# Patient Record
Sex: FEMALE | Race: WHITE | NOT HISPANIC OR LATINO | Employment: OTHER | URBAN - METROPOLITAN AREA
[De-identification: names, ages, dates, MRNs, and addresses within clinical notes are randomized per-mention and may not be internally consistent; named-entity substitution may affect disease eponyms.]

---

## 2017-01-18 DIAGNOSIS — M1A.0790 IDIOPATHIC CHRONIC GOUT OF FOOT WITHOUT TOPHUS, UNSPECIFIED LATERALITY: Primary | ICD-10-CM

## 2017-01-18 RX ORDER — ALLOPURINOL 100 MG/1
200 TABLET ORAL DAILY
Qty: 180 TABLET | Refills: 3 | Status: SHIPPED | OUTPATIENT
Start: 2017-01-18 | End: 2017-06-01

## 2017-01-18 NOTE — TELEPHONE ENCOUNTER
Routing refill request to provider for review/approval because:  Labs out of range  Saida Bolton, RAUL [facility] Triage.

## 2017-01-18 NOTE — TELEPHONE ENCOUNTER
allopurinol (ZYLOPRIM) 100 MG tablet       Last Written Prescription Date: 1/19/16  Last Fill Quantity: 180, # refills: 3  Last Office Visit with [facility] or [facility] prescribing provider:  5/19/16        URIC ACID   Date Value Ref Range Status   05/19/2016 4.4 2.6 - 6.0 mg/dL Final   ]  CREATININE   Date Value Ref Range Status   05/19/2016 1.29* 0.52 - 1.04 mg/dL Final   05/05/2009 78  Final     Comment:     Unit: mg/dL   ]  WBC      7.9   1/14/2016  RBC     4.33   1/14/2016  HGB     14.7   1/14/2016  HCT     43.3   1/14/2016  No components found with this name: mct  MCV      100   1/14/2016  MCH     33.9   1/14/2016  MCHC     33.9   1/14/2016  RDW     13.1   1/14/2016  PLT      237   1/14/2016  AST       30   5/2/2014  ALT       27   5/2/2014

## 2017-01-30 DIAGNOSIS — I10 HYPERTENSION GOAL BP (BLOOD PRESSURE) < 140/90: Primary | ICD-10-CM

## 2017-01-30 DIAGNOSIS — N18.30 CKD (CHRONIC KIDNEY DISEASE) STAGE 3, GFR 30-59 ML/MIN (H): ICD-10-CM

## 2017-01-30 NOTE — TELEPHONE ENCOUNTER
furosemide (LASIX) 20 MG tablet      Last Written Prescription Date: 2/2/16  Last Fill Quantity: 180, # refills: 3  Last Office Visit with [facility] or [facility] prescribing provider: 5/19/16       POTASSIUM   Date Value Ref Range Status   05/19/2016 3.9 3.4 - 5.3 mmol/L Final     CREATININE   Date Value Ref Range Status   05/19/2016 1.29* 0.52 - 1.04 mg/dL Final   05/05/2009 78  Final     Comment:     Unit: mg/dL     BP Readings from Last 3 Encounters:   05/19/16 133/70   02/24/16 120/71   02/02/16 136/80

## 2017-01-31 RX ORDER — FUROSEMIDE 20 MG
20 TABLET ORAL 2 TIMES DAILY
Qty: 180 TABLET | Refills: 3 | Status: SHIPPED | OUTPATIENT
Start: 2017-01-31 | End: 2017-06-01

## 2017-02-03 ENCOUNTER — OFFICE VISIT (OUTPATIENT)
Dept: INTERNAL MEDICINE | Facility: CLINIC | Age: 82
End: 2017-02-03
Payer: COMMERCIAL

## 2017-02-03 VITALS
WEIGHT: 144 LBS | SYSTOLIC BLOOD PRESSURE: 128 MMHG | BODY MASS INDEX: 27.65 KG/M2 | TEMPERATURE: 97.8 F | HEART RATE: 100 BPM | DIASTOLIC BLOOD PRESSURE: 69 MMHG | OXYGEN SATURATION: 96 %

## 2017-02-03 DIAGNOSIS — E78.5 HYPERLIPIDEMIA LDL GOAL <130: ICD-10-CM

## 2017-02-03 DIAGNOSIS — R73.02 GLUCOSE INTOLERANCE (IMPAIRED GLUCOSE TOLERANCE): ICD-10-CM

## 2017-02-03 DIAGNOSIS — R21 RASH: ICD-10-CM

## 2017-02-03 DIAGNOSIS — N18.30 CKD (CHRONIC KIDNEY DISEASE) STAGE 3, GFR 30-59 ML/MIN (H): ICD-10-CM

## 2017-02-03 DIAGNOSIS — N20.0 KIDNEY STONES: ICD-10-CM

## 2017-02-03 DIAGNOSIS — M1A.00X0 IDIOPATHIC CHRONIC GOUT WITHOUT TOPHUS, UNSPECIFIED SITE: ICD-10-CM

## 2017-02-03 DIAGNOSIS — E03.8 OTHER SPECIFIED HYPOTHYROIDISM: ICD-10-CM

## 2017-02-03 DIAGNOSIS — I10 HYPERTENSION GOAL BP (BLOOD PRESSURE) < 140/90: Primary | ICD-10-CM

## 2017-02-03 PROCEDURE — 99213 OFFICE O/P EST LOW 20 MIN: CPT | Performed by: INTERNAL MEDICINE

## 2017-02-03 RX ORDER — MOMETASONE FUROATE 1 MG/G
CREAM TOPICAL
Qty: 15 G | Refills: 0 | Status: SHIPPED | OUTPATIENT
Start: 2017-02-03 | End: 2017-06-01

## 2017-02-03 RX ORDER — MOMETASONE FUROATE 1 MG/G
CREAM TOPICAL
Qty: 15 G | Refills: 0 | Status: SHIPPED | OUTPATIENT
Start: 2017-02-03 | End: 2017-02-03

## 2017-02-03 NOTE — MR AVS SNAPSHOT
After Visit Summary   2/3/2017    Helena Eldridge    MRN: [id number]           Patient Information     Date Of Birth          1935        Visit Information        Provider Department      2/3/2017 2:00 PM Sushma Nelson MD [facility]        Today's Diagnoses     Hypertension goal BP (blood pressure) < 140/90    -  1     CKD (chronic kidney disease) stage 3, GFR 30-59 ml/min         Glucose intolerance (impaired glucose tolerance)         Kidney stones         Hyperlipidemia LDL goal <130         Idiopathic chronic gout without tophus, unspecified site         Other specified hypothyroidism         Rash           Care Instructions    Apply ice/heat 2 - 3 times per day    Change your positioning.      Call to schedule imaging   -- CT scan kidneys prior to your next follow up            Follow-ups after your visit        Future tests that were ordered for you today     Open Future Orders        Priority Expected Expires Ordered    CT Abdomen Pelvis w/o Contrast Routine  2/3/2018 2/3/2017    Basic metabolic panel Routine  2/2/2018 2/3/2017    Lipid panel reflex to direct LDL Routine  2/2/2018 2/3/2017    Hemoglobin A1c Routine  2/3/2018 2/3/2017    TSH with free T4 reflex Routine  2/3/2018 2/3/2017    CBC with platelets Routine  2/3/2018 2/3/2017    Vitamin D Deficiency Routine  2/3/2018 2/3/2017    Albumin Random Urine Quantitative Routine 3/3/2017 2/3/2018 2/3/2017    Uric acid Routine  2/3/2018 2/3/2017            Who to contact     If you have questions or need follow up information about today's clinic visit or your schedule please contact [facility] directly at [phone].  Normal or non-critical lab and imaging results will be communicated to you by MyChart, letter or phone within 4 business days after the clinic has received the results. If you do not hear from us within 7 days, please contact the clinic through [employer] or  "phone. If you have a critical or abnormal lab result, we will notify you by phone as soon as possible.  Submit refill requests through [employer] or call your pharmacy and they will forward the refill request to us. Please allow 3 business days for your refill to be completed.          Additional Information About Your Visit        [employer] Information     [employer] lets you send messages to your doctor, view your test results, renew your prescriptions, schedule appointments and more. To sign up, go to [email or web address] . Click on \"Log in\" on the left side of the screen, which will take you to the Welcome page. Then click on \"Sign up Now\" on the right side of the page.     You will be asked to enter the access code listed below, as well as some personal information. Please follow the directions to create your username and password.     Your access code is: [email or web address]  Expires: 2017  2:34 PM     Your access code will  in 90 days. If you need help or a new code, please call your [place] clinic or [phone].        Care EveryWhere ID     This is your Care EveryWhere ID. This could be used by other organizations to access your [place] medical records  [id number]        Your Vitals Were     Pulse Temperature Pulse Oximetry             100 97.8  F (36.6  C) (Oral) 96%          Blood Pressure from Last 3 Encounters:   17 128/69   16 133/70   16 120/71    Weight from Last 3 Encounters:   17 144 lb (65.318 kg)   16 144 lb (65.318 kg)   16 145 lb (65.772 kg)                 Today's Medication Changes          These changes are accurate as of: 2/3/17  2:34 PM.  If you have any questions, ask your nurse or doctor.               These medicines have changed or have updated prescriptions.        Dose/Directions    * mometasone 0.1 % cream   Commonly known as:  ELOCON   This may have changed:  Another medication with the same name was added. Make sure you understand how and " when to take each.   Used for:  Rash   Changed by:  Sushma Nelson MD        Apply  topically daily as needed.   Quantity:  45 g   Refills:  1       * mometasone 0.1 % cream   Commonly known as:  ELOCON   This may have changed:  You were already taking a medication with the same name, and this prescription was added. Make sure you understand how and when to take each.   Used for:  Rash   Changed by:  Sushma Nelson MD        Apply sparingly to affected area twice daily for 14 days.  Do not apply to face.   Quantity:  15 g   Refills:  0       * Notice:  This list has 2 medication(s) that are the same as other medications prescribed for you. Read the directions carefully, and ask your doctor or other care provider to review them with you.         Where to get your medicines      Some of these will need a paper prescription and others can be bought over the counter.  Ask your nurse if you have questions.     Bring a paper prescription for each of these medications    - mometasone 0.1 % cream             Primary Care Provider Office Phone # Fax #    Sushma Nelson [phone]       [facility] [address] [place], [place]. \[facility] Outlook.\"" [id number]        Thank you!     Thank you for choosing [facility]  for your care. Our goal is always to provide you with excellent care. Hearing back from our patients is one way we can continue to improve our services. Please take a few minutes to complete the written survey that you may receive in the mail after your visit with us. Thank you!             Your Updated Medication List - Protect others around you: Learn how to safely use, store and throw away your medicines at www.disposemymeds.org.          This list is accurate as of: 2/3/17  2:34 PM.  Always use your most recent med list.                   Brand Name Dispense Instructions for use    albuterol 108 (90 BASE) MCG/ACT Inhaler    PROAIR HFA/PROVENTIL  HFA/VENTOLIN HFA    1 Inhaler    Inhale 2 puffs into the lungs every 6 hours as needed for shortness of breath / dyspnea or wheezing       allopurinol 100 MG tablet    ZYLOPRIM    180 tablet    Take 2 tablets (200 mg) by mouth daily       amLODIPine 5 MG tablet    NORVASC    180 tablet    Take 1 tablet (5 mg) by mouth 2 times daily       ammonium lactate 12 % cream    AMLACTIN    280 g    Apply  topically 2 times daily as needed. apply to affected area       ASPIRIN PO      Take 81 mg by mouth daily       BENADRYL 25 MG tablet   Generic drug:  diphenhydrAMINE      Take 25 mg by mouth At Bedtime       calcium + D 600-200 MG-UNIT Tabs   Generic drug:  calcium carbonate-vitamin D      Take 2 tablets by mouth 2 times daily.       clobetasol 0.05 % cream    TEMOVATE    45 g    Apply  topically daily as needed.       DOCUSATE SODIUM PO      Take 100 mg by mouth At Bedtime       furosemide 20 MG tablet    LASIX    180 tablet    Take 1 tablet (20 mg) by mouth 2 times daily (morning and lunch time)       ketoconazole 2 % cream    NIZORAL    15 g    Apply  topically daily.       levothyroxine 100 MCG tablet    SYNTHROID    90 tablet    Take 1 tablet (100 mcg) by mouth daily Except Sunday       * mometasone 0.1 % cream    ELOCON    45 g    Apply  topically daily as needed.       * mometasone 0.1 % cream    ELOCON    15 g    Apply sparingly to affected area twice daily for 14 days.  Do not apply to face.       OCUVITE ADULT 50+ Caps     30 capsule    Take 1 capsule by mouth daily       rOPINIRole 0.25 MG tablet    REQUIP    60 tablet    Take 1-2 tablets (0.25-0.5 mg) by mouth At Bedtime       STATIN NOT PRESCRIBED (INTENTIONAL)      by Other route continuous prn. Patient declined 5/4/12       * Notice:  This list has 2 medication(s) that are the same as other medications prescribed for you. Read the directions carefully, and ask your doctor or other care provider to review them with you.

## 2017-02-03 NOTE — PROGRESS NOTES
SUBJECTIVE:                                                    Helena Eldridge is a 81 year old female who presents to clinic today for the following health issues:      Pain behind left knee x2 weeks .  Aches when sitting and reading.  No problems with ambulation or sleep.      Meantime no hematuria or flank pain.  Feels very well.          Problem list and histories reviewed & adjusted, as indicated.  Additional history: as documented    Patient Active Problem List   Diagnosis     CARDIOVASCULAR SCREENING; LDL GOAL LESS THAN 100     CKD (chronic kidney disease) stage 3, GFR 30-59 ml/min     Glucose intolerance (impaired glucose tolerance)     Kidney stones     Advance care planning     Hypertension goal BP (blood pressure) < 140/90     Hyperlipidemia LDL goal <130     Lichen sclerosus et atrophicus of the vulva     Solitary kidney, acquired     Pulmonary nodule/lesion, solitary     Senile osteoporosis     Osteoarthritis of right knee     Medial meniscus tear     Cataracts, both eyes     Macular degeneration of both eyes, right eye greater than left eye     Pelvic fracture (H)     COPD, severe (H)     IPF (idiopathic pulmonary fibrosis) (H)     Other specified hypothyroidism     Idiopathic chronic gout     Past Surgical History   Procedure Laterality Date     C removal gallbladder       Hc revise median n/carpal tunnel surg       C venous thrombosis imaging       with pe     Tubal ligation       C nephrectomy       left partial 07-01-07       Social History   Substance Use Topics     Smoking status: Former Smoker     Types: Cigarettes     Quit date: 12/04/1969     Smokeless tobacco: Never Used     Alcohol Use: No      Comment: rare     Family History   Problem Relation Age of Onset     CANCER Brother      Glaucoma Mother      Macular Degeneration No family hx of      Alzheimer Disease Brother          Current Outpatient Prescriptions   Medication Sig Dispense Refill     mometasone (ELOCON) 0.1 % cream Apply  sparingly to affected area twice daily for 14 days.  Do not apply to face. 15 g 0     furosemide (LASIX) 20 MG tablet Take 1 tablet (20 mg) by mouth 2 times daily (morning and lunch time) 180 tablet 3     allopurinol (ZYLOPRIM) 100 MG tablet Take 2 tablets (200 mg) by mouth daily 180 tablet 3     ammonium lactate (AMLACTIN) 12 % cream Apply  topically 2 times daily as needed. apply to affected area 280 g 3     rOPINIRole (REQUIP) 0.25 MG tablet Take 1-2 tablets (0.25-0.5 mg) by mouth At Bedtime 60 tablet 1     levothyroxine (SYNTHROID) 100 MCG tablet Take 1 tablet (100 mcg) by mouth daily Except Sunday 90 tablet 3     amLODIPine (NORVASC) 5 MG tablet Take 1 tablet (5 mg) by mouth 2 times daily 180 tablet 3     Multiple Vitamins-Minerals (OCUVITE ADULT 50+) CAPS Take 1 capsule by mouth daily 30 capsule 12     DOCUSATE SODIUM PO Take 100 mg by mouth At Bedtime       ketoconazole (NIZORAL) 2 % cream Apply  topically daily. 15 g 3     mometasone (ELOCON) 0.1 % cream Apply  topically daily as needed. 45 g 1     diphenhydrAMINE (BENADRYL) 25 MG tablet Take 25 mg by mouth At Bedtime        Calcium Carbonate-Vitamin D (CALCIUM + D) 600-200 MG-UNIT per tablet Take 2 tablets by mouth 2 times daily.       clobetasol (TEMOVATE) 0.05 % cream Apply  topically daily as needed. 45 g 1     albuterol (PROAIR HFA, PROVENTIL HFA, VENTOLIN HFA) 108 (90 BASE) MCG/ACT inhaler Inhale 2 puffs into the lungs every 6 hours as needed for shortness of breath / dyspnea or wheezing 1 Inhaler 3     ASPIRIN PO Take 81 mg by mouth daily        STATIN NOT PRESCRIBED, INTENTIONAL, by Other route continuous prn. Patient declined 5/4/12  0     Allergies   Allergen Reactions     Ace Inhibitors Cough     Advicor      Doxycycline Nausea     Poor appetite     Fosamax      Severe substernal burning and dysphagia within 3 days     Valsartan Cramps     severe     Recent Labs   Lab Test  05/19/16   1104  03/16/16   0952   01/14/16   0955   05/19/15   0946    05/02/14   0823   04/24/13   0828   04/20/12   0907   A1C   --    --    --    --    --   5.4   --   5.3   --   5.2   --    --    LDL  126*   --    --    --    --   122   --   125   --   128   --   135*   HDL  41*   --    --    --    --   39*   --   30*   --   32*   --   30*   TRIG  228*   --    --    --    --   183*   --   216*   --   146   --   194*   ALT   --    --    --    --    --    --    --   27   --   24   --   22   CR  1.29*  1.33*   < >  1.22*   < >  1.16*   < >  1.17*   < >  1.20*   < >  1.34*   GFRESTIMATED  40*  38*   < >  42*   < >  45*   < >  45*   < >  44*   < >  38*   GFRESTBLACK  48*  46*   < >  51*   < >  54*   < >  54*   < >  53*   < >  47*   POTASSIUM  3.9  3.6   < >  3.6   < >  4.2   < >  4.2   < >  4.3   < >  4.3   TSH  1.36   --    --   1.15   --   1.01   --   3.31   --   0.62   --   0.35*    < > = values in this interval not displayed.      BP Readings from Last 3 Encounters:   02/03/17 128/69   05/19/16 133/70   02/24/16 120/71    Wt Readings from Last 3 Encounters:   02/03/17 144 lb (65.318 kg)   05/19/16 144 lb (65.318 kg)   02/24/16 145 lb (65.772 kg)                  Labs reviewed in EPIC  Problem list, Medication list, Allergies, and Medical/Social/Surgical histories reviewed in [facility] and updated as appropriate.    ROS:  Constitutional, HEENT, cardiovascular, pulmonary, gi and gu systems are negative, except as otherwise noted.    OBJECTIVE:                                                    /69 mmHg  Pulse 100  Temp(Src) 97.8  F (36.6  C) (Oral)  Wt 144 lb (65.318 kg)  SpO2 96%  Body mass index is 27.65 kg/(m^2).  GENERAL: healthy, alert and no distress  EYES: Eyes grossly normal to inspection, PERRL and conjunctivae and sclerae normal  MS: no gross musculoskeletal defects noted, no edema  MS:  Mild pain to palpation behind left knee, no palpable cyst, though patient has subjective sense of fullness.  No erythema or warmth.   SKIN: no suspicious lesions or rashes  NEURO:  Normal strength and tone, mentation intact and speech normal  PSYCH: mentation appears normal, affect normal/bright    Diagnostic Test Results:  Results for orders placed or performed in visit on 05/19/16   MA Screening Digital Bilateral    Narrative    SCREENING MAMMOGRAM, BILATERAL, DIGITAL w/CAD - 5/19/2016 12:16 PM    BREAST SYMPTOMS: No current breast complaints.     COMPARISON:  5-, 5-9-2014, 5-8-2013.    BREAST DENSITY: Scattered fibroglandular densities.    COMMENTS: No findings of suspicion for malignancy.            Impression    IMPRESSION: BI-RADS CATEGORY: 1 -  Negative    RECOMMENDED FOLLOW-UP: Annual Mammography.    Exam results letter mailed to patient.           FRANNY HERNANDEZ MD        ASSESSMENT/PLAN:                                                              ICD-10-CM    1. Hypertension goal BP (blood pressure) < 140/90 I10 Basic metabolic panel   2. CKD (chronic kidney disease) stage 3, GFR 30-59 ml/min N18.3 Basic metabolic panel     CBC with platelets     Vitamin D Deficiency     Albumin Random Urine Quantitative   3. Glucose intolerance (impaired glucose tolerance) R73.02 Hemoglobin A1c   4. Kidney stones N20.0 CT Abdomen Pelvis w/o Contrast   5. Hyperlipidemia LDL goal <130 E78.5 Lipid panel reflex to direct LDL   6. Idiopathic chronic gout without tophus, unspecified site [id number].00X0    7. Other specified hypothyroidism E03.8 TSH with free T4 reflex   8. Rash R21 Uric acid     mometasone (ELOCON) 0.1 % cream     DISCONTINUED: mometasone (ELOCON) 0.1 % cream     Conservative management for mild bakers cyst irritation, left.   Future orders for upcoming physical.        See Patient Instructions    Sushma Nelson MD  [facility]

## 2017-02-03 NOTE — NURSING NOTE
"Chief Complaint   Patient presents with     Pain     pain behind left knee x2 weeks      Health Maintenance     hgb,Dexa,fall risk        Initial /69 mmHg  Pulse 100  Temp(Src) 97.8  F (36.6  C) (Oral)  Wt 144 lb (65.318 kg)  SpO2 96% Estimated body mass index is 27.65 kg/(m^2) as calculated from the following:    Height as of 5/19/16: 5' 0.5\" (1.537 m).    Weight as of this encounter: 144 lb (65.318 kg).  BP completed using cuff size: regular  Deanna Watson ma      "

## 2017-02-03 NOTE — PATIENT INSTRUCTIONS
Apply ice/heat 2 - 3 times per day    Change your positioning.      Call to schedule imaging   -- CT scan kidneys prior to your next follow up

## 2017-02-13 ENCOUNTER — RADIANT APPOINTMENT (OUTPATIENT)
Dept: CT IMAGING | Facility: CLINIC | Age: 82
End: 2017-02-13
Attending: INTERNAL MEDICINE
Payer: COMMERCIAL

## 2017-02-13 DIAGNOSIS — N20.0 KIDNEY STONES: ICD-10-CM

## 2017-02-13 PROCEDURE — 74176 CT ABD & PELVIS W/O CONTRAST: CPT | Mod: TC

## 2017-02-13 NOTE — PROGRESS NOTES
Helena Eldridge    Here is your recent CT (stone protocol) report.  The two small left kidney stones are unchanged.  We will continue watchful waiting for now.      Thank you for completing this study!      Sincerely,     MANUELA MONREAL M.D.

## 2017-03-11 DIAGNOSIS — R25.2 CRAMP OF LIMB: ICD-10-CM

## 2017-03-13 RX ORDER — ROPINIROLE 0.25 MG/1
TABLET, FILM COATED ORAL
Qty: 60 TABLET | Refills: 1 | Status: SHIPPED | OUTPATIENT
Start: 2017-03-13 | End: 2017-06-01

## 2017-03-13 NOTE — TELEPHONE ENCOUNTER
Routing refill request to provider for review/approval because:  [facility] is not on [facility] refill protocol for med      Juli Figueroa RN  [facility]

## 2017-03-13 NOTE — TELEPHONE ENCOUNTER
rOPINIRole (REQUIP) 0.25 MG tablet     Last Written Prescription Date: 11/15/16  Last Fill Quantity: 60, # refills: 1  Last Office Visit with [facility], [facility] or [facility] prescribing provider: 2/3/17        BP Readings from Last 3 Encounters:   02/03/17 128/69   05/19/16 133/70   02/24/16 120/71

## 2017-04-10 DIAGNOSIS — R25.2 CRAMP OF LIMB: ICD-10-CM

## 2017-04-10 NOTE — TELEPHONE ENCOUNTER
rOPINIRole (REQUIP) 0.25 MG tablet     Last Written Prescription Date: 3-13-17  Last Fill Quantity: 60, # refills: 1  Last Office Visit with [facility], [facility] or [facility] prescribing provider: 2-3-17        BP Readings from Last 3 Encounters:   02/03/17 128/69   05/19/16 133/70   02/24/16 120/71

## 2017-04-11 RX ORDER — ROPINIROLE 0.25 MG/1
TABLET, FILM COATED ORAL
Qty: 60 TABLET | Refills: 1 | Status: SHIPPED | OUTPATIENT
Start: 2017-04-11 | End: 2017-06-01

## 2017-04-27 ENCOUNTER — OFFICE VISIT (OUTPATIENT)
Dept: AUDIOLOGY | Facility: CLINIC | Age: 82
End: 2017-04-27

## 2017-04-27 DIAGNOSIS — H90.3 SENSORINEURAL HEARING LOSS, BILATERAL: Primary | ICD-10-CM

## 2017-04-27 PROCEDURE — V5267 HEARING AID SUP/ACCESS/DEV: HCPCS | Performed by: AUDIOLOGIST

## 2017-04-27 PROCEDURE — 99207 ZZC NO CHARGE LOS: CPT | Performed by: AUDIOLOGIST

## 2017-04-27 NOTE — PROGRESS NOTES
HEARING AID RECHECK    Patient Name:  Helena Eldridge    Patient Age:   81 year old    :  1935    Background:   Patient is here to have her Widex BTE hearing aids cleaned and the tubing changed.     Procedures:   Re-tubed both hearing aids and fit to patient's ears. General cleaning of both hearing aids was performed. Biologic listening check finds the hearing aids sounding crisp and clear. Patient requested a new DryAid jar so she was provided one.     Plan:   Patient will return as needed for hearing aid concerns. Patient is now out of warranty for her hearing aids so all further visits will be at a charge. Patient is made aware of this and is in agreement.     CHARGES:    Hearing aid supply $15 for a DryAid Jar bill directly to patient.     James Lau CCC-A  Licensed Audiologist  2017

## 2017-04-27 NOTE — MR AVS SNAPSHOT
"              After Visit Summary   2017    Helena Eldridge    MRN: [id number]           Patient Information     Date Of Birth          1935        Visit Information        Provider Department      2017 10:00 AM [facility]        Today's Diagnoses     Sensorineural hearing loss, bilateral    -  1       Follow-ups after your visit        Who to contact     If you have questions or need follow up information about today's clinic visit or your schedule please contact [facility] directly at [phone].  Normal or non-critical lab and imaging results will be communicated to you by [employer], letter or phone within 4 business days after the clinic has received the results. If you do not hear from us within 7 days, please contact the clinic through [employer] or phone. If you have a critical or abnormal lab result, we will notify you by phone as soon as possible.  Submit refill requests through [employer] or call your pharmacy and they will forward the refill request to us. Please allow 3 business days for your refill to be completed.          Additional Information About Your Visit        MyChart Information     [employer] lets you send messages to your doctor, view your test results, renew your prescriptions, schedule appointments and more. To sign up, go to [email or web address] . Click on \"Log in\" on the left side of the screen, which will take you to the Welcome page. Then click on \"Sign up Now\" on the right side of the page.     You will be asked to enter the access code listed below, as well as some personal information. Please follow the directions to create your username and password.     Your access code is: [email or web address]  Expires: 2017  3:34 PM     Your access code will  in 90 days. If you need help or a new code, please call your [facility] or [phone].        Care EveryWhere ID     This is your Care EveryWhere ID. This could be used " by other organizations to access your [place] medical records  [id number]         Blood Pressure from Last 3 Encounters:   02/03/17 128/69   05/19/16 133/70   02/24/16 120/71    Weight from Last 3 Encounters:   02/03/17 144 lb (65.3 kg)   05/19/16 144 lb (65.3 kg)   02/24/16 145 lb (65.8 kg)              We Performed the Following     HEARING AID SUPPLY        Primary Care Provider Office Phone # Fax #    Sushma eNlson [phone]       [facility] [address]  [facility] [address]        Thank you!     Thank you for choosing [facility]  for your care. Our goal is always to provide you with excellent care. Hearing back from our patients is one way we can continue to improve our services. Please take a few minutes to complete the written survey that you may receive in the mail after your visit with us. Thank you!             Your Updated Medication List - Protect others around you: Learn how to safely use, store and throw away your medicines at www.disposemymeds.org.          This list is accurate as of: 4/27/17 10:34 AM.  Always use your most recent med list.                   Brand Name Dispense Instructions for use    albuterol 108 (90 BASE) MCG/ACT Inhaler    PROAIR HFA/PROVENTIL HFA/VENTOLIN HFA    1 Inhaler    Inhale 2 puffs into the lungs every 6 hours as needed for shortness of breath / dyspnea or wheezing       allopurinol 100 MG tablet    ZYLOPRIM    180 tablet    Take 2 tablets (200 mg) by mouth daily       amLODIPine 5 MG tablet    NORVASC    180 tablet    Take 1 tablet (5 mg) by mouth 2 times daily       ammonium lactate 12 % cream    AMLACTIN    280 g    Apply  topically 2 times daily as needed. apply to affected area       ASPIRIN PO      Take 81 mg by mouth daily       BENADRYL 25 MG tablet   Generic drug:  diphenhydrAMINE      Take 25 mg by mouth At Bedtime       calcium + D 600-200 MG-UNIT Tabs   Generic drug:  calcium  carbonate-vitamin D      Take 2 tablets by mouth 2 times daily.       clobetasol 0.05 % cream    TEMOVATE    45 g    Apply  topically daily as needed.       DOCUSATE SODIUM PO      Take 100 mg by mouth At Bedtime       furosemide 20 MG tablet    LASIX    180 tablet    Take 1 tablet (20 mg) by mouth 2 times daily (morning and lunch time)       ketoconazole 2 % cream    NIZORAL    15 g    Apply  topically daily.       levothyroxine 100 MCG tablet    SYNTHROID    90 tablet    Take 1 tablet (100 mcg) by mouth daily Except Sunday       * mometasone 0.1 % cream    ELOCON    45 g    Apply  topically daily as needed.       * mometasone 0.1 % cream    ELOCON    15 g    Apply sparingly to affected area twice daily for 14 days.  Do not apply to face.       OCUVITE ADULT 50+ Caps     30 capsule    Take 1 capsule by mouth daily       * rOPINIRole 0.25 MG tablet    REQUIP    60 tablet    TAKE 1-2 TABLETS BY MOUTH AT BEDTIME       * rOPINIRole 0.25 MG tablet    REQUIP    60 tablet    TAKE 1-2 TABLETS BY MOUTH AT BEDTIME       STATIN NOT PRESCRIBED (INTENTIONAL)      by Other route continuous prn. Patient declined 5/4/12       * Notice:  This list has 4 medication(s) that are the same as other medications prescribed for you. Read the directions carefully, and ask your doctor or other care provider to review them with you.

## 2017-05-24 ENCOUNTER — RADIANT APPOINTMENT (OUTPATIENT)
Dept: MAMMOGRAPHY | Facility: CLINIC | Age: 82
End: 2017-05-24
Payer: COMMERCIAL

## 2017-05-24 DIAGNOSIS — Z12.31 VISIT FOR SCREENING MAMMOGRAM: ICD-10-CM

## 2017-05-24 PROCEDURE — G0202 SCR MAMMO BI INCL CAD: HCPCS | Mod: TC

## 2017-05-30 DIAGNOSIS — I10 ESSENTIAL HYPERTENSION WITH GOAL BLOOD PRESSURE LESS THAN 140/90: ICD-10-CM

## 2017-05-30 NOTE — TELEPHONE ENCOUNTER
amLODIPine (NORVASC) 5 MG tablet      Last Written Prescription Date: 5-31-16  Last Fill Quantity: 180, # refills: 3    Last Office Visit with [facility], [facility] or [facility] prescribing provider:  2-3-17   Future Office Visit:    Next 5 appointments (look out 90 days)     Jun 01, 2017  1:40 PM CDT   PHYSICAL with Sushma Nelson MD   [facility]    [address] [place] [id number] [phone]                    BP Readings from Last 3 Encounters:   02/03/17 128/69   05/19/16 133/70   02/24/16 120/71

## 2017-06-01 ENCOUNTER — OFFICE VISIT (OUTPATIENT)
Dept: FAMILY MEDICINE | Facility: CLINIC | Age: 82
End: 2017-06-01
Payer: COMMERCIAL

## 2017-06-01 VITALS
SYSTOLIC BLOOD PRESSURE: 137 MMHG | HEIGHT: 60 IN | OXYGEN SATURATION: 93 % | WEIGHT: 139 LBS | DIASTOLIC BLOOD PRESSURE: 68 MMHG | TEMPERATURE: 98.4 F | BODY MASS INDEX: 27.29 KG/M2 | HEART RATE: 92 BPM

## 2017-06-01 DIAGNOSIS — Z00.00 ROUTINE GENERAL MEDICAL EXAMINATION AT A HEALTH CARE FACILITY: Primary | ICD-10-CM

## 2017-06-01 DIAGNOSIS — L85.3 DRY SKIN DERMATITIS: ICD-10-CM

## 2017-06-01 DIAGNOSIS — M1A.0790 IDIOPATHIC CHRONIC GOUT OF FOOT WITHOUT TOPHUS, UNSPECIFIED LATERALITY: ICD-10-CM

## 2017-06-01 DIAGNOSIS — M1A.00X0 IDIOPATHIC CHRONIC GOUT WITHOUT TOPHUS, UNSPECIFIED SITE: ICD-10-CM

## 2017-06-01 DIAGNOSIS — R21 RASH: ICD-10-CM

## 2017-06-01 DIAGNOSIS — Z90.5 SOLITARY KIDNEY, ACQUIRED: ICD-10-CM

## 2017-06-01 DIAGNOSIS — E03.8 OTHER SPECIFIED HYPOTHYROIDISM: ICD-10-CM

## 2017-06-01 DIAGNOSIS — I10 ESSENTIAL HYPERTENSION WITH GOAL BLOOD PRESSURE LESS THAN 140/90: ICD-10-CM

## 2017-06-01 DIAGNOSIS — I10 HYPERTENSION GOAL BP (BLOOD PRESSURE) < 140/90: ICD-10-CM

## 2017-06-01 DIAGNOSIS — Z63.6 CAREGIVER STRESS: ICD-10-CM

## 2017-06-01 DIAGNOSIS — E78.5 HYPERLIPIDEMIA LDL GOAL <130: ICD-10-CM

## 2017-06-01 DIAGNOSIS — N18.30 CKD (CHRONIC KIDNEY DISEASE) STAGE 3, GFR 30-59 ML/MIN (H): ICD-10-CM

## 2017-06-01 DIAGNOSIS — R25.2 CRAMP OF LIMB: ICD-10-CM

## 2017-06-01 DIAGNOSIS — H35.30 MACULAR DEGENERATION OF BOTH EYES: ICD-10-CM

## 2017-06-01 DIAGNOSIS — Z78.0 ASYMPTOMATIC POSTMENOPAUSAL STATUS: ICD-10-CM

## 2017-06-01 DIAGNOSIS — R73.02 GLUCOSE INTOLERANCE (IMPAIRED GLUCOSE TOLERANCE): ICD-10-CM

## 2017-06-01 DIAGNOSIS — N20.0 KIDNEY STONES: ICD-10-CM

## 2017-06-01 DIAGNOSIS — J44.9 COPD, SEVERE (H): ICD-10-CM

## 2017-06-01 LAB
ANION GAP SERPL CALCULATED.3IONS-SCNC: 7 MMOL/L (ref 3–14)
BUN SERPL-MCNC: 22 MG/DL (ref 7–30)
CALCIUM SERPL-MCNC: 10.2 MG/DL (ref 8.5–10.1)
CHLORIDE SERPL-SCNC: 100 MMOL/L (ref 94–109)
CHOLEST SERPL-MCNC: 217 MG/DL
CO2 SERPL-SCNC: 32 MMOL/L (ref 20–32)
CREAT SERPL-MCNC: 1.26 MG/DL (ref 0.52–1.04)
GFR SERPL CREATININE-BSD FRML MDRD: 41 ML/MIN/1.7M2
GLUCOSE SERPL-MCNC: 91 MG/DL (ref 70–99)
HBA1C MFR BLD: 5.7 % (ref 4.3–6)
HDLC SERPL-MCNC: 45 MG/DL
HGB BLD-MCNC: 14.6 G/DL (ref 11.7–15.7)
LDLC SERPL CALC-MCNC: 133 MG/DL
NONHDLC SERPL-MCNC: 172 MG/DL
POTASSIUM SERPL-SCNC: 3.1 MMOL/L (ref 3.4–5.3)
SODIUM SERPL-SCNC: 139 MMOL/L (ref 133–144)
TRIGL SERPL-MCNC: 193 MG/DL
TSH SERPL DL<=0.005 MIU/L-ACNC: 1.42 MU/L (ref 0.4–4)
URATE SERPL-MCNC: 4.9 MG/DL (ref 2.6–6)

## 2017-06-01 PROCEDURE — 84443 ASSAY THYROID STIM HORMONE: CPT | Performed by: INTERNAL MEDICINE

## 2017-06-01 PROCEDURE — 99397 PER PM REEVAL EST PAT 65+ YR: CPT | Performed by: INTERNAL MEDICINE

## 2017-06-01 PROCEDURE — 83036 HEMOGLOBIN GLYCOSYLATED A1C: CPT | Performed by: INTERNAL MEDICINE

## 2017-06-01 PROCEDURE — 99213 OFFICE O/P EST LOW 20 MIN: CPT | Mod: 25 | Performed by: INTERNAL MEDICINE

## 2017-06-01 PROCEDURE — 80048 BASIC METABOLIC PNL TOTAL CA: CPT | Performed by: INTERNAL MEDICINE

## 2017-06-01 PROCEDURE — 80061 LIPID PANEL: CPT | Performed by: INTERNAL MEDICINE

## 2017-06-01 PROCEDURE — 36415 COLL VENOUS BLD VENIPUNCTURE: CPT | Performed by: INTERNAL MEDICINE

## 2017-06-01 PROCEDURE — 85018 HEMOGLOBIN: CPT | Performed by: INTERNAL MEDICINE

## 2017-06-01 PROCEDURE — 84550 ASSAY OF BLOOD/URIC ACID: CPT | Performed by: INTERNAL MEDICINE

## 2017-06-01 RX ORDER — AMLODIPINE BESYLATE 5 MG/1
TABLET ORAL
Qty: 180 TABLET | Refills: 3 | Status: SHIPPED | OUTPATIENT
Start: 2017-06-01 | End: 2017-07-27

## 2017-06-01 RX ORDER — ROPINIROLE 0.25 MG/1
0.25 TABLET, FILM COATED ORAL DAILY
Qty: 90 TABLET | Refills: 3
Start: 2017-06-01 | End: 2017-07-27

## 2017-06-01 RX ORDER — ALBUTEROL SULFATE 90 UG/1
2 AEROSOL, METERED RESPIRATORY (INHALATION) EVERY 6 HOURS PRN
Qty: 2 INHALER | Refills: 3 | Status: SHIPPED | OUTPATIENT
Start: 2017-06-01 | End: 2019-03-17

## 2017-06-01 RX ORDER — CLOBETASOL PROPIONATE 0.5 MG/G
CREAM TOPICAL
Qty: 45 G | Refills: 1 | Status: SHIPPED | OUTPATIENT
Start: 2017-06-01 | End: 2019-11-18

## 2017-06-01 RX ORDER — MV-MN/OM3/DHA/EPA/FISH/LUT/ZEA 250-5-1 MG
1 CAPSULE ORAL DAILY
Qty: 30 CAPSULE | Refills: 12 | Status: SHIPPED | OUTPATIENT
Start: 2017-06-01

## 2017-06-01 RX ORDER — FUROSEMIDE 20 MG
20 TABLET ORAL 2 TIMES DAILY
Qty: 180 TABLET | Refills: 3 | Status: SHIPPED | OUTPATIENT
Start: 2017-06-01 | End: 2018-05-03

## 2017-06-01 RX ORDER — LEVOTHYROXINE SODIUM 100 UG/1
100 TABLET ORAL DAILY
Qty: 90 TABLET | Refills: 3 | Status: SHIPPED | OUTPATIENT
Start: 2017-06-01 | End: 2018-06-10

## 2017-06-01 RX ORDER — AMLODIPINE BESYLATE 5 MG/1
5 TABLET ORAL 2 TIMES DAILY
Qty: 180 TABLET | Refills: 3 | Status: SHIPPED | OUTPATIENT
Start: 2017-06-01 | End: 2017-11-20

## 2017-06-01 RX ORDER — AMMONIUM LACTATE 12 G/100G
CREAM TOPICAL
Qty: 280 G | Refills: 3 | Status: SHIPPED | OUTPATIENT
Start: 2017-06-01 | End: 2019-02-13

## 2017-06-01 RX ORDER — ALLOPURINOL 100 MG/1
200 TABLET ORAL DAILY
Qty: 180 TABLET | Refills: 3 | Status: SHIPPED | OUTPATIENT
Start: 2017-06-01 | End: 2018-07-11

## 2017-06-01 SDOH — SOCIAL STABILITY - SOCIAL INSECURITY: DEPENDENT RELATIVE NEEDING CARE AT HOME: Z63.6

## 2017-06-01 NOTE — MR AVS SNAPSHOT
After Visit Summary   6/1/2017    Helena Eldridge    MRN: [id number]           Patient Information     Date Of Birth          1935        Visit Information        Provider Department      6/1/2017 1:40 PM Sushma Nelson MD [facility]        Today's Diagnoses     Routine general medical examination at a health care facility    -  1    Caregiver stress        Kidney stones        Solitary kidney, acquired        COPD, severe (H)        Glucose intolerance (impaired glucose tolerance)        CKD (chronic kidney disease) stage 3, GFR 30-59 ml/min        Hypertension goal BP (blood pressure) < 140/90        Hyperlipidemia LDL goal <130        Other specified hypothyroidism        Idiopathic chronic gout without tophus, unspecified site        Asymptomatic postmenopausal status        Cramp of limb        Essential hypertension with goal blood pressure less than 140/90          Care Instructions      Preventive Health Recommendations    Female Ages 65 +    Yearly exam:     See your health care provider every year in order to  o Review health changes.   o Discuss preventive care.    o Review your medicines if your doctor has prescribed any.      You no longer need a yearly Pap test unless you've had an abnormal Pap test in the past 10 years. If you have vaginal symptoms, such as bleeding or discharge, be sure to talk with your provider about a Pap test.      Every 1 to 2 years, have a mammogram.  If you are over 69, talk with your health care provider about whether or not you want to continue having screening mammograms.      Every 10 years, have a colonoscopy. Or, have a yearly FIT test (stool test). These exams will check for colon cancer.       Have a cholesterol test every 5 years, or more often if your doctor advises it.       Have a diabetes test (fasting glucose) every three years. If you are at risk for diabetes, you should have this test more often.       At age 65,  have a bone density scan (DEXA) to check for osteoporosis (brittle bone disease).    Shots:    Get a flu shot each year.    Get a tetanus shot every 10 years.    Talk to your doctor about your pneumonia vaccines. There are now two you should receive - Pneumovax (PPSV 23) and Prevnar (PCV 13).    Talk to your doctor about the shingles vaccine.    Talk to your doctor about the hepatitis B vaccine.    Nutrition:     Eat at least 5 servings of fruits and vegetables each day.      Eat whole-grain bread, whole-wheat pasta and brown rice instead of white grains and rice.      Talk to your provider about Calcium and Vitamin D.     Lifestyle    Exercise at least 150 minutes a week (30 minutes a day, 5 days a week). This will help you control your weight and prevent disease.      Limit alcohol to one drink per day.      No smoking.       Wear sunscreen to prevent skin cancer.       See your dentist twice a year for an exam and cleaning.      See your eye doctor every 1 to 2 years to screen for conditions such as glaucoma, macular degeneration and cataracts.    Plan to see Dr Ramirez in Fall/Winter              Follow-ups after your visit        Follow-up notes from your care team     Return in about 6 months (around 12/1/2017) for Routine Visit.      Future tests that were ordered for you today     Open Future Orders        Priority Expected Expires Ordered    DEXA HIP/PELVIS/SPINE - Future Routine  5/19/2018 6/1/2017            Who to contact     If you have questions or need follow up information about today's clinic visit or your schedule please contact [facility] directly at [phone].  Normal or non-critical lab and imaging results will be communicated to you by MyChart, letter or phone within 4 business days after the clinic has received the results. If you do not hear from us within 7 days, please contact the clinic through MyChart or phone. If you have a critical or abnormal lab result, we will  "notify you by phone as soon as possible.  Submit refill requests through [employer] or call your pharmacy and they will forward the refill request to us. Please allow 3 business days for your refill to be completed.          Additional Information About Your Visit        [employer] Information     [employer] lets you send messages to your doctor, view your test results, renew your prescriptions, schedule appointments and more. To sign up, go to [email or web address] . Click on \"Log in\" on the left side of the screen, which will take you to the Welcome page. Then click on \"Sign up Now\" on the right side of the page.     You will be asked to enter the access code listed below, as well as some personal information. Please follow the directions to create your username and password.     Your access code is: [email or web address]  Expires: 2017  2:34 PM     Your access code will  in 90 days. If you need help or a new code, please call your [place] clinic or [phone].        Care EveryWhere ID     This is your Care EveryWhere ID. This could be used by other organizations to access your [place] medical records  [id number]        Your Vitals Were     Pulse Temperature Height Pulse Oximetry BMI (Body Mass Index)       92 98.4  F (36.9  C) (Oral) 5' (1.524 m) 93% 27.15 kg/m2        Blood Pressure from Last 3 Encounters:   17 137/68   17 128/69   16 133/70    Weight from Last 3 Encounters:   17 139 lb (63 kg)   17 144 lb (65.3 kg)   16 144 lb (65.3 kg)              We Performed the Following     Albumin Random Urine Quantitative     BASIC METABOLIC PANEL     Hemoglobin A1c     Hemoglobin     Lipid panel reflex to direct LDL     TSH with free T4 reflex     Uric acid          Today's Medication Changes          These changes are accurate as of: 17  2:34 PM.  If you have any questions, ask your nurse or doctor.               These medicines have changed or have updated prescriptions.     "    Dose/Directions    rOPINIRole 0.25 MG tablet   Commonly known as:  REQUIP   This may have changed:  See the new instructions.   Used for:  Cramp of limb   Changed by:  Sushma Nelson MD        Dose:  0.25 mg   Take 1 tablet (0.25 mg) by mouth daily   Quantity:  90 tablet   Refills:  3            Where to get your medicines      Some of these will need a paper prescription and others can be bought over the counter.  Ask your nurse if you have questions.     You don't need a prescription for these medications     rOPINIRole 0.25 MG tablet                Primary Care Provider Office Phone # Fax #    Sushma Nelson [phone]       [facility] [address]  [facility] [address]        Thank you!     Thank you for choosing [facility]  for your care. Our goal is always to provide you with excellent care. Hearing back from our patients is one way we can continue to improve our services. Please take a few minutes to complete the written survey that you may receive in the mail after your visit with us. Thank you!             Your Updated Medication List - Protect others around you: Learn how to safely use, store and throw away your medicines at www.disposemymeds.org.          This list is accurate as of: 6/1/17  2:34 PM.  Always use your most recent med list.                   Brand Name Dispense Instructions for use    albuterol 108 (90 BASE) MCG/ACT Inhaler    PROAIR HFA/PROVENTIL HFA/VENTOLIN HFA    1 Inhaler    Inhale 2 puffs into the lungs every 6 hours as needed for shortness of breath / dyspnea or wheezing       allopurinol 100 MG tablet    ZYLOPRIM    180 tablet    Take 2 tablets (200 mg) by mouth daily       amLODIPine 5 MG tablet    NORVASC    180 tablet    Take 1 tablet (5 mg) by mouth 2 times daily       ammonium lactate 12 % cream    AMLACTIN    280 g    Apply  topically 2 times daily as needed. apply to affected area       ASPIRIN  PO      Take 81 mg by mouth daily       BENADRYL 25 MG tablet   Generic drug:  diphenhydrAMINE      Take 25 mg by mouth At Bedtime       calcium + D 600-200 MG-UNIT Tabs   Generic drug:  calcium carbonate-vitamin D      Take 2 tablets by mouth 2 times daily.       clobetasol 0.05 % cream    TEMOVATE    45 g    Apply  topically daily as needed.       DOCUSATE SODIUM PO      Take 100 mg by mouth At Bedtime       furosemide 20 MG tablet    LASIX    180 tablet    Take 1 tablet (20 mg) by mouth 2 times daily (morning and lunch time)       ketoconazole 2 % cream    NIZORAL    15 g    Apply  topically daily.       levothyroxine 100 MCG tablet    SYNTHROID    90 tablet    Take 1 tablet (100 mcg) by mouth daily Except Sunday       mometasone 0.1 % cream    ELOCON    45 g    Apply  topically daily as needed.       OCUVITE ADULT 50+ Caps     30 capsule    Take 1 capsule by mouth daily       rOPINIRole 0.25 MG tablet    REQUIP    90 tablet    Take 1 tablet (0.25 mg) by mouth daily       STATIN NOT PRESCRIBED (INTENTIONAL)      by Other route continuous prn. Patient declined 5/4/12

## 2017-06-01 NOTE — LETTER
[facility]  [address] [place]  [address]  [phone]        June 2, 2017    Helena Eldridge  [address] [facility] [address]        Dear Melonie Malik are your results.  Your labs are normal/stable at this time.     Please call  with any questions.  We can also discuss any questions regarding these labs at your next scheduled visit.    Results for orders placed or performed in visit on 06/01/17   BASIC METABOLIC PANEL   Result Value Ref Range    Sodium 139 133 - 144 mmol/L    Potassium 3.1 (L) 3.4 - 5.3 mmol/L    Chloride 100 94 - 109 mmol/L    Carbon Dioxide 32 20 - 32 mmol/L    Anion Gap 7 3 - 14 mmol/L    Glucose 91 70 - 99 mg/dL    Urea Nitrogen 22 7 - 30 mg/dL    Creatinine 1.26 (H) 0.52 - 1.04 mg/dL    GFR Estimate 41 (L) >60 mL/min/1.7m2    GFR Estimate If Black 49 (L) >60 mL/min/1.7m2    Calcium 10.2 (H) 8.5 - 10.1 mg/dL   Hemoglobin   Result Value Ref Range    Hemoglobin 14.6 11.7 - 15.7 g/dL   Lipid panel reflex to direct LDL   Result Value Ref Range    Cholesterol 217 (H) <200 mg/dL    Triglycerides 193 (H) <150 mg/dL    HDL Cholesterol 45 (L) >49 mg/dL    LDL Cholesterol Calculated 133 (H) <100 mg/dL    Non HDL Cholesterol 172 (H) <130 mg/dL   TSH with free T4 reflex   Result Value Ref Range    TSH 1.42 0.40 - 4.00 mU/L   Hemoglobin A1c   Result Value Ref Range    Hemoglobin A1C 5.7 4.3 - 6.0 %   Uric acid   Result Value Ref Range    Uric Acid 4.9 2.6 - 6.0 mg/dL       If you have any questions please call the clinic at [phone].    Sincerely,    Sushma ZAMBRANO

## 2017-06-01 NOTE — NURSING NOTE
Chief Complaint   Patient presents with     Physical     Health Maintenance     Hgb,Dexa,eye,tsh,BMP,lipid,microalbumin        Initial /68 (BP Location: Right arm, Patient Position: Chair, Cuff Size: Adult Regular)  Pulse 92  Temp 98.4  F (36.9  C) (Oral)  Ht 5' (1.524 m)  Wt 139 lb (63 kg)  SpO2 93%  BMI 27.15 kg/m2 Estimated body mass index is 27.15 kg/(m^2) as calculated from the following:    Height as of this encounter: 5' (1.524 m).    Weight as of this encounter: 139 lb (63 kg).  Medication Reconciliation: complete   Deanna Watson ma

## 2017-06-01 NOTE — PROGRESS NOTES
SUBJECTIVE:                                                            Helena Eldridge is a 81 year old female who presents for Preventive Visit.    STRESS:  Significant other has metastatic prostate cancer, recently diagnosed.  Patient is trying to manage his pain.  She feels she has resilience and support from friends/family  Are you in the first 12 months of your Medicare Part B coverage?  No    Healthy Habits:    Do you get at least three servings of calcium containing foods daily (dairy, green leafy vegetables, etc.)? no, taking calcium and/or vitamin D supplement: yes -     Amount of exercise or daily activities, outside of work: 0 day(s) per week    Problems taking medications regularly No    Medication side effects: No    Have you had an eye exam in the past two years? yes    Do you see a dentist twice per year? no    Do you have sleep apnea, excessive snoring or daytime drowsiness?no    COGNITIVE SCREEN  1) Repeat 3 items (Banana, Sunrise, Chair)    2) Clock draw: NORMAL  3) 3 item recall: Recalls 3 objects  Results: 3 items recalled: COGNITIVE IMPAIRMENT LESS LIKELY    Mini-CogTM Copyright TIARA Cam. Licensed by the author for use in [facility]; reprinted with permission (delilah@[email or web address]). All rights reserved.            No concerns    Reviewed and updated as needed this visit by clinical staff  Tobacco  Allergies  Med Hx  Surg Hx  Fam Hx  Soc Hx        Reviewed and updated as needed this visit by Provider        Social History   Substance Use Topics     Smoking status: Former Smoker     Types: Cigarettes     Quit date: 12/4/1969     Smokeless tobacco: Never Used     Alcohol use No      Comment: rare       The patient does not drink >3 drinks per day nor >7 drinks per week.    Today's PHQ-2 Score:   PHQ-2 ( 1999 [employer]) 2/3/2017 5/19/2016   Q1: Little interest or pleasure in doing things 0 0   Q2: Feeling down, depressed or hopeless 0 0   PHQ-2 Score 0 0     PHQ 2 = 0  Do you feel safe in  your environment - Yes    Do you have a Health Care Directive?: Yes: Advance Directive has been received and scanned.    Current providers sharing in care for this patient include:   Patient Care Team:  Sushma Nelson MD as PCP - Monica Oliver as       Hearing impairment: Yes, has hearing aids    Ability to successfully perform activities of daily living: Yes, no assistance needed     Fall risk:       Home safety:  none identified      The following health maintenance items are reviewed in Epic and correct as of today:  Health Maintenance   Topic Date Due     HEMOGLOBIN Q1 YR  01/14/2017     DEXA Q3 YR  02/28/2017     EYE EXAM Q1 YEAR  05/04/2017     TSH Q1 YEAR  05/19/2017     BMP Q1 YR  05/19/2017     LIPID MONITORING Q1 YEAR  05/19/2017     MICROALBUMIN Q1 YEAR  05/19/2017     INFLUENZA VACCINE (SYSTEM ASSIGNED)  09/01/2017     FALL RISK ASSESSMENT  02/03/2018     TETANUS IMMUNIZATION (SYSTEM ASSIGNED)  04/30/2020     ADVANCE DIRECTIVE PLANNING Q5 YRS  04/19/2021     PNEUMOCOCCAL  Completed               ROS:  Constitutional, HEENT, cardiovascular, pulmonary, GI, , musculoskeletal, neuro, skin, endocrine and psych systems are negative, except as otherwise noted.    Problem list, Medication list, Allergies, and Medical/Social/Surgical histories reviewed in EPIC and updated as appropriate.  Labs reviewed in EPIC  BP Readings from Last 3 Encounters:   06/01/17 137/68   02/03/17 128/69   05/19/16 133/70    Wt Readings from Last 3 Encounters:   06/01/17 139 lb (63 kg)   02/03/17 144 lb (65.3 kg)   05/19/16 144 lb (65.3 kg)                  Patient Active Problem List   Diagnosis     CARDIOVASCULAR SCREENING; LDL GOAL LESS THAN 100     CKD (chronic kidney disease) stage 3, GFR 30-59 ml/min     Glucose intolerance (impaired glucose tolerance)     Kidney stones     Advance care planning     Hypertension goal BP (blood pressure) < 140/90     Hyperlipidemia LDL goal <130     Lichen  sclerosus et atrophicus of the vulva     Solitary kidney, acquired     Pulmonary nodule/lesion, solitary     Senile osteoporosis     Osteoarthritis of right knee     Medial meniscus tear     Cataracts, both eyes     Macular degeneration of both eyes, right eye greater than left eye     Pelvic fracture (H)     COPD, severe (H)     IPF (idiopathic pulmonary fibrosis) (H)     Other specified hypothyroidism     Idiopathic chronic gout     Past Surgical History:   Procedure Laterality Date     C NEPHRECTOMY      left partial 07-01-07     C REMOVAL GALLBLADDER       C VENOUS THROMBOSIS IMAGING      with pe     HC REVISE MEDIAN N/CARPAL TUNNEL SURG       TUBAL LIGATION         Social History   Substance Use Topics     Smoking status: Former Smoker     Types: Cigarettes     Quit date: 12/4/1969     Smokeless tobacco: Never Used     Alcohol use No      Comment: rare     Family History   Problem Relation Age of Onset     CANCER Brother      Glaucoma Mother      Alzheimer Disease Brother      Macular Degeneration No family hx of          Current Outpatient Prescriptions   Medication Sig Dispense Refill     rOPINIRole (REQUIP) 0.25 MG tablet TAKE 1-2 TABLETS BY MOUTH AT BEDTIME (Patient taking differently: QD) 60 tablet 1     furosemide (LASIX) 20 MG tablet Take 1 tablet (20 mg) by mouth 2 times daily (morning and lunch time) 180 tablet 3     allopurinol (ZYLOPRIM) 100 MG tablet Take 2 tablets (200 mg) by mouth daily 180 tablet 3     ammonium lactate (AMLACTIN) 12 % cream Apply  topically 2 times daily as needed. apply to affected area 280 g 3     clobetasol (TEMOVATE) 0.05 % cream Apply  topically daily as needed. 45 g 1     levothyroxine (SYNTHROID) 100 MCG tablet Take 1 tablet (100 mcg) by mouth daily Except Sunday 90 tablet 3     amLODIPine (NORVASC) 5 MG tablet Take 1 tablet (5 mg) by mouth 2 times daily 180 tablet 3     Multiple Vitamins-Minerals (OCUVITE ADULT 50+) CAPS Take 1 capsule by mouth daily 30 capsule 12      albuterol (PROAIR HFA, PROVENTIL HFA, VENTOLIN HFA) 108 (90 BASE) MCG/ACT inhaler Inhale 2 puffs into the lungs every 6 hours as needed for shortness of breath / dyspnea or wheezing 1 Inhaler 3     DOCUSATE SODIUM PO Take 100 mg by mouth At Bedtime       ketoconazole (NIZORAL) 2 % cream Apply  topically daily. 15 g 3     mometasone (ELOCON) 0.1 % cream Apply  topically daily as needed. 45 g 1     diphenhydrAMINE (BENADRYL) 25 MG tablet Take 25 mg by mouth At Bedtime        Calcium Carbonate-Vitamin D (CALCIUM + D) 600-200 MG-UNIT per tablet Take 2 tablets by mouth 2 times daily.       [DISCONTINUED] rOPINIRole (REQUIP) 0.25 MG tablet TAKE 1-2 TABLETS BY MOUTH AT BEDTIME (Patient taking differently: QD) 60 tablet 1     ASPIRIN PO Take 81 mg by mouth daily        STATIN NOT PRESCRIBED, INTENTIONAL, by Other route continuous prn. Patient declined 5/4/12  0     Allergies   Allergen Reactions     Ace Inhibitors Cough     Advicor      Doxycycline Nausea     Poor appetite     Fosamax      Severe substernal burning and dysphagia within 3 days     Valsartan Cramps     severe     Recent Labs   Lab Test  05/19/16   1104  03/16/16   0952   01/14/16   0955   05/19/15   0946   05/02/14   0823   04/24/13   0828   04/20/12   0907   A1C   --    --    --    --    --   5.4   --   5.3   --   5.2   --    --    LDL  126*   --    --    --    --   122   --   125   --   128   --   135*   HDL  41*   --    --    --    --   39*   --   30*   --   32*   --   30*   TRIG  228*   --    --    --    --   183*   --   216*   --   146   --   194*   ALT   --    --    --    --    --    --    --   27   --   24   --   22   CR  1.29*  1.33*   < >  1.22*   < >  1.16*   < >  1.17*   < >  1.20*   < >  1.34*   GFRESTIMATED  40*  38*   < >  42*   < >  45*   < >  45*   < >  44*   < >  38*   GFRESTBLACK  48*  46*   < >  51*   < >  54*   < >  54*   < >  53*   < >  47*   POTASSIUM  3.9  3.6   < >  3.6   < >  4.2   < >  4.2   < >  4.3   < >  4.3   TSH  1.36   --    --    1.15   --   1.01   --   3.31   --   0.62   --   0.35*    < > = values in this interval not displayed.      OBJECTIVE:                                                            /68 (BP Location: Right arm, Patient Position: Chair, Cuff Size: Adult Regular)  Pulse 92  Temp 98.4  F (36.9  C) (Oral)  Ht 5' (1.524 m)  Wt 139 lb (63 kg)  SpO2 93%  BMI 27.15 kg/m2 Estimated body mass index is 27.15 kg/(m^2) as calculated from the following:    Height as of this encounter: 5' (1.524 m).    Weight as of this encounter: 139 lb (63 kg).  EXAM:   GENERAL APPEARANCE: healthy, alert and no distress  EYES: Eyes grossly normal to inspection, PERRL and conjunctivae and sclerae normal  HENT: ear canals and TM's normal, nose and mouth without ulcers or lesions, oropharynx clear and oral mucous membranes moist  NECK: no adenopathy, no asymmetry, masses, or scars and thyroid normal to palpation  RESP: lungs clear to auscultation - no rales, rhonchi or wheezes  BREAST: normal without masses, tenderness or nipple discharge and no palpable axillary masses or adenopathy  CV: regular rate and rhythm, normal S1 S2, no S3 or S4, no murmur, click or rub, no peripheral edema and peripheral pulses strong  ABDOMEN: soft, nontender, no hepatosplenomegaly, no masses and bowel sounds normal  ABDOMEN:  Rosalia scar.  Bilateral kidney surgery scars (stone right, pyelo left)   (female): normal urethral meatus, vaginal mucosal atrophy noted, normal cervix, adnexae, and uterus without masses. and severe lichen sclerosis  MS: no musculoskeletal defects are noted and gait is age appropriate without ataxia  SKIN: no suspicious lesions or rashes  NEURO: Normal strength and tone, sensory exam grossly normal, mentation intact and speech normal  PSYCH: mentation appears normal and affect normal/bright    ASSESSMENT / PLAN:                                                                ICD-10-CM    1. Routine general medical examination at a [facility]  care facility Z00.00    2. Caregiver stress Z63.6    3. Kidney stones N20.0    4. Solitary kidney, acquired Z90.5 BASIC METABOLIC PANEL   5. COPD, severe (H) J44.9    6. Glucose intolerance (impaired glucose tolerance) R73.02 Hemoglobin A1c   7. CKD (chronic kidney disease) stage 3, GFR 30-59 ml/min N18.3 BASIC METABOLIC PANEL     Hemoglobin     Albumin Random Urine Quantitative   8. Hypertension goal BP (blood pressure) < 140/90 I10    9. Hyperlipidemia LDL goal <130 E78.5 Lipid panel reflex to direct LDL   10. Other specified hypothyroidism E03.8 TSH with free T4 reflex   11. Idiopathic chronic gout without tophus, unspecified site M1A.00X0 Uric acid   12. Asymptomatic postmenopausal status Z78.0 DEXA HIP/PELVIS/SPINE - Future          End of Life Planning:  Patient currently has an advanced directive: Yes.  Practitioner is supportive of decision.    COUNSELING:  Reviewed preventive health counseling, as reflected in patient instructions       Regular exercise        Estimated body mass index is 27.15 kg/(m^2) as calculated from the following:    Height as of this encounter: 5' (1.524 m).    Weight as of this encounter: 139 lb (63 kg).     reports that she quit smoking about 47 years ago. Her smoking use included Cigarettes. She has never used smokeless tobacco.      Appropriate preventive services were discussed with this patient, including applicable screening as appropriate for cardiovascular disease, diabetes, osteopenia/osteoporosis, and glaucoma.  As appropriate for age/gender, discussed screening for colorectal cancer, prostate cancer, breast cancer, and cervical cancer. Checklist reviewing preventive services available has been given to the patient.    Reviewed patients plan of care and provided an AVS. The Basic Care Plan (routine screening as documented in Health Maintenance) for Helena meets the Care Plan requirement. This Care Plan has been established and reviewed with the Patient.    Counseling  Resources:  ATP IV Guidelines  Pooled Cohorts Equation Calculator  Breast Cancer Risk Calculator  FRAX Risk Assessment  ICSI Preventive Guidelines  Dietary Guidelines for Americans, 2010  USDA's MyPlate  ASA Prophylaxis  Lung CA Screening    Sushma Nelson MD  [facility]

## 2017-06-01 NOTE — PATIENT INSTRUCTIONS

## 2017-06-02 NOTE — PROGRESS NOTES
Helena Eldridge      Enclosed are your results.  Your labs are normal/stable at this time.     Please call  with any questions.  We can also discuss any questions regarding these labs at your next scheduled visit.    Sincerely,    Sushma Nelson M.D.

## 2017-06-02 NOTE — TELEPHONE ENCOUNTER
Patient was just seen today, advised routine visit in 6 months.    BP Readings from Last 3 Encounters:   06/01/17 137/68   02/03/17 128/69   05/19/16 133/70     Prescription approved per [facility] Refill Protocol.    Yanique Fritz RN  [facility]

## 2017-06-12 DIAGNOSIS — R25.2 CRAMP OF LIMB: ICD-10-CM

## 2017-06-12 RX ORDER — ROPINIROLE 0.25 MG/1
TABLET, FILM COATED ORAL
Qty: 60 TABLET | Refills: 1 | Status: SHIPPED | OUTPATIENT
Start: 2017-06-12 | End: 2018-04-11

## 2017-06-12 NOTE — TELEPHONE ENCOUNTER
rOPINIRole (REQUIP) 0.25 MG tablet     Last Written Prescription Date: 6-1-17  Last Fill Quantity: 90, # refills: 3  Last Office Visit with [facility], [facility] or [facility] prescribing provider: 6-1-17        BP Readings from Last 3 Encounters:   06/01/17 137/68   02/03/17 128/69   05/19/16 133/70

## 2017-06-12 NOTE — TELEPHONE ENCOUNTER
"Routing refill request to provider for review/approval because:  Last Rx was \"no print out\" so not actually sent, RN unable to refill.  Yanique Fritz RN  [facility]            "

## 2017-07-27 ENCOUNTER — OFFICE VISIT (OUTPATIENT)
Dept: FAMILY MEDICINE | Facility: CLINIC | Age: 82
End: 2017-07-27
Payer: COMMERCIAL

## 2017-07-27 VITALS
WEIGHT: 138 LBS | DIASTOLIC BLOOD PRESSURE: 65 MMHG | SYSTOLIC BLOOD PRESSURE: 129 MMHG | BODY MASS INDEX: 26.95 KG/M2 | OXYGEN SATURATION: 93 % | HEART RATE: 87 BPM | TEMPERATURE: 97.9 F

## 2017-07-27 DIAGNOSIS — Z90.5 SOLITARY KIDNEY, ACQUIRED: ICD-10-CM

## 2017-07-27 DIAGNOSIS — F43.29 ADJUSTMENT DISORDER WITH MIXED EMOTIONAL FEATURES: Primary | ICD-10-CM

## 2017-07-27 DIAGNOSIS — N20.0 KIDNEY STONES: ICD-10-CM

## 2017-07-27 DIAGNOSIS — N18.30 CKD (CHRONIC KIDNEY DISEASE) STAGE 3, GFR 30-59 ML/MIN (H): ICD-10-CM

## 2017-07-27 DIAGNOSIS — M25.512 LEFT SHOULDER PAIN, UNSPECIFIED CHRONICITY: ICD-10-CM

## 2017-07-27 DIAGNOSIS — E87.6 LOW BLOOD POTASSIUM: ICD-10-CM

## 2017-07-27 PROCEDURE — 80048 BASIC METABOLIC PNL TOTAL CA: CPT | Performed by: INTERNAL MEDICINE

## 2017-07-27 PROCEDURE — 99214 OFFICE O/P EST MOD 30 MIN: CPT | Performed by: INTERNAL MEDICINE

## 2017-07-27 PROCEDURE — 82043 UR ALBUMIN QUANTITATIVE: CPT | Performed by: INTERNAL MEDICINE

## 2017-07-27 PROCEDURE — 36415 COLL VENOUS BLD VENIPUNCTURE: CPT | Performed by: INTERNAL MEDICINE

## 2017-07-27 NOTE — MR AVS SNAPSHOT
"              After Visit Summary   7/27/2017    Helena Eldridge    MRN: [id number]           Patient Information     Date Of Birth          1935        Visit Information        Provider Department      7/27/2017 5:00 PM Sushma Nelson MD [facility]        Today's Diagnoses     Adjustment disorder with mixed emotional features    -  1    Left shoulder pain, unspecified chronicity        Kidney stones        CKD (chronic kidney disease) stage 3, GFR 30-59 ml/min        Solitary kidney, acquired        Low blood potassium          Care Instructions    \"ABC's\" and \"wall crawl\" three times daily    Diclofenac gel up to twice daily  ... You can also try over the counter aspercreme for example..     Consider injection to left shoulder (with me or you can request orthopedic referral)  ---------------------------------------------------------------------  due to see Dr Ramirez this Fall , follow up stones              Follow-ups after your visit        Follow-up notes from your care team     Return if symptoms worsen or fail to improve.      Who to contact     If you have questions or need follow up information about today's clinic visit or your schedule please contact [facility] directly at [phone].  Normal or non-critical lab and imaging results will be communicated to you by Dragon Armyhart, letter or phone within 4 business days after the clinic has received the results. If you do not hear from us within 7 days, please contact the clinic through Friends Aroundt or phone. If you have a critical or abnormal lab result, we will notify you by phone as soon as possible.  Submit refill requests through Wyzerr or call your pharmacy and they will forward the refill request to us. Please allow 3 business days for your refill to be completed.          Additional Information About Your Visit        Dragon ArmyharValued Relationships Information     Wyzerr lets you send messages to your doctor, view your test " "results, renew your prescriptions, schedule appointments and more. To sign up, go to [email or web address] . Click on \"Log in\" on the left side of the screen, which will take you to the Welcome page. Then click on \"Sign up Now\" on the right side of the page.     You will be asked to enter the access code listed below, as well as some personal information. Please follow the directions to create your username and password.     Your access code is: [email or web address]  Expires: 2017  2:34 PM     Your access code will  in 90 days. If you need help or a new code, please call your [place] clinic or [phone].        Care EveryWhere ID     This is your Care EveryWhere ID. This could be used by other organizations to access your [place] medical records  [id number]        Your Vitals Were     Pulse Temperature Pulse Oximetry BMI (Body Mass Index)          87 97.9  F (36.6  C) (Oral) 93% 26.95 kg/m2         Blood Pressure from Last 3 Encounters:   17 129/65   17 137/68   17 128/69    Weight from Last 3 Encounters:   17 138 lb (62.6 kg)   17 139 lb (63 kg)   17 144 lb (65.3 kg)              We Performed the Following     Albumin Random Urine Quantitative     Basic metabolic panel          Today's Medication Changes          These changes are accurate as of: 17  5:29 PM.  If you have any questions, ask your nurse or doctor.               Start taking these medicines.        Dose/Directions    diclofenac sodium 3 % Gel   Used for:  Left shoulder pain, unspecified chronicity   Started by:  Sushma Nelson MD        Dose:  2 g   Place 2 g onto the skin 2 times daily as needed   Quantity:  100 g   Refills:  3            Where to get your medicines      These medications were sent [facility] [place] [address] [place] [facility] - [place] - [address]  [address] [place] [address]     Phone:  [phone]     diclofenac sodium 3 % Gel                Primary Care Provider Office Phone # " Fax #    Sushma Nelson [phone]       [facility] [address]  [facility] [address]        Equal Access to Services     SHRUTHI DEL TORO : Hadii aad ku hadsteveparas Padminicrystal, waaxda luqadaha, qaybta kaalmada luis, kayla doyleatilio multanibandar rodriguez riccardo pelaez. So [facility] [phone].    ATENCIÓN: Si habla español, tiene a jones disposición servicios gratuitos de asistencia lingüística. Llame al [phone].    We comply with applicable federal civil rights laws and [place] laws. We do not discriminate on the basis of race, color, national origin, age, disability sex, sexual orientation or gender identity.            Thank you!     Thank you for choosing [facility]  for your care. Our goal is always to provide you with excellent care. Hearing back from our patients is one way we can continue to improve our services. Please take a few minutes to complete the written survey that you may receive in the mail after your visit with us. Thank you!             Your Updated Medication List - Protect others around you: Learn how to safely use, store and throw away your medicines at www.disposemymeds.org.          This list is accurate as of: 7/27/17  5:29 PM.  Always use your most recent med list.                   Brand Name Dispense Instructions for use Diagnosis    albuterol 108 (90 BASE) MCG/ACT Inhaler    PROAIR HFA/PROVENTIL HFA/VENTOLIN HFA    2 Inhaler    Inhale 2 puffs into the lungs every 6 hours as needed for shortness of breath / dyspnea or wheezing    COPD, severe (H)       allopurinol 100 MG tablet    ZYLOPRIM    180 tablet    Take 2 tablets (200 mg) by mouth daily    Idiopathic chronic gout of foot without tophus, unspecified laterality       amLODIPine 5 MG tablet    NORVASC    180 tablet    Take 1 tablet (5 mg) by mouth 2 times daily    Essential hypertension with goal blood pressure less than 140/90       ammonium lactate 12 % cream    AMLACTIN     280 g    Apply  topically 2 times daily as needed. apply to affected area    Dry skin dermatitis       ASPIRIN PO      Take 81 mg by mouth daily        BENADRYL 25 MG tablet   Generic drug:  diphenhydrAMINE      Take 25 mg by mouth At Bedtime    DIAGNOSIS NOT YET DEFINED       calcium + D 600-200 MG-UNIT Tabs   Generic drug:  calcium carbonate-vitamin D      Take 2 tablets by mouth 2 times daily.    DIAGNOSIS NOT YET DEFINED       clobetasol 0.05 % cream    TEMOVATE    45 g    Apply  topically daily as needed.    Rash       diclofenac sodium 3 % Gel     100 g    Place 2 g onto the skin 2 times daily as needed    Left shoulder pain, unspecified chronicity       DOCUSATE SODIUM PO      Take 100 mg by mouth At Bedtime        furosemide 20 MG tablet    LASIX    180 tablet    Take 1 tablet (20 mg) by mouth 2 times daily (morning and lunch time)    Hypertension goal BP (blood pressure) < 140/90, CKD (chronic kidney disease) stage 3, GFR 30-59 ml/min       ketoconazole 2 % cream    NIZORAL    15 g    Apply  topically daily.    Sciatic pain       levothyroxine 100 MCG tablet    SYNTHROID    90 tablet    Take 1 tablet (100 mcg) by mouth daily Except Sunday    Other specified hypothyroidism       mometasone 0.1 % cream    ELOCON    45 g    Apply  topically daily as needed.    Rash       OCUVITE ADULT 50+ Caps     30 capsule    Take 1 capsule by mouth daily    Macular degeneration of both eyes       rOPINIRole 0.25 MG tablet    REQUIP    60 tablet    TAKE 1-2 TABLETS BY MOUTH AT BEDTIME    Cramp of limb       STATIN NOT PRESCRIBED (INTENTIONAL)      by Other route continuous prn. Patient declined 5/4/12

## 2017-07-27 NOTE — PATIENT INSTRUCTIONS
"\"ABC's\" and \"wall crawl\" three times daily    Diclofenac gel up to twice daily  ... You can also try over the counter aspercreme for example..     Consider injection to left shoulder (with me or you can request orthopedic referral)  ---------------------------------------------------------------------  due to see Dr Ramirez this Fall , follow up stones      "

## 2017-07-27 NOTE — PROGRESS NOTES
SUBJECTIVE:                                                    Helena Eldridge is a 81 year old female who presents to clinic today for the following health issues:    80 y/o F caregiver (sig other with metastatic prostate cancer) with CKD, h/o renal stones (s/p partial nephrectomy), CKD 3, COPD, gout, here with shoulder pain.      Joint Pain    Onset: 2-3 weeks     Description:   Location: left shoulder  Character: Dull ache    Intensity: mild    Progression of Symptoms: same    Accompanying Signs & Symptoms:  Other symptoms: none    History:   Previous similar pain: no       Precipitating factors:   Trauma or overuse: YES    Alleviating factors:  Improved by: aleve     Therapies Tried and outcome: aleve      Stress:  Her significant other has metastatic prostate cancer.          Problem list and histories reviewed & adjusted, as indicated.  Additional history: as documented    Patient Active Problem List   Diagnosis     CARDIOVASCULAR SCREENING; LDL GOAL LESS THAN 100     CKD (chronic kidney disease) stage 3, GFR 30-59 ml/min     Glucose intolerance (impaired glucose tolerance)     Kidney stones     Advance care planning     Hypertension goal BP (blood pressure) < 140/90     Hyperlipidemia LDL goal <130     Lichen sclerosus et atrophicus of the vulva     Solitary kidney, acquired     Pulmonary nodule/lesion, solitary     Senile osteoporosis     Osteoarthritis of right knee     Medial meniscus tear     Cataracts, both eyes     Macular degeneration of both eyes, right eye greater than left eye     Pelvic fracture (H)     COPD, severe (H)     IPF (idiopathic pulmonary fibrosis) (H)     Other specified hypothyroidism     Idiopathic chronic gout     Past Surgical History:   Procedure Laterality Date     C NEPHRECTOMY      left partial 07-01-07     C VENOUS THROMBOSIS IMAGING      with pe     HC REMOVAL GALLBLADDER       HC REVISE MEDIAN N/CARPAL TUNNEL SURG       TUBAL LIGATION         Social History   Substance Use  Topics     Smoking status: Former Smoker     Types: Cigarettes     Quit date: 12/4/1969     Smokeless tobacco: Never Used     Alcohol use No      Comment: rare     Family History   Problem Relation Age of Onset     CANCER Brother      Glaucoma Mother      Alzheimer Disease Brother      Macular Degeneration No family hx of          Current Outpatient Prescriptions   Medication Sig Dispense Refill     rOPINIRole (REQUIP) 0.25 MG tablet TAKE 1-2 TABLETS BY MOUTH AT BEDTIME 60 tablet 1     levothyroxine (SYNTHROID) 100 MCG tablet Take 1 tablet (100 mcg) by mouth daily Except Sunday 90 tablet 3     amLODIPine (NORVASC) 5 MG tablet Take 1 tablet (5 mg) by mouth 2 times daily 180 tablet 3     furosemide (LASIX) 20 MG tablet Take 1 tablet (20 mg) by mouth 2 times daily (morning and lunch time) 180 tablet 3     allopurinol (ZYLOPRIM) 100 MG tablet Take 2 tablets (200 mg) by mouth daily 180 tablet 3     ammonium lactate (AMLACTIN) 12 % cream Apply  topically 2 times daily as needed. apply to affected area 280 g 3     clobetasol (TEMOVATE) 0.05 % cream Apply  topically daily as needed. 45 g 1     Multiple Vitamins-Minerals (OCUVITE ADULT 50+) CAPS Take 1 capsule by mouth daily 30 capsule 12     albuterol (PROAIR HFA/PROVENTIL HFA/VENTOLIN HFA) 108 (90 BASE) MCG/ACT Inhaler Inhale 2 puffs into the lungs every 6 hours as needed for shortness of breath / dyspnea or wheezing 2 Inhaler 3     ASPIRIN PO Take 81 mg by mouth daily        DOCUSATE SODIUM PO Take 100 mg by mouth At Bedtime       STATIN NOT PRESCRIBED, INTENTIONAL, by Other route continuous prn. Patient declined 5/4/12  0     ketoconazole (NIZORAL) 2 % cream Apply  topically daily. 15 g 3     mometasone (ELOCON) 0.1 % cream Apply  topically daily as needed. 45 g 1     diphenhydrAMINE (BENADRYL) 25 MG tablet Take 25 mg by mouth At Bedtime        Calcium Carbonate-Vitamin D (CALCIUM + D) 600-200 MG-UNIT per tablet Take 2 tablets by mouth 2 times daily.       [DISCONTINUED]  amLODIPine (NORVASC) 5 MG tablet TAKE 1 TABLET (5 MG) BY MOUTH 2 TIMES DAILY 180 tablet 3     [DISCONTINUED] rOPINIRole (REQUIP) 0.25 MG tablet Take 1 tablet (0.25 mg) by mouth daily 90 tablet 3     Allergies   Allergen Reactions     Ace Inhibitors Cough     Advicor      Doxycycline Nausea     Poor appetite     Fosamax      Severe substernal burning and dysphagia within 3 days     Valsartan Cramps     severe     Recent Labs   Lab Test  06/01/17   1436  05/19/16   1104   05/19/15   0946   05/02/14   0823   04/24/13   0828   04/20/12   0907   A1C  5.7   --    --   5.4   --   5.3   --   5.2   --    --    LDL  133*  126*   --   122   --   125   --   128   --   135*   HDL  45*  41*   --   39*   --   30*   --   32*   --   30*   TRIG  193*  228*   --   183*   --   216*   --   146   --   194*   ALT   --    --    --    --    --   27   --   24   --   22   CR  1.26*  1.29*   < >  1.16*   < >  1.17*   < >  1.20*   < >  1.34*   GFRESTIMATED  41*  40*   < >  45*   < >  45*   < >  44*   < >  38*   GFRESTBLACK  49*  48*   < >  54*   < >  54*   < >  53*   < >  47*   POTASSIUM  3.1*  3.9   < >  4.2   < >  4.2   < >  4.3   < >  4.3   TSH  1.42  1.36   < >  1.01   --   3.31   --   0.62   --   0.35*    < > = values in this interval not displayed.      BP Readings from Last 3 Encounters:   07/27/17 129/65   06/01/17 137/68   02/03/17 128/69    Wt Readings from Last 3 Encounters:   07/27/17 138 lb (62.6 kg)   06/01/17 139 lb (63 kg)   02/03/17 144 lb (65.3 kg)                  Labs reviewed in EPIC          Reviewed and updated as needed this visit by clinical staffTobacco  Allergies  Med Hx  Surg Hx  Fam Hx  Soc Hx      Reviewed and updated as needed this visit by Provider         ROS:  Constitutional, HEENT, cardiovascular, pulmonary, gi and gu systems are negative, except as otherwise noted.      OBJECTIVE:   /65 (BP Location: Right arm, Patient Position: Chair, Cuff Size: Adult Regular)  Pulse 87  Temp 97.9  F (36.6  C)  "(Oral)  Wt 138 lb (62.6 kg)  SpO2 93%  BMI 26.95 kg/m2  Body mass index is 26.95 kg/(m^2).  GENERAL: healthy, alert and no distress  EYES: Eyes grossly normal to inspection, PERRL and conjunctivae and sclerae normal  MS: no gross musculoskeletal defects noted, no edema  MS: Shoulder exam - both sides normal; full range of motion, no pain on motion,some tenderness at left supraspinatous tendon noted.  No deformity noted.    NEURO: Normal strength and tone, mentation intact and speech normal  PSYCH: mentation appears normal, affect normal/bright, tearful at times when discussing her loved one.     Diagnostic Test Results:  none     ASSESSMENT/PLAN:        ICD-10-CM    1. Adjustment disorder with mixed emotional features F43.29    2. Left shoulder pain, unspecified chronicity M25.512 diclofenac sodium 3 % GEL   3. Kidney stones N20.0    4. CKD (chronic kidney disease) stage 3, GFR 30-59 ml/min N18.3 Albumin Random Urine Quantitative     Basic metabolic panel   5. Solitary kidney, acquired Z90.5 Basic metabolic panel   6. Low blood potassium E87.6 Basic metabolic panel       Taught home therapy for shoulder (Unable to go to therapy, is caregiver right now)  No oral NSAIDS     Patient Instructions   \"ABC's\" and \"wall crawl\" three times daily    Diclofenac gel up to twice daily  ... You can also try over the counter aspercreme for example..     Consider injection to left shoulder (with me or you can request orthopedic referral)  ---------------------------------------------------------------------  due to see Dr Ramirez this Fall , follow up stones            Sushma Nelson MD  [facility]    "

## 2017-07-27 NOTE — LETTER
[facility]  [address] [place]  [address]  [phone]        July 31, 2017    Helena Eldridge  [address] [facility] [address]        Dear Helena,    There is a change in your kidney function.  Please work on good hydration -- I think this can play a role during stress.  Try to drink 1.5 - 2 L per day.    Results for orders placed or performed in visit on 07/27/17   Albumin Random Urine Quantitative   Result Value Ref Range    Creatinine Urine 91 mg/dL    Albumin Urine mg/L 25 mg/L    Albumin Urine mg/g Cr 27.17 (H) 0 - 25 mg/g Cr   Basic metabolic panel   Result Value Ref Range    Sodium 138 133 - 144 mmol/L    Potassium 3.5 3.4 - 5.3 mmol/L    Chloride 100 94 - 109 mmol/L    Carbon Dioxide 32 20 - 32 mmol/L    Anion Gap 6 3 - 14 mmol/L    Glucose 89 70 - 99 mg/dL    Urea Nitrogen 28 7 - 30 mg/dL    Creatinine 1.49 (H) 0.52 - 1.04 mg/dL    GFR Estimate 34 (L) >60 mL/min/1.7m2    GFR Estimate If Black 41 (L) >60 mL/min/1.7m2    Calcium 10.0 8.5 - 10.1 mg/dL       If you have any questions please call the clinic at [phone].    Sincerely,    Sushma ZAMBRANO

## 2017-07-27 NOTE — NURSING NOTE
Chief Complaint   Patient presents with     Shoulder Pain     left shoulder      Health Maintenance     eye,microalbumin       Initial /65 (BP Location: Right arm, Patient Position: Chair, Cuff Size: Adult Regular)  Pulse 87  Temp 97.9  F (36.6  C) (Oral)  Wt 138 lb (62.6 kg)  SpO2 93%  BMI 26.95 kg/m2 Estimated body mass index is 26.95 kg/(m^2) as calculated from the following:    Height as of 6/1/17: 5' (1.524 m).    Weight as of this encounter: 138 lb (62.6 kg).  Medication Reconciliation: complete   Deanna Watson ma

## 2017-07-28 ENCOUNTER — TELEPHONE (OUTPATIENT)
Dept: FAMILY MEDICINE | Facility: CLINIC | Age: 82
End: 2017-07-28

## 2017-07-28 LAB
ANION GAP SERPL CALCULATED.3IONS-SCNC: 6 MMOL/L (ref 3–14)
BUN SERPL-MCNC: 28 MG/DL (ref 7–30)
CALCIUM SERPL-MCNC: 10 MG/DL (ref 8.5–10.1)
CHLORIDE SERPL-SCNC: 100 MMOL/L (ref 94–109)
CO2 SERPL-SCNC: 32 MMOL/L (ref 20–32)
CREAT SERPL-MCNC: 1.49 MG/DL (ref 0.52–1.04)
CREAT UR-MCNC: 91 MG/DL
GFR SERPL CREATININE-BSD FRML MDRD: 34 ML/MIN/1.7M2
GLUCOSE SERPL-MCNC: 89 MG/DL (ref 70–99)
MICROALBUMIN UR-MCNC: 25 MG/L
MICROALBUMIN/CREAT UR: 27.17 MG/G CR (ref 0–25)
POTASSIUM SERPL-SCNC: 3.5 MMOL/L (ref 3.4–5.3)
SODIUM SERPL-SCNC: 138 MMOL/L (ref 133–144)

## 2017-07-28 NOTE — PROGRESS NOTES
Helena Eldridge    There is a change in your kidney function.  Please work on good hydration -- I think this can play a role during stress.  Try to drink 1.5 - 2 L per day.    MANUELA Perez M.D.

## 2017-08-08 ENCOUNTER — OFFICE VISIT (OUTPATIENT)
Dept: FAMILY MEDICINE | Facility: CLINIC | Age: 82
End: 2017-08-08
Payer: COMMERCIAL

## 2017-08-08 VITALS
BODY MASS INDEX: 26.76 KG/M2 | SYSTOLIC BLOOD PRESSURE: 127 MMHG | DIASTOLIC BLOOD PRESSURE: 63 MMHG | WEIGHT: 137 LBS | TEMPERATURE: 97.8 F | HEART RATE: 86 BPM | OXYGEN SATURATION: 96 %

## 2017-08-08 DIAGNOSIS — N18.30 CKD (CHRONIC KIDNEY DISEASE) STAGE 3, GFR 30-59 ML/MIN (H): ICD-10-CM

## 2017-08-08 DIAGNOSIS — Z13.5 SCREENING FOR DIABETIC RETINOPATHY: ICD-10-CM

## 2017-08-08 DIAGNOSIS — L72.3 SEBACEOUS CYST: Primary | ICD-10-CM

## 2017-08-08 DIAGNOSIS — S46.001D ROTATOR CUFF INJURY, RIGHT, SUBSEQUENT ENCOUNTER: ICD-10-CM

## 2017-08-08 PROCEDURE — 99214 OFFICE O/P EST MOD 30 MIN: CPT | Mod: 25 | Performed by: INTERNAL MEDICINE

## 2017-08-08 PROCEDURE — 20610 DRAIN/INJ JOINT/BURSA W/O US: CPT | Mod: RT | Performed by: INTERNAL MEDICINE

## 2017-08-08 RX ORDER — CEPHALEXIN 500 MG/1
500 CAPSULE ORAL 2 TIMES DAILY
Qty: 10 CAPSULE | Refills: 0 | Status: SHIPPED | OUTPATIENT
Start: 2017-08-08 | End: 2017-08-13

## 2017-08-08 NOTE — PROGRESS NOTES
SUBJECTIVE:                                                    Helena Eldridge is a 81 year old female who presents to clinic today for the following health issues:    82 y/o F caregiver (sig other with metastatic prostate cancer) with CKD, h/o renal stones (s/p partial nephrectomy), CKD 3, COPD, gout, here with shoulder pain...  home therapy taught to pt at last visit (due to caregiver status, unable to get to therapy).  THis was not successful...     ALSO, recent creatinine rise. pt asked to improve hydration and will recheck .      Left shoulder pain x 3 weeks   Vaginal Symptoms      Duration: 3-4 days    Description  Hard lump    Intensity:  mild    Accompanying signs and symptoms (fever/dysuria/abdominal or back pain): None    History  Sexually active: not at present  Possibility of pregnancy: No  Recent antibiotic use: no     Precipitating or alleviating factors: None    Therapies tried and outcome: none   Outcome:            Problem list and histories reviewed & adjusted, as indicated.  Additional history: as documented    Patient Active Problem List   Diagnosis     CARDIOVASCULAR SCREENING; LDL GOAL LESS THAN 100     CKD (chronic kidney disease) stage 3, GFR 30-59 ml/min     Glucose intolerance (impaired glucose tolerance)     Kidney stones     Advance care planning     Hypertension goal BP (blood pressure) < 140/90     Hyperlipidemia LDL goal <130     Lichen sclerosus et atrophicus of the vulva     Solitary kidney, acquired     Pulmonary nodule/lesion, solitary     Senile osteoporosis     Osteoarthritis of right knee     Medial meniscus tear     Cataracts, both eyes     Macular degeneration of both eyes, right eye greater than left eye     Pelvic fracture (H)     COPD, severe (H)     IPF (idiopathic pulmonary fibrosis) (H)     Other specified hypothyroidism     Idiopathic chronic gout     Past Surgical History:   Procedure Laterality Date     C NEPHRECTOMY      left partial 07-01-07     C VENOUS THROMBOSIS  IMAGING      with pe     HC REMOVAL GALLBLADDER       HC REVISE MEDIAN N/CARPAL TUNNEL SURG       TUBAL LIGATION         Social History   Substance Use Topics     Smoking status: Former Smoker     Types: Cigarettes     Quit date: 12/4/1969     Smokeless tobacco: Never Used     Alcohol use No      Comment: rare     Family History   Problem Relation Age of Onset     CANCER Brother      Glaucoma Mother      Alzheimer Disease Brother      Macular Degeneration No family hx of          Current Outpatient Prescriptions   Medication Sig Dispense Refill     cephALEXin (KEFLEX) 500 MG capsule Take 1 capsule (500 mg) by mouth 2 times daily for 5 days 10 capsule 0     rOPINIRole (REQUIP) 0.25 MG tablet TAKE 1-2 TABLETS BY MOUTH AT BEDTIME 60 tablet 1     levothyroxine (SYNTHROID) 100 MCG tablet Take 1 tablet (100 mcg) by mouth daily Except Sunday 90 tablet 3     amLODIPine (NORVASC) 5 MG tablet Take 1 tablet (5 mg) by mouth 2 times daily 180 tablet 3     furosemide (LASIX) 20 MG tablet Take 1 tablet (20 mg) by mouth 2 times daily (morning and lunch time) 180 tablet 3     allopurinol (ZYLOPRIM) 100 MG tablet Take 2 tablets (200 mg) by mouth daily 180 tablet 3     ammonium lactate (AMLACTIN) 12 % cream Apply  topically 2 times daily as needed. apply to affected area 280 g 3     clobetasol (TEMOVATE) 0.05 % cream Apply  topically daily as needed. 45 g 1     Multiple Vitamins-Minerals (OCUVITE ADULT 50+) CAPS Take 1 capsule by mouth daily 30 capsule 12     albuterol (PROAIR HFA/PROVENTIL HFA/VENTOLIN HFA) 108 (90 BASE) MCG/ACT Inhaler Inhale 2 puffs into the lungs every 6 hours as needed for shortness of breath / dyspnea or wheezing 2 Inhaler 3     ASPIRIN PO Take 81 mg by mouth daily        DOCUSATE SODIUM PO Take 100 mg by mouth At Bedtime       STATIN NOT PRESCRIBED, INTENTIONAL, by Other route continuous prn. Patient declined 5/4/12  0     ketoconazole (NIZORAL) 2 % cream Apply  topically daily. 15 g 3     mometasone (ELOCON)  0.1 % cream Apply  topically daily as needed. 45 g 1     diphenhydrAMINE (BENADRYL) 25 MG tablet Take 25 mg by mouth At Bedtime        Calcium Carbonate-Vitamin D (CALCIUM + D) 600-200 MG-UNIT per tablet Take 2 tablets by mouth 2 times daily.       Allergies   Allergen Reactions     Ace Inhibitors Cough     Advicor      Doxycycline Nausea     Poor appetite     Fosamax      Severe substernal burning and dysphagia within 3 days     Valsartan Cramps     severe     Recent Labs   Lab Test  07/27/17   1736  06/01/17   1436  05/19/16   1104   05/19/15   0946   05/02/14   0823   04/24/13   0828   04/20/12   0907   A1C   --   5.7   --    --   5.4   --   5.3   --   5.2   --    --    LDL   --   133*  126*   --   122   --   125   --   128   --   135*   HDL   --   45*  41*   --   39*   --   30*   --   32*   --   30*   TRIG   --   193*  228*   --   183*   --   216*   --   146   --   194*   ALT   --    --    --    --    --    --   27   --   24   --   22   CR  1.49*  1.26*  1.29*   < >  1.16*   < >  1.17*   < >  1.20*   < >  1.34*   GFRESTIMATED  34*  41*  40*   < >  45*   < >  45*   < >  44*   < >  38*   GFRESTBLACK  41*  49*  48*   < >  54*   < >  54*   < >  53*   < >  47*   POTASSIUM  3.5  3.1*  3.9   < >  4.2   < >  4.2   < >  4.3   < >  4.3   TSH   --   1.42  1.36   < >  1.01   --   3.31   --   0.62   --   0.35*    < > = values in this interval not displayed.      BP Readings from Last 3 Encounters:   08/08/17 127/63   07/27/17 129/65   06/01/17 137/68    Wt Readings from Last 3 Encounters:   08/08/17 137 lb (62.1 kg)   07/27/17 138 lb (62.6 kg)   06/01/17 139 lb (63 kg)                  Labs reviewed in EPIC          Reviewed and updated as needed this visit by clinical staffTobacco  Allergies  Med Hx  Surg Hx  Fam Hx  Soc Hx      Reviewed and updated as needed this visit by Provider         ROS:  Constitutional, HEENT, cardiovascular, pulmonary, gi and gu systems are negative, except as otherwise noted.      OBJECTIVE:    /63 (BP Location: Right arm, Patient Position: Chair, Cuff Size: Adult Regular)  Pulse 86  Temp 97.8  F (36.6  C) (Oral)  Wt 137 lb (62.1 kg)  SpO2 96%  BMI 26.76 kg/m2  Body mass index is 26.76 kg/(m^2).  GENERAL: healthy, alert and no distress  EYES: Eyes grossly normal to inspection, PERRL and conjunctivae and sclerae normal  : Sebaceous cyst right labia, small.  Able to drain all using pressure. Small area erythema .   MS: no gross musculoskeletal defects noted, no edema  MS: Shoulder exam shows positive impingement signs are present with pain at high arc of abduction and forward flexion on right. There is tenderness of the right supraspinatous area. .    NEURO: Normal strength and tone, mentation intact and speech normal  PSYCH: mentation appears normal, affect normal/bright    Diagnostic Test Results:  Results for orders placed or performed in visit on 07/27/17   Albumin Random Urine Quantitative   Result Value Ref Range    Creatinine Urine 91 mg/dL    Albumin Urine mg/L 25 mg/L    Albumin Urine mg/g Cr 27.17 (H) 0 - 25 mg/g Cr   Basic metabolic panel   Result Value Ref Range    Sodium 138 133 - 144 mmol/L    Potassium 3.5 3.4 - 5.3 mmol/L    Chloride 100 94 - 109 mmol/L    Carbon Dioxide 32 20 - 32 mmol/L    Anion Gap 6 3 - 14 mmol/L    Glucose 89 70 - 99 mg/dL    Urea Nitrogen 28 7 - 30 mg/dL    Creatinine 1.49 (H) 0.52 - 1.04 mg/dL    GFR Estimate 34 (L) >60 mL/min/1.7m2    GFR Estimate If Black 41 (L) >60 mL/min/1.7m2    Calcium 10.0 8.5 - 10.1 mg/dL       This procedure has been fully reviewed with the patient and written informed consent has been obtained.  After cleaning area with betadine, a steroid injection was performed at  Left shoulder  using 1% plain Lidocaine (1 cc) and 40 mg of K40 (1 cc). This was well tolerated.    ASSESSMENT/PLAN:             ICD-10-CM    1. Sebaceous cyst L72.3 cephALEXin (KEFLEX) 500 MG capsule   2. Screening for diabetic retinopathy Z13.5    3. CKD (chronic  kidney disease) stage 3, GFR 30-59 ml/min N18.3    4. Rotator cuff injury, right, subsequent encounter S46.001D TRIAMCINOLONE ACET INJ NOS     DRAIN/INJECT LARGE JOINT/BURSA          Patient Instructions   Hot packs to area daily    Keflex 500 mg twice daily for 5 days.        Sushma Nelson MD  [facility]

## 2017-08-08 NOTE — MR AVS SNAPSHOT
"              After Visit Summary   8/8/2017    Helena Eldridge    MRN: [id number]           Patient Information     Date Of Birth          1935        Visit Information        Provider Department      8/8/2017 10:40 AM Sushma Neslon MD [facility]        Today's Diagnoses     Sebaceous cyst    -  1    Screening for diabetic retinopathy        CKD (chronic kidney disease) stage 3, GFR 30-59 ml/min        Rotator cuff injury, right, subsequent encounter          Care Instructions    Hot packs to area daily    Keflex 500 mg twice daily for 5 days.            Follow-ups after your visit        Who to contact     If you have questions or need follow up information about today's clinic visit or your schedule please contact [facility] directly at [phone].  Normal or non-critical lab and imaging results will be communicated to you by ALEXANDALEXAhart, letter or phone within 4 business days after the clinic has received the results. If you do not hear from us within 7 days, please contact the clinic through ALEXANDALEXAhart or phone. If you have a critical or abnormal lab result, we will notify you by phone as soon as possible.  Submit refill requests through [employer] or call your pharmacy and they will forward the refill request to us. Please allow 3 business days for your refill to be completed.          Additional Information About Your Visit        ALEXANDALEXAharRendeevoo Information     DuckDuckGo lets you send messages to your doctor, view your test results, renew your prescriptions, schedule appointments and more. To sign up, go to [email or web address] . Click on \"Log in\" on the left side of the screen, which will take you to the Welcome page. Then click on \"Sign up Now\" on the right side of the page.     You will be asked to enter the access code listed below, as well as some personal information. Please follow the directions to create your username and password.     Your access code is: " [email or web address]  Expires: 2017  2:34 PM     Your access code will  in 90 days. If you need help or a new code, please call your [facility] clinic or [phone].        Care EveryWhere ID     This is your Care EveryWhere ID. This could be used by other organizations to access your [facility] medical records  [id number]        Your Vitals Were     Pulse Temperature Pulse Oximetry BMI (Body Mass Index)          86 97.8  F (36.6  C) (Oral) 96% 26.76 kg/m2         Blood Pressure from Last 3 Encounters:   17 127/63   17 129/65   17 137/68    Weight from Last 3 Encounters:   17 137 lb (62.1 kg)   17 138 lb (62.6 kg)   17 139 lb (63 kg)              We Performed the Following     DRAIN/INJECT LARGE JOINT/BURSA     TRIAMCINOLONE ACET INJ NOS          Today's Medication Changes          These changes are accurate as of: 17 11:13 AM.  If you have any questions, ask your nurse or doctor.               Start taking these medicines.        Dose/Directions    cephALEXin 500 MG capsule   Commonly known as:  KEFLEX   Used for:  Sebaceous cyst   Started by:  Sushma Nelson MD        Dose:  500 mg   Take 1 capsule (500 mg) by mouth 2 times daily for 5 days   Quantity:  10 capsule   Refills:  0            Where to get your medicines      These medications were sent [facility] [facility] [place], [place] - [address]  [address], [facility] [address]     Phone:  [phone]     cephALEXin 500 MG capsule                Primary Care Provider Office Phone # Fax #    Sushma Nelson [phone]       [facility] [address] [facility] [address]        Equal Access to Services     SHRUTHI DEL TORO : Hadii britany Renner, waaxda luqadaha, qaybta kaalmada luis, kayla pelaez. So [facility] [phone].    ATENCIÓN: Si habla español, tiene a jones disposición  servicios gratuitos de asistencia lingüística. Josiane parker [phone].    We comply with applicable federal civil rights laws and [place] laws. We do not discriminate on the basis of race, color, national origin, age, disability sex, sexual orientation or gender identity.            Thank you!     Thank you for choosing [facility]  for your care. Our goal is always to provide you with excellent care. Hearing back from our patients is one way we can continue to improve our services. Please take a few minutes to complete the written survey that you may receive in the mail after your visit with us. Thank you!             Your Updated Medication List - Protect others around you: Learn how to safely use, store and throw away your medicines at www.disposemymeds.org.          This list is accurate as of: 8/8/17 11:13 AM.  Always use your most recent med list.                   Brand Name Dispense Instructions for use Diagnosis    albuterol 108 (90 BASE) MCG/ACT Inhaler    PROAIR HFA/PROVENTIL HFA/VENTOLIN HFA    2 Inhaler    Inhale 2 puffs into the lungs every 6 hours as needed for shortness of breath / dyspnea or wheezing    COPD, severe (H)       allopurinol 100 MG tablet    ZYLOPRIM    180 tablet    Take 2 tablets (200 mg) by mouth daily    Idiopathic chronic gout of foot without tophus, unspecified laterality       amLODIPine 5 MG tablet    NORVASC    180 tablet    Take 1 tablet (5 mg) by mouth 2 times daily    Essential hypertension with goal blood pressure less than 140/90       ammonium lactate 12 % cream    AMLACTIN    280 g    Apply  topically 2 times daily as needed. apply to affected area    Dry skin dermatitis       ASPIRIN PO      Take 81 mg by mouth daily        BENADRYL 25 MG tablet   Generic drug:  diphenhydrAMINE      Take 25 mg by mouth At Bedtime    DIAGNOSIS NOT YET DEFINED       calcium + D 600-200 MG-UNIT Tabs   Generic drug:  calcium carbonate-vitamin D      Take 2 tablets by  mouth 2 times daily.    DIAGNOSIS NOT YET DEFINED       cephALEXin 500 MG capsule    KEFLEX    10 capsule    Take 1 capsule (500 mg) by mouth 2 times daily for 5 days    Sebaceous cyst       clobetasol 0.05 % cream    TEMOVATE    45 g    Apply  topically daily as needed.    Rash       DOCUSATE SODIUM PO      Take 100 mg by mouth At Bedtime        furosemide 20 MG tablet    LASIX    180 tablet    Take 1 tablet (20 mg) by mouth 2 times daily (morning and lunch time)    Hypertension goal BP (blood pressure) < 140/90, CKD (chronic kidney disease) stage 3, GFR 30-59 ml/min       ketoconazole 2 % cream    NIZORAL    15 g    Apply  topically daily.    Sciatic pain       levothyroxine 100 MCG tablet    SYNTHROID    90 tablet    Take 1 tablet (100 mcg) by mouth daily Except Sunday    Other specified hypothyroidism       mometasone 0.1 % cream    ELOCON    45 g    Apply  topically daily as needed.    Rash       OCUVITE ADULT 50+ Caps     30 capsule    Take 1 capsule by mouth daily    Macular degeneration of both eyes       rOPINIRole 0.25 MG tablet    REQUIP    60 tablet    TAKE 1-2 TABLETS BY MOUTH AT BEDTIME    Cramp of limb       STATIN NOT PRESCRIBED (INTENTIONAL)      by Other route continuous prn. Patient declined 5/4/12

## 2017-08-08 NOTE — NURSING NOTE
Chief Complaint   Patient presents with     Shoulder Pain     Health Maintenance     eye      Vaginal Problem     vaginal lump       Initial /63 (BP Location: Right arm, Patient Position: Chair, Cuff Size: Adult Regular)  Pulse 86  Temp 97.8  F (36.6  C) (Oral)  Wt 137 lb (62.1 kg)  SpO2 96%  BMI 26.76 kg/m2 Estimated body mass index is 26.76 kg/(m^2) as calculated from the following:    Height as of 6/1/17: 5' (1.524 m).    Weight as of this encounter: 137 lb (62.1 kg).  Medication Reconciliation: complete   Deanna Watson ma

## 2017-08-15 ENCOUNTER — TELEPHONE (OUTPATIENT)
Dept: AUDIOLOGY | Facility: CLINIC | Age: 82
End: 2017-08-15

## 2017-08-15 ENCOUNTER — OFFICE VISIT (OUTPATIENT)
Dept: AUDIOLOGY | Facility: CLINIC | Age: 82
End: 2017-08-15
Payer: COMMERCIAL

## 2017-08-15 DIAGNOSIS — H90.3 SENSORINEURAL HEARING LOSS, BILATERAL: Primary | ICD-10-CM

## 2017-08-15 DIAGNOSIS — R25.2 CRAMP OF LIMB: ICD-10-CM

## 2017-08-15 PROCEDURE — 99207 ZZC NO CHARGE LOS: CPT | Performed by: AUDIOLOGIST

## 2017-08-15 PROCEDURE — V5299 HEARING SERVICE: HCPCS | Performed by: AUDIOLOGIST

## 2017-08-15 RX ORDER — ROPINIROLE 0.25 MG/1
TABLET, FILM COATED ORAL
Qty: 60 TABLET | Refills: 1 | Status: SHIPPED | OUTPATIENT
Start: 2017-08-15 | End: 2017-10-31

## 2017-08-15 NOTE — PROGRESS NOTES
HEARING AID RECHECK    Patient Name:  Helena Eldridge    Patient Age:   81 year old    :  1935    Background:   Patient is seen for an add on hearing aid appointment, she is here with reports of her right ear Widex BTE not working. Visual inspection finds significant cerumen in the end of the earmold.     Procedures:   General cleaning of both hearing aids is performed. Once the cerumen in removed from the tubing port of the right earmold the hearing aid was returned to functionality. The tubes are still in good order so there was no need to change these today.     Plan:   Patient will return as needed for hearing aid concerns.     CHARGES:   .003 Hearing aid cleaning $15 bill to [facility].     James Lau [facility]-A  Licensed Audiologist  8/15/2017

## 2017-08-15 NOTE — TELEPHONE ENCOUNTER
I offered an add on appointment today at 10:30 for a hearing aid repair appointment. Patient agreed and will be seen today at 10:30. Patient has been scheduled.     -James Lau CCC-A

## 2017-08-15 NOTE — TELEPHONE ENCOUNTER
Prescription approved per [facility] Refill Protocol.  Yanique Fritz RN  [facility]

## 2017-08-15 NOTE — TELEPHONE ENCOUNTER
Reason for Call:  Other call back    Detailed comments: Patient states the right side of her hearing aid is not working and would like help getting this fixed. Soonest appointment is Aug 30th and patient states she can't go that long without hearing.  Please call to advise.     Phone Number Patient can be reached at: Cell number on file:    Telephone Information:   Mobile [phone]       Best Time: any    Can we leave a detailed message on this number? NO     Call taken on 8/15/2017 at 8:14 AM by Geoff Rocha

## 2017-08-15 NOTE — TELEPHONE ENCOUNTER
rOPINIRole (REQUIP) 0.25 MG tablet     Last Written Prescription Date: 6/12/17  Last Fill Quantity: 60, # refills: 1  Last Office Visit with [facility], [facility] or [facility] prescribing provider: 8/8/17   Next 5 appointments (look out 90 days)     Aug 15, 2017 10:30 AM CDT   [facility] Visit with Emiliano Dye   [facility]    [address] [place] [id number] [phone]                   BP Readings from Last 3 Encounters:   08/08/17 127/63   07/27/17 129/65   06/01/17 137/68

## 2017-08-15 NOTE — MR AVS SNAPSHOT
"              After Visit Summary   8/15/2017    Helena Eldridge    MRN: [id number]           Patient Information     Date Of Birth          1935        Visit Information        Provider Department      8/15/2017 10:30 AM James Merino AuD [facility]        Today's Diagnoses     Sensorineural hearing loss, bilateral    -  1       Follow-ups after your visit        Your next 10 appointments already scheduled     Aug 15, 2017 10:30 AM CDT   Return Visit with Emiliano Dye   [facility] ([facility]    [address] [place] [id number]   [phone]              Who to contact     If you have questions or need follow up information about today's clinic visit or your schedule please contact [facility] directly at [phone].  Normal or non-critical lab and imaging results will be communicated to you by MyChart, letter or phone within 4 business days after the clinic has received the results. If you do not hear from us within 7 days, please contact the clinic through MyChart or phone. If you have a critical or abnormal lab result, we will notify you by phone as soon as possible.  Submit refill requests through [employer] or call your pharmacy and they will forward the refill request to us. Please allow 3 business days for your refill to be completed.          Additional Information About Your Visit        MyChart Information     [employer] lets you send messages to your doctor, view your test results, renew your prescriptions, schedule appointments and more. To sign up, go to [email or web address] . Click on \"Log in\" on the left side of the screen, which will take you to the Welcome page. Then click on \"Sign up Now\" on the right side of the page.     You will be asked to enter the access code listed below, as well as some personal information. Please follow the directions to create your username and password.     Your access code " is: [email or web address]  Expires: 2017  2:34 PM     Your access code will  in 90 days. If you need help or a new code, please call your [facility] clinic or [phone].        Care EveryWhere ID     This is your Care EveryWhere ID. This could be used by other organizations to access your [facility] medical records  [id number]         Blood Pressure from Last 3 Encounters:   17 127/63   17 129/65   17 137/68    Weight from Last 3 Encounters:   17 137 lb (62.1 kg)   17 138 lb (62.6 kg)   17 139 lb (63 kg)              We Performed the Following     HEARING AID CLEANING        Primary Care Provider Office Phone # Fax #    Sushma Nelson [phone]       [address] [facility] [address]        Equal Access to Services     SHRUTHI DEL TORO : Hadii aad ku hadasho Soomaali, waaxda luqadaha, qaybta kaalmada adeegyada, waxay annain haykailey gu . So [facility] [phone].    ATENCIÓN: Si habla español, tiene a jones disposición servicios gratuitos de asistencia lingüística. Llame al [phone].    We comply with applicable federal civil rights laws and [place] laws. We do not discriminate on the basis of race, color, national origin, age, disability sex, sexual orientation or gender identity.            Thank you!     Thank you for choosing [facility]  for your care. Our goal is always to provide you with excellent care. Hearing back from our patients is one way we can continue to improve our services. Please take a few minutes to complete the written survey that you may receive in the mail after your visit with us. Thank you!             Your Updated Medication List - Protect others around you: Learn how to safely use, store and throw away your medicines at www.disposemymeds.org.          This list is accurate as of: 8/15/17 10:27 AM.  Always use your most recent med list.                   Brand Name Dispense Instructions for  use Diagnosis    albuterol 108 (90 BASE) MCG/ACT Inhaler    PROAIR HFA/PROVENTIL HFA/VENTOLIN HFA    2 Inhaler    Inhale 2 puffs into the lungs every 6 hours as needed for shortness of breath / dyspnea or wheezing    COPD, severe (H)       allopurinol 100 MG tablet    ZYLOPRIM    180 tablet    Take 2 tablets (200 mg) by mouth daily    Idiopathic chronic gout of foot without tophus, unspecified laterality       amLODIPine 5 MG tablet    NORVASC    180 tablet    Take 1 tablet (5 mg) by mouth 2 times daily    Essential hypertension with goal blood pressure less than 140/90       ammonium lactate 12 % cream    AMLACTIN    280 g    Apply  topically 2 times daily as needed. apply to affected area    Dry skin dermatitis       ASPIRIN PO      Take 81 mg by mouth daily        BENADRYL 25 MG tablet   Generic drug:  diphenhydrAMINE      Take 25 mg by mouth At Bedtime    DIAGNOSIS NOT YET DEFINED       calcium + D 600-200 MG-UNIT Tabs   Generic drug:  calcium carbonate-vitamin D      Take 2 tablets by mouth 2 times daily.    DIAGNOSIS NOT YET DEFINED       clobetasol 0.05 % cream    TEMOVATE    45 g    Apply  topically daily as needed.    Rash       DOCUSATE SODIUM PO      Take 100 mg by mouth At Bedtime        furosemide 20 MG tablet    LASIX    180 tablet    Take 1 tablet (20 mg) by mouth 2 times daily (morning and lunch time)    Hypertension goal BP (blood pressure) < 140/90, CKD (chronic kidney disease) stage 3, GFR 30-59 ml/min       ketoconazole 2 % cream    NIZORAL    15 g    Apply  topically daily.    Sciatic pain       levothyroxine 100 MCG tablet    SYNTHROID    90 tablet    Take 1 tablet (100 mcg) by mouth daily Except Sunday    Other specified hypothyroidism       mometasone 0.1 % cream    ELOCON    45 g    Apply  topically daily as needed.    Rash       OCUVITE ADULT 50+ Caps     30 capsule    Take 1 capsule by mouth daily    Macular degeneration of both eyes       rOPINIRole 0.25 MG tablet    REQUIP    60 tablet     TAKE 1-2 TABLETS BY MOUTH AT BEDTIME    Cramp of limb       STATIN NOT PRESCRIBED (INTENTIONAL)      by Other route continuous prn. Patient declined 5/4/12

## 2017-09-28 ENCOUNTER — OFFICE VISIT (OUTPATIENT)
Dept: OPTOMETRY | Facility: CLINIC | Age: 82
End: 2017-09-28
Payer: COMMERCIAL

## 2017-09-28 DIAGNOSIS — H35.30 MACULAR DEGENERATION OF BOTH EYES: ICD-10-CM

## 2017-09-28 DIAGNOSIS — Z01.00 EXAMINATION OF EYES AND VISION: Primary | ICD-10-CM

## 2017-09-28 DIAGNOSIS — H52.4 HYPEROPIA OF BOTH EYES WITH ASTIGMATISM AND PRESBYOPIA: ICD-10-CM

## 2017-09-28 DIAGNOSIS — H52.203 HYPEROPIA OF BOTH EYES WITH ASTIGMATISM AND PRESBYOPIA: ICD-10-CM

## 2017-09-28 DIAGNOSIS — H25.813 COMBINED FORMS OF AGE-RELATED CATARACT OF BOTH EYES: ICD-10-CM

## 2017-09-28 DIAGNOSIS — H52.03 HYPEROPIA OF BOTH EYES WITH ASTIGMATISM AND PRESBYOPIA: ICD-10-CM

## 2017-09-28 PROCEDURE — 92015 DETERMINE REFRACTIVE STATE: CPT | Performed by: OPTOMETRIST

## 2017-09-28 PROCEDURE — 92014 COMPRE OPH EXAM EST PT 1/>: CPT | Performed by: OPTOMETRIST

## 2017-09-28 ASSESSMENT — TONOMETRY
OS_IOP_MMHG: 12
OD_IOP_MMHG: 10
IOP_METHOD: APPLANATION

## 2017-09-28 ASSESSMENT — REFRACTION_WEARINGRX
OS_CYLINDER: +0.50
OS_ADD: +3.00
OS_AXIS: 170
OD_ADD: +3.00
OD_CYLINDER: +0.75
OS_SPHERE: +2.00
OD_AXIS: 155
OD_SPHERE: +2.50

## 2017-09-28 ASSESSMENT — VISUAL ACUITY
OS_CC: 20/40
OD_SC: 20/200
METHOD: SNELLEN - LINEAR
OD_SC: 20/70
OS_CC: 20/60
OS_CC+: -1
CORRECTION_TYPE: GLASSES
OD_CC: 20/70
OS_SC: 20/70
OS_SC: 20/200
OD_CC: 20/60 -1

## 2017-09-28 ASSESSMENT — REFRACTION_MANIFEST
OS_SPHERE: +1.25
OD_CYLINDER: +0.75
OD_ADD: +3.00
OS_CYLINDER: +0.50
OD_AXIS: 135
OS_ADD: +3.00
OS_AXIS: 170
OD_SPHERE: +2.50

## 2017-09-28 ASSESSMENT — CUP TO DISC RATIO
OS_RATIO: 0.5
OD_RATIO: 0.5

## 2017-09-28 ASSESSMENT — CONF VISUAL FIELD
OD_NORMAL: 1
OS_NORMAL: 1

## 2017-09-28 ASSESSMENT — EXTERNAL EXAM - RIGHT EYE: OD_EXAM: NORMAL

## 2017-09-28 ASSESSMENT — EXTERNAL EXAM - LEFT EYE: OS_EXAM: NORMAL

## 2017-09-28 ASSESSMENT — SLIT LAMP EXAM - LIDS
COMMENTS: NORMAL
COMMENTS: NORMAL

## 2017-09-28 NOTE — MR AVS SNAPSHOT
After Visit Summary   9/28/2017    Helena Eldridge    MRN: [id number]           Patient Information     Date Of Birth          1935        Visit Information        Provider Department      9/28/2017 11:20 AM Aurea Espinoza OD [facility]        Today's Diagnoses     Examination of eyes and vision    -  1    Hyperopia of both eyes with astigmatism and presbyopia        Combined forms of age-related cataract of both eyes        Macular degeneration of both eyes, right eye greater than left eye          Care Instructions        A final glasses prescription was given.  Allow time for adaptation.  The glasses may cause dizziness and affect depth perception for awhile.  Monitor cataracts by having yearly exams.  Wear sunglasses when outside.  Recommend taking vitamins for the eyes (Ocuvite Adult 50+) or eat a good healthy diet with lots of green leafy vegetables, colorful fruits and vegetables and nuts, also wear UV protection when outside  Return to clinic 1 year for Comprehensive Vision Exam      Aurea Espinoza O.D.  [address]  [address]    [phone]              Follow-ups after your visit        Follow-up notes from your care team     Return in about 1 year (around 9/28/2018) for Eye Exam.      Who to contact     If you have questions or need follow up information about today's clinic visit or your schedule please contact [facility] directly at [phone].  Normal or non-critical lab and imaging results will be communicated to you by MyChart, letter or phone within 4 business days after the clinic has received the results. If you do not hear from us within 7 days, please contact the clinic through MyChart or phone. If you have a critical or abnormal lab result, we will notify you by phone as soon as possible.  Submit refill requests through [employer] or call your pharmacy and they will forward the refill request to  "us. Please allow 3 business days for your refill to be completed.          Additional Information About Your Visit        MyChart Information     [employer] lets you send messages to your doctor, view your test results, renew your prescriptions, schedule appointments and more. To sign up, go to [email or web address] . Click on \"Log in\" on the left side of the screen, which will take you to the Welcome page. Then click on \"Sign up Now\" on the right side of the page.     You will be asked to enter the access code listed below, as well as some personal information. Please follow the directions to create your username and password.     Your access code is: [email or web address]  Expires: 2017 12:32 PM     Your access code will  in 90 days. If you need help or a new code, please call your [place] clinic or [phone].        Care EveryWhere ID     This is your Care EveryWhere ID. This could be used by other organizations to access your [place] medical records  [id number]         Blood Pressure from Last 3 Encounters:   17 127/63   17 129/65   17 137/68    Weight from Last 3 Encounters:   17 62.1 kg (137 lb)   17 62.6 kg (138 lb)   17 63 kg (139 lb)              We Performed the Following     EYE EXAM (SIMPLE-NONBILLABLE)     REFRACTION        Primary Care Provider Office Phone # Fax #    Sushma Nelson [phone]       [address] [facility] [address]        Equal Access to Services     [facility]: Hadii aad ku hadasho Soomaali, waaxda luqadaha, qaybta kaalmada luis, kayla gu . So [facility] [phone].    ATENCIÓN: Si habla español, tiene a jones disposición servicios gratuitos de asistencia lingüística. Llame al [phone].    We comply with applicable federal civil rights laws and [place] laws. We do not discriminate on the basis of race, color, national origin, age, disability sex, sexual orientation " or gender identity.            Thank you!     Thank you for choosing [facility]  for your care. Our goal is always to provide you with excellent care. Hearing back from our patients is one way we can continue to improve our services. Please take a few minutes to complete the written survey that you may receive in the mail after your visit with us. Thank you!             Your Updated Medication List - Protect others around you: Learn how to safely use, store and throw away your medicines at www.disposemymeds.org.          This list is accurate as of: 9/28/17 12:32 PM.  Always use your most recent med list.                   Brand Name Dispense Instructions for use Diagnosis    albuterol 108 (90 BASE) MCG/ACT Inhaler    PROAIR HFA/PROVENTIL HFA/VENTOLIN HFA    2 Inhaler    Inhale 2 puffs into the lungs every 6 hours as needed for shortness of breath / dyspnea or wheezing    COPD, severe (H)       allopurinol 100 MG tablet    ZYLOPRIM    180 tablet    Take 2 tablets (200 mg) by mouth daily    Idiopathic chronic gout of foot without tophus, unspecified laterality       amLODIPine 5 MG tablet    NORVASC    180 tablet    Take 1 tablet (5 mg) by mouth 2 times daily    Essential hypertension with goal blood pressure less than 140/90       ammonium lactate 12 % cream    AMLACTIN    280 g    Apply  topically 2 times daily as needed. apply to affected area    Dry skin dermatitis       ASPIRIN PO      Take 81 mg by mouth daily        BENADRYL 25 MG tablet   Generic drug:  diphenhydrAMINE      Take 25 mg by mouth At Bedtime    DIAGNOSIS NOT YET DEFINED       calcium + D 600-200 MG-UNIT Tabs   Generic drug:  calcium carbonate-vitamin D      Take 2 tablets by mouth 2 times daily.    DIAGNOSIS NOT YET DEFINED       clobetasol 0.05 % cream    TEMOVATE    45 g    Apply  topically daily as needed.    Rash       DOCUSATE SODIUM PO      Take 100 mg by mouth At Bedtime        furosemide 20 MG tablet    LASIX    180 tablet     Take 1 tablet (20 mg) by mouth 2 times daily (morning and lunch time)    Hypertension goal BP (blood pressure) < 140/90, CKD (chronic kidney disease) stage 3, GFR 30-59 ml/min       ketoconazole 2 % cream    NIZORAL    15 g    Apply  topically daily.    Sciatic pain       levothyroxine 100 MCG tablet    SYNTHROID    90 tablet    Take 1 tablet (100 mcg) by mouth daily Except Sunday    Other specified hypothyroidism       mometasone 0.1 % cream    ELOCON    45 g    Apply  topically daily as needed.    Rash       OCUVITE ADULT 50+ Caps     30 capsule    Take 1 capsule by mouth daily    Macular degeneration of both eyes       * rOPINIRole 0.25 MG tablet    REQUIP    60 tablet    TAKE 1-2 TABLETS BY MOUTH AT BEDTIME    Cramp of limb       * rOPINIRole 0.25 MG tablet    REQUIP    60 tablet    TAKE 1-2 TABLETS BY MOUTH AT BEDTIME    Cramp of limb       STATIN NOT PRESCRIBED (INTENTIONAL)      by Other route continuous prn. Patient declined 5/4/12        * Notice:  This list has 2 medication(s) that are the same as other medications prescribed for you. Read the directions carefully, and ask your doctor or other care provider to review them with you.

## 2017-09-28 NOTE — PROGRESS NOTES
Chief Complaint   Patient presents with     COMPREHENSIVE EYE EXAM      Accompanied by self  Last Eye Exam: 1.5 years ago  Dilated Previously: Yes    What are you currently using to see?  glasses       Distance Vision Acuity: Noticed gradual change in both eyes    Near Vision Acuity: Not satisfied     Eye Comfort: good  Do you use eye drops? : No  Occupation or Hobbies: retired    Ingrid Gaytan, Optometric Tech          Medical, surgical and family histories reviewed and updated 9/28/2017.       OBJECTIVE: See Ophthalmology exam    ASSESSMENT:    ICD-10-CM    1. Examination of eyes and vision Z01.00 EYE EXAM (SIMPLE-NONBILLABLE)     REFRACTION   2. Hyperopia of both eyes with astigmatism and presbyopia H52.03 EYE EXAM (SIMPLE-NONBILLABLE)    H52.203 REFRACTION    H52.4    3. Combined forms of age-related cataract of both eyes H25.813 EYE EXAM (SIMPLE-NONBILLABLE)   4. Macular degeneration of both eyes, right eye greater than left eye H35.30 EYE EXAM (SIMPLE-NONBILLABLE)      PLAN:   A final glasses prescription was given.  Allow time for adaptation.  The glasses may cause dizziness and affect depth perception for awhile.  Monitor cataracts by having yearly exams.  Wear sunglasses when outside.  Recommend taking vitamins for the eyes (Ocuvite Adult 50+) or eat a good healthy diet with lots of green leafy vegetables, colorful fruits and vegetables and nuts, also wear UV protection when outside  Return to clinic 1 year for Comprehensive Vision Exam      Aurea Espinoza O.D.  [address]  [address]    [phone]

## 2017-09-28 NOTE — PATIENT INSTRUCTIONS
A final glasses prescription was given.  Allow time for adaptation.  The glasses may cause dizziness and affect depth perception for awhile.  Monitor cataracts by having yearly exams.  Wear sunglasses when outside.  Recommend taking vitamins for the eyes (Ocuvite Adult 50+) or eat a good healthy diet with lots of green leafy vegetables, colorful fruits and vegetables and nuts, also wear UV protection when outside  Return to clinic 1 year for Comprehensive Vision Exam      Aurea Espinoza O.D.  [address]  [address]    [phone]

## 2017-10-07 ENCOUNTER — TRANSFERRED RECORDS (OUTPATIENT)
Dept: HEALTH INFORMATION MANAGEMENT | Facility: CLINIC | Age: 82
End: 2017-10-07

## 2017-10-28 ENCOUNTER — TRANSFERRED RECORDS (OUTPATIENT)
Dept: HEALTH INFORMATION MANAGEMENT | Facility: CLINIC | Age: 82
End: 2017-10-28

## 2017-10-30 ENCOUNTER — TELEPHONE (OUTPATIENT)
Dept: FAMILY MEDICINE | Facility: CLINIC | Age: 82
End: 2017-10-30

## 2017-10-30 NOTE — TELEPHONE ENCOUNTER
Called and spoke with patient, scheduled for 920am tomorrow.    Juli Figureoa RN  [facility]

## 2017-10-30 NOTE — TELEPHONE ENCOUNTER
Patient  the fellow she was with for 30 years  on 2017 - then second week of October the 13 went to ER for difficulty swallowing and again on 10/28 early am for same thing. Admits a history of this happening for many years,  has never chewed food well, usually gets up and eats a cracker and this helps but now is afraid to eat the cracker ( probably because am alone now) - has lost 14 or 15 pounds since end of August,  walks every day, had to move back to own home after living with her friend, was taking care of him for 3 months while he passed away.     Denies: SOB, fever, noisy respirations    Scheduled appointment for  - patient requesting something sooner if possible?     Routing to PCP to review and advise.    Ngoc Smith RN  [facility]

## 2017-10-31 ENCOUNTER — OFFICE VISIT (OUTPATIENT)
Dept: FAMILY MEDICINE | Facility: CLINIC | Age: 82
End: 2017-10-31
Payer: COMMERCIAL

## 2017-10-31 VITALS
SYSTOLIC BLOOD PRESSURE: 110 MMHG | OXYGEN SATURATION: 96 % | HEART RATE: 85 BPM | BODY MASS INDEX: 25.97 KG/M2 | TEMPERATURE: 98.1 F | WEIGHT: 133 LBS | DIASTOLIC BLOOD PRESSURE: 58 MMHG

## 2017-10-31 DIAGNOSIS — J84.112 IPF (IDIOPATHIC PULMONARY FIBROSIS) (H): ICD-10-CM

## 2017-10-31 DIAGNOSIS — I10 HYPERTENSION GOAL BP (BLOOD PRESSURE) < 140/90: ICD-10-CM

## 2017-10-31 DIAGNOSIS — N20.0 KIDNEY STONES: ICD-10-CM

## 2017-10-31 DIAGNOSIS — F43.21 ADJUSTMENT DISORDER WITH DEPRESSED MOOD: ICD-10-CM

## 2017-10-31 DIAGNOSIS — N18.30 CKD (CHRONIC KIDNEY DISEASE) STAGE 3, GFR 30-59 ML/MIN (H): ICD-10-CM

## 2017-10-31 DIAGNOSIS — R63.4 LOSS OF WEIGHT: ICD-10-CM

## 2017-10-31 DIAGNOSIS — J44.9 COPD, SEVERE (H): ICD-10-CM

## 2017-10-31 DIAGNOSIS — R13.19 ESOPHAGEAL DYSPHAGIA: Primary | ICD-10-CM

## 2017-10-31 DIAGNOSIS — Z90.5 SOLITARY KIDNEY, ACQUIRED: ICD-10-CM

## 2017-10-31 PROCEDURE — 99214 OFFICE O/P EST MOD 30 MIN: CPT | Performed by: INTERNAL MEDICINE

## 2017-10-31 RX ORDER — CITALOPRAM HYDROBROMIDE 10 MG/1
10 TABLET ORAL DAILY
Qty: 30 TABLET | Refills: 3 | Status: SHIPPED | OUTPATIENT
Start: 2017-10-31 | End: 2017-11-01 | Stop reason: SINTOL

## 2017-10-31 ASSESSMENT — PATIENT HEALTH QUESTIONNAIRE - PHQ9: SUM OF ALL RESPONSES TO PHQ QUESTIONS 1-9: 13

## 2017-10-31 NOTE — PATIENT INSTRUCTIONS
Mental health  will call you  (I recommend Luis E Ng)    Citalopram 10 mg daily (mood)    ENdoscopy will call you:  Avoid aspirin and any anti inflammatories      Return to clinic one month, follow up depression/meds.

## 2017-10-31 NOTE — NURSING NOTE
Chief Complaint   Patient presents with     Pharyngitis     trouble swallowing      Health Maintenance     Depression       Initial /58 (BP Location: Right arm, Patient Position: Sitting, Cuff Size: Adult Regular)  Pulse 85  Temp 98.1  F (36.7  C) (Oral)  Wt 133 lb (60.3 kg)  SpO2 96%  BMI 25.97 kg/m2 Estimated body mass index is 25.97 kg/(m^2) as calculated from the following:    Height as of 6/1/17: 5' (1.524 m).    Weight as of this encounter: 133 lb (60.3 kg).  Medication Reconciliation: complete   Deanna Watson ma

## 2017-10-31 NOTE — MR AVS SNAPSHOT
After Visit Summary   10/31/2017    Helena Eldridge    MRN: [id number]           Patient Information     Date Of Birth          1935        Visit Information        Provider Department      10/31/2017 9:20 AM Sushma Nelson MD [facility]        Today's Diagnoses     Esophageal dysphagia    -  1    Loss of weight        Adjustment disorder with depressed mood        COPD, severe (H)        IPF (idiopathic pulmonary fibrosis) (H)        Hypertension goal BP (blood pressure) < 140/90        Kidney stones        Solitary kidney, acquired        CKD (chronic kidney disease) stage 3, GFR 30-59 ml/min          Care Instructions    Mental health  will call you  (I recommend uLis E Ng)    Citalopram 10 mg daily (mood)    ENdoscopy will call you:  Avoid aspirin and any anti inflammatories      Return to clinic one month, follow up depression/meds.             Follow-ups after your visit        Additional Services     GASTROENTEROLOGY ADULT REF PROCEDURE ONLY       Last Lab Result: Creatinine (mg/dL)       Date                     Value                 07/27/2017               1.49 (H)         ----------  Body mass index is 25.97 kg/(m^2).     Needed:  No  Language:  English    Patient will be contacted to schedule procedure.     Please be aware that coverage of these services is subject to the terms and limitations of your health insurance plan.  Call member services at your health plan with any benefit or coverage questions.  Any procedures must be performed at a [place] facility OR coordinated by your clinic's referral office.    Please bring the following with you to your appointment:    (1) Any X-Rays, CTs or MRIs which have been performed.  Contact the facility where they were done to arrange for  prior to your scheduled appointment.    (2) List of current medications   (3) This referral request   (4) Any documents/labs given to you for this  referral            MENTAL HEALTH REFERRAL       Your provider has referred you to: [facility]: [facility] - Counseling (Individual/Couples/Family) - [facility] [phone]   [email or web address]/   *Patient will be contacted by [facility] scheduling partner, [facility]), to schedule an appointment.  Patients may also call [facility] to schedule.    All scheduling is subject to the client's specific insurance plan & benefits, provider/location availability, and provider clinical specialities.  Please arrive 15 minutes early for your first appointment and bring your completed paperwork.    Please be aware that coverage of these services is subject to the terms and limitations of your health insurance plan.  Call member services at your health plan with any benefit or coverage questions.                  Follow-up notes from your care team     Return in about 4 weeks (around 11/28/2017) for Routine Visit.      Who to contact     If you have questions or need follow up information about today's clinic visit or your schedule please contact [facility] directly at [phone].  Normal or non-critical lab and imaging results will be communicated to you by [employer], letter or phone within 4 business days after the clinic has received the results. If you do not hear from us within 7 days, please contact the clinic through [employer] or phone. If you have a critical or abnormal lab result, we will notify you by phone as soon as possible.  Submit refill requests through [employer] or call your pharmacy and they will forward the refill request to us. Please allow 3 business days for your refill to be completed.          Additional Information About Your Visit        [employer] Information     [employer] lets you send messages to your doctor, view your test results, renew your prescriptions, schedule  "appointments and more. To sign up, go to [email or web address] . Click on \"Log in\" on the left side of the screen, which will take you to the Welcome page. Then click on \"Sign up Now\" on the right side of the page.     You will be asked to enter the access code listed below, as well as some personal information. Please follow the directions to create your username and password.     Your access code is: [email or web address]  Expires: 2017 12:32 PM     Your access code will  in 90 days. If you need help or a new code, please call your [place] clinic or [phone].        Care EveryWhere ID     This is your Care EveryWhere ID. This could be used by other organizations to access your [place] medical records  [id number]        Your Vitals Were     Pulse Temperature Pulse Oximetry BMI (Body Mass Index)          85 98.1  F (36.7  C) (Oral) 96% 25.97 kg/m2         Blood Pressure from Last 3 Encounters:   10/31/17 110/58   17 127/63   17 129/65    Weight from Last 3 Encounters:   10/31/17 133 lb (60.3 kg)   17 137 lb (62.1 kg)   17 138 lb (62.6 kg)              We Performed the Following     GASTROENTEROLOGY ADULT REF PROCEDURE ONLY     MENTAL HEALTH REFERRAL          Today's Medication Changes          These changes are accurate as of: 10/31/17 10:07 AM.  If you have any questions, ask your nurse or doctor.               Start taking these medicines.        Dose/Directions    citalopram 10 MG tablet   Commonly known as:  celeXA   Used for:  Adjustment disorder with depressed mood   Started by:  Sushma Nelson MD        Dose:  10 mg   Take 1 tablet (10 mg) by mouth daily   Quantity:  30 tablet   Refills:  3            Where to get your medicines      These medications were sent [facility] [place] [address] [facility] - [place] - [address]  [address] [place] [address]     Phone:  [phone]     citalopram 10 MG tablet                Primary Care Provider Office Phone # Fax #    Sushma " STONEY Nelson [phone]       [address] [facility] [address]        Equal Access to Services     SHRUTHI DEL TORO : Hadii aad ku hadsteveparas Padminicrystal, charlesyi reiddemiha, dania kajoseda luis, kayla rodriguez laKerikaiely pelaez. So [facility] [phone].    ATENCIÓN: Si habla español, tiene a jones disposición servicios gratuitos de asistencia lingüística. Llame al [phone].    We comply with applicable federal civil rights laws and [place] laws. We do not discriminate on the basis of race, color, national origin, age, disability, sex, sexual orientation, or gender identity.            Thank you!     Thank you for choosing [facility]  for your care. Our goal is always to provide you with excellent care. Hearing back from our patients is one way we can continue to improve our services. Please take a few minutes to complete the written survey that you may receive in the mail after your visit with us. Thank you!             Your Updated Medication List - Protect others around you: Learn how to safely use, store and throw away your medicines at www.disposemymeds.org.          This list is accurate as of: 10/31/17 10:07 AM.  Always use your most recent med list.                   Brand Name Dispense Instructions for use Diagnosis    albuterol 108 (90 BASE) MCG/ACT Inhaler    PROAIR HFA/PROVENTIL HFA/VENTOLIN HFA    2 Inhaler    Inhale 2 puffs into the lungs every 6 hours as needed for shortness of breath / dyspnea or wheezing    COPD, severe (H)       allopurinol 100 MG tablet    ZYLOPRIM    180 tablet    Take 2 tablets (200 mg) by mouth daily    Idiopathic chronic gout of foot without tophus, unspecified laterality       amLODIPine 5 MG tablet    NORVASC    180 tablet    Take 1 tablet (5 mg) by mouth 2 times daily    Essential hypertension with goal blood pressure less than 140/90       ammonium lactate 12 % cream    AMLACTIN    280 g    Apply  topically 2 times  daily as needed. apply to affected area    Dry skin dermatitis       ASPIRIN PO      Take 81 mg by mouth daily        BENADRYL 25 MG tablet   Generic drug:  diphenhydrAMINE      Take 25 mg by mouth At Bedtime    DIAGNOSIS NOT YET DEFINED       calcium + D 600-200 MG-UNIT Tabs   Generic drug:  calcium carbonate-vitamin D      Take 2 tablets by mouth 2 times daily.    DIAGNOSIS NOT YET DEFINED       citalopram 10 MG tablet    celeXA    30 tablet    Take 1 tablet (10 mg) by mouth daily    Adjustment disorder with depressed mood       clobetasol 0.05 % cream    TEMOVATE    45 g    Apply  topically daily as needed.    Rash       DOCUSATE SODIUM PO      Take 100 mg by mouth At Bedtime        furosemide 20 MG tablet    LASIX    180 tablet    Take 1 tablet (20 mg) by mouth 2 times daily (morning and lunch time)    Hypertension goal BP (blood pressure) < 140/90, CKD (chronic kidney disease) stage 3, GFR 30-59 ml/min       ketoconazole 2 % cream    NIZORAL    15 g    Apply  topically daily.    Sciatic pain       levothyroxine 100 MCG tablet    SYNTHROID    90 tablet    Take 1 tablet (100 mcg) by mouth daily Except Sunday    Other specified hypothyroidism       mometasone 0.1 % cream    ELOCON    45 g    Apply  topically daily as needed.    Rash       OCUVITE ADULT 50+ Caps     30 capsule    Take 1 capsule by mouth daily    Macular degeneration of both eyes       rOPINIRole 0.25 MG tablet    REQUIP    60 tablet    TAKE 1-2 TABLETS BY MOUTH AT BEDTIME    Cramp of limb       STATIN NOT PRESCRIBED (INTENTIONAL)      by Other route continuous prn. Patient declined 5/4/12

## 2017-10-31 NOTE — PROGRESS NOTES
SUBJECTIVE:   Helena Eldridge is a 82 year old female who presents to clinic today for the following health issues:    83 y/o F with recent weight loss due to recent death of significant other (cancer), her best friend (cancer) and brother (alzheimer's)...      She was recently in ER due to dysphagia earlier this month:  resolved with GI cocktail   She continues to experience dysphasia.. She avoids bread and rice.  .. Poor appetitie and losing weight. ......  Bulk of weight loss is from her depression/stress while significant other in hospice.     Still walking.     due to see Dr Ramirez this fall 2017, f/u stones)    Abnormal Mood Symptoms  Onset: 3-4 weeks     Description:   Depression: YES  Anxiety: YES    Accompanying Signs & Symptoms:  Still participating in activities that you used to enjoy: no  Fatigue: YES  Irritability: no   Difficulty concentrating: no   Changes in appetite: YES  Problems with sleep: YES  Heart racing/beating fast : YES  Thoughts of hurting yourself or others: none    History:   Recent stress: no   Prior depression hospitalization: None  Family history of depression: no   Family history of anxiety: no     Precipitating factors:   Alcohol/drug use: no     Alleviating factors:      Therapies Tried and outcome: None      Trouble swallowing-feels likes food is sitting in her throat.       Problem list and histories reviewed & adjusted, as indicated.  Additional history: as documented    Patient Active Problem List   Diagnosis     CARDIOVASCULAR SCREENING; LDL GOAL LESS THAN 100     CKD (chronic kidney disease) stage 3, GFR 30-59 ml/min     Glucose intolerance (impaired glucose tolerance)     Kidney stones     Advance care planning     Hypertension goal BP (blood pressure) < 140/90     Hyperlipidemia LDL goal <130     Lichen sclerosus et atrophicus of the vulva     Solitary kidney, acquired     Pulmonary nodule/lesion, solitary     Senile osteoporosis     Osteoarthritis of right knee     Medial  meniscus tear     Cataracts, both eyes     Macular degeneration of both eyes, right eye greater than left eye     Pelvic fracture (H)     COPD, severe (H)     IPF (idiopathic pulmonary fibrosis) (H)     Other specified hypothyroidism     Idiopathic chronic gout     Past Surgical History:   Procedure Laterality Date     C NEPHRECTOMY      left partial 07-01-07     C VENOUS THROMBOSIS IMAGING      with pe     HC REMOVAL GALLBLADDER       HC REVISE MEDIAN N/CARPAL TUNNEL SURG       TUBAL LIGATION         Social History   Substance Use Topics     Smoking status: Former Smoker     Types: Cigarettes     Quit date: 12/4/1969     Smokeless tobacco: Never Used     Alcohol use 0.0 oz/week     0 Standard drinks or equivalent per week      Comment: very little at night 1-2 times a week      Family History   Problem Relation Age of Onset     CANCER Brother      Glaucoma Mother      Alzheimer Disease Brother      Macular Degeneration No family hx of          Current Outpatient Prescriptions   Medication Sig Dispense Refill     citalopram (CELEXA) 10 MG tablet Take 1 tablet (10 mg) by mouth daily 30 tablet 3     rOPINIRole (REQUIP) 0.25 MG tablet TAKE 1-2 TABLETS BY MOUTH AT BEDTIME 60 tablet 1     levothyroxine (SYNTHROID) 100 MCG tablet Take 1 tablet (100 mcg) by mouth daily Except Sunday 90 tablet 3     amLODIPine (NORVASC) 5 MG tablet Take 1 tablet (5 mg) by mouth 2 times daily 180 tablet 3     furosemide (LASIX) 20 MG tablet Take 1 tablet (20 mg) by mouth 2 times daily (morning and lunch time) 180 tablet 3     allopurinol (ZYLOPRIM) 100 MG tablet Take 2 tablets (200 mg) by mouth daily 180 tablet 3     ammonium lactate (AMLACTIN) 12 % cream Apply  topically 2 times daily as needed. apply to affected area 280 g 3     clobetasol (TEMOVATE) 0.05 % cream Apply  topically daily as needed. 45 g 1     Multiple Vitamins-Minerals (OCUVITE ADULT 50+) CAPS Take 1 capsule by mouth daily 30 capsule 12     DOCUSATE SODIUM PO Take 100 mg by  mouth At Bedtime       ketoconazole (NIZORAL) 2 % cream Apply  topically daily. 15 g 3     mometasone (ELOCON) 0.1 % cream Apply  topically daily as needed. 45 g 1     diphenhydrAMINE (BENADRYL) 25 MG tablet Take 25 mg by mouth At Bedtime        Calcium Carbonate-Vitamin D (CALCIUM + D) 600-200 MG-UNIT per tablet Take 2 tablets by mouth 2 times daily.       [DISCONTINUED] rOPINIRole (REQUIP) 0.25 MG tablet TAKE 1-2 TABLETS BY MOUTH AT BEDTIME 60 tablet 1     albuterol (PROAIR HFA/PROVENTIL HFA/VENTOLIN HFA) 108 (90 BASE) MCG/ACT Inhaler Inhale 2 puffs into the lungs every 6 hours as needed for shortness of breath / dyspnea or wheezing (Patient not taking: Reported on 10/31/2017) 2 Inhaler 3     ASPIRIN PO Take 81 mg by mouth daily        STATIN NOT PRESCRIBED, INTENTIONAL, by Other route continuous prn. Patient declined 5/4/12  0     Allergies   Allergen Reactions     Ace Inhibitors Cough     Advicor      Doxycycline Nausea     Poor appetite     Fosamax      Severe substernal burning and dysphagia within 3 days     Valsartan Cramps     severe     Recent Labs   Lab Test  07/27/17   1736  06/01/17   1436  05/19/16   1104   05/19/15   0946   05/02/14   0823   04/24/13   0828   04/20/12   0907   A1C   --   5.7   --    --   5.4   --   5.3   --   5.2   --    --    LDL   --   133*  126*   --   122   --   125   --   128   --   135*   HDL   --   45*  41*   --   39*   --   30*   --   32*   --   30*   TRIG   --   193*  228*   --   183*   --   216*   --   146   --   194*   ALT   --    --    --    --    --    --   27   --   24   --   22   CR  1.49*  1.26*  1.29*   < >  1.16*   < >  1.17*   < >  1.20*   < >  1.34*   GFRESTIMATED  34*  41*  40*   < >  45*   < >  45*   < >  44*   < >  38*   GFRESTBLACK  41*  49*  48*   < >  54*   < >  54*   < >  53*   < >  47*   POTASSIUM  3.5  3.1*  3.9   < >  4.2   < >  4.2   < >  4.3   < >  4.3   TSH   --   1.42  1.36   < >  1.01   --   3.31   --   0.62   --   0.35*    < > = values in this  interval not displayed.      BP Readings from Last 3 Encounters:   10/31/17 110/58   08/08/17 127/63   07/27/17 129/65    Wt Readings from Last 3 Encounters:   10/31/17 133 lb (60.3 kg)   08/08/17 137 lb (62.1 kg)   07/27/17 138 lb (62.6 kg)                  Labs reviewed in EPIC          Reviewed and updated as needed this visit by clinical staffTobacco  Allergies  Med Hx  Surg Hx  Fam Hx  Soc Hx      Reviewed and updated as needed this visit by Provider         ROS:  Constitutional, HEENT, cardiovascular, pulmonary, gi and gu systems are negative, except as otherwise noted.      OBJECTIVE:   /58 (BP Location: Right arm, Patient Position: Sitting, Cuff Size: Adult Regular)  Pulse 85  Temp 98.1  F (36.7  C) (Oral)  Wt 133 lb (60.3 kg)  SpO2 96%  BMI 25.97 kg/m2  Body mass index is 25.97 kg/(m^2).  GENERAL: healthy, alert and no distress  EYES: Eyes grossly normal to inspection, PERRL and conjunctivae and sclerae normal  HENT: ear canals and TM's normal, nose and mouth without ulcers or lesions  MS: no gross musculoskeletal defects noted, no edema  SKIN: no suspicious lesions or rashes  NEURO: Normal strength and tone, mentation intact and speech normal  PSYCH: mentation appears normal, affect normal/ appropriate... Tearful at times.  Content of speech = recent stressors.     Diagnostic Test Results:  Results for orders placed or performed in visit on 07/27/17   Albumin Random Urine Quantitative   Result Value Ref Range    Creatinine Urine 91 mg/dL    Albumin Urine mg/L 25 mg/L    Albumin Urine mg/g Cr 27.17 (H) 0 - 25 mg/g Cr   Basic metabolic panel   Result Value Ref Range    Sodium 138 133 - 144 mmol/L    Potassium 3.5 3.4 - 5.3 mmol/L    Chloride 100 94 - 109 mmol/L    Carbon Dioxide 32 20 - 32 mmol/L    Anion Gap 6 3 - 14 mmol/L    Glucose 89 70 - 99 mg/dL    Urea Nitrogen 28 7 - 30 mg/dL    Creatinine 1.49 (H) 0.52 - 1.04 mg/dL    GFR Estimate 34 (L) >60 mL/min/1.7m2    GFR Estimate If Black 41  (L) >60 mL/min/1.7m2    Calcium 10.0 8.5 - 10.1 mg/dL       ASSESSMENT/PLAN:               ICD-10-CM    1. Esophageal dysphagia R13.10 GASTROENTEROLOGY ADULT REF PROCEDURE ONLY   2. Loss of weight R63.4 GASTROENTEROLOGY ADULT REF PROCEDURE ONLY   3. Adjustment disorder with depressed mood F43.21 MENTAL HEALTH REFERRAL     citalopram (CELEXA) 10 MG tablet   4. COPD, severe (H) J44.9    5. IPF (idiopathic pulmonary fibrosis) (H) J84.112    6. Hypertension goal BP (blood pressure) < 140/90 I10    7. Kidney stones N20.0    8. Solitary kidney, acquired Z90.5    9. CKD (chronic kidney disease) stage 3, GFR 30-59 ml/min N18.3           Patient Instructions   Mental health  will call you  (I recommend Luis E Ng)    Citalopram 10 mg daily (mood)    ENdoscopy will call you:  Avoid aspirin and any anti inflammatories      Return to clinic one month, follow up depression/meds.         Sushma Nelson MD  [facility]

## 2017-11-01 ENCOUNTER — TELEPHONE (OUTPATIENT)
Dept: FAMILY MEDICINE | Facility: CLINIC | Age: 82
End: 2017-11-01

## 2017-11-01 DIAGNOSIS — R13.19 ESOPHAGEAL DYSPHAGIA: Primary | ICD-10-CM

## 2017-11-01 NOTE — TELEPHONE ENCOUNTER
MD called.  Patient will d/c citalopram.  Would like the EZ gas she got inER for dysphagia, just to have on hand.  Tried to find some at various pharmacies, and they don't carry.   Rx called in to [facility]    Will update allergy list.  Patient does not want alternative antidepressant

## 2017-11-01 NOTE — TELEPHONE ENCOUNTER
Reason for call:  Patient reporting a symptom    Symptom or request: Side effects from     Duration (how long have symptoms been present): 2 days    Have you been treated for this before? No    Additional comments: Patient stated she just started this new medication yesterday and she is experiencing side effects that she would like to discuss with the nurse. She declined explaining what exactly the side effects were to . She prefers to explain to a nurse. Please call back as soon as possible.    Phone Number patient can be reached at:  Home number on file [phone] (home)    Best Time:  Please call before 10 am    Can we leave a detailed message on this number:  YES    Call taken on 11/1/2017 at 7:18 AM by Maritza Jeter

## 2017-11-01 NOTE — TELEPHONE ENCOUNTER
Called and spoke with patient.  She is agitated and upset because she took the Celexa yesterday and then at 3 am this morning diarrhea started and went on for 2-3 hours.  It has subsided somewhat.  She does not want to take this pill any further, and she would like advice on what to do next.    Routed to PCP.    Juli Figueroa RN  [facility]

## 2017-11-02 ENCOUNTER — TELEPHONE (OUTPATIENT)
Dept: FAMILY MEDICINE | Facility: CLINIC | Age: 82
End: 2017-11-02

## 2017-11-02 NOTE — TELEPHONE ENCOUNTER
Received faxed message from [facility] .[phone] Fax [phone]    Medication: sod bicarbonate-citric acid-simethicone (EZ GAS) 2.21-1.53-0.04 G PACK    This product is available OTC. We contacted the patient to advise her this is not covered and it would be over $100 to a box of 50 packets. (We cannot break OTC box) Patient requested that the Dr or Dr's nurse to call her at home and advise her on what to do. Thanks.

## 2017-11-03 NOTE — TELEPHONE ENCOUNTER
MD called.  GI has not called patient. Yet.  She has dysphagia and weight loss.  Please ask GI to call     Patient will stop pursuing the other med. Will keep Maalox and tums on hand for prn use

## 2017-11-06 ENCOUNTER — TELEPHONE (OUTPATIENT)
Dept: FAMILY MEDICINE | Facility: CLINIC | Age: 82
End: 2017-11-06

## 2017-11-06 NOTE — TELEPHONE ENCOUNTER
Reason for call:  Patient reporting a symptom    Symptom or request: Head cold    Duration (how long have symptoms been present): 4-5 days    Have you been treated for this before? No    Additional comments: Patient stated she has a head cold and needs a prescription to help with this. Please call back to discuss.    Phone Number patient can be reached at:  Home number on file [phone] (home)    Best Time:  Anytime    Can we leave a detailed message on this number:  YES    Call taken on 11/6/2017 at 7:54 AM by Maritza Jeter

## 2017-11-06 NOTE — TELEPHONE ENCOUNTER
"Patient was last seen 10/31/17, has \"severe COPD\" on problem list but I don't see any controller med, just albuterol on med list.    I called patient to discuss.   She states she is having nasal congestion and post nasal drainage into her throat.   Denies cough, chest pain, shortness of breath, fever.  States she is drinking fluids, son got her some over the counter cold and sinus med which she is already taking, it contains sudafed and she has HTN, advised her to take this sparingly.   She is using saline nose drops.  Denies weakness or dizziness.  She will do home care measures to include warm fluids, humidity, rest, over the counter medications.  Call if having chest pain, short of breath, fever, weakness or other alarming symptoms.    She also says she has not heard from the endoscopy .  I provided her with phone number for [facility] scheduling to call herself:  [phone].    Patient verbalized understanding of and agreement with plan.  Yanique Fritz RN  [facility]      "

## 2017-11-13 ENCOUNTER — TRANSFERRED RECORDS (OUTPATIENT)
Dept: HEALTH INFORMATION MANAGEMENT | Facility: CLINIC | Age: 82
End: 2017-11-13

## 2017-11-20 ENCOUNTER — TELEPHONE (OUTPATIENT)
Dept: FAMILY MEDICINE | Facility: CLINIC | Age: 82
End: 2017-11-20

## 2017-11-20 ENCOUNTER — OFFICE VISIT (OUTPATIENT)
Dept: FAMILY MEDICINE | Facility: CLINIC | Age: 82
End: 2017-11-20
Payer: COMMERCIAL

## 2017-11-20 VITALS
TEMPERATURE: 98.5 F | DIASTOLIC BLOOD PRESSURE: 58 MMHG | WEIGHT: 131 LBS | HEART RATE: 90 BPM | BODY MASS INDEX: 25.58 KG/M2 | SYSTOLIC BLOOD PRESSURE: 109 MMHG | OXYGEN SATURATION: 95 %

## 2017-11-20 DIAGNOSIS — K21.00 GASTROESOPHAGEAL REFLUX DISEASE WITH ESOPHAGITIS: ICD-10-CM

## 2017-11-20 DIAGNOSIS — R07.89 ATYPICAL CHEST PAIN: ICD-10-CM

## 2017-11-20 DIAGNOSIS — R13.10 DYSPHAGIA, UNSPECIFIED TYPE: Primary | ICD-10-CM

## 2017-11-20 PROCEDURE — 99214 OFFICE O/P EST MOD 30 MIN: CPT | Performed by: INTERNAL MEDICINE

## 2017-11-20 PROCEDURE — 93000 ELECTROCARDIOGRAM COMPLETE: CPT | Performed by: INTERNAL MEDICINE

## 2017-11-20 RX ORDER — OMEPRAZOLE 40 MG/1
40 CAPSULE, DELAYED RELEASE ORAL DAILY
Qty: 90 CAPSULE | Refills: 3 | Status: SHIPPED | OUTPATIENT
Start: 2017-11-20 | End: 2018-10-29

## 2017-11-20 NOTE — TELEPHONE ENCOUNTER
Reason for Call:  Same Day Appointment, Requested Provider:  Sushma Nelson MD    PCP: Sushma Nelson    Reason for visit: Swallowing issues / cannot sleep    Duration of symptoms: Ongoign    Have you been treated for this in the past? Yes    Additional comments: Patient asked if Dr. Nelson could see her today or tomorrow for her swallowing issues. She has been unable to sleep because she cannot swallow. She asked to be seen as soon as possible.    Can we leave a detailed message on this number? YES    Phone number patient can be reached at: Home number on file [phone] (home)    Best Time: Anytime    Call taken on 11/20/2017 at 8:33 AM by Maritza Jeter

## 2017-11-20 NOTE — PROGRESS NOTES
"  SUBJECTIVE:   Helena Eldridge is a 82 year old female who presents to clinic today for the following health issues:      Results of EGD -     She has the feeling that she can not belch and this feels distressing.   Has the feeling she can not move food down.    Feels hungry, but apprehensive to eat.  The worst time is after supper and then when lays down to sleep.  Meatloaf yesterday caused a lot of symptoms.       Grieving loss of her partner, but not depressed.     Still exercising, walked 35 minutes yesterday without any symptoms.   No problems with activity.   Having some discomfort now, as if needs to \"belch\".           Problem list and histories reviewed & adjusted, as indicated.  Additional history: as documented    Patient Active Problem List   Diagnosis     CARDIOVASCULAR SCREENING; LDL GOAL LESS THAN 100     CKD (chronic kidney disease) stage 3, GFR 30-59 ml/min     Glucose intolerance (impaired glucose tolerance)     Kidney stones     Advance care planning     Hypertension goal BP (blood pressure) < 140/90     Hyperlipidemia LDL goal <130     Lichen sclerosus et atrophicus of the vulva     Solitary kidney, acquired     Pulmonary nodule/lesion, solitary     Senile osteoporosis     Osteoarthritis of right knee     Medial meniscus tear     Cataracts, both eyes     Macular degeneration of both eyes, right eye greater than left eye     Pelvic fracture (H)     COPD, severe (H)     IPF (idiopathic pulmonary fibrosis) (H)     Other specified hypothyroidism     Idiopathic chronic gout     Past Surgical History:   Procedure Laterality Date     C NEPHRECTOMY      left partial 07-01-07     C VENOUS THROMBOSIS IMAGING      with pe     HC REMOVAL GALLBLADDER       HC REVISE MEDIAN N/CARPAL TUNNEL SURG       TUBAL LIGATION         Social History   Substance Use Topics     Smoking status: Former Smoker     Types: Cigarettes     Quit date: 12/4/1969     Smokeless tobacco: Never Used     Alcohol use 0.0 oz/week     0 " Standard drinks or equivalent per week      Comment: very little at night 1-2 times a week      Family History   Problem Relation Age of Onset     CANCER Brother      Glaucoma Mother      Alzheimer Disease Brother      Macular Degeneration No family hx of          Current Outpatient Prescriptions   Medication Sig Dispense Refill     omeprazole (PRILOSEC) 40 MG capsule Take 1 capsule (40 mg) by mouth daily 90 capsule 3     rOPINIRole (REQUIP) 0.25 MG tablet TAKE 1-2 TABLETS BY MOUTH AT BEDTIME 60 tablet 1     levothyroxine (SYNTHROID) 100 MCG tablet Take 1 tablet (100 mcg) by mouth daily Except Sunday 90 tablet 3     furosemide (LASIX) 20 MG tablet Take 1 tablet (20 mg) by mouth 2 times daily (morning and lunch time) 180 tablet 3     allopurinol (ZYLOPRIM) 100 MG tablet Take 2 tablets (200 mg) by mouth daily 180 tablet 3     ammonium lactate (AMLACTIN) 12 % cream Apply  topically 2 times daily as needed. apply to affected area 280 g 3     clobetasol (TEMOVATE) 0.05 % cream Apply  topically daily as needed. 45 g 1     Multiple Vitamins-Minerals (OCUVITE ADULT 50+) CAPS Take 1 capsule by mouth daily 30 capsule 12     albuterol (PROAIR HFA/PROVENTIL HFA/VENTOLIN HFA) 108 (90 BASE) MCG/ACT Inhaler Inhale 2 puffs into the lungs every 6 hours as needed for shortness of breath / dyspnea or wheezing 2 Inhaler 3     DOCUSATE SODIUM PO Take 100 mg by mouth At Bedtime       ketoconazole (NIZORAL) 2 % cream Apply  topically daily. 15 g 3     mometasone (ELOCON) 0.1 % cream Apply  topically daily as needed. 45 g 1     diphenhydrAMINE (BENADRYL) 25 MG tablet Take 25 mg by mouth At Bedtime        Calcium Carbonate-Vitamin D (CALCIUM + D) 600-200 MG-UNIT per tablet Take 2 tablets by mouth 2 times daily.       ASPIRIN PO Take 81 mg by mouth daily        STATIN NOT PRESCRIBED, INTENTIONAL, by Other route continuous prn. Patient declined 5/4/12  0     Allergies   Allergen Reactions     Ace Inhibitors Cough     Advicor      Celexa  [Citalopram] GI Disturbance     diarrhea     Doxycycline Nausea     Poor appetite     Fosamax      Severe substernal burning and dysphagia within 3 days     Valsartan Cramps     severe     Recent Labs   Lab Test  07/27/17   1736  06/01/17   1436  05/19/16   1104   05/19/15   0946   05/02/14   0823   04/24/13   0828   04/20/12   0907   A1C   --   5.7   --    --   5.4   --   5.3   --   5.2   --    --    LDL   --   133*  126*   --   122   --   125   --   128   --   135*   HDL   --   45*  41*   --   39*   --   30*   --   32*   --   30*   TRIG   --   193*  228*   --   183*   --   216*   --   146   --   194*   ALT   --    --    --    --    --    --   27   --   24   --   22   CR  1.49*  1.26*  1.29*   < >  1.16*   < >  1.17*   < >  1.20*   < >  1.34*   GFRESTIMATED  34*  41*  40*   < >  45*   < >  45*   < >  44*   < >  38*   GFRESTBLACK  41*  49*  48*   < >  54*   < >  54*   < >  53*   < >  47*   POTASSIUM  3.5  3.1*  3.9   < >  4.2   < >  4.2   < >  4.3   < >  4.3   TSH   --   1.42  1.36   < >  1.01   --   3.31   --   0.62   --   0.35*    < > = values in this interval not displayed.      BP Readings from Last 3 Encounters:   11/20/17 109/58   10/31/17 110/58   08/08/17 127/63    Wt Readings from Last 3 Encounters:   11/20/17 131 lb (59.4 kg)   10/31/17 133 lb (60.3 kg)   08/08/17 137 lb (62.1 kg)                  Labs reviewed in EPIC          Reviewed and updated as needed this visit by clinical staffTobacco  Allergies  Meds  Med Hx  Surg Hx  Fam Hx  Soc Hx      Reviewed and updated as needed this visit by Provider         ROS:  Constitutional, HEENT, cardiovascular, pulmonary, gi and gu systems are negative, except as otherwise noted.      OBJECTIVE:   /58 (BP Location: Right arm, Patient Position: Sitting, Cuff Size: Adult Regular)  Pulse 90  Temp 98.5  F (36.9  C) (Oral)  Wt 131 lb (59.4 kg)  SpO2 95%  BMI 25.58 kg/m2  Body mass index is 25.58 kg/(m^2).  GENERAL: healthy, alert and no distress  EYES:  Eyes grossly normal to inspection, PERRL and conjunctivae and sclerae normal  NECK: no adenopathy, no asymmetry, masses, or scars and thyroid normal to palpation  RESP: lungs clear to auscultation - no rales, rhonchi or wheezes  CV: regular rate and rhythm, normal S1 S2, no S3 or S4, no murmur, click or rub, no peripheral edema and peripheral pulses strong  ABDOMEN: soft, nontender, no hepatosplenomegaly, no masses and bowel sounds normal  MS: no gross musculoskeletal defects noted, no edema    Diagnostic Test Results:  none     EKG = LAFB and LAD.  Basically unchanged since 2011    ASSESSMENT/PLAN:             ICD-10-CM    1. Dysphagia, unspecified type R13.10 XR Esophagram     EKG 12-lead complete w/read - Clinics     GASTROENTEROLOGY ADULT REF CONSULT ONLY     Exercise Stress Echocardiogram   2. Gastroesophageal reflux disease with esophagitis K21.0 omeprazole (PRILOSEC) 40 MG capsule   3. Atypical chest pain R07.89 Exercise Stress Echocardiogram        MD called [facility].  Esophpageal specialist will see her tomorrow at 10 am  Mehreen Villar, Esophageal specialist  [address] [facility]      Start PPI.     25 minutes face to face time, > 50 %  counseling and coordination of care      Sushma Nelson MD  [facility]

## 2017-11-20 NOTE — TELEPHONE ENCOUNTER
RN sees patient has history of swallowing issues.  She is wondering if she can be seen today or tomorrow for this?  She only wants to see PCP.    Routed to PCP.    Juli Figueroa RN  [facility]

## 2017-11-20 NOTE — NURSING NOTE
Chief Complaint   Patient presents with     Results     EGD     Health Maintenance     DAP,COPD action plan        Initial /58 (BP Location: Right arm, Patient Position: Sitting, Cuff Size: Adult Regular)  Pulse 90  Temp 98.5  F (36.9  C) (Oral)  Wt 131 lb (59.4 kg)  SpO2 95%  BMI 25.58 kg/m2 Estimated body mass index is 25.58 kg/(m^2) as calculated from the following:    Height as of 6/1/17: 5' (1.524 m).    Weight as of this encounter: 131 lb (59.4 kg).  Medication Reconciliation: complete   Deanna Watson ma

## 2017-11-20 NOTE — PATIENT INSTRUCTIONS
Mehreen Villar, Esophageal specialist  [address] [facility]    10 am tomorrow (11/21/17)... Get there 15 min early  ____________________________________________    Omeprazole 40 mg daily (first dose tonight)      ________________________________________________    Stress test.

## 2017-11-21 ENCOUNTER — TRANSFERRED RECORDS (OUTPATIENT)
Dept: HEALTH INFORMATION MANAGEMENT | Facility: CLINIC | Age: 82
End: 2017-11-21

## 2017-11-22 ENCOUNTER — RADIANT APPOINTMENT (OUTPATIENT)
Dept: CARDIOLOGY | Facility: CLINIC | Age: 82
End: 2017-11-22
Attending: INTERNAL MEDICINE
Payer: COMMERCIAL

## 2017-11-22 DIAGNOSIS — R13.10 DYSPHAGIA, UNSPECIFIED TYPE: ICD-10-CM

## 2017-11-22 DIAGNOSIS — R07.89 ATYPICAL CHEST PAIN: ICD-10-CM

## 2017-11-22 PROCEDURE — 40000264 ECHO STRESS TEST WITH DEFINITY: Performed by: INTERNAL MEDICINE

## 2017-11-22 PROCEDURE — 93350 STRESS TTE ONLY: CPT | Mod: TC | Performed by: INTERNAL MEDICINE

## 2017-11-22 PROCEDURE — 93017 CV STRESS TEST TRACING ONLY: CPT | Performed by: INTERNAL MEDICINE

## 2017-11-22 PROCEDURE — 93325 DOPPLER ECHO COLOR FLOW MAPG: CPT | Mod: 26 | Performed by: INTERNAL MEDICINE

## 2017-11-22 PROCEDURE — 93018 CV STRESS TEST I&R ONLY: CPT | Performed by: INTERNAL MEDICINE

## 2017-11-22 PROCEDURE — 93016 CV STRESS TEST SUPVJ ONLY: CPT | Performed by: INTERNAL MEDICINE

## 2017-11-22 PROCEDURE — 93352 ADMIN ECG CONTRAST AGENT: CPT | Performed by: INTERNAL MEDICINE

## 2017-11-22 PROCEDURE — 93321 DOPPLER ECHO F-UP/LMTD STD: CPT | Mod: 26 | Performed by: INTERNAL MEDICINE

## 2017-11-22 PROCEDURE — 93350 STRESS TTE ONLY: CPT | Mod: 26 | Performed by: INTERNAL MEDICINE

## 2017-11-22 PROCEDURE — 93321 DOPPLER ECHO F-UP/LMTD STD: CPT | Mod: TC | Performed by: INTERNAL MEDICINE

## 2017-11-22 PROCEDURE — 93325 DOPPLER ECHO COLOR FLOW MAPG: CPT | Mod: TC | Performed by: INTERNAL MEDICINE

## 2017-11-22 NOTE — LETTER
[place]   [address] [place]  [address]  [phone]                                   2017    Helena SPENCER Chente  [address] [facility] [address]        Dear Helena,    Your stress test appears to be normal.     Results for orders placed or performed in visit on 17   Echo stress test with definity    Narrative    [id number]  [id number]  [id number]  [id number]^RAH^MANUELA^STONEY           [facility], Echocardiography Laboratory  [address] [place] [id number]        Name: HELENA GARCIA  MRN: [id number]  : 1935  Study Date: 2017 09:57 AM  Age: 82 yrs  Gender: Female  Patient Location: [facility]  Reason For Study: Dysphagia, unspecified type, Atypical chest pain  History: COPD  Ordering Physician: MANUELA MONREAL  Referring Physician: MANUELA MONREAL  Performed By: Roberto Morocho RDCS     BSA: 1.6 m2  Height: 60 in  Weight: 131 lb  HR: 88  BP: 127/64 mmHg  _____________________________________________________________________________  __        Procedure  Stress Echo Bike with two dimensional, color and spectral Doppler performed.  Contrast Definity.  _____________________________________________________________________________  __        Interpretation Summary  Normal, low-risk exercise echocardiogram with slightly reduced sensitivity due  to failure to augment LVEF and decrease LV cavity size.  The target heart rate was achieved. Normal heart rate and BP response to  exercise.  Normal biventricular size, thickness, and global systolic function at  baseline, LVEF=60-65%.  With exercise, LVEF and LV cavity size remained unchanged .  No regional wall motion abnormalities are present at rest or with exercise.  No angina was elicited.  No ECG evidence of ischemia.  Functional capacity is normal for age.  No significant valvular abnormalities are noted on screening Doppler exam.  The aortic root and  visualized ascending aorta are normal.  _____________________________________________________________________________  __     Stress  Definity (NDC #11267-501-81) given intravenously.  Patient was given 5ml mixture of 1.5ml Definity and 8.5ml saline.  5 ml wasted.  IV start location RAC .  Peak MVO2 20.2 ml/kg/min .  Percent predicted MVO2 98 %.  RPP 04110.  Maximum workload 75 muro.  Target Heart Rate was achieved.  The patient did not exhibit any symptoms during exercise.  The patient exhibited fatigue during exercise.     Stress Results     Protocol:  Supine Bike Stress Echo        Maximum Predicted HR:   138 bpm     Target HR: 117 bpm                        % Maximum Predicted HR: 87 %                             StageDurationHeart Rate  BP                                (mm:ss)   (bpm)                           Rest            88    127/64                           Peak  5:20      120   184/78                             Stress Duration:   5:20 mm:ss                       Maximum Stress HR: 120 bpm  _____________________________________________________________________________  __     MMode/2D Measurements & Calculations  asc Aorta Diam: 2.8 cm        Doppler Measurements & Calculations  Ao V2 max: 148.2 cm/sec  Ao max P.0 mmHg  PA V2 max: 139.0 cm/sec  PA max P.7 mmHg           _____________________________________________________________________________  __           Report approved by: Jacquelyn Thibodeaux 2017 02:52 PM          If you have any questions please call the clinic at [phone]    Sincerely,    MANUELA MONREAL M.D.   bmd

## 2017-11-29 ENCOUNTER — TRANSFERRED RECORDS (OUTPATIENT)
Dept: HEALTH INFORMATION MANAGEMENT | Facility: CLINIC | Age: 82
End: 2017-11-29

## 2017-11-29 ENCOUNTER — TELEPHONE (OUTPATIENT)
Dept: FAMILY MEDICINE | Facility: CLINIC | Age: 82
End: 2017-11-29

## 2017-11-29 NOTE — TELEPHONE ENCOUNTER
Reason for Call:  Same Day Appointment, Requested Provider:  Sushma Nelson MD    PCP: Sushma Nelson    Reason for visit: ER follow up, Esophagus     Duration of symptoms: ongoing    Have you been treated for this in the past? Yes    Additional comments: please call patient     Can we leave a detailed message on this number? NO    Phone number patient can be reached at: Home number on file [phone] (home)    Best Time: any    Call taken on 11/29/2017 at 8:28 AM by Mary Kenyon

## 2017-11-30 ENCOUNTER — OFFICE VISIT (OUTPATIENT)
Dept: FAMILY MEDICINE | Facility: CLINIC | Age: 82
End: 2017-11-30
Payer: COMMERCIAL

## 2017-11-30 VITALS
HEART RATE: 80 BPM | TEMPERATURE: 98.6 F | DIASTOLIC BLOOD PRESSURE: 67 MMHG | SYSTOLIC BLOOD PRESSURE: 116 MMHG | BODY MASS INDEX: 25.39 KG/M2 | OXYGEN SATURATION: 94 % | WEIGHT: 130 LBS

## 2017-11-30 DIAGNOSIS — K21.00 GASTROESOPHAGEAL REFLUX DISEASE WITH ESOPHAGITIS: ICD-10-CM

## 2017-11-30 DIAGNOSIS — R91.8 PULMONARY NODULES: Primary | ICD-10-CM

## 2017-11-30 DIAGNOSIS — R63.4 LOSS OF WEIGHT: ICD-10-CM

## 2017-11-30 PROCEDURE — 99214 OFFICE O/P EST MOD 30 MIN: CPT | Performed by: INTERNAL MEDICINE

## 2017-11-30 NOTE — NURSING NOTE
Chief Complaint   Patient presents with     Hospital F/U     [facility]        Initial /67 (BP Location: Left arm, Patient Position: Sitting, Cuff Size: Adult Regular)  Pulse 80  Temp 98.6  F (37  C) (Oral)  Wt 130 lb (59 kg)  SpO2 94%  BMI 25.39 kg/m2 Estimated body mass index is 25.39 kg/(m^2) as calculated from the following:    Height as of 6/1/17: 5' (1.524 m).    Weight as of this encounter: 130 lb (59 kg).  Medication Reconciliation: complete   Deanna Watson ma

## 2017-11-30 NOTE — TELEPHONE ENCOUNTER
Called patient who states that she went into [facility] last night for esophagus issues. She states that she always feels there is something stuck and the only way for relief is when she belches. [facility] did not find anything abnormal according to patient but they did recommend that patient see provider in 2 days.   She is currently doing well. No shortness of breath/difficulties breathing but she is afraid to eat and has already lost a lot of weight.  Can she come in today? Preferably at 11:20?    Routed to provider to advise.  Armida Kelsey RN  [facility]

## 2017-11-30 NOTE — TELEPHONE ENCOUNTER
Attempt # 1  Called patient at home number [phone]  Was call answered? Yes,  States just spoke to Dr. Nelson's nurse who was going to check with PCP for an 11 am appointment today - said they will check with provider and get back to her?    Ngoc Smith RN  [facility]

## 2017-11-30 NOTE — PATIENT INSTRUCTIONS
Zantac 150 mg daily for a month, then as needed after that month    Schedule with esophageal specialist if symptoms recur despite acid blockers on board.

## 2017-11-30 NOTE — MR AVS SNAPSHOT
After Visit Summary   11/30/2017    Helena Eldridge    MRN: [id number]           Patient Information     Date Of Birth          1935        Visit Information        Provider Department      11/30/2017 11:20 AM Sushma Nelson MD [facility]        Today's Diagnoses     Pulmonary nodules    -  1    Gastroesophageal reflux disease with esophagitis        Loss of weight          Care Instructions    Zantac 150 mg daily for a month, then as needed after that month    Schedule with esophageal specialist if symptoms recur despite acid blockers on board.               Follow-ups after your visit        Additional Services     PULMONARY MEDICINE REFERRAL       Your provider has referred you to:  [facility], Dr Moises De La Cruz    Please be aware that coverage of these services is subject to the terms and limitations of your health insurance plan.  Call member services at your health plan with any benefit or coverage questions.      Please bring the following with you to your appointment:    (1) Any X-Rays, CTs or MRIs which have been performed.  Contact the facility where they were done to arrange for  prior to your scheduled appointment.    (2) List of current medications   (3) This referral request   (4) Any documents/labs given to you for this referral                  Follow-up notes from your care team     Return in about 2 weeks (around 12/14/2017), or as schedule .      Your next 10 appointments already scheduled     Dec 04, 2017  1:30 PM CST   New Visit with JABARI Cuevas   [facility] ([facility])    [address] [place] [id number] [phone]            Dec 11, 2017  2:20 PM CST   SHORT with Sushma Nelson MD   [facility] ([facility]    [address] [place] [id number] [phone]              Who  "to contact     If you have questions or need follow up information about today's clinic visit or your schedule please contact [facility] directly at [phone].  Normal or non-critical lab and imaging results will be communicated to you by MyChart, letter or phone within 4 business days after the clinic has received the results. If you do not hear from us within 7 days, please contact the clinic through MyChart or phone. If you have a critical or abnormal lab result, we will notify you by phone as soon as possible.  Submit refill requests through [employer] or call your pharmacy and they will forward the refill request to us. Please allow 3 business days for your refill to be completed.          Additional Information About Your Visit        [employer] Information     [employer] lets you send messages to your doctor, view your test results, renew your prescriptions, schedule appointments and more. To sign up, go to [email or web address] . Click on \"Log in\" on the left side of the screen, which will take you to the Welcome page. Then click on \"Sign up Now\" on the right side of the page.     You will be asked to enter the access code listed below, as well as some personal information. Please follow the directions to create your username and password.     Your access code is: [email or web address]  Expires: 2017 11:32 AM     Your access code will  in 90 days. If you need help or a new code, please call your [place] clinic or [phone].        Care EveryWhere ID     This is your Care EveryWhere ID. This could be used by other organizations to access your [facility] medical records  [id number]        Your Vitals Were     Pulse Temperature Pulse Oximetry BMI (Body Mass Index)          80 98.6  F (37  C) (Oral) 94% 25.39 kg/m2         Blood Pressure from Last 3 Encounters:   17 116/67   17 109/58   10/31/17 110/58    Weight from Last 3 Encounters:   17 130 lb (59 kg)   17 " 131 lb (59.4 kg)   10/31/17 133 lb (60.3 kg)              We Performed the Following     PULMONARY MEDICINE REFERRAL          Today's Medication Changes          These changes are accurate as of: 11/30/17 12:02 PM.  If you have any questions, ask your nurse or doctor.               Start taking these medicines.        Dose/Directions    ranitidine 150 MG tablet   Commonly known as:  ZANTAC   Used for:  Gastroesophageal reflux disease with esophagitis   Started by:  Sushma Nelson MD        Dose:  150 mg   Take 1 tablet (150 mg) by mouth daily (afternoon) for a month, then as needed   Quantity:  60 tablet   Refills:  1            Where to get your medicines      These medications were sent [facility] [place] [address] [facility] - [facility] [address]  [address] [facility] [address]     Phone:  [phone]     ranitidine 150 MG tablet                Primary Care Provider Office Phone # Fax #    Sushma Nelson [phone]       [address]  [facility] [address]        Equal Access to Services     [facility] CenterDAMIEN : Hadii britany ku hadasho Soomaali, waaxda luqadaha, qaybta kaalmada adeegyada, waxjuan velazquez haykailey gu . So [facility] [phone].    ATENCIÓN: Si habla español, tiene a jones disposición servicios gratuitos de asistencia lingüística. Llame al [phone].    We comply with applicable federal civil rights laws and [place] laws. We do not discriminate on the basis of race, color, national origin, age, disability, sex, sexual orientation, or gender identity.            Thank you!     Thank you for choosing [facility]  for your care. Our goal is always to provide you with excellent care. Hearing back from our patients is one way we can continue to improve our services. Please take a few minutes to complete the written survey that you may receive in the mail after your visit with us. Thank you!             Your Updated Medication List - Protect  others around you: Learn how to safely use, store and throw away your medicines at www.disposemymeds.org.          This list is accurate as of: 11/30/17 12:02 PM.  Always use your most recent med list.                   Brand Name Dispense Instructions for use Diagnosis    albuterol 108 (90 BASE) MCG/ACT Inhaler    PROAIR HFA/PROVENTIL HFA/VENTOLIN HFA    2 Inhaler    Inhale 2 puffs into the lungs every 6 hours as needed for shortness of breath / dyspnea or wheezing    COPD, severe (H)       allopurinol 100 MG tablet    ZYLOPRIM    180 tablet    Take 2 tablets (200 mg) by mouth daily    Idiopathic chronic gout of foot without tophus, unspecified laterality       ammonium lactate 12 % cream    AMLACTIN    280 g    Apply  topically 2 times daily as needed. apply to affected area    Dry skin dermatitis       ASPIRIN PO      Take 81 mg by mouth daily        BENADRYL 25 MG tablet   Generic drug:  diphenhydrAMINE      Take 25 mg by mouth At Bedtime    DIAGNOSIS NOT YET DEFINED       calcium + D 600-200 MG-UNIT Tabs   Generic drug:  calcium carbonate-vitamin D      Take 2 tablets by mouth 2 times daily.    DIAGNOSIS NOT YET DEFINED       clobetasol 0.05 % cream    TEMOVATE    45 g    Apply  topically daily as needed.    Rash       DOCUSATE SODIUM PO      Take 100 mg by mouth At Bedtime        furosemide 20 MG tablet    LASIX    180 tablet    Take 1 tablet (20 mg) by mouth 2 times daily (morning and lunch time)    Hypertension goal BP (blood pressure) < 140/90, CKD (chronic kidney disease) stage 3, GFR 30-59 ml/min       ketoconazole 2 % cream    NIZORAL    15 g    Apply  topically daily.    Sciatic pain       levothyroxine 100 MCG tablet    SYNTHROID    90 tablet    Take 1 tablet (100 mcg) by mouth daily Except Sunday    Other specified hypothyroidism       mometasone 0.1 % cream    ELOCON    45 g    Apply  topically daily as needed.    Rash       OCUVITE ADULT 50+ Caps     30 capsule    Take 1 capsule by mouth daily     Macular degeneration of both eyes       omeprazole 40 MG capsule    priLOSEC    90 capsule    Take 1 capsule (40 mg) by mouth daily    Gastroesophageal reflux disease with esophagitis       ranitidine 150 MG tablet    ZANTAC    60 tablet    Take 1 tablet (150 mg) by mouth daily (afternoon) for a month, then as needed    Gastroesophageal reflux disease with esophagitis       rOPINIRole 0.25 MG tablet    REQUIP    60 tablet    TAKE 1-2 TABLETS BY MOUTH AT BEDTIME    Cramp of limb       STATIN NOT PRESCRIBED (INTENTIONAL)      by Other route continuous prn. Patient declined 5/4/12

## 2017-11-30 NOTE — TELEPHONE ENCOUNTER
Yes, have her come for 11:20 appt.  Ask her to bring ph# of GI specialist she saw recently.  Thank you

## 2017-11-30 NOTE — PROGRESS NOTES
SUBJECTIVE:   Helena Eldridge is a 82 year old female who presents to clinic today for the following health issues:      ED/UC Followup:    Facility:  [facility]   Date of visit: 11/29/17  Reason for visit: feeling of food stuck in esophagus   Current Status:       This has been occurring for past 6 weeks.   This is 3rd ER episode  EGD = Schatzkis ring, gastritis.      On omeprazole once daily.      The symptoms come on with random meals, still losing weight apprehensive to eat.             Problem list and histories reviewed & adjusted, as indicated.  Additional history: as documented    Patient Active Problem List   Diagnosis     CARDIOVASCULAR SCREENING; LDL GOAL LESS THAN 100     CKD (chronic kidney disease) stage 3, GFR 30-59 ml/min     Glucose intolerance (impaired glucose tolerance)     Kidney stones     Advance care planning     Hypertension goal BP (blood pressure) < 140/90     Hyperlipidemia LDL goal <130     Lichen sclerosus et atrophicus of the vulva     Solitary kidney, acquired     Pulmonary nodule/lesion, solitary     Senile osteoporosis     Osteoarthritis of right knee     Medial meniscus tear     Cataracts, both eyes     Macular degeneration of both eyes, right eye greater than left eye     Pelvic fracture (H)     COPD, severe (H)     IPF (idiopathic pulmonary fibrosis) (H)     Other specified hypothyroidism     Idiopathic chronic gout     Past Surgical History:   Procedure Laterality Date     C NEPHRECTOMY      left partial 07-01-07     C VENOUS THROMBOSIS IMAGING      with pe     HC REMOVAL GALLBLADDER       HC REVISE MEDIAN N/CARPAL TUNNEL SURG       TUBAL LIGATION         Social History   Substance Use Topics     Smoking status: Former Smoker     Types: Cigarettes     Quit date: 12/4/1969     Smokeless tobacco: Never Used     Alcohol use 0.0 oz/week     0 Standard drinks or equivalent per week      Comment: very little at night 1-2 times a week      Family History   Problem Relation Age of Onset      CANCER Brother      Glaucoma Mother      Alzheimer Disease Brother      Macular Degeneration No family hx of          Current Outpatient Prescriptions   Medication Sig Dispense Refill     ranitidine (ZANTAC) 150 MG tablet Take 1 tablet (150 mg) by mouth daily (afternoon) for a month, then as needed 60 tablet 1     omeprazole (PRILOSEC) 40 MG capsule Take 1 capsule (40 mg) by mouth daily 90 capsule 3     rOPINIRole (REQUIP) 0.25 MG tablet TAKE 1-2 TABLETS BY MOUTH AT BEDTIME 60 tablet 1     levothyroxine (SYNTHROID) 100 MCG tablet Take 1 tablet (100 mcg) by mouth daily Except Sunday 90 tablet 3     furosemide (LASIX) 20 MG tablet Take 1 tablet (20 mg) by mouth 2 times daily (morning and lunch time) 180 tablet 3     allopurinol (ZYLOPRIM) 100 MG tablet Take 2 tablets (200 mg) by mouth daily 180 tablet 3     ammonium lactate (AMLACTIN) 12 % cream Apply  topically 2 times daily as needed. apply to affected area 280 g 3     clobetasol (TEMOVATE) 0.05 % cream Apply  topically daily as needed. 45 g 1     Multiple Vitamins-Minerals (OCUVITE ADULT 50+) CAPS Take 1 capsule by mouth daily 30 capsule 12     albuterol (PROAIR HFA/PROVENTIL HFA/VENTOLIN HFA) 108 (90 BASE) MCG/ACT Inhaler Inhale 2 puffs into the lungs every 6 hours as needed for shortness of breath / dyspnea or wheezing 2 Inhaler 3     DOCUSATE SODIUM PO Take 100 mg by mouth At Bedtime       ketoconazole (NIZORAL) 2 % cream Apply  topically daily. 15 g 3     mometasone (ELOCON) 0.1 % cream Apply  topically daily as needed. 45 g 1     diphenhydrAMINE (BENADRYL) 25 MG tablet Take 25 mg by mouth At Bedtime        Calcium Carbonate-Vitamin D (CALCIUM + D) 600-200 MG-UNIT per tablet Take 2 tablets by mouth 2 times daily.       ASPIRIN PO Take 81 mg by mouth daily        STATIN NOT PRESCRIBED, INTENTIONAL, by Other route continuous prn. Patient declined 5/4/12  0     Allergies   Allergen Reactions     Ace Inhibitors Cough     Advicor      Celexa [Citalopram] GI  Disturbance     diarrhea     Doxycycline Nausea     Poor appetite     Fosamax      Severe substernal burning and dysphagia within 3 days     Valsartan Cramps     severe     Recent Labs   Lab Test  07/27/17   1736  06/01/17   1436  05/19/16   1104   05/19/15   0946   05/02/14   0823   04/24/13   0828   04/20/12   0907   A1C   --   5.7   --    --   5.4   --   5.3   --   5.2   --    --    LDL   --   133*  126*   --   122   --   125   --   128   --   135*   HDL   --   45*  41*   --   39*   --   30*   --   32*   --   30*   TRIG   --   193*  228*   --   183*   --   216*   --   146   --   194*   ALT   --    --    --    --    --    --   27   --   24   --   22   CR  1.49*  1.26*  1.29*   < >  1.16*   < >  1.17*   < >  1.20*   < >  1.34*   GFRESTIMATED  34*  41*  40*   < >  45*   < >  45*   < >  44*   < >  38*   GFRESTBLACK  41*  49*  48*   < >  54*   < >  54*   < >  53*   < >  47*   POTASSIUM  3.5  3.1*  3.9   < >  4.2   < >  4.2   < >  4.3   < >  4.3   TSH   --   1.42  1.36   < >  1.01   --   3.31   --   0.62   --   0.35*    < > = values in this interval not displayed.      BP Readings from Last 3 Encounters:   11/30/17 116/67   11/20/17 109/58   10/31/17 110/58    Wt Readings from Last 3 Encounters:   11/30/17 130 lb (59 kg)   11/20/17 131 lb (59.4 kg)   10/31/17 133 lb (60.3 kg)                  Labs reviewed in EPIC          Reviewed and updated as needed this visit by clinical staffTobacco  Allergies  Med Hx  Surg Hx  Fam Hx  Soc Hx      Reviewed and updated as needed this visit by Provider         ROS:  Constitutional, HEENT, cardiovascular, pulmonary,  and gu systems are negative, except as otherwise noted.      OBJECTIVE:   /67 (BP Location: Left arm, Patient Position: Sitting, Cuff Size: Adult Regular)  Pulse 80  Temp 98.6  F (37  C) (Oral)  Wt 130 lb (59 kg)  SpO2 94%  BMI 25.39 kg/m2  Body mass index is 25.39 kg/(m^2).  GENERAL: alert, no distress, fatigued and has lost weight   MS: no gross  musculoskeletal defects noted, no edema  SKIN: no suspicious lesions or rashes  NEURO: Normal strength and tone, mentation intact and speech normal  PSYCH: mentation appears normal, affect normal/bright  PSYCH: content of speech sad since loss of her significant other to cancer.     Diagnostic Test Results:  Results for orders placed or performed in visit on 17   Echo stress test with definity    Narrative    [id number]  [id number]  [id number]  [id number]^RAH^MANUELA^STONEY           [facility], Echocardiography Laboratory  [address] [place] [address]        Name: ARELY GARCIA  MRN: [id number]  : 1935  Study Date: 2017 09:57 AM  Age: 82 yrs  Gender: Female  Patient [facility]  Reason For Study: Dysphagia, unspecified type, Atypical chest pain  History: COPD  Ordering Physician: MANUELA MONREAL  Referring Physician: MANUELA MONREAL  Performed By: Roberto Morocho RDCS     BSA: 1.6 m2  Height: 60 in  Weight: 131 lb  HR: 88  BP: 127/64 mmHg  _____________________________________________________________________________  __        Procedure  Stress Echo Bike with two dimensional, color and spectral Doppler performed.  Contrast Definity.  _____________________________________________________________________________  __        Interpretation Summary  Normal, low-risk exercise echocardiogram with slightly reduced sensitivity due  to failure to augment LVEF and decrease LV cavity size.  The target heart rate was achieved. Normal heart rate and BP response to  exercise.  Normal biventricular size, thickness, and global systolic function at  baseline, LVEF=60-65%.  With exercise, LVEF and LV cavity size remained unchanged .  No regional wall motion abnormalities are present at rest or with exercise.  No angina was elicited.  No ECG evidence of ischemia.  Functional capacity is normal for age.  No significant valvular abnormalities are noted on screening Doppler exam.  The  aortic root and visualized ascending aorta are normal.  _____________________________________________________________________________  __     Stress  Definity (NDC #76995-559-65) given intravenously.  Patient was given 5ml mixture of 1.5ml Definity and 8.5ml saline.  5 ml wasted.  IV start location RAC .  Peak MVO2 20.2 ml/kg/min .  Percent predicted MVO2 98 %.  RPP 97395.  Maximum workload 75 muro.  Target Heart Rate was achieved.  The patient did not exhibit any symptoms during exercise.  The patient exhibited fatigue during exercise.     Stress Results     Protocol:  Supine Bike Stress Echo        Maximum Predicted HR:   138 bpm     Target HR: 117 bpm                        % Maximum Predicted HR: 87 %                             StageDurationHeart Rate  BP                                (mm:ss)   (bpm)                           Rest            88    127/64                           Peak  5:20      120   184/78                             Stress Duration:   5:20 mm:ss                       Maximum Stress HR: 120 bpm  _____________________________________________________________________________  __     MMode/2D Measurements & Calculations  asc Aorta Diam: 2.8 cm        Doppler Measurements & Calculations  Ao V2 max: 148.2 cm/sec  Ao max P.0 mmHg  PA V2 max: 139.0 cm/sec  PA max P.7 mmHg           _____________________________________________________________________________  __           Report approved by: Jacquelyn Thibodeaux 2017 02:52 PM          ASSESSMENT/PLAN:             ICD-10-CM    1. Pulmonary nodules R91.8 PULMONARY MEDICINE REFERRAL   2. Gastroesophageal reflux disease with esophagitis K21.0 ranitidine (ZANTAC) 150 MG tablet   3. Loss of weight R63.4        Ensure supplements for weight  Pulm nodule likely the same one from 2013:  D/c from pulmonary clinic at that time, was deemed benign (behaviour of lesion)... Pulmonary referral to re assess  Add zantac. .. The episodes might be less  frequent, altho she was recently in ER... Watchful waiting ... Hopefully no more ER with Zantac on board.   She has appt with me 12/11, will check weight again then.         Sushma Nelson MD  [facility]

## 2017-11-30 NOTE — TELEPHONE ENCOUNTER
Attempt # 1  Called patient at home number.  Was call answered?  Yes, scheduled appointment for today at 11:20   Asked patient to bring all paper work with her to appointment, she will.    Ngoc Smith RN  [facility]

## 2017-12-04 ENCOUNTER — OFFICE VISIT (OUTPATIENT)
Dept: PSYCHOLOGY | Facility: CLINIC | Age: 82
End: 2017-12-04
Attending: INTERNAL MEDICINE

## 2017-12-04 DIAGNOSIS — F43.22 ADJUSTMENT DISORDER WITH ANXIETY: Primary | ICD-10-CM

## 2017-12-04 PROCEDURE — 90791 PSYCH DIAGNOSTIC EVALUATION: CPT | Performed by: SOCIAL WORKER

## 2017-12-04 ASSESSMENT — ANXIETY QUESTIONNAIRES
7. FEELING AFRAID AS IF SOMETHING AWFUL MIGHT HAPPEN: NOT AT ALL
IF YOU CHECKED OFF ANY PROBLEMS ON THIS QUESTIONNAIRE, HOW DIFFICULT HAVE THESE PROBLEMS MADE IT FOR YOU TO DO YOUR WORK, TAKE CARE OF THINGS AT HOME, OR GET ALONG WITH OTHER PEOPLE: NOT DIFFICULT AT ALL
6. BECOMING EASILY ANNOYED OR IRRITABLE: NOT AT ALL
GAD7 TOTAL SCORE: 1
5. BEING SO RESTLESS THAT IT IS HARD TO SIT STILL: NOT AT ALL
3. WORRYING TOO MUCH ABOUT DIFFERENT THINGS: NOT AT ALL
1. FEELING NERVOUS, ANXIOUS, OR ON EDGE: SEVERAL DAYS
2. NOT BEING ABLE TO STOP OR CONTROL WORRYING: NOT AT ALL

## 2017-12-04 ASSESSMENT — PATIENT HEALTH QUESTIONNAIRE - PHQ9
5. POOR APPETITE OR OVEREATING: NOT AT ALL
SUM OF ALL RESPONSES TO PHQ QUESTIONS 1-9: 1

## 2017-12-04 NOTE — MR AVS SNAPSHOT
"                  MRN:[id number]                      After Visit Summary   2017    Helena Eldridge    MRN: [id number]           Visit Information        Provider Department      2017 1:30 PM Luis E Plata VIOLETJABARI [facility]      Your next 10 appointments already scheduled     Dec 11, 2017  2:20 PM CST   [facility]    [address] [facility] [id number] [phone]            2018  2:30 PM CST   Return Visit with Luis E VIOLET LiannariverJABARI richter   [facility] ([facility]    [address] [facility] [id number] [phone]              MyChart Information     [facility] lets you send messages to your doctor, view your test results, renew your prescriptions, schedule appointments and more. To sign up, go to [email or web address] . Click on \"Log in\" on the left side of the screen, which will take you to the Welcome page. Then click on \"Sign up Now\" on the right side of the page.     You will be asked to enter the access code listed below, as well as some personal information. Please follow the directions to create your username and password.     Your access code is: [email or web address]  Expires: 2017 11:32 AM     Your access code will  in 90 days. If you need help or a new code, please call your [place] clinic or [phone].        Care EveryWhere ID     This is your Care EveryWhere ID. This could be used by other organizations to access your [facility] medical records  [id number]        Equal Access to Services     SHRUTHI DEL TORO : Hadii britany perry Socrystal, waaxda luqadaha, qaybta kaalmakayla becker. So [facility] [phone].    ATENCIÓN: Si habla español, tiene a jones disposición servicios gratuitos de asistencia lingüística. Llame al " [phone].    We comply with applicable federal civil rights laws and [place] laws. We do not discriminate on the basis of race, color, national origin, age, disability, sex, sexual orientation, or gender identity.

## 2017-12-04 NOTE — Clinical Note
Maciel Ordaz--I met with our patient today for her first counseling session.  I will work with her on grief and loss and reducing anxiety around health issues she has concerns about.  Let me know if you have any questions.  I look forward to coordinating care with you, as needed, in the future.  Best, Luis E
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
## 2017-12-05 ASSESSMENT — ANXIETY QUESTIONNAIRES: GAD7 TOTAL SCORE: 1

## 2017-12-11 ENCOUNTER — OFFICE VISIT (OUTPATIENT)
Dept: FAMILY MEDICINE | Facility: CLINIC | Age: 82
End: 2017-12-11
Payer: COMMERCIAL

## 2017-12-11 VITALS
DIASTOLIC BLOOD PRESSURE: 55 MMHG | HEART RATE: 99 BPM | TEMPERATURE: 98.2 F | WEIGHT: 129 LBS | BODY MASS INDEX: 25.19 KG/M2 | OXYGEN SATURATION: 96 % | SYSTOLIC BLOOD PRESSURE: 123 MMHG

## 2017-12-11 DIAGNOSIS — R63.4 LOSS OF WEIGHT: ICD-10-CM

## 2017-12-11 DIAGNOSIS — K22.4 ESOPHAGEAL DYSMOTILITY: Primary | ICD-10-CM

## 2017-12-11 PROCEDURE — 99213 OFFICE O/P EST LOW 20 MIN: CPT | Performed by: INTERNAL MEDICINE

## 2017-12-11 NOTE — PROGRESS NOTES
"  SUBJECTIVE:   Helena Eldridge is a 82 year old female who presents to clinic today for the following health issues:      Follow up on swallowing issues- states it is getting better.    83 y/o F experiencing bereavement since the loss of her significant other.  In addition, she has experienced difficulty swallowing, like food \"gets stuck\" in the esophagus.  She feels her weight loss is in large part due to apprehension with eating lately...  no general or vocal weakness, went walking ...  Recent EGD=mild Schatzkis ring, making a mild esophageal  dysmotility possible.   She is on PPI and sx are better, but she is still apprehensive to eat and still losing weight... Patient feels part of weight loss is due to her significant other \"used to do all of the cooking\"...         Problem list and histories reviewed & adjusted, as indicated.  Additional history: as documented    Patient Active Problem List   Diagnosis     CARDIOVASCULAR SCREENING; LDL GOAL LESS THAN 100     CKD (chronic kidney disease) stage 3, GFR 30-59 ml/min     Glucose intolerance (impaired glucose tolerance)     Kidney stones     Advance care planning     Hypertension goal BP (blood pressure) < 140/90     Hyperlipidemia LDL goal <130     Lichen sclerosus et atrophicus of the vulva     Solitary kidney, acquired     Pulmonary nodule/lesion, solitary     Senile osteoporosis     Osteoarthritis of right knee     Medial meniscus tear     Cataracts, both eyes     Macular degeneration of both eyes, right eye greater than left eye     Pelvic fracture (H)     COPD, severe (H)     IPF (idiopathic pulmonary fibrosis) (H)     Other specified hypothyroidism     Idiopathic chronic gout     Adjustment disorder with anxiety     Past Surgical History:   Procedure Laterality Date     C NEPHRECTOMY      left partial 07-01-07     C VENOUS THROMBOSIS IMAGING      with pe     HC REMOVAL GALLBLADDER       HC REVISE MEDIAN N/CARPAL TUNNEL SURG       TUBAL LIGATION         Social " History   Substance Use Topics     Smoking status: Former Smoker     Types: Cigarettes     Quit date: 12/4/1969     Smokeless tobacco: Never Used     Alcohol use 0.0 oz/week     0 Standard drinks or equivalent per week      Comment: very little      Family History   Problem Relation Age of Onset     CANCER Brother      Glaucoma Mother      Alzheimer Disease Brother      Macular Degeneration No family hx of          Current Outpatient Prescriptions   Medication Sig Dispense Refill     ranitidine (ZANTAC) 150 MG tablet Take 1 tablet (150 mg) by mouth daily (afternoon) for a month, then as needed 60 tablet 1     omeprazole (PRILOSEC) 40 MG capsule Take 1 capsule (40 mg) by mouth daily 90 capsule 3     rOPINIRole (REQUIP) 0.25 MG tablet TAKE 1-2 TABLETS BY MOUTH AT BEDTIME 60 tablet 1     levothyroxine (SYNTHROID) 100 MCG tablet Take 1 tablet (100 mcg) by mouth daily Except Sunday 90 tablet 3     furosemide (LASIX) 20 MG tablet Take 1 tablet (20 mg) by mouth 2 times daily (morning and lunch time) 180 tablet 3     allopurinol (ZYLOPRIM) 100 MG tablet Take 2 tablets (200 mg) by mouth daily 180 tablet 3     ammonium lactate (AMLACTIN) 12 % cream Apply  topically 2 times daily as needed. apply to affected area 280 g 3     clobetasol (TEMOVATE) 0.05 % cream Apply  topically daily as needed. 45 g 1     Multiple Vitamins-Minerals (OCUVITE ADULT 50+) CAPS Take 1 capsule by mouth daily 30 capsule 12     albuterol (PROAIR HFA/PROVENTIL HFA/VENTOLIN HFA) 108 (90 BASE) MCG/ACT Inhaler Inhale 2 puffs into the lungs every 6 hours as needed for shortness of breath / dyspnea or wheezing 2 Inhaler 3     DOCUSATE SODIUM PO Take 100 mg by mouth At Bedtime       ketoconazole (NIZORAL) 2 % cream Apply  topically daily. 15 g 3     mometasone (ELOCON) 0.1 % cream Apply  topically daily as needed. 45 g 1     diphenhydrAMINE (BENADRYL) 25 MG tablet Take 25 mg by mouth At Bedtime        Calcium Carbonate-Vitamin D (CALCIUM + D) 600-200 MG-UNIT  per tablet Take 2 tablets by mouth 2 times daily.       ASPIRIN PO Take 81 mg by mouth daily        STATIN NOT PRESCRIBED, INTENTIONAL, by Other route continuous prn. Patient declined 5/4/12  0     Allergies   Allergen Reactions     Ace Inhibitors Cough     Advicor      Celexa [Citalopram] GI Disturbance     diarrhea     Doxycycline Nausea     Poor appetite     Fosamax      Severe substernal burning and dysphagia within 3 days     Valsartan Cramps     severe     Recent Labs   Lab Test  07/27/17   1736  06/01/17   1436  05/19/16   1104   05/19/15   0946   05/02/14   0823   04/24/13   0828   04/20/12   0907   A1C   --   5.7   --    --   5.4   --   5.3   --   5.2   --    --    LDL   --   133*  126*   --   122   --   125   --   128   --   135*   HDL   --   45*  41*   --   39*   --   30*   --   32*   --   30*   TRIG   --   193*  228*   --   183*   --   216*   --   146   --   194*   ALT   --    --    --    --    --    --   27   --   24   --   22   CR  1.49*  1.26*  1.29*   < >  1.16*   < >  1.17*   < >  1.20*   < >  1.34*   GFRESTIMATED  34*  41*  40*   < >  45*   < >  45*   < >  44*   < >  38*   GFRESTBLACK  41*  49*  48*   < >  54*   < >  54*   < >  53*   < >  47*   POTASSIUM  3.5  3.1*  3.9   < >  4.2   < >  4.2   < >  4.3   < >  4.3   TSH   --   1.42  1.36   < >  1.01   --   3.31   --   0.62   --   0.35*    < > = values in this interval not displayed.      BP Readings from Last 3 Encounters:   12/11/17 123/55   11/30/17 116/67   11/20/17 109/58    Wt Readings from Last 3 Encounters:   12/11/17 129 lb (58.5 kg)   11/30/17 130 lb (59 kg)   11/20/17 131 lb (59.4 kg)                  Labs reviewed in EPIC          Reviewed and updated as needed this visit by clinical staffTobacco  Allergies  Med Hx  Surg Hx  Fam Hx  Soc Hx      Reviewed and updated as needed this visit by Provider         ROS:  Constitutional, HEENT, cardiovascular, pulmonary, gi and gu systems are negative, except as otherwise noted.      OBJECTIVE:    /55 (BP Location: Right arm, Patient Position: Sitting, Cuff Size: Adult Regular)  Pulse 99  Temp 98.2  F (36.8  C) (Oral)  Wt 129 lb (58.5 kg)  SpO2 96%  BMI 25.19 kg/m2  Body mass index is 25.19 kg/(m^2).  GENERAL: healthy, alert and no distress  EYES: Eyes grossly normal to inspection, PERRL and conjunctivae and sclerae normal  HENT:  Normal speech/volume.  No tongue fasciculations.   MS: no gross musculoskeletal defects noted, no edema  NEURO:  Grossly Normal strength and tone, mentation intact and speech normal    Diagnostic Test Results:  Results for orders placed or performed in visit on 17   Echo stress test with definity    Narrative    [id number]  [id number]  [id number]  [id number]^RAH^MANUELA^STONEY           [facility], Echocardiography Laboratory  [address] [place] [address]        Name: ARELY GARCIA  MRN: [id number]  : 1935  Study Date: 2017 09:57 AM  Age: 82 yrs  Gender: Female  Patient Location: [facility]  Reason For Study: Dysphagia, unspecified type, Atypical chest pain  History: COPD  Ordering Physician: MANUELA MONREAL  Referring Physician: MANUELA MONREAL  Performed By: Roberto Morocho RDCS     BSA: 1.6 m2  Height: 60 in  Weight: 131 lb  HR: 88  BP: 127/64 mmHg  _____________________________________________________________________________  __        Procedure  Stress Echo Bike with two dimensional, color and spectral Doppler performed.  Contrast Definity.  _____________________________________________________________________________  __        Interpretation Summary  Normal, low-risk exercise echocardiogram with slightly reduced sensitivity due  to failure to augment LVEF and decrease LV cavity size.  The target heart rate was achieved. Normal heart rate and BP response to  exercise.  Normal biventricular size, thickness, and global systolic function at  baseline, LVEF=60-65%.  With exercise, LVEF and LV cavity size remained unchanged  .  No regional wall motion abnormalities are present at rest or with exercise.  No angina was elicited.  No ECG evidence of ischemia.  Functional capacity is normal for age.  No significant valvular abnormalities are noted on screening Doppler exam.  The aortic root and visualized ascending aorta are normal.  _____________________________________________________________________________  __     Stress  Definity (NDC #57289-773-29) given intravenously.  Patient was given 5ml mixture of 1.5ml Definity and 8.5ml saline.  5 ml wasted.  IV start location RAC .  Peak MVO2 20.2 ml/kg/min .  Percent predicted MVO2 98 %.  RPP 13140.  Maximum workload 75 muro.  Target Heart Rate was achieved.  The patient did not exhibit any symptoms during exercise.  The patient exhibited fatigue during exercise.     Stress Results     Protocol:  Supine Bike Stress Echo        Maximum Predicted HR:   138 bpm     Target HR: 117 bpm                        % Maximum Predicted HR: 87 %                             StageDurationHeart Rate  BP                                (mm:ss)   (bpm)                           Rest            88    127/64                           Peak  5:20      120   184/78                             Stress Duration:   5:20 mm:ss                       Maximum Stress HR: 120 bpm  _____________________________________________________________________________  __     MMode/2D Measurements & Calculations  asc Aorta Diam: 2.8 cm        Doppler Measurements & Calculations  Ao V2 max: 148.2 cm/sec  Ao max P.0 mmHg  PA V2 max: 139.0 cm/sec  PA max P.7 mmHg           _____________________________________________________________________________  __           Report approved by: Jacquelyn Thibodeaux 2017 02:52 PM          ASSESSMENT/PLAN:               ICD-10-CM    1. Esophageal dysmotility K22.4    2. Loss of weight R63.4         If her swallow issues and weight loss continue, then will get specialized EMG, HEad and  neck imaging, and neurology consult.  She currently feels she is getting better so we will not pursue an unlikely case of ALS.      Patient is having PET scan via Pulmonologist (Dr Turner) to re evaluate the pulmonary nodule.     She is grieving loss of significant other and reports she is staying active and feels well supported.   Sushma Nelson MD  [facility]

## 2017-12-11 NOTE — MR AVS SNAPSHOT
"              After Visit Summary   12/11/2017    Helena Eldridge    MRN: [id number]           Patient Information     Date Of Birth          1935        Visit Information        Provider Department      12/11/2017 2:20 PM Sushma Nelson MD [facility]        Today's Diagnoses     Esophageal dysmotility    -  1    Loss of weight          Care Instructions    Return to clinic Spring or summer '18 (or sooner if needed)              Follow-ups after your visit        Follow-up notes from your care team     Return in about 5 months (around 5/11/2018).      Your next 10 appointments already scheduled     Jan 08, 2018 10:00 AM CST   Return Visit with JABARI Cuevas   [facility] ([facility])    [address] [place] [id number] [phone]              Who to contact     If you have questions or need follow up information about today's clinic visit or your schedule please contact [facility] directly at [phone].  Normal or non-critical lab and imaging results will be communicated to you by [employer], letter or phone within 4 business days after the clinic has received the results. If you do not hear from us within 7 days, please contact the clinic through [employer] or phone. If you have a critical or abnormal lab result, we will notify you by phone as soon as possible.  Submit refill requests through [employer] or call your pharmacy and they will forward the refill request to us. Please allow 3 business days for your refill to be completed.          Additional Information About Your Visit        [employer] Information     [employer] lets you send messages to your doctor, view your test results, renew your prescriptions, schedule appointments and more. To sign up, go to [email or web address] . Click on \"Log in\" on the left side of the screen, which will take you to the Welcome page. Then click on \"Sign " "up Now\" on the right side of the page.     You will be asked to enter the access code listed below, as well as some personal information. Please follow the directions to create your username and password.     Your access code is: [email or web address]  Expires: 2017 11:32 AM     Your access code will  in 90 days. If you need help or a new code, please call your [facility] clinic or [phone].        Care EveryWhere ID     This is your Care EveryWhere ID. This could be used by other organizations to access your [facility] medical records  [id number]        Your Vitals Were     Pulse Temperature Pulse Oximetry BMI (Body Mass Index)          99 98.2  F (36.8  C) (Oral) 96% 25.19 kg/m2         Blood Pressure from Last 3 Encounters:   17 123/55   17 116/67   17 109/58    Weight from Last 3 Encounters:   17 129 lb (58.5 kg)   17 130 lb (59 kg)   17 131 lb (59.4 kg)              Today, you had the following     No orders found for display       Primary Care Provider Office Phone # Fax #    Sushma Nelson [phone]       [address] [facility] [address]        Equal Access to Services     SHRUTHI DEL TORO : Hadii brtiany russ hadasho Soheberali, waaxda luqadaha, qaybta kaalmada adeegyada, kayla gu . So [facility] [phone].    ATENCIÓN: Si habla español, tiene a jones disposición servicios gratuitos de asistencia lingüística. Llame al [phone].    We comply with applicable federal civil rights laws and [place] laws. We do not discriminate on the basis of race, color, national origin, age, disability, sex, sexual orientation, or gender identity.            Thank you!     Thank you for choosing [facility]  for your care. Our goal is always to provide you with excellent care. Hearing back from our patients is one way we can continue to improve our services. Please take a few minutes to complete the written " survey that you may receive in the mail after your visit with us. Thank you!             Your Updated Medication List - Protect others around you: Learn how to safely use, store and throw away your medicines at www.disposemymeds.org.          This list is accurate as of: 12/11/17  2:45 PM.  Always use your most recent med list.                   Brand Name Dispense Instructions for use Diagnosis    albuterol 108 (90 BASE) MCG/ACT Inhaler    PROAIR HFA/PROVENTIL HFA/VENTOLIN HFA    2 Inhaler    Inhale 2 puffs into the lungs every 6 hours as needed for shortness of breath / dyspnea or wheezing    COPD, severe (H)       allopurinol 100 MG tablet    ZYLOPRIM    180 tablet    Take 2 tablets (200 mg) by mouth daily    Idiopathic chronic gout of foot without tophus, unspecified laterality       ammonium lactate 12 % cream    AMLACTIN    280 g    Apply  topically 2 times daily as needed. apply to affected area    Dry skin dermatitis       ASPIRIN PO      Take 81 mg by mouth daily        BENADRYL 25 MG tablet   Generic drug:  diphenhydrAMINE      Take 25 mg by mouth At Bedtime    DIAGNOSIS NOT YET DEFINED       calcium + D 600-200 MG-UNIT Tabs   Generic drug:  calcium carbonate-vitamin D      Take 2 tablets by mouth 2 times daily.    DIAGNOSIS NOT YET DEFINED       clobetasol 0.05 % cream    TEMOVATE    45 g    Apply  topically daily as needed.    Rash       DOCUSATE SODIUM PO      Take 100 mg by mouth At Bedtime        furosemide 20 MG tablet    LASIX    180 tablet    Take 1 tablet (20 mg) by mouth 2 times daily (morning and lunch time)    Hypertension goal BP (blood pressure) < 140/90, CKD (chronic kidney disease) stage 3, GFR 30-59 ml/min       ketoconazole 2 % cream    NIZORAL    15 g    Apply  topically daily.    Sciatic pain       levothyroxine 100 MCG tablet    SYNTHROID    90 tablet    Take 1 tablet (100 mcg) by mouth daily Except Sunday    Other specified hypothyroidism       mometasone 0.1 % cream    ELOCON    45 g     Apply  topically daily as needed.    Rash       OCUVITE ADULT 50+ Caps     30 capsule    Take 1 capsule by mouth daily    Macular degeneration of both eyes       omeprazole 40 MG capsule    priLOSEC    90 capsule    Take 1 capsule (40 mg) by mouth daily    Gastroesophageal reflux disease with esophagitis       ranitidine 150 MG tablet    ZANTAC    60 tablet    Take 1 tablet (150 mg) by mouth daily (afternoon) for a month, then as needed    Gastroesophageal reflux disease with esophagitis       rOPINIRole 0.25 MG tablet    REQUIP    60 tablet    TAKE 1-2 TABLETS BY MOUTH AT BEDTIME    Cramp of limb       STATIN NOT PRESCRIBED (INTENTIONAL)      by Other route continuous prn. Patient declined 5/4/12

## 2017-12-11 NOTE — NURSING NOTE
Chief Complaint   Patient presents with     Pharyngitis     trouble swallowing      Health Maintenance     COPD action plan,DAP        Initial /55 (BP Location: Right arm, Patient Position: Sitting, Cuff Size: Adult Regular)  Pulse 99  Temp 98.2  F (36.8  C) (Oral)  Wt 129 lb (58.5 kg)  SpO2 96%  BMI 25.19 kg/m2 Estimated body mass index is 25.19 kg/(m^2) as calculated from the following:    Height as of 6/1/17: 5' (1.524 m).    Weight as of this encounter: 129 lb (58.5 kg).  Medication Reconciliation: complete   Deanna Watson ma

## 2017-12-13 ENCOUNTER — TRANSFERRED RECORDS (OUTPATIENT)
Dept: HEALTH INFORMATION MANAGEMENT | Facility: CLINIC | Age: 82
End: 2017-12-13

## 2017-12-21 ENCOUNTER — TRANSFERRED RECORDS (OUTPATIENT)
Dept: HEALTH INFORMATION MANAGEMENT | Facility: CLINIC | Age: 82
End: 2017-12-21

## 2018-01-04 ENCOUNTER — TRANSFERRED RECORDS (OUTPATIENT)
Dept: HEALTH INFORMATION MANAGEMENT | Facility: CLINIC | Age: 83
End: 2018-01-04

## 2018-01-08 ENCOUNTER — OFFICE VISIT (OUTPATIENT)
Dept: PSYCHOLOGY | Facility: CLINIC | Age: 83
End: 2018-01-08
Payer: COMMERCIAL

## 2018-01-08 DIAGNOSIS — F43.22 ADJUSTMENT DISORDER WITH ANXIETY: Primary | ICD-10-CM

## 2018-01-08 PROCEDURE — 90834 PSYTX W PT 45 MINUTES: CPT | Performed by: SOCIAL WORKER

## 2018-01-08 NOTE — MR AVS SNAPSHOT
"                  MRN:[id number]                      After Visit Summary   2018    Helena Eldridge    MRN: [id number]           Visit Information        Provider Department      2018 10:00 AM Luis E Plata LICSW [facility]      MyChart Information     MyChart lets you send messages to your doctor, view your test results, renew your prescriptions, schedule appointments and more. To sign up, go to [email or web address] . Click on \"Log in\" on the left side of the screen, which will take you to the Welcome page. Then click on \"Sign up Now\" on the right side of the page.     You will be asked to enter the access code listed below, as well as some personal information. Please follow the directions to create your username and password.     Your access code is: [email or web address]  Expires: 2018 12:23 PM     Your access code will  in 90 days. If you need help or a new code, please call your [facility] clinic or [phone].        Care EveryWhere ID     This is your Care EveryWhere ID. This could be used by other organizations to access your [facility] medical records  [id number]        Equal Access to Services     SHRUTHI DEL TORO AH: Sofya Renner, paty matthews, qaelizabeth kaaureliano smith, kayla pelaez. So [facility] [phone].    ATENCIÓN: Si habla español, tiene a jones disposición servicios gratuitos de asistencia lingüística. Llame al [phone].    We comply with applicable federal civil rights laws and [place] laws. We do not discriminate on the basis of race, color, national origin, age, disability, sex, sexual orientation, or gender identity.            "
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
## 2018-01-11 ENCOUNTER — TRANSFERRED RECORDS (OUTPATIENT)
Dept: HEALTH INFORMATION MANAGEMENT | Facility: CLINIC | Age: 83
End: 2018-01-11

## 2018-02-01 ENCOUNTER — TRANSFERRED RECORDS (OUTPATIENT)
Dept: HEALTH INFORMATION MANAGEMENT | Facility: CLINIC | Age: 83
End: 2018-02-01

## 2018-02-07 ENCOUNTER — TRANSFERRED RECORDS (OUTPATIENT)
Dept: HEALTH INFORMATION MANAGEMENT | Facility: CLINIC | Age: 83
End: 2018-02-07

## 2018-02-13 DIAGNOSIS — N18.30 CKD (CHRONIC KIDNEY DISEASE) STAGE 3, GFR 30-59 ML/MIN (H): ICD-10-CM

## 2018-02-13 LAB
ERYTHROCYTE [DISTWIDTH] IN BLOOD BY AUTOMATED COUNT: 12.8 % (ref 10–15)
HCT VFR BLD AUTO: 36.5 % (ref 35–47)
HGB BLD-MCNC: 12 G/DL (ref 11.7–15.7)
MCH RBC QN AUTO: 33.5 PG (ref 26.5–33)
MCHC RBC AUTO-ENTMCNC: 32.9 G/DL (ref 31.5–36.5)
MCV RBC AUTO: 102 FL (ref 78–100)
PLATELET # BLD AUTO: 358 10E9/L (ref 150–450)
RBC # BLD AUTO: 3.58 10E12/L (ref 3.8–5.2)
WBC # BLD AUTO: 6.9 10E9/L (ref 4–11)

## 2018-02-13 PROCEDURE — 85027 COMPLETE CBC AUTOMATED: CPT | Performed by: INTERNAL MEDICINE

## 2018-02-13 PROCEDURE — 36415 COLL VENOUS BLD VENIPUNCTURE: CPT | Performed by: INTERNAL MEDICINE

## 2018-02-13 PROCEDURE — 82306 VITAMIN D 25 HYDROXY: CPT | Performed by: INTERNAL MEDICINE

## 2018-02-13 NOTE — LETTER
[facility]  [address] [place]  [address]  [phone]        February 15, 2018    Helena Eldridge  [address] [facility] [address]        Dear Helena,    Enclosed are your results.  Your labs are normal/stable at this time.    The MCV is slightly elevated.   Please review this with Dr Hsieh.   Typically one might check some B-Vitamin levels with this finding.  I am quite sure he will be getting more blood tests during your evaluation and he may want to check into this as well.      Results for orders placed or performed in visit on 02/13/18   CBC with platelets   Result Value Ref Range    WBC 6.9 4.0 - 11.0 10e9/L    RBC Count 3.58 (L) 3.8 - 5.2 10e12/L    Hemoglobin 12.0 11.7 - 15.7 g/dL    Hematocrit 36.5 35.0 - 47.0 %     (H) 78 - 100 fl    MCH 33.5 (H) 26.5 - 33.0 pg    MCHC 32.9 31.5 - 36.5 g/dL    RDW 12.8 10.0 - 15.0 %    Platelet Count 358 150 - 450 10e9/L   Vitamin D Deficiency   Result Value Ref Range    Vitamin D Deficiency screening 63 20 - 75 ug/L       If you have any questions please call the clinic at [phone].    Sincerely,    Sushma ZAMBRANO

## 2018-02-14 LAB — DEPRECATED CALCIDIOL+CALCIFEROL SERPL-MC: 63 UG/L (ref 20–75)

## 2018-02-15 ENCOUNTER — OFFICE VISIT (OUTPATIENT)
Dept: AUDIOLOGY | Facility: CLINIC | Age: 83
End: 2018-02-15
Payer: COMMERCIAL

## 2018-02-15 DIAGNOSIS — H90.3 SENSORINEURAL HEARING LOSS, BILATERAL: Primary | ICD-10-CM

## 2018-02-15 PROCEDURE — V5299 HEARING SERVICE: HCPCS | Performed by: AUDIOLOGIST

## 2018-02-15 NOTE — MR AVS SNAPSHOT
"              After Visit Summary   2/15/2018    Helena Eldridge    MRN: [id number]           Patient Information     Date Of Birth          1935        Visit Information        Provider Department      2/15/2018 2:45 PM [facility]        Today's Diagnoses     Sensorineural hearing loss, bilateral    -  1       Follow-ups after your visit        Who to contact     If you have questions or need follow up information about today's clinic visit or your schedule please contact [facility] directly at [phone].  Normal or non-critical lab and imaging results will be communicated to you by [employer], letter or phone within 4 business days after the clinic has received the results. If you do not hear from us within 7 days, please contact the clinic through [employer] or phone. If you have a critical or abnormal lab result, we will notify you by phone as soon as possible.  Submit refill requests through [employer] or call your pharmacy and they will forward the refill request to us. Please allow 3 business days for your refill to be completed.          Additional Information About Your Visit        MyChart Information     [employer] lets you send messages to your doctor, view your test results, renew your prescriptions, schedule appointments and more. To sign up, go to [email or web address] . Click on \"Log in\" on the left side of the screen, which will take you to the Welcome page. Then click on \"Sign up Now\" on the right side of the page.     You will be asked to enter the access code listed below, as well as some personal information. Please follow the directions to create your username and password.     Your access code is: [email or web address]  Expires: 2018 12:23 PM     Your access code will  in 90 days. If you need help or a new code, please call your [facility] or [phone].        Care EveryWhere ID     This is your Care EveryWhere ID. This could be used " by other organizations to access your [facility] medical records  [id number]         Blood Pressure from Last 3 Encounters:   12/11/17 123/55   11/30/17 116/67   11/20/17 109/58    Weight from Last 3 Encounters:   12/11/17 129 lb (58.5 kg)   11/30/17 130 lb (59 kg)   11/20/17 131 lb (59.4 kg)              We Performed the Following     HEARING SERVICES, [facility]        Primary Care Provider Office Phone # Fax #    Sushma Nelson [phone]       [address] [facility] [address]        Equal Access to Services     [facility]: Hadii aad jeb hadsteveo Socrystal, waaxda luqadaha, qaybta kaalmada adebandaryada, kayla gu . So [facility] [phone].    ATENCIÓN: Si habla español, tiene a jones disposición servicios gratuitos de asistencia lingüística. LlMer[facility] [phone].    We comply with applicable federal civil rights laws and [place] laws. We do not discriminate on the basis of race, color, national origin, age, disability, sex, sexual orientation, or gender identity.            Thank you!     Thank you for choosing [facility]  for your care. Our goal is always to provide you with excellent care. Hearing back from our patients is one way we can continue to improve our services. Please take a few minutes to complete the written survey that you may receive in the mail after your visit with us. Thank you!             Your Updated Medication List - Protect others around you: Learn how to safely use, store and throw away your medicines at www.disposemymeds.org.          This list is accurate as of 2/15/18  2:55 PM.  Always use your most recent med list.                   Brand Name Dispense Instructions for use Diagnosis    albuterol 108 (90 BASE) MCG/ACT Inhaler    PROAIR HFA/PROVENTIL HFA/VENTOLIN HFA    2 Inhaler    Inhale 2 puffs into the lungs every 6 hours as needed for shortness of breath / dyspnea or wheezing    COPD, severe (H)       allopurinol  100 MG tablet    ZYLOPRIM    180 tablet    Take 2 tablets (200 mg) by mouth daily    Idiopathic chronic gout of foot without tophus, unspecified laterality       ammonium lactate 12 % cream    AMLACTIN    280 g    Apply  topically 2 times daily as needed. apply to affected area    Dry skin dermatitis       ASPIRIN PO      Take 81 mg by mouth daily        BENADRYL 25 MG tablet   Generic drug:  diphenhydrAMINE      Take 25 mg by mouth At Bedtime    DIAGNOSIS NOT YET DEFINED       calcium + D 600-200 MG-UNIT Tabs   Generic drug:  calcium carbonate-vitamin D      Take 2 tablets by mouth 2 times daily.    DIAGNOSIS NOT YET DEFINED       clobetasol 0.05 % cream    TEMOVATE    45 g    Apply  topically daily as needed.    Rash       DOCUSATE SODIUM PO      Take 100 mg by mouth At Bedtime        furosemide 20 MG tablet    LASIX    180 tablet    Take 1 tablet (20 mg) by mouth 2 times daily (morning and lunch time)    Hypertension goal BP (blood pressure) < 140/90, CKD (chronic kidney disease) stage 3, GFR 30-59 ml/min       ketoconazole 2 % cream    NIZORAL    15 g    Apply  topically daily.    Sciatic pain       levothyroxine 100 MCG tablet    SYNTHROID    90 tablet    Take 1 tablet (100 mcg) by mouth daily Except Sunday    Other specified hypothyroidism       mometasone 0.1 % cream    ELOCON    45 g    Apply  topically daily as needed.    Rash       OCUVITE ADULT 50+ Caps     30 capsule    Take 1 capsule by mouth daily    Macular degeneration of both eyes       omeprazole 40 MG capsule    priLOSEC    90 capsule    Take 1 capsule (40 mg) by mouth daily    Gastroesophageal reflux disease with esophagitis       ranitidine 150 MG tablet    ZANTAC    60 tablet    Take 1 tablet (150 mg) by mouth daily (afternoon) for a month, then as needed    Gastroesophageal reflux disease with esophagitis       rOPINIRole 0.25 MG tablet    REQUIP    60 tablet    TAKE 1-2 TABLETS BY MOUTH AT BEDTIME    Cramp of limb       STATIN NOT PRESCRIBED  (INTENTIONAL)      by Other route continuous prn. Patient declined 5/4/12

## 2018-02-15 NOTE — PROGRESS NOTES
HEARING AID RECHECK    Patient Name:  Helena Eldridge    Patient Age:   82 year old    :  1935    Background:   Patient is here to have the tubing on her Widex BTE hearing aids changed as they are hard and brittle.     Procedures:   Replaced the tubing on the hearing aids and cut to patient's ear benton. General cleaning of the hearing aids was also performed. Biologic listening check finds the hearings aids sounding crisp and clear. Patient reports the hearing aids are feeling better and sounding better than when she arrived.      Plan:   Patient will return as needed for hearing aid concerns.     CHARGES:   .001 [facility], [facility] $30 bill to patient directly.     James Lau [facility]  Licensed Audiologist  2/15/2018

## 2018-02-26 ENCOUNTER — TRANSFERRED RECORDS (OUTPATIENT)
Dept: HEALTH INFORMATION MANAGEMENT | Facility: CLINIC | Age: 83
End: 2018-02-26

## 2018-02-28 ENCOUNTER — TRANSFERRED RECORDS (OUTPATIENT)
Dept: HEALTH INFORMATION MANAGEMENT | Facility: CLINIC | Age: 83
End: 2018-02-28

## 2018-03-08 ENCOUNTER — OFFICE VISIT (OUTPATIENT)
Dept: FAMILY MEDICINE | Facility: CLINIC | Age: 83
End: 2018-03-08
Payer: COMMERCIAL

## 2018-03-08 ENCOUNTER — TELEPHONE (OUTPATIENT)
Dept: FAMILY MEDICINE | Facility: CLINIC | Age: 83
End: 2018-03-08

## 2018-03-08 VITALS
DIASTOLIC BLOOD PRESSURE: 58 MMHG | TEMPERATURE: 97.4 F | WEIGHT: 128 LBS | SYSTOLIC BLOOD PRESSURE: 111 MMHG | HEART RATE: 86 BPM | BODY MASS INDEX: 25 KG/M2 | OXYGEN SATURATION: 98 %

## 2018-03-08 DIAGNOSIS — Z95.828 PORT-A-CATH IN PLACE: ICD-10-CM

## 2018-03-08 DIAGNOSIS — D84.9 IMMUNOSUPPRESSION (H): ICD-10-CM

## 2018-03-08 DIAGNOSIS — Z90.5 SOLITARY KIDNEY, ACQUIRED: ICD-10-CM

## 2018-03-08 DIAGNOSIS — J44.9 COPD, SEVERE (H): ICD-10-CM

## 2018-03-08 DIAGNOSIS — C34.91 NON-SMALL CELL CANCER OF RIGHT LUNG (H): Primary | ICD-10-CM

## 2018-03-08 DIAGNOSIS — I10 ESSENTIAL HYPERTENSION WITH GOAL BLOOD PRESSURE LESS THAN 140/90: ICD-10-CM

## 2018-03-08 DIAGNOSIS — Z91.81 AT RISK FOR FALLING: ICD-10-CM

## 2018-03-08 PROCEDURE — 99214 OFFICE O/P EST MOD 30 MIN: CPT | Performed by: INTERNAL MEDICINE

## 2018-03-08 RX ORDER — FOLIC ACID 1 MG/1
1 TABLET ORAL DAILY
Qty: 100 TABLET | Refills: 0 | COMMUNITY
Start: 2018-03-08 | End: 2018-08-20

## 2018-03-08 RX ORDER — AMLODIPINE BESYLATE 5 MG/1
5 TABLET ORAL DAILY
Qty: 90 TABLET | Refills: 3
Start: 2018-03-08 | End: 2018-11-23

## 2018-03-08 NOTE — PATIENT INSTRUCTIONS
Reduce the furosemide to ONE daily... Would consider stopping it if your blood pressure is < 120/70.     Okay to use Senna S daily if needed (some take up to 2 tabs twice daily)    Return to clinic 2-3 months (summer 2018)

## 2018-03-08 NOTE — MR AVS SNAPSHOT
"              After Visit Summary   3/8/2018    Helena Eldridge    MRN: [id number]           Patient Information     Date Of Birth          1935        Visit Information        Provider Department      3/8/2018 10:40 AM Sushma Nelson MD [facility]        Today's Diagnoses     Non-small cell cancer of right lung (H)    -  1    Immunosuppression (H)        COPD, severe (H)        Solitary kidney, acquired        At risk for falling        Port-a-cath in place          Care Instructions    Reduce the furosemide to ONE daily... Would consider stopping it if your blood pressure is < 120/70.     Okay to use Senna S daily if needed (some take up to 2 tabs twice daily)    Return to clinic 2-3 months (summer 2018)              Follow-ups after your visit        Who to contact     If you have questions or need follow up information about today's clinic visit or your schedule please contact [facility] directly at [phone].  Normal or non-critical lab and imaging results will be communicated to you by Synderohart, letter or phone within 4 business days after the clinic has received the results. If you do not hear from us within 7 days, please contact the clinic through Synderohart or phone. If you have a critical or abnormal lab result, we will notify you by phone as soon as possible.  Submit refill requests through Kamida or call your pharmacy and they will forward the refill request to us. Please allow 3 business days for your refill to be completed.          Additional Information About Your Visit        Kamida Information     Kamida lets you send messages to your doctor, view your test results, renew your prescriptions, schedule appointments and more. To sign up, go to [email or web address] . Click on \"Log in\" on the left side of the screen, which will take you to the Welcome page. Then click on \"Sign up Now\" on the right side of the page.     You will be asked to " enter the access code listed below, as well as some personal information. Please follow the directions to create your username and password.     Your access code is: [email or web address]  Expires: 2018 12:23 PM     Your access code will  in 90 days. If you need help or a new code, please call your [place] clinic or [phone].        Care EveryWhere ID     This is your Care EveryWhere ID. This could be used by other organizations to access your [facility] medical records  [id number]        Your Vitals Were     Pulse Temperature Pulse Oximetry BMI (Body Mass Index)          86 97.4  F (36.3  C) (Oral) 98% 25 kg/m2         Blood Pressure from Last 3 Encounters:   18 111/58   17 123/55   17 116/67    Weight from Last 3 Encounters:   18 128 lb (58.1 kg)   17 129 lb (58.5 kg)   17 130 lb (59 kg)              We Performed the Following     COPD ACTION PLAN     DEPRESSION ACTION PLAN (DAP)        Primary Care Provider Office Phone # Fax #    Sushma Nelson [phone]       [address] [facility] [address]        Equal Access to Services     SHRUTHI DEL TORO : Hadii britany rameyo Soheberali, waaxda luqadaha, qaybta kaalmada adeegyada, kayla pelaez. So [facility] [phone].    ATENCIÓN: Si habla español, tiene a jones disposición servicios gratuitos de asistencia lingüística. Llame al [phone].    We comply with applicable federal civil rights laws and [place] laws. We do not discriminate on the basis of race, color, national origin, age, disability, sex, sexual orientation, or gender identity.            Thank you!     Thank you for choosing [facility]  for your care. Our goal is always to provide you with excellent care. Hearing back from our patients is one way we can continue to improve our services. Please take a few minutes to complete the written survey that you may receive in the mail after your  visit with us. Thank you!             Your Updated Medication List - Protect others around you: Learn how to safely use, store and throw away your medicines at www.disposemymeds.org.          This list is accurate as of 3/8/18 11:20 AM.  Always use your most recent med list.                   Brand Name Dispense Instructions for use Diagnosis    albuterol 108 (90 BASE) MCG/ACT Inhaler    PROAIR HFA/PROVENTIL HFA/VENTOLIN HFA    2 Inhaler    Inhale 2 puffs into the lungs every 6 hours as needed for shortness of breath / dyspnea or wheezing    COPD, severe (H)       allopurinol 100 MG tablet    ZYLOPRIM    180 tablet    Take 2 tablets (200 mg) by mouth daily    Idiopathic chronic gout of foot without tophus, unspecified laterality       ammonium lactate 12 % cream    AMLACTIN    280 g    Apply  topically 2 times daily as needed. apply to affected area    Dry skin dermatitis       ASPIRIN PO      Take 81 mg by mouth daily        BENADRYL 25 MG tablet   Generic drug:  diphenhydrAMINE      Take 25 mg by mouth At Bedtime    DIAGNOSIS NOT YET DEFINED       calcium + D 600-200 MG-UNIT Tabs   Generic drug:  calcium carbonate-vitamin D      Take 2 tablets by mouth 2 times daily.    DIAGNOSIS NOT YET DEFINED       clobetasol 0.05 % cream    TEMOVATE    45 g    Apply  topically daily as needed.    Rash       DOCUSATE SODIUM PO      Take 100 mg by mouth At Bedtime        furosemide 20 MG tablet    LASIX    180 tablet    Take 1 tablet (20 mg) by mouth 2 times daily (morning and lunch time)    Hypertension goal BP (blood pressure) < 140/90, CKD (chronic kidney disease) stage 3, GFR 30-59 ml/min       ketoconazole 2 % cream    NIZORAL    15 g    Apply  topically daily.    Sciatic pain       levothyroxine 100 MCG tablet    SYNTHROID    90 tablet    Take 1 tablet (100 mcg) by mouth daily Except Sunday    Other specified hypothyroidism       mometasone 0.1 % cream    ELOCON    45 g    Apply  topically daily as needed.    Rash        OCUVITE ADULT 50+ Caps     30 capsule    Take 1 capsule by mouth daily    Macular degeneration of both eyes       omeprazole 40 MG capsule    priLOSEC    90 capsule    Take 1 capsule (40 mg) by mouth daily    Gastroesophageal reflux disease with esophagitis       ranitidine 150 MG tablet    ZANTAC    60 tablet    Take 1 tablet (150 mg) by mouth daily (afternoon) for a month, then as needed    Gastroesophageal reflux disease with esophagitis       rOPINIRole 0.25 MG tablet    REQUIP    60 tablet    TAKE 1-2 TABLETS BY MOUTH AT BEDTIME    Cramp of limb       STATIN NOT PRESCRIBED (INTENTIONAL)      by Other route continuous prn. Patient declined 5/4/12

## 2018-03-08 NOTE — TELEPHONE ENCOUNTER
Called patient and provided message below as per Dr. Nelson.  Patient verbalized understanding.    Dhaval Nolan RN

## 2018-03-08 NOTE — LETTER
My COPD Action Plan     Name: Helena Eldridge    YOB: 1935   Date: 3/8/2018    My doctor: Sushma Nelson MD   My clinic: [address] [address] [phone]  My Controller Medicine:     Dose:       My Rescue Medicine: Albuterol (Proair/Ventolin/Proventil) inhaler   Dose:       My Flare Up Medicine: Prednisone   Dose: call clinic     My COPD Severity: Severe = FeV1 < 30%-49%      Use of Oxygen: Oxygen Not Prescribed      Make sure you've had your pneumonia   vaccines.          GREEN ZONE       Doing well today      Usual level of activity and exercise    Usual amount of cough and mucus    No shortness of breath    Usual level of health (thinking clearly, sleeping well, feel like eating) Actions:      Take daily medicines    Use oxygen as prescribed    Follow regular exercise and diet plan    Avoid cigarette smoke and other irritants that harm the lungs           YELLOW ZONE          Having a bad day or flare up      Short of breath more than usual    A lot more sputum (mucus) than usual    Sputum looks yellow, green, tan, brown or bloody    More coughing or wheezing    Fever or chills    Less energy; trouble completing activities    Trouble thinking or focusing    Using quick relief inhaler or nebulizer more often    Poor sleep; symptoms wake me up    Do not feel like eating Actions:      Get plenty of rest    Take daily medicines    Use quick relief inhaler every 4 hours    If you use oxygen, call you doctor to see if you should adjust your oxygen    Do breathing exercises or other things to help you relax    Let a loved one, friend or neighbor know you are feeling worse    Call your care team if you have 2 or more symptoms.  Start taking steroids or antibiotics if directed by your care team           RED ZONE       Need medical care now      Severe shortness of breath (feel you can't breathe)    Fever, chills    Not  enough breath to do any activity    Trouble coughing up mucus, walking or talking    Blood in mucus    Frequent coughing   Rescue medicines are not working    Not able to sleep because of breathing    Feel confused or drowsy    Chest pain    Actions:      Call your health care team.  If you cannot reach your care team, call 911 or go to the emergency room.        Electronically signed by: Sushma Nelson, March 8, 2018  Annual Reminders:  Meet with Care Team, Flu Shot every Fall  Pharmacy:    [id number] [place] [facility] - [address] [facility] - [address]

## 2018-03-08 NOTE — TELEPHONE ENCOUNTER
Reason for Call:  Other    Detailed comments: Patient got home after appointment today and discovered that she is taking amlodipine 5mg twice daily and folic acid 1mg. She for sure wants to continue the folic acid and wants to clarify if she should be taking the amlodipine.    Phone Number Patient can be reached at: Home number on file [phone] (home)    Best Time: any    Can we leave a detailed message on this number? NO- patient does not know how to operate her voicemail    Call taken on 3/8/2018 at 12:03 PM by Raquel Merchant

## 2018-03-08 NOTE — LETTER
My Depression Action Plan  Name: Helena Eldridge   Date of Birth 1935  Date: 3/8/2018    My doctor: Sushma Nelson   My clinic: [address] [id number] [phone]          GREEN    ZONE   Good Control    What it looks like:     Things are going generally well. You have normal up s and down s. You may even feel depressed from time to time, but bad moods usually last less than a day.   What you need to do:  1. Continue to care for yourself (see self care plan)  2. Check your depression survival kit and update it as needed  3. Follow your physician s recommendations including any medication.  4. Do not stop taking medication unless you consult with your physician first.           YELLOW         ZONE Getting Worse    What it looks like:     Depression is starting to interfere with your life.     It may be hard to get out of bed; you may be starting to isolate yourself from others.    Symptoms of depression are starting to last most all day and this has happened for several days.     You may have suicidal thoughts but they are not constant.   What you need to do:     1. Call your care team, your response to treatment will improve if you keep your care team informed of your progress. Yellow periods are signs an adjustment may need to be made.     2. Continue your self-care, even if you have to fake it!    3. Talk to someone in your support network    4. Open up your depression survival kit           RED    ZONE Medical Alert - Get Help    What it looks like:     Depression is seriously interfering with your life.     You may experience these or other symptoms: You can t get out of bed most days, can t work or engage in other necessary activities, you have trouble taking care of basic hygiene, or basic responsibilities, thoughts of suicide or death that will not go away, self-injurious behavior.     What you need to  do:  1. Call your care team and request a same-day appointment. If they are not available (weekends or after hours) call your local crisis line, emergency room or 911.      Electronically signed by: Sushma Nelson, March 8, 2018    Depression Self Care Plan / Survival Kit    Self-Care for Depression  Here s the deal. Your body and mind are really not as separate as most people think.  What you do and think affects how you feel and how you feel influences what you do and think. This means if you do things that people who feel good do, it will help you feel better.  Sometimes this is all it takes.  There is also a place for medication and therapy depending on how severe your depression is, so be sure to consult with your medical provider and/ or Behavioral Health Consultant if your symptoms are worsening or not improving.     In order to better manage my stress, I will:    Exercise  Get some form of exercise, every day. This will help reduce pain and release endorphins, the  feel good  chemicals in your brain. This is almost as good as taking antidepressants!  This is not the same as joining a gym and then never going! (they count on that by the way ) It can be as simple as just going for a walk or doing some gardening, anything that will get you moving.      Hygiene   Maintain good hygiene (Get out of bed in the morning, Make your bed, Brush your teeth, Take a shower, and Get dressed like you were going to work, even if you are unemployed).  If your clothes don't fit try to get ones that do.    Diet  I will strive to eat foods that are good for me, drink plenty of water, and avoid excessive sugar, caffeine, alcohol, and other mood-altering substances.  Some foods that are helpful in depression are: complex carbohydrates, B vitamins, flaxseed, fish or fish oil, fresh fruits and vegetables.    Psychotherapy  I agree to participate in Individual Therapy (if recommended).    Medication  If prescribed medications, I  agree to take them.  Missing doses can result in serious side effects.  I understand that drinking alcohol, or other illicit drug use, may cause potential side effects.  I will not stop my medication abruptly without first discussing it with my provider.    Staying Connected With Others  I will stay in touch with my friends, family members, and my primary care provider/team.    Use your imagination  Be creative.  We all have a creative side; it doesn t matter if it s oil painting, sand castles, or mud pies! This will also kick up the endorphins.    Witness Beauty  (AKA stop and smell the roses) Take a look outside, even in mid-winter. Notice colors, textures. Watch the squirrels and birds.     Service to others  Be of service to others.  There is always someone else in need.  By helping others we can  get out of ourselves  and remember the really important things.  This also provides opportunities for practicing all the other parts of the program.    Humor  Laugh and be silly!  Adjust your TV habits for less news and crime-drama and more comedy.    Control your stress  Try breathing deep, massage therapy, biofeedback, and meditation. Find time to relax each day.     My support system    Clinic Contact:  Phone number:    Contact 1:  Phone number:    Contact 2:  Phone number:    Buddhist/:  Phone number:    Therapist:  Phone number:    Local crisis center:    Phone number:    Other community support:  Phone number:

## 2018-03-08 NOTE — TELEPHONE ENCOUNTER
Phone call to patient. Taking folic acid 1 mg per oncology - added to med list.  Also reports taking amlodipine 5 mg twice daily. Our records indicate she wasn't taking it as of last July and it was removed from med list.  Patient states, however, that she has been taking this all along.    Should she continue to take amlodipine?  Reports furosemide was cut to once daily per Dr. Nelson today.    BP Readings from Last 3 Encounters:   03/08/18 111/58   12/11/17 123/55   11/30/17 116/67     Dhaval Nolan RN

## 2018-03-08 NOTE — PROGRESS NOTES
SUBJECTIVE:   Helena Eldridge is a 82 year old female who presents to clinic today for the following health issues:      Medication Followup of all meds    Taking Medication as prescribed: yes    Side Effects:  None    Medication Helping Symptoms:  yes     81 y/o F with recent dx metastatic NSCLCa involving right upper lung.  Biopsies yielded insufficient tissue for PD-L1 ancillary testing.  She has initiated chemotherapy with carboplatin and pemtrexed starting 1/2018  ..  with a 50% dose reduction for age. Has a PORT cath...   No current issues.      Eating okay, some constipation.  Her Tenant brings her food, and she eats it.  Wants to be sure it is okay to take senna s daily     reports she has Hgb 9.6 as measured via Dr Hsieh.      Problem list and histories reviewed & adjusted, as indicated.  Additional history: as documented    Patient Active Problem List   Diagnosis     CARDIOVASCULAR SCREENING; LDL GOAL LESS THAN 100     CKD (chronic kidney disease) stage 3, GFR 30-59 ml/min     Glucose intolerance (impaired glucose tolerance)     Kidney stones     Advance care planning     Hypertension goal BP (blood pressure) < 140/90     Hyperlipidemia LDL goal <130     Lichen sclerosus et atrophicus of the vulva     Solitary kidney, acquired     Pulmonary nodule/lesion, solitary     Senile osteoporosis     Osteoarthritis of right knee     Medial meniscus tear     Cataracts, both eyes     Macular degeneration of both eyes, right eye greater than left eye     Pelvic fracture (H)     COPD, severe (H)     IPF (idiopathic pulmonary fibrosis) (H)     Other specified hypothyroidism     Idiopathic chronic gout     Adjustment disorder with anxiety     Non-small cell cancer of right lung (H)     Immunosuppression (H)     Past Surgical History:   Procedure Laterality Date     C NEPHRECTOMY      left partial 07-01-07     C VENOUS THROMBOSIS IMAGING      with pe     HC REMOVAL GALLBLADDER       HC REVISE MEDIAN N/CARPAL TUNNEL SURG        TUBAL LIGATION         Social History   Substance Use Topics     Smoking status: Former Smoker     Types: Cigarettes     Quit date: 12/4/1969     Smokeless tobacco: Never Used     Alcohol use 0.0 oz/week     0 Standard drinks or equivalent per week      Comment: very little      Family History   Problem Relation Age of Onset     CANCER Brother      Glaucoma Mother      Alzheimer Disease Brother      Macular Degeneration No family hx of          Current Outpatient Prescriptions   Medication Sig Dispense Refill     ranitidine (ZANTAC) 150 MG tablet Take 1 tablet (150 mg) by mouth daily (afternoon) for a month, then as needed 60 tablet 1     omeprazole (PRILOSEC) 40 MG capsule Take 1 capsule (40 mg) by mouth daily 90 capsule 3     rOPINIRole (REQUIP) 0.25 MG tablet TAKE 1-2 TABLETS BY MOUTH AT BEDTIME 60 tablet 1     levothyroxine (SYNTHROID) 100 MCG tablet Take 1 tablet (100 mcg) by mouth daily Except Sunday 90 tablet 3     furosemide (LASIX) 20 MG tablet Take 1 tablet (20 mg) by mouth 2 times daily (morning and lunch time) 180 tablet 3     allopurinol (ZYLOPRIM) 100 MG tablet Take 2 tablets (200 mg) by mouth daily 180 tablet 3     ammonium lactate (AMLACTIN) 12 % cream Apply  topically 2 times daily as needed. apply to affected area 280 g 3     clobetasol (TEMOVATE) 0.05 % cream Apply  topically daily as needed. 45 g 1     Multiple Vitamins-Minerals (OCUVITE ADULT 50+) CAPS Take 1 capsule by mouth daily 30 capsule 12     DOCUSATE SODIUM PO Take 100 mg by mouth At Bedtime       STATIN NOT PRESCRIBED, INTENTIONAL, by Other route continuous prn. Patient declined 5/4/12  0     ketoconazole (NIZORAL) 2 % cream Apply  topically daily. 15 g 3     mometasone (ELOCON) 0.1 % cream Apply  topically daily as needed. 45 g 1     diphenhydrAMINE (BENADRYL) 25 MG tablet Take 25 mg by mouth At Bedtime        Calcium Carbonate-Vitamin D (CALCIUM + D) 600-200 MG-UNIT per tablet Take 2 tablets by mouth 2 times daily.       albuterol  (PROAIR HFA/PROVENTIL HFA/VENTOLIN HFA) 108 (90 BASE) MCG/ACT Inhaler Inhale 2 puffs into the lungs every 6 hours as needed for shortness of breath / dyspnea or wheezing (Patient not taking: Reported on 3/8/2018) 2 Inhaler 3     ASPIRIN PO Take 81 mg by mouth daily        Allergies   Allergen Reactions     Ace Inhibitors Cough     Advicor      Celexa [Citalopram] GI Disturbance     diarrhea     Doxycycline Nausea     Poor appetite     Fosamax      Severe substernal burning and dysphagia within 3 days     Valsartan Cramps     severe     Recent Labs   Lab Test  07/27/17   1736  06/01/17   1436  05/19/16   1104   05/19/15   0946   05/02/14   0823   04/24/13   0828   04/20/12   0907   A1C   --   5.7   --    --   5.4   --   5.3   --   5.2   --    --    LDL   --   133*  126*   --   122   --   125   --   128   --   135*   HDL   --   45*  41*   --   39*   --   30*   --   32*   --   30*   TRIG   --   193*  228*   --   183*   --   216*   --   146   --   194*   ALT   --    --    --    --    --    --   27   --   24   --   22   CR  1.49*  1.26*  1.29*   < >  1.16*   < >  1.17*   < >  1.20*   < >  1.34*   GFRESTIMATED  34*  41*  40*   < >  45*   < >  45*   < >  44*   < >  38*   GFRESTBLACK  41*  49*  48*   < >  54*   < >  54*   < >  53*   < >  47*   POTASSIUM  3.5  3.1*  3.9   < >  4.2   < >  4.2   < >  4.3   < >  4.3   TSH   --   1.42  1.36   < >  1.01   --   3.31   --   0.62   --   0.35*    < > = values in this interval not displayed.      BP Readings from Last 3 Encounters:   03/08/18 111/58   12/11/17 123/55   11/30/17 116/67    Wt Readings from Last 3 Encounters:   03/08/18 128 lb (58.1 kg)   12/11/17 129 lb (58.5 kg)   11/30/17 130 lb (59 kg)                  Labs reviewed in EPIC    Reviewed and updated as needed this visit by clinical staff  Tobacco  Allergies  Meds  Med Hx  Surg Hx  Fam Hx  Soc Hx      Reviewed and updated as needed this visit by Provider         ROS:  Constitutional, HEENT, cardiovascular,  "pulmonary, gi and gu systems are negative, except as otherwise noted.    OBJECTIVE:     /58 (BP Location: Right arm, Patient Position: Sitting, Cuff Size: Adult Regular)  Pulse 86  Temp 97.4  F (36.3  C) (Oral)  Wt 128 lb (58.1 kg)  SpO2 98%  BMI 25 kg/m2  Body mass index is 25 kg/(m^2).  GENERAL: healthy, alert and no distress  EYES: Eyes grossly normal to inspection, PERRL and conjunctivae and sclerae normal  NECK: no adenopathy, no asymmetry, masses, or scars and thyroid normal to palpation  RESP: lungs clear to auscultation - no rales, rhonchi or wheezes  CV: regular rate and rhythm, normal S1 S2, no S3 or S4,  2/6 early systolic murmur best over LUSB, click or rub, no peripheral edema and peripheral pulses strong  ABDOMEN: soft, nontender, no hepatosplenomegaly, no masses and bowel sounds normal  MS: no gross musculoskeletal defects noted, no edema    Diagnostic Test Results:  Results for orders placed or performed in visit on 02/13/18   CBC with platelets   Result Value Ref Range    WBC 6.9 4.0 - 11.0 10e9/L    RBC Count 3.58 (L) 3.8 - 5.2 10e12/L    Hemoglobin 12.0 11.7 - 15.7 g/dL    Hematocrit 36.5 35.0 - 47.0 %     (H) 78 - 100 fl    MCH 33.5 (H) 26.5 - 33.0 pg    MCHC 32.9 31.5 - 36.5 g/dL    RDW 12.8 10.0 - 15.0 %    Platelet Count 358 150 - 450 10e9/L   Vitamin D Deficiency   Result Value Ref Range    Vitamin D Deficiency screening 63 20 - 75 ug/L       ASSESSMENT/PLAN:      81 y/o on renal dosed chemotherapy for NSCLCa, tolerating well.  She looks great today.  Due to diminishing BP and some weight loss, will reduce the lasix (... And norvasc, see \"later entry\" below)       ICD-10-CM    1. Non-small cell cancer of right lung (H) C34.91    2. Immunosuppression (H) D89.9    3. COPD, severe (H) J44.9 COPD ACTION PLAN   4. Solitary kidney, acquired Z90.5    5. At risk for falling Z91.81    6. Port-a-cath in place Z95.828           Patient Instructions   Reduce the furosemide to ONE " daily... Would consider stopping it if your blood pressure is < 120/70.     Okay to use Senna S daily if needed (some take up to 2 tabs twice daily)    LATER ENTRY:  Patient has been taking norvas 5 mg bid, never did d/c it.  Will reduce this to 5 mg once daily and re evaluate need at next appt., ....  She will watch BP meantime.      Sushma Nelson MD  [facility]

## 2018-03-21 ENCOUNTER — TRANSFERRED RECORDS (OUTPATIENT)
Dept: HEALTH INFORMATION MANAGEMENT | Facility: CLINIC | Age: 83
End: 2018-03-21

## 2018-03-27 DIAGNOSIS — K21.00 GASTROESOPHAGEAL REFLUX DISEASE WITH ESOPHAGITIS: ICD-10-CM

## 2018-03-27 NOTE — TELEPHONE ENCOUNTER
"Requested Prescriptions   Pending Prescriptions Disp Refills     ranitidine (ZANTAC) 150 MG tablet [Pharmacy Med Name: RANITIDINE 150 MG TABLET] 60 tablet 1    Last Written Prescription Date:  11-30-17  Last Fill Quantity: 60,  # refills: 1   Last office visit: 3/8/2018 with prescribing provider:     Future Office Visit:     Sig: TAKE 1 TABLET BY MOUTH DAILY (AFTERNOON) FOR A MONTH, THEN AS NEEDED    H2 Blockers Protocol Passed    3/27/2018 12:33 PM       Passed - Patient is age 12 or older       Passed - Recent (12 mo) or future (30 days) visit within the authorizing provider's specialty    Patient had office visit in the last 12 months or has a visit in the next 30 days with authorizing provider or within the authorizing provider's specialty.  See \"Patient Info\" tab in inbasket, or \"Choose Columns\" in Meds & Orders section of the refill encounter.              "

## 2018-03-28 NOTE — TELEPHONE ENCOUNTER
Routing refill request to provider for review/approval because:  Diagnosis not on problem list.  Saida Bolton, RN CPC Triage.

## 2018-04-11 ENCOUNTER — TRANSFERRED RECORDS (OUTPATIENT)
Dept: HEALTH INFORMATION MANAGEMENT | Facility: CLINIC | Age: 83
End: 2018-04-11

## 2018-04-11 DIAGNOSIS — R25.2 CRAMP OF LIMB: ICD-10-CM

## 2018-04-12 RX ORDER — ROPINIROLE 0.25 MG/1
TABLET, FILM COATED ORAL
Qty: 180 TABLET | Refills: 1 | Status: SHIPPED | OUTPATIENT
Start: 2018-04-12 | End: 2019-04-18

## 2018-04-12 NOTE — TELEPHONE ENCOUNTER
Routing refill request to provider for review/approval because:  CR is out of range July 2017 and last fill August 2017      Ngoc Smith RN  [facility]

## 2018-04-12 NOTE — TELEPHONE ENCOUNTER
"Requested Prescriptions   Pending Prescriptions Disp Refills     rOPINIRole (REQUIP) 0.25 MG tablet [Pharmacy Med Name: ROPINIROLE HCL 0.25 MG TABLET] 60 tablet 1    Last Written Prescription Date:  6-12-17  Last Fill Quantity: 60,  # refills: 1   Last office visit: 3/8/2018 with prescribing provider:     Future Office Visit:     Sig: TAKE ONE TO TWO TABLETS BY MOUTH NIGHTLY AT BEDTIME    Antiparkinson's Agents Protocol Passed    4/11/2018  8:11 PM       Passed - Blood pressure under 140/90 in past 12 months    BP Readings from Last 3 Encounters:   03/08/18 111/58   12/11/17 123/55   11/30/17 116/67                Passed - Recent (12 mo) or future (30 days) visit within the authorizing provider's specialty    Patient had office visit in the last 12 months or has a visit in the next 30 days with authorizing provider or within the authorizing provider's specialty.  See \"Patient Info\" tab in inbasket, or \"Choose Columns\" in Meds & Orders section of the refill encounter.           Passed - Patient is age 18 or older       Passed - No active pregnancy on record       Passed - No positive pregnancy test in the past 12 months          "

## 2018-04-30 ENCOUNTER — TRANSFERRED RECORDS (OUTPATIENT)
Dept: HEALTH INFORMATION MANAGEMENT | Facility: CLINIC | Age: 83
End: 2018-04-30

## 2018-05-02 ENCOUNTER — TRANSFERRED RECORDS (OUTPATIENT)
Dept: HEALTH INFORMATION MANAGEMENT | Facility: CLINIC | Age: 83
End: 2018-05-02

## 2018-05-03 ENCOUNTER — OFFICE VISIT (OUTPATIENT)
Dept: FAMILY MEDICINE | Facility: CLINIC | Age: 83
End: 2018-05-03
Payer: COMMERCIAL

## 2018-05-03 VITALS
DIASTOLIC BLOOD PRESSURE: 55 MMHG | BODY MASS INDEX: 26.17 KG/M2 | SYSTOLIC BLOOD PRESSURE: 122 MMHG | HEART RATE: 83 BPM | TEMPERATURE: 98.8 F | OXYGEN SATURATION: 96 % | WEIGHT: 134 LBS

## 2018-05-03 DIAGNOSIS — N18.30 CKD (CHRONIC KIDNEY DISEASE) STAGE 3, GFR 30-59 ML/MIN (H): ICD-10-CM

## 2018-05-03 DIAGNOSIS — D84.9 IMMUNOSUPPRESSION (H): ICD-10-CM

## 2018-05-03 DIAGNOSIS — K21.00 GASTROESOPHAGEAL REFLUX DISEASE WITH ESOPHAGITIS: ICD-10-CM

## 2018-05-03 DIAGNOSIS — J44.9 COPD, SEVERE (H): ICD-10-CM

## 2018-05-03 DIAGNOSIS — I10 HYPERTENSION GOAL BP (BLOOD PRESSURE) < 140/90: ICD-10-CM

## 2018-05-03 DIAGNOSIS — C34.91 NON-SMALL CELL CANCER OF RIGHT LUNG (H): ICD-10-CM

## 2018-05-03 DIAGNOSIS — M25.512 LEFT SHOULDER PAIN, UNSPECIFIED CHRONICITY: Primary | ICD-10-CM

## 2018-05-03 PROCEDURE — 99213 OFFICE O/P EST LOW 20 MIN: CPT | Mod: 25 | Performed by: INTERNAL MEDICINE

## 2018-05-03 PROCEDURE — 20610 DRAIN/INJ JOINT/BURSA W/O US: CPT | Mod: LT | Performed by: INTERNAL MEDICINE

## 2018-05-03 RX ORDER — FUROSEMIDE 20 MG
20 TABLET ORAL DAILY
Qty: 90 TABLET | Refills: 3
Start: 2018-05-03 | End: 2018-08-20

## 2018-05-03 NOTE — MR AVS SNAPSHOT
"              After Visit Summary   5/3/2018    Helena Eldridge    MRN: [id number]           Patient Information     Date Of Birth          1935        Visit Information        Provider Department      5/3/2018 5:20 PM Sushma Nelson MD [facility]        Today's Diagnoses     Left shoulder pain, unspecified chronicity    -  1    Non-small cell cancer of right lung (H)        COPD, severe (H)        Immunosuppression (H)        Hypertension goal BP (blood pressure) < 140/90        CKD (chronic kidney disease) stage 3, GFR 30-59 ml/min        Gastroesophageal reflux disease with esophagitis           Follow-ups after your visit        Who to contact     If you have questions or need follow up information about today's clinic visit or your schedule please contact [facility] directly at [phone].  Normal or non-critical lab and imaging results will be communicated to you by MyChart, letter or phone within 4 business days after the clinic has received the results. If you do not hear from us within 7 days, please contact the clinic through MyChart or phone. If you have a critical or abnormal lab result, we will notify you by phone as soon as possible.  Submit refill requests through [employer] or call your pharmacy and they will forward the refill request to us. Please allow 3 business days for your refill to be completed.          Additional Information About Your Visit        MyChart Information     [employer] lets you send messages to your doctor, view your test results, renew your prescriptions, schedule appointments and more. To sign up, go to [email or web address] . Click on \"Log in\" on the left side of the screen, which will take you to the Welcome page. Then click on \"Sign up Now\" on the right side of the page.     You will be asked to enter the access code listed below, as well as some personal information. Please follow the directions to create your " username and password.     Your access code is: [email or web address]  Expires: 2018  5:50 PM     Your access code will  in 90 days. If you need help or a new code, please call your [place] clinic or [phone].        Care EveryWhere ID     This is your Care EveryWhere ID. This could be used by other organizations to access your [place] medical records  [id number]        Your Vitals Were     Pulse Temperature Pulse Oximetry BMI (Body Mass Index)          83 98.8  F (37.1  C) (Oral) 96% 26.17 kg/m2         Blood Pressure from Last 3 Encounters:   18 122/55   18 111/58   17 123/55    Weight from Last 3 Encounters:   18 134 lb (60.8 kg)   18 128 lb (58.1 kg)   17 129 lb (58.5 kg)              We Performed the Following     DRAIN/INJECT LARGE JOINT/BURSA     TRIAMCINOLONE ACET INJ NOS          Today's Medication Changes          These changes are accurate as of 5/3/18  5:50 PM.  If you have any questions, ask your nurse or doctor.               These medicines have changed or have updated prescriptions.        Dose/Directions    furosemide 20 MG tablet   Commonly known as:  LASIX   This may have changed:  when to take this   Used for:  Hypertension goal BP (blood pressure) < 140/90, CKD (chronic kidney disease) stage 3, GFR 30-59 ml/min   Changed by:  Sushma Nelson MD        Dose:  20 mg   Take 1 tablet (20 mg) by mouth daily (morning and lunch time)   Quantity:  90 tablet   Refills:  3       ranitidine 150 MG tablet   Commonly known as:  ZANTAC   This may have changed:  See the new instructions.   Used for:  Gastroesophageal reflux disease with esophagitis   Changed by:  Sushma Nelson MD        Dose:  150 mg   Take 1 tablet (150 mg) by mouth daily as needed for heartburn (breakthrough)   Quantity:  60 tablet   Refills:  1            Where to get your medicines      Some of these will need a paper prescription and others can be bought over the counter.  Ask your  nurse if you have questions.     You don't need a prescription for these medications     furosemide 20 MG tablet    ranitidine 150 MG tablet                Primary Care Provider Office Phone # Fax #    Sushma Nelson [phone] [address] [facility] [address]        Equal Access to Services     SHRUTHI DEL TORO : Hadii aad ku hadasho Soomaali, waaxda luqadaha, qaybta kaalmada adeegyada, waxay caroline vivekatilio cruzkrisjorje pelaez. So [facility] [phone].    ATENCIÓN: Si habla español, tiene a jones disposición servicios gratuitos de asistencia lingüística. [facility] [phone].    We comply with applicable federal civil rights laws and [place] laws. We do not discriminate on the basis of race, color, national origin, age, disability, sex, sexual orientation, or gender identity.            Thank you!     Thank you for choosing [facility]  for your care. Our goal is always to provide you with excellent care. Hearing back from our patients is one way we can continue to improve our services. Please take a few minutes to complete the written survey that you may receive in the mail after your visit with us. Thank you!             Your Updated Medication List - Protect others around you: Learn how to safely use, store and throw away your medicines at www.disposemymeds.org.          This list is accurate as of 5/3/18  5:50 PM.  Always use your most recent med list.                   Brand Name Dispense Instructions for use Diagnosis    albuterol 108 (90 Base) MCG/ACT Inhaler    PROAIR HFA/PROVENTIL HFA/VENTOLIN HFA    2 Inhaler    Inhale 2 puffs into the lungs every 6 hours as needed for shortness of breath / dyspnea or wheezing    COPD, severe (H)       allopurinol 100 MG tablet    ZYLOPRIM    180 tablet    Take 2 tablets (200 mg) by mouth daily    Idiopathic chronic gout of foot without tophus, unspecified laterality       amLODIPine 5 MG tablet    NORVASC    90  tablet    Take 1 tablet (5 mg) by mouth daily    Essential hypertension with goal blood pressure less than 140/90       ammonium lactate 12 % cream    AMLACTIN    280 g    Apply  topically 2 times daily as needed. apply to affected area    Dry skin dermatitis       ASPIRIN PO      Take 81 mg by mouth daily        BENADRYL 25 MG tablet   Generic drug:  diphenhydrAMINE      Take 25 mg by mouth At Bedtime    DIAGNOSIS NOT YET DEFINED       calcium + D 600-200 MG-UNIT Tabs   Generic drug:  calcium carbonate-vitamin D      Take 2 tablets by mouth 2 times daily.    DIAGNOSIS NOT YET DEFINED       clobetasol 0.05 % cream    TEMOVATE    45 g    Apply  topically daily as needed.    Rash       DOCUSATE SODIUM PO      Take 100 mg by mouth At Bedtime        folic acid 1 MG tablet    FOLVITE    100 tablet    Take 1 tablet (1 mg) by mouth daily        furosemide 20 MG tablet    LASIX    90 tablet    Take 1 tablet (20 mg) by mouth daily (morning and lunch time)    Hypertension goal BP (blood pressure) < 140/90, CKD (chronic kidney disease) stage 3, GFR 30-59 ml/min       ketoconazole 2 % cream    NIZORAL    15 g    Apply  topically daily.    Sciatic pain       levothyroxine 100 MCG tablet    SYNTHROID    90 tablet    Take 1 tablet (100 mcg) by mouth daily Except Sunday    Other specified hypothyroidism       mometasone 0.1 % cream    ELOCON    45 g    Apply  topically daily as needed.    Rash       OCUVITE ADULT 50+ Caps     30 capsule    Take 1 capsule by mouth daily    Macular degeneration of both eyes       omeprazole 40 MG capsule    priLOSEC    90 capsule    Take 1 capsule (40 mg) by mouth daily    Gastroesophageal reflux disease with esophagitis       ranitidine 150 MG tablet    ZANTAC    60 tablet    Take 1 tablet (150 mg) by mouth daily as needed for heartburn (breakthrough)    Gastroesophageal reflux disease with esophagitis       rOPINIRole 0.25 MG tablet    REQUIP    180 tablet    TAKE ONE TO TWO TABLETS BY MOUTH  NIGHTLY AT BEDTIME    Cramp of limb       STATIN NOT PRESCRIBED (INTENTIONAL)      by Other route continuous prn. Patient declined 5/4/12

## 2018-05-03 NOTE — LETTER
[address] [id number] [phone]          May 3, 2018    RE:  Helena SPENCER Chente        [birth year]                                                                                                   [address] [facility] [address]            Dear Dr Hsieh,     Please add FLP and TSH, Free T4 to next scheduled lab.       She has a diagnosis of hypothyroidism and is due for routine cholesterol monitoring.         Sincerely,     MANUELA MONREAL M.D.   [phone]  [phone] (fax)

## 2018-05-03 NOTE — PROGRESS NOTES
SUBJECTIVE:   Helena Eldridge is a 82 year old female who presents to clinic today for the following health issues:    81 Y/o F here for L shoulder pain.  she has h/o RUL/Hilar NSCLungCa.   She has undergone 4 Cycles chemotherapy (low dose carboplatin and pemerexed).  Per imaging there has been good response.    She has d/c'd pm lasix dose, BP have been good. ......     Joint Pain    Onset: 2-3 weeks   Diclofenac did not work.     Description:   Location: left shoulder    Character: Dull ache, hurts if she leans on it     Intensity: moderate, severe    Progression of Symptoms: worse    Accompanying Signs & Symptoms:  Other symptoms: none    History:   Previous similar pain: no       Precipitating factors:   Trauma or overuse: no     Alleviating factors:  Improved by: nothing    Therapies Tried and outcome: diclofenac              Problem list and histories reviewed & adjusted, as indicated.  Additional history: as documented    Patient Active Problem List   Diagnosis     CARDIOVASCULAR SCREENING; LDL GOAL LESS THAN 100     CKD (chronic kidney disease) stage 3, GFR 30-59 ml/min     Glucose intolerance (impaired glucose tolerance)     Kidney stones     Advance care planning     Hypertension goal BP (blood pressure) < 140/90     Hyperlipidemia LDL goal <130     Lichen sclerosus et atrophicus of the vulva     Solitary kidney, acquired     Pulmonary nodule/lesion, solitary     Senile osteoporosis     Osteoarthritis of right knee     Medial meniscus tear     Cataracts, both eyes     Macular degeneration of both eyes, right eye greater than left eye     Pelvic fracture (H)     COPD, severe (H)     IPF (idiopathic pulmonary fibrosis) (H)     Other specified hypothyroidism     Idiopathic chronic gout     Adjustment disorder with anxiety     Non-small cell cancer of right lung (H)     Immunosuppression (H)     Port-a-cath in place     Past Surgical History:   Procedure Laterality Date     C NEPHRECTOMY      left partial  07-01-07     C VENOUS THROMBOSIS IMAGING      with pe     HC REMOVAL GALLBLADDER       HC REVISE MEDIAN N/CARPAL TUNNEL SURG       TUBAL LIGATION         Social History   Substance Use Topics     Smoking status: Former Smoker     Types: Cigarettes     Quit date: 12/4/1969     Smokeless tobacco: Never Used     Alcohol use 0.0 oz/week     0 Standard drinks or equivalent per week      Comment: very little      Family History   Problem Relation Age of Onset     CANCER Brother      Glaucoma Mother      Alzheimer Disease Brother      Macular Degeneration No family hx of          Current Outpatient Prescriptions   Medication Sig Dispense Refill     albuterol (PROAIR HFA/PROVENTIL HFA/VENTOLIN HFA) 108 (90 BASE) MCG/ACT Inhaler Inhale 2 puffs into the lungs every 6 hours as needed for shortness of breath / dyspnea or wheezing 2 Inhaler 3     allopurinol (ZYLOPRIM) 100 MG tablet Take 2 tablets (200 mg) by mouth daily 180 tablet 3     amLODIPine (NORVASC) 5 MG tablet Take 1 tablet (5 mg) by mouth daily 90 tablet 3     ammonium lactate (AMLACTIN) 12 % cream Apply  topically 2 times daily as needed. apply to affected area 280 g 3     Calcium Carbonate-Vitamin D (CALCIUM + D) 600-200 MG-UNIT per tablet Take 2 tablets by mouth 2 times daily.       clobetasol (TEMOVATE) 0.05 % cream Apply  topically daily as needed. 45 g 1     diphenhydrAMINE (BENADRYL) 25 MG tablet Take 25 mg by mouth At Bedtime        DOCUSATE SODIUM PO Take 100 mg by mouth At Bedtime       folic acid (FOLVITE) 1 MG tablet Take 1 tablet (1 mg) by mouth daily 100 tablet 0     furosemide (LASIX) 20 MG tablet Take 1 tablet (20 mg) by mouth daily (morning and lunch time) 90 tablet 3     ketoconazole (NIZORAL) 2 % cream Apply  topically daily. 15 g 3     levothyroxine (SYNTHROID) 100 MCG tablet Take 1 tablet (100 mcg) by mouth daily Except Sunday 90 tablet 3     mometasone (ELOCON) 0.1 % cream Apply  topically daily as needed. 45 g 1     Multiple Vitamins-Minerals  (OCUVITE ADULT 50+) CAPS Take 1 capsule by mouth daily 30 capsule 12     omeprazole (PRILOSEC) 40 MG capsule Take 1 capsule (40 mg) by mouth daily 90 capsule 3     ranitidine (ZANTAC) 150 MG tablet Take 1 tablet (150 mg) by mouth daily as needed for heartburn (breakthrough) 60 tablet 1     rOPINIRole (REQUIP) 0.25 MG tablet TAKE ONE TO TWO TABLETS BY MOUTH NIGHTLY AT BEDTIME 180 tablet 1     STATIN NOT PRESCRIBED, INTENTIONAL, by Other route continuous prn. Patient declined 5/4/12  0     ASPIRIN PO Take 81 mg by mouth daily        [DISCONTINUED] furosemide (LASIX) 20 MG tablet Take 1 tablet (20 mg) by mouth 2 times daily (morning and lunch time) 180 tablet 3     Allergies   Allergen Reactions     Ace Inhibitors Cough     Advicor      Celexa [Citalopram] GI Disturbance     diarrhea     Doxycycline Nausea     Poor appetite     Fosamax      Severe substernal burning and dysphagia within 3 days     Valsartan Cramps     severe     Recent Labs   Lab Test  07/27/17   1736  06/01/17   1436  05/19/16   1104   05/19/15   0946   05/02/14   0823   04/24/13   0828   04/20/12   0907   A1C   --   5.7   --    --   5.4   --   5.3   --   5.2   --    --    LDL   --   133*  126*   --   122   --   125   --   128   --   135*   HDL   --   45*  41*   --   39*   --   30*   --   32*   --   30*   TRIG   --   193*  228*   --   183*   --   216*   --   146   --   194*   ALT   --    --    --    --    --    --   27   --   24   --   22   CR  1.49*  1.26*  1.29*   < >  1.16*   < >  1.17*   < >  1.20*   < >  1.34*   GFRESTIMATED  34*  41*  40*   < >  45*   < >  45*   < >  44*   < >  38*   GFRESTBLACK  41*  49*  48*   < >  54*   < >  54*   < >  53*   < >  47*   POTASSIUM  3.5  3.1*  3.9   < >  4.2   < >  4.2   < >  4.3   < >  4.3   TSH   --   1.42  1.36   < >  1.01   --   3.31   --   0.62   --   0.35*    < > = values in this interval not displayed.      BP Readings from Last 3 Encounters:   05/03/18 122/55   03/08/18 111/58   12/11/17 123/55    Wt  Readings from Last 3 Encounters:   05/03/18 134 lb (60.8 kg)   03/08/18 128 lb (58.1 kg)   12/11/17 129 lb (58.5 kg)                  Labs reviewed in EPIC    Reviewed and updated as needed this visit by clinical staff  Tobacco  Allergies  Meds  Med Hx  Surg Hx  Fam Hx  Soc Hx      Reviewed and updated as needed this visit by Provider         ROS:  Constitutional, HEENT, cardiovascular, pulmonary, gi and gu systems are negative, except as otherwise noted.    OBJECTIVE:     /55 (BP Location: Right arm, Patient Position: Sitting, Cuff Size: Adult Regular)  Pulse 83  Temp 98.8  F (37.1  C) (Oral)  Wt 134 lb (60.8 kg)  SpO2 96%  BMI 26.17 kg/m2  Body mass index is 26.17 kg/(m^2).  GENERAL: healthy, alert and no distress  MS: no gross musculoskeletal defects noted, no edema  MS: Shoulder exam shows positive impingement signs are present with pain at high arc of abduction and forward flexion on left. There is tenderness of the supraspinatus tendon.     SKIN: no suspicious lesions or rashes  NEURO: Normal strength and tone, mentation intact and speech normal  PSYCH: mentation appears normal, affect normal/bright     This procedure has been fully reviewed with the patient and written informed consent has been obtained.  After cleaning area with betadine, a steroid injection was performed at  Left supraspinatous tendon.  using 1% plain Lidocaine (1.5 cc) and 60 mg of K40 (1.5 cc). This was well tolerated.    Diagnostic Test Results:  Recent CBC via Dr Hsieh reviewed:  Hgb 9.2*, platelets 236 on 5/2/18.     ASSESSMENT/PLAN:             ICD-10-CM    1. Left shoulder pain, unspecified chronicity M25.512 DRAIN/INJECT LARGE JOINT/BURSA     TRIAMCINOLONE ACET INJ NOS   2. Non-small cell cancer of right lung (H) C34.91    3. COPD, severe (H) J44.9    4. Immunosuppression (H) D89.9    5. Hypertension goal BP (blood pressure) < 140/90 I10 furosemide (LASIX) 20 MG tablet   6. CKD (chronic kidney disease) stage 3, GFR  30-59 ml/min N18.3 furosemide (LASIX) 20 MG tablet   7. Gastroesophageal reflux disease with esophagitis K21.0 ranitidine (ZANTAC) 150 MG tablet       Shoulder injection done.   She is doing well s/p 4 cycles chemo, recent PET shows almost complete resolution of lung cancer  She might get 2 more cycles..       Will add TSH and FLP to next lab at [place]ua Nelson MD  [facility]

## 2018-05-04 ENCOUNTER — TELEPHONE (OUTPATIENT)
Dept: FAMILY MEDICINE | Facility: CLINIC | Age: 83
End: 2018-05-04

## 2018-05-04 DIAGNOSIS — Z13.6 CARDIOVASCULAR SCREENING; LDL GOAL LESS THAN 100: ICD-10-CM

## 2018-05-04 DIAGNOSIS — E03.8 OTHER SPECIFIED HYPOTHYROIDISM: Primary | ICD-10-CM

## 2018-05-04 DIAGNOSIS — M25.512 LEFT SHOULDER PAIN, UNSPECIFIED CHRONICITY: ICD-10-CM

## 2018-05-04 NOTE — TELEPHONE ENCOUNTER
Reason for Call:  Other     Detailed comments: Macey called from [facility] to inform that an order for labs to be done can not be done. They no longer take outside orders.     Phone Number Patient can be reached at: Other phone number:  [phone]    Best Time: Anytime    Can we leave a detailed message on this number? YES    Call taken on 5/4/2018 at 3:42 PM by [place] Man

## 2018-05-04 NOTE — TELEPHONE ENCOUNTER
"I called back to [facility], representative I spoke to said they don't do outside orders \"across the board\", they don't do new draws nor do they add on outside orders to blood they already ailyn.      Routed back to [facility], do you want to place future orders and have patient come back for lab here?    Yanique Fritz RN  [facility]      "

## 2018-05-04 NOTE — TELEPHONE ENCOUNTER
"Patient was just seen yesterday by [facility].    See note:         Will add TSH and FLP to next lab at [facility]          Sushma Nelson MD  [facility]    It sounds like patient tried to get this done at [facility]?    I see the letter with the request was addressed to \"Dr. Hsieh\", is this person with [facility] or an [facility] provider.    I called [facility], Dr. Hsieh is with them, they are not an [facility].    I did try to call patient to clarify if she sees someone at [facility] but her mailbox was full at home number, unable to leave a message.      Routed to [facility], should patient just come back to [facility] for lab draw?   If so, will need future orders.    Yanique Fritz RN  [facility]      "

## 2018-05-07 NOTE — TELEPHONE ENCOUNTER
Called and advised patient of the message below per PCP.  Lab appointment made for tomorrow here with our lab.    Patient stated that she was also seen on 5/3/18 with PCP for a shoulder injection.  She stated that the injection has helped her very little and she is wondering what to do next.    Routed to PCP to advise.    Saida Bolton RN [facility] Triage.

## 2018-05-07 NOTE — TELEPHONE ENCOUNTER
I called patient, advised her of PT referral and number to call if she does not hear from them in a few days.    Patient verbalized understanding of and agreement with plan.    Yanique Fritz RN  [facility]

## 2018-05-08 ENCOUNTER — THERAPY VISIT (OUTPATIENT)
Dept: PHYSICAL THERAPY | Facility: CLINIC | Age: 83
End: 2018-05-08
Payer: COMMERCIAL

## 2018-05-08 DIAGNOSIS — M25.512 SHOULDER PAIN, LEFT: Primary | ICD-10-CM

## 2018-05-08 DIAGNOSIS — Z13.6 CARDIOVASCULAR SCREENING; LDL GOAL LESS THAN 100: ICD-10-CM

## 2018-05-08 DIAGNOSIS — E03.8 OTHER SPECIFIED HYPOTHYROIDISM: ICD-10-CM

## 2018-05-08 LAB
CHOLEST SERPL-MCNC: 206 MG/DL
HDLC SERPL-MCNC: 50 MG/DL
LDLC SERPL CALC-MCNC: 124 MG/DL
NONHDLC SERPL-MCNC: 156 MG/DL
T4 FREE SERPL-MCNC: 0.94 NG/DL (ref 0.76–1.46)
TRIGL SERPL-MCNC: 160 MG/DL
TSH SERPL DL<=0.005 MIU/L-ACNC: 4.85 MU/L (ref 0.4–4)

## 2018-05-08 PROCEDURE — 84443 ASSAY THYROID STIM HORMONE: CPT | Performed by: INTERNAL MEDICINE

## 2018-05-08 PROCEDURE — 97161 PT EVAL LOW COMPLEX 20 MIN: CPT | Mod: GP | Performed by: PHYSICAL THERAPIST

## 2018-05-08 PROCEDURE — 97112 NEUROMUSCULAR REEDUCATION: CPT | Mod: GP | Performed by: PHYSICAL THERAPIST

## 2018-05-08 PROCEDURE — 36415 COLL VENOUS BLD VENIPUNCTURE: CPT | Performed by: INTERNAL MEDICINE

## 2018-05-08 PROCEDURE — G8985 CARRY GOAL STATUS: HCPCS | Mod: GP | Performed by: PHYSICAL THERAPIST

## 2018-05-08 PROCEDURE — 97110 THERAPEUTIC EXERCISES: CPT | Mod: GP | Performed by: PHYSICAL THERAPIST

## 2018-05-08 PROCEDURE — 84439 ASSAY OF FREE THYROXINE: CPT | Performed by: INTERNAL MEDICINE

## 2018-05-08 PROCEDURE — G8984 CARRY CURRENT STATUS: HCPCS | Mod: GP | Performed by: PHYSICAL THERAPIST

## 2018-05-08 PROCEDURE — 80061 LIPID PANEL: CPT | Performed by: INTERNAL MEDICINE

## 2018-05-08 NOTE — MR AVS SNAPSHOT
"              After Visit Summary   5/8/2018    Helena Eldridge    MRN: [id number]           Patient Information     Date Of Birth          1935        Visit Information        Provider Department      5/8/2018 2:20 PM Mitzy Cee, PT [facility]        Today's Diagnoses     Shoulder pain, left    -  1       Follow-ups after your visit        Your next 10 appointments already scheduled     May 17, 2018  8:50 AM CDT   SHAUN Extremity with Mitzy Cee PT   [facility] ([facility])    [address] [facility] [address] [phone]            May 24, 2018 12:10 PM CDT   SHAUN Extremity with Mitzy Cee PT   [facility] ([facility])    [address] [facility] [id number] [phone]              Who to contact     If you have questions or need follow up information about today's clinic visit or your schedule please contact [facility] directly at [phone].  Normal or non-critical lab and imaging results will be communicated to you by [employer], letter or phone within 4 business days after the clinic has received the results. If you do not hear from us within 7 days, please contact the clinic through [employer] or phone. If you have a critical or abnormal lab result, we will notify you by phone as soon as possible.  Submit refill requests through [employer] or call your pharmacy and they will forward the refill request to us. Please allow 3 business days for your refill to be completed.          Additional Information About Your Visit        [employer] Information     [employer] lets you send messages to your doctor, view your test results, renew your prescriptions, schedule appointments and more. To sign up, go to [email or web address] . Click on \"Log in\" on the left side of the screen, which will take you to the " "Welcome page. Then click on \"Sign up Now\" on the right side of the page.     You will be asked to enter the access code listed below, as well as some personal information. Please follow the directions to create your username and password.     Your access code is: [email or web address]  Expires: 2018  5:50 PM     Your access code will  in 90 days. If you need help or a new code, please call your [facility] clinic or [phone].        Care EveryWhere ID     This is your Care EveryWhere ID. This could be used by other organizations to access your [facility] medical records  [id number]         Blood Pressure from Last 3 Encounters:   18 122/55   18 111/58   17 123/55    Weight from Last 3 Encounters:   18 60.8 kg (134 lb)   18 58.1 kg (128 lb)   17 58.5 kg (129 lb)              We Performed the Following     HC PT EVAL, LOW COMPLEXITY     NEUROMUSCULAR RE-EDUCATION     THERAPEUTIC EXERCISES        Primary Care Provider Office Phone # Fax #    Sushma Nelson [phone]       [address] [facility] [address]        Equal Access to Services     SHRUTHI DEL TORO : Hadii aad ku hadasho Soomaali, waaxda luqadaha, qaybta kaalmada adeegyada, waxay caroline gu . So [facility] [phone].    ATENCIÓN: Si habla español, tiene a jones disposición servicios gratuitos de asistencia lingüística. Llame al [phone].    We comply with applicable federal civil rights laws and [place] laws. We do not discriminate on the basis of race, color, national origin, age, disability, sex, sexual orientation, or gender identity.            Thank you!     Thank you for choosing [facility]  for your care. Our goal is always to provide you with excellent care. Hearing back from our patients is one way we can continue to improve our services. Please take a few minutes to complete the written survey that you may receive in " the mail after your visit with us. Thank you!             Your Updated Medication List - Protect others around you: Learn how to safely use, store and throw away your medicines at www.disposemymeds.org.          This list is accurate as of 5/8/18  3:12 PM.  Always use your most recent med list.                   Brand Name Dispense Instructions for use Diagnosis    albuterol 108 (90 Base) MCG/ACT Inhaler    PROAIR HFA/PROVENTIL HFA/VENTOLIN HFA    2 Inhaler    Inhale 2 puffs into the lungs every 6 hours as needed for shortness of breath / dyspnea or wheezing    COPD, severe (H)       allopurinol 100 MG tablet    ZYLOPRIM    180 tablet    Take 2 tablets (200 mg) by mouth daily    Idiopathic chronic gout of foot without tophus, unspecified laterality       amLODIPine 5 MG tablet    NORVASC    90 tablet    Take 1 tablet (5 mg) by mouth daily    Essential hypertension with goal blood pressure less than 140/90       ammonium lactate 12 % cream    AMLACTIN    280 g    Apply  topically 2 times daily as needed. apply to affected area    Dry skin dermatitis       ASPIRIN PO      Take 81 mg by mouth daily        BENADRYL 25 MG tablet   Generic drug:  diphenhydrAMINE      Take 25 mg by mouth At Bedtime    DIAGNOSIS NOT YET DEFINED       calcium + D 600-200 MG-UNIT Tabs   Generic drug:  calcium carbonate-vitamin D      Take 2 tablets by mouth 2 times daily.    DIAGNOSIS NOT YET DEFINED       clobetasol 0.05 % cream    TEMOVATE    45 g    Apply  topically daily as needed.    Rash       DOCUSATE SODIUM PO      Take 100 mg by mouth At Bedtime        folic acid 1 MG tablet    FOLVITE    100 tablet    Take 1 tablet (1 mg) by mouth daily        furosemide 20 MG tablet    LASIX    90 tablet    Take 1 tablet (20 mg) by mouth daily (morning and lunch time)    Hypertension goal BP (blood pressure) < 140/90, CKD (chronic kidney disease) stage 3, GFR 30-59 ml/min       ketoconazole 2 % cream    NIZORAL    15 g    Apply  topically daily.     Sciatic pain       levothyroxine 100 MCG tablet    SYNTHROID    90 tablet    Take 1 tablet (100 mcg) by mouth daily Except Sunday    Other specified hypothyroidism       mometasone 0.1 % cream    ELOCON    45 g    Apply  topically daily as needed.    Rash       OCUVITE ADULT 50+ Caps     30 capsule    Take 1 capsule by mouth daily    Macular degeneration of both eyes       omeprazole 40 MG capsule    priLOSEC    90 capsule    Take 1 capsule (40 mg) by mouth daily    Gastroesophageal reflux disease with esophagitis       ranitidine 150 MG tablet    ZANTAC    60 tablet    Take 1 tablet (150 mg) by mouth daily as needed for heartburn (breakthrough)    Gastroesophageal reflux disease with esophagitis       rOPINIRole 0.25 MG tablet    REQUIP    180 tablet    TAKE ONE TO TWO TABLETS BY MOUTH NIGHTLY AT BEDTIME    Cramp of limb       STATIN NOT PRESCRIBED (INTENTIONAL)      by Other route continuous prn. Patient declined 5/4/12

## 2018-05-08 NOTE — PROGRESS NOTES
[facility] Initial Evaluation  Subjective:  Patient is a 82 year old female presenting with rehab left shoulder hpi. The history is provided by the patient.   Helena Eldridge is a 82 year old female with a left shoulder condition.  Condition occurred with:  Unknown cause.  Condition occurred: for unknown reasons.  This is a recurrent condition  Pain began a few months ago. PT referral on 5/4/18.    Patient reports pain:  Anterior, scapular area and lateral.  Radiates to:  Upper arm and cervical.  Pain is described as aching and is intermittent and reported as 8/10 (at worst).  Associated symptoms:  Loss of strength, loss of motion/stiffness and painful arc. Worse during: no pattern.  Symptoms are exacerbated by using arm overhead, using arm at shoulder level, using arm behind back, lifting, lying on extremity, carrying and certain positions and relieved by rest and ice.  Since onset symptoms are gradually improving.    Previous treatment includes other (cortisone injection 1 week ago).  There was moderate improvement following previous treatment.  General health as reported by patient is good.  Pertinent medical history includes:  Kidney disease, thyroid problems, osteoarthritis and cancer (lung; recent PET shows almost complete resolution of cancer).  Medical allergies: no.  Other surgeries include:  None reported.  Current medications:  High blood pressure medication and thyroid medication.  Current occupation is Retired.        Barriers include:  None as reported by the patient.    Red flags:  None as reported by the patient.                        Objective:  Standing Alignment:    Cervical/Thoracic:  Forward head  Shoulder/UE:  Rounded shoulders, protracted scapula L and protracted scapula R                  Flexibility/Screens:     Upper Extremity:    Decreased left upper extremity flexibility at:  Pectoralis Minor    Decreased right upper extremity flexibility present at:  Pectoralis  Minor    Spine:  Decreased left spine flexibility:  Rhomboids; Upper Trap and Levator                    Cervical/Thoracic Evaluation  Cervical AROM: normal         Headaches: none  Cervical Myotomes:  normal                                       Shoulder Evaluation:  ROM:  AROM:    Flexion:  Left:  160    Right:  160    Abduction:  Left: WNL       Internal Rotation:  Left:  WNL      External Rotation:  Left:  WNL                      Pain: at mid-range flexion, abduction, ER, IR    Strength:  normal                      Stability Testing:  normal      Special Tests:    Left shoulder positive for the following special tests:  Impingement  Left shoulder negative for the following special tests:  Labral; Neural Tension; Rotator cuff tear and Acromioclavicular    Palpation:    Left shoulder tenderness present at:  Supraspinatus; Levator; Rhomboids; Upper Trap and Bicipital Groove    Mobility Tests:    Glenohumeral anterior left:  Normal  Glenohumeral anterior right:  Normal  Glenohumeral posterior left:  Normal  Glenohumeral posterior right:  Normal                                             General     ROS    Assessment/Plan:    Patient is a 82 year old female with left side shoulder complaints.    Patient has the following significant findings with corresponding treatment plan.                Diagnosis 1:  L shoulder pain  Pain -  manual therapy, self management, education and home program  Decreased joint mobility - manual therapy, therapeutic exercise and home program  Decreased strength - therapeutic exercise, therapeutic activities and home program    Therapy Evaluation Codes:   1) History comprised of:   Personal factors that impact the plan of care:      None.    Comorbidity factors that impact the plan of care are:      Cancer.     Medications impacting care: Chemo.  2) Examination of Body Systems comprised of:   Body structures and functions that impact the plan of care:      Cervical spine, Shoulder and  Thoracic Spine.   Activity limitations that impact the plan of care are:      Bathing, Cooking, Dressing, Lifting, Reading/Computer work, Sleeping and Laying down.  3) Clinical presentation characteristics are:   Stable/Uncomplicated.  4) Decision-Making    Low complexity using standardized patient assessment instrument and/or measureable assessment of functional outcome.  Cumulative Therapy Evaluation is: Low complexity.    Previous and current functional limitations:  (See Goal Flow Sheet for this information)    Short term and Long term goals: (See Goal Flow Sheet for this information)     Communication ability:  Patient appears to be able to clearly communicate and understand verbal and written communication and follow directions correctly.  Treatment Explanation - The following has been discussed with the patient:   RX ordered/plan of care  Anticipated outcomes  Possible risks and side effects  This patient would benefit from PT intervention to resume normal activities.   Rehab potential is good.    Frequency:  1 X week, once daily  Duration:  for 8 weeks  Discharge Plan:  Achieve all LTG.  Independent in home treatment program.  Reach maximal therapeutic benefit.    Please refer to the daily flowsheet for treatment today, total treatment time and time spent performing 1:1 timed codes.

## 2018-05-08 NOTE — LETTER
[facility]  [address] [place]  [address]  [phone]        May 9, 2018    Helena Eldridge  [address] [facility] [address]        Dear Helena,    Your cholesterol looks okay.  The thyroid is within normal range, I would not change the dose at this time.      Results for orders placed or performed in visit on 05/08/18   Lipid panel reflex to direct LDL Fasting   Result Value Ref Range    Cholesterol 206 (H) <200 mg/dL    Triglycerides 160 (H) <150 mg/dL    HDL Cholesterol 50 >49 mg/dL    LDL Cholesterol Calculated 124 (H) <100 mg/dL    Non HDL Cholesterol 156 (H) <130 mg/dL   TSH   Result Value Ref Range    TSH 4.85 (H) 0.40 - 4.00 mU/L   T4, free   Result Value Ref Range    T4 Free 0.94 0.76 - 1.46 ng/dL       If you have any questions please call the clinic at [phone].    Sincerely,    Sushma ZAMBRANO

## 2018-05-08 NOTE — PROGRESS NOTES
Helena Eldridge    Your cholesterol looks okay.  The thyroid is within normal range, I would not change the dose at this time.      Sincerely,     MANUELA MONREAL M.D.

## 2018-05-17 ENCOUNTER — THERAPY VISIT (OUTPATIENT)
Dept: PHYSICAL THERAPY | Facility: CLINIC | Age: 83
End: 2018-05-17
Payer: COMMERCIAL

## 2018-05-17 DIAGNOSIS — M25.512 SHOULDER PAIN, LEFT: ICD-10-CM

## 2018-05-17 PROCEDURE — 97110 THERAPEUTIC EXERCISES: CPT | Mod: GP | Performed by: PHYSICAL THERAPIST

## 2018-05-17 PROCEDURE — 97112 NEUROMUSCULAR REEDUCATION: CPT | Mod: GP | Performed by: PHYSICAL THERAPIST

## 2018-05-23 ENCOUNTER — TRANSFERRED RECORDS (OUTPATIENT)
Dept: HEALTH INFORMATION MANAGEMENT | Facility: CLINIC | Age: 83
End: 2018-05-23

## 2018-05-29 ENCOUNTER — RADIANT APPOINTMENT (OUTPATIENT)
Dept: MAMMOGRAPHY | Facility: CLINIC | Age: 83
End: 2018-05-29
Attending: INTERNAL MEDICINE
Payer: COMMERCIAL

## 2018-05-29 DIAGNOSIS — Z12.31 VISIT FOR SCREENING MAMMOGRAM: ICD-10-CM

## 2018-05-29 PROCEDURE — 77067 SCR MAMMO BI INCL CAD: CPT | Mod: TC

## 2018-05-31 ENCOUNTER — THERAPY VISIT (OUTPATIENT)
Dept: PHYSICAL THERAPY | Facility: CLINIC | Age: 83
End: 2018-05-31
Payer: COMMERCIAL

## 2018-05-31 DIAGNOSIS — M25.512 SHOULDER PAIN, LEFT: ICD-10-CM

## 2018-05-31 PROCEDURE — 97110 THERAPEUTIC EXERCISES: CPT | Mod: GP | Performed by: PHYSICAL THERAPIST

## 2018-05-31 PROCEDURE — 97112 NEUROMUSCULAR REEDUCATION: CPT | Mod: GP | Performed by: PHYSICAL THERAPIST

## 2018-05-31 NOTE — MR AVS SNAPSHOT
"              After Visit Summary   5/31/2018    Helena Eldridge    MRN: [id number]           Patient Information     Date Of Birth          1935        Visit Information        Provider Department      5/31/2018 12:10 PM Mitzy Cee, PT [facility]        Today's Diagnoses     Shoulder pain, left           Follow-ups after your visit        Your next 10 appointments already scheduled     Jun 06, 2018  2:10 PM CDT   MA DIAGNOSTIC DIGITAL RIGHT with MGMA2, MG MA TECH   [facility]    [address] [place] [address] [phone]           Do not use any powder, lotion or deodorant under your arms or on your breast. If you do, we will ask you to remove it before your exam.  Wear comfortable, two-piece clothing.  If you have any allergies, tell your care team.  Bring any previous mammograms from other facilities or have them mailed to the breast center.   Three-dimensional (3D) mammograms are available at [place] locations in [facility], [facility], [facility], and [place]. [facility] locations include [place] [facility] in [place]. Benefits of 3D mammograms include: - Improved rate of cancer detection - Decreases your chance of having to go back for more tests, which means fewer: - \"False-positive\" results (This means that there is an abnormal area but it isn't cancer.) - Invasive testing procedures, such as a biopsy or surgery - Can provide clearer images of the breast if you have dense breast tissue. 3D mammography is an optional exam that anyone can have with a 2D mammogram. It doesn't replace or take the place of a 2D mammogram. 2D mammograms remain an effective screening test for all women.  Not all insurance companies cover the cost of a 3D mammogram. Check with your insurance.            Jun 06, 2018  2:30 PM CDT   US BREAST RIGHT LIMITED " "1-3 QUAD with MGMUS1, MG [facility] ([facility])    [address] [address] [place] [address] [phone]           Please bring a list of your medicines (including vitamins, minerals and over-the-counter drugs). Also, tell your doctor about any allergies you may have. Wear comfortable clothes and leave your valuables at home.  You do not need to do anything special to prepare for your exam.  Please call the Imaging Department at your exam site with any questions.            Jun 21, 2018  8:20 AM CDT   PHYSICAL with Sushma Nelson MD   [facility]    [address] [facility] [address] [phone]              Who to contact     If you have questions or need follow up information about today's clinic visit or your schedule please contact [facility] directly at [phone].  Normal or non-critical lab and imaging results will be communicated to you by [employer], letter or phone within 4 business days after the clinic has received the results. If you do not hear from us within 7 days, please contact the clinic through [employer] or phone. If you have a critical or abnormal lab result, we will notify you by phone as soon as possible.  Submit refill requests through [employer] or call your pharmacy and they will forward the refill request to us. Please allow 3 business days for your refill to be completed.          Additional Information About Your Visit        [employer] Information     [employer] lets you send messages to your doctor, view your test results, renew your prescriptions, schedule appointments and more. To sign up, go to [email or web address] . Click on \"Log in\" on the left side of the screen, which will take you to the Welcome page. Then click on \"Sign up Now\" on the right side of the page.     You will be asked to enter the access " code listed below, as well as some personal information. Please follow the directions to create your username and password.     Your access code is: [email or web address]  Expires: 2018  5:50 PM     Your access code will  in 90 days. If you need help or a new code, please call your [facility] clinic or [phone].        Care EveryWhere ID     This is your Care EveryWhere ID. This could be used by other organizations to access your [facility] medical records  [id number]         Blood Pressure from Last 3 Encounters:   18 122/55   18 111/58   17 123/55    Weight from Last 3 Encounters:   18 60.8 kg (134 lb)   18 58.1 kg (128 lb)   17 58.5 kg (129 lb)              We Performed the Following     NEUROMUSCULAR RE-EDUCATION     THERAPEUTIC EXERCISES        Primary Care Provider Office Phone # Fax #    Sushma Nelson [phone]       [address] [facility] [address]        Equal Access to Services     [facility] CenterDAMIEN : Hadii aad ku hadasho Soomaali, waaxda luqadaha, qaybta kaalmada adejesus, kayla gu . So [facility] [phone].    ATENCIÓN: Si habla español, tiene a jones disposición servicios gratuitos de asistencia lingüística. Josiane al [phone].    We comply with applicable federal civil rights laws and [place] laws. We do not discriminate on the basis of race, color, national origin, age, disability, sex, sexual orientation, or gender identity.            Thank you!     Thank you for choosing [facility]  for your care. Our goal is always to provide you with excellent care. Hearing back from our patients is one way we can continue to improve our services. Please take a few minutes to complete the written survey that you may receive in the mail after your visit with us. Thank you!             Your Updated Medication List - Protect others around you: Learn how to safely use, store  and throw away your medicines at www.disposemymeds.org.          This list is accurate as of 5/31/18 11:59 PM.  Always use your most recent med list.                   Brand Name Dispense Instructions for use Diagnosis    albuterol 108 (90 Base) MCG/ACT Inhaler    PROAIR HFA/PROVENTIL HFA/VENTOLIN HFA    2 Inhaler    Inhale 2 puffs into the lungs every 6 hours as needed for shortness of breath / dyspnea or wheezing    COPD, severe (H)       allopurinol 100 MG tablet    ZYLOPRIM    180 tablet    Take 2 tablets (200 mg) by mouth daily    Idiopathic chronic gout of foot without tophus, unspecified laterality       amLODIPine 5 MG tablet    NORVASC    90 tablet    Take 1 tablet (5 mg) by mouth daily    Essential hypertension with goal blood pressure less than 140/90       ammonium lactate 12 % cream    AMLACTIN    280 g    Apply  topically 2 times daily as needed. apply to affected area    Dry skin dermatitis       ASPIRIN PO      Take 81 mg by mouth daily        BENADRYL 25 MG tablet   Generic drug:  diphenhydrAMINE      Take 25 mg by mouth At Bedtime    DIAGNOSIS NOT YET DEFINED       calcium + D 600-200 MG-UNIT Tabs   Generic drug:  calcium carbonate-vitamin D      Take 2 tablets by mouth 2 times daily.    DIAGNOSIS NOT YET DEFINED       clobetasol 0.05 % cream    TEMOVATE    45 g    Apply  topically daily as needed.    Rash       DOCUSATE SODIUM PO      Take 100 mg by mouth At Bedtime        folic acid 1 MG tablet    FOLVITE    100 tablet    Take 1 tablet (1 mg) by mouth daily        furosemide 20 MG tablet    LASIX    90 tablet    Take 1 tablet (20 mg) by mouth daily (morning and lunch time)    Hypertension goal BP (blood pressure) < 140/90, CKD (chronic kidney disease) stage 3, GFR 30-59 ml/min       ketoconazole 2 % cream    NIZORAL    15 g    Apply  topically daily.    Sciatic pain       levothyroxine 100 MCG tablet    SYNTHROID    90 tablet    Take 1 tablet (100 mcg) by mouth daily Except Sunday    Other  specified hypothyroidism       mometasone 0.1 % cream    ELOCON    45 g    Apply  topically daily as needed.    Rash       OCUVITE ADULT 50+ Caps     30 capsule    Take 1 capsule by mouth daily    Macular degeneration of both eyes       omeprazole 40 MG capsule    priLOSEC    90 capsule    Take 1 capsule (40 mg) by mouth daily    Gastroesophageal reflux disease with esophagitis       ranitidine 150 MG tablet    ZANTAC    60 tablet    Take 1 tablet (150 mg) by mouth daily as needed for heartburn (breakthrough)    Gastroesophageal reflux disease with esophagitis       rOPINIRole 0.25 MG tablet    REQUIP    180 tablet    TAKE ONE TO TWO TABLETS BY MOUTH NIGHTLY AT BEDTIME    Cramp of limb       STATIN NOT PRESCRIBED (INTENTIONAL)      by Other route continuous prn. Patient declined 5/4/12

## 2018-06-04 PROBLEM — M25.512 SHOULDER PAIN, LEFT: Status: RESOLVED | Noted: 2018-05-08 | Resolved: 2018-06-04

## 2018-06-04 NOTE — PROGRESS NOTES
Subjective:  HPI                    Objective:  System    Physical Exam    General     ROS    Assessment/Plan:    DISCHARGE REPORT    Progress reporting period is from 5/8/18 to 5/31/18.     SUBJECTIVE  Pt no longer has shoulder pain. Is back to her old activities and everything is going well. She states she is having some R shoulder pain and wants to make appt with MD for this. She is pleased with her progress and does not feel she needs to come back.       Initial Pain level: 6/10   Current Pain level: 0/10  Changes in function:  (yes see goal flow sheet)    OBJECTIVE  L shoulder AROM and strength WNL and pain free  (-) Alicia Aguiar  (-) scapular assistance/relocation  Independent in HEP and instructed in progression    ASSESSMENT/PLAN  Diagnosis 1:  L shoulder pain  Pain -  manual therapy, self management, education and home program  Decreased joint mobility - manual therapy, therapeutic exercise and home program  Decreased strength - therapeutic exercise, therapeutic activities and home program  STG/LTGs have been met or progress has been made towards goals:  Yes (See Goal flow sheet completed today.)  Assessment of Progress: The patient's condition is improving.  The patient has met all of their long term goals.  Self Management Plans:  Patient has been instructed in a home treatment program.  Patient is independent in a home treatment program.  Patient  has been instructed in self management of symptoms.  Patient is independent in self management of symptoms.  Helena continues to require the following intervention to meet STG and LTG's: PT intervention is no longer required to meet STG/LTG.  We will discharge this patient from PT.    Recommendations:  This patient is ready to be discharged from therapy and continue their home treatment program.    Please refer to the daily flowsheet for treatment today, total treatment time and time spent performing 1:1 timed codes.

## 2018-06-06 ENCOUNTER — RADIANT APPOINTMENT (OUTPATIENT)
Dept: MAMMOGRAPHY | Facility: CLINIC | Age: 83
End: 2018-06-06
Attending: INTERNAL MEDICINE
Payer: COMMERCIAL

## 2018-06-06 DIAGNOSIS — R92.8 ABNORMAL MAMMOGRAM: ICD-10-CM

## 2018-06-06 PROCEDURE — 77065 DX MAMMO INCL CAD UNI: CPT | Mod: RT

## 2018-06-10 DIAGNOSIS — E03.8 OTHER SPECIFIED HYPOTHYROIDISM: ICD-10-CM

## 2018-06-11 RX ORDER — LEVOTHYROXINE SODIUM 100 UG/1
TABLET ORAL
Qty: 90 TABLET | Refills: 2 | Status: SHIPPED | OUTPATIENT
Start: 2018-06-11 | End: 2019-03-06

## 2018-06-11 NOTE — TELEPHONE ENCOUNTER
Dear Helena,     Your cholesterol looks okay.  The thyroid is within normal range, I would not change the dose at this time.

## 2018-06-11 NOTE — TELEPHONE ENCOUNTER
"Requested Prescriptions   Pending Prescriptions Disp Refills     levothyroxine (SYNTHROID/LEVOTHROID) 100 MCG tablet [Pharmacy Med Name: LEVOTHYROXINE 100 MCG TABLET] 90 tablet 2    Last Written Prescription Date:  6/1/17  Last Fill Quantity: 90,  # refills: 3   Last office visit: 5/3/2018 with prescribing provider:     Future Office Visit:   Next 5 appointments (look out 90 days)     Jun 21, 2018  8:20 AM CDT   PHYSICAL with Sushma Nelson MD   [facility]    [address] [place] [id number]   [phone]                  Sig: TAKE ONE TABLET BY MOUTH ONCE DAILY EXCEPT SUNDAY    Thyroid Protocol Failed    6/10/2018  1:32 AM       Failed - Normal TSH on file in past 12 months    Recent Labs   Lab Test  05/08/18   0835   TSH  4.85*             Passed - Patient is 12 years or older       Passed - Recent (12 mo) or future (30 days) visit within the authorizing provider's specialty    Patient had office visit in the last 12 months or has a visit in the next 30 days with authorizing provider or within the authorizing provider's specialty.  See \"Patient Info\" tab in inbasket, or \"Choose Columns\" in Meds & Orders section of the refill encounter.           Passed - No active pregnancy on record    If patient is pregnant or has had a positive pregnancy test, please check TSH.         Passed - No positive pregnancy test in past 12 months    If patient is pregnant or has had a positive pregnancy test, please check TSH.            "

## 2018-06-14 ENCOUNTER — RADIANT APPOINTMENT (OUTPATIENT)
Dept: MAMMOGRAPHY | Facility: CLINIC | Age: 83
End: 2018-06-14
Attending: INTERNAL MEDICINE
Payer: COMMERCIAL

## 2018-06-14 DIAGNOSIS — N18.30 CKD (CHRONIC KIDNEY DISEASE) STAGE 3, GFR 30-59 ML/MIN (H): ICD-10-CM

## 2018-06-14 DIAGNOSIS — R92.8 ABNORMAL FINDING ON BREAST IMAGING: ICD-10-CM

## 2018-06-14 DIAGNOSIS — I10 HYPERTENSION GOAL BP (BLOOD PRESSURE) < 140/90: ICD-10-CM

## 2018-06-14 DIAGNOSIS — R92.0 MAMMOGRAPHIC MICROCALCIFICATION: Primary | ICD-10-CM

## 2018-06-14 LAB
CREAT BLD-MCNC: 1.4 MG/DL (ref 0.52–1.04)
GFR SERPL CREATININE-BSD FRML MDRD: 36 ML/MIN/1.7M2

## 2018-06-14 PROCEDURE — 82565 ASSAY OF CREATININE: CPT | Performed by: RADIOLOGY

## 2018-06-14 PROCEDURE — 19081 BX BREAST 1ST LESION STRTCTC: CPT | Mod: RT | Performed by: RADIOLOGY

## 2018-06-14 PROCEDURE — 88305 TISSUE EXAM BY PATHOLOGIST: CPT | Performed by: INTERNAL MEDICINE

## 2018-06-14 NOTE — LETTER
"[facility]  [address] [place]  [address]  [phone]        June 20, 2018    Helena Eldridge  [address] [facility] [address]        Dear Helena,    Here is a copy of your biopsy result.  It is negative, as we discussed by phone.  I look forward to seeing you again for your upcoming physical.     Results for orders placed or performed in visit on 06/14/18   MA Stereotactic Breast Biopsy Vacuum Assist Right    Addendum: 6/19/2018    Addendum:    RIGHT BREAST, 8:00-9:00 POSITION, 8CM FROM THE NIPPLE, STEREOTACTIC  BIOPSY:   -Fibrocystic change with usual ductal hyperplasia, columnar cell  change and focal epithelial atypia.   -Calcifications associated with benign epithelium.   -No definite evidence of malignancy identified in this biopsy.     Concordant with imaging.    Recommendation:  Annual screening mammography.    XU HAZEL MD      Narrative    EXAMINATION: MA STEREOTACTIC BREAST BIOPSY VACUUM RT, 6/14/2018 1:46  PM     COMPARISON: Mammogram 6/6/2018    HISTORY:  Suspicious right breast calcifications. Contrast enhanced  mammogram deferred due to renal function.    FINDINGS: Procedure, risks, benefits and alternatives were discussed  with the patient and the patient gave written and verbal consent. The  patient was positioned for stereotactic biopsy. Aseptic technique was  utilized. Approximately 5 cc of 1% lidocaine with bicarbonate and 15  cc of 1% lidocaine with epinephrine were utilized for local  anesthesia.      Using stereotactic technique and an 9 gauge vacuum assisted biopsy  needle, multiple specimens were obtained. Calcifications are seen in  the biopsy specimen. A SecurMark (shaped like a \"T\") clip was placed.   Pressure was held over this area for approximately 15 minutes. A  dressing was placed and care instructions were discussed with the  patient.    Post biopsy mammogram demonstrates clip deployment at the site of the  biopsied " "calcifications.      Impression    IMPRESSION:    Uncomplicated stereotactic vacuum assisted core needle  biopsy of the right breast.    ALAN WALLACE MD ([place])   Creatinine POCT   Result Value Ref Range    Creatinine 1.4 (H) 0.52 - 1.04 mg/dL    GFR Estimate 36 (L) >60 mL/min/1.7m2    GFR Estimate If Black 44 (L) >60 mL/min/1.7m2   Surgical pathology exam   Result Value Ref Range    Copath Report       Patient Name: ARELY GARCIA  MR#: [id number]  Specimen #: [id number]  Collected: 6/14/2018  Received: 6/15/2018  Reported: 6/19/2018 09:48  Ordering Phy(s): MANUELA MONREAL    For improved result formatting, select 'View Enhanced Report Format' under   Linked Documents section.    SPECIMEN(S):  Breast needle biopsy, right    FINAL DIAGNOSIS:  RIGHT BREAST, 8:00-9:00 POSITION, 8CM FROM THE NIPPLE, STEREOTACTIC   BIOPSY:  - Fibrocystic change with usual ductal hyperplasia, columnar cell change   and focal epithelial atypia.  - Calcifications associated with benign epithelium.  - No definite evidence of malignancy identified in this biopsy.    I have personally reviewed all specimens and/or slides, including the   listed special stains, and used them  with my medical judgement to determine or confirm the final diagnosis.    Electronically signed out by:    Gypsy Cardona M.D., [facility]    CLINICAL HISTORY:  82 year-old woman with developing calcifications in the right breast   det ected by mammogram screening.  GROSS:  The specimen is received in formalin with proper patient identification,   labeled \"right breast stereotactic  biopsy\". The specimen consists of a yellow cassette filled with 5   white-yellow needle cores ranging from  1.1-2.2 cm in length and averaging 0.5 cm in diameter.  Also noted in the   specimen container is 8  free-floating white-yellow needle cores ranging in size from 0.5-1.8 cm in   length and averaging 0.5 cm in  diameter. The cores are entirely submitted (wrapped) as follows:  Summary " of Sections:  A1 - cassetted cores  A2 - free-floating cores  The specimen is collected and placed in formalin at 1320 on 6/14/2018.   (Dictated by: Rabia Holder 6/15/2018 11:22 AM)    MICROSCOPIC:  Microscopic examination is performed.    CPT Codes:  A: 04548-NH3    TESTING LAB LOCATION:  [facility], [address]   [id number] [phone]    COLLECTION SITE:  Client:  [facility]  Location: [facility] (B)    Resident  [id number]         If you have any questions please call the clinic at [phone].    Sincerely,    Sushma ZAMBRANO

## 2018-06-15 RX ORDER — FUROSEMIDE 20 MG
TABLET ORAL
Qty: 180 TABLET | Refills: 1 | Status: SHIPPED | OUTPATIENT
Start: 2018-06-15 | End: 2018-06-21

## 2018-06-15 NOTE — TELEPHONE ENCOUNTER
"Requested Prescriptions   Pending Prescriptions Disp Refills     furosemide (LASIX) 20 MG tablet [Pharmacy Med Name: FUROSEMIDE 20 MG TABLET] 180 tablet 0    Last Written Prescription Date:  5/3/18  Last Fill Quantity: 90,  # refills: 3   Last office visit: 5/3/2018 with prescribing provider:     Future Office Visit:   Next 5 appointments (look out 90 days)     Jun 21, 2018  8:20 AM CDT   PHYSICAL with Sushma Nelson MD   [facility] ([facility]    [address] [place] [id number] [phone]                  Sig: TAKE ONE TABLET BY MOUTH TWICE DAILY IN THE MORNING AND LUNCH TIME    Diuretics (Including Combos) Protocol Failed    6/14/2018  6:04 PM       Failed - Normal serum creatinine on file in past 12 months    Recent Labs   Lab Test  07/27/17   1736   CR  1.49*             Passed - Blood pressure under 140/90 in past 12 months    BP Readings from Last 3 Encounters:   05/03/18 122/55   03/08/18 111/58   12/11/17 123/55                Passed - Recent (12 mo) or future (30 days) visit within the authorizing provider's specialty    Patient had office visit in the last 12 months or has a visit in the next 30 days with authorizing provider or within the authorizing provider's specialty.  See \"Patient Info\" tab in inbasket, or \"Choose Columns\" in Meds & Orders section of the refill encounter.           Passed - Patient is age 18 or older       Passed - No active pregancy on record       Passed - Normal serum potassium on file in past 12 months    Recent Labs   Lab Test  07/27/17   1736   POTASSIUM  3.5                   Passed - Normal serum sodium on file in past 12 months    Recent Labs   Lab Test  07/27/17   1736   NA  138             Passed - No positive pregnancy test in past 12 months          "

## 2018-06-15 NOTE — TELEPHONE ENCOUNTER
Routing refill request to provider for review/approval because does not meet protocol:  Labs out of range:  Per below    Route to PCP    Elana Preston RN

## 2018-06-19 ENCOUNTER — TELEPHONE (OUTPATIENT)
Dept: GENERAL RADIOLOGY | Facility: CLINIC | Age: 83
End: 2018-06-19

## 2018-06-19 LAB — COPATH REPORT: NORMAL

## 2018-06-19 NOTE — PROGRESS NOTES
Helena Eldridge    Here is a copy of your biopsy result.  It is negative, as we discussed by phone.  I look forward to seeing you again for your upcoming physical.     Sincerely,     MANUELA MONREAL M.D.

## 2018-06-19 NOTE — TELEPHONE ENCOUNTER
Spoke to Helena regarding the benign finding from her recent right breast biopsy and the radiologist's recommendation for continuation with annual mammograms. Helena verbalized understanding of these results and all questions were addressed at this time.

## 2018-06-21 ENCOUNTER — OFFICE VISIT (OUTPATIENT)
Dept: FAMILY MEDICINE | Facility: CLINIC | Age: 83
End: 2018-06-21
Payer: COMMERCIAL

## 2018-06-21 VITALS
DIASTOLIC BLOOD PRESSURE: 61 MMHG | BODY MASS INDEX: 25.52 KG/M2 | HEART RATE: 86 BPM | WEIGHT: 130 LBS | TEMPERATURE: 98.3 F | HEIGHT: 60 IN | SYSTOLIC BLOOD PRESSURE: 125 MMHG | OXYGEN SATURATION: 93 %

## 2018-06-21 DIAGNOSIS — E03.8 OTHER SPECIFIED HYPOTHYROIDISM: ICD-10-CM

## 2018-06-21 DIAGNOSIS — Z23 NEED FOR SHINGLES VACCINE: ICD-10-CM

## 2018-06-21 DIAGNOSIS — Z90.5 SOLITARY KIDNEY, ACQUIRED: ICD-10-CM

## 2018-06-21 DIAGNOSIS — R73.09 ELEVATED GLUCOSE: ICD-10-CM

## 2018-06-21 DIAGNOSIS — I49.9 IRREGULAR HEART BEAT: ICD-10-CM

## 2018-06-21 DIAGNOSIS — Z00.00 ROUTINE GENERAL MEDICAL EXAMINATION AT A HEALTH CARE FACILITY: Primary | ICD-10-CM

## 2018-06-21 DIAGNOSIS — J44.9 COPD, SEVERE (H): ICD-10-CM

## 2018-06-21 DIAGNOSIS — I10 HYPERTENSION GOAL BP (BLOOD PRESSURE) < 140/90: ICD-10-CM

## 2018-06-21 DIAGNOSIS — Z95.828 PORT-A-CATH IN PLACE: ICD-10-CM

## 2018-06-21 DIAGNOSIS — C34.91 NON-SMALL CELL CANCER OF RIGHT LUNG (H): ICD-10-CM

## 2018-06-21 DIAGNOSIS — N18.30 CKD (CHRONIC KIDNEY DISEASE) STAGE 3, GFR 30-59 ML/MIN (H): ICD-10-CM

## 2018-06-21 LAB — HBA1C MFR BLD: 5.3 % (ref 0–5.6)

## 2018-06-21 PROCEDURE — 36415 COLL VENOUS BLD VENIPUNCTURE: CPT | Performed by: INTERNAL MEDICINE

## 2018-06-21 PROCEDURE — 99213 OFFICE O/P EST LOW 20 MIN: CPT | Mod: 25 | Performed by: INTERNAL MEDICINE

## 2018-06-21 PROCEDURE — 93000 ELECTROCARDIOGRAM COMPLETE: CPT | Performed by: INTERNAL MEDICINE

## 2018-06-21 PROCEDURE — 99397 PER PM REEVAL EST PAT 65+ YR: CPT | Performed by: INTERNAL MEDICINE

## 2018-06-21 PROCEDURE — 83036 HEMOGLOBIN GLYCOSYLATED A1C: CPT | Performed by: INTERNAL MEDICINE

## 2018-06-21 ASSESSMENT — ANXIETY QUESTIONNAIRES
2. NOT BEING ABLE TO STOP OR CONTROL WORRYING: NOT AT ALL
1. FEELING NERVOUS, ANXIOUS, OR ON EDGE: NOT AT ALL
6. BECOMING EASILY ANNOYED OR IRRITABLE: NOT AT ALL
3. WORRYING TOO MUCH ABOUT DIFFERENT THINGS: NOT AT ALL
5. BEING SO RESTLESS THAT IT IS HARD TO SIT STILL: NOT AT ALL
7. FEELING AFRAID AS IF SOMETHING AWFUL MIGHT HAPPEN: NOT AT ALL
GAD7 TOTAL SCORE: 0
IF YOU CHECKED OFF ANY PROBLEMS ON THIS QUESTIONNAIRE, HOW DIFFICULT HAVE THESE PROBLEMS MADE IT FOR YOU TO DO YOUR WORK, TAKE CARE OF THINGS AT HOME, OR GET ALONG WITH OTHER PEOPLE: NOT DIFFICULT AT ALL

## 2018-06-21 ASSESSMENT — ACTIVITIES OF DAILY LIVING (ADL)
I_NEED_ASSISTANCE_FOR_THE_FOLLOWING_DAILY_ACTIVITIES:: NO ASSISTANCE IS NEEDED
CURRENT_FUNCTION: NO ASSISTANCE NEEDED

## 2018-06-21 ASSESSMENT — PATIENT HEALTH QUESTIONNAIRE - PHQ9: 5. POOR APPETITE OR OVEREATING: NOT AT ALL

## 2018-06-21 NOTE — PROGRESS NOTES
SUBJECTIVE:   Helena Eldridge is a 82 year old female who presents for Preventive Visit.    83 Y/o F here for AFE.  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  Currently living alone in home, significan other passed last year (prost Ca) .  Recent breast biopsy appears negative/benign (6/19/18, [facility])     Her now retired son is able to help get her to her appointments.     Are you in the first 12 months of your Medicare coverage?  No    Physical   Annual:     Getting at least 3 servings of Calcium per day::  NO    Bi-annual eye exam::  Yes    Dental care twice a year::  NO    Sleep apnea or symptoms of sleep apnea::  None    Diet::  Regular (no restrictions)    Frequency of exercise::  2-3 days/week    Duration of exercise::  15-30 minutes    Taking medications regularly::  Yes    Medication side effects::  None    Additional concerns today::  No    Ability to successfully perform activities of daily living: no assistance needed  Home Safety:  No safety concerns identified  Hearing Impairment: no hearing concerns      Ability to successfully perform activities of daily living: Yes, no assistance needed  Home safety:  none identified   Hearing impairment: Yes, has hearing aids    Fall risk:       COGNITIVE SCREEN  1) Repeat 3 items (Banana, Sunrise, Chair)    2) Clock draw: NORMAL  3) 3 item recall: Recalls 3 objects  Results: 3 items recalled: COGNITIVE IMPAIRMENT LESS LIKELY    Mini-CogTM Copyright TIARA Cam. Licensed by the author for use in [facility]; reprinted with permission (delilah@[facility]). All rights reserved.        Reviewed and updated as needed this visit by clinical staff         Reviewed and updated as needed this visit by Provider        Social History   Substance Use Topics     Smoking status: Former Smoker     Types: Cigarettes     Quit date: 12/4/1969      Smokeless tobacco: Never Used     Alcohol use 0.0 oz/week     0 Standard drinks or equivalent per week      Comment: very little        Alcohol Use 6/21/2018   If you drink alcohol do you typically have greater than 3 drinks per day OR greater than 7 drinks per week? No       Discuss breast Bx     Today's PHQ-2 Score:   PHQ-2 ( 1999 [employer]) 6/21/2018   Q1: Little interest or pleasure in doing things 0   Q2: Feeling down, depressed or hopeless 0   PHQ-2 Score 0   Q1: Little interest or pleasure in doing things Not at all   Q2: Feeling down, depressed or hopeless Not at all   PHQ-2 Score 0       Do you feel safe in your environment - Yes    Do you have a Health Care Directive?: Yes: Advance Directive has been received and scanned.    Current providers sharing in care for this patient include:   Patient Care Team:  Sushma Nelson MD as PCP - Monica Oliver as     The following health maintenance items are reviewed in Epic and correct as of today:  Health Maintenance   Topic Date Due     PHQ-9 Q6 MONTHS  06/04/2018     BMP Q1 YR  07/27/2018     MICROALBUMIN Q1 YEAR  07/27/2018     EYE EXAM Q1 YEAR  09/28/2018     HEMOGLOBIN Q1 YR  02/13/2019     COPD ACTION PLAN Q1 YR  03/08/2019     FALL RISK ASSESSMENT  03/08/2019     DEPRESSION ACTION PLAN Q1 YR  03/08/2019     TSH W/ FREE T4 REFLEX Q1 YEAR  05/08/2019     LIPID MONITORING Q1 YEAR  05/08/2019     TETANUS IMMUNIZATION (SYSTEM ASSIGNED)  04/30/2020     DEXA Q3 YR  06/01/2020     ADVANCE DIRECTIVE PLANNING Q5 YRS  04/19/2021     SPIROMETRY ONETIME  Completed     PNEUMOCOCCAL  Completed     INFLUENZA VACCINE  Completed     Labs reviewed in EPIC  BP Readings from Last 3 Encounters:   06/21/18 125/61   05/03/18 122/55   03/08/18 111/58    Wt Readings from Last 3 Encounters:   06/21/18 130 lb (59 kg)   05/03/18 134 lb (60.8 kg)   03/08/18 128 lb (58.1 kg)                  Patient Active Problem List   Diagnosis     CARDIOVASCULAR SCREENING; LDL  GOAL LESS THAN 100     CKD (chronic kidney disease) stage 3, GFR 30-59 ml/min     Glucose intolerance (impaired glucose tolerance)     Kidney stones     Advance care planning     Hypertension goal BP (blood pressure) < 140/90     Hyperlipidemia LDL goal <130     Lichen sclerosus et atrophicus of the vulva     Solitary kidney, acquired     Senile osteoporosis     Osteoarthritis of right knee     Medial meniscus tear     Cataracts, both eyes     Macular degeneration of both eyes, right eye greater than left eye     Pelvic fracture (H)     COPD, severe (H)     IPF (idiopathic pulmonary fibrosis) (H)     Other specified hypothyroidism     Idiopathic chronic gout     Adjustment disorder with anxiety     Non-small cell cancer of right lung (H)     Immunosuppression (H)     Port-a-cath in place     Past Surgical History:   Procedure Laterality Date     C NEPHRECTOMY      left partial 07-01-07     C VENOUS THROMBOSIS IMAGING      with pe     HC REMOVAL GALLBLADDER       HC REVISE MEDIAN N/CARPAL TUNNEL SURG       TUBAL LIGATION         Social History   Substance Use Topics     Smoking status: Former Smoker     Types: Cigarettes     Quit date: 12/4/1969     Smokeless tobacco: Never Used     Alcohol use 0.0 oz/week     0 Standard drinks or equivalent per week      Comment: very little      Family History   Problem Relation Age of Onset     Cancer Brother      Glaucoma Mother      Alzheimer Disease Brother      Macular Degeneration No family hx of          Current Outpatient Prescriptions   Medication Sig Dispense Refill     albuterol (PROAIR HFA/PROVENTIL HFA/VENTOLIN HFA) 108 (90 BASE) MCG/ACT Inhaler Inhale 2 puffs into the lungs every 6 hours as needed for shortness of breath / dyspnea or wheezing 2 Inhaler 3     allopurinol (ZYLOPRIM) 100 MG tablet Take 2 tablets (200 mg) by mouth daily 180 tablet 3     amLODIPine (NORVASC) 5 MG tablet Take 1 tablet (5 mg) by mouth daily 90 tablet 3     ammonium lactate (AMLACTIN) 12 %  cream Apply  topically 2 times daily as needed. apply to affected area 280 g 3     Calcium Carbonate-Vitamin D (CALCIUM + D) 600-200 MG-UNIT per tablet Take 2 tablets by mouth 2 times daily.       clobetasol (TEMOVATE) 0.05 % cream Apply  topically daily as needed. 45 g 1     diphenhydrAMINE (BENADRYL) 25 MG tablet Take 25 mg by mouth At Bedtime        DOCUSATE SODIUM PO Take 100 mg by mouth At Bedtime       folic acid (FOLVITE) 1 MG tablet Take 1 tablet (1 mg) by mouth daily 100 tablet 0     furosemide (LASIX) 20 MG tablet Take 1 tablet (20 mg) by mouth daily (morning and lunch time) 90 tablet 3     ketoconazole (NIZORAL) 2 % cream Apply  topically daily. 15 g 3     levothyroxine (SYNTHROID/LEVOTHROID) 100 MCG tablet TAKE ONE TABLET BY MOUTH ONCE DAILY EXCEPT SUNDAY 90 tablet 2     mometasone (ELOCON) 0.1 % cream Apply  topically daily as needed. 45 g 1     Multiple Vitamins-Minerals (OCUVITE ADULT 50+) CAPS Take 1 capsule by mouth daily 30 capsule 12     omeprazole (PRILOSEC) 40 MG capsule Take 1 capsule (40 mg) by mouth daily 90 capsule 3     ranitidine (ZANTAC) 150 MG tablet Take 1 tablet (150 mg) by mouth daily as needed for heartburn (breakthrough) 60 tablet 1     rOPINIRole (REQUIP) 0.25 MG tablet TAKE ONE TO TWO TABLETS BY MOUTH NIGHTLY AT BEDTIME 180 tablet 1     STATIN NOT PRESCRIBED, INTENTIONAL, by Other route continuous prn. Patient declined 5/4/12  0     ASPIRIN PO Take 81 mg by mouth daily        [DISCONTINUED] furosemide (LASIX) 20 MG tablet TAKE ONE TABLET BY MOUTH TWICE DAILY IN THE MORNING AND LUNCH TIME 180 tablet 1     Allergies   Allergen Reactions     Ace Inhibitors Cough     Advicor      Celexa [Citalopram] GI Disturbance     diarrhea     Doxycycline Nausea     Poor appetite     Fosamax      Severe substernal burning and dysphagia within 3 days     Valsartan Cramps     severe     Recent Labs   Lab Test  06/14/18   1302  05/08/18   0835  07/27/17   1736  06/01/17   1436  05/19/16   1104    "05/19/15   0946   05/02/14   0823   04/24/13   0828   04/20/12   0907   A1C   --    --    --   5.7   --    --   5.4   --   5.3   --   5.2   --    --    LDL   --   124*   --   133*  126*   --   122   --   125   --   128   --   135*   HDL   --   50   --   45*  41*   --   39*   --   30*   --   32*   --   30*   TRIG   --   160*   --   193*  228*   --   183*   --   216*   --   146   --   194*   ALT   --    --    --    --    --    --    --    --   27   --   24   --   22   CR   --    --   1.49*  1.26*  1.29*   < >  1.16*   < >  1.17*   < >  1.20*   < >  1.34*   GFRESTIMATED  36*   --   34*  41*  40*   < >  45*   < >  45*   < >  44*   < >  38*   GFRESTBLACK  44*   --   41*  49*  48*   < >  54*   < >  54*   < >  53*   < >  47*   POTASSIUM   --    --   3.5  3.1*  3.9   < >  4.2   < >  4.2   < >  4.3   < >  4.3   TSH   --   4.85*   --   1.42  1.36   < >  1.01   --   3.31   --   0.62   --   0.35*    < > = values in this interval not displayed.             Review of Systems  Constitutional, HEENT, cardiovascular, pulmonary, GI, , musculoskeletal, neuro, skin, endocrine and psych systems are negative, except as otherwise noted.    OBJECTIVE:   /61 (BP Location: Right arm, Patient Position: Sitting, Cuff Size: Adult Regular)  Pulse 86  Temp 98.3  F (36.8  C) (Oral)  Ht 5' 0.25\" (1.53 m)  Wt 130 lb (59 kg)  SpO2 93%  BMI 25.18 kg/m2 Estimated body mass index is 26.17 kg/(m^2) as calculated from the following:    Height as of 6/1/17: 5' (1.524 m).    Weight as of 5/3/18: 134 lb (60.8 kg).  Physical Exam  GENERAL APPEARANCE: healthy, alert and no distress  EYES: Eyes grossly normal to inspection, PERRL and conjunctivae and sclerae normal  HENT: ear canals and TM's normal, nose and mouth without ulcers or lesions, oropharynx clear and oral mucous membranes moist  NECK: no adenopathy, no asymmetry, masses, or scars and thyroid normal to palpation  RESP: lungs clear to auscultation - no rales, rhonchi or wheezes  BREAST: " normal without masses, tenderness or nipple discharge and no palpable axillary masses or adenopathy  CV: regular rate and rhythm with occasional irre, normal S1 S2, no S3 or S4, no murmur, click or rub, no peripheral edema and peripheral pulses strong   Port a cath right upper chest.   ABDOMEN: soft, nontender, no hepatosplenomegaly, no masses and bowel sounds normal  ABDOMEN: soft, nontender, no hepatosplenomegaly, no masses and bowel sounds normal...   Nephrectomy scar bilaterally (partial on one side)        (female): normal female external genitalia, normal urethral meatus, vaginal mucosal atrophy noted, normal cervix, adnexae, and uterus without masses or abnormal discharge   (female):  Lichen sclerosis.  Bimanual only  MS: no musculoskeletal defects are noted and gait is age appropriate without ataxia  SKIN: no suspicious lesions or rashes   [place].     NEURO: Normal strength and tone, sensory exam grossly normal, mentation intact and speech normal  PSYCH: mentation appears normal and affect normal/bright    ASSESSMENT / PLAN:       ICD-10-CM    1. Routine general medical examination at a health care facility Z00.00 Hemoglobin A1c     order for DME   2. Non-small cell cancer of right lung (H) C34.91    3. COPD, severe (H) J44.9    4. CKD (chronic kidney disease) stage 3, GFR 30-59 ml/min N18.3    5. Hypertension goal BP (blood pressure) < 140/90 I10    6. Solitary kidney, acquired Z90.5    7. Other specified hypothyroidism E03.8    8. Port-a-cath in place Z95.828    9. Elevated glucose R73.09    10. Need for shingles vaccine Z23 zoster vaccine recombinant adjuvanted (SHINGRIX) injection     DISCONTINUED: zoster vaccine recombinant adjuvanted (SHINGRIX) injection   11. Irregular heart beat I49.9 EKG 12-lead complete w/read - Clinics     Doing well.     Patient Instructions   Return to clinic 6 months (November/December 2018) for med follow up.        End of Life Planning:  Patient currently has an advanced  directive: Yes.  Practitioner is supportive of decision.    COUNSELING:  Reviewed preventive health counseling, as reflected in patient instructions       Immunizations    Vaccinated for: Zoster        Estimated body mass index is 26.17 kg/(m^2) as calculated from the following:    Height as of 6/1/17: 5' (1.524 m).    Weight as of 5/3/18: 134 lb (60.8 kg).     reports that she quit smoking about 48 years ago. Her smoking use included Cigarettes. She has never used smokeless tobacco.      Appropriate preventive services were discussed with this patient, including applicable screening as appropriate for cardiovascular disease, diabetes, osteopenia/osteoporosis, and glaucoma.  As appropriate for age/gender, discussed screening for colorectal cancer, prostate cancer, breast cancer, and cervical cancer. Checklist reviewing preventive services available has been given to the patient.    Reviewed patients plan of care and provided an AVS. The Basic Care Plan (routine screening as documented in Health Maintenance) for Helena meets the Care Plan requirement. This Care Plan has been established and reviewed with the Patient.    Counseling Resources:  ATP IV Guidelines  Pooled Cohorts Equation Calculator  Breast Cancer Risk Calculator  FRAX Risk Assessment  ICSI Preventive Guidelines  Dietary Guidelines for Americans, 2010  [employer] MyPlate  ASA Prophylaxis  Lung CA Screening    Sushma Nelson MD  [facility]  Answers for HPI/ROS submitted by the patient on 6/21/2018   PHQ-2 Score: 0

## 2018-06-21 NOTE — MR AVS SNAPSHOT
After Visit Summary   6/21/2018    Helena Eldridge    MRN: [id number]           Patient Information     Date Of Birth          1935        Visit Information        Provider Department      6/21/2018 8:20 AM Sushma Nelson MD [facility]        Today's Diagnoses     Routine general medical examination at a health care facility    -  1    Non-small cell cancer of right lung (H)        COPD, severe (H)        CKD (chronic kidney disease) stage 3, GFR 30-59 ml/min        Hypertension goal BP (blood pressure) < 140/90        Solitary kidney, acquired        Other specified hypothyroidism        Port-a-cath in place        Elevated glucose        Need for shingles vaccine        Irregular heart beat          Care Instructions    Return to clinic 6 months (November/December 2018) for med follow up.             Follow-ups after your visit        Follow-up notes from your care team     Return in about 6 months (around 12/21/2018) for BP Recheck, Routine Visit.      Who to contact     If you have questions or need follow up information about today's clinic visit or your schedule please contact [facility] directly at [phone].  Normal or non-critical lab and imaging results will be communicated to you by MyChart, letter or phone within 4 business days after the clinic has received the results. If you do not hear from us within 7 days, please contact the clinic through MyChart or phone. If you have a critical or abnormal lab result, we will notify you by phone as soon as possible.  Submit refill requests through [employer] or call your pharmacy and they will forward the refill request to us. Please allow 3 business days for your refill to be completed.          Additional Information About Your Visit        Care EveryWhere ID     This is your Care EveryWhere ID. This could be used by other organizations to access your [place] medical records  [id number]    "     Your Vitals Were     Pulse Temperature Height Pulse Oximetry BMI (Body Mass Index)       86 98.3  F (36.8  C) (Oral) 5' 0.25\" (1.53 m) 93% 25.18 kg/m2        Blood Pressure from Last 3 Encounters:   06/21/18 125/61   05/03/18 122/55   03/08/18 111/58    Weight from Last 3 Encounters:   06/21/18 130 lb (59 kg)   05/03/18 134 lb (60.8 kg)   03/08/18 128 lb (58.1 kg)              We Performed the Following     EKG 12-lead complete w/read - Clinics     Hemoglobin A1c          Today's Medication Changes          These changes are accurate as of 6/21/18  9:40 AM.  If you have any questions, ask your nurse or doctor.               Start taking these medicines.        Dose/Directions    order for DME   Used for:  Routine general medical examination at a health care facility   Started by:  Sushma Nelson MD        Equipment being ordered:   Bassam bar Blizzard at [facility].   Quantity:  1 each   Refills:  0       zoster vaccine recombinant adjuvanted injection   Commonly known as:  SHINGRIX   Used for:  Need for shingles vaccine   Started by:  Sushma Nelson MD        Dose:  1 each   Inject 0.5 mLs into the muscle once for 1 dose   Quantity:  0.5 mL   Refills:  0            Where to get your medicines      These medications were sent [facility] [facility] [place], [place] - [address]  [address] [facility] [address]     Phone:  [phone]     zoster vaccine recombinant adjuvanted injection         Some of these will need a paper prescription and others can be bought over the counter.  Ask your nurse if you have questions.     Bring a paper prescription for each of these medications     order for DME                Primary Care Provider Office Phone # Fax #    Sushma Nelson [phone]       [address] [facility] [address]        Equal Access to Services     SHRUTHI DEL TORO AH: paty Gallagher, " kayla potter ah. So [facility] [phone].    ATENCIÓN: Si anthony holden, tiene a jones disposición servicios gratuitos de asistencia lingüística. Josiane al [phone].    We comply with applicable federal civil rights laws and [place] laws. We do not discriminate on the basis of race, color, national origin, age, disability, sex, sexual orientation, or gender identity.            Thank you!     Thank you for choosing [facility]  for your care. Our goal is always to provide you with excellent care. Hearing back from our patients is one way we can continue to improve our services. Please take a few minutes to complete the written survey that you may receive in the mail after your visit with us. Thank you!             Your Updated Medication List - Protect others around you: Learn how to safely use, store and throw away your medicines at www.disposemymeds.org.          This list is accurate as of 6/21/18  9:40 AM.  Always use your most recent med list.                   Brand Name Dispense Instructions for use Diagnosis    albuterol 108 (90 Base) MCG/ACT Inhaler    PROAIR HFA/PROVENTIL HFA/VENTOLIN HFA    2 Inhaler    Inhale 2 puffs into the lungs every 6 hours as needed for shortness of breath / dyspnea or wheezing    COPD, severe (H)       allopurinol 100 MG tablet    ZYLOPRIM    180 tablet    Take 2 tablets (200 mg) by mouth daily    Idiopathic chronic gout of foot without tophus, unspecified laterality       amLODIPine 5 MG tablet    NORVASC    90 tablet    Take 1 tablet (5 mg) by mouth daily    Essential hypertension with goal blood pressure less than 140/90       ammonium lactate 12 % cream    AMLACTIN    280 g    Apply  topically 2 times daily as needed. apply to affected area    Dry skin dermatitis       ASPIRIN PO      Take 81 mg by mouth daily        BENADRYL 25 MG tablet   Generic drug:  diphenhydrAMINE      Take 25 mg by mouth At  Bedtime    DIAGNOSIS NOT YET DEFINED       calcium + D 600-200 MG-UNIT Tabs   Generic drug:  calcium carbonate-vitamin D      Take 2 tablets by mouth 2 times daily.    DIAGNOSIS NOT YET DEFINED       clobetasol 0.05 % cream    TEMOVATE    45 g    Apply  topically daily as needed.    Rash       DOCUSATE SODIUM PO      Take 100 mg by mouth At Bedtime        folic acid 1 MG tablet    FOLVITE    100 tablet    Take 1 tablet (1 mg) by mouth daily        furosemide 20 MG tablet    LASIX    90 tablet    Take 1 tablet (20 mg) by mouth daily (morning and lunch time)    Hypertension goal BP (blood pressure) < 140/90, CKD (chronic kidney disease) stage 3, GFR 30-59 ml/min       ketoconazole 2 % cream    NIZORAL    15 g    Apply  topically daily.    Sciatic pain       levothyroxine 100 MCG tablet    SYNTHROID/LEVOTHROID    90 tablet    TAKE ONE TABLET BY MOUTH ONCE DAILY EXCEPT SUNDAY    Other specified hypothyroidism       mometasone 0.1 % cream    ELOCON    45 g    Apply  topically daily as needed.    Rash       OCUVITE ADULT 50+ Caps     30 capsule    Take 1 capsule by mouth daily    Macular degeneration of both eyes       omeprazole 40 MG capsule    priLOSEC    90 capsule    Take 1 capsule (40 mg) by mouth daily    Gastroesophageal reflux disease with esophagitis       order for DME     1 each    Equipment being ordered:   Bassam bar Blizzard at [facility].    Routine general medical examination at a health care facility       ranitidine 150 MG tablet    ZANTAC    60 tablet    Take 1 tablet (150 mg) by mouth daily as needed for heartburn (breakthrough)    Gastroesophageal reflux disease with esophagitis       rOPINIRole 0.25 MG tablet    REQUIP    180 tablet    TAKE ONE TO TWO TABLETS BY MOUTH NIGHTLY AT BEDTIME    Cramp of limb       STATIN NOT PRESCRIBED (INTENTIONAL)      by Other route continuous prn. Patient declined 5/4/12        zoster vaccine recombinant adjuvanted injection    SHINGRIX    0.5 mL    Inject 0.5 mLs  into the muscle once for 1 dose    Need for shingles vaccine

## 2018-06-21 NOTE — NURSING NOTE
1.  Has the patient received the information for the Zostavax vaccine? YES    2.  Does the patient have any of the following contraindications?     Allergy to Neomycin or Gelatin? No       Current moderate or severe illness? No  Temp = 98.3  If temp > 99.0 degrees orally or patient has above allergies, vaccine is deferred    3.  The vaccine has been administered in the usual fashion and the patient was instructed to wait 15 minutes before leaving the building in the event of an allergic reaction: YES    Vaccination given by Deanna Watson ma  .  Recorded by Deanna Watson

## 2018-06-22 ASSESSMENT — PATIENT HEALTH QUESTIONNAIRE - PHQ9: SUM OF ALL RESPONSES TO PHQ QUESTIONS 1-9: 1

## 2018-06-22 ASSESSMENT — ANXIETY QUESTIONNAIRES: GAD7 TOTAL SCORE: 0

## 2018-07-10 ENCOUNTER — TRANSFERRED RECORDS (OUTPATIENT)
Dept: HEALTH INFORMATION MANAGEMENT | Facility: CLINIC | Age: 83
End: 2018-07-10

## 2018-07-11 DIAGNOSIS — M1A.0790 IDIOPATHIC CHRONIC GOUT OF FOOT WITHOUT TOPHUS, UNSPECIFIED LATERALITY: ICD-10-CM

## 2018-07-11 RX ORDER — ALLOPURINOL 100 MG/1
TABLET ORAL
Qty: 180 TABLET | Refills: 1 | Status: SHIPPED | OUTPATIENT
Start: 2018-07-11 | End: 2019-01-12

## 2018-07-11 NOTE — TELEPHONE ENCOUNTER
Routing refill request to provider for review/approval because:  Failed protocol.  Saida Bolton RN [facility] Triage.

## 2018-07-11 NOTE — TELEPHONE ENCOUNTER
"Requested Prescriptions   Pending Prescriptions Disp Refills     allopurinol (ZYLOPRIM) 100 MG tablet [Pharmacy Med Name: ALLOPURINOL 100 MG TABLET] 180 tablet 1    Last Written Prescription Date:  6-1-17  Last Fill Quantity: 180,  # refills: 3   Last office visit: 6/21/2018 with prescribing provider:  6-21-18   Future Office Visit:     Sig: TAKE TWO TABLETS BY MOUTH ONCE DAILY    Gout Agents Protocol Failed    7/11/2018  3:53 AM       Failed - ALT on file in past 12 months    Recent Labs   Lab Test  05/02/14   0823   ALT  27            Failed - Has Uric Acid on file in past 12 months and value is less than 6    Recent Labs   Lab Test  06/01/17   1436   URIC  4.9     If level is 6mg/dL or greater, ok to refill one time and refer to provider.          Failed - Normal serum creatinine on file in the past 12 months    Recent Labs   Lab Test  07/27/17   1736   CR  1.49*            Passed - CBC on file in past 12 months    Recent Labs   Lab Test  02/13/18   1337   WBC  6.9   RBC  3.58*   HGB  12.0   HCT  36.5   PLT  358       For [facility] ONLY: [id number] = WBC, [id number] = RBC         Passed - Recent (12 mo) or future (30 days) visit within the authorizing provider's specialty    Patient had office visit in the last 12 months or has a visit in the next 30 days with authorizing provider or within the authorizing provider's specialty.  See \"Patient Info\" tab in inbasket, or \"Choose Columns\" in Meds & Orders section of the refill encounter.           Passed - Patient is age 18 or older       Passed - No active pregnancy on record       Passed - No positive pregnancy test in past year          "

## 2018-07-29 DIAGNOSIS — K21.00 GASTROESOPHAGEAL REFLUX DISEASE WITH ESOPHAGITIS: ICD-10-CM

## 2018-07-30 NOTE — TELEPHONE ENCOUNTER
"Requested Prescriptions   Pending Prescriptions Disp Refills     ranitidine (ZANTAC) 150 MG tablet [Pharmacy Med Name: RANITIDINE 150 MG TABLET] 60 tablet 1    Last Written Prescription Date:  5/3/18  Last Fill Quantity: 60,  # refills: 1   Last office visit: 6/21/2018 with prescribing provider:     Future Office Visit:     Sig: TAKE 1 TABLET BY MOUTH DAILY IN THE AFTERNOON FOR A MONTH, THEN AS NEEDED    H2 Blockers Protocol Passed    7/29/2018  1:32 AM       Passed - Patient is age 12 or older       Passed - Recent (12 mo) or future (30 days) visit within the authorizing provider's specialty    Patient had office visit in the last 12 months or has a visit in the next 30 days with authorizing provider or within the authorizing provider's specialty.  See \"Patient Info\" tab in inbasket, or \"Choose Columns\" in Meds & Orders section of the refill encounter.              "

## 2018-07-31 DIAGNOSIS — K21.00 GASTROESOPHAGEAL REFLUX DISEASE WITH ESOPHAGITIS: ICD-10-CM

## 2018-07-31 NOTE — TELEPHONE ENCOUNTER
"Requested Prescriptions   Pending Prescriptions Disp Refills     ranitidine (ZANTAC) 150 MG tablet [Pharmacy Med Name: RANITIDINE 150 MG TABLET] 60 tablet 1    Last Written Prescription Date:  7/30/18  Last Fill Quantity: 60,  # refills: 3   Last office visit: 6/21/2018 with prescribing provider:     Future Office Visit:     Sig: TAKE 1 TABLET BY MOUTH DAILY IN THE AFTERNOON FOR A MONTH, THEN AS NEEDED    H2 Blockers Protocol Passed    7/31/2018 11:01 AM       Passed - Patient is age 12 or older       Passed - Recent (12 mo) or future (30 days) visit within the authorizing provider's specialty    Patient had office visit in the last 12 months or has a visit in the next 30 days with authorizing provider or within the authorizing provider's specialty.  See \"Patient Info\" tab in inbasket, or \"Choose Columns\" in Meds & Orders section of the refill encounter.              "

## 2018-08-08 ENCOUNTER — TRANSFERRED RECORDS (OUTPATIENT)
Dept: HEALTH INFORMATION MANAGEMENT | Facility: CLINIC | Age: 83
End: 2018-08-08

## 2018-08-20 ENCOUNTER — OFFICE VISIT (OUTPATIENT)
Dept: FAMILY MEDICINE | Facility: CLINIC | Age: 83
End: 2018-08-20
Payer: COMMERCIAL

## 2018-08-20 VITALS
TEMPERATURE: 98 F | DIASTOLIC BLOOD PRESSURE: 65 MMHG | SYSTOLIC BLOOD PRESSURE: 124 MMHG | WEIGHT: 135 LBS | OXYGEN SATURATION: 94 % | BODY MASS INDEX: 26.15 KG/M2 | HEART RATE: 98 BPM

## 2018-08-20 DIAGNOSIS — N90.4 LICHEN SCLEROSUS ET ATROPHICUS OF THE VULVA: Primary | ICD-10-CM

## 2018-08-20 DIAGNOSIS — N18.30 CKD (CHRONIC KIDNEY DISEASE) STAGE 3, GFR 30-59 ML/MIN (H): ICD-10-CM

## 2018-08-20 DIAGNOSIS — D64.81 ANEMIA ASSOCIATED WITH CHEMOTHERAPY: ICD-10-CM

## 2018-08-20 DIAGNOSIS — C34.91 NON-SMALL CELL CANCER OF RIGHT LUNG (H): ICD-10-CM

## 2018-08-20 DIAGNOSIS — E03.8 OTHER SPECIFIED HYPOTHYROIDISM: ICD-10-CM

## 2018-08-20 DIAGNOSIS — R73.09 ELEVATED GLUCOSE: ICD-10-CM

## 2018-08-20 DIAGNOSIS — T45.1X5A ANEMIA ASSOCIATED WITH CHEMOTHERAPY: ICD-10-CM

## 2018-08-20 DIAGNOSIS — I10 HYPERTENSION GOAL BP (BLOOD PRESSURE) < 140/90: ICD-10-CM

## 2018-08-20 LAB
ANION GAP SERPL CALCULATED.3IONS-SCNC: 11 MMOL/L (ref 3–14)
BUN SERPL-MCNC: 18 MG/DL (ref 7–30)
CALCIUM SERPL-MCNC: 9 MG/DL (ref 8.5–10.1)
CHLORIDE SERPL-SCNC: 103 MMOL/L (ref 94–109)
CO2 SERPL-SCNC: 27 MMOL/L (ref 20–32)
CREAT SERPL-MCNC: 1.36 MG/DL (ref 0.52–1.04)
CREAT UR-MCNC: 156 MG/DL
ERYTHROCYTE [DISTWIDTH] IN BLOOD BY AUTOMATED COUNT: 14.3 % (ref 10–15)
GFR SERPL CREATININE-BSD FRML MDRD: 37 ML/MIN/1.7M2
GLUCOSE SERPL-MCNC: 130 MG/DL (ref 70–99)
HCT VFR BLD AUTO: 33.8 % (ref 35–47)
HGB BLD-MCNC: 11.6 G/DL (ref 11.7–15.7)
MCH RBC QN AUTO: 37.5 PG (ref 26.5–33)
MCHC RBC AUTO-ENTMCNC: 34.3 G/DL (ref 31.5–36.5)
MCV RBC AUTO: 109 FL (ref 78–100)
MICROALBUMIN UR-MCNC: 57 MG/L
MICROALBUMIN/CREAT UR: 36.73 MG/G CR (ref 0–25)
PLATELET # BLD AUTO: 229 10E9/L (ref 150–450)
POTASSIUM SERPL-SCNC: 3.8 MMOL/L (ref 3.4–5.3)
RBC # BLD AUTO: 3.09 10E12/L (ref 3.8–5.2)
SODIUM SERPL-SCNC: 141 MMOL/L (ref 133–144)
TSH SERPL DL<=0.005 MIU/L-ACNC: 1.58 MU/L (ref 0.4–4)
WBC # BLD AUTO: 7.2 10E9/L (ref 4–11)

## 2018-08-20 PROCEDURE — 83036 HEMOGLOBIN GLYCOSYLATED A1C: CPT | Performed by: INTERNAL MEDICINE

## 2018-08-20 PROCEDURE — 99214 OFFICE O/P EST MOD 30 MIN: CPT | Performed by: INTERNAL MEDICINE

## 2018-08-20 PROCEDURE — 80048 BASIC METABOLIC PNL TOTAL CA: CPT | Performed by: INTERNAL MEDICINE

## 2018-08-20 PROCEDURE — 82043 UR ALBUMIN QUANTITATIVE: CPT | Performed by: INTERNAL MEDICINE

## 2018-08-20 PROCEDURE — 85027 COMPLETE CBC AUTOMATED: CPT | Performed by: INTERNAL MEDICINE

## 2018-08-20 PROCEDURE — 36415 COLL VENOUS BLD VENIPUNCTURE: CPT | Performed by: INTERNAL MEDICINE

## 2018-08-20 PROCEDURE — 84443 ASSAY THYROID STIM HORMONE: CPT | Performed by: INTERNAL MEDICINE

## 2018-08-20 RX ORDER — CLOBETASOL PROPIONATE 0.5 MG/G
OINTMENT TOPICAL
Qty: 45 G | Refills: 1 | Status: SHIPPED | OUTPATIENT
Start: 2018-08-20 | End: 2019-01-09

## 2018-08-20 RX ORDER — FUROSEMIDE 20 MG
20 TABLET ORAL DAILY
COMMUNITY
End: 2019-03-17

## 2018-08-20 RX ORDER — ESTRADIOL 0.1 MG/G
2 CREAM VAGINAL
Qty: 42.5 G | Refills: 1 | Status: SHIPPED | OUTPATIENT
Start: 2018-08-20 | End: 2019-06-25

## 2018-08-20 NOTE — LETTER
[facility]  [address] [place]  [address]  [phone]        August 21, 2018    Helena Eldridge  [address] [facility] [address]        Dear Melonie Malik are your results.  Your labs are normal/stable at this time.     Your blood count has improved.     Please call  with any questions.  We can also discuss any questions regarding these labs at your next scheduled visit.    Results for orders placed or performed in visit on 08/20/18   BASIC METABOLIC PANEL   Result Value Ref Range    Sodium 141 133 - 144 mmol/L    Potassium 3.8 3.4 - 5.3 mmol/L    Chloride 103 94 - 109 mmol/L    Carbon Dioxide 27 20 - 32 mmol/L    Anion Gap 11 3 - 14 mmol/L    Glucose 130 (H) 70 - 99 mg/dL    Urea Nitrogen 18 7 - 30 mg/dL    Creatinine 1.36 (H) 0.52 - 1.04 mg/dL    GFR Estimate 37 (L) >60 mL/min/1.7m2    GFR Estimate If Black 45 (L) >60 mL/min/1.7m2    Calcium 9.0 8.5 - 10.1 mg/dL   Albumin Random Urine Quantitative with Creat Ratio   Result Value Ref Range    Creatinine Urine 156 mg/dL    Albumin Urine mg/L 57 mg/L    Albumin Urine mg/g Cr 36.73 (H) 0 - 25 mg/g Cr   TSH with free T4 reflex   Result Value Ref Range    TSH 1.58 0.40 - 4.00 mU/L   CBC with platelets   Result Value Ref Range    WBC 7.2 4.0 - 11.0 10e9/L    RBC Count 3.09 (L) 3.8 - 5.2 10e12/L    Hemoglobin 11.6 (L) 11.7 - 15.7 g/dL    Hematocrit 33.8 (L) 35.0 - 47.0 %     (H) 78 - 100 fl    MCH 37.5 (H) 26.5 - 33.0 pg    MCHC 34.3 31.5 - 36.5 g/dL    RDW 14.3 10.0 - 15.0 %    Platelet Count 229 150 - 450 10e9/L   Hemoglobin A1c   Result Value Ref Range    Hemoglobin A1C 4.9 0 - 5.6 %       If you have any questions please call the clinic at [phone].    Sincerely,    Sushma ZAMBRANO

## 2018-08-20 NOTE — PATIENT INSTRUCTIONS
Clobetasol ointment OR Estrogen vaginal cream    OBGYN referral.  [facility]: [facility] [place] [phone]     Dr Reyna.

## 2018-08-20 NOTE — PROGRESS NOTES
SUBJECTIVE:   Helena Eldridge is a 82 year old female who presents to clinic today for the following health issues:    83 y/o F with H/o RUL/Hilar NSCLungCa (4 Cycles low dose carboplatin and pemerexed), Left nephrectomy (stones), osteoporosis, pelvic fx, lichen sclerosis, Gout, CKD 3, hypothryoid, severe COPD      She has h/o Lichen Sclerosis, severe     Vaginal Symptoms  Onset: 3-4 weeks     Description:  Vaginal Discharge: none   Itching (Pruritis): YES  Burning sensation:  YES  Odor: no     Accompanying Signs & Symptoms:  Pain with Urination: no   Abdominal Pain: no   Fever: no     History:   Sexually active: no   New Partner: no   Possibility of Pregnancy:  No    Precipitating factors:   Recent Antibiotic Use: no     Alleviating factors:      Therapies Tried and outcome: vasaline, OTC cream          Problem list and histories reviewed & adjusted, as indicated.  Additional history: as documented    Patient Active Problem List   Diagnosis     CARDIOVASCULAR SCREENING; LDL GOAL LESS THAN 100     CKD (chronic kidney disease) stage 3, GFR 30-59 ml/min     Glucose intolerance (impaired glucose tolerance)     Kidney stones     Advance care planning     Hypertension goal BP (blood pressure) < 140/90     Hyperlipidemia LDL goal <130     Lichen sclerosus et atrophicus of the vulva     Solitary kidney, acquired     Senile osteoporosis     Osteoarthritis of right knee     Medial meniscus tear     Cataracts, both eyes     Macular degeneration of both eyes, right eye greater than left eye     Pelvic fracture (H)     COPD, severe (H)     IPF (idiopathic pulmonary fibrosis) (H)     Other specified hypothyroidism     Idiopathic chronic gout     Adjustment disorder with anxiety     Non-small cell cancer of right lung (H)     Immunosuppression (H)     Port-a-cath in place     Past Surgical History:   Procedure Laterality Date     C NEPHRECTOMY      left partial 07-01-07     C VENOUS THROMBOSIS IMAGING      with pe     HC REMOVAL  GALLBLADDER       HC REVISE MEDIAN N/CARPAL TUNNEL SURG       TUBAL LIGATION         Social History   Substance Use Topics     Smoking status: Former Smoker     Types: Cigarettes     Quit date: 12/4/1969     Smokeless tobacco: Never Used     Alcohol use 0.0 oz/week     0 Standard drinks or equivalent per week      Comment: very little      Family History   Problem Relation Age of Onset     Cancer Brother      Glaucoma Mother      Alzheimer Disease Brother      Macular Degeneration No family hx of          Current Outpatient Prescriptions   Medication Sig Dispense Refill     albuterol (PROAIR HFA/PROVENTIL HFA/VENTOLIN HFA) 108 (90 BASE) MCG/ACT Inhaler Inhale 2 puffs into the lungs every 6 hours as needed for shortness of breath / dyspnea or wheezing 2 Inhaler 3     allopurinol (ZYLOPRIM) 100 MG tablet TAKE TWO TABLETS BY MOUTH ONCE DAILY 180 tablet 1     amLODIPine (NORVASC) 5 MG tablet Take 1 tablet (5 mg) by mouth daily 90 tablet 3     ammonium lactate (AMLACTIN) 12 % cream Apply  topically 2 times daily as needed. apply to affected area 280 g 3     ASPIRIN PO Take 81 mg by mouth daily        Calcium Carbonate-Vitamin D (CALCIUM + D) 600-200 MG-UNIT per tablet Take 2 tablets by mouth 2 times daily.       clobetasol (TEMOVATE) 0.05 % cream Apply  topically daily as needed. 45 g 1     clobetasol (TEMOVATE) 0.05 % ointment Apply sparingly to affected area twice daily as needed.  Do not apply to face. 45 g 1     diphenhydrAMINE (BENADRYL) 25 MG tablet Take 25 mg by mouth At Bedtime        DOCUSATE SODIUM PO Take 100 mg by mouth At Bedtime       estradiol (ESTRACE) 0.1 MG/GM cream Place 2 g vaginally twice a week 42.5 g 1     furosemide (LASIX) 20 MG tablet Take 20 mg by mouth daily       ketoconazole (NIZORAL) 2 % cream Apply  topically daily. 15 g 3     levothyroxine (SYNTHROID/LEVOTHROID) 100 MCG tablet TAKE ONE TABLET BY MOUTH ONCE DAILY EXCEPT SUNDAY 90 tablet 2     Multiple Vitamins-Minerals (OCUVITE ADULT 50+)  CAPS Take 1 capsule by mouth daily 30 capsule 12     omeprazole (PRILOSEC) 40 MG capsule Take 1 capsule (40 mg) by mouth daily 90 capsule 3     order for DME Equipment being ordered:   Bassam bar Blizzard at [facility]. 1 each 0     ranitidine (ZANTAC) 150 MG tablet TAKE 1 TABLET BY MOUTH DAILY IN THE AFTERNOON FOR A MONTH, THEN AS NEEDED 60 tablet 1     ranitidine (ZANTAC) 150 MG tablet TAKE 1 TABLET BY MOUTH DAILY IN THE AFTERNOON FOR A MONTH, THEN AS NEEDED 60 tablet 3     ranitidine (ZANTAC) 150 MG tablet Take 1 tablet (150 mg) by mouth daily as needed for heartburn (breakthrough) 60 tablet 1     rOPINIRole (REQUIP) 0.25 MG tablet TAKE ONE TO TWO TABLETS BY MOUTH NIGHTLY AT BEDTIME 180 tablet 1     STATIN NOT PRESCRIBED, INTENTIONAL, by Other route continuous prn. Patient declined 5/4/12  0     [DISCONTINUED] furosemide (LASIX) 20 MG tablet Take 1 tablet (20 mg) by mouth daily (morning and lunch time) (Patient taking differently: Take 20 mg by mouth daily ) 90 tablet 3     Allergies   Allergen Reactions     Ace Inhibitors Cough     Advicor      Celexa [Citalopram] GI Disturbance     diarrhea     Doxycycline Nausea     Poor appetite     Fosamax      Severe substernal burning and dysphagia within 3 days     Valsartan Cramps     severe     Recent Labs   Lab Test  06/21/18   0946  06/14/18   1302  05/08/18   0835  07/27/17   1736  06/01/17   1436  05/19/16   1104   05/19/15   0946   05/02/14   0823   04/24/13   0828   04/20/12   0907   A1C  5.3   --    --    --   5.7   --    --   5.4   --   5.3   --   5.2   --    --    LDL   --    --   124*   --   133*  126*   --   122   --   125   --   128   --   135*   HDL   --    --   50   --   45*  41*   --   39*   --   30*   --   32*   --   30*   TRIG   --    --   160*   --   193*  228*   --   183*   --   216*   --   146   --   194*   ALT   --    --    --    --    --    --    --    --    --   27   --   24   --   22   CR   --    --    --   1.49*  1.26*  1.29*   < >  1.16*   < >   1.17*   < >  1.20*   < >  1.34*   GFRESTIMATED   --   36*   --   34*  41*  40*   < >  45*   < >  45*   < >  44*   < >  38*   GFRESTBLACK   --   44*   --   41*  49*  48*   < >  54*   < >  54*   < >  53*   < >  47*   POTASSIUM   --    --    --   3.5  3.1*  3.9   < >  4.2   < >  4.2   < >  4.3   < >  4.3   TSH   --    --   4.85*   --   1.42  1.36   < >  1.01   --   3.31   --   0.62   --   0.35*    < > = values in this interval not displayed.      BP Readings from Last 3 Encounters:   08/20/18 124/65   06/21/18 125/61   05/03/18 122/55    Wt Readings from Last 3 Encounters:   08/20/18 135 lb (61.2 kg)   06/21/18 130 lb (59 kg)   05/03/18 134 lb (60.8 kg)                  Labs reviewed in EPIC    Reviewed and updated as needed this visit by clinical staff  Tobacco  Allergies  Meds  Med Hx  Surg Hx  Fam Hx  Soc Hx      Reviewed and updated as needed this visit by Provider         ROS:  Constitutional, HEENT, cardiovascular, pulmonary, gi and gu systems are negative, except as otherwise noted.    OBJECTIVE:     /65 (BP Location: Right arm, Patient Position: Sitting, Cuff Size: Adult Regular)  Pulse 98  Temp 98  F (36.7  C) (Oral)  Wt 135 lb (61.2 kg)  SpO2 94%  BMI 26.15 kg/m2  Body mass index is 26.15 kg/(m^2).  GENERAL: healthy, alert and no distress  EYES: Eyes grossly normal to inspection, PERRL and conjunctivae and sclerae normal   (female): severe lichen sclerosis changes at both labia minora, some bleeding.   MS: no gross musculoskeletal defects noted, no edema  NEURO: Normal strength and tone, mentation intact and speech normal  PSYCH: mentation appears normal, affect normal/bright    Diagnostic Test Results:  Results for orders placed or performed in visit on 08/20/18 (from the past 24 hour(s))   CBC with platelets   Result Value Ref Range    WBC 7.2 4.0 - 11.0 10e9/L    RBC Count 3.09 (L) 3.8 - 5.2 10e12/L    Hemoglobin 11.6 (L) 11.7 - 15.7 g/dL    Hematocrit 33.8 (L) 35.0 - 47.0 %      (H) 78 - 100 fl    MCH 37.5 (H) 26.5 - 33.0 pg    MCHC 34.3 31.5 - 36.5 g/dL    RDW 14.3 10.0 - 15.0 %    Platelet Count 229 150 - 450 10e9/L       ASSESSMENT/PLAN:             ICD-10-CM    1. Lichen sclerosus et atrophicus of the vulva N90.4 clobetasol (TEMOVATE) 0.05 % ointment     estradiol (ESTRACE) 0.1 MG/GM cream     OB/GYN REFERRAL   2. CKD (chronic kidney disease) stage 3, GFR 30-59 ml/min N18.3 Albumin Random Urine Quantitative with Creat Ratio   3. Non-small cell cancer of right lung (H) C34.91    4. Hypertension goal BP (blood pressure) < 140/90 I10 BASIC METABOLIC PANEL   5. Other specified hypothyroidism E03.8 TSH with free T4 reflex   6. Anemia associated with chemotherapy D64.81 CBC with platelets    T45.1X5A        Severe Lichen sclerosis, may be worse due to rebound s/p chemo suppression.  Need OB GYN eval (biopsy?) to rule out other dx.     Mometasone is burning the area. Hoping she can try a steroid ointment and/or estrogen cream.     Will recheck thyroid, she takes   6 doses per week.      Asking for hgb recheck, was 10.9 at Oncology office on 8/12/18 (11.6 today, will reassure her)    LATER ENTRY:  A1C added due to glucose elevation    Sushma Nelson MD  [facility]

## 2018-08-20 NOTE — MR AVS SNAPSHOT
After Visit Summary   8/20/2018    Helena Eldridge    MRN: [id number]           Patient Information     Date Of Birth          1935        Visit Information        Provider Department      8/20/2018 2:20 PM Sushma Nelson MD [facility]        Today's Diagnoses     Lichen sclerosus et atrophicus of the vulva    -  1    CKD (chronic kidney disease) stage 3, GFR 30-59 ml/min        Non-small cell cancer of right lung (H)        Hypertension goal BP (blood pressure) < 140/90        Other specified hypothyroidism        Anemia associated with chemotherapy          Care Instructions    Clobetasol ointment OR Estrogen vaginal cream    [place]N referral.  [facility] [facility] [phone]     Dr Reyna.            Follow-ups after your visit        Additional Services     OB/GYN REFERRAL       Your provider has referred you to:  [facility] [facility] [phone]   [email or web address]    Please be aware that coverage of these services is subject to the terms and limitations of your health insurance plan.  Call member services at your health plan with any benefit or coverage questions.      Please bring the following with you to your appointment:    (1) Any X-Rays, CTs or MRIs which have been performed.  Contact the facility where they were done to arrange for  prior to your scheduled appointment.   (2) List of current medications   (3) This referral request   (4) Any documents/labs given to you for this referral                  Who to contact     If you have questions or need follow up information about today's clinic visit or your schedule please contact [facility] directly at [phone].  Normal or non-critical lab and imaging results will be communicated to you by MyChart, letter or phone within 4 business days after the clinic has received the results. If you do not hear from us  within 7 days, please contact the clinic through [employer] or phone. If you have a critical or abnormal lab result, we will notify you by phone as soon as possible.  Submit refill requests through [employer] or call your pharmacy and they will forward the refill request to us. Please allow 3 business days for your refill to be completed.          Additional Information About Your Visit        Care EveryWhere ID     This is your Care EveryWhere ID. This could be used by other organizations to access your [place] medical records  [id number]        Your Vitals Were     Pulse Temperature Pulse Oximetry BMI (Body Mass Index)          98 98  F (36.7  C) (Oral) 94% 26.15 kg/m2         Blood Pressure from Last 3 Encounters:   08/20/18 124/65   06/21/18 125/61   05/03/18 122/55    Weight from Last 3 Encounters:   08/20/18 135 lb (61.2 kg)   06/21/18 130 lb (59 kg)   05/03/18 134 lb (60.8 kg)              We Performed the Following     Albumin Random Urine Quantitative with Creat Ratio     BASIC METABOLIC PANEL     CBC with platelets     OB/GYN REFERRAL     TSH with free T4 reflex          Today's Medication Changes          These changes are accurate as of 8/20/18  2:47 PM.  If you have any questions, ask your nurse or doctor.               Start taking these medicines.        Dose/Directions    estradiol 0.1 MG/GM cream   Commonly known as:  ESTRACE   Used for:  Lichen sclerosus et atrophicus of the vulva   Started by:  Sushma Nelson MD        Dose:  2 g   Place 2 g vaginally twice a week   Quantity:  42.5 g   Refills:  1         These medicines have changed or have updated prescriptions.        Dose/Directions    * clobetasol 0.05 % cream   Commonly known as:  TEMOVATE   This may have changed:  Another medication with the same name was added. Make sure you understand how and when to take each.   Used for:  Rash   Changed by:  Sushma Nelson MD        Apply  topically daily as needed.   Quantity:  45 g   Refills:   1       * clobetasol 0.05 % ointment   Commonly known as:  TEMOVATE   This may have changed:  You were already taking a medication with the same name, and this prescription was added. Make sure you understand how and when to take each.   Used for:  Lichen sclerosus et atrophicus of the vulva   Changed by:  Sushma Nelson MD        Apply sparingly to affected area twice daily as needed.  Do not apply to face.   Quantity:  45 g   Refills:  1       * Notice:  This list has 2 medication(s) that are the same as other medications prescribed for you. Read the directions carefully, and ask your doctor or other care provider to review them with you.         Where to get your medicines      [facility] [place] [id number] [place] [facility] [address]  [address] [facility] [address]     Phone:  [phone]     clobetasol 0.05 % ointment    estradiol 0.1 MG/GM cream                Primary Care Provider Office Phone # Fax #    Sushma Nelson [phone]       [address]  [facility] [address]        Equal Access to Services     [facility] CenterDAMIEN : Hadii britany ku hadasho Soomaali, waaxda luqadaha, qaybta kaalmada adeegyada, kayla gu . So [facility] [phone].    ATENCIÓN: Si habla español, tiene a jones disposición servicios gratuitos de asistencia lingüística. Llame al [phone].    We comply with applicable federal civil rights laws and [place] laws. We do not discriminate on the basis of race, color, national origin, age, disability, sex, sexual orientation, or gender identity.            Thank you!     Thank you for choosing [facility]  for your care. Our goal is always to provide you with excellent care. Hearing back from our patients is one way we can continue to improve our services. Please take a few minutes to complete the written survey that you may receive in the mail after your visit with us. Thank you!              Your Updated Medication List - Protect others around you: Learn how to safely use, store and throw away your medicines at www.disposemymeds.org.          This list is accurate as of 8/20/18  2:47 PM.  Always use your most recent med list.                   Brand Name Dispense Instructions for use Diagnosis    albuterol 108 (90 Base) MCG/ACT inhaler    PROAIR HFA/PROVENTIL HFA/VENTOLIN HFA    2 Inhaler    Inhale 2 puffs into the lungs every 6 hours as needed for shortness of breath / dyspnea or wheezing    COPD, severe (H)       allopurinol 100 MG tablet    ZYLOPRIM    180 tablet    TAKE TWO TABLETS BY MOUTH ONCE DAILY    Idiopathic chronic gout of foot without tophus, unspecified laterality       amLODIPine 5 MG tablet    NORVASC    90 tablet    Take 1 tablet (5 mg) by mouth daily    Essential hypertension with goal blood pressure less than 140/90       ammonium lactate 12 % cream    AMLACTIN    280 g    Apply  topically 2 times daily as needed. apply to affected area    Dry skin dermatitis       ASPIRIN PO      Take 81 mg by mouth daily        BENADRYL 25 MG tablet   Generic drug:  diphenhydrAMINE      Take 25 mg by mouth At Bedtime    DIAGNOSIS NOT YET DEFINED       calcium + D 600-200 MG-UNIT Tabs   Generic drug:  calcium carbonate-vitamin D      Take 2 tablets by mouth 2 times daily.    DIAGNOSIS NOT YET DEFINED       * clobetasol 0.05 % cream    TEMOVATE    45 g    Apply  topically daily as needed.    Rash       * clobetasol 0.05 % ointment    TEMOVATE    45 g    Apply sparingly to affected area twice daily as needed.  Do not apply to face.    Lichen sclerosus et atrophicus of the vulva       DOCUSATE SODIUM PO      Take 100 mg by mouth At Bedtime        estradiol 0.1 MG/GM cream    ESTRACE    42.5 g    Place 2 g vaginally twice a week    Lichen sclerosus et atrophicus of the vulva       furosemide 20 MG tablet    LASIX     Take 20 mg by mouth daily        ketoconazole 2 % cream    NIZORAL    15 g     Apply  topically daily.    Sciatic pain       levothyroxine 100 MCG tablet    SYNTHROID/LEVOTHROID    90 tablet    TAKE ONE TABLET BY MOUTH ONCE DAILY EXCEPT SUNDAY    Other specified hypothyroidism       OCUVITE ADULT 50+ Caps     30 capsule    Take 1 capsule by mouth daily    Macular degeneration of both eyes       omeprazole 40 MG capsule    priLOSEC    90 capsule    Take 1 capsule (40 mg) by mouth daily    Gastroesophageal reflux disease with esophagitis       order for DME     1 each    Equipment being ordered:   [facility] at [facility].    Routine general medical examination at a health care facility       * ranitidine 150 MG tablet    ZANTAC    60 tablet    Take 1 tablet (150 mg) by mouth daily as needed for heartburn (breakthrough)    Gastroesophageal reflux disease with esophagitis       * ranitidine 150 MG tablet    ZANTAC    60 tablet    TAKE 1 TABLET BY MOUTH DAILY IN THE AFTERNOON FOR A MONTH, THEN AS NEEDED    Gastroesophageal reflux disease with esophagitis       * ranitidine 150 MG tablet    ZANTAC    60 tablet    TAKE 1 TABLET BY MOUTH DAILY IN THE AFTERNOON FOR A MONTH, THEN AS NEEDED    Gastroesophageal reflux disease with esophagitis       rOPINIRole 0.25 MG tablet    REQUIP    180 tablet    TAKE ONE TO TWO TABLETS BY MOUTH NIGHTLY AT BEDTIME    Cramp of limb       STATIN NOT PRESCRIBED (INTENTIONAL)      by Other route continuous prn. Patient declined 5/4/12        * Notice:  This list has 5 medication(s) that are the same as other medications prescribed for you. Read the directions carefully, and ask your doctor or other care provider to review them with you.

## 2018-08-21 LAB — HBA1C MFR BLD: 4.9 % (ref 0–5.6)

## 2018-08-26 DIAGNOSIS — I10 ESSENTIAL HYPERTENSION WITH GOAL BLOOD PRESSURE LESS THAN 140/90: ICD-10-CM

## 2018-08-27 RX ORDER — AMLODIPINE BESYLATE 5 MG/1
5 TABLET ORAL DAILY
Qty: 90 TABLET | Refills: 3 | Status: SHIPPED | OUTPATIENT
Start: 2018-08-27 | End: 2019-06-25

## 2018-08-27 NOTE — TELEPHONE ENCOUNTER
"Epic med list has amlodipine 5 mg take one daily; the request from pharmacy is for taking 2 per day.  I called patient, she is taking 1 daily.    Routing refill request to provider for review/approval because:  Rx in March was \"no print out\"    Yanique Fritz RN  [facility]                "

## 2018-08-27 NOTE — TELEPHONE ENCOUNTER
"Requested Prescriptions   Pending Prescriptions Disp Refills     amLODIPine (NORVASC) 5 MG tablet [Pharmacy Med Name: AMLODIPINE BESYLATE 5 MG TAB] 180 tablet 3    Last Written Prescription Date:  3/8/18  Last Fill Quantity: 90,  # refills: 3   Last office visit: 8/20/2018 with prescribing provider:     Future Office Visit:   Next 5 appointments (look out 90 days)     Sep 07, 2018 11:30 AM CDT   Office Visit with Sergio Reyna MD   [facility] ([address] [address]   [phone]                  Sig: TAKE 1 TABLET (5 MG) BY MOUTH 2 TIMES DAILY    Calcium Channel Blockers Protocol  Failed    8/26/2018  4:52 PM       Failed - Normal serum creatinine on file in past 12 months    Recent Labs   Lab Test  08/20/18   1448  06/14/18   1302   CR  1.36*   --    CREAT   --   1.4*            Passed - Blood pressure under 140/90 in past 12 months    BP Readings from Last 3 Encounters:   08/20/18 124/65   06/21/18 125/61   05/03/18 122/55                Passed - Recent (12 mo) or future (30 days) visit within the authorizing provider's specialty    Patient had office visit in the last 12 months or has a visit in the next 30 days with authorizing provider or within the authorizing provider's specialty.  See \"Patient Info\" tab in inbasket, or \"Choose Columns\" in Meds & Orders section of the refill encounter.           Passed - Patient is age 18 or older       Passed - No active pregnancy on record       Passed - No positive pregnancy test in past 12 months          "

## 2018-09-05 ENCOUNTER — TRANSFERRED RECORDS (OUTPATIENT)
Dept: HEALTH INFORMATION MANAGEMENT | Facility: CLINIC | Age: 83
End: 2018-09-05

## 2018-09-07 ENCOUNTER — OFFICE VISIT (OUTPATIENT)
Dept: OBGYN | Facility: CLINIC | Age: 83
End: 2018-09-07
Payer: COMMERCIAL

## 2018-09-07 VITALS
HEART RATE: 98 BPM | SYSTOLIC BLOOD PRESSURE: 117 MMHG | BODY MASS INDEX: 25.95 KG/M2 | DIASTOLIC BLOOD PRESSURE: 70 MMHG | WEIGHT: 134 LBS | OXYGEN SATURATION: 96 %

## 2018-09-07 DIAGNOSIS — N90.4 LICHEN SCLEROSUS ET ATROPHICUS OF THE VULVA: Primary | ICD-10-CM

## 2018-09-07 DIAGNOSIS — L29.2 VULVAR ITCHING: ICD-10-CM

## 2018-09-07 PROCEDURE — 99203 OFFICE O/P NEW LOW 30 MIN: CPT | Performed by: OBSTETRICS & GYNECOLOGY

## 2018-09-07 NOTE — MR AVS SNAPSHOT
After Visit Summary   9/7/2018    Helena Eldridge    MRN: [id number]           Patient Information     Date Of Birth          1935        Visit Information        Provider Department      9/7/2018 11:30 AM [facility] Reyn[facility] MD [facility]        Today's Diagnoses     Lichen sclerosus et atrophicus of the vulva    -  1    Vulvar itching           Follow-ups after your visit        Follow-up notes from your care team     Return if symptoms worsen or fail to improve.      Who to contact     If you have questions or need follow up information about today's clinic visit or your schedule please contact [facility] directly at [phone].  Normal or non-critical lab and imaging results will be communicated to you by MyChart, letter or phone within 4 business days after the clinic has received the results. If you do not hear from us within 7 days, please contact the clinic through MyChart or phone. If you have a critical or abnormal lab result, we will notify you by phone as soon as possible.  Submit refill requests through [employer] or call your pharmacy and they will forward the refill request to us. Please allow 3 business days for your refill to be completed.          Additional Information About Your Visit        Care EveryWhere ID     This is your Care EveryWhere ID. This could be used by other organizations to access your [place] medical records  [id number]        Your Vitals Were     Pulse Pulse Oximetry BMI (Body Mass Index)             98 96% 25.95 kg/m2          Blood Pressure from Last 3 Encounters:   09/07/18 117/70   08/20/18 124/65   06/21/18 125/61    Weight from Last 3 Encounters:   09/07/18 134 lb (60.8 kg)   08/20/18 135 lb (61.2 kg)   06/21/18 130 lb (59 kg)              Today, you had the following     No orders found for display       Primary Care Provider Office Phone # Fax #    Sushma Nelson [phone] [address]  [place]  [place], [place]. \[facility] Outlook.\"" [address]        Equal Access to Services     SHRUTHI DEL TORO : Hadii britany russ hadsteveparas Soheberali, waaxda luqadaha, qaybta kajoseyi smith, kayla cruzkrisjorje pelaez. So [facility] [phone].    ATENCIÓN: Si habla español, tiene a jones disposición servicios gratuitos de asistencia lingüística. Karolinaame al [phone].    We comply with applicable federal civil rights laws and [place] laws. We do not discriminate on the basis of race, color, national origin, age, disability, sex, sexual orientation, or gender identity.            Thank you!     Thank you for choosing [facility]  for your care. Our goal is always to provide you with excellent care. Hearing back from our patients is one way we can continue to improve our services. Please take a few minutes to complete the written survey that you may receive in the mail after your visit with us. Thank you!             Your Updated Medication List - Protect others around you: Learn how to safely use, store and throw away your medicines at www.disposemymeds.org.          This list is accurate as of 9/7/18 11:59 PM.  Always use your most recent med list.                   Brand Name Dispense Instructions for use Diagnosis    albuterol 108 (90 Base) MCG/ACT inhaler    PROAIR HFA/PROVENTIL HFA/VENTOLIN HFA    2 Inhaler    Inhale 2 puffs into the lungs every 6 hours as needed for shortness of breath / dyspnea or wheezing    COPD, severe (H)       allopurinol 100 MG tablet    ZYLOPRIM    180 tablet    TAKE TWO TABLETS BY MOUTH ONCE DAILY    Idiopathic chronic gout of foot without tophus, unspecified laterality       * amLODIPine 5 MG tablet    NORVASC    90 tablet    Take 1 tablet (5 mg) by mouth daily    Essential hypertension with goal blood pressure less than 140/90       * amLODIPine 5 MG tablet    NORVASC    90 tablet    Take 1 tablet (5 mg) by mouth daily    Essential hypertension with goal blood pressure less than 140/90        ammonium lactate 12 % cream    AMLACTIN    280 g    Apply  topically 2 times daily as needed. apply to affected area    Dry skin dermatitis       ASPIRIN PO      Take 81 mg by mouth daily        BENADRYL 25 MG tablet   Generic drug:  diphenhydrAMINE      Take 25 mg by mouth At Bedtime    DIAGNOSIS NOT YET DEFINED       calcium + D 600-200 MG-UNIT Tabs   Generic drug:  calcium carbonate-vitamin D      Take 2 tablets by mouth 2 times daily.    DIAGNOSIS NOT YET DEFINED       * clobetasol 0.05 % cream    TEMOVATE    45 g    Apply  topically daily as needed.    Rash       * clobetasol 0.05 % ointment    TEMOVATE    45 g    Apply sparingly to affected area twice daily as needed.  Do not apply to face.    Lichen sclerosus et atrophicus of the vulva       DOCUSATE SODIUM PO      Take 100 mg by mouth At Bedtime        estradiol 0.1 MG/GM cream    ESTRACE    42.5 g    Place 2 g vaginally twice a week    Lichen sclerosus et atrophicus of the vulva       furosemide 20 MG tablet    LASIX     Take 20 mg by mouth daily        ketoconazole 2 % cream    NIZORAL    15 g    Apply  topically daily.    Sciatic pain       levothyroxine 100 MCG tablet    SYNTHROID/LEVOTHROID    90 tablet    TAKE ONE TABLET BY MOUTH ONCE DAILY EXCEPT SUNDAY    Other specified hypothyroidism       OCUVITE ADULT 50+ Caps     30 capsule    Take 1 capsule by mouth daily    Macular degeneration of both eyes       omeprazole 40 MG capsule    priLOSEC    90 capsule    Take 1 capsule (40 mg) by mouth daily    Gastroesophageal reflux disease with esophagitis       order for DME     1 each    Equipment being ordered:   [facility] at [facility].    Routine general medical examination at a health care facility       * ranitidine 150 MG tablet    ZANTAC    60 tablet    Take 1 tablet (150 mg) by mouth daily as needed for heartburn (breakthrough)    Gastroesophageal reflux disease with esophagitis       * ranitidine 150 MG tablet    ZANTAC    60 tablet     TAKE 1 TABLET BY MOUTH DAILY IN THE AFTERNOON FOR A MONTH, THEN AS NEEDED    Gastroesophageal reflux disease with esophagitis       * ranitidine 150 MG tablet    ZANTAC    60 tablet    TAKE 1 TABLET BY MOUTH DAILY IN THE AFTERNOON FOR A MONTH, THEN AS NEEDED    Gastroesophageal reflux disease with esophagitis       rOPINIRole 0.25 MG tablet    REQUIP    180 tablet    TAKE ONE TO TWO TABLETS BY MOUTH NIGHTLY AT BEDTIME    Cramp of limb       STATIN NOT PRESCRIBED (INTENTIONAL)      by Other route continuous prn. Patient declined 5/4/12        * Notice:  This list has 7 medication(s) that are the same as other medications prescribed for you. Read the directions carefully, and ask your doctor or other care provider to review them with you.

## 2018-09-25 NOTE — PROGRESS NOTES
Helena Eldridge is an 82 year old postmenopausal female who presents today in consultation regarding lichen sclerosus, at the request of Dr. Nelson.  She has a long standing history of lichen sclerosus and Clobetasol has helped in the past, when she uses it.  She has had increase in the itching and burning sensation for the past 1.5 months, but she had not been using it as regularly.  Since she has been using it on a more regular basis, her symptoms have improved.  She has used Vaseline and an OTC (likely Vagisil). Mometasone has been used but it causes burning.  She has been using topical estradiol cream also.   None have worked as well as the Clobetasol cream.  She has not noticed worsening changes of the vulva.  Symptoms might be aggravated by the chemo she is receiving for non-small cell cancer of the right lung.     Past Medical History:   Diagnosis Date     Carpal tunnel syndrome      CKD (chronic kidney disease)     kidney stone with infection     Deep vein thrombosis (DVT) (H)     1972     DVT [id number]     Glucose intolerance      H/O partial nephrectomy      Heel spur      Hypothyroidism      Kidney stones      Lichen sclerosus      Osteoarthritis      Osteoporosis      PID      Pulmonary embolism (H)     1970     Unspecified hypothyroidism      Vitiligo        Past Surgical History:   Procedure Laterality Date     C NEPHRECTOMY      left partial 07-01-07     C VENOUS THROMBOSIS IMAGING      with pe     HC REMOVAL GALLBLADDER       HC REVISE MEDIAN N/CARPAL TUNNEL SURG       TUBAL LIGATION            Allergies   Allergen Reactions     Ace Inhibitors Cough     Advicor      Celexa [Citalopram] GI Disturbance     diarrhea     Doxycycline Nausea     Poor appetite     Fosamax      Severe substernal burning and dysphagia within 3 days     Valsartan Cramps     severe       Current Outpatient Prescriptions   Medication Sig Dispense Refill     allopurinol (ZYLOPRIM) 100 MG tablet TAKE TWO TABLETS BY MOUTH ONCE  DAILY 180 tablet 1     amLODIPine (NORVASC) 5 MG tablet Take 1 tablet (5 mg) by mouth daily 90 tablet 3     amLODIPine (NORVASC) 5 MG tablet Take 1 tablet (5 mg) by mouth daily 90 tablet 3     ASPIRIN PO Take 81 mg by mouth daily        clobetasol (TEMOVATE) 0.05 % cream Apply  topically daily as needed. 45 g 1     estradiol (ESTRACE) 0.1 MG/GM cream Place 2 g vaginally twice a week 42.5 g 1     furosemide (LASIX) 20 MG tablet Take 20 mg by mouth daily       levothyroxine (SYNTHROID/LEVOTHROID) 100 MCG tablet TAKE ONE TABLET BY MOUTH ONCE DAILY EXCEPT SUNDAY 90 tablet 2     Multiple Vitamins-Minerals (OCUVITE ADULT 50+) CAPS Take 1 capsule by mouth daily 30 capsule 12     omeprazole (PRILOSEC) 40 MG capsule Take 1 capsule (40 mg) by mouth daily 90 capsule 3     order for DME Equipment being ordered:   [employer] at [employer]. 1 each 0     ranitidine (ZANTAC) 150 MG tablet TAKE 1 TABLET BY MOUTH DAILY IN THE AFTERNOON FOR A MONTH, THEN AS NEEDED 60 tablet 1     ranitidine (ZANTAC) 150 MG tablet TAKE 1 TABLET BY MOUTH DAILY IN THE AFTERNOON FOR A MONTH, THEN AS NEEDED 60 tablet 3     ranitidine (ZANTAC) 150 MG tablet Take 1 tablet (150 mg) by mouth daily as needed for heartburn (breakthrough) 60 tablet 1     rOPINIRole (REQUIP) 0.25 MG tablet TAKE ONE TO TWO TABLETS BY MOUTH NIGHTLY AT BEDTIME 180 tablet 1     STATIN NOT PRESCRIBED, INTENTIONAL, by Other route continuous prn. Patient declined 5/4/12  0     albuterol (PROAIR HFA/PROVENTIL HFA/VENTOLIN HFA) 108 (90 BASE) MCG/ACT Inhaler Inhale 2 puffs into the lungs every 6 hours as needed for shortness of breath / dyspnea or wheezing 2 Inhaler 3     ammonium lactate (AMLACTIN) 12 % cream Apply  topically 2 times daily as needed. apply to affected area 280 g 3     Calcium Carbonate-Vitamin D (CALCIUM + D) 600-200 MG-UNIT per tablet Take 2 tablets by mouth 2 times daily.       clobetasol (TEMOVATE) 0.05 % ointment Apply sparingly to affected area twice daily as  needed.  Do not apply to face. (Patient not taking: Reported on 9/7/2018) 45 g 1     diphenhydrAMINE (BENADRYL) 25 MG tablet Take 25 mg by mouth At Bedtime        DOCUSATE SODIUM PO Take 100 mg by mouth At Bedtime       ketoconazole (NIZORAL) 2 % cream Apply  topically daily. (Patient not taking: Reported on 9/7/2018) 15 g 3       Social History     Social History     Marital status: Single     Spouse name: N/A     Number of children: N/A     Years of education: N/A     Occupational History     Not on file.     Social History Main Topics     Smoking status: Former Smoker     Types: Cigarettes     Quit date: 12/4/1969     Smokeless tobacco: Never Used     Alcohol use 0.0 oz/week     0 Standard drinks or equivalent per week      Comment: very little      Drug use: No     Sexual activity: Not Currently     Partners: Male     Other Topics Concern     Parent/Sibling W/ Cabg, Mi Or Angioplasty Before 65f 55m? No     Social History Narrative       Family History   Problem Relation Age of Onset     Cancer Brother      Glaucoma Mother      Alzheimer Disease Brother      Macular Degeneration No family hx of        Review of Systems:  10 point ROS of systems including Constitutional, Eyes, Respiratory, Cardiovascular, Gastroenterology, Genitourinary, Integumentary, Muscularskeletal, Psychiatric were all negative except for pertinent positives noted in my HPI and in the PMH.      Exam  /70 (BP Location: Right arm, Cuff Size: Adult Regular)  Pulse 98  Wt 134 lb (60.8 kg)  SpO2 96%  BMI 25.95 kg/m2  General:  WNWD female, NAD  Alert  Oriented x 3  Gait:  Normal  Skin:  Normal skin turgor  HEENT:  NC/AT, EOMI  Abdomen:  Non-tender, non-distended.  Vulva: atrophic changes and labia minora changes consistent with lichen sclerosus.  no blood seen, normal hair distribution, no adenopathy  BUS:  Normal, no masses noted  Urethral meatus:  No masses noted  Perianal:  No masses noted  Extremities:  No clubbing, no cyanosis and  no edema.      Assessment  Lichen sclerosus  Vulvar itching      Plan  She is encouraged to continue with the Clobetasol cream twice weekly.  She is also encouraged to stay away from Vagisil as it has rebound symptoms that encourage continued and increasing use of the Vagisil.    She may also continue with the Estradiol cream.    Since she feels that she has not had any changes in the vulva, I feel that she may continue with the Clobetasol, and a biopsy is not performed today.  Should symptoms worsen (with continued Clobetasol use) a biopsy would be suggested.    Questions seem to be answered.     Sergio Reyna MD

## 2018-10-03 ENCOUNTER — TRANSFERRED RECORDS (OUTPATIENT)
Dept: HEALTH INFORMATION MANAGEMENT | Facility: CLINIC | Age: 83
End: 2018-10-03

## 2018-10-15 ENCOUNTER — ALLIED HEALTH/NURSE VISIT (OUTPATIENT)
Dept: NURSING | Facility: CLINIC | Age: 83
End: 2018-10-15
Payer: COMMERCIAL

## 2018-10-15 DIAGNOSIS — Z23 NEED FOR SHINGLES VACCINE: Primary | ICD-10-CM

## 2018-10-15 PROCEDURE — 90471 IMMUNIZATION ADMIN: CPT

## 2018-10-15 PROCEDURE — 99207 ZZC NO CHARGE NURSE ONLY: CPT

## 2018-10-15 NOTE — NURSING NOTE
Screening Questionnaire for Adult Immunization    Are you sick today?   Yes   Do you have allergies to medications, food, a vaccine component or latex?   Yes   Have you ever had a serious reaction after receiving a vaccination?   No   Do you have a long-term health problem with heart disease, lung disease, asthma, kidney disease, metabolic disease (e.g. diabetes), anemia, or other blood disorder?   Yes   Do you have cancer, leukemia, HIV/AIDS, or any other immune system problem?   Yes   In the past 3 months, have you taken medications that affect  your immune system, such as prednisone, other steroids, or anticancer drugs; drugs for the treatment of rheumatoid arthritis, Crohn s disease, or psoriasis; or have you had radiation treatments?   Yes   Have you had a seizure, or a brain or other nervous system problem?   No   During the past year, have you received a transfusion of blood or blood     products, or been given immune (gamma) globulin or antiviral drug?   Yes   For women: Are you pregnant or is there a chance you could become        pregnant during the next month?   No   Have you received any vaccinations in the past 4 weeks?   No     Immunization questionnaire was positive for at least one answer.  Notified Dr. Brush and Deborah Hand.      Patient instructed to remain in clinic for 15 minutes afterwards, and to report any adverse reaction to me immediately.  Prior to injection verified patient identity using patient's name and date of birth.    Vaccine information supplied for Shingrix.     Screening performed by Radha Krueger on 10/15/2018 at 10:25 AM.

## 2018-10-15 NOTE — MR AVS SNAPSHOT
After Visit Summary   10/15/2018    Helena Eldridge    MRN: [id number]           Patient Information     Date Of Birth          1935        Visit Information        Provider Department      10/15/2018 10:00 AM CP ANCILLARY [facility]        Today's Diagnoses     Need for shingles vaccine    -  1       Follow-ups after your visit        Who to contact     If you have questions or need follow up information about today's clinic visit or your schedule please contact [facility] directly at [phone].  Normal or non-critical lab and imaging results will be communicated to you by MyChart, letter or phone within 4 business days after the clinic has received the results. If you do not hear from us within 7 days, please contact the clinic through MyChart or phone. If you have a critical or abnormal lab result, we will notify you by phone as soon as possible.  Submit refill requests through [employer] or call your pharmacy and they will forward the refill request to us. Please allow 3 business days for your refill to be completed.          Additional Information About Your Visit        Care EveryWhere ID     This is your Care EveryWhere ID. This could be used by other organizations to access your [facility] medical records  [id number]         Blood Pressure from Last 3 Encounters:   09/07/18 117/70   08/20/18 124/65   06/21/18 125/61    Weight from Last 3 Encounters:   09/07/18 134 lb (60.8 kg)   08/20/18 135 lb (61.2 kg)   06/21/18 130 lb (59 kg)              We Performed the Following     VACCINE ADMINISTRATION, INITIAL        Primary Care Provider Office Phone # Fax #    Sushma Nelson [phone]       [address] [facility] [address]        Equal Access to Services     SHRUTHI DEL TORO : paty Gallagher, kayla potter. So [facility]  [phone].    ATENCIÓN: Si anthony holedn, tiene a jones disposición servicios gratuitos de asistencia lingüística. Josiane parker [phone].    We comply with applicable federal civil rights laws and [place] laws. We do not discriminate on the basis of race, color, national origin, age, disability, sex, sexual orientation, or gender identity.            Thank you!     Thank you for choosing [facility]  for your care. Our goal is always to provide you with excellent care. Hearing back from our patients is one way we can continue to improve our services. Please take a few minutes to complete the written survey that you may receive in the mail after your visit with us. Thank you!             Your Updated Medication List - Protect others around you: Learn how to safely use, store and throw away your medicines at www.disposemymeds.org.          This list is accurate as of 10/15/18 10:28 AM.  Always use your most recent med list.                   Brand Name Dispense Instructions for use Diagnosis    albuterol 108 (90 Base) MCG/ACT inhaler    PROAIR HFA/PROVENTIL HFA/VENTOLIN HFA    2 Inhaler    Inhale 2 puffs into the lungs every 6 hours as needed for shortness of breath / dyspnea or wheezing    COPD, severe (H)       allopurinol 100 MG tablet    ZYLOPRIM    180 tablet    TAKE TWO TABLETS BY MOUTH ONCE DAILY    Idiopathic chronic gout of foot without tophus, unspecified laterality       * amLODIPine 5 MG tablet    NORVASC    90 tablet    Take 1 tablet (5 mg) by mouth daily    Essential hypertension with goal blood pressure less than 140/90       * amLODIPine 5 MG tablet    NORVASC    90 tablet    Take 1 tablet (5 mg) by mouth daily    Essential hypertension with goal blood pressure less than 140/90       ammonium lactate 12 % cream    AMLACTIN    280 g    Apply  topically 2 times daily as needed. apply to affected area    Dry skin dermatitis       ASPIRIN PO      Take 81 mg by mouth daily         BENADRYL 25 MG tablet   Generic drug:  diphenhydrAMINE      Take 25 mg by mouth At Bedtime    DIAGNOSIS NOT YET DEFINED       calcium + D 600-200 MG-UNIT Tabs   Generic drug:  calcium carbonate-vitamin D      Take 2 tablets by mouth 2 times daily.    DIAGNOSIS NOT YET DEFINED       * clobetasol 0.05 % cream    TEMOVATE    45 g    Apply  topically daily as needed.    Rash       * clobetasol 0.05 % ointment    TEMOVATE    45 g    Apply sparingly to affected area twice daily as needed.  Do not apply to face.    Lichen sclerosus et atrophicus of the vulva       DOCUSATE SODIUM PO      Take 100 mg by mouth At Bedtime        estradiol 0.1 MG/GM cream    ESTRACE    42.5 g    Place 2 g vaginally twice a week    Lichen sclerosus et atrophicus of the vulva       furosemide 20 MG tablet    LASIX     Take 20 mg by mouth daily        ketoconazole 2 % cream    NIZORAL    15 g    Apply  topically daily.    Sciatic pain       levothyroxine 100 MCG tablet    SYNTHROID/LEVOTHROID    90 tablet    TAKE ONE TABLET BY MOUTH ONCE DAILY EXCEPT SUNDAY    Other specified hypothyroidism       OCUVITE ADULT 50+ Caps     30 capsule    Take 1 capsule by mouth daily    Macular degeneration of both eyes       omeprazole 40 MG capsule    priLOSEC    90 capsule    Take 1 capsule (40 mg) by mouth daily    Gastroesophageal reflux disease with esophagitis       order for DME     1 each    Equipment being ordered:   [facility] at [facility].    Routine general medical examination at a health care facility       * ranitidine 150 MG tablet    ZANTAC    60 tablet    Take 1 tablet (150 mg) by mouth daily as needed for heartburn (breakthrough)    Gastroesophageal reflux disease with esophagitis       * ranitidine 150 MG tablet    ZANTAC    60 tablet    TAKE 1 TABLET BY MOUTH DAILY IN THE AFTERNOON FOR A MONTH, THEN AS NEEDED    Gastroesophageal reflux disease with esophagitis       * ranitidine 150 MG tablet    ZANTAC    60 tablet    TAKE 1 TABLET  BY MOUTH DAILY IN THE AFTERNOON FOR A MONTH, THEN AS NEEDED    Gastroesophageal reflux disease with esophagitis       rOPINIRole 0.25 MG tablet    REQUIP    180 tablet    TAKE ONE TO TWO TABLETS BY MOUTH NIGHTLY AT BEDTIME    Cramp of limb       STATIN NOT PRESCRIBED (INTENTIONAL)      by Other route continuous prn. Patient declined 5/4/12        * Notice:  This list has 7 medication(s) that are the same as other medications prescribed for you. Read the directions carefully, and ask your doctor or other care provider to review them with you.

## 2018-10-29 DIAGNOSIS — K21.00 GASTROESOPHAGEAL REFLUX DISEASE WITH ESOPHAGITIS: ICD-10-CM

## 2018-10-29 NOTE — TELEPHONE ENCOUNTER
"Requested Prescriptions   Pending Prescriptions Disp Refills     omeprazole (PRILOSEC) 40 MG capsule [Pharmacy Med Name: OMEPRAZOLE DR 40 MG CAPSULE] 90 capsule 3    Last Written Prescription Date:  11-20-17  Last Fill Quantity: 90,  # refills: 3   Last office visit: 8/20/2018 with prescribing provider:  8-20-18   Future Office Visit:     Sig: TAKE 1 CAPSULE BY MOUTH DAILY    PPI Protocol Failed    10/29/2018  4:18 PM       Failed - No diagnosis of osteoporosis on record       Passed - Not on Clopidogrel (unless Pantoprazole ordered)       Passed - Recent (12 mo) or future (30 days) visit within the authorizing provider's specialty    Patient had office visit in the last 12 months or has a visit in the next 30 days with authorizing provider or within the authorizing provider's specialty.  See \"Patient Info\" tab in inbasket, or \"Choose Columns\" in Meds & Orders section of the refill encounter.             Passed - Patient is age 18 or older       Passed - No active pregnacy on record       Passed - No positive pregnancy test in past 12 months          "

## 2018-10-30 RX ORDER — OMEPRAZOLE 40 MG/1
CAPSULE, DELAYED RELEASE ORAL
Qty: 90 CAPSULE | Refills: 3 | Status: SHIPPED | OUTPATIENT
Start: 2018-10-30 | End: 2019-10-11

## 2018-10-30 NOTE — TELEPHONE ENCOUNTER
Routing refill request to provider for review/approval because:  RX Protocol Failed.  Please review refill.  Thank you.  Phuong Suggs RN

## 2018-10-31 ENCOUNTER — TRANSFERRED RECORDS (OUTPATIENT)
Dept: HEALTH INFORMATION MANAGEMENT | Facility: CLINIC | Age: 83
End: 2018-10-31

## 2018-11-23 ENCOUNTER — OFFICE VISIT (OUTPATIENT)
Dept: FAMILY MEDICINE | Facility: CLINIC | Age: 83
End: 2018-11-23
Payer: COMMERCIAL

## 2018-11-23 VITALS
WEIGHT: 138 LBS | BODY MASS INDEX: 26.73 KG/M2 | SYSTOLIC BLOOD PRESSURE: 131 MMHG | DIASTOLIC BLOOD PRESSURE: 72 MMHG | OXYGEN SATURATION: 96 % | HEART RATE: 99 BPM | TEMPERATURE: 98.2 F

## 2018-11-23 DIAGNOSIS — J84.112 IPF (IDIOPATHIC PULMONARY FIBROSIS) (H): ICD-10-CM

## 2018-11-23 DIAGNOSIS — N18.30 CKD (CHRONIC KIDNEY DISEASE) STAGE 3, GFR 30-59 ML/MIN (H): ICD-10-CM

## 2018-11-23 DIAGNOSIS — J44.9 COPD, SEVERE (H): ICD-10-CM

## 2018-11-23 DIAGNOSIS — D84.9 IMMUNOSUPPRESSION (H): ICD-10-CM

## 2018-11-23 DIAGNOSIS — C34.91 NON-SMALL CELL CANCER OF RIGHT LUNG (H): ICD-10-CM

## 2018-11-23 DIAGNOSIS — H25.013 CORTICAL AGE-RELATED CATARACT OF BOTH EYES: Primary | ICD-10-CM

## 2018-11-23 DIAGNOSIS — Z01.818 PREOP GENERAL PHYSICAL EXAM: ICD-10-CM

## 2018-11-23 DIAGNOSIS — Z90.5 SOLITARY KIDNEY, ACQUIRED: ICD-10-CM

## 2018-11-23 DIAGNOSIS — B37.9 CANDIDA INFECTION: ICD-10-CM

## 2018-11-23 DIAGNOSIS — E03.8 OTHER SPECIFIED HYPOTHYROIDISM: ICD-10-CM

## 2018-11-23 DIAGNOSIS — I10 HYPERTENSION GOAL BP (BLOOD PRESSURE) < 140/90: ICD-10-CM

## 2018-11-23 DIAGNOSIS — R00.0 TACHYCARDIA: ICD-10-CM

## 2018-11-23 LAB
ANION GAP SERPL CALCULATED.3IONS-SCNC: 6 MMOL/L (ref 3–14)
BUN SERPL-MCNC: 18 MG/DL (ref 7–30)
CALCIUM SERPL-MCNC: 8.8 MG/DL (ref 8.5–10.1)
CHLORIDE SERPL-SCNC: 104 MMOL/L (ref 94–109)
CO2 SERPL-SCNC: 29 MMOL/L (ref 20–32)
CREAT SERPL-MCNC: 1.56 MG/DL (ref 0.52–1.04)
GFR SERPL CREATININE-BSD FRML MDRD: 32 ML/MIN/1.7M2
GLUCOSE SERPL-MCNC: 144 MG/DL (ref 70–99)
POTASSIUM SERPL-SCNC: 3.7 MMOL/L (ref 3.4–5.3)
SODIUM SERPL-SCNC: 139 MMOL/L (ref 133–144)
TSH SERPL DL<=0.005 MIU/L-ACNC: 1.44 MU/L (ref 0.4–4)

## 2018-11-23 PROCEDURE — 93000 ELECTROCARDIOGRAM COMPLETE: CPT | Performed by: INTERNAL MEDICINE

## 2018-11-23 PROCEDURE — 36415 COLL VENOUS BLD VENIPUNCTURE: CPT | Performed by: INTERNAL MEDICINE

## 2018-11-23 PROCEDURE — 99214 OFFICE O/P EST MOD 30 MIN: CPT | Performed by: INTERNAL MEDICINE

## 2018-11-23 PROCEDURE — 84443 ASSAY THYROID STIM HORMONE: CPT | Performed by: INTERNAL MEDICINE

## 2018-11-23 PROCEDURE — 80048 BASIC METABOLIC PNL TOTAL CA: CPT | Performed by: INTERNAL MEDICINE

## 2018-11-23 RX ORDER — NYSTATIN 100000 U/G
CREAM TOPICAL 3 TIMES DAILY
Qty: 30 G | Refills: 1 | Status: SHIPPED | OUTPATIENT
Start: 2018-11-23 | End: 2019-03-19

## 2018-11-23 ASSESSMENT — PATIENT HEALTH QUESTIONNAIRE - PHQ9: SUM OF ALL RESPONSES TO PHQ QUESTIONS 1-9: 2

## 2018-11-23 NOTE — PROGRESS NOTES
[address] [id number] [phone]  Dept: [phone]    PRE-OP EVALUATION:  Today's date: 2018    Helena Eldridge (: 1935) presents for pre-operative evaluation assessment as requested by patient unsure of doctor.  She requires evaluation and anesthesia risk assessment prior to undergoing surgery/procedure for treatment of cataract .    83 y/o F with H/o RUL/Hilar NSCLungCa (4 Cycles low dose carboplatin and pemerexed), Left nephrectomy (stones), osteoporosis, pelvic fx, lichen sclerosis, Gout, CKD 3, hypothryoid, severe COPD (which has not limited her activity) here for preop, cataracts.         Proposed Surgery/ Procedure: cataract surgery  Date of Surgery/ Procedure: 18 and 18  Time of Surgery/ Procedure: [facility]/Surgical Facility: [facility]  Fax number for surgical facility: [phone] or [phone]  Primary Physician: Sushma Nelson  Type of Anesthesia Anticipated: to be determined    Patient has a Health Care Directive or Living Will:  YES    1. NO - Do you have a history of heart attack, stroke, stent, bypass or surgery on an artery in the head, neck, heart or legs?  2. NO - Do you ever have any pain or discomfort in your chest?  3. NO - Do you have a history of  Heart Failure?  4. NO - Are you troubled by shortness of breath when: walking on the level, up a slight hill or at night?  5. NO - Do you currently have a cold, bronchitis or other respiratory infection?  6. NO - Do you have a cough, shortness of breath or wheezing?  7. NO - Do you sometimes get pains in the calves of your legs when you walk?  8. YES - Do you or anyone in your family have previous history of blood clots?  In 1970s due to Birth Control plus smoking.   9. NO - Do you or does anyone in your family have a serious bleeding problem such as prolonged bleeding following surgeries or cuts?  10. NO - Have you ever  had problems with anemia or been told to take iron pills?  11. NO - Have you had any abnormal blood loss such as black, tarry or bloody stools, or abnormal vaginal bleeding?  12. YES - Have you ever had a blood transfusion?  Remote  13. NO - Have you or any of your relatives ever had problems with anesthesia?  14. NO - Do you have sleep apnea, excessive snoring or daytime drowsiness?  15. NO - Do you have any prosthetic heart valves?  16. NO - Do you have prosthetic joints?  17. NO - Is there any chance that you may be pregnant?      HPI:     HPI related to upcoming procedure: decreased visual acuity due to cataracts.  Will have elective removal.       HYPERTENSION - Patient has longstanding history of HTN , currently denies any symptoms referable to elevated blood pressure. Specifically denies chest pain, palpitations, dyspnea, orthopnea, PND or peripheral edema. Blood pressure readings have been in normal range. Current medication regimen is as listed below. Patient denies any side effects of medication.                                                                                                                                                                                          .  RENAL INSUFFICIENCY - Patient has a longstanding history of moderate-severe chronic renal insufficiency. Last Cr 1.36 8/20/18.                                                                                                                                                                               .    MEDICAL HISTORY:     Patient Active Problem List    Diagnosis Date Noted     Port-A-Cath in place 03/08/2018     Priority: Medium     Placed in early 2018       Non-small cell cancer of right lung (H) 03/07/2018     Priority: Medium     Immunosuppression (H) 03/07/2018     Priority: Medium     Adjustment disorder with anxiety 12/04/2017     Priority: Medium     Other specified hypothyroidism 05/14/2015     Priority: Medium      Idiopathic chronic gout 05/14/2015     Priority: Medium     IPF (idiopathic pulmonary fibrosis) (H) 02/25/2015     Priority: Medium     Mild, at bases per CXR 2/2015 (2/15/15, [facility])       COPD, severe (H) 02/16/2015     Priority: Medium     Seen by pulmonary 2013.         Pelvic fracture (H) 08/19/2014     Priority: Medium     Cataracts, both eyes 04/16/2014     Priority: Medium     Macular degeneration of both eyes, right eye greater than left eye 04/16/2014     Priority: Medium     Senile osteoporosis 03/11/2014     Priority: Medium     Osteoarthritis of right knee 03/11/2014     Priority: Medium     Medial meniscus tear 03/11/2014     Priority: Medium     Lichen sclerosus et atrophicus of the vulva 05/08/2013     Priority: Medium     Clobetasol bid is the recommended remedy       Solitary kidney, acquired 05/08/2013     Priority: Medium     Left nephrectomy due to stones (5/8/13[facility])       Hypertension goal BP (blood pressure) < 140/90 11/28/2011     Priority: Medium     Advance care planning 10/10/2011     Priority: Medium     Advance Care Planning 4/19/2016: Receipt of ACP document:  Received: Health Care Directive which was witnessed or notarized on 2/23/16.  Document previously scanned on 3/2/16.  Validation form completed and sent to be scanned.  Code Status reflects choices in most recent ACP document.  Confirmed/documented designated decision maker(s).  Added by Monique Reyna , Advance Care Planning Liaison  Advance Care Planning 10/10/2011:  Discussed advance care planning with patient; however, patient declined at this time.  Patrica Epps       Glucose intolerance (impaired glucose tolerance) 02/02/2011     Priority: Medium     Kidney stones      Priority: Medium     Partial left nephrectomy around 2008 due to stone  Should get yearly CT (stone protocol ) -- 5/20/15, [facility]       CKD (chronic kidney disease) stage 3, GFR 30-59 ml/min (H) 12/02/2010     Priority: Medium     History of kidney stone with  infection.  Creatinine 1.3 - 1.6 (12/2/10, [facility])   -- she can't take ACE / ARB due to side effects.  Will continue with good BP control (12/3/15, [facility])       CARDIOVASCULAR SCREENING; LDL GOAL LESS THAN 100 10/31/2010     Priority: Medium      Past Medical History:   Diagnosis Date     Carpal tunnel syndrome      CKD (chronic kidney disease)     kidney stone with infection     Deep vein thrombosis (DVT) (H)     1972     DVT [id number]     Glucose intolerance      H/O partial nephrectomy      Heel spur      Hypothyroidism      Kidney stones      Lichen sclerosus      Osteoarthritis      Osteoporosis      PID      Pulmonary embolism (H)     1970     Unspecified hypothyroidism      Vitiligo      Past Surgical History:   Procedure Laterality Date     C NEPHRECTOMY      left partial 07-01-07     C VENOUS THROMBOSIS IMAGING      with pe     HC REMOVAL GALLBLADDER       HC REVISE MEDIAN N/CARPAL TUNNEL SURG       TUBAL LIGATION       Current Outpatient Prescriptions   Medication Sig Dispense Refill     albuterol (PROAIR HFA/PROVENTIL HFA/VENTOLIN HFA) 108 (90 BASE) MCG/ACT Inhaler Inhale 2 puffs into the lungs every 6 hours as needed for shortness of breath / dyspnea or wheezing 2 Inhaler 3     allopurinol (ZYLOPRIM) 100 MG tablet TAKE TWO TABLETS BY MOUTH ONCE DAILY 180 tablet 1     amLODIPine (NORVASC) 5 MG tablet Take 1 tablet (5 mg) by mouth daily 90 tablet 3     ammonium lactate (AMLACTIN) 12 % cream Apply  topically 2 times daily as needed. apply to affected area 280 g 3     ASPIRIN PO Take 81 mg by mouth daily        Calcium Carbonate-Vitamin D (CALCIUM + D) 600-200 MG-UNIT per tablet Take 2 tablets by mouth 2 times daily.       clobetasol (TEMOVATE) 0.05 % cream Apply  topically daily as needed. 45 g 1     diphenhydrAMINE (BENADRYL) 25 MG tablet Take 25 mg by mouth At Bedtime        DOCUSATE SODIUM PO Take 100 mg by mouth At Bedtime       estradiol (ESTRACE) 0.1 MG/GM cream Place 2 g vaginally twice a week 42.5 g 1      furosemide (LASIX) 20 MG tablet Take 20 mg by mouth daily       ketoconazole (NIZORAL) 2 % cream Apply  topically daily. 15 g 3     levothyroxine (SYNTHROID/LEVOTHROID) 100 MCG tablet TAKE ONE TABLET BY MOUTH ONCE DAILY EXCEPT SUNDAY 90 tablet 2     Multiple Vitamins-Minerals (OCUVITE ADULT 50+) CAPS Take 1 capsule by mouth daily 30 capsule 12     omeprazole (PRILOSEC) 40 MG capsule TAKE 1 CAPSULE BY MOUTH DAILY 90 capsule 3     order for DME Equipment being ordered:   Bassam [facility] at [facility]. 1 each 0     ranitidine (ZANTAC) 150 MG tablet TAKE 1 TABLET BY MOUTH DAILY IN THE AFTERNOON FOR A MONTH, THEN AS NEEDED 60 tablet 3     ranitidine (ZANTAC) 150 MG tablet Take 1 tablet (150 mg) by mouth daily as needed for heartburn (breakthrough) 60 tablet 1     rOPINIRole (REQUIP) 0.25 MG tablet TAKE ONE TO TWO TABLETS BY MOUTH NIGHTLY AT BEDTIME 180 tablet 1     STATIN NOT PRESCRIBED, INTENTIONAL, by Other route continuous prn. Patient declined 5/4/12  0     clobetasol (TEMOVATE) 0.05 % ointment Apply sparingly to affected area twice daily as needed.  Do not apply to face. (Patient not taking: Reported on 9/7/2018) 45 g 1     ranitidine (ZANTAC) 150 MG tablet TAKE 1 TABLET BY MOUTH DAILY IN THE AFTERNOON FOR A MONTH, THEN AS NEEDED (Patient not taking: Reported on 11/23/2018) 60 tablet 1     [DISCONTINUED] amLODIPine (NORVASC) 5 MG tablet Take 1 tablet (5 mg) by mouth daily 90 tablet 3     OTC products:      Allergies   Allergen Reactions     Ace Inhibitors Cough     Advicor      Celexa [Citalopram] GI Disturbance     diarrhea     Doxycycline Nausea     Poor appetite     Fosamax      Severe substernal burning and dysphagia within 3 days     Valsartan Cramps     severe      Latex Allergy: NO    Social History   Substance Use Topics     Smoking status: Former Smoker     Types: Cigarettes     Quit date: 12/4/1969     Smokeless tobacco: Never Used     Alcohol use 0.0 oz/week     0 Standard drinks or equivalent per  week      Comment: very little      History   Drug Use No       REVIEW OF SYSTEMS:   CONSTITUTIONAL: NEGATIVE for fever, chills, change in weight  ENT/MOUTH: NEGATIVE for ear, mouth and throat problems  RESP: NEGATIVE for significant cough or SOB  CV: NEGATIVE for chest pain, palpitations or peripheral edema  GI: NEGATIVE for nausea, abdominal pain, heartburn, or change in bowel habits    EXAM:   /72 (BP Location: Left arm, Patient Position: Chair, Cuff Size: Adult Regular)  Pulse 99  Temp 98.2  F (36.8  C) (Oral)  Wt 138 lb (62.6 kg)  SpO2 96%  BMI 26.73 kg/m2  GENERAL APPEARANCE: healthy, alert and no distress  HENT: ear canals and TM's normal and nose and mouth without ulcers or lesions  RESP: lungs clear to auscultation - no rales, rhonchi or wheezes  CV: regular rate and rhythm, normal S1 S2, no S3 or S4 and no murmur, click or rub   ABDOMEN: soft, nontender, no HSM or masses and bowel sounds normal  NEURO: Normal strength and tone, sensory exam grossly normal, mentation intact and speech normal  PSYCH: mentation appears normal and affect normal/bright    DIAGNOSTICS:   EKG: appears normal, NSR, normal axis, normal intervals, no acute ST/T changes c/w ischemia, no LVH by voltage criteria, unchanged from previous tracings, (6/2018)    Recent Labs   Lab Test  08/20/18   1448  06/21/18   0946  02/13/18   1337  07/27/17   1736   HGB  11.6*   --   12.0   --    PLT  229   --   358   --    NA  141   --    --   138   POTASSIUM  3.8   --    --   3.5   CR  1.36*   --    --   1.49*   A1C  4.9  5.3   --    --       Hgb 11 early this month.       IMPRESSION:   Reason for surgery/procedure: cataracts   Diagnosis/reason for consult: med clearance.      The proposed surgical procedure is considered LOW risk.    REVISED CARDIAC RISK INDEX  The patient has the following serious cardiovascular risks for perioperative complications such as (MI, PE, VFib and 3  AV Block):  No serious cardiac risks  INTERPRETATION: 0  risks: Class I (very low risk - 0.4% complication rate)    The patient has the following additional risks for perioperative complications:  No identified additional risks      ICD-10-CM    1. Cortical age-related cataract of both eyes H25.013    2. COPD, severe (H) J44.9    3. IPF (idiopathic pulmonary fibrosis) (H) J84.112    4. Non-small cell cancer of right lung (H) C34.91    5. Immunosuppression (H) D89.9    6. Hypertension goal BP (blood pressure) < 140/90 I10 Basic metabolic panel   7. CKD (chronic kidney disease) stage 3, GFR 30-59 ml/min (H) N18.3 Basic metabolic panel   8. Solitary kidney, acquired Z90.5    9. Preop general physical exam Z01.818    10. Candida infection B37.9 nystatin (MYCOSTATIN) cream   11. Tachycardia R00.0 EKG 12-lead complete w/read - Clinics     TSH with free T4 reflex   12. Other specified hypothyroidism E03.8 TSH with free T4 reflex       RECOMMENDATIONS:    --Patient is to take all scheduled medications on the day of surgery     ekg done due to mild borderline tachycardia.  WNL, unchanged from 6/2018.    Will check BMP and TFTs.         APPROVAL GIVEN to proceed with proposed procedure, without further diagnostic evaluation       Signed Electronically by: Sushma Nelson MD    Copy of this evaluation report is provided to requesting physician.    [place] Preop Guidelines    Revised Cardiac Risk Index

## 2018-11-23 NOTE — MR AVS SNAPSHOT
After Visit Summary   11/23/2018    Helena Eldridge    MRN: [id number]           Patient Information     Date Of Birth          1935        Visit Information        Provider Department      11/23/2018 2:00 PM Sushma Nelson MD [facility]        Today's Diagnoses     Cortical age-related cataract of both eyes    -  1    COPD, severe (H)        IPF (idiopathic pulmonary fibrosis) (H)        Non-small cell cancer of right lung (H)        Immunosuppression (H)        Hypertension goal BP (blood pressure) < 140/90        CKD (chronic kidney disease) stage 3, GFR 30-59 ml/min (H)        Solitary kidney, acquired        Preop general physical exam        Candida infection        Tachycardia        Other specified hypothyroidism          Care Instructions      Before Your Surgery      Call your surgeon if there is any change in your health. This includes signs of a cold or flu (such as a sore throat, runny nose, cough, rash or fever).    Do not smoke, drink alcohol or take over the counter medicine (unless your surgeon or primary care doctor tells you to) for the 24 hours before and after surgery.    If you take prescribed drugs: Follow your doctor s orders about which medicines to take and which to stop until after surgery.    Eating and drinking prior to surgery: follow the instructions from your surgeon    Take a shower or bath the night before surgery. Use the soap your surgeon gave you to gently clean your skin. If you do not have soap from your surgeon, use your regular soap. Do not shave or scrub the surgery site.  Wear clean pajamas and have clean sheets on your bed.       Nystatin cream to rash as needed.              Follow-ups after your visit        Who to contact     If you have questions or need follow up information about today's clinic visit or your schedule please contact [facility] directly at [phone].  Normal or non-critical lab  and imaging results will be communicated to you by MyChart, letter or phone within 4 business days after the clinic has received the results. If you do not hear from us within 7 days, please contact the clinic through MyChart or phone. If you have a critical or abnormal lab result, we will notify you by phone as soon as possible.  Submit refill requests through [employer] or call your pharmacy and they will forward the refill request to us. Please allow 3 business days for your refill to be completed.          Additional Information About Your Visit        Care EveryWhere ID     This is your Care EveryWhere ID. This could be used by other organizations to access your [place] medical records  [id number]        Your Vitals Were     Pulse Temperature Pulse Oximetry BMI (Body Mass Index)          99 98.2  F (36.8  C) (Oral) 96% 26.73 kg/m2         Blood Pressure from Last 3 Encounters:   11/23/18 131/72   09/07/18 117/70   08/20/18 124/65    Weight from Last 3 Encounters:   11/23/18 138 lb (62.6 kg)   09/07/18 134 lb (60.8 kg)   08/20/18 135 lb (61.2 kg)              We Performed the Following     Basic metabolic panel     EKG 12-lead complete w/read - Clinics     TSH with free T4 reflex          Today's Medication Changes          These changes are accurate as of 11/23/18  2:53 PM.  If you have any questions, ask your nurse or doctor.               Start taking these medicines.        Dose/Directions    nystatin cream   Commonly known as:  MYCOSTATIN   Used for:  Candida infection   Started by:  Sushma Nelson MD        Apply topically 3 times daily for 14 days   Quantity:  30 g   Refills:  1            Where to get your medicines      These medications were sent [facility] [place] [address] [facility] - [place] - [address]  [address] [place] [address]     Phone:  [phone]     nystatin cream                Primary Care Provider Office Phone # Fax #    Sushma Nelson [phone]  [facility] [address]        Equal Access to Services     SHRUTHI DEL TORO : Hadii britany russ karolineparas Socrystal, waaxda luqadaha, qaybta kajoseyi paezmaria del carmenyi, kayla cruzkrisjorje gu . [facility] [phone].    ATENCIÓN: Si habla español, tiene a jones disposición servicios gratuitos de asistencia lingüística. Karolinaame al [phone].    We comply with applicable federal civil rights laws and [place] laws. We do not discriminate on the basis of race, color, national origin, age, disability, sex, sexual orientation, or gender identity.            Thank you!     Thank you for choosing [facility]  for your care. Our goal is always to provide you with excellent care. Hearing back from our patients is one way we can continue to improve our services. Please take a few minutes to complete the written survey that you may receive in the mail after your visit with us. Thank you!             Your Updated Medication List - Protect others around you: Learn how to safely use, store and throw away your medicines at www.disposemymeds.org.          This list is accurate as of 11/23/18  2:53 PM.  Always use your most recent med list.                   Brand Name Dispense Instructions for use Diagnosis    albuterol 108 (90 Base) MCG/ACT inhaler    PROAIR HFA/PROVENTIL HFA/VENTOLIN HFA    2 Inhaler    Inhale 2 puffs into the lungs every 6 hours as needed for shortness of breath / dyspnea or wheezing    COPD, severe (H)       allopurinol 100 MG tablet    ZYLOPRIM    180 tablet    TAKE TWO TABLETS BY MOUTH ONCE DAILY    Idiopathic chronic gout of foot without tophus, unspecified laterality       amLODIPine 5 MG tablet    NORVASC    90 tablet    Take 1 tablet (5 mg) by mouth daily    Essential hypertension with goal blood pressure less than 140/90       ammonium lactate 12 % cream    AMLACTIN    280 g    Apply  topically 2 times daily as needed. apply to affected area    Dry skin dermatitis        ASPIRIN PO      Take 81 mg by mouth daily        BENADRYL 25 MG tablet   Generic drug:  diphenhydrAMINE      Take 25 mg by mouth At Bedtime    DIAGNOSIS NOT YET DEFINED       calcium + D 600-200 MG-UNIT Tabs   Generic drug:  calcium carbonate-vitamin D      Take 2 tablets by mouth 2 times daily.    DIAGNOSIS NOT YET DEFINED       * clobetasol 0.05 % cream    TEMOVATE    45 g    Apply  topically daily as needed.    Rash       * clobetasol 0.05 % ointment    TEMOVATE    45 g    Apply sparingly to affected area twice daily as needed.  Do not apply to face.    Lichen sclerosus et atrophicus of the vulva       DOCUSATE SODIUM PO      Take 100 mg by mouth At Bedtime        estradiol 0.1 MG/GM cream    ESTRACE    42.5 g    Place 2 g vaginally twice a week    Lichen sclerosus et atrophicus of the vulva       furosemide 20 MG tablet    LASIX     Take 20 mg by mouth daily        ketoconazole 2 % cream    NIZORAL    15 g    Apply  topically daily.    Sciatic pain       levothyroxine 100 MCG tablet    SYNTHROID/LEVOTHROID    90 tablet    TAKE ONE TABLET BY MOUTH ONCE DAILY EXCEPT SUNDAY    Other specified hypothyroidism       nystatin cream    MYCOSTATIN    30 g    Apply topically 3 times daily for 14 days    Candida infection       OCUVITE ADULT 50+ Caps     30 capsule    Take 1 capsule by mouth daily    Macular degeneration of both eyes       omeprazole 40 MG capsule    priLOSEC    90 capsule    TAKE 1 CAPSULE BY MOUTH DAILY    Gastroesophageal reflux disease with esophagitis       order for DME     1 each    Equipment being ordered:   [facility] at [facility].    Routine general medical examination at a health care facility       * ranitidine 150 MG tablet    ZANTAC    60 tablet    Take 1 tablet (150 mg) by mouth daily as needed for heartburn (breakthrough)    Gastroesophageal reflux disease with esophagitis       * ranitidine 150 MG tablet    ZANTAC    60 tablet    TAKE 1 TABLET BY MOUTH DAILY IN THE AFTERNOON  FOR A MONTH, THEN AS NEEDED    Gastroesophageal reflux disease with esophagitis       rOPINIRole 0.25 MG tablet    REQUIP    180 tablet    TAKE ONE TO TWO TABLETS BY MOUTH NIGHTLY AT BEDTIME    Cramp of limb       STATIN NOT PRESCRIBED (INTENTIONAL)      by Other route continuous prn. Patient declined 5/4/12        * Notice:  This list has 4 medication(s) that are the same as other medications prescribed for you. Read the directions carefully, and ask your doctor or other care provider to review them with you.

## 2018-11-23 NOTE — PATIENT INSTRUCTIONS
Before Your Surgery      Call your surgeon if there is any change in your health. This includes signs of a cold or flu (such as a sore throat, runny nose, cough, rash or fever).    Do not smoke, drink alcohol or take over the counter medicine (unless your surgeon or primary care doctor tells you to) for the 24 hours before and after surgery.    If you take prescribed drugs: Follow your doctor s orders about which medicines to take and which to stop until after surgery.    Eating and drinking prior to surgery: follow the instructions from your surgeon    Take a shower or bath the night before surgery. Use the soap your surgeon gave you to gently clean your skin. If you do not have soap from your surgeon, use your regular soap. Do not shave or scrub the surgery site.  Wear clean pajamas and have clean sheets on your bed.       Nystatin cream to rash as needed.

## 2018-11-26 NOTE — PROGRESS NOTES
Helena Eldridge    Enclosed are your results.  Your labs are normal/stable at this time.   The creatinine is a bit higher than previous, will continue to monitor.     Please call  with any questions.  We can also discuss any questions regarding these labs at your next scheduled visit.    Sincerely,    Sushma Nelson M.D.

## 2018-11-28 ENCOUNTER — TRANSFERRED RECORDS (OUTPATIENT)
Dept: HEALTH INFORMATION MANAGEMENT | Facility: CLINIC | Age: 83
End: 2018-11-28

## 2019-01-02 ENCOUNTER — TRANSFERRED RECORDS (OUTPATIENT)
Dept: HEALTH INFORMATION MANAGEMENT | Facility: CLINIC | Age: 84
End: 2019-01-02

## 2019-01-03 ENCOUNTER — TRANSFERRED RECORDS (OUTPATIENT)
Dept: HEALTH INFORMATION MANAGEMENT | Facility: CLINIC | Age: 84
End: 2019-01-03

## 2019-01-03 LAB
GLUCOSE SERPL-MCNC: 109 MG/DL (ref 65–100)
POTASSIUM SERPL-SCNC: 4.1 MMOL/L (ref 3.5–5)

## 2019-01-04 ENCOUNTER — TRANSFERRED RECORDS (OUTPATIENT)
Dept: HEALTH INFORMATION MANAGEMENT | Facility: CLINIC | Age: 84
End: 2019-01-04

## 2019-01-05 LAB
CREAT SERPL-MCNC: 1.52 MG/DL (ref 0.57–1.11)
GFR SERPL CREATININE-BSD FRML MDRD: 33 ML/MIN/1.73M2

## 2019-01-09 ENCOUNTER — OFFICE VISIT (OUTPATIENT)
Dept: FAMILY MEDICINE | Facility: CLINIC | Age: 84
End: 2019-01-09
Payer: COMMERCIAL

## 2019-01-09 VITALS
DIASTOLIC BLOOD PRESSURE: 65 MMHG | BODY MASS INDEX: 25.57 KG/M2 | WEIGHT: 132 LBS | SYSTOLIC BLOOD PRESSURE: 120 MMHG | OXYGEN SATURATION: 93 % | TEMPERATURE: 98.7 F | HEART RATE: 83 BPM

## 2019-01-09 DIAGNOSIS — J44.9 COPD, SEVERE (H): ICD-10-CM

## 2019-01-09 DIAGNOSIS — D84.9 IMMUNOSUPPRESSION (H): ICD-10-CM

## 2019-01-09 DIAGNOSIS — C34.91 NON-SMALL CELL CANCER OF RIGHT LUNG (H): ICD-10-CM

## 2019-01-09 DIAGNOSIS — N18.30 CKD (CHRONIC KIDNEY DISEASE) STAGE 3, GFR 30-59 ML/MIN (H): ICD-10-CM

## 2019-01-09 DIAGNOSIS — Z09 HOSPITAL DISCHARGE FOLLOW-UP: Primary | ICD-10-CM

## 2019-01-09 PROCEDURE — 99214 OFFICE O/P EST MOD 30 MIN: CPT | Performed by: FAMILY MEDICINE

## 2019-01-09 RX ORDER — BUDESONIDE AND FORMOTEROL FUMARATE DIHYDRATE 80; 4.5 UG/1; UG/1
2 AEROSOL RESPIRATORY (INHALATION) 2 TIMES DAILY
Qty: 1 INHALER | Refills: 10 | Status: SHIPPED | OUTPATIENT
Start: 2019-01-09 | End: 2019-08-09

## 2019-01-09 RX ORDER — AZITHROMYCIN 250 MG/1
TABLET, FILM COATED ORAL
Qty: 6 TABLET | Refills: 0 | Status: SHIPPED | OUTPATIENT
Start: 2019-01-09 | End: 2019-03-17

## 2019-01-09 ASSESSMENT — PAIN SCALES - GENERAL: PAINLEVEL: NO PAIN (0)

## 2019-01-09 NOTE — PROGRESS NOTES
SUBJECTIVE:                                                    Helena Eldridge is a 83 year old female who presents to clinic today for the following health issues:  Patient with history of COPD, history of non-small cell carcinoma.  She is currently on immunosuppressive agent, Nivolumab, IV every 4 weeks.  Through oncology office.  Was admitted to the hospital on January 30, 2019, was discharged January 5, 2019.  She was admitted for cough shortness of breath was treated for COPD exacerbation.    Patient reports she was discharged on doxycycline, albuterol inhaler.  She reports she quit taking her doxycycline after 1 dose secondary to stomach upset nausea and vomiting.    Since her discharge she has been doing well she denies any fever or chills she continued to cough.  Denies any shortness of breath or wheezing.  Patient denies being lightheaded and dizzy she has no diarrhea no loose stool. She continue to use her Albuterol inhaler,every 4- 6 hours.  History of chronic kidney disease stage III her kidney function remained to be stable his last creatinine from January 5 was at 1.52.    Hospital Follow-up Visit:    Hospital/Nursing Home/IP Rehab Facility: [facility]  Date of Admission: 01/03/2019  Date of Discharge: 01/05/2019  Reason(s) for Admission: Acute Bronchitis            Problems taking medications regularly:  None       Medication changes since discharge: None       Problems adhering to non-medication therapy:  None    Summary of hospitalization:  [facility] discharge summary reviewed  See outside records, reviewed and scanned  Diagnostic Tests/Treatments reviewed.  Follow up needed: none  Other Healthcare Providers Involved in Patient s Care:         None  Update since discharge: improved.     Post Discharge Medication Reconciliation: discharge medications reconciled and changed, per note/orders (see AVS).  Plan of care communicated with patient     Coding guidelines for this  visit:  Type of Medical   Decision Making Face-to-Face Visit       within 7 Days of discharge Face-to-Face Visit        within 14 days of discharge   Moderate Complexity [id number]   High Complexity [id number]            Social History     Tobacco Use     Smoking status: Former Smoker     Types: Cigarettes     Last attempt to quit: 1969     Years since quittin.1     Smokeless tobacco: Never Used   Substance Use Topics     Alcohol use: Yes     Alcohol/week: 0.0 oz     Comment: very little         Problem list and histories reviewed & adjusted, as indicated.  Additional history: as documented    Patient Active Problem List   Diagnosis     CARDIOVASCULAR SCREENING; LDL GOAL LESS THAN 100     CKD (chronic kidney disease) stage 3, GFR 30-59 ml/min (H)     Glucose intolerance (impaired glucose tolerance)     Kidney stones     Advance care planning     Hypertension goal BP (blood pressure) < 140/90     Lichen sclerosus et atrophicus of the vulva     Solitary kidney, acquired     Senile osteoporosis     Osteoarthritis of right knee     Medial meniscus tear     Cataracts, both eyes     Macular degeneration of both eyes, right eye greater than left eye     Pelvic fracture (H)     COPD, severe (H)     IPF (idiopathic pulmonary fibrosis) (H)     Other specified hypothyroidism     Idiopathic chronic gout     Adjustment disorder with anxiety     Non-small cell cancer of right lung (H)     Immunosuppression (H)     Port-A-Cath in place     Past Surgical History:   Procedure Laterality Date     C NEPHRECTOMY      left partial 07     C VENOUS THROMBOSIS IMAGING      with pe     HC REMOVAL GALLBLADDER       HC REVISE MEDIAN N/CARPAL TUNNEL SURG       TUBAL LIGATION         Social History     Tobacco Use     Smoking status: Former Smoker     Types: Cigarettes     Last attempt to quit: 1969     Years since quittin.1     Smokeless tobacco: Never Used   Substance Use Topics     Alcohol use: Yes      Alcohol/week: 0.0 oz     Comment: very little      Family History   Problem Relation Age of Onset     Cancer Brother      Glaucoma Mother      Alzheimer Disease Brother      Macular Degeneration No family hx of          Current Outpatient Medications   Medication Sig Dispense Refill     albuterol (PROAIR HFA/PROVENTIL HFA/VENTOLIN HFA) 108 (90 BASE) MCG/ACT Inhaler Inhale 2 puffs into the lungs every 6 hours as needed for shortness of breath / dyspnea or wheezing 2 Inhaler 3     allopurinol (ZYLOPRIM) 100 MG tablet TAKE TWO TABLETS BY MOUTH ONCE DAILY 180 tablet 1     amLODIPine (NORVASC) 5 MG tablet Take 1 tablet (5 mg) by mouth daily 90 tablet 3     ammonium lactate (AMLACTIN) 12 % cream Apply  topically 2 times daily as needed. apply to affected area 280 g 3     ASPIRIN PO Take 81 mg by mouth daily        azithromycin (ZITHROMAX) 250 MG tablet Take 2 tablets (500 mg) by mouth daily for 1 day, THEN 1 tablet (250 mg) daily for 4 days. 6 tablet 0     budesonide-formoterol (SYMBICORT) 80-4.5 MCG/ACT Inhaler Inhale 2 puffs into the lungs 2 times daily 1 Inhaler 10     Calcium Carbonate-Vitamin D (CALCIUM + D) 600-200 MG-UNIT per tablet Take 2 tablets by mouth 2 times daily.       clobetasol (TEMOVATE) 0.05 % cream Apply  topically daily as needed. 45 g 1     diphenhydrAMINE (BENADRYL) 25 MG tablet Take 25 mg by mouth At Bedtime        DOCUSATE SODIUM PO Take 100 mg by mouth At Bedtime       estradiol (ESTRACE) 0.1 MG/GM cream Place 2 g vaginally twice a week 42.5 g 1     furosemide (LASIX) 20 MG tablet Take 20 mg by mouth daily       ketoconazole (NIZORAL) 2 % cream Apply  topically daily. 15 g 3     levothyroxine (SYNTHROID/LEVOTHROID) 100 MCG tablet TAKE ONE TABLET BY MOUTH ONCE DAILY EXCEPT SUNDAY 90 tablet 2     Multiple Vitamins-Minerals (OCUVITE ADULT 50+) CAPS Take 1 capsule by mouth daily 30 capsule 12     omeprazole (PRILOSEC) 40 MG capsule TAKE 1 CAPSULE BY MOUTH DAILY 90 capsule 3     order for DME  Equipment being ordered:   Bassam bar [facility] at [facility]. 1 each 0     ranitidine (ZANTAC) 150 MG tablet TAKE 1 TABLET BY MOUTH DAILY IN THE AFTERNOON FOR A MONTH, THEN AS NEEDED 60 tablet 1     rOPINIRole (REQUIP) 0.25 MG tablet TAKE ONE TO TWO TABLETS BY MOUTH NIGHTLY AT BEDTIME 180 tablet 1     sodium chloride 0.9 % SOLN 100 mL with nivolumab 100 MG/10ML SOLN Inject into the vein once for 1 dose       STATIN NOT PRESCRIBED, INTENTIONAL, by Other route continuous prn. Patient declined 5/4/12  0     Allergies   Allergen Reactions     Ace Inhibitors Cough     Advicor      Celexa [Citalopram] GI Disturbance     diarrhea     Doxycycline Nausea     Poor appetite     Fosamax      Severe substernal burning and dysphagia within 3 days     Valsartan Cramps     severe     Recent Labs   Lab Test 11/23/18  1453 08/20/18  1448 06/21/18  0946  05/08/18  0835  06/01/17  1436 05/19/16  1104  05/02/14  0823  04/24/13  0828  04/20/12  0907   A1C  --  4.9 5.3  --   --   --  5.7  --    < > 5.3  --  5.2  --   --    LDL  --   --   --   --  124*  --  133* 126*   < > 125  --  128  --  135*   HDL  --   --   --   --  50  --  45* 41*   < > 30*  --  32*  --  30*   TRIG  --   --   --   --  160*  --  193* 228*   < > 216*  --  146  --  194*   ALT  --   --   --   --   --   --   --   --   --  27  --  24  --  22   CR 1.56* 1.36*  --   --   --    < > 1.26* 1.29*   < > 1.17*   < > 1.20*   < > 1.34*   GFRESTIMATED 32* 37*  --    < >  --    < > 41* 40*   < > 45*   < > 44*   < > 38*   GFRESTBLACK 38* 45*  --    < >  --    < > 49* 48*   < > 54*   < > 53*   < > 47*   POTASSIUM 3.7 3.8  --   --   --    < > 3.1* 3.9   < > 4.2   < > 4.3   < > 4.3   TSH 1.44 1.58  --   --  4.85*  --  1.42 1.36   < > 3.31  --  0.62  --  0.35*    < > = values in this interval not displayed.      BP Readings from Last 3 Encounters:   01/09/19 120/65   11/23/18 131/72   09/07/18 117/70    Wt Readings from Last 3 Encounters:   01/09/19 59.9 kg (132 lb)   11/23/18 62.6 kg (138  lb)   09/07/18 60.8 kg (134 lb)                    ROS:  Constitutional, HEENT, cardiovascular, pulmonary, gi and gu systems are negative, except as otherwise noted.    OBJECTIVE:     /65 (BP Location: Right arm, Patient Position: Chair, Cuff Size: Adult Regular)   Pulse 83   Temp 98.7  F (37.1  C) (Oral)   Wt 59.9 kg (132 lb)   SpO2 93%   BMI 25.57 kg/m    Body mass index is 25.57 kg/m .  GENERAL: healthy, alert and no distress  RESP: rales throughout, rhonchi throughout and bronchial breath sounds  CV: regular rate and rhythm, normal S1 S2, no S3 or S4, no murmur, click or rub, no peripheral edema and peripheral pulses strong  ABDOMEN: soft, nontender, no hepatosplenomegaly, no masses and bowel sounds normal  MS: no gross musculoskeletal defects noted, no edema  PSYCH: mentation appears normal, affect normal/bright    Diagnostic Test Results:  none     ASSESSMENT/PLAN:       ICD-10-CM    1. Hospital discharge follow-up Z09    2. COPD, severe (H) J44.9 azithromycin (ZITHROMAX) 250 MG tablet     budesonide-formoterol (SYMBICORT) 80-4.5 MCG/ACT Inhaler   3. CKD (chronic kidney disease) stage 3, GFR 30-59 ml/min (H) N18.3    4. Non-small cell cancer of right lung (H) C34.91 sodium chloride 0.9 % SOLN 100 mL with nivolumab 100 MG/10ML SOLN   5. Immunosuppression (H) D89.9 sodium chloride 0.9 % SOLN 100 mL with nivolumab 100 MG/10ML SOLN     #1 Hospital discharge follow-up.  Status post acute bronchitis/ COPD exacerbation.  Patient reports she is feeling a lot better.  She has been using her albuterol inhaler multiple times throughout the day.  She also reports side effect with doxycycline.    I did change Doxycyline to , Z-John use has been prescribed.  In addition, because of her history of COPD she continues to use her albuterol inhaler multiple times throughout the day give her a prescription for Symbicort to use has been prescribed as a maintenance therapy.  She was advised to continue with her albuterol  inhaler as needed.    2.  History of renal cell carcinoma of the lung she is to continue with her immunosuppressive agent, Nivolumb, IV once every 4 weeks.  She does follow-up with oncology.    3.  History of chronic kidney disease stage III that remained to be stable.    She is to follow-up with her primary care physician in 4-6 months or sooner  See Patient Instructions  There are no Patient Instructions on file for this visit.    Celena Freire MD  [facility]

## 2019-01-12 ENCOUNTER — TRANSFERRED RECORDS (OUTPATIENT)
Dept: HEALTH INFORMATION MANAGEMENT | Facility: CLINIC | Age: 84
End: 2019-01-12

## 2019-01-12 DIAGNOSIS — M1A.0790 IDIOPATHIC CHRONIC GOUT OF FOOT WITHOUT TOPHUS, UNSPECIFIED LATERALITY: ICD-10-CM

## 2019-01-14 RX ORDER — ALLOPURINOL 100 MG/1
TABLET ORAL
Qty: 180 TABLET | Refills: 1 | Status: SHIPPED | OUTPATIENT
Start: 2019-01-14 | End: 2019-05-13

## 2019-01-14 NOTE — TELEPHONE ENCOUNTER
Routing refill request to provider for review/approval because:  Labs out of range  Labs not current  Saida Bolton RN [facility] Triage.

## 2019-01-14 NOTE — TELEPHONE ENCOUNTER
"Requested Prescriptions   Pending Prescriptions Disp Refills     allopurinol (ZYLOPRIM) 100 MG tablet [Pharmacy Med Name: ALLOPURINOL 100 MG TABLET] 180 tablet 1    Last Written Prescription Date:  7/11/18  Last Fill Quantity: 180,  # refills: 1   Last office visit: 1/9/2019 with prescribing provider:     Future Office Visit:     Sig: TAKE 2 TABLETS BY MOUTH EVERY DAY    Gout Agents Protocol Failed - 1/12/2019 12:29 AM       Failed - ALT on file in past 12 months    Recent Labs   Lab Test 05/02/14  0823   ALT 27            Failed - Has Uric Acid on file in past 12 months and value is less than 6    Recent Labs   Lab Test 06/01/17  1436   URIC 4.9     If level is 6mg/dL or greater, ok to refill one time and refer to provider.          Failed - Normal serum creatinine on file in the past 12 months    Recent Labs   Lab Test 11/23/18  1453  06/14/18  1302   CR 1.56*   < >  --    CREAT  --   --  1.4*    < > = values in this interval not displayed.            Passed - CBC on file in past 12 months    Recent Labs   Lab Test 08/20/18  1448   WBC 7.2   RBC 3.09*   HGB 11.6*   HCT 33.8*                   Passed - Recent (12 mo) or future (30 days) visit within the authorizing provider's specialty    Patient had office visit in the last 12 months or has a visit in the next 30 days with authorizing provider or within the authorizing provider's specialty.  See \"Patient Info\" tab in inbasket, or \"Choose Columns\" in Meds & Orders section of the refill encounter.             Passed - Medication is active on med list       Passed - Patient is age 18 or older       Passed - No active pregnancy on record       Passed - No positive pregnancy test in past year          "

## 2019-01-16 ENCOUNTER — PATIENT OUTREACH (OUTPATIENT)
Dept: CARE COORDINATION | Facility: CLINIC | Age: 84
End: 2019-01-16

## 2019-01-16 ENCOUNTER — TRANSFERRED RECORDS (OUTPATIENT)
Dept: HEALTH INFORMATION MANAGEMENT | Facility: CLINIC | Age: 84
End: 2019-01-16

## 2019-01-16 NOTE — PATIENT INSTRUCTIONS
DC'd  from [facility] on 1/12/19 to home with home self care   Primary Problem: None  LACE Score: 64

## 2019-01-16 NOTE — PROGRESS NOTES
Clinic Care Coordination Contact  Care Team Conversations    Patient was seen in the emergency room following a fall on her front steps in her slippers getting the newspaper.  The patient states she injured her ribs and her forehead in the fall.  Today she has a very black eyes and still has pain in her ribs.  She did attend her appointment with the oncologist today and he gave her a different prescription for a pain medication.  The patient feels that everything is going much better and that she is not in any need of care coordination.  Therefore the case was closed as declined.    Jonah Sanchez, MSN, RN, PHN, CCM  I Clinic Care Coordinator  [facility]  Phone: [phone]  [email or web address]

## 2019-01-17 ENCOUNTER — TRANSFERRED RECORDS (OUTPATIENT)
Dept: HEALTH INFORMATION MANAGEMENT | Facility: CLINIC | Age: 84
End: 2019-01-17

## 2019-01-24 ENCOUNTER — OFFICE VISIT (OUTPATIENT)
Dept: FAMILY MEDICINE | Facility: CLINIC | Age: 84
End: 2019-01-24
Payer: COMMERCIAL

## 2019-01-24 VITALS
SYSTOLIC BLOOD PRESSURE: 107 MMHG | DIASTOLIC BLOOD PRESSURE: 62 MMHG | BODY MASS INDEX: 25.18 KG/M2 | OXYGEN SATURATION: 94 % | HEART RATE: 94 BPM | WEIGHT: 130 LBS | TEMPERATURE: 98.2 F

## 2019-01-24 DIAGNOSIS — S00.03XA HEMATOMA OF SCALP, INITIAL ENCOUNTER: ICD-10-CM
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
## 2019-01-24 DIAGNOSIS — S20.219A CONTUSION OF RIB, UNSPECIFIED LATERALITY, INITIAL ENCOUNTER: ICD-10-CM

## 2019-01-24 DIAGNOSIS — K59.00 CONSTIPATION, UNSPECIFIED CONSTIPATION TYPE: ICD-10-CM

## 2019-01-24 PROCEDURE — 99213 OFFICE O/P EST LOW 20 MIN: CPT | Performed by: NURSE PRACTITIONER

## 2019-01-24 ASSESSMENT — PAIN SCALES - GENERAL: PAINLEVEL: MILD PAIN (3)

## 2019-01-24 NOTE — PROGRESS NOTES
SUBJECTIVE:   Helena Eldridge is a 83 year old female who presents to clinic today for the following health issues:      ED/UC Followup:    Facility:  [place]  Date of visit: 1/12/19  Reason for visit: fell, face and bruised ribs  Current Status: better but still sore.     [facility] 1/3/19 for bronchitis which has since resolved  She was seen at [facility] 1/12 after a fall  No LOC  CT head/brain showed nare fluid level in maxillary sinus though no symptoms sinus. Otherwise normal  CT cervical spine negative for acute fracture  XR ribs/chest unremarkable  She returned to ED 1/17 for constipation  Given enema in ED     She has continued to take Miralax every day in addition stool softener  Having several BMs daily    She continues to have bilateral rib pain  Taking 2 Tylenol every 6 hours  Dr. Hsieh prescribed Tramadol for pain 1/16/19 (follows for non-small cell cancer right lung)        Problem list and histories reviewed & adjusted, as indicated.  Additional history: as documented    Patient Active Problem List   Diagnosis     CARDIOVASCULAR SCREENING; LDL GOAL LESS THAN 100     CKD (chronic kidney disease) stage 3, GFR 30-59 ml/min (H)     Glucose intolerance (impaired glucose tolerance)     Kidney stones     Advance care planning     Hypertension goal BP (blood pressure) < 140/90     Lichen sclerosus et atrophicus of the vulva     Solitary kidney, acquired     Senile osteoporosis     Osteoarthritis of right knee     Medial meniscus tear     Cataracts, both eyes     Macular degeneration of both eyes, right eye greater than left eye     Pelvic fracture (H)     COPD, severe (H)     IPF (idiopathic pulmonary fibrosis) (H)     Other specified hypothyroidism     Idiopathic chronic gout     Adjustment disorder with anxiety     Non-small cell cancer of right lung (H)     Immunosuppression (H)     Port-A-Cath in place     Past Surgical History:   Procedure Laterality Date     C NEPHRECTOMY      left partial 07-01-07     C VENOUS  THROMBOSIS IMAGING      with pe     HC REMOVAL GALLBLADDER       HC REVISE MEDIAN N/CARPAL TUNNEL SURG       TUBAL LIGATION         Social History     Tobacco Use     Smoking status: Former Smoker     Types: Cigarettes     Last attempt to quit: 1969     Years since quittin.1     Smokeless tobacco: Never Used   Substance Use Topics     Alcohol use: Yes     Alcohol/week: 0.0 oz     Comment: very little      Family History   Problem Relation Age of Onset     Cancer Brother      Glaucoma Mother      Alzheimer Disease Brother      Macular Degeneration No family hx of          Current Outpatient Medications   Medication Sig Dispense Refill     allopurinol (ZYLOPRIM) 100 MG tablet TAKE 2 TABLETS BY MOUTH EVERY  tablet 1     amLODIPine (NORVASC) 5 MG tablet Take 1 tablet (5 mg) by mouth daily 90 tablet 3     ammonium lactate (AMLACTIN) 12 % cream Apply  topically 2 times daily as needed. apply to affected area 280 g 3     budesonide-formoterol (SYMBICORT) 80-4.5 MCG/ACT Inhaler Inhale 2 puffs into the lungs 2 times daily 1 Inhaler 10     Calcium Carbonate-Vitamin D (CALCIUM + D) 600-200 MG-UNIT per tablet Take 2 tablets by mouth 2 times daily.       clobetasol (TEMOVATE) 0.05 % cream Apply  topically daily as needed. 45 g 1     diphenhydrAMINE (BENADRYL) 25 MG tablet Take 25 mg by mouth At Bedtime        DOCUSATE SODIUM PO Take 100 mg by mouth At Bedtime       estradiol (ESTRACE) 0.1 MG/GM cream Place 2 g vaginally twice a week 42.5 g 1     furosemide (LASIX) 20 MG tablet Take 20 mg by mouth daily       ketoconazole (NIZORAL) 2 % cream Apply  topically daily. 15 g 3     levothyroxine (SYNTHROID/LEVOTHROID) 100 MCG tablet TAKE ONE TABLET BY MOUTH ONCE DAILY EXCEPT  90 tablet 2     Multiple Vitamins-Minerals (OCUVITE ADULT 50+) CAPS Take 1 capsule by mouth daily 30 capsule 12     omeprazole (PRILOSEC) 40 MG capsule TAKE 1 CAPSULE BY MOUTH DAILY 90 capsule 3     ranitidine (ZANTAC) 150 MG tablet TAKE 1  TABLET BY MOUTH DAILY IN THE AFTERNOON FOR A MONTH, THEN AS NEEDED 60 tablet 1     rOPINIRole (REQUIP) 0.25 MG tablet TAKE ONE TO TWO TABLETS BY MOUTH NIGHTLY AT BEDTIME 180 tablet 1     STATIN NOT PRESCRIBED, INTENTIONAL, by Other route continuous prn. Patient declined 5/4/12  0     albuterol (PROAIR HFA/PROVENTIL HFA/VENTOLIN HFA) 108 (90 BASE) MCG/ACT Inhaler Inhale 2 puffs into the lungs every 6 hours as needed for shortness of breath / dyspnea or wheezing (Patient not taking: Reported on 1/24/2019) 2 Inhaler 3     ASPIRIN PO Take 81 mg by mouth daily        order for DME Equipment being ordered:   [facility]am [facility] at [facility]. 1 each 0     sodium chloride 0.9 % SOLN 100 mL with nivolumab 100 MG/10ML SOLN Inject into the vein once for 1 dose         Reviewed and updated as needed this visit by clinical staff  Tobacco  Allergies  Meds  Med Hx  Surg Hx  Fam Hx  Soc Hx      Reviewed and updated as needed this visit by Provider         ROS:  Constitutional, HEENT, cardiovascular, pulmonary, gi and gu systems are negative, except as otherwise noted.    OBJECTIVE:     /62 (BP Location: Right arm, Patient Position: Chair, Cuff Size: Adult Regular)   Pulse 94   Temp 98.2  F (36.8  C) (Oral)   Wt 59 kg (130 lb)   SpO2 94%   BMI 25.18 kg/m    Body mass index is 25.18 kg/m .  GENERAL: healthy, alert and no distress  RESP: lungs clear to auscultation - no rales, rhonchi or wheezes  CV: regular rate and rhythm, normal S1 S2, no S3 or S4, no murmur, click or rub, no peripheral edema and peripheral pulses strong  ABDOMEN: soft, nontender, no hepatosplenomegaly, no masses and bowel sounds normal  MS: Tenderness bilateral lower ribs without guarding or crepitus  SKIN: Ecchymosis right temporal region. Skin intact  Port right subclavian    Diagnostic Test Results:  none     ASSESSMENT/PLAN:       ICD-10-CM    1. Fall, initial encounter W19.XXXA    2. Hematoma of scalp, initial encounter S00.03XA    3.  Contusion of rib, unspecified laterality, initial encounter S20.219A    4. Constipation, unspecified constipation type K59.00      She had a list of questions which I answered  She is doing better  Attributes fall to tripping on a rub. No previous history of falling so I don't think further workup is indicated today  Pain is improving  Controlling with tylenol and Tramadol PRN  Advised may take 4-6 weeks to heal  Reviewed breathing exercises to prevent pneumonia      Patient Instructions   You can stop Miralax. Just take in the future as needed when you are having constipation  You can continue with your nightly stool softener     Do not take more than 3,000 mg in a 24 hour period  Max dose is 1,000 mg every 8 hours    You can take 1 tablet Tramadol for pain. May helpful to take at night. Ok to take with Tylenol  It may cause constipation. You may want to take an extra stool softener when you take it      MERARI Liu [facility]

## 2019-01-24 NOTE — PATIENT INSTRUCTIONS
You can stop Miralax. Just take in the future as needed when you are having constipation  You can continue with your nightly stool softener     Do not take more than 3,000 mg in a 24 hour period  Max dose is 1,000 mg every 8 hours    You can take 1 tablet Tramadol for pain. May helpful to take at night. Ok to take with Tylenol  It may cause constipation. You may want to take an extra stool softener when you take it

## 2019-02-01 DIAGNOSIS — N90.4 LICHEN SCLEROSUS ET ATROPHICUS OF THE VULVA: ICD-10-CM

## 2019-02-04 RX ORDER — CLOBETASOL PROPIONATE 0.5 MG/G
OINTMENT TOPICAL
Qty: 45 G | Refills: 1 | Status: SHIPPED | OUTPATIENT
Start: 2019-02-04 | End: 2019-11-18

## 2019-02-04 NOTE — TELEPHONE ENCOUNTER
Requested Prescriptions   Pending Prescriptions Disp Refills     clobetasol (TEMOVATE) 0.05 % external ointment [Pharmacy Med Name: CLOBETASOL 0.05% OINTMENT] 45 g 1     Sig: APPLY SPARINGLY TO AFFECTED AREA TOPICALLY TWICE DAILY AS NEEDED. DO NOT APPLY TO FACE.    There is no refill protocol information for this order        Routing refill request to provider for review/approval because:  Drug not on the [facility] [place]     Aby Dunne RN

## 2019-02-04 NOTE — TELEPHONE ENCOUNTER
Requested Prescriptions   Pending Prescriptions Disp Refills     clobetasol (TEMOVATE) 0.05 % external ointment [Pharmacy Med Name: CLOBETASOL 0.05% OINTMENT] 45 g 1     Sig: APPLY SPARINGLY TO AFFECTED AREA TOPICALLY TWICE DAILY AS NEEDED. DO NOT APPLY TO FACE.    There is no refill protocol information for this order   Last Written Prescription Date:  6-1-17  Last Fill Quantity: 45g,  # refills: 1   Last office visit: 1/24/2019 with prescribing provider:  11-23-18   Future Office Visit:

## 2019-02-13 ENCOUNTER — TRANSFERRED RECORDS (OUTPATIENT)
Dept: HEALTH INFORMATION MANAGEMENT | Facility: CLINIC | Age: 84
End: 2019-02-13

## 2019-02-13 DIAGNOSIS — L85.3 DRY SKIN DERMATITIS: ICD-10-CM

## 2019-02-13 NOTE — TELEPHONE ENCOUNTER
"Requested Prescriptions   Pending Prescriptions Disp Refills     ammonium lactate (AMLACTIN) 12 % external cream [Pharmacy Med Name: AMMONIUM LACTATE 12% CREAM] 280 g 0    Last Written Prescription Date:  6/1/17  Last Fill Quantity: 280,  # refills: 3   Last office visit: 1/24/2019 with prescribing provider:     Future Office Visit:     Sig: APPLY TOPICALLY TO AFFECTED AREA(S) TWICE DAILY AS NEEDED    Miscellaneous Dermatologic Agents Passed - 2/13/2019 12:06 PM       Passed - Recent (12 mo) or future (30 days) visit within the authorizing provider's specialty    Patient had office visit in the last 12 months or has a visit in the next 30 days with authorizing provider or within the authorizing provider's specialty.  See \"Patient Info\" tab in inbasket, or \"Choose Columns\" in Meds & Orders section of the refill encounter.             Passed - Refill request is not for Imiquimod, 5-Fluorouracil, or Finasteride     If Imiquimod, 5-Fluorouracil, or Finasteride, may refill if indicated in progress notes.          Passed - Medication is active on med list       Passed - Patient is 24 mos old or older          "

## 2019-02-14 RX ORDER — AMMONIUM LACTATE 12 G/100G
CREAM TOPICAL
Qty: 280 G | Refills: 0 | Status: SHIPPED | OUTPATIENT
Start: 2019-02-14 | End: 2019-05-04

## 2019-02-14 NOTE — TELEPHONE ENCOUNTER
Prescription approved per [facility] Refill Protocol.    Yanique Fritz RN  [facility]

## 2019-03-06 DIAGNOSIS — E03.8 OTHER SPECIFIED HYPOTHYROIDISM: ICD-10-CM

## 2019-03-06 NOTE — TELEPHONE ENCOUNTER
"Requested Prescriptions   Pending Prescriptions Disp Refills     levothyroxine (SYNTHROID/LEVOTHROID) 100 MCG tablet [Pharmacy Med Name: LEVOTHYROXINE 100 MCG TABLET] 90 tablet 1    Last Written Prescription Date:  6-11-18  Last Fill Quantity: 90,  # refills: 2   Last office visit: 1/24/2019 with prescribing provider:  11-23-18   Future Office Visit:   Next 5 appointments (look out 90 days)    Mar 19, 2019 12:20 PM CDT  Pre-Op physical with Sushma Nelson MD  [facility] ([facility]) [address] [place] [phone]          Sig: TAKE ONE TABLET BY MOUTH ONCE DAILY EXCEPT SUNDAY    Thyroid Protocol Passed - 3/6/2019  9:23 AM       Passed - Patient is 12 years or older       Passed - Recent (12 mo) or future (30 days) visit within the authorizing provider's specialty    Patient had office visit in the last 12 months or has a visit in the next 30 days with authorizing provider or within the authorizing provider's specialty.  See \"Patient Info\" tab in inbasket, or \"Choose Columns\" in Meds & Orders section of the refill encounter.             Passed - Medication is active on med list       Passed - Normal TSH on file in past 12 months    Recent Labs   Lab Test 11/23/18  1453   TSH 1.44             Passed - No active pregnancy on record    If patient is pregnant or has had a positive pregnancy test, please check TSH.         Passed - No positive pregnancy test in past 12 months    If patient is pregnant or has had a positive pregnancy test, please check TSH.            "

## 2019-03-07 DIAGNOSIS — I10 HYPERTENSION GOAL BP (BLOOD PRESSURE) < 140/90: ICD-10-CM

## 2019-03-07 DIAGNOSIS — N18.30 CKD (CHRONIC KIDNEY DISEASE) STAGE 3, GFR 30-59 ML/MIN (H): ICD-10-CM

## 2019-03-07 RX ORDER — LEVOTHYROXINE SODIUM 100 UG/1
TABLET ORAL
Qty: 90 TABLET | Refills: 1 | Status: SHIPPED | OUTPATIENT
Start: 2019-03-07 | End: 2019-06-25

## 2019-03-07 NOTE — TELEPHONE ENCOUNTER
"Requested Prescriptions   Pending Prescriptions Disp Refills     furosemide (LASIX) 20 MG tablet [Pharmacy Med Name: FUROSEMIDE 20 MG TABLET] 180 tablet 1          Last Written Prescription Date:  na  Last Fill Quantity: na,   # refills: na  Last Office Visit: 11/23/18  Future Office visit:    Next 5 appointments (look out 90 days)    Mar 19, 2019 12:20 PM CDT  Pre-Op physical with Sushma Nelson MD  [facility] ([facility]) [address] [place] [phone]           Routing refill request to provider for review/approval because:  Medication is reported/historical   Sig: TAKE ONE TABLET BY MOUTH TWICE DAILY IN THE MORNING AND LUNCH TIME    Diuretics (Including Combos) Protocol Failed - 3/7/2019 11:14 AM       Failed - Normal serum creatinine on file in past 12 months    Recent Labs   Lab Test 01/05/19   CR 1.52*             Passed - Blood pressure under 140/90 in past 12 months    BP Readings from Last 3 Encounters:   01/24/19 107/62   01/09/19 120/65   11/23/18 131/72                Passed - Recent (12 mo) or future (30 days) visit within the authorizing provider's specialty    Patient had office visit in the last 12 months or has a visit in the next 30 days with authorizing provider or within the authorizing provider's specialty.  See \"Patient Info\" tab in inbasket, or \"Choose Columns\" in Meds & Orders section of the refill encounter.             Passed - Medication is active on med list       Passed - Patient is age 18 or older       Passed - No active pregancy on record       Passed - Normal serum potassium on file in past 12 months    Recent Labs   Lab Test 01/03/19   POTASSIUM 4.1                   Passed - Normal serum sodium on file in past 12 months    Recent Labs   Lab Test 11/23/18  1453                Passed - No positive pregnancy test in past 12 months          "

## 2019-03-07 NOTE — TELEPHONE ENCOUNTER
Prescription approved per [facility] Refill Protocol.  Aby Dunne RN

## 2019-03-08 RX ORDER — FUROSEMIDE 20 MG
TABLET ORAL
Qty: 180 TABLET | Refills: 1 | Status: SHIPPED | OUTPATIENT
Start: 2019-03-08 | End: 2019-06-25

## 2019-03-08 NOTE — TELEPHONE ENCOUNTER
Patient was last seen 1/24/19.    Routing refill request to provider for review/approval because:  Medication is reported/historical  Elevated creatinine    Yanique Fritz RN  [facility]

## 2019-03-13 ENCOUNTER — TRANSFERRED RECORDS (OUTPATIENT)
Dept: HEALTH INFORMATION MANAGEMENT | Facility: CLINIC | Age: 84
End: 2019-03-13

## 2019-03-13 LAB
CREAT SERPL-MCNC: 1.8 MG/DL (ref 0.6–1.3)
GFR SERPL CREATININE-BSD FRML MDRD: 25.6 ML/MIN/1.73M2

## 2019-03-18 DIAGNOSIS — B37.9 CANDIDA INFECTION: ICD-10-CM

## 2019-03-18 NOTE — TELEPHONE ENCOUNTER
"Requested Prescriptions   Pending Prescriptions Disp Refills     nystatin (MYCOSTATIN) 926587 UNIT/GM external cream 30 g 1    Last Written Prescription Date:  11-23-18  Last Fill Quantity: 30g,  # refills: 1   Last office visit: 1/24/2019 with prescribing provider:  11-23-18   Future Office Visit:   Next 5 appointments (look out 90 days)    Mar 19, 2019 12:20 PM CDT  Pre-Op physical with S[facility] MD  [facility] [address] [place] [phone]  [phone]          Sig: Apply topically 3 times daily    Antifungal Agents Failed - 3/18/2019  4:28 PM       Failed - Medication is active on med list       Passed - Recent (12 mo) or future (30 days) visit within the authorizing provider's specialty    Patient had office visit in the last 12 months or has a visit in the next 30 days with authorizing provider or within the authorizing provider's specialty.  See \"Patient Info\" tab in inbasket, or \"Choose Columns\" in Meds & Orders section of the refill encounter.             Passed - Not Fluconazole or Terconazole     If oral Fluconazole or Terconazole, may refill if indicated in progress notes.             "

## 2019-03-19 ENCOUNTER — OFFICE VISIT (OUTPATIENT)
Dept: FAMILY MEDICINE | Facility: CLINIC | Age: 84
End: 2019-03-19
Payer: COMMERCIAL

## 2019-03-19 VITALS
DIASTOLIC BLOOD PRESSURE: 69 MMHG | SYSTOLIC BLOOD PRESSURE: 120 MMHG | HEART RATE: 110 BPM | OXYGEN SATURATION: 96 % | WEIGHT: 130 LBS | BODY MASS INDEX: 25.52 KG/M2 | HEIGHT: 60 IN | TEMPERATURE: 98.9 F

## 2019-03-19 DIAGNOSIS — L29.9 SCALP ITCH: ICD-10-CM

## 2019-03-19 DIAGNOSIS — C34.91 NON-SMALL CELL CANCER OF RIGHT LUNG (H): ICD-10-CM

## 2019-03-19 DIAGNOSIS — N18.30 CKD (CHRONIC KIDNEY DISEASE) STAGE 3, GFR 30-59 ML/MIN (H): ICD-10-CM

## 2019-03-19 DIAGNOSIS — T88.7XXA MEDICATION SIDE EFFECTS: ICD-10-CM

## 2019-03-19 DIAGNOSIS — Z90.5 SOLITARY KIDNEY, ACQUIRED: ICD-10-CM

## 2019-03-19 DIAGNOSIS — I10 HYPERTENSION GOAL BP (BLOOD PRESSURE) < 140/90: ICD-10-CM

## 2019-03-19 DIAGNOSIS — Z95.828 PORT-A-CATH IN PLACE: ICD-10-CM

## 2019-03-19 DIAGNOSIS — Z01.818 PREOP GENERAL PHYSICAL EXAM: Primary | ICD-10-CM

## 2019-03-19 DIAGNOSIS — J44.9 COPD, SEVERE (H): ICD-10-CM

## 2019-03-19 PROCEDURE — 99214 OFFICE O/P EST MOD 30 MIN: CPT | Performed by: INTERNAL MEDICINE

## 2019-03-19 RX ORDER — NYSTATIN 100000 U/G
CREAM TOPICAL 3 TIMES DAILY
Qty: 30 G | Refills: 3 | Status: SHIPPED | OUTPATIENT
Start: 2019-03-19 | End: 2019-08-09

## 2019-03-19 RX ORDER — HYDROXYZINE HYDROCHLORIDE 25 MG/1
25 TABLET, FILM COATED ORAL 2 TIMES DAILY PRN
Qty: 90 TABLET | Refills: 1 | Status: SHIPPED | OUTPATIENT
Start: 2019-03-19 | End: 2019-08-09

## 2019-03-19 RX ORDER — CLOBETASOL PROPIONATE 0.5 MG/G
AEROSOL, FOAM TOPICAL
Qty: 50 G | Refills: 1 | Status: SHIPPED | OUTPATIENT
Start: 2019-03-19 | End: 2019-08-09

## 2019-03-19 ASSESSMENT — MIFFLIN-ST. JEOR: SCORE: 966.18

## 2019-03-19 NOTE — LETTER
My COPD Action Plan     Name: Helena Eldridge    YOB: 1935   Date: 3/17/2019    My doctor: Sushma Nelson MD   My clinic: [address] [address] [phone]  My Controller Medicine: Formoterol/Budesonide (Symbicort)   Dose:      My Rescue Medicine: Albuterol (Proair/Ventolin/Proventil) inhaler   Dose: 2 puffs every 4 hours as needed for wheezing/shortness of breath     My Flare Up Medicine: Prednisone, Azithromycin (Zithromax or Zithromax Z-darren) and call clinic   Dose:       My COPD Severity: Severe = FeV1 < 30%-49%      Use of Oxygen: Oxygen Not Prescribed      Make sure you've had your pneumonia   vaccines.          GREEN ZONE       Doing well today      Usual level of activity and exercise    Usual amount of cough and mucus    No shortness of breath    Usual level of health (thinking clearly, sleeping well, feel like eating) Actions:      Take daily medicines    Use oxygen as prescribed    Follow regular exercise and diet plan    Avoid cigarette smoke and other irritants that harm the lungs           YELLOW ZONE          Having a bad day or flare up      Short of breath more than usual    A lot more sputum (mucus) than usual    Sputum looks yellow, green, tan, brown or bloody    More coughing or wheezing    Fever or chills    Less energy; trouble completing activities    Trouble thinking or focusing    Using quick relief inhaler or nebulizer more often    Poor sleep; symptoms wake me up    Do not feel like eating Actions:      Get plenty of rest    Take daily medicines    Use quick relief inhaler every 4 hours    If you use oxygen, call you doctor to see if you should adjust your oxygen    Do breathing exercises or other things to help you relax    Let a loved one, friend or neighbor know you are feeling worse    Call your care team if you have 2 or more symptoms.  Start taking steroids or antibiotics if directed  by your care team           RED ZONE       Need medical care now      Severe shortness of breath (feel you can't breathe)    Fever, chills    Not enough breath to do any activity    Trouble coughing up mucus, walking or talking    Blood in mucus    Frequent coughing   Rescue medicines are not working    Not able to sleep because of breathing    Feel confused or drowsy    Chest pain    Actions:      Call your health care team.  If you cannot reach your care team, call 911 or go to the emergency room.        Annual Reminders:  Meet with Care Team, Flu Shot every Fall  Pharmacy:    [id number] [place] [address] [facility] - [address]

## 2019-03-19 NOTE — PROGRESS NOTES
[address] [address]  [phone]  Dept: [phone]    PRE-OP EVALUATION:  Today's date: 3/19/2019    Helena Eldridge (: 1935) presents for pre-operative evaluation assessment as requested by Dr. Lazaro.  She requires evaluation and anesthesia risk assessment prior to undergoing surgery/procedure for treatment of eyes .    Proposed Surgery/ Procedure: eye surgery  Date of Surgery/ Procedure: 19  Time of Surgery/ Procedure: 9:50 A.M.  Hospital/Surgical Facility: [facility]   Fax number for surgical facility: [phone] and [phone]  Primary Physician: Sushma Nelson  Type of Anesthesia Anticipated: to be determined    Patient has a Health Care Directive or Living Will:  YES     1. NO - Do you have a history of heart attack, stroke, stent, bypass or surgery on an artery in the head, neck, heart or legs?  2. NO - Do you ever have any pain or discomfort in your chest?  3. NO - Do you have a history of  Heart Failure?  4. NO - Are you troubled by shortness of breath when: walking on the level, up a slight hill or at night?  5. NO - Do you currently have a cold, bronchitis or other respiratory infection?  6. NO - Do you have a cough, shortness of breath or wheezing?  7. NO - Do you sometimes get pains in the calves of your legs when you walk?  8. YES - DO YOU OR ANYONE IN YOUR FAMILY HAVE PREVIOUS HISTORY OF BLOOD CLOTS? During OCP treatment, remote  9. NO - Do you or does anyone in your family have a serious bleeding problem such as prolonged bleeding following surgeries or cuts?  10. YES - HAVE YOU EVER HAD PROBLEMS WITH ANEMIA OR BEEN TOLD TO TAKE IRON PILLS?  remote  11. NO - Have you had any abnormal blood loss such as black, tarry or bloody stools, or abnormal vaginal bleeding?  12. YES - HAVE YOU EVER HAD A BLOOD TRANSFUSION? Last year   13. NO - Have you or any of your relatives ever had problems with  anesthesia?  14. NO - Do you have sleep apnea, excessive snoring or daytime drowsiness?  15. NO - Do you have any prosthetic heart valves?  16. NO - Do you have prosthetic joints?  17. NO - Is there any chance that you may be pregnant?      HPI:     HPI related to upcoming procedure:      82 y/o F here for preop.  She will have laser surgery on both eyes    H/o RUL/Hilar NSCLungCa (s/p low dose carboplatin and pemerexed.....no on Novolumib immunotherapy), L. nephrectomy (stones), osteoporosis, pelvic fx, lichen sclerosis, Gout, CKD 3, hypothryoid, severe COPD   Lichen Sclerosis, severe ....     Since starting Novolumib, she has had a scalp rash and itching all over .  This is very bothersome.         COPD - Patient has a longstanding history of moderate-severe COPD . Patient has been doing well overall noting NO SYMPTOMS and continues on medication regimen consisting of ...not using much...  without adverse reactions or side effects.                                                                                                         .  RENAL INSUFFICIENCY - Patient has a longstanding history of moderate-severe chronic renal insufficiency. Last Cr 1.5 1/5/19.                                                                                                                                                                               .       MEDICAL HISTORY:     Patient Active Problem List    Diagnosis Date Noted     Port-A-Cath in place 03/08/2018     Priority: Medium     Placed in early 2018       Non-small cell cancer of right lung (H) 03/07/2018     Priority: Medium     Immunosuppression (H) 03/07/2018     Priority: Medium     Adjustment disorder with anxiety 12/04/2017     Priority: Medium     Other specified hypothyroidism 05/14/2015     Priority: Medium     Idiopathic chronic gout 05/14/2015     Priority: Medium     IPF (idiopathic pulmonary fibrosis) (H) 02/25/2015     Priority: Medium     Mild, at bases per  CXR 2/2015 (2/15/15, [facility])       COPD, severe (H) 02/16/2015     Priority: Medium     Seen by pulmonary 2013.         Pelvic fracture (H) 08/19/2014     Priority: Medium     Cataracts, both eyes 04/16/2014     Priority: Medium     Macular degeneration of both eyes, right eye greater than left eye 04/16/2014     Priority: Medium     Senile osteoporosis 03/11/2014     Priority: Medium     Osteoarthritis of right knee 03/11/2014     Priority: Medium     Medial meniscus tear 03/11/2014     Priority: Medium     Lichen sclerosus et atrophicus of the vulva 05/08/2013     Priority: Medium     Clobetasol bid is the recommended remedy       Solitary kidney, acquired 05/08/2013     Priority: Medium     Left nephrectomy due to stones (5/8/13, [facility])       Hypertension goal BP (blood pressure) < 140/90 11/28/2011     Priority: Medium     Advance care planning 10/10/2011     Priority: Medium     Advance Care Planning 4/19/2016: Receipt of ACP document:  Received: Health Care Directive which was witnessed or notarized on 2/23/16.  Document previously scanned on 3/2/16.  Validation form completed and sent to be scanned.  Code Status reflects choices in most recent ACP document.  Confirmed/documented designated decision maker(s).  Added by Monique Reyna , Advance Care Planning Liaison  Advance Care Planning 10/10/2011:  Discussed advance care planning with patient; however, patient declined at this time.  Patrica Epps       Glucose intolerance (impaired glucose tolerance) 02/02/2011     Priority: Medium     Kidney stones      Priority: Medium     Partial left nephrectomy around 2008 due to stone  Should get yearly CT (stone protocol ) -- 5/20/15, [facility]       CKD (chronic kidney disease) stage 3, GFR 30-59 ml/min (H) 12/02/2010     Priority: Medium     History of kidney stone with infection.  Creatinine 1.3 - 1.6 (12/2/10, [facility])   -- she can't take ACE / ARB due to side effects.  Will continue with good BP control (12/3/15, [facility])        CARDIOVASCULAR SCREENING; LDL GOAL LESS THAN 100 10/31/2010     Priority: Medium      Past Medical History:   Diagnosis Date     Carpal tunnel syndrome      CKD (chronic kidney disease)     kidney stone with infection     Deep vein thrombosis (DVT) (H)     1972     DVT [id number]     Glucose intolerance      H/O partial nephrectomy      Heel spur      Hypothyroidism      Kidney stones      Lichen sclerosus      Osteoarthritis      Osteoporosis      PID      Pulmonary embolism (H)     1970     Unspecified hypothyroidism      Vitiligo      Past Surgical History:   Procedure Laterality Date     C NEPHRECTOMY      left partial 07-01-07     C VENOUS THROMBOSIS IMAGING      with pe     HC REMOVAL GALLBLADDER       HC REVISE MEDIAN N/CARPAL TUNNEL SURG       TUBAL LIGATION       Current Outpatient Medications   Medication Sig Dispense Refill     amLODIPine (NORVASC) 5 MG tablet Take 1 tablet (5 mg) by mouth daily 90 tablet 3     ammonium lactate (AMLACTIN) 12 % external cream APPLY TOPICALLY TO AFFECTED AREA(S) TWICE DAILY AS NEEDED 280 g 0     ASPIRIN PO Take 81 mg by mouth daily        budesonide-formoterol (SYMBICORT) 80-4.5 MCG/ACT Inhaler Inhale 2 puffs into the lungs 2 times daily 1 Inhaler 10     Calcium Carbonate-Vitamin D (CALCIUM + D) 600-200 MG-UNIT per tablet Take 2 tablets by mouth 2 times daily.       clobetasol (TEMOVATE) 0.05 % cream Apply  topically daily as needed. 45 g 1     clobetasol (TEMOVATE) 0.05 % external ointment APPLY SPARINGLY TO AFFECTED AREA TOPICALLY TWICE DAILY AS NEEDED. DO NOT APPLY TO FACE. 45 g 1     diphenhydrAMINE (BENADRYL) 25 MG tablet Take 25 mg by mouth At Bedtime        DOCUSATE SODIUM PO Take 100 mg by mouth At Bedtime       estradiol (ESTRACE) 0.1 MG/GM cream Place 2 g vaginally twice a week 42.5 g 1     furosemide (LASIX) 20 MG tablet TAKE ONE TABLET BY MOUTH TWICE DAILY IN THE MORNING AND LUNCH TIME 180 tablet 1     ketoconazole (NIZORAL) 2 % cream Apply  topically daily.  15 g 3     levothyroxine (SYNTHROID/LEVOTHROID) 100 MCG tablet TAKE ONE TABLET BY MOUTH ONCE DAILY EXCEPT  90 tablet 1     Multiple Vitamins-Minerals (OCUVITE ADULT 50+) CAPS Take 1 capsule by mouth daily 30 capsule 12     nystatin (MYCOSTATIN) 850591 UNIT/GM external cream Apply topically 3 times daily 30 g 3     omeprazole (PRILOSEC) 40 MG capsule TAKE 1 CAPSULE BY MOUTH DAILY 90 capsule 3     order for DME Equipment being ordered:   [facility] at [facility]. 1 each 0     ranitidine (ZANTAC) 150 MG tablet TAKE 1 TABLET BY MOUTH DAILY IN THE AFTERNOON FOR A MONTH, THEN AS NEEDED 60 tablet 1     rOPINIRole (REQUIP) 0.25 MG tablet TAKE ONE TO TWO TABLETS BY MOUTH NIGHTLY AT BEDTIME 180 tablet 1     sodium chloride 0.9 % SOLN 100 mL with nivolumab 100 MG/10ML SOLN Inject into the vein once for 1 dose       STATIN NOT PRESCRIBED, INTENTIONAL, by Other route continuous prn. Patient declined 12  0     allopurinol (ZYLOPRIM) 100 MG tablet TAKE 2 TABLETS BY MOUTH EVERY  tablet 1     OTC products: None, except as noted above    Allergies   Allergen Reactions     Ace Inhibitors Cough     Advicor      Celexa [Citalopram] GI Disturbance     diarrhea     Doxycycline Nausea     Poor appetite     Fosamax      Severe substernal burning and dysphagia within 3 days     Valsartan Cramps     severe      Latex Allergy: NO    Social History     Tobacco Use     Smoking status: Former Smoker     Types: Cigarettes     Last attempt to quit: 1969     Years since quittin.3     Smokeless tobacco: Never Used   Substance Use Topics     Alcohol use: Yes     Alcohol/week: 0.0 oz     Comment: very little      History   Drug Use No       REVIEW OF SYSTEMS:   CONSTITUTIONAL: NEGATIVE for fever, chills, change in weight  ENT/MOUTH: NEGATIVE for ear, mouth and throat problems  RESP: NEGATIVE for significant cough or SOB  CV: NEGATIVE for chest pain, palpitations or peripheral edema  GI: NEGATIVE for nausea, abdominal  pain, heartburn, or change in bowel habits  PSYCHIATRIC: NEGATIVE for changes in mood or affect    EXAM:   /69 (BP Location: Right arm, Patient Position: Sitting, Cuff Size: Adult Regular)   Pulse 110   Temp 98.9  F (37.2  C) (Oral)   Ht 1.524 m (5')   Wt 59 kg (130 lb)   SpO2 96%   BMI 25.39 kg/m    GENERAL APPEARANCE: healthy, alert and no distress  HENT: ear canals and TM's normal and nose and mouth without ulcers or lesions  RESP: lungs clear to auscultation - no rales, rhonchi or wheezes  CV: regular rate and rhythm, normal S1 S2, no S3 or S4 and no murmur, click or rub   ABDOMEN: soft, nontender, no HSM or masses and bowel sounds normal  SKIN:   Rash on right side of scalp, plaques.  Excoriations on back and bra- line.   NEURO: Normal strength and tone, sensory exam grossly normal, mentation intact and speech normal    DIAGNOSTICS:   No labs or EKG required for low risk surgery (cataract, skin procedure, breast biopsy, etc)    Recent Labs   Lab Test 01/05/19 01/03/19 11/23/18  1453 08/20/18  1448 06/21/18  0946 02/13/18  1337   HGB  --   --   --  11.6*  --  12.0   PLT  --   --   --  229  --  358   NA  --   --  139 141  --   --    POTASSIUM  --  4.1 3.7 3.8  --   --    CR 1.52*  --  1.56* 1.36*  --   --    A1C  --   --   --  4.9 5.3  --         IMPRESSION:   Reason for surgery/procedure: removing a film from eyes, using laser.   Diagnosis/reason for consult:   Med clearance    The proposed surgical procedure is considered LOW risk.    REVISED CARDIAC RISK INDEX  The patient has the following serious cardiovascular risks for perioperative complications such as (MI, PE, VFib and 3  AV Block):  No serious cardiac risks  INTERPRETATION: 0 risks: Class I (very low risk - 0.4% complication rate)    The patient has the following additional risks for perioperative complications:  No identified additional risks      ICD-10-CM    1. CKD (chronic kidney disease) stage 3, GFR 30-59 ml/min (H) N18.3    2.  Hypertension goal BP (blood pressure) < 140/90 I10    3. Solitary kidney, acquired Z90.5    4. COPD, severe (H) J44.9    5. Non-small cell cancer of right lung (H) C34.91    6. Port-A-Cath in place Z95.828    7. Preop general physical exam Z01.818        RECOMMENDATIONS:         --Patient is to take all scheduled medications on the day of surgery      APPROVAL GIVEN to proceed with proposed procedure, without further diagnostic evaluation       Signed Electronically by: Sushma Nelson MD    Copy of this evaluation report is provided to requesting physician.    [facility] Preop Guidelines    Revised Cardiac Risk Index

## 2019-03-19 NOTE — LETTER
My COPD Action Plan     Name: Helena Eldridge    YOB: 1935   Date: 3/19/2019    My doctor: Sushma Nelson MD   My clinic: [address] [address] [phone]  My Controller Medicine: Formoterol/Budesonide (Symbicort)   Dose:       My Rescue Medicine: Albuterol (Proair/Ventolin/Proventil) inhaler   Dose:       My Flare Up Medicine: Prednisone and call clinic   Dose:       My COPD Severity: Severe = FeV1 < 30%-49%      Use of Oxygen: Oxygen Not Prescribed      Make sure you've had your pneumonia   vaccines.          GREEN ZONE       Doing well today      Usual level of activity and exercise    Usual amount of cough and mucus    No shortness of breath    Usual level of health (thinking clearly, sleeping well, feel like eating) Actions:      Take daily medicines    Use oxygen as prescribed    Follow regular exercise and diet plan    Avoid cigarette smoke and other irritants that harm the lungs           YELLOW ZONE          Having a bad day or flare up      Short of breath more than usual    A lot more sputum (mucus) than usual    Sputum looks yellow, green, tan, brown or bloody    More coughing or wheezing    Fever or chills    Less energy; trouble completing activities    Trouble thinking or focusing    Using quick relief inhaler or nebulizer more often    Poor sleep; symptoms wake me up    Do not feel like eating Actions:      Get plenty of rest    Take daily medicines    Use quick relief inhaler every 4 hours    If you use oxygen, call you doctor to see if you should adjust your oxygen    Do breathing exercises or other things to help you relax    Let a loved one, friend or neighbor know you are feeling worse    Call your care team if you have 2 or more symptoms.  Start taking steroids or antibiotics if directed by your care team           RED ZONE       Need medical care now      Severe shortness of breath (feel you  can't breathe)    Fever, chills    Not enough breath to do any activity    Trouble coughing up mucus, walking or talking    Blood in mucus    Frequent coughing   Rescue medicines are not working    Not able to sleep because of breathing    Feel confused or drowsy    Chest pain    Actions:      Call your health care team.  If you cannot reach your care team, call 911 or go to the emergency room.        Annual Reminders:  Meet with Care Team, Flu Shot every Fall  Pharmacy:    [id number] [place] [address] [address]  [facility] - [facility], [place] - [address] [place] [address] DRUG STORE [address] [place] [address] AVE [address] AT [address]

## 2019-03-19 NOTE — PATIENT INSTRUCTIONS
Atarax (Hydroxyzine) 25 mg up to twice daily for itch (sedating)  Steroid Foam for your scalp as needed  Talk to Dr Hsieh about the itching.     Hold Lasix (furosemide) for one day.  Push some fluids.         Before Your Surgery      Call your surgeon if there is any change in your health. This includes signs of a cold or flu (such as a sore throat, runny nose, cough, rash or fever).    Do not smoke, drink alcohol or take over the counter medicine (unless your surgeon or primary care doctor tells you to) for the 24 hours before and after surgery.    If you take prescribed drugs: Follow your doctor s orders about which medicines to take and which to stop until after surgery.    Eating and drinking prior to surgery: follow the instructions from your surgeon    Take a shower or bath the night before surgery. Use the soap your surgeon gave you to gently clean your skin. If you do not have soap from your surgeon, use your regular soap. Do not shave or scrub the surgery site.  Wear clean pajamas and have clean sheets on your bed.

## 2019-03-21 ENCOUNTER — TELEPHONE (OUTPATIENT)
Dept: FAMILY MEDICINE | Facility: CLINIC | Age: 84
End: 2019-03-21

## 2019-03-21 NOTE — TELEPHONE ENCOUNTER
Prior Authorization Retail Medication Request    Medication/Dose: Hydroxyzine HCL 25 MG tablets   ICD code (if different than what is on RX):  L29.9  Previously Tried and Failed:  ?  Rationale:  Scalp itching    Insurance Name:  [facility]  Insurance ID:  [id number]       Pharmacy Information (if different than what is on RX)  Name:  [facility]  Phone:  [phone]

## 2019-03-26 NOTE — TELEPHONE ENCOUNTER
Central Prior Authorization Team   Phone: [phone]    PA Initiation    Medication: Hydroxyzine HCL 25 MG tablets   Insurance Company: [employer] - Phone [phone] Fax [phone]  Pharmacy Filling the Rx: [facility] [address] [place] [address] [address] AT [facility] OF [address]  Filling Pharmacy Phone: [phone]  Filling Pharmacy Fax:    Start Date: 3/26/2019    Key: [id number] - PA Case ID: [id number]

## 2019-03-26 NOTE — TELEPHONE ENCOUNTER
Prior Authorization Approval    Authorization Effective Date: 2/24/2019  Authorization Expiration Date: 3/25/2020  Medication: Hydroxyzine HCL 25 MG tablets   Approved Dose/Quantity:    Reference #:     Insurance Company: [employer] - Phone [phone] Fax [phone]  Expected CoPay:       CoPay Card Available:      Foundation Assistance Needed:    Which Pharmacy is filling the prescription (Not needed for infusion/clinic administered): [facility] [address] [place] [address] [address] AT [facility] OF [address] [facility]  Pharmacy Notified: Yes  Patient Notified: Yes

## 2019-04-03 DIAGNOSIS — K21.00 GASTROESOPHAGEAL REFLUX DISEASE WITH ESOPHAGITIS: ICD-10-CM

## 2019-04-03 NOTE — TELEPHONE ENCOUNTER
Prescription approved per [facility] Refill Protocol.  Aby Dunne RN

## 2019-04-03 NOTE — TELEPHONE ENCOUNTER
"Requested Prescriptions   Pending Prescriptions Disp Refills     ranitidine (ZANTAC) 150 MG tablet [Pharmacy Med Name: RANITIDINE 150 MG TABLET] 60 tablet 1    Last Written Prescription Date:  11-26-18  Last Fill Quantity: 60,  # refills: 1   Last office visit: 3/19/2019 with prescribing provider:     Future Office Visit:     Sig: TAKE 1 TABLET BY MOUTH DAILY IN THE AFTERNOON FOR A MONTH, THEN AS NEEDED    H2 Blockers Protocol Passed - 4/3/2019  1:16 AM       Passed - Patient is age 12 or older       Passed - Recent (12 mo) or future (30 days) visit within the authorizing provider's specialty    Patient had office visit in the last 12 months or has a visit in the next 30 days with authorizing provider or within the authorizing provider's specialty.  See \"Patient Info\" tab in inbasket, or \"Choose Columns\" in Meds & Orders section of the refill encounter.             Passed - Medication is active on med list          "

## 2019-04-17 ENCOUNTER — TRANSFERRED RECORDS (OUTPATIENT)
Dept: HEALTH INFORMATION MANAGEMENT | Facility: CLINIC | Age: 84
End: 2019-04-17

## 2019-04-18 ENCOUNTER — TELEPHONE (OUTPATIENT)
Dept: FAMILY MEDICINE | Facility: CLINIC | Age: 84
End: 2019-04-18
Payer: COMMERCIAL

## 2019-04-18 DIAGNOSIS — R25.2 CRAMP OF LIMB: ICD-10-CM

## 2019-04-18 RX ORDER — ROPINIROLE 0.25 MG/1
TABLET, FILM COATED ORAL
Qty: 180 TABLET | Refills: 1 | Status: SHIPPED | OUTPATIENT
Start: 2019-04-18 | End: 2019-10-11

## 2019-04-18 NOTE — TELEPHONE ENCOUNTER
"Requested Prescriptions   Pending Prescriptions Disp Refills     rOPINIRole (REQUIP) 0.25 MG tablet 180 tablet 1     Sig: TAKE ONE TO TWO TABLETS BY MOUTH NIGHTLY AT BEDTIME       Last Written Prescription Date:  4/12/18  Last Fill Quantity: 180,  # refills: 1   Last office visit: 3/19/2019 with prescribing provider:  3/19/19   Future Office Visit:        Antiparkinson's Agents Protocol Failed - 4/18/2019  4:09 PM        Failed - ALT on record in past 12 months         Recent Labs   Lab Test 05/02/14  0823   ALT 27             Passed - Blood pressure under 140/90 in past 12 months     BP Readings from Last 3 Encounters:   03/19/19 120/69   01/24/19 107/62   01/09/19 120/65                 Passed - CBC on record in past 12 months     Recent Labs   Lab Test 08/20/18  1448   WBC 7.2   RBC 3.09*   HGB 11.6*   HCT 33.8*                    Passed - Serum Creatinine on file in past 12 months     Recent Labs   Lab Test 03/13/19 06/14/18  1302   CR 1.8*   < >  --    CREAT  --   --  1.4*    < > = values in this interval not displayed.             Passed - Medication is active on med list        Passed - Patient is age 18 or older        Passed - No active pregnancy on record        Passed - No positive pregnancy test in the past 12 months        Passed - Recent (6 mo) or future (30 days) visit within the authorizing provider's specialty     Patient had office visit in the last 6 months or has a visit in the next 30 days with authorizing provider or within the authorizing provider's specialty.  See \"Patient Info\" tab in inbasket, or \"Choose Columns\" in Meds & Orders section of the refill encounter.              "

## 2019-04-18 NOTE — TELEPHONE ENCOUNTER
Routing refill request to provider for review/approval because:  Labs not current:  ALT    Elevated creatinine    Yanique Fritz RN  [facility]

## 2019-04-18 NOTE — TELEPHONE ENCOUNTER
Reason for Call:  Medication or medication refill:    Do you use a [facility]?  Name of the pharmacy and phone number for the current request:  [email or web address] [facility] [phone]    Name of the medication requested: rOPINIRole (REQUIP)    Other request: Patient called and stated she needs this medication ASAP.    Can we leave a detailed message on this number? YES    Phone number patient can be reached at: Home number on file [phone] (home)    Best Time: ASAP    Call taken on 4/18/2019 at 3:58 PM by Cristina Rodgers

## 2019-05-02 ENCOUNTER — TRANSFERRED RECORDS (OUTPATIENT)
Dept: HEALTH INFORMATION MANAGEMENT | Facility: CLINIC | Age: 84
End: 2019-05-02

## 2019-05-04 DIAGNOSIS — L85.3 DRY SKIN DERMATITIS: ICD-10-CM

## 2019-05-06 RX ORDER — AMMONIUM LACTATE 12 G/100G
CREAM TOPICAL
Qty: 280 G | Refills: 1 | Status: SHIPPED | OUTPATIENT
Start: 2019-05-06 | End: 2020-07-09

## 2019-05-06 NOTE — TELEPHONE ENCOUNTER
"Requested Prescriptions   Pending Prescriptions Disp Refills     ammonium lactate (AMLACTIN) 12 % external cream [Pharmacy Med Name: AMMONIUM LACTATE 12% CREAM 140GM] 280 g 0     Sig: APPLY TOPICALLY TO THE AFFECTED AREA TWICE DAILY AS NEEDED   Last Written Prescription Date:  2/14/19  Last Fill Quantity: 280,  # refills: 0   Last office visit: 3/19/2019 with prescribing provider:     Future Office Visit:   Next 5 appointments (look out 90 days)    May 07, 2019 11:30 AM CDT  [facility] Emiliano Dye  [facility] [address] [place] [id number]  [phone]   Jun 25, 2019 11:00 AM CDT  PHYSICAL with Sushma Nelson MD  [facility] [address] [facility] [id number] [phone]             Miscellaneous Dermatologic Agents Passed - 5/4/2019 12:47 PM        Passed - Recent (12 mo) or future (30 days) visit within the authorizing provider's specialty     Patient had office visit in the last 12 months or has a visit in the next 30 days with authorizing provider or within the authorizing provider's specialty.  See \"Patient Info\" tab in inbasket, or \"Choose Columns\" in Meds & Orders section of the refill encounter.              Passed - Refill request is not for Imiquimod, 5-Fluorouracil, or Finasteride      If Imiquimod, 5-Fluorouracil, or Finasteride, may refill if indicated in progress notes.           Passed - Medication is active on med list        Passed - Patient is 24 mos old or older          "

## 2019-05-07 ENCOUNTER — OFFICE VISIT (OUTPATIENT)
Dept: AUDIOLOGY | Facility: CLINIC | Age: 84
End: 2019-05-07

## 2019-05-07 DIAGNOSIS — H90.3 SENSORINEURAL HEARING LOSS, BILATERAL: Primary | ICD-10-CM

## 2019-05-07 PROCEDURE — V5299 HEARING SERVICE: HCPCS | Performed by: AUDIOLOGIST

## 2019-05-07 PROCEDURE — 99207 ZZC NO CHARGE LOS: CPT | Performed by: AUDIOLOGIST

## 2019-05-13 DIAGNOSIS — M1A.0790 IDIOPATHIC CHRONIC GOUT OF FOOT WITHOUT TOPHUS, UNSPECIFIED LATERALITY: ICD-10-CM

## 2019-05-13 NOTE — TELEPHONE ENCOUNTER
"Requested Prescriptions   Pending Prescriptions Disp Refills     allopurinol (ZYLOPRIM) 100 MG tablet 180 tablet 1   Last Written Prescription Date:  1-14-19  Last Fill Quantity: 180,  # refills: 1   Last office visit: 3/19/2019 with prescribing provider:  3-19-19   Future Office Visit:   Next 5 appointments (look out 90 days)    Jun 25, 2019 11:00 AM CDT  PHYSICAL with Sushma [facility] MD  [facility] [address] [place] [phone] [phone]             Gout Agents Protocol Failed - 5/13/2019  3:36 PM        Failed - ALT on file in past 12 months     Recent Labs   Lab Test 05/02/14  0823   ALT 27             Failed - Has Uric Acid on file in past 12 months and value is less than 6     Recent Labs   Lab Test 06/01/17  1436   URIC 4.9     If level is 6mg/dL or greater, ok to refill one time and refer to provider.           Failed - Normal serum creatinine on file in the past 12 months     Recent Labs   Lab Test 03/13/19 06/14/18  1302   CR 1.8*   < >  --    CREAT  --   --  1.4*    < > = values in this interval not displayed.             Passed - CBC on file in past 12 months     Recent Labs   Lab Test 08/20/18  1448   WBC 7.2   RBC 3.09*   HGB 11.6*   HCT 33.8*                    Passed - Recent (12 mo) or future (30 days) visit within the authorizing provider's specialty     Patient had office visit in the last 12 months or has a visit in the next 30 days with authorizing provider or within the authorizing provider's specialty.  See \"Patient Info\" tab in inbasket, or \"Choose Columns\" in Meds & Orders section of the refill encounter.              Passed - Medication is active on med list        Passed - Patient is age 18 or older        Passed - No active pregnancy on record        Passed - No positive pregnancy test in past year          "

## 2019-05-14 RX ORDER — ALLOPURINOL 100 MG/1
TABLET ORAL
Qty: 180 TABLET | Refills: 2 | Status: SHIPPED | OUTPATIENT
Start: 2019-05-14 | End: 2019-08-09

## 2019-05-15 ENCOUNTER — TRANSFERRED RECORDS (OUTPATIENT)
Dept: HEALTH INFORMATION MANAGEMENT | Facility: CLINIC | Age: 84
End: 2019-05-15

## 2019-06-07 ENCOUNTER — ANCILLARY PROCEDURE (OUTPATIENT)
Dept: MAMMOGRAPHY | Facility: CLINIC | Age: 84
End: 2019-06-07
Payer: COMMERCIAL

## 2019-06-07 DIAGNOSIS — Z12.31 VISIT FOR SCREENING MAMMOGRAM: ICD-10-CM

## 2019-06-07 PROCEDURE — 77067 SCR MAMMO BI INCL CAD: CPT | Mod: TC

## 2019-06-12 ENCOUNTER — TRANSFERRED RECORDS (OUTPATIENT)
Dept: HEALTH INFORMATION MANAGEMENT | Facility: CLINIC | Age: 84
End: 2019-06-12

## 2019-06-12 LAB
CREAT SERPL-MCNC: 1.8 MG/DL (ref 0.6–1.3)
GFR SERPL CREATININE-BSD FRML MDRD: 25.5 ML/MIN/1.73M2

## 2019-06-25 ENCOUNTER — OFFICE VISIT (OUTPATIENT)
Dept: FAMILY MEDICINE | Facility: CLINIC | Age: 84
End: 2019-06-25
Payer: COMMERCIAL

## 2019-06-25 VITALS
OXYGEN SATURATION: 93 % | SYSTOLIC BLOOD PRESSURE: 110 MMHG | WEIGHT: 132 LBS | HEIGHT: 60 IN | TEMPERATURE: 98.3 F | DIASTOLIC BLOOD PRESSURE: 65 MMHG | HEART RATE: 97 BPM | BODY MASS INDEX: 25.91 KG/M2

## 2019-06-25 DIAGNOSIS — R73.02 GLUCOSE INTOLERANCE (IMPAIRED GLUCOSE TOLERANCE): ICD-10-CM

## 2019-06-25 DIAGNOSIS — Z00.01 ENCOUNTER FOR GENERAL ADULT MEDICAL EXAMINATION WITH ABNORMAL FINDINGS: Primary | ICD-10-CM

## 2019-06-25 DIAGNOSIS — N18.30 CKD (CHRONIC KIDNEY DISEASE) STAGE 3, GFR 30-59 ML/MIN (H): ICD-10-CM

## 2019-06-25 DIAGNOSIS — E03.8 OTHER SPECIFIED HYPOTHYROIDISM: ICD-10-CM

## 2019-06-25 DIAGNOSIS — Z95.828 PORT-A-CATH IN PLACE: ICD-10-CM

## 2019-06-25 DIAGNOSIS — I10 ESSENTIAL HYPERTENSION WITH GOAL BLOOD PRESSURE LESS THAN 140/90: ICD-10-CM

## 2019-06-25 DIAGNOSIS — N90.4 LICHEN SCLEROSUS ET ATROPHICUS OF THE VULVA: ICD-10-CM

## 2019-06-25 DIAGNOSIS — J44.9 COPD, SEVERE (H): ICD-10-CM

## 2019-06-25 DIAGNOSIS — C34.91 NON-SMALL CELL CANCER OF RIGHT LUNG (H): ICD-10-CM

## 2019-06-25 DIAGNOSIS — Z90.5 SOLITARY KIDNEY, ACQUIRED: ICD-10-CM

## 2019-06-25 DIAGNOSIS — L40.9 SCALP PSORIASIS: ICD-10-CM

## 2019-06-25 DIAGNOSIS — I10 HYPERTENSION GOAL BP (BLOOD PRESSURE) < 140/90: ICD-10-CM

## 2019-06-25 DIAGNOSIS — Z13.6 CARDIOVASCULAR SCREENING; LDL GOAL LESS THAN 100: ICD-10-CM

## 2019-06-25 LAB
ANION GAP SERPL CALCULATED.3IONS-SCNC: 8 MMOL/L (ref 3–14)
BUN SERPL-MCNC: 37 MG/DL (ref 7–30)
CALCIUM SERPL-MCNC: 9.3 MG/DL (ref 8.5–10.1)
CHLORIDE SERPL-SCNC: 102 MMOL/L (ref 94–109)
CHOLEST SERPL-MCNC: 207 MG/DL
CO2 SERPL-SCNC: 27 MMOL/L (ref 20–32)
CREAT SERPL-MCNC: 2.07 MG/DL (ref 0.52–1.04)
GFR SERPL CREATININE-BSD FRML MDRD: 22 ML/MIN/{1.73_M2}
GLUCOSE SERPL-MCNC: 112 MG/DL (ref 70–99)
HBA1C MFR BLD: 5.5 % (ref 0–5.6)
HDLC SERPL-MCNC: 35 MG/DL
LDLC SERPL CALC-MCNC: 97 MG/DL
NONHDLC SERPL-MCNC: 172 MG/DL
POTASSIUM SERPL-SCNC: 4.3 MMOL/L (ref 3.4–5.3)
SODIUM SERPL-SCNC: 137 MMOL/L (ref 133–144)
TRIGL SERPL-MCNC: 376 MG/DL
TSH SERPL DL<=0.005 MIU/L-ACNC: 2.5 MU/L (ref 0.4–4)

## 2019-06-25 PROCEDURE — 80048 BASIC METABOLIC PNL TOTAL CA: CPT | Performed by: INTERNAL MEDICINE

## 2019-06-25 PROCEDURE — 36415 COLL VENOUS BLD VENIPUNCTURE: CPT | Performed by: INTERNAL MEDICINE

## 2019-06-25 PROCEDURE — 80061 LIPID PANEL: CPT | Performed by: INTERNAL MEDICINE

## 2019-06-25 PROCEDURE — 99397 PER PM REEVAL EST PAT 65+ YR: CPT | Performed by: INTERNAL MEDICINE

## 2019-06-25 PROCEDURE — 84443 ASSAY THYROID STIM HORMONE: CPT | Performed by: INTERNAL MEDICINE

## 2019-06-25 PROCEDURE — 83036 HEMOGLOBIN GLYCOSYLATED A1C: CPT | Performed by: INTERNAL MEDICINE

## 2019-06-25 PROCEDURE — 99213 OFFICE O/P EST LOW 20 MIN: CPT | Mod: 25 | Performed by: INTERNAL MEDICINE

## 2019-06-25 RX ORDER — AMLODIPINE BESYLATE 5 MG/1
5 TABLET ORAL DAILY
Qty: 90 TABLET | Refills: 3 | Status: SHIPPED | OUTPATIENT
Start: 2019-06-25 | End: 2019-08-09

## 2019-06-25 RX ORDER — LEVOTHYROXINE SODIUM 100 UG/1
TABLET ORAL
Qty: 90 TABLET | Refills: 3 | Status: SHIPPED | OUTPATIENT
Start: 2019-06-25 | End: 2020-07-24

## 2019-06-25 RX ORDER — ESTRADIOL 0.1 MG/G
2 CREAM VAGINAL
Qty: 42.5 G | Refills: 3 | Status: SHIPPED | OUTPATIENT
Start: 2019-06-27 | End: 2020-11-12

## 2019-06-25 RX ORDER — TRIAMCINOLONE ACETONIDE 5 MG/G
OINTMENT TOPICAL 2 TIMES DAILY
COMMUNITY
End: 2019-08-09

## 2019-06-25 RX ORDER — CLOBETASOL PROPIONATE 0.5 MG/ML
SOLUTION TOPICAL 2 TIMES DAILY
Qty: 50 ML | Refills: 3 | Status: SHIPPED | OUTPATIENT
Start: 2019-06-25 | End: 2019-08-09

## 2019-06-25 RX ORDER — FUROSEMIDE 20 MG
TABLET ORAL
Qty: 180 TABLET | Refills: 3 | Status: SHIPPED | OUTPATIENT
Start: 2019-06-25 | End: 2019-07-05

## 2019-06-25 ASSESSMENT — MIFFLIN-ST. JEOR: SCORE: 975.25

## 2019-06-25 NOTE — PATIENT INSTRUCTIONS
Consider trial clobetasol solution for scalp    You can put some of that triamcinolone cream to your ear canals, and around A      Return to clinic 4 - 6 months for routine follow up

## 2019-06-25 NOTE — PROGRESS NOTES
SUBJECTIVE:   Helena Eldridge is a 83 year old female who presents for Preventive Visit.    82 y/o F here for AFE.   H/o RUL/Hilar NSCLungCa (s/p low dose carboplatin and pemerexed.....recently d/c Novolumib immunotherapy due to pruritis and yeast infections.... has CT imaging surveillance next month), L. nephrectomy (stones), osteoporosis, pelvic fx, lichen sclerosis, Gout, CKD 3, hypothryoid, severe COPD   Lichen Sclerosis, severe (asymptomatic) COPD  Are you in the first 12 months of your Medicare coverage?  No    HPI  Do you feel safe in your environment? Yes    Do you have a Health Care Directive? Yes: Advance Directive has been received and scanned.      Fall risk       Cognitive Screening   1) Repeat 3 items (Leader, Season, Table)    2) Clock draw: NORMAL  3) 3 item recall: Recalls 3 objects  Results: 3 items recalled: COGNITIVE IMPAIRMENT LESS LIKELY    Mini-CogTM Copyright S Dorina. Licensed by the author for use in [facility] [employer]; reprinted with permission (dleilah@[facility]). All rights reserved.      Do you have sleep apnea, excessive snoring or daytime drowsiness?: yes-Daytime drowsiness    Reviewed and updated as needed this visit by clinical staff         Reviewed and updated as needed this visit by Provider        Social History     Tobacco Use     Smoking status: Former Smoker     Types: Cigarettes     Last attempt to quit: 1969     Years since quittin.5     Smokeless tobacco: Never Used   Substance Use Topics     Alcohol use: Yes     Alcohol/week: 0.0 oz     Comment: very little      If you drink alcohol do you typically have >3 drinks per day or >7 drinks per week? No    Alcohol Use 2018   Prescreen: >3 drinks/day or >7 drinks/week? No   Prescreen: >3 drinks/day or >7 drinks/week? -       Itching all over  improving    Current providers sharing in care for this patient include:   Patient Care Team:  Sushma Nelson MD as PCP - General  Sushma Nelson MD as Assigned  PCP    The following health maintenance items are reviewed in Epic and correct as of today:  Health Maintenance   Topic Date Due     MEDICARE ANNUAL WELLNESS VISIT  06/01/2018     LIPID  05/08/2019     MICROALBUMIN  08/20/2019     BMP  11/23/2019     TSH W/FREE T4 REFLEX  11/23/2019     FALL RISK ASSESSMENT  01/16/2020     EYE EXAM  03/01/2020     DTAP/TDAP/TD IMMUNIZATION (3 - Td) 04/30/2020     DEXA  06/01/2020     ADVANCE CARE PLANNING  04/19/2021     SPIROMETRY  Completed     COPD ACTION PLAN  Completed     INFLUENZA VACCINE  Completed     ZOSTER IMMUNIZATION  Completed     IPV IMMUNIZATION  Aged Out     MENINGITIS IMMUNIZATION  Aged Out     Lab work is in process  Labs reviewed in EPIC  BP Readings from Last 3 Encounters:   06/25/19 110/65   03/19/19 120/69   01/24/19 107/62    Wt Readings from Last 3 Encounters:   06/25/19 59.9 kg (132 lb)   03/19/19 59 kg (130 lb)   01/24/19 59 kg (130 lb)                  Patient Active Problem List   Diagnosis     CARDIOVASCULAR SCREENING; LDL GOAL LESS THAN 100     CKD (chronic kidney disease) stage 3, GFR 30-59 ml/min (H)     Glucose intolerance (impaired glucose tolerance)     Kidney stones     Advance care planning     Hypertension goal BP (blood pressure) < 140/90     Lichen sclerosus et atrophicus of the vulva     Solitary kidney, acquired     Senile osteoporosis     Osteoarthritis of right knee     Medial meniscus tear     Cataracts, both eyes     Macular degeneration of both eyes, right eye greater than left eye     Pelvic fracture (H)     COPD, severe (H)     IPF (idiopathic pulmonary fibrosis) (H)     Other specified hypothyroidism     Idiopathic chronic gout     Adjustment disorder with anxiety     Non-small cell cancer of right lung (H)     Immunosuppression (H)     Port-A-Cath in place     Past Surgical History:   Procedure Laterality Date     C NEPHRECTOMY      left partial 07-01-07     C VENOUS THROMBOSIS IMAGING      with pe     HC REMOVAL GALLBLADDER        HC REVISE MEDIAN N/CARPAL TUNNEL SURG       TUBAL LIGATION         Social History     Tobacco Use     Smoking status: Former Smoker     Types: Cigarettes     Last attempt to quit: 1969     Years since quittin.5     Smokeless tobacco: Never Used   Substance Use Topics     Alcohol use: Yes     Alcohol/week: 0.0 oz     Comment: very little      Family History   Problem Relation Age of Onset     Cancer Brother      Glaucoma Mother      Alzheimer Disease Brother      Macular Degeneration No family hx of          Current Outpatient Medications   Medication Sig Dispense Refill     allopurinol (ZYLOPRIM) 100 MG tablet TAKE 2 TABLETS BY MOUTH EVERY  tablet 2     amLODIPine (NORVASC) 5 MG tablet Take 1 tablet (5 mg) by mouth daily 90 tablet 3     Calcium Carbonate-Vitamin D (CALCIUM + D) 600-200 MG-UNIT per tablet Take 2 tablets by mouth 2 times daily.       diphenhydrAMINE (BENADRYL) 25 MG tablet Take 25 mg by mouth At Bedtime        DOCUSATE SODIUM PO Take 100 mg by mouth At Bedtime       [START ON 2019] estradiol (ESTRACE) 0.1 MG/GM vaginal cream Place 2 g vaginally twice a week 42.5 g 3     furosemide (LASIX) 20 MG tablet TAKE ONE TABLET BY MOUTH TWICE DAILY IN THE MORNING AND LUNCH TIME 180 tablet 3     hydrOXYzine (ATARAX) 25 MG tablet Take 1 tablet (25 mg) by mouth 2 times daily as needed for itching 90 tablet 1     levothyroxine (SYNTHROID/LEVOTHROID) 100 MCG tablet TAKE ONE TABLET BY MOUTH ONCE DAILY EXCEPT  90 tablet 3     Multiple Vitamins-Minerals (OCUVITE ADULT 50+) CAPS Take 1 capsule by mouth daily 30 capsule 12     omeprazole (PRILOSEC) 40 MG capsule TAKE 1 CAPSULE BY MOUTH DAILY 90 capsule 3     ranitidine (ZANTAC) 150 MG tablet TAKE 1 TABLET BY MOUTH DAILY IN THE AFTERNOON FOR A MONTH, THEN AS NEEDED 60 tablet 1     rOPINIRole (REQUIP) 0.25 MG tablet TAKE ONE TO TWO TABLETS BY MOUTH NIGHTLY AT BEDTIME 180 tablet 1     STATIN NOT PRESCRIBED, INTENTIONAL, by Other route continuous  prn. Patient declined 5/4/12  0     triamcinolone (KENALOG) 0.5 % external ointment Apply topically 2 times daily       ammonium lactate (AMLACTIN) 12 % external cream APPLY TOPICALLY TO THE AFFECTED AREA TWICE DAILY AS NEEDED (Patient not taking: Reported on 6/25/2019) 280 g 1     ASPIRIN PO Take 81 mg by mouth daily        budesonide-formoterol (SYMBICORT) 80-4.5 MCG/ACT Inhaler Inhale 2 puffs into the lungs 2 times daily (Patient not taking: Reported on 6/25/2019) 1 Inhaler 10     clobetasol (TEMOVATE) 0.05 % cream Apply  topically daily as needed. (Patient not taking: Reported on 6/25/2019) 45 g 1     clobetasol (TEMOVATE) 0.05 % external ointment APPLY SPARINGLY TO AFFECTED AREA TOPICALLY TWICE DAILY AS NEEDED. DO NOT APPLY TO FACE. (Patient not taking: Reported on 6/25/2019) 45 g 1     clobetasol propionate (OLUX) 0.05 % external foam Apply to scalp rash up to twice daily as needed 50 g 1     ketoconazole (NIZORAL) 2 % cream Apply  topically daily. (Patient not taking: Reported on 6/25/2019) 15 g 3     nystatin (MYCOSTATIN) 367657 UNIT/GM external cream Apply topically 3 times daily (Patient not taking: Reported on 6/25/2019) 30 g 3     order for DME Equipment being ordered:   Bassam bar [facility] at [facility]. 1 each 0     sodium chloride 0.9 % SOLN 100 mL with nivolumab 100 MG/10ML SOLN Inject into the vein once for 1 dose       Allergies   Allergen Reactions     Ace Inhibitors Cough     Advicor      Celexa [Citalopram] GI Disturbance     diarrhea     Doxycycline Nausea     Poor appetite     Fosamax      Severe substernal burning and dysphagia within 3 days     Valsartan Cramps     severe     Recent Labs   Lab Test 03/13/19 01/05/19 01/03/19 11/23/18  1453 08/20/18  1448 06/21/18  0946  05/08/18  0835  06/01/17  1436 05/19/16  1104  05/02/14  0823  04/24/13  0828  04/20/12  0907   A1C  --   --   --   --  4.9 5.3  --   --   --  5.7  --    < > 5.3  --  5.2  --   --    LDL  --   --   --   --   --   --   --   "124*  --  133* 126*   < > 125  --  128  --  135*   HDL  --   --   --   --   --   --   --  50  --  45* 41*   < > 30*  --  32*  --  30*   TRIG  --   --   --   --   --   --   --  160*  --  193* 228*   < > 216*  --  146  --  194*   ALT  --   --   --   --   --   --   --   --   --   --   --   --  27  --  24  --  22   CR 1.8* 1.52*  --  1.56* 1.36*  --   --   --    < > 1.26* 1.29*   < > 1.17*   < > 1.20*   < > 1.34*   GFRESTIMATED 25.6 33*  --  32* 37*  --    < >  --    < > 41* 40*   < > 45*   < > 44*   < > 38*   GFRESTBLACK  --  40*  --  38* 45*  --    < >  --    < > 49* 48*   < > 54*   < > 53*   < > 47*   POTASSIUM  --   --  4.1 3.7 3.8  --   --   --    < > 3.1* 3.9   < > 4.2   < > 4.3   < > 4.3   TSH  --   --   --  1.44 1.58  --   --  4.85*  --  1.42 1.36   < > 3.31  --  0.62  --  0.35*    < > = values in this interval not displayed.           Review of Systems  Constitutional, HEENT, cardiovascular, pulmonary, GI, , musculoskeletal, neuro, skin, endocrine and psych systems are negative, except as otherwise noted.  Can walk on treadmill at \"2.3\" for 27 minutes each day.      OBJECTIVE:   /65 (BP Location: Right arm, Patient Position: Sitting, Cuff Size: Adult Regular)   Pulse 97   Temp 98.3  F (36.8  C) (Oral)   Ht 1.524 m (5')   Wt 59.9 kg (132 lb)   SpO2 93%   BMI 25.78 kg/m   Estimated body mass index is 25.39 kg/m  as calculated from the following:    Height as of 3/19/19: 1.524 m (5').    Weight as of 3/19/19: 59 kg (130 lb).     Physical Exam     GENERAL APPEARANCE: healthy, alert and no distress  EYES: Eyes grossly normal to inspection, PERRL and conjunctivae and sclerae normal  HENT: ear canals and TM's normal, nose and mouth without ulcers or lesions, oropharynx clear and oral mucous membranes moist  HENT: Wears Haids.     Some psoriatic flaking and excoriations in ear canals.   NECK: no adenopathy, no asymmetry, masses, or scars and thyroid normal to palpation  RESP: lungs clear to auscultation - " no rales, rhonchi or wheezes  Normal expansion.  Surgical changes R upper field.   BREAST: normal without masses, tenderness or nipple discharge and no palpable axillary masses or adenopathy  CV: regular rate and rhythm, normal S1 S2, no S3 or S4, no murmur, click or rub, no peripheral edema and peripheral pulses strong  ABDOMEN: soft, nontender, no hepatosplenomegaly, no masses and bowel sounds normal   (female): normal female external genitalia, normal urethral meatus without  abnormal discharge.  Bimanual not done due to discomfort/lichen sclerosis   (female): lichen sclerosis  MS: no musculoskeletal defects are noted and gait is age appropriate without ataxia  SKIN: psoriatic plaques on scalp and scattered on trunk and extremities.   Around anus.  NEURO: Normal strength and tone, sensory exam grossly normal, mentation intact and speech normal  PSYCH: mentation appears normal and affect normal/bright    Diagnostic Test Results:  Labs reviewed in Epic  Orders done.     ASSESSMENT / PLAN:       ICD-10-CM    1. Encounter for general adult medical examination with abnormal findings Z00.01    2. CKD (chronic kidney disease) stage 3, GFR 30-59 ml/min (H) N18.3 Basic metabolic panel     furosemide (LASIX) 20 MG tablet     OFFICE/OUTPT VISIT,EST,LEVL III   3. Glucose intolerance (impaired glucose tolerance) R73.02 Hemoglobin A1c     OFFICE/OUTPT VISIT,EST,LEVL III   4. Hypertension goal BP (blood pressure) < 140/90 I10 Basic metabolic panel     furosemide (LASIX) 20 MG tablet     OFFICE/OUTPT VISIT,EST,LEVL III   5. Solitary kidney, acquired Z90.5 Basic metabolic panel   6. COPD, severe (H) J44.9    7. Other specified hypothyroidism E03.8 TSH with free T4 reflex     levothyroxine (SYNTHROID/LEVOTHROID) 100 MCG tablet   8. Non-small cell cancer of right lung (H) C34.91    9. Port-A-Cath in place Z95.828 OFFICE/OUTPT VISIT,EST,LEVL III   10. CARDIOVASCULAR SCREENING; LDL GOAL LESS THAN 100 Z13.6 Lipid panel reflex to  direct LDL Non-fasting   11. Essential hypertension with goal blood pressure less than 140/90 I10 amLODIPine (NORVASC) 5 MG tablet     OFFICE/OUTPT VISIT,EST,LEVL III   12. Lichen sclerosus et atrophicus of the vulva N90.4 estradiol (ESTRACE) 0.1 MG/GM vaginal cream   13. Scalp psoriasis L40.9 clobetasol (TEMOVATE) 0.05 % external solution     OFFICE/OUTPT VISIT,EST,LEVL III       Psoriasis exacerbated by novolumib therapy.    Is improving a lot.   Ropinirole helps leg cramps, has started to use >1 per night now.      End of Life Planning:  Patient currently has an advanced directive: Yes.  Practitioner is supportive of decision.    COUNSELING:  Reviewed preventive health counseling, as reflected in patient instructions       Regular exercise    Estimated body mass index is 25.39 kg/m  as calculated from the following:    Height as of 3/19/19: 1.524 m (5').    Weight as of 3/19/19: 59 kg (130 lb).         reports that she quit smoking about 49 years ago. Her smoking use included cigarettes. She has never used smokeless tobacco.      Appropriate preventive services were discussed with this patient, including applicable screening as appropriate for cardiovascular disease, diabetes, osteopenia/osteoporosis, and glaucoma.  As appropriate for age/gender, discussed screening for colorectal cancer, prostate cancer, breast cancer, and cervical cancer. Checklist reviewing preventive services available has been given to the patient.    Reviewed patients plan of care and provided an AVS. The Basic Care Plan (routine screening as documented in Health Maintenance) for Helena meets the Care Plan requirement. This Care Plan has been established and reviewed with the Patient.    Counseling Resources:  ATP IV Guidelines  Pooled Cohorts Equation Calculator  Breast Cancer Risk Calculator  FRAX Risk Assessment  ICSI Preventive Guidelines  Dietary Guidelines for Americans, 2010  USDA's MyPlate  ASA Prophylaxis  Lung CA Screening    Sushma  DANIEL Nelson MD  [facility]    Identified Health Risks:

## 2019-06-25 NOTE — LETTER
[facility]   [address] [place]  [address]  [phone]                                   July 5, 2019    Helena Eldridge  [address] [facility] [address]        Dear Helena,    Enclosed are your results.   The creatinine is rising .   As discussed by phone, I think it is in part due to dehydration of the kidneys.  Your weight has declined this year, and perhaps we should reduce the water pill (furosemide)  to once daily (you are currently taking the furosemide twice daily).    So, please reduce the furosemide to ONCE daily.   Drink more fluids daily.  Recheck labs in about 2 - 3 weeks (via lab appointment at any [facility], no fasting is necessary).     Please call  with any questions.  We can also discuss any questions regarding these labs at your next scheduled visit.    Results for orders placed or performed in visit on 06/25/19   Hemoglobin A1c   Result Value Ref Range    Hemoglobin A1C 5.5 0 - 5.6 %   Lipid panel reflex to direct LDL Non-fasting   Result Value Ref Range    Cholesterol 207 (H) <200 mg/dL    Triglycerides 376 (H) <150 mg/dL    HDL Cholesterol 35 (L) >49 mg/dL    LDL Cholesterol Calculated 97 <100 mg/dL    Non HDL Cholesterol 172 (H) <130 mg/dL   TSH with free T4 reflex   Result Value Ref Range    TSH 2.50 0.40 - 4.00 mU/L   Basic metabolic panel   Result Value Ref Range    Sodium 137 133 - 144 mmol/L    Potassium 4.3 3.4 - 5.3 mmol/L    Chloride 102 94 - 109 mmol/L    Carbon Dioxide 27 20 - 32 mmol/L    Anion Gap 8 3 - 14 mmol/L    Glucose 112 (H) 70 - 99 mg/dL    Urea Nitrogen 37 (H) 7 - 30 mg/dL    Creatinine 2.07 (H) 0.52 - 1.04 mg/dL    GFR Estimate 22 (L) >60 mL/min/[1.73_m2]    GFR Estimate If Black 25 (L) >60 mL/min/[1.73_m2]    Calcium 9.3 8.5 - 10.1 mg/dL       If you have any questions please call the clinic at [phone]    Sincerely,    Sushma Nelsno MD  [facility]

## 2019-07-05 ENCOUNTER — TELEPHONE (OUTPATIENT)
Dept: FAMILY MEDICINE | Facility: CLINIC | Age: 84
End: 2019-07-05

## 2019-07-05 DIAGNOSIS — N18.30 CKD (CHRONIC KIDNEY DISEASE) STAGE 3, GFR 30-59 ML/MIN (H): ICD-10-CM

## 2019-07-05 DIAGNOSIS — I10 HYPERTENSION GOAL BP (BLOOD PRESSURE) < 140/90: ICD-10-CM

## 2019-07-05 RX ORDER — FUROSEMIDE 20 MG
20 TABLET ORAL DAILY
Qty: 90 TABLET | Refills: 3 | Status: SHIPPED | OUTPATIENT
Start: 2019-07-05 | End: 2020-02-28

## 2019-07-05 NOTE — TELEPHONE ENCOUNTER
MD called patient with the following information:      The creatinine is rising .   As discussed by phone, I think it is in part due to dehydration of the kidneys.  Your weight has declined this year, and perhaps we should reduce the water pill (furosemide)  to once daily (you are currently taking the furosemide twice daily).    So, please reduce the furosemide to ONCE daily.   Drink more fluids daily.  Recheck labs in about 2 - 3 weeks (via lab appointment at any [place] clinic, no fasting is necessary).

## 2019-07-05 NOTE — RESULT ENCOUNTER NOTE
Helena Eldridge    Enclosed are your results.   The creatinine is rising .   As discussed by phone, I think it is in part due to dehydration of the kidneys.  Your weight has declined this year, and perhaps we should reduce the water pill (furosemide)  to once daily (you are currently taking the furosemide twice daily).    So, please reduce the furosemide to ONCE daily.   Drink more fluids daily.  Recheck labs in about 2 - 3 weeks (via lab appointment at any [facility], no fasting is necessary).     Please call  with any questions.  We can also discuss any questions regarding these labs at your next scheduled visit.    Sincerely,    Sushma Nelson M.D.  '

## 2019-07-10 ENCOUNTER — TRANSFERRED RECORDS (OUTPATIENT)
Dept: HEALTH INFORMATION MANAGEMENT | Facility: CLINIC | Age: 84
End: 2019-07-10

## 2019-07-29 DIAGNOSIS — N18.30 CKD (CHRONIC KIDNEY DISEASE) STAGE 3, GFR 30-59 ML/MIN (H): ICD-10-CM

## 2019-07-29 DIAGNOSIS — I10 HYPERTENSION GOAL BP (BLOOD PRESSURE) < 140/90: ICD-10-CM

## 2019-07-29 LAB
ANION GAP SERPL CALCULATED.3IONS-SCNC: 6 MMOL/L (ref 3–14)
BUN SERPL-MCNC: 22 MG/DL (ref 7–30)
CALCIUM SERPL-MCNC: 9.1 MG/DL (ref 8.5–10.1)
CHLORIDE SERPL-SCNC: 104 MMOL/L (ref 94–109)
CO2 SERPL-SCNC: 27 MMOL/L (ref 20–32)
CREAT SERPL-MCNC: 1.78 MG/DL (ref 0.52–1.04)
GFR SERPL CREATININE-BSD FRML MDRD: 26 ML/MIN/{1.73_M2}
GLUCOSE SERPL-MCNC: 112 MG/DL (ref 70–99)
POTASSIUM SERPL-SCNC: 4.4 MMOL/L (ref 3.4–5.3)
SODIUM SERPL-SCNC: 137 MMOL/L (ref 133–144)

## 2019-07-29 PROCEDURE — 36415 COLL VENOUS BLD VENIPUNCTURE: CPT | Performed by: INTERNAL MEDICINE

## 2019-07-29 PROCEDURE — 80048 BASIC METABOLIC PNL TOTAL CA: CPT | Performed by: INTERNAL MEDICINE

## 2019-07-29 NOTE — LETTER
[facility]  [address] [place]  [address]  [phone]        July 30, 2019    Helenajessica Eldridge  [address] [facility] [address]        Dear Helena,    Basic metabolic panel is stable.       Results for orders placed or performed in visit on 07/29/19   Basic metabolic panel   Result Value Ref Range    Sodium 137 133 - 144 mmol/L    Potassium 4.4 3.4 - 5.3 mmol/L    Chloride 104 94 - 109 mmol/L    Carbon Dioxide 27 20 - 32 mmol/L    Anion Gap 6 3 - 14 mmol/L    Glucose 112 (H) 70 - 99 mg/dL    Urea Nitrogen 22 7 - 30 mg/dL    Creatinine 1.78 (H) 0.52 - 1.04 mg/dL    GFR Estimate 26 (L) >60 mL/min/[1.73_m2]    GFR Estimate If Black 30 (L) >60 mL/min/[1.73_m2]    Calcium 9.1 8.5 - 10.1 mg/dL       If you have any questions please call the clinic at [phone].    Sincerely,    Sushma ZAMBRANO

## 2019-07-31 DIAGNOSIS — K21.00 GASTROESOPHAGEAL REFLUX DISEASE WITH ESOPHAGITIS: ICD-10-CM

## 2019-07-31 NOTE — TELEPHONE ENCOUNTER
"Requested Prescriptions   Pending Prescriptions Disp Refills     ranitidine (ZANTAC) 150 MG tablet  Last Written Prescription Date:  4/3/19  Last Fill Quantity: 60,  # refills: 1   Last office visit: 6/25/2019 with prescribing provider:  Leslie   Future Office Visit:   Next 5 appointments (look out 90 days)    Aug 09, 2019 10:20 AM CDT  SHORT with Sushma Nelson MD  [facility] ([facility] [address] [place] [id number]  [phone]          60 tablet 1       H2 Blockers Protocol Passed - 7/31/2019 11:14 AM        Passed - Patient is age 12 or older        Passed - Recent (12 mo) or future (30 days) visit within the authorizing provider's specialty     Patient had office visit in the last 12 months or has a visit in the next 30 days with authorizing provider or within the authorizing provider's specialty.  See \"Patient Info\" tab in inbasket, or \"Choose Columns\" in Meds & Orders section of the refill encounter.              Passed - Medication is active on med list          "

## 2019-07-31 NOTE — TELEPHONE ENCOUNTER
Prescription approved per [facility] Refill Protocol.  Saida Bolton, RN C[facility] Triage.

## 2019-08-09 ENCOUNTER — OFFICE VISIT (OUTPATIENT)
Dept: FAMILY MEDICINE | Facility: CLINIC | Age: 84
End: 2019-08-09
Payer: COMMERCIAL

## 2019-08-09 VITALS
TEMPERATURE: 98.3 F | OXYGEN SATURATION: 95 % | WEIGHT: 136 LBS | BODY MASS INDEX: 26.56 KG/M2 | DIASTOLIC BLOOD PRESSURE: 68 MMHG | HEART RATE: 101 BPM | SYSTOLIC BLOOD PRESSURE: 108 MMHG

## 2019-08-09 DIAGNOSIS — T88.7XXA MEDICATION SIDE EFFECTS: ICD-10-CM

## 2019-08-09 DIAGNOSIS — R21 RASH AND NONSPECIFIC SKIN ERUPTION: Primary | ICD-10-CM

## 2019-08-09 DIAGNOSIS — L29.9 SCALP ITCH: ICD-10-CM

## 2019-08-09 LAB
BASOPHILS # BLD AUTO: 0 10E9/L (ref 0–0.2)
BASOPHILS NFR BLD AUTO: 0.2 %
DIFFERENTIAL METHOD BLD: ABNORMAL
EOSINOPHIL # BLD AUTO: 0.5 10E9/L (ref 0–0.7)
EOSINOPHIL NFR BLD AUTO: 5.3 %
ERYTHROCYTE [DISTWIDTH] IN BLOOD BY AUTOMATED COUNT: 14.4 % (ref 10–15)
HCT VFR BLD AUTO: 34.8 % (ref 35–47)
HGB BLD-MCNC: 11.7 G/DL (ref 11.7–15.7)
LYMPHOCYTES # BLD AUTO: 2.1 10E9/L (ref 0.8–5.3)
LYMPHOCYTES NFR BLD AUTO: 23 %
MCH RBC QN AUTO: 35.3 PG (ref 26.5–33)
MCHC RBC AUTO-ENTMCNC: 33.6 G/DL (ref 31.5–36.5)
MCV RBC AUTO: 105 FL (ref 78–100)
MONOCYTES # BLD AUTO: 0.9 10E9/L (ref 0–1.3)
MONOCYTES NFR BLD AUTO: 10.3 %
NEUTROPHILS # BLD AUTO: 5.5 10E9/L (ref 1.6–8.3)
NEUTROPHILS NFR BLD AUTO: 61.2 %
PLATELET # BLD AUTO: 276 10E9/L (ref 150–450)
RBC # BLD AUTO: 3.31 10E12/L (ref 3.8–5.2)
WBC # BLD AUTO: 9 10E9/L (ref 4–11)

## 2019-08-09 PROCEDURE — 99214 OFFICE O/P EST MOD 30 MIN: CPT | Performed by: INTERNAL MEDICINE

## 2019-08-09 PROCEDURE — 85025 COMPLETE CBC W/AUTO DIFF WBC: CPT | Performed by: INTERNAL MEDICINE

## 2019-08-09 PROCEDURE — 36415 COLL VENOUS BLD VENIPUNCTURE: CPT | Performed by: INTERNAL MEDICINE

## 2019-08-09 RX ORDER — HYDROXYZINE HYDROCHLORIDE 25 MG/1
25 TABLET, FILM COATED ORAL 2 TIMES DAILY PRN
Qty: 90 TABLET | Refills: 1 | Status: SHIPPED | OUTPATIENT
Start: 2019-08-09 | End: 2020-01-27

## 2019-08-09 RX ORDER — TRIAMCINOLONE ACETONIDE 5 MG/G
CREAM TOPICAL 2 TIMES DAILY
Qty: 454 G | Refills: 3 | Status: SHIPPED | OUTPATIENT
Start: 2019-08-09 | End: 2020-02-28

## 2019-08-09 ASSESSMENT — PATIENT HEALTH QUESTIONNAIRE - PHQ9: SUM OF ALL RESPONSES TO PHQ QUESTIONS 1-9: 2

## 2019-08-09 ASSESSMENT — PAIN SCALES - GENERAL: PAINLEVEL: NO PAIN (0)

## 2019-08-09 NOTE — PROGRESS NOTES
Subjective     Helena Eldridge is a 83 year old female who presents to clinic today for the following health issues:  84 y/o F here for f/u.   H/o RUL/Hilar NSCLungCa (s/p low dose carboplatin and pemerexed.....recently d/c Novolumib immunotherapy due to pruritis and yeast infections.... recent CT imaging= no evidence of cancer), L. nephrectomy (stones), osteoporosis, pelvic fx, lichen sclerosis, Gout, CKD 3, hypothryoid   , severe (asymptomatic) COPD  .  Lasix decreased at recent visit due to prerenal changes in Bun/Cr.   Skin reaction has barely improved since d/c Novimulab     HPI   psoriasis       Duration: A few months ago    Description (location/character/radiation): Right hip is the worst, all over body    Intensity:  severe    Accompanying signs and symptoms: Red, itching, raised    History (similar episodes/previous evaluation): Yes- dermatologist    Precipitating or alleviating factors: Shower, taking clothes off    Therapies tried and outcome: Brought things with her today to show you, Rx creams     Alisa Cunningham MA    OTC cerave helps symptoms some.   Using calamine lotion  Triamcinolone may help. She would like refills.    The steroid shampoo is helping.           Patient Active Problem List   Diagnosis     CARDIOVASCULAR SCREENING; LDL GOAL LESS THAN 100     CKD (chronic kidney disease) stage 3, GFR 30-59 ml/min (H)     Glucose intolerance (impaired glucose tolerance)     Kidney stones     Advance care planning     Hypertension goal BP (blood pressure) < 140/90     Lichen sclerosus et atrophicus of the vulva     Solitary kidney, acquired     Senile osteoporosis     Osteoarthritis of right knee     Medial meniscus tear     Cataracts, both eyes     Macular degeneration of both eyes, right eye greater than left eye     Pelvic fracture (H)     COPD, severe (H)     IPF (idiopathic pulmonary fibrosis) (H)     Other specified hypothyroidism     Idiopathic chronic gout     Adjustment disorder with anxiety      Non-small cell cancer of right lung (H)     Immunosuppression (H)     Port-A-Cath in place     Past Surgical History:   Procedure Laterality Date     C NEPHRECTOMY      left partial 07     C VENOUS THROMBOSIS IMAGING      with pe     HC REMOVAL GALLBLADDER       HC REVISE MEDIAN N/CARPAL TUNNEL SURG       TUBAL LIGATION         Social History     Tobacco Use     Smoking status: Former Smoker     Types: Cigarettes     Last attempt to quit: 1969     Years since quittin.7     Smokeless tobacco: Never Used   Substance Use Topics     Alcohol use: Yes     Alcohol/week: 0.0 oz     Comment: very little      Family History   Problem Relation Age of Onset     Cancer Brother      Glaucoma Mother      Alzheimer Disease Brother      Macular Degeneration No family hx of          Current Outpatient Medications   Medication Sig Dispense Refill     ammonium lactate (AMLACTIN) 12 % external cream APPLY TOPICALLY TO THE AFFECTED AREA TWICE DAILY AS NEEDED 280 g 1     Calcium Carbonate-Vitamin D (CALCIUM + D) 600-200 MG-UNIT per tablet Take 2 tablets by mouth 2 times daily.       clobetasol (TEMOVATE) 0.05 % cream Apply  topically daily as needed. 45 g 1     clobetasol (TEMOVATE) 0.05 % external ointment APPLY SPARINGLY TO AFFECTED AREA TOPICALLY TWICE DAILY AS NEEDED. DO NOT APPLY TO FACE. 45 g 1     diphenhydrAMINE (BENADRYL) 25 MG tablet Take 25 mg by mouth At Bedtime        DOCUSATE SODIUM PO Take 100 mg by mouth At Bedtime       estradiol (ESTRACE) 0.1 MG/GM vaginal cream Place 2 g vaginally twice a week 42.5 g 3     furosemide (LASIX) 20 MG tablet Take 1 tablet (20 mg) by mouth daily 90 tablet 3     hydrOXYzine (ATARAX) 25 MG tablet Take 1 tablet (25 mg) by mouth 2 times daily as needed for itching (use at night time for itch) 90 tablet 1     levothyroxine (SYNTHROID/LEVOTHROID) 100 MCG tablet TAKE ONE TABLET BY MOUTH ONCE DAILY EXCEPT  90 tablet 3     Multiple Vitamins-Minerals (OCUVITE ADULT 50+) CAPS  Take 1 capsule by mouth daily 30 capsule 12     omeprazole (PRILOSEC) 40 MG capsule TAKE 1 CAPSULE BY MOUTH DAILY 90 capsule 3     order for DME Equipment being ordered:   [facility]. 1 each 0     rOPINIRole (REQUIP) 0.25 MG tablet TAKE ONE TO TWO TABLETS BY MOUTH NIGHTLY AT BEDTIME 180 tablet 1     STATIN NOT PRESCRIBED, INTENTIONAL, by Other route continuous prn. Patient declined 5/4/12  0     triamcinolone (ARISTOCORT HP) 0.5 % external cream Apply topically 2 times daily 454 g 3     Allergies   Allergen Reactions     Ace Inhibitors Cough     Advicor      Celexa [Citalopram] GI Disturbance     diarrhea     Doxycycline Nausea     Poor appetite     Fosamax      Severe substernal burning and dysphagia within 3 days     Valsartan Cramps     severe     Recent Labs   Lab Test 07/29/19  1053 06/25/19  1157  11/23/18  1453 08/20/18  1448 06/21/18  0946  05/08/18  0835  06/01/17  1436  05/02/14  0823  04/24/13  0828  04/20/12  0907   A1C  --  5.5  --   --  4.9 5.3  --   --   --  5.7   < > 5.3  --  5.2   < >  --    LDL  --  97  --   --   --   --   --  124*  --  133*   < > 125  --  128  --  135*   HDL  --  35*  --   --   --   --   --  50  --  45*   < > 30*  --  32*  --  30*   TRIG  --  376*  --   --   --   --   --  160*  --  193*   < > 216*  --  146  --  194*   ALT  --   --   --   --   --   --   --   --   --   --   --  27  --  24  --  22   CR 1.78* 2.07*   < > 1.56* 1.36*  --   --   --    < > 1.26*   < > 1.17*   < > 1.20*   < > 1.34*   GFRESTIMATED 26* 22*   < > 32* 37*  --    < >  --    < > 41*   < > 45*   < > 44*   < > 38*   GFRESTBLACK 30* 25*   < > 38* 45*  --    < >  --    < > 49*   < > 54*   < > 53*   < > 47*   POTASSIUM 4.4 4.3   < > 3.7 3.8  --   --   --    < > 3.1*   < > 4.2   < > 4.3   < > 4.3   TSH  --  2.50  --  1.44 1.58  --   --  4.85*  --  1.42   < > 3.31  --  0.62  --  0.35*    < > = values in this interval not displayed.      BP Readings from Last 3 Encounters:   08/09/19 108/68    06/25/19 110/65   03/19/19 120/69    Wt Readings from Last 3 Encounters:   08/09/19 61.7 kg (136 lb)   06/25/19 59.9 kg (132 lb)   03/19/19 59 kg (130 lb)                    Reviewed and updated as needed this visit by Provider         Review of Systems   ROS COMP: Constitutional, HEENT, cardiovascular, pulmonary, gi and gu systems are negative, except as otherwise noted.      Objective    /68 (BP Location: Left arm, Patient Position: Chair, Cuff Size: Adult Regular)   Pulse 101   Temp 98.3  F (36.8  C) (Oral)   Wt 61.7 kg (136 lb)   SpO2 95%   BMI 26.56 kg/m    There is no height or weight on file to calculate BMI.  Physical Exam   GENERAL: healthy, alert and no distress  SKIN: inflammatory hyperkeratotic patches on scalp, abdomen, low back...   NEURO: Normal strength and tone, mentation intact and speech normal  PSYCH: mentation appears normal, affect normal/bright    Diagnostic Test Results:  Labs reviewed in Epic  Results for orders placed or performed in visit on 08/09/19   CBC with platelets differential   Result Value Ref Range    WBC 9.0 4.0 - 11.0 10e9/L    RBC Count 3.31 (L) 3.8 - 5.2 10e12/L    Hemoglobin 11.7 11.7 - 15.7 g/dL    Hematocrit 34.8 (L) 35.0 - 47.0 %     (H) 78 - 100 fl    MCH 35.3 (H) 26.5 - 33.0 pg    MCHC 33.6 31.5 - 36.5 g/dL    RDW 14.4 10.0 - 15.0 %    Platelet Count 276 150 - 450 10e9/L    % Neutrophils 61.2 %    % Lymphocytes 23.0 %    % Monocytes 10.3 %    % Eosinophils 5.3 %    % Basophils 0.2 %    Absolute Neutrophil 5.5 1.6 - 8.3 10e9/L    Absolute Lymphocytes 2.1 0.8 - 5.3 10e9/L    Absolute Monocytes 0.9 0.0 - 1.3 10e9/L    Absolute Eosinophils 0.5 0.0 - 0.7 10e9/L    Absolute Basophils 0.0 0.0 - 0.2 10e9/L    Diff Method Automated Method        NO EOSINOPHILIA    Assessment & Plan       ICD-10-CM    1. Rash and nonspecific skin eruption R21 CBC with platelets differential     triamcinolone (ARISTOCORT HP) 0.5 % external cream   2. Medication side effects  T88.7XXA hydrOXYzine (ATARAX) 25 MG tablet   3. Scalp itch L29.9 hydrOXYzine (ATARAX) 25 MG tablet     triamcinolone (ARISTOCORT HP) 0.5 % external cream        Patient Instructions   Stop norvasc (amlodipine)   -- -in case contributing to rash     Stop allopurinol    -in case contributing to rash     Use hydroxyzine for the night itch    Triamcinolone steroid cream refilled.      We should stop the norvasc now that the psoriasis is persisting off nivolumab for a long time.     BMI:   Estimated body mass index is 26.56 kg/m  as calculated from the following:    Height as of 6/25/19: 1.524 m (5').    Weight as of this encounter: 61.7 kg (136 lb).       No follow-ups on file.    Sushma Nelson MD  [facility]

## 2019-08-09 NOTE — LETTER
[facility]  [address] [place] [place]  [address]  [phone]        August 9, 2019    Helena Eldridge  [address] [facility] [address]        Dear Helena,    Blood count is normal, thank you for doing this additional test.     Results for orders placed or performed in visit on 08/09/19   CBC with platelets differential   Result Value Ref Range    WBC 9.0 4.0 - 11.0 10e9/L    RBC Count 3.31 (L) 3.8 - 5.2 10e12/L    Hemoglobin 11.7 11.7 - 15.7 g/dL    Hematocrit 34.8 (L) 35.0 - 47.0 %     (H) 78 - 100 fl    MCH 35.3 (H) 26.5 - 33.0 pg    MCHC 33.6 31.5 - 36.5 g/dL    RDW 14.4 10.0 - 15.0 %    Platelet Count 276 150 - 450 10e9/L    % Neutrophils 61.2 %    % Lymphocytes 23.0 %    % Monocytes 10.3 %    % Eosinophils 5.3 %    % Basophils 0.2 %    Absolute Neutrophil 5.5 1.6 - 8.3 10e9/L    Absolute Lymphocytes 2.1 0.8 - 5.3 10e9/L    Absolute Monocytes 0.9 0.0 - 1.3 10e9/L    Absolute Eosinophils 0.5 0.0 - 0.7 10e9/L    Absolute Basophils 0.0 0.0 - 0.2 10e9/L    Diff Method Automated Method        If you have any questions please call the clinic at [phone].    Sincerely,    Sushma ZAMBRANO

## 2019-08-09 NOTE — RESULT ENCOUNTER NOTE
Helena Eldridge    Blood count is normal, thank you for doing this additional test.     Sincerely,     MANUELA MONREAL M.D.

## 2019-08-09 NOTE — PATIENT INSTRUCTIONS
Stop norvasc (amlodipine)    Stop allopurinol     Use hydroxyzine for the night itch    Triamcinolone steroid cream refilled.

## 2019-09-26 DIAGNOSIS — K21.00 GASTROESOPHAGEAL REFLUX DISEASE WITH ESOPHAGITIS: ICD-10-CM

## 2019-09-26 NOTE — TELEPHONE ENCOUNTER
Reason for Call:  Other prescription    Detailed comments: Aby with [facility] is calling wanting to say they are needing a refill with a 90 day supply instead of 60 day.     Phone Number Patient can be reached at: Other phone number:  [phone]    Best Time: anytime    Can we leave a detailed message on this number? YES    Call taken on 9/26/2019 at 11:23 AM by Linda Alfred

## 2019-09-26 NOTE — TELEPHONE ENCOUNTER
"Requested Prescriptions   Pending Prescriptions Disp Refills     ranitidine (ZANTAC) 150 MG tablet [Pharmacy Med Name: RANITIDINE 150MG TABLETS] 60 tablet 0     Sig: TAKE 1 TABLET BY MOUTH DAILY IN THE AFTERNOON FOR 1 MONTH, THEN TAKE AS NEEDED         Last Written Prescription Date:  na  Last Fill Quantity: na,   # refills: na  Last Office Visit: 8/9/19  Future Office visit:       Routing refill request to provider for review/approval because:  Drug not active on patient's medication list      H2 Blockers Protocol Failed - 9/26/2019 11:25 AM        Failed - Medication is active on med list        Passed - Patient is age 12 or older        Passed - Recent (12 mo) or future (30 days) visit within the authorizing provider's specialty     Patient had office visit in the last 12 months or has a visit in the next 30 days with authorizing provider or within the authorizing provider's specialty.  See \"Patient Info\" tab in inbasket, or \"Choose Columns\" in Meds & Orders section of the refill encounter.                "

## 2019-09-26 NOTE — TELEPHONE ENCOUNTER
Routing refill request to provider for review/approval because:  Drug not active on patient's medication list    Medication taken off list on 8/9/19, no discontinued reason given.     Keyana Flanagan RN on 9/26/2019 at 4:29 PM

## 2019-10-01 ENCOUNTER — TRANSFERRED RECORDS (OUTPATIENT)
Dept: HEALTH INFORMATION MANAGEMENT | Facility: CLINIC | Age: 84
End: 2019-10-01

## 2019-10-03 ENCOUNTER — TRANSFERRED RECORDS (OUTPATIENT)
Dept: HEALTH INFORMATION MANAGEMENT | Facility: CLINIC | Age: 84
End: 2019-10-03

## 2019-10-03 LAB
CREAT SERPL-MCNC: 2 MG/DL (ref 0.6–1.3)
GFR SERPL CREATININE-BSD FRML MDRD: 22.4 ML/MIN/1.73M2

## 2019-10-11 DIAGNOSIS — K21.00 GASTROESOPHAGEAL REFLUX DISEASE WITH ESOPHAGITIS: ICD-10-CM

## 2019-10-11 DIAGNOSIS — R25.2 CRAMP OF LIMB: ICD-10-CM

## 2019-10-11 RX ORDER — OMEPRAZOLE 40 MG/1
CAPSULE, DELAYED RELEASE ORAL
Qty: 90 CAPSULE | Refills: 0 | Status: SHIPPED | OUTPATIENT
Start: 2019-10-11 | End: 2020-01-09

## 2019-10-11 RX ORDER — ROPINIROLE 0.25 MG/1
TABLET, FILM COATED ORAL
Qty: 180 TABLET | Refills: 3 | Status: SHIPPED | OUTPATIENT
Start: 2019-10-11 | End: 2020-12-08

## 2019-10-11 NOTE — TELEPHONE ENCOUNTER
Routing refill request to provider for review/approval because:  Failed - No diagnosis of osteoporosis on record  ALT out of date.     Keyana Flanagan, RN, BSN, PHN  [facility]

## 2019-10-11 NOTE — TELEPHONE ENCOUNTER
"Requested Prescriptions   Pending Prescriptions Disp Refills     omeprazole (PRILOSEC) 40 MG DR capsule [Pharmacy Med Name: OMEPRAZOLE 40MG CAPSULES] 90 capsule 0     Sig: TAKE 1 CAPSULE BY MOUTH EVERY DAY   Last Written Prescription Date:  10-30-18  Last Fill Quantity: 90,  # refills: 3   Last office visit: 8/9/2019 with prescribing provider:  8-9-19   Future Office Visit:   Next 5 appointments (look out 90 days)    Oct 21, 2019 11:20 AM CDT  SHORT with Sushma Nelson MD  [facility] ([address] [address]  [phone]             PPI Protocol Failed - 10/11/2019  5:11 AM        Failed - No diagnosis of osteoporosis on record        Passed - Not on Clopidogrel (unless Pantoprazole ordered)        Passed - Recent (12 mo) or future (30 days) visit within the authorizing provider's specialty     Patient has had an office visit with the authorizing provider or a provider within the authorizing providers department within the previous 12 mos or has a future within next 30 days. See \"Patient Info\" tab in inbasket, or \"Choose Columns\" in Meds & Orders section of the refill encounter.              Passed - Medication is active on med list        Passed - Patient is age 18 or older        Passed - No active pregnacy on record        Passed - No positive pregnancy test in past 12 months        rOPINIRole (REQUIP) 0.25 MG tablet [Pharmacy Med Name: ROPINIROLE 0.25MG TABLETS] 180 tablet 0     Sig: TAKE 1 TO 2 TABLETS BY MOUTH EVERY NIGHT AT BEDTIME   Last Written Prescription Date:  4-18-19  Last Fill Quantity: 180,  # refills: 1   Last office visit: 8/9/2019 with prescribing provider:  8-9-19   Future Office Visit:   Next 5 appointments (look out 90 days)    Oct 21, 2019 11:20 AM CDT  SHORT with Sushma Nelson MD  [facility] ([address]" "[facility] [address]  [phone]             Antiparkinson's Agents Protocol Failed - 10/11/2019  5:11 AM        Failed - ALT on record in past 12 months         Recent Labs   Lab Test 05/02/14  0823   ALT 27             Passed - Blood pressure under 140/90 in past 12 months     BP Readings from Last 3 Encounters:   08/09/19 108/68   06/25/19 110/65   03/19/19 120/69                 Passed - CBC on record in past 12 months     Recent Labs   Lab Test 08/09/19  1050   WBC 9.0   RBC 3.31*   HGB 11.7   HCT 34.8*                    Passed - Serum Creatinine on file in past 12 months     Recent Labs   Lab Test 07/29/19  1053  06/14/18  1302   CR 1.78*   < >  --    CREAT  --   --  1.4*    < > = values in this interval not displayed.             Passed - Medication is active on med list        Passed - Patient is age 18 or older        Passed - No active pregnancy on record        Passed - No positive pregnancy test in the past 12 months        Passed - Recent (6 mo) or future (30 days) visit within the authorizing provider's specialty     Patient had office visit in the last 6 months or has a visit in the next 30 days with authorizing provider or within the authorizing provider's specialty.  See \"Patient Info\" tab in inbasket, or \"Choose Columns\" in Meds & Orders section of the refill encounter.              "

## 2019-10-21 ENCOUNTER — OFFICE VISIT (OUTPATIENT)
Dept: FAMILY MEDICINE | Facility: CLINIC | Age: 84
End: 2019-10-21
Payer: COMMERCIAL

## 2019-10-21 VITALS
TEMPERATURE: 98 F | HEART RATE: 92 BPM | DIASTOLIC BLOOD PRESSURE: 80 MMHG | OXYGEN SATURATION: 97 % | WEIGHT: 130 LBS | BODY MASS INDEX: 25.39 KG/M2 | SYSTOLIC BLOOD PRESSURE: 129 MMHG

## 2019-10-21 DIAGNOSIS — I10 HYPERTENSION GOAL BP (BLOOD PRESSURE) < 140/90: Primary | ICD-10-CM

## 2019-10-21 DIAGNOSIS — L29.9 SCALP ITCH: ICD-10-CM

## 2019-10-21 DIAGNOSIS — N18.4 CKD (CHRONIC KIDNEY DISEASE) STAGE 4, GFR 15-29 ML/MIN (H): ICD-10-CM

## 2019-10-21 DIAGNOSIS — R21 RASH AND NONSPECIFIC SKIN ERUPTION: ICD-10-CM

## 2019-10-21 DIAGNOSIS — L29.9 CHRONIC PRURITUS: ICD-10-CM

## 2019-10-21 DIAGNOSIS — C34.91 NON-SMALL CELL CANCER OF RIGHT LUNG (H): ICD-10-CM

## 2019-10-21 DIAGNOSIS — J84.112 IPF (IDIOPATHIC PULMONARY FIBROSIS) (H): ICD-10-CM

## 2019-10-21 DIAGNOSIS — J44.9 COPD, SEVERE (H): ICD-10-CM

## 2019-10-21 LAB
ANION GAP SERPL CALCULATED.3IONS-SCNC: 7 MMOL/L (ref 3–14)
BUN SERPL-MCNC: 33 MG/DL (ref 7–30)
CALCIUM SERPL-MCNC: 9.6 MG/DL (ref 8.5–10.1)
CHLORIDE SERPL-SCNC: 103 MMOL/L (ref 94–109)
CO2 SERPL-SCNC: 27 MMOL/L (ref 20–32)
CREAT SERPL-MCNC: 1.87 MG/DL (ref 0.52–1.04)
GFR SERPL CREATININE-BSD FRML MDRD: 24 ML/MIN/{1.73_M2}
GLUCOSE SERPL-MCNC: 99 MG/DL (ref 70–99)
POTASSIUM SERPL-SCNC: 4.6 MMOL/L (ref 3.4–5.3)
SODIUM SERPL-SCNC: 137 MMOL/L (ref 133–144)

## 2019-10-21 PROCEDURE — 99207 C PAF COMPLETED  NO CHARGE: CPT | Performed by: INTERNAL MEDICINE

## 2019-10-21 PROCEDURE — 80048 BASIC METABOLIC PNL TOTAL CA: CPT | Performed by: INTERNAL MEDICINE

## 2019-10-21 PROCEDURE — 36415 COLL VENOUS BLD VENIPUNCTURE: CPT | Performed by: INTERNAL MEDICINE

## 2019-10-21 PROCEDURE — 99214 OFFICE O/P EST MOD 30 MIN: CPT | Performed by: INTERNAL MEDICINE

## 2019-10-21 NOTE — LETTER
[facility]  [address] [place]  [address]  [phone]        October 22, 2019    Helena Edlridge  [address] [facility] [address]        Dear Helena,    Electrolytes and kidney function remain stable.     Results for orders placed or performed in visit on 10/21/19   Basic metabolic panel   Result Value Ref Range    Sodium 137 133 - 144 mmol/L    Potassium 4.6 3.4 - 5.3 mmol/L    Chloride 103 94 - 109 mmol/L    Carbon Dioxide 27 20 - 32 mmol/L    Anion Gap 7 3 - 14 mmol/L    Glucose 99 70 - 99 mg/dL    Urea Nitrogen 33 (H) 7 - 30 mg/dL    Creatinine 1.87 (H) 0.52 - 1.04 mg/dL    GFR Estimate 24 (L) >60 mL/min/[1.73_m2]    GFR Estimate If Black 28 (L) >60 mL/min/[1.73_m2]    Calcium 9.6 8.5 - 10.1 mg/dL       If you have any questions please call the clinic at [phone].    Sincerely,    Sushma ZAMBRANO

## 2019-10-21 NOTE — PROGRESS NOTES
Subjective     Helena Eldridge is a 83 year old female who presents to clinic today for the following health issues:    82 y/o F here for f/u.   H/o RUL/Hilar NSCLungCa (s/p low dose carboplatin and pemerexed.....recently d/c Novolumib immunotherapy due to  pruritis and yeast infections.... recent CT imaging= no evidence of cancer), L. nephrectomy (stones), osteoporosis, pelvic fx, lichen sclerosis, Gout, CKD 3, hypothryoid   , severe (asymptomatic) COPD  .   2 months ago we stopped norvasc hoping this would improve pruritis , but pruritis still an active problem.  She has f/u with oncology in 2020.          HPI   Discuss psoriasis everywhere        Patient Active Problem List   Diagnosis     CARDIOVASCULAR SCREENING; LDL GOAL LESS THAN 100     CKD (chronic kidney disease) stage 4, GFR 15-29 ml/min (H)     Glucose intolerance (impaired glucose tolerance)     Kidney stones     Advance care planning     Hypertension goal BP (blood pressure) < 140/90     Lichen sclerosus et atrophicus of the vulva     Solitary kidney, acquired     Senile osteoporosis     Osteoarthritis of right knee     Medial meniscus tear     Cataracts, both eyes     Macular degeneration of both eyes, right eye greater than left eye     Pelvic fracture (H)     COPD, severe (H)     IPF (idiopathic pulmonary fibrosis) (H)     Other specified hypothyroidism     Idiopathic chronic gout     Adjustment disorder with anxiety     Non-small cell cancer of right lung (H)     Immunosuppression (H)     Port-A-Cath in place     Past Surgical History:   Procedure Laterality Date     C NEPHRECTOMY      left partial 07     C VENOUS THROMBOSIS IMAGING      with pe     HC REMOVAL GALLBLADDER       HC REVISE MEDIAN N/CARPAL TUNNEL SURG       TUBAL LIGATION         Social History     Tobacco Use     Smoking status: Former Smoker     Types: Cigarettes     Last attempt to quit: 1969     Years since quittin.9     Smokeless tobacco: Never Used   Substance  Use Topics     Alcohol use: Yes     Alcohol/week: 0.0 standard drinks     Comment: very little      Family History   Problem Relation Age of Onset     Cancer Brother      Glaucoma Mother      Alzheimer Disease Brother      Macular Degeneration No family hx of          Current Outpatient Medications   Medication Sig Dispense Refill     ammonium lactate (AMLACTIN) 12 % external cream APPLY TOPICALLY TO THE AFFECTED AREA TWICE DAILY AS NEEDED 280 g 1     Calcium Carbonate-Vitamin D (CALCIUM + D) 600-200 MG-UNIT per tablet Take 2 tablets by mouth 2 times daily.       clobetasol (TEMOVATE) 0.05 % cream Apply  topically daily as needed. 45 g 1     clobetasol (TEMOVATE) 0.05 % external ointment APPLY SPARINGLY TO AFFECTED AREA TOPICALLY TWICE DAILY AS NEEDED. DO NOT APPLY TO FACE. 45 g 1     diphenhydrAMINE (BENADRYL) 25 MG tablet Take 25 mg by mouth At Bedtime        DOCUSATE SODIUM PO Take 100 mg by mouth At Bedtime       furosemide (LASIX) 20 MG tablet Take 1 tablet (20 mg) by mouth daily 90 tablet 3     hydrOXYzine (ATARAX) 25 MG tablet Take 1 tablet (25 mg) by mouth 2 times daily as needed for itching (use at night time for itch) 90 tablet 1     levothyroxine (SYNTHROID/LEVOTHROID) 100 MCG tablet TAKE ONE TABLET BY MOUTH ONCE DAILY EXCEPT SUNDAY 90 tablet 3     Multiple Vitamins-Minerals (OCUVITE ADULT 50+) CAPS Take 1 capsule by mouth daily 30 capsule 12     omeprazole (PRILOSEC) 40 MG DR capsule TAKE 1 CAPSULE BY MOUTH EVERY DAY 90 capsule 0     ranitidine (ZANTAC) 150 MG tablet TAKE 1 TABLET BY MOUTH DAILY IN THE AFTERNOON FOR 1 MONTH, THEN TAKE AS NEEDED 60 tablet 11     rOPINIRole (REQUIP) 0.25 MG tablet TAKE 1 TO 2 TABLETS BY MOUTH EVERY NIGHT AT BEDTIME 180 tablet 3     STATIN NOT PRESCRIBED, INTENTIONAL, by Other route continuous prn. Patient declined 5/4/12  0     triamcinolone (ARISTOCORT HP) 0.5 % external cream Apply topically 2 times daily 454 g 3     estradiol (ESTRACE) 0.1 MG/GM vaginal cream Place 2 g  vaginally twice a week 42.5 g 3     order for DME Equipment being ordered:   [facility] [employer] at [employer]. 1 each 0     Allergies   Allergen Reactions     Ace Inhibitors Cough     Advicor      Celexa [Citalopram] GI Disturbance     diarrhea     Doxycycline Nausea     Poor appetite     Fosamax      Severe substernal burning and dysphagia within 3 days     Valsartan Cramps     severe     Recent Labs   Lab Test 07/29/19  1053 06/25/19  1157  11/23/18  1453 08/20/18  1448 06/21/18  0946  05/08/18  0835  06/01/17  1436  05/02/14  0823  04/24/13  0828  04/20/12  0907   A1C  --  5.5  --   --  4.9 5.3  --   --   --  5.7   < > 5.3  --  5.2   < >  --    LDL  --  97  --   --   --   --   --  124*  --  133*   < > 125  --  128  --  135*   HDL  --  35*  --   --   --   --   --  50  --  45*   < > 30*  --  32*  --  30*   TRIG  --  376*  --   --   --   --   --  160*  --  193*   < > 216*  --  146  --  194*   ALT  --   --   --   --   --   --   --   --   --   --   --  27  --  24  --  22   CR 1.78* 2.07*   < > 1.56* 1.36*  --   --   --    < > 1.26*   < > 1.17*   < > 1.20*   < > 1.34*   GFRESTIMATED 26* 22*   < > 32* 37*  --    < >  --    < > 41*   < > 45*   < > 44*   < > 38*   GFRESTBLACK 30* 25*   < > 38* 45*  --    < >  --    < > 49*   < > 54*   < > 53*   < > 47*   POTASSIUM 4.4 4.3   < > 3.7 3.8  --   --   --    < > 3.1*   < > 4.2   < > 4.3   < > 4.3   TSH  --  2.50  --  1.44 1.58  --   --  4.85*  --  1.42   < > 3.31  --  0.62  --  0.35*    < > = values in this interval not displayed.      BP Readings from Last 3 Encounters:   10/21/19 129/80   08/09/19 108/68   06/25/19 110/65    Wt Readings from Last 3 Encounters:   10/21/19 59 kg (130 lb)   08/09/19 61.7 kg (136 lb)   06/25/19 59.9 kg (132 lb)                       Reviewed and updated as needed this visit by Provider         Review of Systems   ROS COMP: Constitutional, HEENT, cardiovascular, pulmonary, gi and gu systems are negative, except as otherwise noted.      Objective     /80 (BP Location: Right arm, Patient Position: Sitting, Cuff Size: Adult Regular)   Pulse 92   Temp 98  F (36.7  C) (Oral)   Wt 59 kg (130 lb)   SpO2 97%   BMI 25.39 kg/m    Body mass index is 25.39 kg/m .     Physical Exam   GENERAL: alert, no distress, elderly and fatigued  EYES: Eyes grossly normal to inspection, PERRL and conjunctivae and sclerae normal  CV: regular rate and rhythm, normal S1 S2, no S3 or S4, no murmur, click or rub, no peripheral edema and peripheral pulses strong  ABDOMEN: soft, nontender, no hepatosplenomegaly, no masses and bowel sounds normal  MS: no gross musculoskeletal defects noted, no edema  SKIN: dry hair with loss at midline part.  Thick scales all over scalp.  Rash at right flank, chronic, scratch marks.    NEURO: Normal strength and tone, mentation intact and speech normal  PSYCH: mentation appears normal, affect appropriate, stressed due to constant symptoms.        Diagnostic Test Results:  Labs reviewed in Epic  BMP pending.  Reviewed recent CBC and CT chest from Onc clinic.  No mets, stable disease.          Assessment & Plan       ICD-10-CM    1. Hypertension goal BP (blood pressure) < 140/90 I10    2. Chronic pruritus L29.9    3. Non-small cell cancer of right lung (H) C34.91    4. IPF (idiopathic pulmonary fibrosis) (H) J84.112    5. COPD, severe (H) J44.9    6. CKD (chronic kidney disease) stage 4, GFR 15-29 ml/min (H) N18.4 Basic metabolic panel     Albumin Random Urine Quantitative with Creat Ratio     CANCELED: Albumin Random Urine Quantitative with Creat Ratio   7. Scalp itch L29.9    8. Rash and nonspecific skin eruption R21 DERMATOLOGY REFERRAL        Stop the furosemide, in case her rash has allergy component (minimize med exposure).    Seems more likely Nivolumab induced.          Return in about 3 months (around 1/21/2020).    Sushma Nelson MD  [facility]      BMI:   Estimated body mass index is 25.39 kg/m  as calculated  from the following:    Height as of 6/25/19: 1.524 m (5').    Weight as of this encounter: 59 kg (130 lb).

## 2019-10-22 NOTE — RESULT ENCOUNTER NOTE
Helena Eldridge    Electrolytes and kidney function remain stable.     Sincerely,     MANUELA MONREAL M.D.

## 2019-11-07 ENCOUNTER — OFFICE VISIT (OUTPATIENT)
Dept: FAMILY MEDICINE | Facility: CLINIC | Age: 84
End: 2019-11-07
Payer: COMMERCIAL

## 2019-11-07 VITALS
OXYGEN SATURATION: 91 % | TEMPERATURE: 98.7 F | BODY MASS INDEX: 25.39 KG/M2 | SYSTOLIC BLOOD PRESSURE: 112 MMHG | DIASTOLIC BLOOD PRESSURE: 68 MMHG | HEART RATE: 91 BPM | WEIGHT: 130 LBS

## 2019-11-07 DIAGNOSIS — M25.511 ACUTE PAIN OF BOTH SHOULDERS: Primary | ICD-10-CM

## 2019-11-07 DIAGNOSIS — M25.512 ACUTE PAIN OF BOTH SHOULDERS: Primary | ICD-10-CM

## 2019-11-07 PROCEDURE — 20610 DRAIN/INJ JOINT/BURSA W/O US: CPT | Mod: RT | Performed by: NURSE PRACTITIONER

## 2019-11-07 RX ORDER — TRIAMCINOLONE ACETONIDE 40 MG/ML
60 INJECTION, SUSPENSION INTRA-ARTICULAR; INTRAMUSCULAR ONCE
Status: COMPLETED | OUTPATIENT
Start: 2019-11-07 | End: 2019-11-07

## 2019-11-07 RX ADMIN — TRIAMCINOLONE ACETONIDE 60 MG: 40 INJECTION, SUSPENSION INTRA-ARTICULAR; INTRAMUSCULAR at 11:00

## 2019-11-07 ASSESSMENT — PAIN SCALES - GENERAL: PAINLEVEL: SEVERE PAIN (6)

## 2019-11-07 NOTE — PATIENT INSTRUCTIONS
It may take several days to a week for the steroid medication to kick in  In the mean time, take it easy with your shoulders. Avoid lifting more than 10 pounds or excessive repetitive activity with your shoulders  I recommend increasing use of Tylenol. You can take 1,000 mg Tylenol every 8 hours for pain  It may also be helpful to ice your shoulders, 10-15 minutes every few hours    Schedule with physical therapy in the next 1-2 weeks

## 2019-11-07 NOTE — PROGRESS NOTES
Subjective     Helena Eldridge is a 84 year old female who presents to clinic today for the following health issues:    HPI   Joint Pain    Onset: 1 1/5 weeks    Description:   Location: left shoulder and right shoulder  Character: Sharp, her right shoulder is worse.    Intensity: severe    Progression of Symptoms: same    Accompanying Signs & Symptoms:  Other symptoms: none    History:   Previous similar pain: no       Precipitating factors:   Trauma or overuse: no     Alleviating factors:  Improved by: nothing    Therapies Tried and outcome: tylenol not helpful    She developed bilateral shoulder pain (R>L) approximately 10 days ago  The pain is constant and worse with activity  She denies any history of injury  She has been taking Tylenol at bedtime and is not sure if it is helpful  The pain does not wake her up at night  She points to anterior shoulders as location of pain  Pain does not radiate  Denies paresthesias or weakness        Patient Active Problem List   Diagnosis     CARDIOVASCULAR SCREENING; LDL GOAL LESS THAN 100     CKD (chronic kidney disease) stage 4, GFR 15-29 ml/min (H)     Glucose intolerance (impaired glucose tolerance)     Kidney stones     Advance care planning     Hypertension goal BP (blood pressure) < 140/90     Lichen sclerosus et atrophicus of the vulva     Solitary kidney, acquired     Senile osteoporosis     Osteoarthritis of right knee     Medial meniscus tear     Cataracts, both eyes     Macular degeneration of both eyes, right eye greater than left eye     Pelvic fracture (H)     COPD, severe (H)     IPF (idiopathic pulmonary fibrosis) (H)     Other specified hypothyroidism     Idiopathic chronic gout     Adjustment disorder with anxiety     Non-small cell cancer of right lung (H)     Immunosuppression (H)     Port-A-Cath in place     Past Surgical History:   Procedure Laterality Date     C NEPHRECTOMY      left partial 07-01-07     C VENOUS THROMBOSIS IMAGING      with pe     HC  REMOVAL GALLBLADDER       HC REVISE MEDIAN N/CARPAL TUNNEL SURG       TUBAL LIGATION         Social History     Tobacco Use     Smoking status: Former Smoker     Types: Cigarettes     Last attempt to quit: 1969     Years since quittin.9     Smokeless tobacco: Never Used   Substance Use Topics     Alcohol use: Yes     Alcohol/week: 0.0 standard drinks     Comment: very little      Family History   Problem Relation Age of Onset     Cancer Brother      Glaucoma Mother      Alzheimer Disease Brother      Macular Degeneration No family hx of              Reviewed and updated as needed this visit by Provider         Review of Systems   ROS COMP: Constitutional, HEENT, cardiovascular, pulmonary, gi and gu systems are negative, except as otherwise noted.      Objective    /68 (BP Location: Right arm, Patient Position: Chair, Cuff Size: Adult Regular)   Pulse 91   Temp 98.7  F (37.1  C) (Oral)   Wt 59 kg (130 lb)   SpO2 91%   Breastfeeding? No   BMI 25.39 kg/m    Body mass index is 25.39 kg/m .  Physical Exam   GENERAL: healthy, alert and no distress  MS: Generalized tenderness bilateral shoulders. Flexion limited to 90 degrees, abduction limited to 75 degrees. Positive empty can test bilaterally.   SKIN: no suspicious lesions or rashes  NEURO: Normal strength and tone, mentation intact and speech normal    This procedure has been fully reviewed with the patient and verbal informed consent has been obtained.  After cleaning area with betadine, a steroid injection was performed at  Bilateral shoulders using 1% plain Lidocaine (1.5 cc) and 60 mg of K40 (1.5 cc). This was well tolerated.        Diagnostic Test Results:  Labs reviewed in Epic        Assessment & Plan       ICD-10-CM    1. Acute pain of both shoulders M25.511 SHAUN PT, HAND, AND CHIROPRACTIC REFERRAL    M25.512 triamcinolone (KENALOG-40) injection 60 mg     triamcinolone (KENALOG-40) injection 60 mg     DRAIN/INJECT LARGE JOINT/BURSA      DRAIN/INJECT LARGE JOINT/BURSA        BMI:   Estimated body mass index is 25.39 kg/m  as calculated from the following:    Height as of 6/25/19: 1.524 m (5').    Weight as of this encounter: 59 kg (130 lb).       She likely has some arthritis and may also have new rotator cuff tendonitis. Recommend physical therapy and over the counter pain medications. She is also interested in bilateral injections today. Reviewed risks and benefits of injections. Recommend PHYSICAL THERAPY, gentle ROM exercises. If not improving, consider imaging and/or ortho consult    Patient Instructions   It may take several days to a week for the steroid medication to kick in  In the mean time, take it easy with your shoulders. Avoid lifting more than 10 pounds or excessive repetitive activity with your shoulders  I recommend increasing use of Tylenol. You can take 1,000 mg Tylenol every 8 hours for pain  It may also be helpful to ice your shoulders, 10-15 minutes every few hours    Schedule with physical therapy in the next 1-2 weeks          MERARI Liu [facility]

## 2019-11-15 ENCOUNTER — TELEPHONE (OUTPATIENT)
Dept: DERMATOLOGY | Facility: CLINIC | Age: 84
End: 2019-11-15

## 2019-11-15 NOTE — TELEPHONE ENCOUNTER
Attempted to reach patient, unable to leave message at this time. She needs to reschedule her appointment on 11/18 with Elidia. She is referred for a rash so should not be scheduled with a PA.

## 2019-11-18 ENCOUNTER — OFFICE VISIT (OUTPATIENT)
Dept: DERMATOLOGY | Facility: CLINIC | Age: 84
End: 2019-11-18
Attending: INTERNAL MEDICINE
Payer: COMMERCIAL

## 2019-11-18 DIAGNOSIS — L40.0 PSORIASIS VULGARIS: Primary | ICD-10-CM

## 2019-11-18 RX ORDER — TRIAMCINOLONE ACETONIDE 5 MG/G
CREAM TOPICAL 2 TIMES DAILY
Qty: 60 G | Refills: 5 | Status: SHIPPED | OUTPATIENT
Start: 2019-11-18 | End: 2020-07-09

## 2019-11-18 RX ORDER — FLUOCINOLONE ACETONIDE 0.11 MG/ML
OIL TOPICAL AT BEDTIME
Qty: 1 BOTTLE | Refills: 11 | Status: SHIPPED | OUTPATIENT
Start: 2019-11-18 | End: 2020-07-20

## 2019-11-18 RX ORDER — KETOCONAZOLE 20 MG/ML
SHAMPOO TOPICAL
Qty: 120 ML | Refills: 11 | Status: SHIPPED | OUTPATIENT
Start: 2019-11-18 | End: 2020-02-24

## 2019-11-18 RX ORDER — CLOBETASOL PROPIONATE 0.5 MG/ML
SOLUTION TOPICAL 2 TIMES DAILY
Qty: 50 ML | Refills: 11 | Status: SHIPPED | OUTPATIENT
Start: 2019-11-18 | End: 2020-02-24

## 2019-11-18 RX ORDER — AMLODIPINE BESYLATE AND ATORVASTATIN CALCIUM 10; 10 MG/1; MG/1
1 TABLET, FILM COATED ORAL DAILY
COMMUNITY
End: 2020-02-28

## 2019-11-18 ASSESSMENT — PAIN SCALES - GENERAL: PAINLEVEL: NO PAIN (0)

## 2019-11-18 NOTE — PROGRESS NOTES
[facility] Dermatology Note      Dermatology Problem List:  1. Psoriasis  - Clobetasol 0.05% solution BID for flares, scalp  - Triamcinolone 0.5% ointment BID, body  - Ketoconazole 2% shampoo, daily  - Fluocinolone 0.01% scalp oil, nightly    Encounter Date: Nov 18, 2019    CC:  Chief Complaint   Patient presents with     Derm Problem     Helena is here today for Psoriasis flare up. Helena notes she was on Nivolumab and it caused Psoriasis.          History of Present Illness:  Ms. Helena Eldridge is a 84 year old female who presents as a referral from Dr. Sushma Nelson as a new patient in the dermatology clinic for evaluation of a flare of her psoriasis.     Today she reports that she broke out in this rash a little over one year ago after taking Nivolumab. The rash is present on the abdomen, upper back, and left arm today. She notes that the overall severity of the lesions is reducing slightly as the time since exposure to Nivolumab has increased. She denies a past personal history of psoriasis. She has been using clobetasol 0.05% solution for her scalp, triamcinolone 0.5% cream to the body, and ketoconazole 2% shampoo for her scalp without control of the rashes. She washes her scalp with the ketoconazole 2% shampoo twice a week. She showers in the late afternoon. She uses clobetasol 0.05% solution on her scalp 3-4 times a day. She has been applying triamcinolone 0.5% cream at night prn when her rashes are itchy.     Denies a family history of skin cancer or skin diseases.    Otherwise she is feeling well, without additional skin concerns at this time.     Past Medical History:   Patient Active Problem List   Diagnosis     CARDIOVASCULAR SCREENING; LDL GOAL LESS THAN 100     CKD (chronic kidney disease) stage 4, GFR 15-29 ml/min (H)     Glucose intolerance (impaired glucose tolerance)     Kidney stones     Advance care planning     Hypertension goal BP (blood pressure) < 140/90     Lichen  sclerosus et atrophicus of the vulva     Solitary kidney, acquired     Senile osteoporosis     Osteoarthritis of right knee     Medial meniscus tear     Cataracts, both eyes     Macular degeneration of both eyes, right eye greater than left eye     Pelvic fracture (H)     COPD, severe (H)     IPF (idiopathic pulmonary fibrosis) (H)     Other specified hypothyroidism     Idiopathic chronic gout     Adjustment disorder with anxiety     Non-small cell cancer of right lung (H)     Immunosuppression (H)     Port-A-Cath in place     Past Medical History:   Diagnosis Date     Carpal tunnel syndrome      CKD (chronic kidney disease)     kidney stone with infection     Deep vein thrombosis (DVT) (H)     1972     DVT [id number]     Glucose intolerance      H/O partial nephrectomy      Heel spur      Hypothyroidism      Kidney stones      Lichen sclerosus      Osteoarthritis      Osteoporosis      PID      Pulmonary embolism (H)     1970     Unspecified hypothyroidism      Vitiligo      Past Surgical History:   Procedure Laterality Date     C NEPHRECTOMY      left partial 07-01-07     C VENOUS THROMBOSIS IMAGING      with pe     HC REMOVAL GALLBLADDER       HC REVISE MEDIAN N/CARPAL TUNNEL SURG       TUBAL LIGATION         Social History:  She leads a very active life and has continued to work out regularly.    reports that she quit smoking about 49 years ago. Her smoking use included cigarettes. She has never used smokeless tobacco. She reports current alcohol use. She reports that she does not use drugs.    Family History:  Denies a family history of skin cancer or skin diseases.  Family History   Problem Relation Age of Onset     Cancer Brother      Glaucoma Mother      Alzheimer Disease Brother      Macular Degeneration No family hx of        Medications:  Current Outpatient Medications   Medication Sig Dispense Refill     amLODIPine-atorvastatin (CADUET) 10-10 MG tablet Take 1 tablet by mouth daily       Calcium  Carbonate-Vitamin D (CALCIUM + D) 600-200 MG-UNIT per tablet Take 2 tablets by mouth 2 times daily.       diphenhydrAMINE (BENADRYL) 25 MG tablet Take 25 mg by mouth At Bedtime        levothyroxine (SYNTHROID/LEVOTHROID) 100 MCG tablet TAKE ONE TABLET BY MOUTH ONCE DAILY EXCEPT SUNDAY 90 tablet 3     Multiple Vitamins-Minerals (OCUVITE ADULT 50+) CAPS Take 1 capsule by mouth daily 30 capsule 12     omeprazole (PRILOSEC) 40 MG DR capsule TAKE 1 CAPSULE BY MOUTH EVERY DAY 90 capsule 0     ranitidine (ZANTAC) 150 MG tablet TAKE 1 TABLET BY MOUTH DAILY IN THE AFTERNOON FOR 1 MONTH, THEN TAKE AS NEEDED 60 tablet 11     rOPINIRole (REQUIP) 0.25 MG tablet TAKE 1 TO 2 TABLETS BY MOUTH EVERY NIGHT AT BEDTIME 180 tablet 3     triamcinolone (ARISTOCORT HP) 0.5 % external cream Apply topically 2 times daily 454 g 3     ammonium lactate (AMLACTIN) 12 % external cream APPLY TOPICALLY TO THE AFFECTED AREA TWICE DAILY AS NEEDED (Patient not taking: Reported on 11/18/2019) 280 g 1     clobetasol (TEMOVATE) 0.05 % cream Apply  topically daily as needed. (Patient not taking: Reported on 11/18/2019) 45 g 1     clobetasol (TEMOVATE) 0.05 % external ointment APPLY SPARINGLY TO AFFECTED AREA TOPICALLY TWICE DAILY AS NEEDED. DO NOT APPLY TO FACE. (Patient not taking: Reported on 11/18/2019) 45 g 1     DOCUSATE SODIUM PO Take 100 mg by mouth At Bedtime       estradiol (ESTRACE) 0.1 MG/GM vaginal cream Place 2 g vaginally twice a week (Patient not taking: Reported on 11/18/2019) 42.5 g 3     furosemide (LASIX) 20 MG tablet Take 1 tablet (20 mg) by mouth daily (Patient not taking: Reported on 11/7/2019) 90 tablet 3     hydrOXYzine (ATARAX) 25 MG tablet Take 1 tablet (25 mg) by mouth 2 times daily as needed for itching (use at night time for itch) (Patient not taking: Reported on 11/18/2019) 90 tablet 1     order for DME Equipment being ordered:   [facility] [facility] at [facility]. 1 each 0     STATIN NOT PRESCRIBED, INTENTIONAL, by Other  route continuous prn. Patient declined 5/4/12  0       Allergies   Allergen Reactions     Ace Inhibitors Cough     Advicor      Celexa [Citalopram] GI Disturbance     diarrhea     Doxycycline Nausea     Poor appetite     Fosamax      Severe substernal burning and dysphagia within 3 days     Valsartan Cramps     severe       Review of Systems:  -As per HPI  -Constitutional: The patient denies fatigue, fevers, chills, unintended weight loss, and night sweats.  -HEENT: Patient denies nonhealing oral sores.  -Skin: As above in HPI. No additional skin concerns.    Physical exam:  Vitals: There were no vitals taken for this visit.  GEN: This is a well developed, well-nourished female in no acute distress, in a pleasant mood.    SKIN: Full skin, which includes the head/face, both arms, chest, back, abdomen,both legs, genitalia and/or groin buttocks, digits and/or nails, was examined.    - Pink scaly plaques to the scalp with adherent scale to many hair fibers  - Pink scaly plaques on the abdomen, upper back, and left arm  - No other lesions of concern on areas examined.       Impression/Plan:  1. Psoriasis, flaring in reaction to Nivolumab.    Start fluocinolone 0.01% scalp oil, apply once daily to the scalp before bed    Increase frequency of bathing to daily with application of ketoconazole 2% shampoo to the scalp daily    Continue Clobetasol 0.05% solution BID for flares on the scalp    Continue triamcinolone 0.5% cream BID to plaques even if the lesions are non-bothersome, until they resolve.     Photodocumentation was obtained today      Follow-up in 3 months, earlier for new or changing lesions.       Staff Involved:  Staff/Scribe    Scribe Disclosure:  Chinedu GARCIA am serving as a scribe to document services personally performed by Elidia Bower PA-C based on data collection and the provider's statements to me.     Provider Disclosure:   The documentation recorded by the scribe accurately reflects the  services I personally performed and the decisions made by me.    All risks, benefits and alternatives were discussed with patient.  Patient is in agreement and understands the assessment and plan.  All questions were answered.  Sun Screen Education was given.   Return to Clinic in 3 months or sooner as needed.   Elidia Bower PA-C   [facility]

## 2019-11-18 NOTE — LETTER
11/18/2019       RE: Helena Eldridge  [address] [facility] [address]     Dear Colleague,    Thank you for referring your patient, Helena Eldridge, to the [facility] DERMATOLOGY at [facility]. Please see a copy of my visit note below.    [facility] Dermatology Note      Dermatology Problem List:  1. Psoriasis  - Clobetasol 0.05% solution BID for flares, scalp  - Triamcinolone 0.5% ointment BID, body  - Ketoconazole 2% shampoo, daily  - Fluocinolone 0.01% scalp oil, nightly    Encounter Date: Nov 18, 2019    CC:  Chief Complaint   Patient presents with     Derm Problem     Helena is here today for Psoriasis flare up. Helena notes she was on Nivolumab and it caused Psoriasis.          History of Present Illness:  Ms. Helena Eldridge is a 84 year old female who presents as a referral from Dr. Sushma Nelson as a new patient in the dermatology clinic for evaluation of a flare of her psoriasis.     Today she reports that she broke out in this rash a little over one year ago after taking Nivolumab. The rash is present on the abdomen, upper back, and left arm today. She notes that the overall severity of the lesions is reducing slightly as the time since exposure to Nivolumab has increased. She denies a past personal history of psoriasis. She has been using clobetasol 0.05% solution for her scalp, triamcinolone 0.5% cream to the body, and ketoconazole 2% shampoo for her scalp without control of the rashes. She washes her scalp with the ketoconazole 2% shampoo twice a week. She showers in the late afternoon. She uses clobetasol 0.05% solution on her scalp 3-4 times a day. She has been applying triamcinolone 0.5% cream at night prn when her rashes are itchy.     Denies a family history of skin cancer or skin diseases.    Otherwise she is feeling well, without additional skin concerns at this time.     Past Medical History:   Patient Active Problem List    Diagnosis     CARDIOVASCULAR SCREENING; LDL GOAL LESS THAN 100     CKD (chronic kidney disease) stage 4, GFR 15-29 ml/min (H)     Glucose intolerance (impaired glucose tolerance)     Kidney stones     Advance care planning     Hypertension goal BP (blood pressure) < 140/90     Lichen sclerosus et atrophicus of the vulva     Solitary kidney, acquired     Senile osteoporosis     Osteoarthritis of right knee     Medial meniscus tear     Cataracts, both eyes     Macular degeneration of both eyes, right eye greater than left eye     Pelvic fracture (H)     COPD, severe (H)     IPF (idiopathic pulmonary fibrosis) (H)     Other specified hypothyroidism     Idiopathic chronic gout     Adjustment disorder with anxiety     Non-small cell cancer of right lung (H)     Immunosuppression (H)     Port-A-Cath in place     Past Medical History:   Diagnosis Date     Carpal tunnel syndrome      CKD (chronic kidney disease)     kidney stone with infection     Deep vein thrombosis (DVT) (H)     1972     DVT [id number]     Glucose intolerance      H/O partial nephrectomy      Heel spur      Hypothyroidism      Kidney stones      Lichen sclerosus      Osteoarthritis      Osteoporosis      PID      Pulmonary embolism (H)     1970     Unspecified hypothyroidism      Vitiligo      Past Surgical History:   Procedure Laterality Date     C NEPHRECTOMY      left partial 07-01-07     C VENOUS THROMBOSIS IMAGING      with pe     HC REMOVAL GALLBLADDER       HC REVISE MEDIAN N/CARPAL TUNNEL SURG       TUBAL LIGATION         Social History:  She leads a very active life and has continued to work out regularly.    reports that she quit smoking about 49 years ago. Her smoking use included cigarettes. She has never used smokeless tobacco. She reports current alcohol use. She reports that she does not use drugs.    Family History:  Denies a family history of skin cancer or skin diseases.  Family History   Problem Relation Age of Onset     Cancer Brother       Glaucoma Mother      Alzheimer Disease Brother      Macular Degeneration No family hx of        Medications:  Current Outpatient Medications   Medication Sig Dispense Refill     amLODIPine-atorvastatin (CADUET) 10-10 MG tablet Take 1 tablet by mouth daily       Calcium Carbonate-Vitamin D (CALCIUM + D) 600-200 MG-UNIT per tablet Take 2 tablets by mouth 2 times daily.       diphenhydrAMINE (BENADRYL) 25 MG tablet Take 25 mg by mouth At Bedtime        levothyroxine (SYNTHROID/LEVOTHROID) 100 MCG tablet TAKE ONE TABLET BY MOUTH ONCE DAILY EXCEPT SUNDAY 90 tablet 3     Multiple Vitamins-Minerals (OCUVITE ADULT 50+) CAPS Take 1 capsule by mouth daily 30 capsule 12     omeprazole (PRILOSEC) 40 MG DR capsule TAKE 1 CAPSULE BY MOUTH EVERY DAY 90 capsule 0     ranitidine (ZANTAC) 150 MG tablet TAKE 1 TABLET BY MOUTH DAILY IN THE AFTERNOON FOR 1 MONTH, THEN TAKE AS NEEDED 60 tablet 11     rOPINIRole (REQUIP) 0.25 MG tablet TAKE 1 TO 2 TABLETS BY MOUTH EVERY NIGHT AT BEDTIME 180 tablet 3     triamcinolone (ARISTOCORT HP) 0.5 % external cream Apply topically 2 times daily 454 g 3     ammonium lactate (AMLACTIN) 12 % external cream APPLY TOPICALLY TO THE AFFECTED AREA TWICE DAILY AS NEEDED (Patient not taking: Reported on 11/18/2019) 280 g 1     clobetasol (TEMOVATE) 0.05 % cream Apply  topically daily as needed. (Patient not taking: Reported on 11/18/2019) 45 g 1     clobetasol (TEMOVATE) 0.05 % external ointment APPLY SPARINGLY TO AFFECTED AREA TOPICALLY TWICE DAILY AS NEEDED. DO NOT APPLY TO FACE. (Patient not taking: Reported on 11/18/2019) 45 g 1     DOCUSATE SODIUM PO Take 100 mg by mouth At Bedtime       estradiol (ESTRACE) 0.1 MG/GM vaginal cream Place 2 g vaginally twice a week (Patient not taking: Reported on 11/18/2019) 42.5 g 3     furosemide (LASIX) 20 MG tablet Take 1 tablet (20 mg) by mouth daily (Patient not taking: Reported on 11/7/2019) 90 tablet 3     hydrOXYzine (ATARAX) 25 MG tablet Take 1 tablet (25  mg) by mouth 2 times daily as needed for itching (use at night time for itch) (Patient not taking: Reported on 11/18/2019) 90 tablet 1     order for DME Equipment being ordered:   Bassam [facility] Blizzard at [facility]. 1 each 0     STATIN NOT PRESCRIBED, INTENTIONAL, by Other route continuous prn. Patient declined 5/4/12  0       Allergies   Allergen Reactions     Ace Inhibitors Cough     Advicor      Celexa [Citalopram] GI Disturbance     diarrhea     Doxycycline Nausea     Poor appetite     Fosamax      Severe substernal burning and dysphagia within 3 days     Valsartan Cramps     severe       Review of Systems:  -As per HPI  -Constitutional: The patient denies fatigue, fevers, chills, unintended weight loss, and night sweats.  -HEENT: Patient denies nonhealing oral sores.  -Skin: As above in HPI. No additional skin concerns.    Physical exam:  Vitals: There were no vitals taken for this visit.  GEN: This is a well developed, well-nourished female in no acute distress, in a pleasant mood.    SKIN: Full skin, which includes the head/face, both arms, chest, back, abdomen,both legs, genitalia and/or groin buttocks, digits and/or nails, was examined.    - Pink scaly plaques to the scalp with adherent scale to many hair fibers  - Pink scaly plaques on the abdomen, upper back, and left arm  - No other lesions of concern on areas examined.       Impression/Plan:  1. Psoriasis, flaring in reaction to Nivolumab.    Start fluocinolone 0.01% scalp oil, apply once daily to the scalp before bed    Increase frequency of bathing to daily with application of ketoconazole 2% shampoo to the scalp daily    Continue Clobetasol 0.05% solution BID for flares on the scalp    Continue triamcinolone 0.5% cream BID to plaques even if the lesions are non-bothersome, until they resolve.     Photodocumentation was obtained today      Follow-up in 3 months, earlier for new or changing lesions.       Staff Involved:  Staff/Scribe    Scribe  Disclosure:  I, Chinedu Eldridge am serving as a scribe to document services personally performed by Elidia Bower PA-C based on data collection and the provider's statements to me.     Provider Disclosure:   The documentation recorded by the scribe accurately reflects the services I personally performed and the decisions made by me.    All risks, benefits and alternatives were discussed with patient.  Patient is in agreement and understands the assessment and plan.  All questions were answered.  Sun Screen Education was given.   Return to Clinic in 3 months or sooner as needed.   Elidia Bower PA-C   [facility]

## 2019-11-18 NOTE — NURSING NOTE
Chief Complaint   Patient presents with     Derm Problem     Helena is here today for Psoriasis flare up. Helena notes she was on Nivolumab and it caused Psoriasis.      Akilah Sandhu LPN

## 2019-11-25 ENCOUNTER — TELEPHONE (OUTPATIENT)
Dept: FAMILY MEDICINE | Facility: CLINIC | Age: 84
End: 2019-11-25

## 2019-11-25 DIAGNOSIS — K21.9 GASTROESOPHAGEAL REFLUX DISEASE, ESOPHAGITIS PRESENCE NOT SPECIFIED: Primary | ICD-10-CM

## 2019-11-25 RX ORDER — FAMOTIDINE 20 MG/1
20 TABLET, FILM COATED ORAL 2 TIMES DAILY PRN
Qty: 60 TABLET | Refills: 11 | Status: SHIPPED | OUTPATIENT
Start: 2019-11-25 | End: 2020-07-09

## 2019-11-25 NOTE — TELEPHONE ENCOUNTER
Received faxed request from [facility]. . [phone] Fax [phone]    Medication: Ranitidine 150mg tablets    This medication is recalled and not readily available. We wonder if we can get a new prescription for famotidine(pepcid) instead. Thank you!

## 2019-11-26 NOTE — TELEPHONE ENCOUNTER
[facility] has now sent a request stating that the Famotidine is on backorder. Please send alternative if possible. Ph. [phone] Fax [phone]

## 2019-12-13 ENCOUNTER — OFFICE VISIT (OUTPATIENT)
Dept: FAMILY MEDICINE | Facility: CLINIC | Age: 84
End: 2019-12-13
Payer: COMMERCIAL

## 2019-12-13 VITALS
DIASTOLIC BLOOD PRESSURE: 80 MMHG | WEIGHT: 129 LBS | HEART RATE: 102 BPM | TEMPERATURE: 98.3 F | BODY MASS INDEX: 25.19 KG/M2 | SYSTOLIC BLOOD PRESSURE: 131 MMHG

## 2019-12-13 DIAGNOSIS — J34.0: ICD-10-CM

## 2019-12-13 DIAGNOSIS — M19.011 PRIMARY OSTEOARTHRITIS OF RIGHT SHOULDER: Primary | ICD-10-CM

## 2019-12-13 DIAGNOSIS — N90.4 LICHEN SCLEROSUS ET ATROPHICUS OF THE VULVA: ICD-10-CM

## 2019-12-13 PROCEDURE — 99214 OFFICE O/P EST MOD 30 MIN: CPT | Mod: 25 | Performed by: INTERNAL MEDICINE

## 2019-12-13 PROCEDURE — 20610 DRAIN/INJ JOINT/BURSA W/O US: CPT | Mod: RT | Performed by: INTERNAL MEDICINE

## 2019-12-13 RX ORDER — TRIAMCINOLONE ACETONIDE 40 MG/ML
40 INJECTION, SUSPENSION INTRA-ARTICULAR; INTRAMUSCULAR ONCE
Status: COMPLETED | OUTPATIENT
Start: 2019-12-13 | End: 2019-12-13

## 2019-12-13 RX ORDER — CLINDAMYCIN PHOSPHATE 10 MG/G
GEL TOPICAL 2 TIMES DAILY
Qty: 30 G | Refills: 1 | Status: SHIPPED | OUTPATIENT
Start: 2019-12-13 | End: 2020-07-09

## 2019-12-13 RX ORDER — CLOBETASOL PROPIONATE 0.5 MG/G
OINTMENT TOPICAL
Qty: 60 G | Refills: 1 | Status: SHIPPED | OUTPATIENT
Start: 2019-12-13 | End: 2020-06-05

## 2019-12-13 RX ADMIN — TRIAMCINOLONE ACETONIDE 40 MG: 40 INJECTION, SUSPENSION INTRA-ARTICULAR; INTRAMUSCULAR at 15:40

## 2019-12-13 NOTE — PATIENT INSTRUCTIONS
Physical therapy will call you    Return to clinic June 2020 for routine physical         For mornings, use non sedating allergy pill for the runny nose (like claritin or allegra)    Clindamycin gel to LEFT septum twice daily    Try nasal saline every few hours while awake

## 2019-12-13 NOTE — PROGRESS NOTES
Subjective     Helena Eldridge is a 84 year old female who presents to clinic today for the following health issues:     83 y/o F here for shoulder pain.  H/o  H/o RUL/Hilar NSCLungCa (s/p low dose carboplatin and pemerexed.....recently d/c Novolumib immunotherapy due to  pruritis and yeast infections.... recent CT imaging= no evidence of cancer), L. nephrectomy (stones), osteoporosis, pelvic fx, lichen sclerosis, Gout, CKD 3, hypothryoid   , severe (asymptomatic) COPD       She saw colleague a month ago, and recieved bilateral shoulder injections which has has not helped on right side... this still hurts.  Hard to get dressed, does not awaken her at night.  ...     She has had much improvement in her psoriatic symptoms with current derm treatment.  Still has pruritis of her back, especially in shower.     HPI   Right shoulder pain x 3-4 weeks        Patient Active Problem List   Diagnosis     CARDIOVASCULAR SCREENING; LDL GOAL LESS THAN 100     CKD (chronic kidney disease) stage 4, GFR 15-29 ml/min (H)     Glucose intolerance (impaired glucose tolerance)     Kidney stones     Advance care planning     Hypertension goal BP (blood pressure) < 140/90     Lichen sclerosus et atrophicus of the vulva     Solitary kidney, acquired     Senile osteoporosis     Osteoarthritis of right knee     Medial meniscus tear     Cataracts, both eyes     Macular degeneration of both eyes, right eye greater than left eye     Pelvic fracture (H)     COPD, severe (H)     IPF (idiopathic pulmonary fibrosis) (H)     Other specified hypothyroidism     Idiopathic chronic gout     Adjustment disorder with anxiety     Non-small cell cancer of right lung (H)     Immunosuppression (H)     Port-A-Cath in place     Past Surgical History:   Procedure Laterality Date     C NEPHRECTOMY      left partial 07-01-07     C VENOUS THROMBOSIS IMAGING      with pe     HC REMOVAL GALLBLADDER       HC REVISE MEDIAN N/CARPAL TUNNEL SURG       TUBAL LIGATION          Social History     Tobacco Use     Smoking status: Former Smoker     Types: Cigarettes     Last attempt to quit: 1969     Years since quittin.0     Smokeless tobacco: Never Used   Substance Use Topics     Alcohol use: Yes     Alcohol/week: 0.0 standard drinks     Comment: very little      Family History   Problem Relation Age of Onset     Cancer Brother      Glaucoma Mother      Alzheimer Disease Brother      Macular Degeneration No family hx of          Current Outpatient Medications   Medication Sig Dispense Refill     amLODIPine-atorvastatin (CADUET) 10-10 MG tablet Take 1 tablet by mouth daily       Calcium Carbonate-Vitamin D (CALCIUM + D) 600-200 MG-UNIT per tablet Take 2 tablets by mouth 2 times daily.       clindamycin (CLINDAMAX) 1 % external gel Apply topically 2 times daily To left septum 30 g 1     clobetasol (TEMOVATE) 0.05 % external ointment APPLY SPARINGLY TO AFFECTED AREA TOPICALLY TWICE DAILY AS NEEDED. DO NOT APPLY TO FACE. 60 g 1     clobetasol (TEMOVATE) 0.05 % external solution Apply topically 2 times daily To the scalp 50 mL 11     diphenhydrAMINE (BENADRYL) 25 MG tablet Take 25 mg by mouth At Bedtime        ketoconazole (NIZORAL) 2 % external shampoo Use to wash scalp daily. 120 mL 11     levothyroxine (SYNTHROID/LEVOTHROID) 100 MCG tablet TAKE ONE TABLET BY MOUTH ONCE DAILY EXCEPT  90 tablet 3     Multiple Vitamins-Minerals (OCUVITE ADULT 50+) CAPS Take 1 capsule by mouth daily 30 capsule 12     omeprazole (PRILOSEC) 40 MG DR capsule TAKE 1 CAPSULE BY MOUTH EVERY DAY 90 capsule 0     rOPINIRole (REQUIP) 0.25 MG tablet TAKE 1 TO 2 TABLETS BY MOUTH EVERY NIGHT AT BEDTIME 180 tablet 3     STATIN NOT PRESCRIBED, INTENTIONAL, by Other route continuous prn. Patient declined 12  0     ammonium lactate (AMLACTIN) 12 % external cream APPLY TOPICALLY TO THE AFFECTED AREA TWICE DAILY AS NEEDED (Patient not taking: Reported on 2019) 280 g 1     cimetidine (TAGAMET) 200 MG  tablet Take 1 tablet (200 mg) by mouth 4 times daily 30 tablet 3     DOCUSATE SODIUM PO Take 100 mg by mouth At Bedtime       estradiol (ESTRACE) 0.1 MG/GM vaginal cream Place 2 g vaginally twice a week (Patient not taking: Reported on 11/18/2019) 42.5 g 3     famotidine (PEPCID) 20 MG tablet Take 1 tablet (20 mg) by mouth 2 times daily as needed 60 tablet 11     Fluocinolone Acetonide Scalp 0.01 % OIL oil Apply topically At Bedtime To the scalp 1 Bottle 11     furosemide (LASIX) 20 MG tablet Take 1 tablet (20 mg) by mouth daily (Patient not taking: Reported on 11/7/2019) 90 tablet 3     hydrOXYzine (ATARAX) 25 MG tablet Take 1 tablet (25 mg) by mouth 2 times daily as needed for itching (use at night time for itch) (Patient not taking: Reported on 11/18/2019) 90 tablet 1     order for DME Equipment being ordered:   Bassam [facility] Blizzard at [facility] [facility]. 1 each 0     triamcinolone (ARISTOCORT HP) 0.5 % external cream Apply topically 2 times daily To the plaques on the body 60 g 5     triamcinolone (ARISTOCORT HP) 0.5 % external cream Apply topically 2 times daily 454 g 3     Allergies   Allergen Reactions     Ace Inhibitors Cough     Advicor      Celexa [Citalopram] GI Disturbance     diarrhea     Doxycycline Nausea     Poor appetite     Fosamax      Severe substernal burning and dysphagia within 3 days     Valsartan Cramps     severe     Recent Labs   Lab Test 10/21/19  1221 10/03/19 07/29/19  1053 06/25/19  1157  11/23/18  1453 08/20/18  1448 06/21/18  0946  05/08/18  0835  06/01/17  1436  05/02/14  0823  04/24/13  0828  04/20/12  0907   A1C  --   --   --  5.5  --   --  4.9 5.3  --   --   --  5.7   < > 5.3  --  5.2   < >  --    LDL  --   --   --  97  --   --   --   --   --  124*  --  133*   < > 125  --  128  --  135*   HDL  --   --   --  35*  --   --   --   --   --  50  --  45*   < > 30*  --  32*  --  30*   TRIG  --   --   --  376*  --   --   --   --   --  160*  --  193*   < > 216*  --  146  --  194*   ALT  --   --    --   --   --   --   --   --   --   --   --   --   --  27  --  24  --  22   CR 1.87* 2.0* 1.78* 2.07*   < > 1.56* 1.36*  --   --   --    < > 1.26*   < > 1.17*   < > 1.20*   < > 1.34*   GFRESTIMATED 24* 22.4* 26* 22*   < > 32* 37*  --    < >  --    < > 41*   < > 45*   < > 44*   < > 38*   GFRESTBLACK 28*  --  30* 25*   < > 38* 45*  --    < >  --    < > 49*   < > 54*   < > 53*   < > 47*   POTASSIUM 4.6  --  4.4 4.3   < > 3.7 3.8  --   --   --    < > 3.1*   < > 4.2   < > 4.3   < > 4.3   TSH  --   --   --  2.50  --  1.44 1.58  --   --  4.85*  --  1.42   < > 3.31  --  0.62  --  0.35*    < > = values in this interval not displayed.      BP Readings from Last 3 Encounters:   12/13/19 131/80   11/07/19 112/68   10/21/19 129/80    Wt Readings from Last 3 Encounters:   12/13/19 58.5 kg (129 lb)   11/07/19 59 kg (130 lb)   10/21/19 59 kg (130 lb)                    Reviewed and updated as needed this visit by Provider         Review of Systems   ROS COMP: Constitutional, HEENT, cardiovascular, pulmonary, gi and gu systems are negative, except as otherwise noted.      Objective    /80 (BP Location: Right arm, Patient Position: Sitting, Cuff Size: Adult Regular)   Pulse 102   Temp 98.3  F (36.8  C) (Oral)   Wt 58.5 kg (129 lb)   BMI 25.19 kg/m    Body mass index is 25.19 kg/m .  Physical Exam   GENERAL: healthy, alert and no distress  EYES: Eyes grossly normal to inspection, PERRL and conjunctivae and sclerae normal  HENT: ear canals and TM's normal, nose and mouth without ulcers or lesions  HENT: left nasal septal sore/ulcer.  Rhinorrhea   NECK: no adenopathy, no asymmetry, masses, or scars and thyroid normal to palpation  MS: no gross musculoskeletal defects noted, no edema  MS:  Shoulder exam shows positive impingement signs are present with pain at high arc of abduction and forward flexion on right. There is tenderness of the supraspinatous tendon.    SKIN:  Psoriatic lesions on scalp, extremities almost completely  gone.   NEURO: Normal strength and tone, mentation intact and speech normal  PSYCH: mentation appears normal, affect normal/bright    Diagnostic Test Results:  Labs reviewed in Epic      This procedure has been fully reviewed with the patient and written informed consent has been obtained.  After cleaning area with betadine, a steroid injection was performed at  Right shoulder, supraspinatous area using 1% plain Lidocaine (1.  cc) and  40 mg of K40 (1  cc). This was well tolerated.      Assessment & Plan       ICD-10-CM    1. Primary osteoarthritis of right shoulder M19.011 DRAIN/INJECT LARGE JOINT/BURSA     triamcinolone (KENALOG-40) injection 40 mg     [facility] REFERR[facility]   2. Lichen sclerosus et atrophicus of the vulva N90.4 clobetasol (TEMOVATE) 0.05 % external ointment   3. Nose septum ulceration J34.0 clindamycin (CLINDAMAX) 1 % external gel             Patient Instructions   Physical therapy will call you  Shoulder therapy    Return to clinic June 2020 for routine physical         For mornings, use non sedating allergy pill for the runny nose (like claritin or allegra)  She has been using benadryl    Clindamycin gel to LEFT septum twice daily    Try nasal saline every few hours while awake   Consider ENT referral if symptoms don't improve.        Return in about 6 months (around 6/13/2020) for Physical Exam.    Sushma Nelson MD  [facility]

## 2019-12-27 ENCOUNTER — THERAPY VISIT (OUTPATIENT)
Dept: PHYSICAL THERAPY | Facility: CLINIC | Age: 84
End: 2019-12-27
Attending: INTERNAL MEDICINE
Payer: COMMERCIAL

## 2019-12-27 DIAGNOSIS — G89.29 CHRONIC RIGHT SHOULDER PAIN: ICD-10-CM

## 2019-12-27 DIAGNOSIS — M19.011 PRIMARY OSTEOARTHRITIS OF RIGHT SHOULDER: ICD-10-CM

## 2019-12-27 DIAGNOSIS — M25.511 CHRONIC RIGHT SHOULDER PAIN: ICD-10-CM

## 2019-12-27 PROCEDURE — 97110 THERAPEUTIC EXERCISES: CPT | Mod: GP | Performed by: PHYSICAL THERAPIST

## 2019-12-27 PROCEDURE — 97162 PT EVAL MOD COMPLEX 30 MIN: CPT | Mod: GP | Performed by: PHYSICAL THERAPIST

## 2019-12-27 NOTE — PROGRESS NOTES
[facility] Initial Evaluation  Subjective:  The history is provided by the patient.   Type of problem:  Right shoulder   Condition occurred with:  Unknown cause. This is a chronic condition   Problem details: Many months overall.  MD referral 12/13/2019..   Patient reports pain:  Anterior, in the joint, lateral, medial, posterior and upper arm. Radiates to:  Shoulder and upper arm. Associated symptoms:  Loss of motion/stiffness and loss of strength. Symptoms are exacerbated by certain positions, lifting, using arm at shoulder level, using arm behind back and using arm overhead and relieved by rest.      and reported as 9/10 on pain scale. General health as reported by patient is fair. Pertinent medical history includes:  Cancer, kidney disease, osteoporosis and thyroid problems.    Surgeries include:  Orthopedic surgery and other.  Current medications: see list in EPIC.     Pain is described as aching and sharp and is intermittent. Pain is worse during the day. Since onset symptoms are unchanged.  Past treatment: injections. There was none improvement following previous treatment.   Occupation: Retired.   Barriers include:  None as reported by patient.  Red flags:  None as reported by patient.                      Objective:  Standing Alignment:    Cervical/Thoracic:  Forward head  Shoulder/UE:  Rounded shoulders                                       Shoulder Evaluation:  ROM:  AROM:    Flexion:  Right:  85    Abduction:  Right:  50    Internal Rotation:  Right:  R hip                  PROM:    Flexion:  Right: 110      Abduction:  Right:  94                          Strength:    Flexion: Right: 4/5      Pain:  ++  Extension:  Right: 5-/5     Pain:+  Abduction:  Right: 4-/5      Pain:++  Adduction:  Right: 5-/5      Pain:+  Internal Rotation:  Right: 4/5      Pain:+  External Rotation:   Right:4/5      Pain:+      Horizontal Adduction:  Right:/5     Pain:+        Special Tests:      Right  shoulder positive for the following special tests:Impingement and Rotator cuff tear  Palpation:  Palpation assessed shoulder: diffuse around shoulder.                                         General     ROS    Assessment/Plan:    Patient is a 84 year old female with right side shoulder complaints.    Patient has the following significant findings with corresponding treatment plan.                Diagnosis 1:  R shoulder pain  Pain -  manual therapy, self management, education, directional preference exercise and home program  Decreased ROM/flexibility - manual therapy and therapeutic exercise  Decreased strength - therapeutic exercise and therapeutic activities  Impaired muscle performance - neuro re-education  Decreased function - therapeutic activities    Therapy Evaluation Codes:   1) History comprised of:   Personal factors that impact the plan of care:      None.    Comorbidity factors that impact the plan of care are:      Cancer and Weakness.     Medications impacting care: List in EPIC.  2) Examination of Body Systems comprised of:   Body structures and functions that impact the plan of care:      Shoulder.   Activity limitations that impact the plan of care are:      Bathing, Cooking, Driving, Dressing and Lifting.  3) Clinical presentation characteristics are:   Evolving/Changing.  4) Decision-Making    Moderate complexity using standardized patient assessment instrument and/or measureable assessment of functional outcome.  Cumulative Therapy Evaluation is: Moderate complexity.    Previous and current functional limitations:  (See Goal Flow Sheet for this information)    Short term and Long term goals: (See Goal Flow Sheet for this information)     Communication ability:  Patient appears to be able to clearly communicate and understand verbal and written communication and follow directions correctly.  Treatment Explanation - The following has been discussed with the patient:   RX ordered/plan of  care  Anticipated outcomes  Possible risks and side effects  This patient would benefit from PT intervention to resume normal activities.   Rehab potential is fair.    Frequency:  1 X week, once daily  Duration:  for 8 weeks  Discharge Plan:  Achieve all LTG.  Independent in home treatment program.  Reach maximal therapeutic benefit.    Please refer to the daily flowsheet for treatment today, total treatment time and time spent performing 1:1 timed codes.

## 2020-01-03 ENCOUNTER — THERAPY VISIT (OUTPATIENT)
Dept: PHYSICAL THERAPY | Facility: CLINIC | Age: 85
End: 2020-01-03
Attending: INTERNAL MEDICINE
Payer: COMMERCIAL

## 2020-01-03 DIAGNOSIS — G89.29 CHRONIC RIGHT SHOULDER PAIN: ICD-10-CM

## 2020-01-03 DIAGNOSIS — M25.511 CHRONIC RIGHT SHOULDER PAIN: ICD-10-CM

## 2020-01-03 PROCEDURE — 97112 NEUROMUSCULAR REEDUCATION: CPT | Mod: GP | Performed by: PHYSICAL THERAPIST

## 2020-01-03 PROCEDURE — 97110 THERAPEUTIC EXERCISES: CPT | Mod: GP | Performed by: PHYSICAL THERAPIST

## 2020-01-10 ENCOUNTER — THERAPY VISIT (OUTPATIENT)
Dept: PHYSICAL THERAPY | Facility: CLINIC | Age: 85
End: 2020-01-10
Payer: COMMERCIAL

## 2020-01-10 DIAGNOSIS — M25.511 CHRONIC RIGHT SHOULDER PAIN: ICD-10-CM

## 2020-01-10 DIAGNOSIS — G89.29 CHRONIC RIGHT SHOULDER PAIN: ICD-10-CM

## 2020-01-10 PROCEDURE — 97112 NEUROMUSCULAR REEDUCATION: CPT | Mod: GP | Performed by: PHYSICAL THERAPIST

## 2020-01-10 PROCEDURE — 97110 THERAPEUTIC EXERCISES: CPT | Mod: GP | Performed by: PHYSICAL THERAPIST

## 2020-01-16 ENCOUNTER — OFFICE VISIT (OUTPATIENT)
Dept: AUDIOLOGY | Facility: CLINIC | Age: 85
End: 2020-01-16
Payer: COMMERCIAL

## 2020-01-16 ENCOUNTER — TELEPHONE (OUTPATIENT)
Dept: AUDIOLOGY | Facility: CLINIC | Age: 85
End: 2020-01-16

## 2020-01-16 DIAGNOSIS — H90.3 SENSORINEURAL HEARING LOSS, BILATERAL: Primary | ICD-10-CM

## 2020-01-16 PROCEDURE — V5299 HEARING SERVICE: HCPCS | Performed by: AUDIOLOGIST

## 2020-01-16 PROCEDURE — 99207 ZZC NO CHARGE LOS: CPT | Performed by: AUDIOLOGIST

## 2020-01-16 NOTE — PROGRESS NOTES
HEARING AID RECHECK     Patient Name:  Helena Eldridge     Patient Age:   82 year old     :  1935     Background:                Patient is here to have the tubing on her Widex BTE hearing aids changed as they are hard and brittle.      Procedures:       SIDE: Both    : Widex     TYPE: C3-9 BTE    S/N: [id number]    WARRANTY:                  Replaced the tubing on the hearing aids and cut to patient's ear height. General cleaning of the hearing aids and earmolds was also performed. Biologic listening check finds the hearings aids sounding crisp and clear. Patient reports the hearing aids are feeling better and sounding better than when she arrived.       Plan:              Patient will return as needed for hearing aid concerns.      CHARGES:              [facility] [place]    James Lau CCC-A  Licensed Audiologist #[id number]  2020

## 2020-01-16 NOTE — TELEPHONE ENCOUNTER
Please call Helena and schedule a 30 minute hearing aid check. Permission to use non-ears ENT spots if necessary. If you have any questions please do not hesitate to ask.     Thank you,   -James KINGA

## 2020-01-21 ENCOUNTER — OFFICE VISIT (OUTPATIENT)
Dept: FAMILY MEDICINE | Facility: CLINIC | Age: 85
End: 2020-01-21
Payer: COMMERCIAL

## 2020-01-21 VITALS
SYSTOLIC BLOOD PRESSURE: 128 MMHG | BODY MASS INDEX: 24.35 KG/M2 | HEART RATE: 95 BPM | TEMPERATURE: 97.9 F | OXYGEN SATURATION: 97 % | DIASTOLIC BLOOD PRESSURE: 74 MMHG | HEIGHT: 61 IN | WEIGHT: 129 LBS

## 2020-01-21 DIAGNOSIS — L30.4 INTERTRIGO: ICD-10-CM

## 2020-01-21 DIAGNOSIS — R30.9 PAINFUL URINATION: Primary | ICD-10-CM

## 2020-01-21 DIAGNOSIS — L98.9 PERINEAL IRRITATION IN FEMALE: ICD-10-CM

## 2020-01-21 DIAGNOSIS — J30.0 CHRONIC VASOMOTOR RHINITIS: ICD-10-CM

## 2020-01-21 LAB
ALBUMIN UR-MCNC: NEGATIVE MG/DL
APPEARANCE UR: CLEAR
BILIRUB UR QL STRIP: NEGATIVE
COLOR UR AUTO: YELLOW
GLUCOSE UR STRIP-MCNC: NEGATIVE MG/DL
HGB UR QL STRIP: NEGATIVE
HYALINE CASTS #/AREA URNS LPF: NORMAL /LPF
KETONES UR STRIP-MCNC: NEGATIVE MG/DL
LEUKOCYTE ESTERASE UR QL STRIP: NEGATIVE
NITRATE UR QL: NEGATIVE
NON-SQ EPI CELLS #/AREA URNS LPF: NORMAL /LPF
PH UR STRIP: 5.5 PH (ref 5–7)
RBC #/AREA URNS AUTO: NORMAL /HPF
SOURCE: NORMAL
SP GR UR STRIP: 1.02 (ref 1–1.03)
UROBILINOGEN UR STRIP-ACNC: 0.2 EU/DL (ref 0.2–1)
WBC #/AREA URNS AUTO: NORMAL /HPF

## 2020-01-21 PROCEDURE — 81001 URINALYSIS AUTO W/SCOPE: CPT | Performed by: INTERNAL MEDICINE

## 2020-01-21 PROCEDURE — 99214 OFFICE O/P EST MOD 30 MIN: CPT | Performed by: INTERNAL MEDICINE

## 2020-01-21 RX ORDER — NYSTATIN 100000 U/G
CREAM TOPICAL 2 TIMES DAILY
Qty: 60 G | Refills: 1 | Status: SHIPPED | OUTPATIENT
Start: 2020-01-21 | End: 2020-07-09

## 2020-01-21 RX ORDER — AMLODIPINE BESYLATE 5 MG/1
5 TABLET ORAL DAILY
COMMUNITY
End: 2020-07-24

## 2020-01-21 RX ORDER — ZINC OXIDE
OINTMENT (GRAM) TOPICAL PRN
Qty: 60 G | Refills: 0 | Status: SHIPPED | OUTPATIENT
Start: 2020-01-21 | End: 2020-07-09

## 2020-01-21 RX ORDER — IPRATROPIUM BROMIDE 21 UG/1
2 SPRAY, METERED NASAL EVERY 12 HOURS
Qty: 30 ML | Refills: 1 | Status: SHIPPED | OUTPATIENT
Start: 2020-01-21 | End: 2020-07-20

## 2020-01-21 ASSESSMENT — MIFFLIN-ST. JEOR: SCORE: 964.58

## 2020-01-21 NOTE — PROGRESS NOTES
"Subjective     Helena Eldridge is a 84 year old female who presents to clinic today for the following health issues:   .    Getting PT for shoulder pain... \"it is coming along\".  Happy with results. S/p injection at our visit a couple months ago.     H/o  H/o RUL/Hilar NSCLungCa (s/p low dose carboplatin and pemerexed.....had to d/c Novolumib immunotherapy due to  pruritis and yeast infections.... recent CT imaging= no evidence of cancer), L. nephrectomy (stones), osteoporosis, pelvic fx, lichen sclerosis, Gout, CKD 3, hypothryoid   , severe (asymptomatic) COPD       ..    HPI   URINARY TRACT SYMPTOMS  Onset: 2 weeks    Description:   Painful urination (Dysuria): YES           Frequency: no  Blood in urine (Hematuria): no   Delay in urine (Hesitency): no     Intensity: moderate    Progression of Symptoms:  same    Accompanying Signs & Symptoms:  Fever/chills: no   Flank pain no   Nausea and vomiting: no   Any vaginal symptoms: none  Abdominal/Pelvic Pain: no     History:   History of frequent UTI's: no ... does have lichen sclerosis.    History of kidney stones: YES  Sexually Active: no   Possibility of pregnancy: No    Precipitating factors:       Therapies Tried and outcome: none    Also runny nose all the time.  Despite benadryl and fluticasone trial.            Patient Active Problem List   Diagnosis     CARDIOVASCULAR SCREENING; LDL GOAL LESS THAN 100     CKD (chronic kidney disease) stage 4, GFR 15-29 ml/min (H)     Glucose intolerance (impaired glucose tolerance)     Kidney stones     Advance care planning     Hypertension goal BP (blood pressure) < 140/90     Lichen sclerosus et atrophicus of the vulva     Solitary kidney, acquired     Senile osteoporosis     Osteoarthritis of right knee     Medial meniscus tear     Cataracts, both eyes     Macular degeneration of both eyes, right eye greater than left eye     Pelvic fracture (H)     COPD, severe (H)     IPF (idiopathic pulmonary fibrosis) (H)     Other " specified hypothyroidism     Idiopathic chronic gout     Adjustment disorder with anxiety     Non-small cell cancer of right lung (H)     Immunosuppression (H)     Port-A-Cath in place     Chronic right shoulder pain     Past Surgical History:   Procedure Laterality Date     C NEPHRECTOMY      left partial 07     C VENOUS THROMBOSIS IMAGING      with pe     HC REMOVAL GALLBLADDER       HC REVISE MEDIAN N/CARPAL TUNNEL SURG       TUBAL LIGATION         Social History     Tobacco Use     Smoking status: Former Smoker     Types: Cigarettes     Last attempt to quit: 1969     Years since quittin.1     Smokeless tobacco: Never Used   Substance Use Topics     Alcohol use: Yes     Alcohol/week: 0.0 standard drinks     Comment: very little      Family History   Problem Relation Age of Onset     Cancer Brother      Glaucoma Mother      Alzheimer Disease Brother      Macular Degeneration No family hx of          Current Outpatient Medications   Medication Sig Dispense Refill     amLODIPine (NORVASC) 5 MG tablet Take 5 mg by mouth daily       Calcium Carbonate-Vitamin D (CALCIUM + D) 600-200 MG-UNIT per tablet Take 2 tablets by mouth 2 times daily.       diphenhydrAMINE (BENADRYL) 25 MG tablet Take 25 mg by mouth At Bedtime        ipratropium (ATROVENT) 0.03 % nasal spray Spray 2 sprays into both nostrils every 12 hours 30 mL 1     ketoconazole (NIZORAL) 2 % external shampoo Use to wash scalp daily. 120 mL 11     levothyroxine (SYNTHROID/LEVOTHROID) 100 MCG tablet TAKE ONE TABLET BY MOUTH ONCE DAILY EXCEPT  90 tablet 3     Multiple Vitamins-Minerals (OCUVITE ADULT 50+) CAPS Take 1 capsule by mouth daily 30 capsule 12     nystatin (MYCOSTATIN) 074294 UNIT/GM external cream Apply topically 2 times daily 60 g 1     omeprazole (PRILOSEC) 40 MG DR capsule TAKE 1 CAPSULE BY MOUTH EVERY DAY 90 capsule 3     raNITIdine HCl (RANITIDINE 150 MAX STRENGTH PO)        rOPINIRole (REQUIP) 0.25 MG tablet TAKE 1 TO 2  TABLETS BY MOUTH EVERY NIGHT AT BEDTIME 180 tablet 3     STATIN NOT PRESCRIBED, INTENTIONAL, by Other route continuous prn. Patient declined 5/4/12  0     triamcinolone (ARISTOCORT HP) 0.5 % external cream Apply topically 2 times daily To the plaques on the body 60 g 5     triamcinolone (ARISTOCORT HP) 0.5 % external cream Apply topically 2 times daily 454 g 3     zinc oxide (DESITIN) 40 % external ointment Apply topically as needed for dry skin or irritation 60 g 0     amLODIPine-atorvastatin (CADUET) 10-10 MG tablet Take 1 tablet by mouth daily       ammonium lactate (AMLACTIN) 12 % external cream APPLY TOPICALLY TO THE AFFECTED AREA TWICE DAILY AS NEEDED (Patient not taking: Reported on 11/18/2019) 280 g 1     cimetidine (TAGAMET) 200 MG tablet Take 1 tablet (200 mg) by mouth 4 times daily 30 tablet 3     clindamycin (CLINDAMAX) 1 % external gel Apply topically 2 times daily To left septum 30 g 1     clobetasol (TEMOVATE) 0.05 % external ointment APPLY SPARINGLY TO AFFECTED AREA TOPICALLY TWICE DAILY AS NEEDED. DO NOT APPLY TO FACE. 60 g 1     clobetasol (TEMOVATE) 0.05 % external solution Apply topically 2 times daily To the scalp 50 mL 11     DOCUSATE SODIUM PO Take 100 mg by mouth At Bedtime       estradiol (ESTRACE) 0.1 MG/GM vaginal cream Place 2 g vaginally twice a week (Patient not taking: Reported on 11/18/2019) 42.5 g 3     famotidine (PEPCID) 20 MG tablet Take 1 tablet (20 mg) by mouth 2 times daily as needed 60 tablet 11     Fluocinolone Acetonide Scalp 0.01 % OIL oil Apply topically At Bedtime To the scalp 1 Bottle 11     furosemide (LASIX) 20 MG tablet Take 1 tablet (20 mg) by mouth daily (Patient not taking: Reported on 11/7/2019) 90 tablet 3     hydrOXYzine (ATARAX) 25 MG tablet Take 1 tablet (25 mg) by mouth 2 times daily as needed for itching (use at night time for itch) (Patient not taking: Reported on 11/18/2019) 90 tablet 1     order for DME Equipment being ordered:   Heath bar [employer] at  [employer]. 1 each 0     Allergies   Allergen Reactions     Ace Inhibitors Cough     Advicor      Celexa [Citalopram] GI Disturbance     diarrhea     Doxycycline Nausea     Poor appetite     Fosamax      Severe substernal burning and dysphagia within 3 days     Valsartan Cramps     severe     Recent Labs   Lab Test 10/21/19  1221 10/03/19 07/29/19  1053 06/25/19  1157  11/23/18  1453 08/20/18  1448 06/21/18  0946  05/08/18  0835  06/01/17  1436  05/02/14  0823  04/24/13  0828  04/20/12  0907   A1C  --   --   --  5.5  --   --  4.9 5.3  --   --   --  5.7   < > 5.3  --  5.2   < >  --    LDL  --   --   --  97  --   --   --   --   --  124*  --  133*   < > 125  --  128  --  135*   HDL  --   --   --  35*  --   --   --   --   --  50  --  45*   < > 30*  --  32*  --  30*   TRIG  --   --   --  376*  --   --   --   --   --  160*  --  193*   < > 216*  --  146  --  194*   ALT  --   --   --   --   --   --   --   --   --   --   --   --   --  27  --  24  --  22   CR 1.87* 2.0* 1.78* 2.07*   < > 1.56* 1.36*  --   --   --    < > 1.26*   < > 1.17*   < > 1.20*   < > 1.34*   GFRESTIMATED 24* 22.4* 26* 22*   < > 32* 37*  --    < >  --    < > 41*   < > 45*   < > 44*   < > 38*   GFRESTBLACK 28*  --  30* 25*   < > 38* 45*  --    < >  --    < > 49*   < > 54*   < > 53*   < > 47*   POTASSIUM 4.6  --  4.4 4.3   < > 3.7 3.8  --   --   --    < > 3.1*   < > 4.2   < > 4.3   < > 4.3   TSH  --   --   --  2.50  --  1.44 1.58  --   --  4.85*  --  1.42   < > 3.31  --  0.62  --  0.35*    < > = values in this interval not displayed.      BP Readings from Last 3 Encounters:   01/21/20 128/74   12/13/19 131/80   11/07/19 112/68    Wt Readings from Last 3 Encounters:   01/21/20 58.5 kg (129 lb)   12/13/19 58.5 kg (129 lb)   11/07/19 59 kg (130 lb)                    Reviewed and updated as needed this visit by Provider         Review of Systems   ROS COMP: Constitutional, HEENT, cardiovascular, pulmonary, gi and gu systems are negative, except as otherwise  "noted.      Objective    /74 (BP Location: Other (Comment), Patient Position: Sitting, Cuff Size: Adult Regular)   Pulse 95   Temp 97.9  F (36.6  C) (Oral)   Ht 1.537 m (5' 0.5\")   Wt 58.5 kg (129 lb)   SpO2 97%   BMI 24.78 kg/m    Body mass index is 24.78 kg/m .  Physical Exam   GENERAL: healthy, alert and no distress  EYES: Eyes grossly normal to inspection, PERRL and conjunctivae and sclerae normal   (female): she has chronic lichen sclerosis.  perinium has thinning scarred puckered skin, may have some candida as well.  Patient wears a pad.      Diagnostic Test Results:  Labs reviewed in Epic  Results for orders placed or performed in visit on 01/21/20 (from the past 24 hour(s))   UA with Microscopic reflex to Culture   Result Value Ref Range    Color Urine Yellow     Appearance Urine Clear     Glucose Urine Negative NEG^Negative mg/dL    Bilirubin Urine Negative NEG^Negative    Ketones Urine Negative NEG^Negative mg/dL    Specific Gravity Urine 1.025 1.003 - 1.035    pH Urine 5.5 5.0 - 7.0 pH    Protein Albumin Urine Negative NEG^Negative mg/dL    Urobilinogen Urine 0.2 0.2 - 1.0 EU/dL    Nitrite Urine Negative NEG^Negative    Blood Urine Negative NEG^Negative    Leukocyte Esterase Urine Negative NEG^Negative    Source Midstream Urine     WBC Urine 0 - 5 OTO5^0 - 5 /HPF    RBC Urine O - 2 OTO2^O - 2 /HPF    Hyaline Casts O - 2 OTO2^O - 2 /LPF    Squamous Epithelial /LPF Urine Few FEW^Few /LPF           Assessment & Plan       ICD-10-CM    1. Painful urination R30.9 UA with Microscopic reflex to Culture   2. Intertrigo L30.4 nystatin (MYCOSTATIN) 131119 UNIT/GM external cream     zinc oxide (DESITIN) 40 % external ointment   3. Perineal irritation in female L98.9 nystatin (MYCOSTATIN) 566736 UNIT/GM external cream     zinc oxide (DESITIN) 40 % external ointment   4. Chronic vasomotor rhinitis J30.0 ipratropium (ATROVENT) 0.03 % nasal spray          Patient Instructions   For irritated area:   Apply " Desitin ointment several times a day (after gentle clean/dry) as BARRIER  ------------------------------------------  Use Nystatin (antifungal) to the area twice daily for a week (possible yeast)      For vasomotor rhinitis:  Trial Atrovent nasal spray         Return in about 5 months (around 6/21/2020) for Physical Exam.    Sushma Nelson MD  [facility]

## 2020-01-21 NOTE — PATIENT INSTRUCTIONS
For irritated area:   Apply Desitin ointment several times a day (after gentle clean/dry) as BARRIER  ------------------------------------------  Use Nystatin (antifungal) to the area twice daily for a week (possible yeast)      For vasomotor rhinitis:  Trial Atrovent nasal spray

## 2020-01-23 ENCOUNTER — TELEPHONE (OUTPATIENT)
Dept: FAMILY MEDICINE | Facility: CLINIC | Age: 85
End: 2020-01-23

## 2020-01-23 ENCOUNTER — THERAPY VISIT (OUTPATIENT)
Dept: PHYSICAL THERAPY | Facility: CLINIC | Age: 85
End: 2020-01-23
Payer: COMMERCIAL

## 2020-01-23 DIAGNOSIS — M25.511 CHRONIC RIGHT SHOULDER PAIN: ICD-10-CM

## 2020-01-23 DIAGNOSIS — G89.29 CHRONIC RIGHT SHOULDER PAIN: ICD-10-CM

## 2020-01-23 PROCEDURE — 97110 THERAPEUTIC EXERCISES: CPT | Mod: GP | Performed by: PHYSICAL THERAPIST

## 2020-01-23 PROCEDURE — 97112 NEUROMUSCULAR REEDUCATION: CPT | Mod: GP | Performed by: PHYSICAL THERAPIST

## 2020-01-23 NOTE — TELEPHONE ENCOUNTER
PA is needed for the medication, Nystatin Cream 100,000 Unit/GM Cream . Would you like to start PA or Rx alternative medication? If PA, please list all medications this patient has tried along with any contraindications that may have been experienced in the past. Thank you.    Pharmacy: [facility]  Phone: [phone]    Insurance Plan: [facility]  Phone:   Pt ID: [id number]  Group:     Forwarded to AMY CASILLAS for review.

## 2020-01-23 NOTE — PROGRESS NOTES
Subjective:  HPI                    Objective:  System    Physical Exam    General     ROS    Assessment/Plan:    DISCHARGE REPORT    Progress reporting period is from 12/27/2019 to 1/23/2020.       SUBJECTIVE  Subjective changes noted by patient:  Improved.  Able to do everything    Current pain level is : 4/10.     Previous pain level was  : 9/10.   Changes in function:  Yes (See Goal flowsheet attached for changes in current functional level)  Adverse reaction to treatment or activity: None    OBJECTIVE  Objective: AROM equal to L    Strength improving  Pain decreased    ASSESSMENT/PLAN  Updated problem list and treatment plan:Diagnosis 1:  R shoulder pain  Pain -  manual therapy, self management, education, directional preference exercise and home program  Decreased ROM/flexibility - manual therapy and therapeutic exercise  Decreased strength - therapeutic exercise and therapeutic activities  Impaired muscle performance - neuro re-education  Decreased function - therapeutic activities   STG/LTGs have been met or progress has been made towards goals:  Yes (See Goal flow sheet completed today.)  Assessment of Progress: The patient's condition is improving.  The patient has met all of their long term goals.  Self Management Plans:  Patient has been instructed in a home treatment program.    Helena continues to require the following intervention to meet STG and LTG's:  PT intervention is no longer required to meet STG/LTG.    Recommendations:  This patient is ready to be discharged from therapy and continue their home treatment program.    Please refer to the daily flowsheet for treatment today, total treatment time and time spent performing 1:1 timed codes.

## 2020-01-27 NOTE — TELEPHONE ENCOUNTER
Central Prior Authorization Team  Phone: [phone]    PA Initiation    Medication: Nystatin Cream   Insurance Company: [employer] - Phone [phone] Fax [phone]  Pharmacy Filling the Rx: [id number] - [facility] [address] AT [facility] [address] [facility]  Filling Pharmacy Phone: [phone]  Filling Pharmacy Fax:    Start Date: 1/27/2020

## 2020-01-28 NOTE — TELEPHONE ENCOUNTER
Prior Authorization Approval    Authorization Effective Date: 12/28/2019  Authorization Expiration Date: 2/26/2020  Medication: Nystatin Cream- APPROVED   Approved Dose/Quantity:   Reference #:     Insurance Company: [employer] - Phone [phone] Fax [phone]  Expected CoPay:       CoPay Card Available:      Foundation Assistance Needed:    Which Pharmacy is filling the prescription (Not needed for infusion/clinic administered): [id number] - [facility] [address] [place] [facility] [place] [address] [facility]  Pharmacy Notified: Yes  Patient Notified: Comment:  **Instructed pharmacy to notify patient when script is ready to /ship.**

## 2020-01-30 ENCOUNTER — TRANSFERRED RECORDS (OUTPATIENT)
Dept: HEALTH INFORMATION MANAGEMENT | Facility: CLINIC | Age: 85
End: 2020-01-30

## 2020-02-24 ENCOUNTER — OFFICE VISIT (OUTPATIENT)
Dept: DERMATOLOGY | Facility: CLINIC | Age: 85
End: 2020-02-24
Payer: COMMERCIAL

## 2020-02-24 DIAGNOSIS — L40.0 PSORIASIS VULGARIS: ICD-10-CM

## 2020-02-24 DIAGNOSIS — L40.0 PLAQUE PSORIASIS: Primary | ICD-10-CM

## 2020-02-24 RX ORDER — CLOBETASOL PROPIONATE 0.5 MG/ML
SOLUTION TOPICAL 2 TIMES DAILY
Qty: 50 ML | Refills: 11 | Status: SHIPPED | OUTPATIENT
Start: 2020-02-24 | End: 2020-10-19

## 2020-02-24 RX ORDER — KETOCONAZOLE 20 MG/ML
SHAMPOO TOPICAL
Qty: 120 ML | Refills: 11 | Status: SHIPPED | OUTPATIENT
Start: 2020-02-24 | End: 2020-10-19

## 2020-02-24 ASSESSMENT — PAIN SCALES - GENERAL: PAINLEVEL: NO PAIN (0)

## 2020-02-24 NOTE — PROGRESS NOTES
[facility] Dermatology Note      Dermatology Problem List:  1. Plaque psoriasis  - Clobetasol 0.05% solution BID for flares, scalp  - Triamcinolone 0.5% ointment BID, body  - Ketoconazole 2% shampoo, daily  - Holding Fluocinolone 0.01% scalp oil    Encounter Date: Feb 24, 2020    CC:  Chief Complaint   Patient presents with     Derm Problem     Helena is here today for Psoriasis follow up. Helena would like to discuss her presriptions.          History of Present Illness:  Ms. Helena Eldridge is a 84 year old female who presents as a follow up for psoriasis. She was last seen in the dermatology clinic on 11/18/19 when she started fluocinolone 0.01% scalp oil once a day to the scalp, increased the frequency of washing of the scalp to daily with daily use of ketoconazole 2% shampoo, continued clobetasol 0.05% solution BID for flares on the scalp, and triamcinolone 0.5% cream BID for flares on the body.     She notes that she used both clobetasol solution and fluocinolone scalp oil with improvement in her scalp lesions. She did not renew her prescription of fluocinolone as she believes that it was too expensive, she can afford it, she just doesn't want to pay the price for the medication if she can get good control of symptoms with only washing and clobetasol. She notes that the only itchy areas today are on the lower back, the rest of her skin is clear of bothersome lesions. She notes that for one month, she was washing her scalp and applying medications daily and after that until today she has been washing her scalp every other day. She has continued using the clobetasol 0.05% solution and triamcinolone 0.5% cream prn for plaques as they arise on the scalp and body respectively.     Otherwise she is feeling well, without additional skin concerns at this time.     Past Medical History:   Patient Active Problem List   Diagnosis     CARDIOVASCULAR SCREENING; LDL GOAL LESS THAN 100     CKD (chronic kidney  disease) stage 4, GFR 15-29 ml/min (H)     Glucose intolerance (impaired glucose tolerance)     Kidney stones     Advance care planning     Hypertension goal BP (blood pressure) < 140/90     Lichen sclerosus et atrophicus of the vulva     Solitary kidney, acquired     Senile osteoporosis     Osteoarthritis of right knee     Medial meniscus tear     Cataracts, both eyes     Macular degeneration of both eyes, right eye greater than left eye     Pelvic fracture (H)     COPD, severe (H)     IPF (idiopathic pulmonary fibrosis) (H)     Other specified hypothyroidism     Idiopathic chronic gout     Adjustment disorder with anxiety     Non-small cell cancer of right lung (H)     Immunosuppression (H)     Port-A-Cath in place     Past Medical History:   Diagnosis Date     Carpal tunnel syndrome      CKD (chronic kidney disease)     kidney stone with infection     Deep vein thrombosis (DVT) (H)     1972     DVT 07011992     Glucose intolerance      H/O partial nephrectomy      Heel spur      Hypothyroidism      Kidney stones      Lichen sclerosus      Osteoarthritis      Osteoporosis      PID      Pulmonary embolism (H)     1970     Unspecified hypothyroidism      Vitiligo      Past Surgical History:   Procedure Laterality Date     C NEPHRECTOMY      left partial 07-01-07     C VENOUS THROMBOSIS IMAGING      with pe     HC REMOVAL GALLBLADDER       HC REVISE MEDIAN N/CARPAL TUNNEL SURG       TUBAL LIGATION         Social History:  She leads a very active life and has continued to work out regularly.    reports that she quit smoking about 50 years ago. Her smoking use included cigarettes. She has never used smokeless tobacco. She reports current alcohol use. She reports that she does not use drugs.    Family History:  Denies a family history of skin cancer or skin diseases.  Family History   Problem Relation Age of Onset     Cancer Brother      Glaucoma Mother      Alzheimer Disease Brother      Macular Degeneration No family  hx of        Medications:  Current Outpatient Medications   Medication Sig Dispense Refill     amLODIPine (NORVASC) 5 MG tablet Take 5 mg by mouth daily       amLODIPine-atorvastatin (CADUET) 10-10 MG tablet Take 1 tablet by mouth daily       Calcium Carbonate-Vitamin D (CALCIUM + D) 600-200 MG-UNIT per tablet Take 2 tablets by mouth 2 times daily.       cimetidine (TAGAMET) 200 MG tablet Take 1 tablet (200 mg) by mouth 4 times daily 30 tablet 3     clindamycin (CLINDAMAX) 1 % external gel Apply topically 2 times daily To left septum 30 g 1     clobetasol (TEMOVATE) 0.05 % external ointment APPLY SPARINGLY TO AFFECTED AREA TOPICALLY TWICE DAILY AS NEEDED. DO NOT APPLY TO FACE. 60 g 1     clobetasol (TEMOVATE) 0.05 % external solution Apply topically 2 times daily To the scalp 50 mL 11     diphenhydrAMINE (BENADRYL) 25 MG tablet Take 25 mg by mouth At Bedtime        DOCUSATE SODIUM PO Take 100 mg by mouth At Bedtime       estradiol (ESTRACE) 0.1 MG/GM vaginal cream Place 2 g vaginally twice a week 42.5 g 3     famotidine (PEPCID) 20 MG tablet Take 1 tablet (20 mg) by mouth 2 times daily as needed 60 tablet 11     Fluocinolone Acetonide Scalp 0.01 % OIL oil Apply topically At Bedtime To the scalp 1 Bottle 11     furosemide (LASIX) 20 MG tablet Take 1 tablet (20 mg) by mouth daily 90 tablet 3     hydrOXYzine (ATARAX) 25 MG tablet TAKE 1 TABLET BY MOUTH TWICE DAILY AS NEEDED FOR ITCHING 90 tablet 1     ipratropium (ATROVENT) 0.03 % nasal spray Spray 2 sprays into both nostrils every 12 hours 30 mL 1     ketoconazole (NIZORAL) 2 % external shampoo Use to wash scalp daily. 120 mL 11     levothyroxine (SYNTHROID/LEVOTHROID) 100 MCG tablet TAKE ONE TABLET BY MOUTH ONCE DAILY EXCEPT SUNDAY 90 tablet 3     Multiple Vitamins-Minerals (OCUVITE ADULT 50+) CAPS Take 1 capsule by mouth daily 30 capsule 12     nystatin (MYCOSTATIN) 189700 UNIT/GM external cream Apply topically 2 times daily 60 g 1     omeprazole (PRILOSEC) 40 MG DR  capsule TAKE 1 CAPSULE BY MOUTH EVERY DAY 90 capsule 3     order for DME Equipment being ordered:   Bassam [facility] at [facility]. 1 each 0     raNITIdine HCl (RANITIDINE 150 MAX STRENGTH PO)        rOPINIRole (REQUIP) 0.25 MG tablet TAKE 1 TO 2 TABLETS BY MOUTH EVERY NIGHT AT BEDTIME 180 tablet 3     STATIN NOT PRESCRIBED, INTENTIONAL, by Other route continuous prn. Patient declined 5/4/12  0     triamcinolone (ARISTOCORT HP) 0.5 % external cream Apply topically 2 times daily To the plaques on the body 60 g 5     triamcinolone (ARISTOCORT HP) 0.5 % external cream Apply topically 2 times daily 454 g 3     zinc oxide (DESITIN) 40 % external ointment Apply topically as needed for dry skin or irritation 60 g 0     ammonium lactate (AMLACTIN) 12 % external cream APPLY TOPICALLY TO THE AFFECTED AREA TWICE DAILY AS NEEDED (Patient not taking: Reported on 11/18/2019) 280 g 1       Allergies   Allergen Reactions     Ace Inhibitors Cough     Advicor      Celexa [Citalopram] GI Disturbance     diarrhea     Doxycycline Nausea     Poor appetite     Fosamax      Severe substernal burning and dysphagia within 3 days     Valsartan Cramps     severe       Review of Systems:  -As per HPI  -Constitutional: The patient denies fatigue, fevers, chills, unintended weight loss, and night sweats.  -HEENT: Patient denies nonhealing oral sores.  -Skin: As above in HPI. No additional skin concerns.    Physical exam:  Vitals: There were no vitals taken for this visit.  GEN: This is a well developed, well-nourished female in no acute distress, in a pleasant mood.    SKIN: Focused examination of the abdomen, lower back, and scalp was performed.    - Pink scaly plaques on the lower abdominal fold and central lower back  - Very faint pink scaly plaques to the central parietal scalp. No thick adherent scale.    - No other lesions of concern on areas examined.       Impression/Plan:  1. Psoriasis, improvement noted on current regimen    Holding  fluocinolone 0.01% scalp oil for now to restart as desired, apply once daily to the scalp before bed    Continue regular bathing and ketoconazole 2% shampoo to the scalp every other day to daily    Continue Clobetasol 0.05% solution BID for flares on the scalp    Continue triamcinolone 0.5% cream BID to plaques even if the lesions are non-bothersome, until they resolve.     Photodocumentation was obtained today      Follow-up annually, earlier if flaring for new or changing lesions.       Staff Involved:  Staff/Scribe    Scribe Disclosure:  IChinedu am serving as a scribe to document services personally performed by Elidia Bower PA-C based on data collection and the provider's statements to me.     Provider Disclosure:   The documentation recorded by the scribe accurately reflects the services I personally performed and the decisions made by me.    All risks, benefits and alternatives were discussed with patient.  Patient is in agreement and understands the assessment and plan.  All questions were answered.  Sun Screen Education was given.   Return to Clinic annually or sooner as needed.   Elidia Bower PA-C   [facility]

## 2020-02-24 NOTE — NURSING NOTE
Chief Complaint   Patient presents with     Derm Problem     Helena is here today for Psoriasis follow up. Helena would like to discuss her presriptions.      Akilah Sandhu LPN

## 2020-02-24 NOTE — LETTER
2/24/2020       RE: Helena Eldridge  [address] [id number]     Dear Colleague,    Thank you for referring your patient, Helena Eldridge, to the [facility] DERMATOLOGY at [facility]. Please see a copy of my visit note below.    [facility] Dermatology Note      Dermatology Problem List:  1. Plaque psoriasis  - Clobetasol 0.05% solution BID for flares, scalp  - Triamcinolone 0.5% ointment BID, body  - Ketoconazole 2% shampoo, daily  - Holding Fluocinolone 0.01% scalp oil    Encounter Date: Feb 24, 2020    CC:  Chief Complaint   Patient presents with     Derm Problem     Helena is here today for Psoriasis follow up. Helena would like to discuss her presriptions.          History of Present Illness:  Ms. Helena Eldridge is a 84 year old female who presents as a follow up for psoriasis. She was last seen in the dermatology clinic on 11/18/19 when she started fluocinolone 0.01% scalp oil once a day to the scalp, increased the frequency of washing of the scalp to daily with daily use of ketoconazole 2% shampoo, continued clobetasol 0.05% solution BID for flares on the scalp, and triamcinolone 0.5% cream BID for flares on the body.     She notes that she used both clobetasol solution and fluocinolone scalp oil with improvement in her scalp lesions. She did not renew her prescription of fluocinolone as she believes that it was too expensive, she can afford it, she just doesn't want to pay the price for the medication if she can get good control of symptoms with only washing and clobetasol. She notes that the only itchy areas today are on the lower back, the rest of her skin is clear of bothersome lesions. She notes that for one month, she was washing her scalp and applying medications daily and after that until today she has been washing her scalp every other day. She has continued using the clobetasol 0.05% solution and triamcinolone 0.5% cream prn for  plaques as they arise on the scalp and body respectively.     Otherwise she is feeling well, without additional skin concerns at this time.     Past Medical History:   Patient Active Problem List   Diagnosis     CARDIOVASCULAR SCREENING; LDL GOAL LESS THAN 100     CKD (chronic kidney disease) stage 4, GFR 15-29 ml/min (H)     Glucose intolerance (impaired glucose tolerance)     Kidney stones     Advance care planning     Hypertension goal BP (blood pressure) < 140/90     Lichen sclerosus et atrophicus of the vulva     Solitary kidney, acquired     Senile osteoporosis     Osteoarthritis of right knee     Medial meniscus tear     Cataracts, both eyes     Macular degeneration of both eyes, right eye greater than left eye     Pelvic fracture (H)     COPD, severe (H)     IPF (idiopathic pulmonary fibrosis) (H)     Other specified hypothyroidism     Idiopathic chronic gout     Adjustment disorder with anxiety     Non-small cell cancer of right lung (H)     Immunosuppression (H)     Port-A-Cath in place     Past Medical History:   Diagnosis Date     Carpal tunnel syndrome      CKD (chronic kidney disease)     kidney stone with infection     Deep vein thrombosis (DVT) (H)     1972     DVT 07011992     Glucose intolerance      H/O partial nephrectomy      Heel spur      Hypothyroidism      Kidney stones      Lichen sclerosus      Osteoarthritis      Osteoporosis      PID      Pulmonary embolism (H)     1970     Unspecified hypothyroidism      Vitiligo      Past Surgical History:   Procedure Laterality Date     C NEPHRECTOMY      left partial 07-01-07     C VENOUS THROMBOSIS IMAGING      with pe     HC REMOVAL GALLBLADDER       HC REVISE MEDIAN N/CARPAL TUNNEL SURG       TUBAL LIGATION         Social History:  She leads a very active life and has continued to work out regularly.    reports that she quit smoking about 50 years ago. Her smoking use included cigarettes. She has never used smokeless tobacco. She reports current  alcohol use. She reports that she does not use drugs.    Family History:  Denies a family history of skin cancer or skin diseases.  Family History   Problem Relation Age of Onset     Cancer Brother      Glaucoma Mother      Alzheimer Disease Brother      Macular Degeneration No family hx of        Medications:  Current Outpatient Medications   Medication Sig Dispense Refill     amLODIPine (NORVASC) 5 MG tablet Take 5 mg by mouth daily       amLODIPine-atorvastatin (CADUET) 10-10 MG tablet Take 1 tablet by mouth daily       Calcium Carbonate-Vitamin D (CALCIUM + D) 600-200 MG-UNIT per tablet Take 2 tablets by mouth 2 times daily.       cimetidine (TAGAMET) 200 MG tablet Take 1 tablet (200 mg) by mouth 4 times daily 30 tablet 3     clindamycin (CLINDAMAX) 1 % external gel Apply topically 2 times daily To left septum 30 g 1     clobetasol (TEMOVATE) 0.05 % external ointment APPLY SPARINGLY TO AFFECTED AREA TOPICALLY TWICE DAILY AS NEEDED. DO NOT APPLY TO FACE. 60 g 1     clobetasol (TEMOVATE) 0.05 % external solution Apply topically 2 times daily To the scalp 50 mL 11     diphenhydrAMINE (BENADRYL) 25 MG tablet Take 25 mg by mouth At Bedtime        DOCUSATE SODIUM PO Take 100 mg by mouth At Bedtime       estradiol (ESTRACE) 0.1 MG/GM vaginal cream Place 2 g vaginally twice a week 42.5 g 3     famotidine (PEPCID) 20 MG tablet Take 1 tablet (20 mg) by mouth 2 times daily as needed 60 tablet 11     Fluocinolone Acetonide Scalp 0.01 % OIL oil Apply topically At Bedtime To the scalp 1 Bottle 11     furosemide (LASIX) 20 MG tablet Take 1 tablet (20 mg) by mouth daily 90 tablet 3     hydrOXYzine (ATARAX) 25 MG tablet TAKE 1 TABLET BY MOUTH TWICE DAILY AS NEEDED FOR ITCHING 90 tablet 1     ipratropium (ATROVENT) 0.03 % nasal spray Spray 2 sprays into both nostrils every 12 hours 30 mL 1     ketoconazole (NIZORAL) 2 % external shampoo Use to wash scalp daily. 120 mL 11     levothyroxine (SYNTHROID/LEVOTHROID) 100 MCG tablet  TAKE ONE TABLET BY MOUTH ONCE DAILY EXCEPT SUNDAY 90 tablet 3     Multiple Vitamins-Minerals (OCUVITE ADULT 50+) CAPS Take 1 capsule by mouth daily 30 capsule 12     nystatin (MYCOSTATIN) 508238 UNIT/GM external cream Apply topically 2 times daily 60 g 1     omeprazole (PRILOSEC) 40 MG DR capsule TAKE 1 CAPSULE BY MOUTH EVERY DAY 90 capsule 3     order for DME Equipment being ordered:   [facility] at [facility]. 1 each 0     raNITIdine HCl (RANITIDINE 150 MAX STRENGTH PO)        rOPINIRole (REQUIP) 0.25 MG tablet TAKE 1 TO 2 TABLETS BY MOUTH EVERY NIGHT AT BEDTIME 180 tablet 3     STATIN NOT PRESCRIBED, INTENTIONAL, by Other route continuous prn. Patient declined 5/4/12  0     triamcinolone (ARISTOCORT HP) 0.5 % external cream Apply topically 2 times daily To the plaques on the body 60 g 5     triamcinolone (ARISTOCORT HP) 0.5 % external cream Apply topically 2 times daily 454 g 3     zinc oxide (DESITIN) 40 % external ointment Apply topically as needed for dry skin or irritation 60 g 0     ammonium lactate (AMLACTIN) 12 % external cream APPLY TOPICALLY TO THE AFFECTED AREA TWICE DAILY AS NEEDED (Patient not taking: Reported on 11/18/2019) 280 g 1       Allergies   Allergen Reactions     Ace Inhibitors Cough     Advicor      Celexa [Citalopram] GI Disturbance     diarrhea     Doxycycline Nausea     Poor appetite     Fosamax      Severe substernal burning and dysphagia within 3 days     Valsartan Cramps     severe       Review of Systems:  -As per HPI  -Constitutional: The patient denies fatigue, fevers, chills, unintended weight loss, and night sweats.  -HEENT: Patient denies nonhealing oral sores.  -Skin: As above in HPI. No additional skin concerns.    Physical exam:  Vitals: There were no vitals taken for this visit.  GEN: This is a well developed, well-nourished female in no acute distress, in a pleasant mood.    SKIN: Focused examination of the abdomen, lower back, and scalp was performed.    - Pink  scaly plaques on the lower abdominal fold and central lower back  - Very faint pink scaly plaques to the central parietal scalp. No thick adherent scale.    - No other lesions of concern on areas examined.       Impression/Plan:  1. Psoriasis, improvement noted on current regimen    Holding fluocinolone 0.01% scalp oil for now to restart as desired, apply once daily to the scalp before bed    Continue regular bathing and ketoconazole 2% shampoo to the scalp every other day to daily    Continue Clobetasol 0.05% solution BID for flares on the scalp    Continue triamcinolone 0.5% cream BID to plaques even if the lesions are non-bothersome, until they resolve.     Photodocumentation was obtained today      Follow-up annually, earlier if flaring for new or changing lesions.       Staff Involved:  Staff/Scribe    Scribe Disclosure:  IChinedu am serving as a scribe to document services personally performed by Elidia Bower PA-C based on data collection and the provider's statements to me.     Provider Disclosure:   The documentation recorded by the scribe accurately reflects the services I personally performed and the decisions made by me.    All risks, benefits and alternatives were discussed with patient.  Patient is in agreement and understands the assessment and plan.  All questions were answered.  Sun Screen Education was given.   Return to Clinic annually or sooner as needed.   Elidia Bower PA-C   [facility]       Elidia Bower PA-C

## 2020-02-28 ENCOUNTER — ANCILLARY PROCEDURE (OUTPATIENT)
Dept: GENERAL RADIOLOGY | Facility: CLINIC | Age: 85
End: 2020-02-28
Attending: INTERNAL MEDICINE
Payer: COMMERCIAL

## 2020-02-28 ENCOUNTER — OFFICE VISIT (OUTPATIENT)
Dept: FAMILY MEDICINE | Facility: CLINIC | Age: 85
End: 2020-02-28
Payer: COMMERCIAL

## 2020-02-28 VITALS
DIASTOLIC BLOOD PRESSURE: 85 MMHG | BODY MASS INDEX: 24.97 KG/M2 | OXYGEN SATURATION: 95 % | TEMPERATURE: 98.3 F | SYSTOLIC BLOOD PRESSURE: 136 MMHG | HEART RATE: 87 BPM | WEIGHT: 130 LBS

## 2020-02-28 DIAGNOSIS — J44.9 COPD, SEVERE (H): ICD-10-CM

## 2020-02-28 DIAGNOSIS — J84.112 IPF (IDIOPATHIC PULMONARY FIBROSIS) (H): ICD-10-CM

## 2020-02-28 DIAGNOSIS — D84.9 IMMUNOSUPPRESSION (H): ICD-10-CM

## 2020-02-28 DIAGNOSIS — R05.9 COUGH: ICD-10-CM

## 2020-02-28 DIAGNOSIS — J06.9 ACUTE UPPER RESPIRATORY INFECTION, UNSPECIFIED: Primary | ICD-10-CM

## 2020-02-28 DIAGNOSIS — Z90.5 SOLITARY KIDNEY, ACQUIRED: ICD-10-CM

## 2020-02-28 DIAGNOSIS — C34.91 NON-SMALL CELL CANCER OF RIGHT LUNG (H): ICD-10-CM

## 2020-02-28 DIAGNOSIS — N18.4 CKD (CHRONIC KIDNEY DISEASE) STAGE 4, GFR 15-29 ML/MIN (H): ICD-10-CM

## 2020-02-28 LAB
ANION GAP SERPL CALCULATED.3IONS-SCNC: 9 MMOL/L (ref 3–14)
BUN SERPL-MCNC: 32 MG/DL (ref 7–30)
CALCIUM SERPL-MCNC: 9.3 MG/DL (ref 8.5–10.1)
CHLORIDE SERPL-SCNC: 102 MMOL/L (ref 94–109)
CO2 SERPL-SCNC: 27 MMOL/L (ref 20–32)
CREAT SERPL-MCNC: 1.72 MG/DL (ref 0.52–1.04)
ERYTHROCYTE [DISTWIDTH] IN BLOOD BY AUTOMATED COUNT: 13 % (ref 10–15)
GFR SERPL CREATININE-BSD FRML MDRD: 27 ML/MIN/{1.73_M2}
GLUCOSE SERPL-MCNC: 101 MG/DL (ref 70–99)
HCT VFR BLD AUTO: 34.5 % (ref 35–47)
HGB BLD-MCNC: 11 G/DL (ref 11.7–15.7)
MCH RBC QN AUTO: 33.3 PG (ref 26.5–33)
MCHC RBC AUTO-ENTMCNC: 31.9 G/DL (ref 31.5–36.5)
MCV RBC AUTO: 105 FL (ref 78–100)
PLATELET # BLD AUTO: 291 10E9/L (ref 150–450)
POTASSIUM SERPL-SCNC: 4.4 MMOL/L (ref 3.4–5.3)
RBC # BLD AUTO: 3.3 10E12/L (ref 3.8–5.2)
SODIUM SERPL-SCNC: 138 MMOL/L (ref 133–144)
WBC # BLD AUTO: 9.6 10E9/L (ref 4–11)

## 2020-02-28 PROCEDURE — 80048 BASIC METABOLIC PNL TOTAL CA: CPT | Performed by: INTERNAL MEDICINE

## 2020-02-28 PROCEDURE — 71046 X-RAY EXAM CHEST 2 VIEWS: CPT

## 2020-02-28 PROCEDURE — 85027 COMPLETE CBC AUTOMATED: CPT | Performed by: INTERNAL MEDICINE

## 2020-02-28 PROCEDURE — 99214 OFFICE O/P EST MOD 30 MIN: CPT | Performed by: INTERNAL MEDICINE

## 2020-02-28 PROCEDURE — 36415 COLL VENOUS BLD VENIPUNCTURE: CPT | Performed by: INTERNAL MEDICINE

## 2020-02-28 RX ORDER — PREDNISONE 20 MG/1
40 TABLET ORAL DAILY
Qty: 6 TABLET | Refills: 0 | Status: SHIPPED | OUTPATIENT
Start: 2020-02-28 | End: 2020-07-20

## 2020-02-28 RX ORDER — CEFIXIME 400 MG/1
400 CAPSULE ORAL DAILY
Qty: 10 CAPSULE | Refills: 0 | Status: SHIPPED | OUTPATIENT
Start: 2020-02-28 | End: 2020-07-16

## 2020-02-28 NOTE — PROGRESS NOTES
Subjective     Helena Eldridge is an 85 y/o F here for URI sx ...     H/o RUL/Hilar NSCLungCa (s/p low dose carboplatin and pemerexed.....had to d/c Novolumib immunotherapy due to  pruritis and yeast infections.... recent CT imaging= no evidence of cancer), L. nephrectomy (stones), osteoporosis, pelvic fx, lichen sclerosis, Gout, CKD 3, hypothryoid   , severe (asymptomatic) COPD       ..      She will see Dr Hsieh and get imaging again in early MAY.       WBC/Hgb 6.6 /10.8 1/2020 @ [facility]     HPI   ENT Symptoms             Symptoms: cc Present Absent Comment   Fever/Chills  x  chills   Fatigue  x     Muscle Aches   xx    Eye Irritation   x    Sneezing  x     Nasal Christo/Drg  x     Sinus Pressure/Pain   x    Loss of smell   x    Dental pain   x    Sore Throat   x    Swollen Glands   x    Ear Pain/Fullness   x    Cough  x     Wheeze  x     Chest Pain   x    Shortness of breath  x     Rash   x    Other         Symptom duration:  2 weeks   Symptom severity:  severe   Treatments tried:  nyquil,dayquil,zinc   Contacts:  none known.             Patient Active Problem List   Diagnosis     CARDIOVASCULAR SCREENING; LDL GOAL LESS THAN 100     CKD (chronic kidney disease) stage 4, GFR 15-29 ml/min (H)     Glucose intolerance (impaired glucose tolerance)     Kidney stones     Advance care planning     Hypertension goal BP (blood pressure) < 140/90     Lichen sclerosus et atrophicus of the vulva     Solitary kidney, acquired     Senile osteoporosis     Osteoarthritis of right knee     Medial meniscus tear     Cataracts, both eyes     Macular degeneration of both eyes, right eye greater than left eye     Pelvic fracture (H)     COPD, severe (H)     IPF (idiopathic pulmonary fibrosis) (H)     Other specified hypothyroidism     Idiopathic chronic gout     Adjustment disorder with anxiety     Non-small cell cancer of right lung (H)     Immunosuppression (H)     Port-A-Cath in place     Past Surgical History:   Procedure Laterality  Date     C NEPHRECTOMY      left partial 07     C VENOUS THROMBOSIS IMAGING      with pe     HC REMOVAL GALLBLADDER       HC REVISE MEDIAN N/CARPAL TUNNEL SURG       TUBAL LIGATION         Social History     Tobacco Use     Smoking status: Former Smoker     Types: Cigarettes     Last attempt to quit: 1969     Years since quittin.2     Smokeless tobacco: Never Used   Substance Use Topics     Alcohol use: Yes     Alcohol/week: 0.0 standard drinks     Comment: very little      Family History   Problem Relation Age of Onset     Cancer Brother      Glaucoma Mother      Alzheimer Disease Brother      Macular Degeneration No family hx of          Current Outpatient Medications   Medication Sig Dispense Refill     amLODIPine (NORVASC) 5 MG tablet Take 5 mg by mouth daily       ammonium lactate (AMLACTIN) 12 % external cream APPLY TOPICALLY TO THE AFFECTED AREA TWICE DAILY AS NEEDED 280 g 1     Calcium Carbonate-Vitamin D (CALCIUM + D) 600-200 MG-UNIT per tablet Take 2 tablets by mouth 2 times daily.       clindamycin (CLINDAMAX) 1 % external gel Apply topically 2 times daily To left septum 30 g 1     clobetasol (TEMOVATE) 0.05 % external ointment APPLY SPARINGLY TO AFFECTED AREA TOPICALLY TWICE DAILY AS NEEDED. DO NOT APPLY TO FACE. 60 g 1     clobetasol (TEMOVATE) 0.05 % external solution Apply topically 2 times daily To the scalp 50 mL 11     diphenhydrAMINE (BENADRYL) 25 MG tablet Take 25 mg by mouth At Bedtime        DOCUSATE SODIUM PO Take 100 mg by mouth At Bedtime       estradiol (ESTRACE) 0.1 MG/GM vaginal cream Place 2 g vaginally twice a week 42.5 g 3     famotidine (PEPCID) 20 MG tablet Take 1 tablet (20 mg) by mouth 2 times daily as needed 60 tablet 11     Fluocinolone Acetonide Scalp 0.01 % OIL oil Apply topically At Bedtime To the scalp 1 Bottle 11     hydrOXYzine (ATARAX) 25 MG tablet TAKE 1 TABLET BY MOUTH TWICE DAILY AS NEEDED FOR ITCHING 90 tablet 1     ipratropium (ATROVENT) 0.03 % nasal  spray Spray 2 sprays into both nostrils every 12 hours 30 mL 1     ketoconazole (NIZORAL) 2 % external shampoo Use to wash scalp daily. 120 mL 11     levothyroxine (SYNTHROID/LEVOTHROID) 100 MCG tablet TAKE ONE TABLET BY MOUTH ONCE DAILY EXCEPT SUNDAY 90 tablet 3     Multiple Vitamins-Minerals (OCUVITE ADULT 50+) CAPS Take 1 capsule by mouth daily 30 capsule 12     nystatin (MYCOSTATIN) 305078 UNIT/GM external cream Apply topically 2 times daily 60 g 1     omeprazole (PRILOSEC) 40 MG DR capsule TAKE 1 CAPSULE BY MOUTH EVERY DAY 90 capsule 3     order for DME Equipment being ordered:   [facility] at [employer]. 1 each 0     raNITIdine HCl (RANITIDINE 150 MAX STRENGTH PO)        rOPINIRole (REQUIP) 0.25 MG tablet TAKE 1 TO 2 TABLETS BY MOUTH EVERY NIGHT AT BEDTIME 180 tablet 3     STATIN NOT PRESCRIBED, INTENTIONAL, by Other route continuous prn. Patient declined 5/4/12  0     triamcinolone (ARISTOCORT HP) 0.5 % external cream Apply topically 2 times daily To the plaques on the body 60 g 5     triamcinolone (ARISTOCORT HP) 0.5 % external cream Apply topically 2 times daily 454 g 3     zinc oxide (DESITIN) 40 % external ointment Apply topically as needed for dry skin or irritation 60 g 0     amLODIPine-atorvastatin (CADUET) 10-10 MG tablet Take 1 tablet by mouth daily       cimetidine (TAGAMET) 200 MG tablet Take 1 tablet (200 mg) by mouth 4 times daily 30 tablet 3     furosemide (LASIX) 20 MG tablet Take 1 tablet (20 mg) by mouth daily (Patient not taking: Reported on 2/28/2020) 90 tablet 3     Allergies   Allergen Reactions     Ace Inhibitors Cough     Advicor      Celexa [Citalopram] GI Disturbance     diarrhea     Doxycycline Nausea     Poor appetite     Fosamax      Severe substernal burning and dysphagia within 3 days     Valsartan Cramps     severe     Recent Labs   Lab Test 10/21/19  1221 10/03/19 07/29/19  1053 06/25/19  1157  11/23/18  1453 08/20/18  1448 06/21/18  0946  05/08/18  0835   06/01/17  1436  05/02/14  0823  04/24/13  0828  04/20/12  0907   A1C  --   --   --  5.5  --   --  4.9 5.3  --   --   --  5.7   < > 5.3  --  5.2   < >  --    LDL  --   --   --  97  --   --   --   --   --  124*  --  133*   < > 125  --  128  --  135*   HDL  --   --   --  35*  --   --   --   --   --  50  --  45*   < > 30*  --  32*  --  30*   TRIG  --   --   --  376*  --   --   --   --   --  160*  --  193*   < > 216*  --  146  --  194*   ALT  --   --   --   --   --   --   --   --   --   --   --   --   --  27  --  24  --  22   CR 1.87* 2.0* 1.78* 2.07*   < > 1.56* 1.36*  --   --   --    < > 1.26*   < > 1.17*   < > 1.20*   < > 1.34*   GFRESTIMATED 24* 22.4* 26* 22*   < > 32* 37*  --    < >  --    < > 41*   < > 45*   < > 44*   < > 38*   GFRESTBLACK 28*  --  30* 25*   < > 38* 45*  --    < >  --    < > 49*   < > 54*   < > 53*   < > 47*   POTASSIUM 4.6  --  4.4 4.3   < > 3.7 3.8  --   --   --    < > 3.1*   < > 4.2   < > 4.3   < > 4.3   TSH  --   --   --  2.50  --  1.44 1.58  --   --  4.85*  --  1.42   < > 3.31  --  0.62  --  0.35*    < > = values in this interval not displayed.      BP Readings from Last 3 Encounters:   02/28/20 136/85   01/21/20 128/74   12/13/19 131/80    Wt Readings from Last 3 Encounters:   02/28/20 59 kg (130 lb)   01/21/20 58.5 kg (129 lb)   12/13/19 58.5 kg (129 lb)                    Reviewed and updated as needed this visit by Provider         Review of Systems   See HPI  ROS COMP: see HPII      Objective    /85 (BP Location: Right arm, Patient Position: Sitting, Cuff Size: Adult Regular)   Pulse 87   Temp 98.3  F (36.8  C) (Oral)   Wt 59 kg (130 lb)   SpO2 95%   BMI 24.97 kg/m    Body mass index is 24.97 kg/m .   Oxygen saturations 95% on RA @ rest...    After a 2 minute walk, 87%.   HR stayed at about 85.     Physical Exam     GENERAL: healthy, alert and no distress  EYES: Eyes grossly normal to inspection, PERRL and conjunctivae and sclerae normal  NECK: no adenopathy, no asymmetry,  masses, or scars and thyroid normal to palpation  RESP: lungs clear to auscultation - no rales, rhonchi or wheezes  RESP: some dry cough.  She has diminished BS  BREAST: normal without masses, tenderness or nipple discharge and no palpable axillary masses or adenopathy  CV: regular rate and rhythm, normal S1 S2, no S3 or S4, no murmur, click or rub, no peripheral edema and peripheral pulses strong  MS: no gross musculoskeletal defects noted, no edema  SKIN: no suspicious lesions or rashes  NEURO: Normal strength and tone, mentation intact and speech normal  PSYCH: mentation appears normal, affect normal/bright    Diagnostic Test Results:  Labs reviewed in Epic  Results for orders placed or performed in visit on 02/28/20 (from the past 24 hour(s))   CBC with platelets   Result Value Ref Range    WBC 9.6 4.0 - 11.0 10e9/L    RBC Count 3.30 (L) 3.8 - 5.2 10e12/L    Hemoglobin 11.0 (L) 11.7 - 15.7 g/dL    Hematocrit 34.5 (L) 35.0 - 47.0 %     (H) 78 - 100 fl    MCH 33.3 (H) 26.5 - 33.0 pg    MCHC 31.9 31.5 - 36.5 g/dL    RDW 13.0 10.0 - 15.0 %    Platelet Count 291 150 - 450 10e9/L       CXR:  Lateral view may have perihilar infiltrate.     BMP is pending.       Assessment & Plan       ICD-10-CM    1. Acute upper respiratory infection, unspecified J06.9 cefixime (SUPRAX) 400 MG capsule     predniSONE (DELTASONE) 20 MG tablet     CANCELED: XR Chest 2 Views   2. COPD, severe (H) J44.9 predniSONE (DELTASONE) 20 MG tablet   3. IPF (idiopathic pulmonary fibrosis) (H) J84.112    4. Non-small cell cancer of right lung (H) C34.91    5. Immunosuppression (H) D89.9 CBC with platelets   6. CKD (chronic kidney disease) stage 4, GFR 15-29 ml/min (H) N18.4 Basic metabolic panel     CBC with platelets   7. Solitary kidney, acquired Z90.5         Patient Instructions   Cefixime 400 mg twice daily for 10 days  Prednisone 20 mg, Two tabs each morning for 3 days    Return to clinic 1 - 2 weeks for close follow up  Recheck status  and O2 sats.           Return in about 2 weeks (around 3/13/2020) for pneumonia recheck. .    Sushma Nelson MD  [facility]

## 2020-02-28 NOTE — PATIENT INSTRUCTIONS
Cefixime 400 mg twice daily for 10 days  Prednisone 20 mg, Two tabs each morning for 3 days    Return to clinic 1 - 2 weeks for close follow up

## 2020-02-28 NOTE — Clinical Note
"Patient also wants included in the note from patient :  \"Dr Hsieh wanted to know who my dermatologist is... it is Elidia Wright at [facility] \"   Thank you!"

## 2020-03-02 NOTE — RESULT ENCOUNTER NOTE
Helena Eldridge    Blood count and kidney function remain stable.  I hope you are feeling a lot better.     Sincerely,     MANUELA MONREAL M.D.

## 2020-03-16 ENCOUNTER — OFFICE VISIT (OUTPATIENT)
Dept: FAMILY MEDICINE | Facility: CLINIC | Age: 85
End: 2020-03-16
Payer: COMMERCIAL

## 2020-03-16 VITALS
DIASTOLIC BLOOD PRESSURE: 69 MMHG | WEIGHT: 130 LBS | SYSTOLIC BLOOD PRESSURE: 139 MMHG | OXYGEN SATURATION: 96 % | BODY MASS INDEX: 24.97 KG/M2 | HEART RATE: 94 BPM | TEMPERATURE: 97.9 F

## 2020-03-16 DIAGNOSIS — J44.9 COPD, SEVERE (H): ICD-10-CM

## 2020-03-16 DIAGNOSIS — J84.112 IPF (IDIOPATHIC PULMONARY FIBROSIS) (H): ICD-10-CM

## 2020-03-16 DIAGNOSIS — N18.4 CKD (CHRONIC KIDNEY DISEASE) STAGE 4, GFR 15-29 ML/MIN (H): ICD-10-CM

## 2020-03-16 DIAGNOSIS — J06.9 UPPER RESPIRATORY TRACT INFECTION, UNSPECIFIED TYPE: Primary | ICD-10-CM

## 2020-03-16 DIAGNOSIS — I10 HYPERTENSION GOAL BP (BLOOD PRESSURE) < 140/90: ICD-10-CM

## 2020-03-16 DIAGNOSIS — Z90.5 SOLITARY KIDNEY, ACQUIRED: ICD-10-CM

## 2020-03-16 DIAGNOSIS — C34.91 NON-SMALL CELL CANCER OF RIGHT LUNG (H): ICD-10-CM

## 2020-03-16 PROCEDURE — 99213 OFFICE O/P EST LOW 20 MIN: CPT | Performed by: INTERNAL MEDICINE

## 2020-03-16 NOTE — PROGRESS NOTES
Subjective     Helena Eldridge is a 84 year old female who presents to clinic today for the following health issues:    83 y/o F here for f/u pneumonia, treated w/cefixime/prednisone 3 weeks ago....     H/o RUL/Hilar NSCLungCa (s/p low dose carboplatin and pemerexed.....had to d/c Novolumib immunotherapy due to  pruritis and yeast infections.... recent CT imaging= no evidence of cancer), L. nephrectomy (stones), osteoporosis, pelvic fx, lichen sclerosis, Gout, CKD 3, hypothryoid   , severe (asymptomatic) COPD           HPI   Follow up on URI-States she is feeling better.   Felt better within 24 hours           Patient Active Problem List   Diagnosis     CARDIOVASCULAR SCREENING; LDL GOAL LESS THAN 100     CKD (chronic kidney disease) stage 4, GFR 15-29 ml/min (H)     Glucose intolerance (impaired glucose tolerance)     Kidney stones     Advance care planning     Hypertension goal BP (blood pressure) < 140/90     Lichen sclerosus et atrophicus of the vulva     Solitary kidney, acquired     Senile osteoporosis     Osteoarthritis of right knee     Medial meniscus tear     Cataracts, both eyes     Macular degeneration of both eyes, right eye greater than left eye     Pelvic fracture (H)     COPD, severe (H)     IPF (idiopathic pulmonary fibrosis) (H)     Other specified hypothyroidism     Idiopathic chronic gout     Adjustment disorder with anxiety     Non-small cell cancer of right lung (H)     Immunosuppression (H)     Port-A-Cath in place     Past Surgical History:   Procedure Laterality Date     C NEPHRECTOMY      left partial 07     C VENOUS THROMBOSIS IMAGING      with pe     HC REMOVAL GALLBLADDER       HC REVISE MEDIAN N/CARPAL TUNNEL SURG       TUBAL LIGATION         Social History     Tobacco Use     Smoking status: Former Smoker     Types: Cigarettes     Last attempt to quit: 1969     Years since quittin.3     Smokeless tobacco: Never Used   Substance Use Topics     Alcohol use: Not Currently      Alcohol/week: 0.0 standard drinks     Comment: very little      Family History   Problem Relation Age of Onset     Cancer Brother      Glaucoma Mother      Alzheimer Disease Brother      Macular Degeneration No family hx of          Current Outpatient Medications   Medication Sig Dispense Refill     amLODIPine (NORVASC) 5 MG tablet Take 5 mg by mouth daily       ammonium lactate (AMLACTIN) 12 % external cream APPLY TOPICALLY TO THE AFFECTED AREA TWICE DAILY AS NEEDED 280 g 1     Calcium Carbonate-Vitamin D (CALCIUM + D) 600-200 MG-UNIT per tablet Take 2 tablets by mouth 2 times daily.       clindamycin (CLINDAMAX) 1 % external gel Apply topically 2 times daily To left septum 30 g 1     clobetasol (TEMOVATE) 0.05 % external ointment APPLY SPARINGLY TO AFFECTED AREA TOPICALLY TWICE DAILY AS NEEDED. DO NOT APPLY TO FACE. 60 g 1     clobetasol (TEMOVATE) 0.05 % external solution Apply topically 2 times daily To the scalp 50 mL 11     diphenhydrAMINE (BENADRYL) 25 MG tablet Take 25 mg by mouth At Bedtime        DOCUSATE SODIUM PO Take 100 mg by mouth At Bedtime       estradiol (ESTRACE) 0.1 MG/GM vaginal cream Place 2 g vaginally twice a week 42.5 g 3     famotidine (PEPCID) 20 MG tablet Take 1 tablet (20 mg) by mouth 2 times daily as needed 60 tablet 11     Fluocinolone Acetonide Scalp 0.01 % OIL oil Apply topically At Bedtime To the scalp 1 Bottle 11     hydrOXYzine (ATARAX) 25 MG tablet TAKE 1 TABLET BY MOUTH TWICE DAILY AS NEEDED FOR ITCHING 90 tablet 1     ipratropium (ATROVENT) 0.03 % nasal spray Spray 2 sprays into both nostrils every 12 hours 30 mL 1     ketoconazole (NIZORAL) 2 % external shampoo Use to wash scalp daily. 120 mL 11     levothyroxine (SYNTHROID/LEVOTHROID) 100 MCG tablet TAKE ONE TABLET BY MOUTH ONCE DAILY EXCEPT SUNDAY 90 tablet 3     Multiple Vitamins-Minerals (OCUVITE ADULT 50+) CAPS Take 1 capsule by mouth daily 30 capsule 12     nystatin (MYCOSTATIN) 648541 UNIT/GM external cream Apply  topically 2 times daily 60 g 1     omeprazole (PRILOSEC) 40 MG DR capsule TAKE 1 CAPSULE BY MOUTH EVERY DAY 90 capsule 3     order for DME Equipment being ordered:   [facility] at [facility]. 1 each 0     raNITIdine HCl (RANITIDINE 150 MAX STRENGTH PO)        rOPINIRole (REQUIP) 0.25 MG tablet TAKE 1 TO 2 TABLETS BY MOUTH EVERY NIGHT AT BEDTIME 180 tablet 3     STATIN NOT PRESCRIBED, INTENTIONAL, by Other route continuous prn. Patient declined 5/4/12  0     triamcinolone (ARISTOCORT HP) 0.5 % external cream Apply topically 2 times daily To the plaques on the body 60 g 5     zinc oxide (DESITIN) 40 % external ointment Apply topically as needed for dry skin or irritation 60 g 0     Allergies   Allergen Reactions     Ace Inhibitors Cough     Advicor      Celexa [Citalopram] GI Disturbance     diarrhea     Doxycycline Nausea     Poor appetite     Fosamax      Severe substernal burning and dysphagia within 3 days     Valsartan Cramps     severe     Recent Labs   Lab Test 02/28/20  0854 10/21/19  1221  06/25/19  1157  11/23/18  1453 08/20/18  1448 06/21/18  0946  05/08/18  0835  06/01/17  1436  05/02/14  0823  04/24/13  0828  04/20/12  0907   A1C  --   --   --  5.5  --   --  4.9 5.3  --   --   --  5.7   < > 5.3  --  5.2   < >  --    LDL  --   --   --  97  --   --   --   --   --  124*  --  133*   < > 125  --  128  --  135*   HDL  --   --   --  35*  --   --   --   --   --  50  --  45*   < > 30*  --  32*  --  30*   TRIG  --   --   --  376*  --   --   --   --   --  160*  --  193*   < > 216*  --  146  --  194*   ALT  --   --   --   --   --   --   --   --   --   --   --   --   --  27  --  24  --  22   CR 1.72* 1.87*   < > 2.07*   < > 1.56* 1.36*  --   --   --    < > 1.26*   < > 1.17*   < > 1.20*   < > 1.34*   GFRESTIMATED 27* 24*   < > 22*   < > 32* 37*  --    < >  --    < > 41*   < > 45*   < > 44*   < > 38*   GFRESTBLACK 31* 28*   < > 25*   < > 38* 45*  --    < >  --    < > 49*   < > 54*   < > 53*   < > 47*    POTASSIUM 4.4 4.6   < > 4.3   < > 3.7 3.8  --   --   --    < > 3.1*   < > 4.2   < > 4.3   < > 4.3   TSH  --   --   --  2.50  --  1.44 1.58  --   --  4.85*  --  1.42   < > 3.31  --  0.62  --  0.35*    < > = values in this interval not displayed.      BP Readings from Last 3 Encounters:   03/16/20 139/69   02/28/20 136/85   01/21/20 128/74    Wt Readings from Last 3 Encounters:   03/16/20 59 kg (130 lb)   02/28/20 59 kg (130 lb)   01/21/20 58.5 kg (129 lb)                    Reviewed and updated as needed this visit by Provider         Review of Systems   ROS COMP: Constitutional, HEENT, cardiovascular, pulmonary, gi and gu systems are negative, except as otherwise noted.      Objective    /69 (BP Location: Right arm, Patient Position: Sitting, Cuff Size: Adult Regular)   Pulse 94   Temp 97.9  F (36.6  C) (Oral)   Wt 59 kg (130 lb)   SpO2 96%   BMI 24.97 kg/m    Body mass index is 24.97 kg/m .   O2 sats 95% rest and 95% on room air.     Physical Exam   GENERAL: healthy, alert and no distress  EYES: Eyes grossly normal to inspection, PERRL and conjunctivae and sclerae normal  NECK: no adenopathy, no asymmetry, masses, or scars and thyroid normal to palpation  RESP: lungs clear to auscultation - no rales, rhonchi or wheezes  RESP: shorter respiratory cycle.      MS: no gross musculoskeletal defects noted, no edema  PSYCH: mentation appears normal, affect normal/bright    Diagnostic Test Results:  Labs reviewed in Epic        Assessment & Plan       ICD-10-CM    1. Upper respiratory tract infection, unspecified type  J06.9    2. COPD, severe (H)  J44.9    3. Non-small cell cancer of right lung (H)  C34.91    4. IPF (idiopathic pulmonary fibrosis) (H)  J84.112    5. Solitary kidney, acquired  Z90.5    6. Hypertension goal BP (blood pressure) < 140/90  I10    7. CKD (chronic kidney disease) stage 4, GFR 15-29 ml/min (H)  N18.4           URI symptoms are a lot better.   She will contact us of symptoms recur and  would treat the same     Will be avoiding clinic/social distancing due to COVID    No follow-ups on file.    Sushma Nelson MD  [facility]

## 2020-04-10 ENCOUNTER — TELEPHONE (OUTPATIENT)
Dept: FAMILY MEDICINE | Facility: CLINIC | Age: 85
End: 2020-04-10

## 2020-04-10 NOTE — TELEPHONE ENCOUNTER
Nurse sees had Cefixime 2/28/2020    Attempt # 1  Called patient at home number.[phone] (home)  Was call answered?  Yes, not runny, soft but no bigger than a tablespoon, easy to expel. Has gone 3 times already this morning. No new medications, nothing OTC. Walks in basement every day. Cannot go to fitness center because is closed.   No fever, no cough, quit smoking in 1970, has lung cancer but is in remission.   Nurse advised patient to make sure is drinking 6 to 8 eight ounce glasses of water per day and go outside even for a few minutes on the front step. Patient verbalized understanding and agreement with plan and had no questions.        Routing to PCP to review and advise.      Ngoc Smith RN  [facility]                  Ngoc Smith RN  [facility]

## 2020-04-10 NOTE — TELEPHONE ENCOUNTER
MD called.   Patient thinks it is constipation  She is drinking water, this has helped some.     She will resume a laxative she used to take in the past.

## 2020-04-10 NOTE — TELEPHONE ENCOUNTER
Reason for Call:  Other call back    Detailed comments:  Patient having bowel movement 10 to 15 times a day for about 3 to 4 days.  Wondering what to do please give patient a call back.    Phone Number Patient can be reached at: Home number on file [phone] (home)    Best Time:  Anytime     Can we leave a detailed message on this number? YES    Call taken on 4/10/2020 at 8:30 AM by Lisa Ray

## 2020-04-21 ENCOUNTER — TELEPHONE (OUTPATIENT)
Dept: AUDIOLOGY | Facility: CLINIC | Age: 85
End: 2020-04-21

## 2020-04-21 NOTE — TELEPHONE ENCOUNTER
Reason for Call:  Other Hearing Aid    Detailed comments: Patient would like to get an appointment to fix her hearing aid tubes. States they are hardened so she is unable to hear.    Phone Number Patient can be reached at: Home number on file [phone] (home)    Best Time: any    Can we leave a detailed message on this number? YES    Call taken on 4/21/2020 at 1:28 PM by James Dunlap

## 2020-04-22 NOTE — TELEPHONE ENCOUNTER
Called patient back and informed her that we are not doing in-person hearing aid appointments at this time, but that she could drop off the hearing aids and they will be retubed the next time an audiologist is at this location. She will have them dropped off tomorrow.    William Poe, CCC-A  4/22/2020

## 2020-04-24 ENCOUNTER — ALLIED HEALTH/NURSE VISIT (OUTPATIENT)
Dept: AUDIOLOGY | Facility: CLINIC | Age: 85
End: 2020-04-24
Payer: COMMERCIAL

## 2020-04-24 DIAGNOSIS — H90.3 SENSORINEURAL HEARING LOSS, BILATERAL: Primary | ICD-10-CM

## 2020-04-24 PROCEDURE — 99207 ZZC NO CHARGE LOS: CPT | Performed by: AUDIOLOGIST

## 2020-04-24 PROCEDURE — V5299 HEARING SERVICE: HCPCS | Performed by: AUDIOLOGIST

## 2020-05-04 ENCOUNTER — TELEPHONE (OUTPATIENT)
Dept: FAMILY MEDICINE | Facility: CLINIC | Age: 85
End: 2020-05-04

## 2020-05-04 DIAGNOSIS — K21.00 GASTROESOPHAGEAL REFLUX DISEASE WITH ESOPHAGITIS: Primary | ICD-10-CM

## 2020-05-04 RX ORDER — FAMOTIDINE 20 MG/1
20 TABLET, FILM COATED ORAL 2 TIMES DAILY
Qty: 90 TABLET | Refills: 3 | Status: SHIPPED | OUTPATIENT
Start: 2020-05-04 | End: 2020-10-19

## 2020-05-04 NOTE — TELEPHONE ENCOUNTER
?pharmacy?   Ranitidine is historical on med list.    I see hydroxyxine refill sent to [facility] [place] recently so cued that up.    Routed to provider to address alternative for zantac.      Yanique Fritz RN  [facility]

## 2020-05-04 NOTE — TELEPHONE ENCOUNTER
Reason for Call:  Other prescription (Rantidine)    Detailed comments: Pt would like a call back to discuss that she is no longer able to receive this medication as she was informed that it was removed from the market.    Phone Number Patient can be reached at: Home number on file [phone] (home)    Best Time: Anytime    Can we leave a detailed message on this number? NO    Call taken on 5/4/2020 at 12:43 PM by Savana Keys

## 2020-05-05 ENCOUNTER — TRANSFERRED RECORDS (OUTPATIENT)
Dept: HEALTH INFORMATION MANAGEMENT | Facility: CLINIC | Age: 85
End: 2020-05-05

## 2020-06-05 ENCOUNTER — TELEPHONE (OUTPATIENT)
Dept: DERMATOLOGY | Facility: CLINIC | Age: 85
End: 2020-06-05

## 2020-06-05 ENCOUNTER — OFFICE VISIT (OUTPATIENT)
Dept: DERMATOLOGY | Facility: CLINIC | Age: 85
End: 2020-06-05
Payer: COMMERCIAL

## 2020-06-05 DIAGNOSIS — L40.0 PLAQUE PSORIASIS: Primary | ICD-10-CM

## 2020-06-05 DIAGNOSIS — N90.4 LICHEN SCLEROSUS ET ATROPHICUS OF THE VULVA: ICD-10-CM

## 2020-06-05 RX ORDER — TACROLIMUS 1 MG/G
OINTMENT TOPICAL
Qty: 60 G | Refills: 11 | Status: SHIPPED | OUTPATIENT
Start: 2020-06-05 | End: 2020-07-09

## 2020-06-05 RX ORDER — CLOBETASOL PROPIONATE 0.5 MG/G
OINTMENT TOPICAL
Qty: 60 G | Refills: 1 | Status: SHIPPED | OUTPATIENT
Start: 2020-06-05 | End: 2020-10-19

## 2020-06-05 RX ORDER — CALCIPOTRIENE 50 UG/G
OINTMENT TOPICAL
Qty: 90 G | Refills: 3 | Status: SHIPPED | OUTPATIENT
Start: 2020-06-05 | End: 2020-07-09

## 2020-06-05 ASSESSMENT — PAIN SCALES - GENERAL: PAINLEVEL: SEVERE PAIN (7)

## 2020-06-05 NOTE — LETTER
6/5/2020       RE: Helena Eldridge  [address] [facility] [id number]     Dear Colleague,    Thank you for referring your patient, Helena Eldridge, to the [facility] DERMATOLOGY at [facility]. Please see a copy of my visit note below.    [facility] Dermatology Note      Dermatology Problem List:  1. Plaque psoriasis  - Clobetasol 0.05% solution BID for flares, scalp  - Triamcinolone 0.5% ointment BID, body  - Ketoconazole 2% shampoo, daily  - Holding Fluocinolone 0.01% scalp oil    Encounter Date: Jun 5, 2020    CC:  Chief Complaint   Patient presents with     Derm Problem     Helena, is being seen today for a follow up for a itchy rash on  stomach, buttocks, back and the right lateral knee, as reported by patient.         History of Present Illness:  Ms. Helena Eldridge is a 84 year old female who presents as a follow up for psoriasis. She was last seen in the dermatology clinic on 2/24/20 by AMY Bower. She has been using dermasmoothe oil intermittently to the scalp, but notes that this is expensive. She uses triamcinolone 0.5% cream to the areas of psoriasis on the body, but this seems to help less than calamine lotion which she is also using alsong with Cerave. Areas that are bothersome are under the breasts and on the abdomen.     Otherwise she is feeling well, without additional skin concerns at this time.     Past Medical History:   Patient Active Problem List   Diagnosis     CARDIOVASCULAR SCREENING; LDL GOAL LESS THAN 100     CKD (chronic kidney disease) stage 4, GFR 15-29 ml/min (H)     Glucose intolerance (impaired glucose tolerance)     Kidney stones     Advance care planning     Hypertension goal BP (blood pressure) < 140/90     Lichen sclerosus et atrophicus of the vulva     Solitary kidney, acquired     Senile osteoporosis     Osteoarthritis of right knee     Medial meniscus tear     Cataracts, both eyes     Macular degeneration of both  eyes, right eye greater than left eye     Pelvic fracture (H)     COPD, severe (H)     IPF (idiopathic pulmonary fibrosis) (H)     Other specified hypothyroidism     Idiopathic chronic gout     Adjustment disorder with anxiety     Non-small cell cancer of right lung (H)     Immunosuppression (H)     Port-A-Cath in place     Past Medical History:   Diagnosis Date     Carpal tunnel syndrome      CKD (chronic kidney disease)     kidney stone with infection     Deep vein thrombosis (DVT) (H)     1972     DVT 07011992     Glucose intolerance      H/O partial nephrectomy      Heel spur      Hypothyroidism      Kidney stones      Lichen sclerosus      Osteoarthritis      Osteoporosis      PID      Pulmonary embolism (H)     1970     Unspecified hypothyroidism      Vitiligo      Past Surgical History:   Procedure Laterality Date     C NEPHRECTOMY      left partial 07-01-07     C VENOUS THROMBOSIS IMAGING      with pe     HC REMOVAL GALLBLADDER       HC REVISE MEDIAN N/CARPAL TUNNEL SURG       TUBAL LIGATION         Social History:  She leads a very active life and has continued to work out regularly.    reports that she quit smoking about 50 years ago. Her smoking use included cigarettes. She has never used smokeless tobacco. She reports previous alcohol use. She reports that she does not use drugs.    Family History:  Denies a family history of skin cancer or skin diseases.  Family History   Problem Relation Age of Onset     Cancer Brother      Glaucoma Mother      Alzheimer Disease Brother      Macular Degeneration No family hx of        Medications:  Current Outpatient Medications   Medication Sig Dispense Refill     amLODIPine (NORVASC) 5 MG tablet Take 5 mg by mouth daily       ammonium lactate (AMLACTIN) 12 % external cream APPLY TOPICALLY TO THE AFFECTED AREA TWICE DAILY AS NEEDED 280 g 1     calcipotriene (DOVONOX) 0.005 % external ointment Twice daily to areas of psoriasis on the stomach 90 g 3     Calcium  Carbonate-Vitamin D (CALCIUM + D) 600-200 MG-UNIT per tablet Take 2 tablets by mouth 2 times daily.       clindamycin (CLINDAMAX) 1 % external gel Apply topically 2 times daily To left septum 30 g 1     clobetasol (TEMOVATE) 0.05 % external ointment APPLY SPARINGLY TO AFFECTED AREA TOPICALLY TWICE DAILY AS NEEDED. DO NOT APPLY TO FACE. 60 g 1     clobetasol (TEMOVATE) 0.05 % external solution Apply topically 2 times daily To the scalp 50 mL 11     diphenhydrAMINE (BENADRYL) 25 MG tablet Take 25 mg by mouth At Bedtime        DOCUSATE SODIUM PO Take 100 mg by mouth At Bedtime       estradiol (ESTRACE) 0.1 MG/GM vaginal cream Place 2 g vaginally twice a week 42.5 g 3     famotidine (PEPCID) 20 MG tablet Take 1 tablet (20 mg) by mouth 2 times daily 90 tablet 3     famotidine (PEPCID) 20 MG tablet Take 1 tablet (20 mg) by mouth 2 times daily as needed 60 tablet 11     Fluocinolone Acetonide Scalp 0.01 % OIL oil Apply topically At Bedtime To the scalp 1 Bottle 11     hydrOXYzine (ATARAX) 25 MG tablet TAKE 1 TABLET BY MOUTH TWICE DAILY AS NEEDED FOR ITCHING 90 tablet 1     ipratropium (ATROVENT) 0.03 % nasal spray Spray 2 sprays into both nostrils every 12 hours 30 mL 1     ketoconazole (NIZORAL) 2 % external shampoo Use to wash scalp daily. 120 mL 11     levothyroxine (SYNTHROID/LEVOTHROID) 100 MCG tablet TAKE ONE TABLET BY MOUTH ONCE DAILY EXCEPT SUNDAY 90 tablet 3     Multiple Vitamins-Minerals (OCUVITE ADULT 50+) CAPS Take 1 capsule by mouth daily 30 capsule 12     nystatin (MYCOSTATIN) 201521 UNIT/GM external cream Apply topically 2 times daily 60 g 1     omeprazole (PRILOSEC) 40 MG DR capsule TAKE 1 CAPSULE BY MOUTH EVERY DAY 90 capsule 3     order for DME Equipment being ordered:   [facility] at [facility]. 1 each 0     rOPINIRole (REQUIP) 0.25 MG tablet TAKE 1 TO 2 TABLETS BY MOUTH EVERY NIGHT AT BEDTIME 180 tablet 3     STATIN NOT PRESCRIBED, INTENTIONAL, by Other route continuous prn. Patient declined  5/4/12  0     tacrolimus (PROTOPIC) 0.1 % external ointment Twice daily to rash areas in the underwear area and under the breast. 60 g 11     triamcinolone (ARISTOCORT HP) 0.5 % external cream Apply topically 2 times daily To the plaques on the body 60 g 5     zinc oxide (DESITIN) 40 % external ointment Apply topically as needed for dry skin or irritation 60 g 0       Allergies   Allergen Reactions     Ace Inhibitors Cough     Advicor      Celexa [Citalopram] GI Disturbance     diarrhea     Doxycycline Nausea     Poor appetite     Fosamax      Severe substernal burning and dysphagia within 3 days     Valsartan Cramps     severe       Review of Systems:  -As per HPI  -Constitutional: The patient denies fatigue, fevers, chills, unintended weight loss, and night sweats.  -HEENT: Patient denies nonhealing oral sores.No cough or rhinorrhea.   -Skin: As above in HPI. No additional skin concerns.    Physical exam:  Vitals: There were no vitals taken for this visit.  GEN: This is a well developed, well-nourished female in no acute distress, in a pleasant mood.    NEURO: Normal mentation and speech  PSYCH: Normal mood/affect  SKIN: Focused examination of the abdomen, lower back, chest, and scalp was performed.  - Pink scaly plaques on the lower abdominal fold and central lower back  - Pink macerated plaques under the breasts bilaterally  - Scaling plaque to the central frontal scalp.       Impression/Plan:    Psoriasis, flaring in the intertriginous areas. I recommended the following:    Use protopic 0.1% ointment in the skin folds twice daily    Use calcipotriene bid to rash areas on the body    If the calcipotriene and protopic are not covered, but use clobetasol ointment BID until visit by phone in 2 weeks.     Continue dermasmooth to the scalp as needed      Follow-up 2 weeks by phone.     Eugenia Remy MD  Dermatology Staff

## 2020-06-05 NOTE — NURSING NOTE
Dermatology Rooming Note    Helena Eldridge's goals for this visit include:   Chief Complaint   Patient presents with     Derm Problem     Helena, is being seen today for a follow up for a itchy rash on  stomach, buttocks, back and the right lateral knee, as reported by patient.     Aby Bond LPN

## 2020-06-05 NOTE — PROGRESS NOTES
[facility] Dermatology Note      Dermatology Problem List:  1. Plaque psoriasis  - Clobetasol 0.05% solution BID for flares, scalp  - Triamcinolone 0.5% ointment BID, body  - Ketoconazole 2% shampoo, daily  - Holding Fluocinolone 0.01% scalp oil    Encounter Date: Jun 5, 2020    CC:  Chief Complaint   Patient presents with     Derm Pb Malik, is being seen today for a follow up for a itchy rash on  stomach, buttocks, back and the right lateral knee, as reported by patient.         History of Present Illness:  Ms. Helena Eldridge is a 84 year old female who presents as a follow up for psoriasis. She was last seen in the dermatology clinic on 2/24/20 by AMY Bower. She has been using dermasmoothe oil intermittently to the scalp, but notes that this is expensive. She uses triamcinolone 0.5% cream to the areas of psoriasis on the body, but this seems to help less than calamine lotion which she is also using alsong with Cerave. Areas that are bothersome are under the breasts and on the abdomen.     Otherwise she is feeling well, without additional skin concerns at this time.     Past Medical History:   Patient Active Problem List   Diagnosis     CARDIOVASCULAR SCREENING; LDL GOAL LESS THAN 100     CKD (chronic kidney disease) stage 4, GFR 15-29 ml/min (H)     Glucose intolerance (impaired glucose tolerance)     Kidney stones     Advance care planning     Hypertension goal BP (blood pressure) < 140/90     Lichen sclerosus et atrophicus of the vulva     Solitary kidney, acquired     Senile osteoporosis     Osteoarthritis of right knee     Medial meniscus tear     Cataracts, both eyes     Macular degeneration of both eyes, right eye greater than left eye     Pelvic fracture (H)     COPD, severe (H)     IPF (idiopathic pulmonary fibrosis) (H)     Other specified hypothyroidism     Idiopathic chronic gout     Adjustment disorder with anxiety     Non-small cell cancer of right lung (H)      Immunosuppression (H)     Port-A-Cath in place     Past Medical History:   Diagnosis Date     Carpal tunnel syndrome      CKD (chronic kidney disease)     kidney stone with infection     Deep vein thrombosis (DVT) (H)     1972     DVT 07011992     Glucose intolerance      H/O partial nephrectomy      Heel spur      Hypothyroidism      Kidney stones      Lichen sclerosus      Osteoarthritis      Osteoporosis      PID      Pulmonary embolism (H)     1970     Unspecified hypothyroidism      Vitiligo      Past Surgical History:   Procedure Laterality Date     C NEPHRECTOMY      left partial 07-01-07     C VENOUS THROMBOSIS IMAGING      with pe     HC REMOVAL GALLBLADDER       HC REVISE MEDIAN N/CARPAL TUNNEL SURG       TUBAL LIGATION         Social History:  She leads a very active life and has continued to work out regularly.    reports that she quit smoking about 50 years ago. Her smoking use included cigarettes. She has never used smokeless tobacco. She reports previous alcohol use. She reports that she does not use drugs.    Family History:  Denies a family history of skin cancer or skin diseases.  Family History   Problem Relation Age of Onset     Cancer Brother      Glaucoma Mother      Alzheimer Disease Brother      Macular Degeneration No family hx of        Medications:  Current Outpatient Medications   Medication Sig Dispense Refill     amLODIPine (NORVASC) 5 MG tablet Take 5 mg by mouth daily       ammonium lactate (AMLACTIN) 12 % external cream APPLY TOPICALLY TO THE AFFECTED AREA TWICE DAILY AS NEEDED 280 g 1     calcipotriene (DOVONOX) 0.005 % external ointment Twice daily to areas of psoriasis on the stomach 90 g 3     Calcium Carbonate-Vitamin D (CALCIUM + D) 600-200 MG-UNIT per tablet Take 2 tablets by mouth 2 times daily.       clindamycin (CLINDAMAX) 1 % external gel Apply topically 2 times daily To left septum 30 g 1     clobetasol (TEMOVATE) 0.05 % external ointment APPLY SPARINGLY TO AFFECTED AREA  TOPICALLY TWICE DAILY AS NEEDED. DO NOT APPLY TO FACE. 60 g 1     clobetasol (TEMOVATE) 0.05 % external solution Apply topically 2 times daily To the scalp 50 mL 11     diphenhydrAMINE (BENADRYL) 25 MG tablet Take 25 mg by mouth At Bedtime        DOCUSATE SODIUM PO Take 100 mg by mouth At Bedtime       estradiol (ESTRACE) 0.1 MG/GM vaginal cream Place 2 g vaginally twice a week 42.5 g 3     famotidine (PEPCID) 20 MG tablet Take 1 tablet (20 mg) by mouth 2 times daily 90 tablet 3     famotidine (PEPCID) 20 MG tablet Take 1 tablet (20 mg) by mouth 2 times daily as needed 60 tablet 11     Fluocinolone Acetonide Scalp 0.01 % OIL oil Apply topically At Bedtime To the scalp 1 Bottle 11     hydrOXYzine (ATARAX) 25 MG tablet TAKE 1 TABLET BY MOUTH TWICE DAILY AS NEEDED FOR ITCHING 90 tablet 1     ipratropium (ATROVENT) 0.03 % nasal spray Spray 2 sprays into both nostrils every 12 hours 30 mL 1     ketoconazole (NIZORAL) 2 % external shampoo Use to wash scalp daily. 120 mL 11     levothyroxine (SYNTHROID/LEVOTHROID) 100 MCG tablet TAKE ONE TABLET BY MOUTH ONCE DAILY EXCEPT SUNDAY 90 tablet 3     Multiple Vitamins-Minerals (OCUVITE ADULT 50+) CAPS Take 1 capsule by mouth daily 30 capsule 12     nystatin (MYCOSTATIN) 885431 UNIT/GM external cream Apply topically 2 times daily 60 g 1     omeprazole (PRILOSEC) 40 MG DR capsule TAKE 1 CAPSULE BY MOUTH EVERY DAY 90 capsule 3     order for DME Equipment being ordered:   [facility] at [facility]. 1 each 0     rOPINIRole (REQUIP) 0.25 MG tablet TAKE 1 TO 2 TABLETS BY MOUTH EVERY NIGHT AT BEDTIME 180 tablet 3     STATIN NOT PRESCRIBED, INTENTIONAL, by Other route continuous prn. Patient declined 5/4/12  0     tacrolimus (PROTOPIC) 0.1 % external ointment Twice daily to rash areas in the underwear area and under the breast. 60 g 11     triamcinolone (ARISTOCORT HP) 0.5 % external cream Apply topically 2 times daily To the plaques on the body 60 g 5     zinc oxide (DESITIN) 40  % external ointment Apply topically as needed for dry skin or irritation 60 g 0       Allergies   Allergen Reactions     Ace Inhibitors Cough     Advicor      Celexa [Citalopram] GI Disturbance     diarrhea     Doxycycline Nausea     Poor appetite     Fosamax      Severe substernal burning and dysphagia within 3 days     Valsartan Cramps     severe       Review of Systems:  -As per HPI  -Constitutional: The patient denies fatigue, fevers, chills, unintended weight loss, and night sweats.  -HEENT: Patient denies nonhealing oral sores.No cough or rhinorrhea.   -Skin: As above in HPI. No additional skin concerns.    Physical exam:  Vitals: There were no vitals taken for this visit.  GEN: This is a well developed, well-nourished female in no acute distress, in a pleasant mood.    NEURO: Normal mentation and speech  PSYCH: Normal mood/affect  SKIN: Focused examination of the abdomen, lower back, chest, and scalp was performed.  - Pink scaly plaques on the lower abdominal fold and central lower back  - Pink macerated plaques under the breasts bilaterally  - Scaling plaque to the central frontal scalp.       Impression/Plan:    Psoriasis, flaring in the intertriginous areas. I recommended the following:    Use protopic 0.1% ointment in the skin folds twice daily    Use calcipotriene bid to rash areas on the body    If the calcipotriene and protopic are not covered, but use clobetasol ointment BID until visit by phone in 2 weeks.     Continue dermasmooth to the scalp as needed      Follow-up 2 weeks by phone.     Eugenia Remy MD  Dermatology Staff

## 2020-06-05 NOTE — TELEPHONE ENCOUNTER
"Teledermatology Nurse Call for RETURN patients seen within the last 3 years:    The patient was contacted by phone and we reviewed, \"Due to the coronavirus pandemic, we are calling to review your visit and offer you a teledermatology visit where you send in photos via [email or web address]. These photos will be seen by an MD or TONYA. This will be billed to you and your insurance.\"  The patient was also told that \"a teledermatology visit is not as thorough as an in-person visit and that the quality of the photograph sent may not be of the same quality as that taken by the dermatology clinic, but the patient would like to proceed with an teledermatology because of National Emergency Regarding Coronavirus disease (COVID 19) Outbreak.\"  \"If a prescription is necessary we can send it directly to your pharmacy.  If lab work is needed we can place an order for that and you can then stop by our lab to have the test done at a later time.\"    The patient understood that they may receive a call from the clinic to review additional history, may still be instructed to come to clinic even after photo review and be billed for both visits with an MD. They were told that a photo assessment does not replace an in person skin exam. The patient understood that teledermatology is not for urgent issues and would require up to 3 business days for review. The patient denied skin pain, fever, mucosal symptoms (lesions, blisters, sores in the mouth, nose, eyes, or genitals)  IF PATIENT ENDORSES ANY OF THESE STOP AND PAGE  ON CALL ATTENDING. IF OTHER POSSIBLY URGENT SYMPTOMS THEN PAGE PHYSICIAN YOU ARE SCHEDULING WITH OR ON CALL IF NO ANSWER.       The patient chose to:                                                                                                                                                                                                                    The patient accepted a phone visit with no photos to be received. They " understood they would receive a bill for this visit. They understood that even with a phone visit, the clinician may require a photo, video or in person visit for treatment.                                                                                                     The patient is verified to be in the [facility] at their home address at the time of the encounter.                                                                                                                        Patient concerns for this return visit:     Two week follow up     Photo instructions reviewed with patients as below:  -ALL patient needs to send photos unless they have a phone only visit approved by the clinic:  o To send photos to your doctor, respond to the message in [employer] as many times as needed to upload your photos. Each message allows for 3 photos.  o For spots or lesions of concern, please take at least 2 photos of each site you are worried about (at different angles if needed, and at least one close up and one farther away so we can tell where it is on the body. Be sure all photographs are in focus)  o For rashes, take photos of the entire body because it is important for us to see which areas are involved and which areas are not involved.  At a minimum, please include photos of the arms, legs, front of trunk, back of trunk, face, and a few close-ups of the rash.  Leave undergarments in place unless the rash involves the skin in these areas  o For acne, please take photos of the face, upper chest and neck, and upper chest and back  o For hair loss, please send views of the top of the head, sides of the head, back of the head and a picture of your face with your hair pulled back. Also, a photos of both hands with nails.    -For ADULT NEW patients video visits are needed, to receive an invitation and connect with your provider, the [employer] video visit technology must be accessible from your smartphone or personal  device. Please click the link below for setup instructions: [email or web address]    Nursing tasks completed  -Pharmacy preference was updated.  -The nurse has dropped in the AVS information *(For adults the phrase is umdermhteleavs and for pediatrics it is their own) for the physician to route in the AVS.                                                                                                                                                                                                                         -The patient was told to contact the clinic if they have not received correspondence within 72 hours.

## 2020-06-19 ENCOUNTER — VIRTUAL VISIT (OUTPATIENT)
Dept: DERMATOLOGY | Facility: CLINIC | Age: 85
End: 2020-06-19
Payer: COMMERCIAL

## 2020-06-19 DIAGNOSIS — L40.0 PLAQUE PSORIASIS: Primary | ICD-10-CM

## 2020-06-19 ASSESSMENT — PAIN SCALES - GENERAL: PAINLEVEL: NO PAIN (0)

## 2020-06-19 NOTE — NURSING NOTE
"Chief Complaint   Patient presents with     Derm Problem     Helena is following up with Dr. Remy for psoriasis, pt states \"things are perfect\"     Kathy Juarez LPN    "

## 2020-06-19 NOTE — PROGRESS NOTES
"[facility] ((National Emergency Concerning the CORONAVIRUS (COVID 19), preferred for return patients. )    Image quality and interpretability:NA    Physician has received verbal consent for a Video/Photos Visit from the patient? Yes    In-person dermatology visit recommendation: no    Consent has been obtained for this service by 1 care team member: yes.     Teledermatology information:  - Location of patient: Home  - Location of teledermatologist:  [facility] DERMATOLOGY (Dr. Remy, [place], [place])  - Reason teledermatology is appropriate:  of National Emergency Regarding Coronavirus disease (COVID 19) Outbreak  - Method of transmission:  Store and Forward ((National Emergency Concerning the CORONAVIRUS (COVID 19), preferred for return patients.   - Date of images: NA  - Service start time: 0902  - Service end time:0908  - Date of report: 06/19/20      ___________________________________________________________________________      [facility] Dermatology Note      Dermatology Problem List:  1. Plaque psoriasis  - Clobetasol 0.05% solution BID for flares, scalp  - Triamcinolone 0.5% ointment BID, body  - Ketoconazole 2% shampoo, daily  - Holding Fluocinolone 0.01% scalp oil  - Protopic for skin folds    Encounter Date: Jun 19, 2020    CC:  Chief Complaint   Patient presents with     Derm Problem     Helena is following up with Dr. Remy for psoriasis, pt states \"things are perfect\"         History of Present Illness:  Ms. Helena Eldridge is a 84 year old female who presents as a follow up for psoriasis. She was last seen in the dermatology clinic two weeks ago. I prescribed calcipotriene and protopic 0.1%. She notes that she is using the protopic twice daily but did not get the calcipotriene due to cost. She is \"99.5%\" better.       Past Medical History:   Patient Active Problem List   Diagnosis     CARDIOVASCULAR SCREENING; LDL GOAL LESS THAN 100     CKD (chronic " kidney disease) stage 4, GFR 15-29 ml/min (H)     Glucose intolerance (impaired glucose tolerance)     Kidney stones     Advance care planning     Hypertension goal BP (blood pressure) < 140/90     Lichen sclerosus et atrophicus of the vulva     Solitary kidney, acquired     Senile osteoporosis     Osteoarthritis of right knee     Medial meniscus tear     Cataracts, both eyes     Macular degeneration of both eyes, right eye greater than left eye     Pelvic fracture (H)     COPD, severe (H)     IPF (idiopathic pulmonary fibrosis) (H)     Other specified hypothyroidism     Idiopathic chronic gout     Adjustment disorder with anxiety     Non-small cell cancer of right lung (H)     Immunosuppression (H)     Port-A-Cath in place     Past Medical History:   Diagnosis Date     Carpal tunnel syndrome      CKD (chronic kidney disease)     kidney stone with infection     Deep vein thrombosis (DVT) (H)     1972     DVT 07011992     Glucose intolerance      H/O partial nephrectomy      Heel spur      Hypothyroidism      Kidney stones      Lichen sclerosus      Osteoarthritis      Osteoporosis      PID      Pulmonary embolism (H)     1970     Unspecified hypothyroidism      Vitiligo      Past Surgical History:   Procedure Laterality Date     C NEPHRECTOMY      left partial 07-01-07     C VENOUS THROMBOSIS IMAGING      with pe     HC REMOVAL GALLBLADDER       HC REVISE MEDIAN N/CARPAL TUNNEL SURG       TUBAL LIGATION         Social History:  She leads a very active life and has continued to work out regularly.    reports that she quit smoking about 50 years ago. Her smoking use included cigarettes. She has never used smokeless tobacco. She reports previous alcohol use. She reports that she does not use drugs.    Family History:  Denies a family history of skin cancer or skin diseases.  Family History   Problem Relation Age of Onset     Cancer Brother      Glaucoma Mother      Alzheimer Disease Brother      Macular Degeneration No  family hx of        Medications:  Current Outpatient Medications   Medication Sig Dispense Refill     amLODIPine (NORVASC) 5 MG tablet Take 5 mg by mouth daily       ammonium lactate (AMLACTIN) 12 % external cream APPLY TOPICALLY TO THE AFFECTED AREA TWICE DAILY AS NEEDED 280 g 1     Calcium Carbonate-Vitamin D (CALCIUM + D) 600-200 MG-UNIT per tablet Take 2 tablets by mouth 2 times daily.       clindamycin (CLINDAMAX) 1 % external gel Apply topically 2 times daily To left septum 30 g 1     clobetasol (TEMOVATE) 0.05 % external ointment APPLY SPARINGLY TO AFFECTED AREA TOPICALLY TWICE DAILY AS NEEDED. DO NOT APPLY TO FACE. 60 g 1     clobetasol (TEMOVATE) 0.05 % external solution Apply topically 2 times daily To the scalp 50 mL 11     diphenhydrAMINE (BENADRYL) 25 MG tablet Take 25 mg by mouth At Bedtime        DOCUSATE SODIUM PO Take 100 mg by mouth At Bedtime       estradiol (ESTRACE) 0.1 MG/GM vaginal cream Place 2 g vaginally twice a week 42.5 g 3     famotidine (PEPCID) 20 MG tablet Take 1 tablet (20 mg) by mouth 2 times daily 90 tablet 3     famotidine (PEPCID) 20 MG tablet Take 1 tablet (20 mg) by mouth 2 times daily as needed 60 tablet 11     Fluocinolone Acetonide Scalp 0.01 % OIL oil Apply topically At Bedtime To the scalp 1 Bottle 11     hydrOXYzine (ATARAX) 25 MG tablet TAKE 1 TABLET BY MOUTH TWICE DAILY AS NEEDED FOR ITCHING 90 tablet 1     ipratropium (ATROVENT) 0.03 % nasal spray Spray 2 sprays into both nostrils every 12 hours 30 mL 1     ketoconazole (NIZORAL) 2 % external shampoo Use to wash scalp daily. 120 mL 11     levothyroxine (SYNTHROID/LEVOTHROID) 100 MCG tablet TAKE ONE TABLET BY MOUTH ONCE DAILY EXCEPT SUNDAY 90 tablet 3     Multiple Vitamins-Minerals (OCUVITE ADULT 50+) CAPS Take 1 capsule by mouth daily 30 capsule 12     nystatin (MYCOSTATIN) 800062 UNIT/GM external cream Apply topically 2 times daily 60 g 1     omeprazole (PRILOSEC) 40 MG DR capsule TAKE 1 CAPSULE BY MOUTH EVERY DAY 90  capsule 3     order for DME Equipment being ordered:   Bassam [facility]izzard at [facility]. 1 each 0     rOPINIRole (REQUIP) 0.25 MG tablet TAKE 1 TO 2 TABLETS BY MOUTH EVERY NIGHT AT BEDTIME 180 tablet 3     STATIN NOT PRESCRIBED, INTENTIONAL, by Other route continuous prn. Patient declined 5/4/12  0     tacrolimus (PROTOPIC) 0.1 % external ointment Twice daily to rash areas in the underwear area and under the breast. 60 g 11     triamcinolone (ARISTOCORT HP) 0.5 % external cream Apply topically 2 times daily To the plaques on the body 60 g 5     zinc oxide (DESITIN) 40 % external ointment Apply topically as needed for dry skin or irritation 60 g 0     calcipotriene (DOVONOX) 0.005 % external ointment Twice daily to areas of psoriasis on the stomach (Patient not taking: Reported on 6/19/2020) 90 g 3       Allergies   Allergen Reactions     Ace Inhibitors Cough     Advicor      Celexa [Citalopram] GI Disturbance     diarrhea     Doxycycline Nausea     Poor appetite     Fosamax      Severe substernal burning and dysphagia within 3 days     Valsartan Cramps     severe       Review of Systems:  -As per HPI  -Constitutional: The patient denies fatigue, fevers, chills, unintended weight loss, and night sweats.  -HEENT: Patient denies nonhealing oral sores.No cough or rhinorrhea.   -Skin: As above in HPI. No additional skin concerns.    Physical exam:  Vitals: There were no vitals taken for this visit.  GEN: This is a well developed, well-nourished female in no acute distress, in a pleasant mood.    NEURO: Normal mentation and speech  PSYCH: Normal mood/affect        Impression/Plan:    Psoriasis, now clear per patient in the intertriginous areas. I recommended the following:    Use protopic 0.1% ointment in the skin folds twice daily as needed    Triamcinolone 0.5% cream available for the body    Continue dermasmooth to the scalp as needed      Follow-up yearly, sooner as needed.     Eugenia Remy MD  Dermatology  Staff

## 2020-06-19 NOTE — LETTER
"6/19/2020       RE: Helena Eldridge  [address] [address]     Dear Colleague,    Thank you for referring your patient, Helena Eldridge, to the [facility] DERMATOLOGY at [facility]. Please see a copy of my visit note below.    [facility] ((National Emergency Concerning the CORONAVIRUS (COVID 19), preferred for return patients. )    Image quality and interpretability:NA    Physician has received verbal consent for a Video/Photos Visit from the patient? Yes    In-person dermatology visit recommendation: no    Consent has been obtained for this service by 1 care team member: yes.     Teledermatology information:  - Location of patient: Home  - Location of teledermatologist:  ([facility] DERMATOLOGY (Dr. Remy, [place], [place])  - Reason teledermatology is appropriate:  of National Emergency Regarding Coronavirus disease (COVID 19) Outbreak  - Method of transmission:  Store and Forward ((National Emergency Concerning the CORONAVIRUS (COVID 19), preferred for return patients.   - Date of images: NA  - Service start time: 0902  - Service end time:0908  - Date of report: 06/19/20      ___________________________________________________________________________      [facility] Dermatology Note      Dermatology Problem List:  1. Plaque psoriasis  - Clobetasol 0.05% solution BID for flares, scalp  - Triamcinolone 0.5% ointment BID, body  - Ketoconazole 2% shampoo, daily  - Holding Fluocinolone 0.01% scalp oil  - Protopic for skin folds    Encounter Date: Jun 19, 2020    CC:  Chief Complaint   Patient presents with     Derm Problem     Helena is following up with Dr. Remy for psoriasis, pt states \"things are perfect\"         History of Present Illness:  Ms. Helena Eldridge is a 84 year old female who presents as a follow up for psoriasis. She was last seen in the dermatology clinic two weeks ago. I prescribed calcipotriene " "and protopic 0.1%. She notes that she is using the protopic twice daily but did not get the calcipotriene due to cost. She is \"99.5%\" better.       Past Medical History:   Patient Active Problem List   Diagnosis     CARDIOVASCULAR SCREENING; LDL GOAL LESS THAN 100     CKD (chronic kidney disease) stage 4, GFR 15-29 ml/min (H)     Glucose intolerance (impaired glucose tolerance)     Kidney stones     Advance care planning     Hypertension goal BP (blood pressure) < 140/90     Lichen sclerosus et atrophicus of the vulva     Solitary kidney, acquired     Senile osteoporosis     Osteoarthritis of right knee     Medial meniscus tear     Cataracts, both eyes     Macular degeneration of both eyes, right eye greater than left eye     Pelvic fracture (H)     COPD, severe (H)     IPF (idiopathic pulmonary fibrosis) (H)     Other specified hypothyroidism     Idiopathic chronic gout     Adjustment disorder with anxiety     Non-small cell cancer of right lung (H)     Immunosuppression (H)     Port-A-Cath in place     Past Medical History:   Diagnosis Date     Carpal tunnel syndrome      CKD (chronic kidney disease)     kidney stone with infection     Deep vein thrombosis (DVT) (H)     1972     DVT 07011992     Glucose intolerance      H/O partial nephrectomy      Heel spur      Hypothyroidism      Kidney stones      Lichen sclerosus      Osteoarthritis      Osteoporosis      PID      Pulmonary embolism (H)     1970     Unspecified hypothyroidism      Vitiligo      Past Surgical History:   Procedure Laterality Date     C NEPHRECTOMY      left partial 07-01-07     C VENOUS THROMBOSIS IMAGING      with pe     HC REMOVAL GALLBLADDER       HC REVISE MEDIAN N/CARPAL TUNNEL SURG       TUBAL LIGATION         Social History:  She leads a very active life and has continued to work out regularly.    reports that she quit smoking about 50 years ago. Her smoking use included cigarettes. She has never used smokeless tobacco. She reports " previous alcohol use. She reports that she does not use drugs.    Family History:  Denies a family history of skin cancer or skin diseases.  Family History   Problem Relation Age of Onset     Cancer Brother      Glaucoma Mother      Alzheimer Disease Brother      Macular Degeneration No family hx of        Medications:  Current Outpatient Medications   Medication Sig Dispense Refill     amLODIPine (NORVASC) 5 MG tablet Take 5 mg by mouth daily       ammonium lactate (AMLACTIN) 12 % external cream APPLY TOPICALLY TO THE AFFECTED AREA TWICE DAILY AS NEEDED 280 g 1     Calcium Carbonate-Vitamin D (CALCIUM + D) 600-200 MG-UNIT per tablet Take 2 tablets by mouth 2 times daily.       clindamycin (CLINDAMAX) 1 % external gel Apply topically 2 times daily To left septum 30 g 1     clobetasol (TEMOVATE) 0.05 % external ointment APPLY SPARINGLY TO AFFECTED AREA TOPICALLY TWICE DAILY AS NEEDED. DO NOT APPLY TO FACE. 60 g 1     clobetasol (TEMOVATE) 0.05 % external solution Apply topically 2 times daily To the scalp 50 mL 11     diphenhydrAMINE (BENADRYL) 25 MG tablet Take 25 mg by mouth At Bedtime        DOCUSATE SODIUM PO Take 100 mg by mouth At Bedtime       estradiol (ESTRACE) 0.1 MG/GM vaginal cream Place 2 g vaginally twice a week 42.5 g 3     famotidine (PEPCID) 20 MG tablet Take 1 tablet (20 mg) by mouth 2 times daily 90 tablet 3     famotidine (PEPCID) 20 MG tablet Take 1 tablet (20 mg) by mouth 2 times daily as needed 60 tablet 11     Fluocinolone Acetonide Scalp 0.01 % OIL oil Apply topically At Bedtime To the scalp 1 Bottle 11     hydrOXYzine (ATARAX) 25 MG tablet TAKE 1 TABLET BY MOUTH TWICE DAILY AS NEEDED FOR ITCHING 90 tablet 1     ipratropium (ATROVENT) 0.03 % nasal spray Spray 2 sprays into both nostrils every 12 hours 30 mL 1     ketoconazole (NIZORAL) 2 % external shampoo Use to wash scalp daily. 120 mL 11     levothyroxine (SYNTHROID/LEVOTHROID) 100 MCG tablet TAKE ONE TABLET BY MOUTH ONCE DAILY EXCEPT  SUNDAY 90 tablet 3     Multiple Vitamins-Minerals (OCUVITE ADULT 50+) CAPS Take 1 capsule by mouth daily 30 capsule 12     nystatin (MYCOSTATIN) 836505 UNIT/GM external cream Apply topically 2 times daily 60 g 1     omeprazole (PRILOSEC) 40 MG DR capsule TAKE 1 CAPSULE BY MOUTH EVERY DAY 90 capsule 3     order for DME Equipment being ordered:   Bassam bar Blizzard at [facility]. 1 each 0     rOPINIRole (REQUIP) 0.25 MG tablet TAKE 1 TO 2 TABLETS BY MOUTH EVERY NIGHT AT BEDTIME 180 tablet 3     STATIN NOT PRESCRIBED, INTENTIONAL, by Other route continuous prn. Patient declined 5/4/12  0     tacrolimus (PROTOPIC) 0.1 % external ointment Twice daily to rash areas in the underwear area and under the breast. 60 g 11     triamcinolone (ARISTOCORT HP) 0.5 % external cream Apply topically 2 times daily To the plaques on the body 60 g 5     zinc oxide (DESITIN) 40 % external ointment Apply topically as needed for dry skin or irritation 60 g 0     calcipotriene (DOVONOX) 0.005 % external ointment Twice daily to areas of psoriasis on the stomach (Patient not taking: Reported on 6/19/2020) 90 g 3       Allergies   Allergen Reactions     Ace Inhibitors Cough     Advicor      Celexa [Citalopram] GI Disturbance     diarrhea     Doxycycline Nausea     Poor appetite     Fosamax      Severe substernal burning and dysphagia within 3 days     Valsartan Cramps     severe       Review of Systems:  -As per HPI  -Constitutional: The patient denies fatigue, fevers, chills, unintended weight loss, and night sweats.  -HEENT: Patient denies nonhealing oral sores.No cough or rhinorrhea.   -Skin: As above in HPI. No additional skin concerns.    Physical exam:  Vitals: There were no vitals taken for this visit.  GEN: This is a well developed, well-nourished female in no acute distress, in a pleasant mood.    NEURO: Normal mentation and speech  PSYCH: Normal mood/affect        Impression/Plan:    Psoriasis, now clear per patient in the  intertriginous areas. I recommended the following:    Use protopic 0.1% ointment in the skin folds twice daily as needed    Triamcinolone 0.5% cream available for the body    Continue dermasmooth to the scalp as needed      Follow-up yearly, sooner as needed.     Eugenia Remy MD  Dermatology Staff

## 2020-06-19 NOTE — PATIENT INSTRUCTIONS
[facility] Teledermatology Visit    Thank you for allowing us to participate in your care. Your findings, instructions and follow-up plan are as follows:    I'm so glad your skin is better.   It is safe to keep using the protopic as needed to the skin fold areas.         When should I call my doctor?    If you are worsening or not improving, please, contact us or seek urgent care as noted below.     Who should I call with questions (adults)?    [facility] (adult and pediatric): [phone]     [facility] (adult): [phone]    For urgent needs outside of business hours call the [facility] at [phone] and ask for the dermatology resident on call    If this is a medical emergency and you are unable to reach an ER, Call [phone]      Who should I call with questions (pediatric)?  [facility]- Pediatric Dermatology  Dr. Jazz Philip, Dr. Mike Tim, Dr. Eugenia Remy, Elidia Bower, AMY Vasquez, Dr. Saida Caballero & Dr. Norberto Brizuela  Non Urgent  Nurse Triage Line; [phone]- Alexa and Tati RN Care Coordinators   Nat (/Complex ) [phone]    If you need a prescription refill, please contact your pharmacy. Refills are approved or denied by our Physicians during normal business hours, Monday through Fridays  Per office policy, refills will not be granted if you have not been seen within the past year (or sooner depending on your child's condition)    Scheduling Information:  Pediatric Appointment Scheduling and Call Center [phone]  Radiology Scheduling- [phone]  Sedation Unit Scheduling- [phone]  [place] Scheduling- General [phone]; Pediatric Dermatology [phone]  Main  Services: [phone]  Yakut: [phone]  Lithuanian: [phone]  Hmong/Estonian/Aden: [phone]  Preadmission Nursing Department Fax  Number: [phone] (Fax all pre-operative paperwork to this number)    For urgent matters arising during evenings, weekends, or holidays that cannot wait for normal business hours please call [phone] and ask for the Dermatology Resident On-Call to be paged.

## 2020-07-05 ENCOUNTER — TRANSFERRED RECORDS (OUTPATIENT)
Dept: HEALTH INFORMATION MANAGEMENT | Facility: CLINIC | Age: 85
End: 2020-07-05

## 2020-07-05 LAB
ALT SERPL-CCNC: 13 IU/L (ref 8–45)
AST SERPL-CCNC: 17 IU/L (ref 2–40)
CREAT SERPL-MCNC: 2.05 MG/DL (ref 0.57–1.11)
GFR SERPL CREATININE-BSD FRML MDRD: 23 ML/MIN/1.73M2
GLUCOSE SERPL-MCNC: 96 MG/DL (ref 65–100)
POTASSIUM SERPL-SCNC: 4.8 MMOL/L (ref 3.5–5)

## 2020-07-06 ENCOUNTER — PREP FOR PROCEDURE (OUTPATIENT)
Dept: SURGERY | Facility: CLINIC | Age: 85
End: 2020-07-06

## 2020-07-06 ENCOUNTER — OFFICE VISIT (OUTPATIENT)
Dept: UROLOGY | Facility: CLINIC | Age: 85
End: 2020-07-06
Payer: COMMERCIAL

## 2020-07-06 ENCOUNTER — NURSE TRIAGE (OUTPATIENT)
Dept: FAMILY MEDICINE | Facility: CLINIC | Age: 85
End: 2020-07-06

## 2020-07-06 ENCOUNTER — ANCILLARY PROCEDURE (OUTPATIENT)
Dept: GENERAL RADIOLOGY | Facility: CLINIC | Age: 85
End: 2020-07-06
Attending: UROLOGY
Payer: COMMERCIAL

## 2020-07-06 DIAGNOSIS — N28.9 RENAL INSUFFICIENCY: ICD-10-CM

## 2020-07-06 DIAGNOSIS — N20.1 URETERAL STONE: Primary | ICD-10-CM

## 2020-07-06 DIAGNOSIS — N23 RENAL COLIC: ICD-10-CM

## 2020-07-06 PROCEDURE — 74019 RADEX ABDOMEN 2 VIEWS: CPT

## 2020-07-06 PROCEDURE — 99204 OFFICE O/P NEW MOD 45 MIN: CPT | Performed by: UROLOGY

## 2020-07-06 NOTE — PROGRESS NOTES
S: Patient is a pleasant 84-year-old  female who was seen for consultation with regard to patient's recent renal colic and ureteral stone.  Patient was seen in emergency room yesterday with right hip pain.  CT scan of the abdomen pelvis was done that showed a 6 mm stone in the right upper ureter with hydronephrosis.  Right now she is completely asymptomatic.  She has history of left partial nephrectomy in the past due to cancer.  Her recent creatinine is 2.05 with a GFR of 23.  She has no fever, nausea, or vomiting.  Current Outpatient Medications   Medication Sig Dispense Refill     amLODIPine (NORVASC) 5 MG tablet Take 5 mg by mouth daily       ammonium lactate (AMLACTIN) 12 % external cream APPLY TOPICALLY TO THE AFFECTED AREA TWICE DAILY AS NEEDED 280 g 1     calcipotriene (DOVONOX) 0.005 % external ointment Twice daily to areas of psoriasis on the stomach 90 g 3     Calcium Carbonate-Vitamin D (CALCIUM + D) 600-200 MG-UNIT per tablet Take 2 tablets by mouth 2 times daily.       clindamycin (CLINDAMAX) 1 % external gel Apply topically 2 times daily To left septum 30 g 1     clobetasol (TEMOVATE) 0.05 % external ointment APPLY SPARINGLY TO AFFECTED AREA TOPICALLY TWICE DAILY AS NEEDED. DO NOT APPLY TO FACE. 60 g 1     clobetasol (TEMOVATE) 0.05 % external solution Apply topically 2 times daily To the scalp 50 mL 11     diphenhydrAMINE (BENADRYL) 25 MG tablet Take 25 mg by mouth At Bedtime        DOCUSATE SODIUM PO Take 100 mg by mouth At Bedtime       estradiol (ESTRACE) 0.1 MG/GM vaginal cream Place 2 g vaginally twice a week 42.5 g 3     famotidine (PEPCID) 20 MG tablet Take 1 tablet (20 mg) by mouth 2 times daily 90 tablet 3     famotidine (PEPCID) 20 MG tablet Take 1 tablet (20 mg) by mouth 2 times daily as needed 60 tablet 11     Fluocinolone Acetonide Scalp 0.01 % OIL oil Apply topically At Bedtime To the scalp 1 Bottle 11     hydrOXYzine (ATARAX) 25 MG tablet TAKE 1 TABLET BY MOUTH TWICE DAILY AS  NEEDED FOR ITCHING 90 tablet 1     ipratropium (ATROVENT) 0.03 % nasal spray Spray 2 sprays into both nostrils every 12 hours 30 mL 1     ketoconazole (NIZORAL) 2 % external shampoo Use to wash scalp daily. 120 mL 11     levothyroxine (SYNTHROID/LEVOTHROID) 100 MCG tablet TAKE ONE TABLET BY MOUTH ONCE DAILY EXCEPT SUNDAY 90 tablet 3     Multiple Vitamins-Minerals (OCUVITE ADULT 50+) CAPS Take 1 capsule by mouth daily 30 capsule 12     nystatin (MYCOSTATIN) 244120 UNIT/GM external cream Apply topically 2 times daily 60 g 1     omeprazole (PRILOSEC) 40 MG DR capsule TAKE 1 CAPSULE BY MOUTH EVERY DAY 90 capsule 3     order for DME Equipment being ordered:   [facility] at [facility]. 1 each 0     rOPINIRole (REQUIP) 0.25 MG tablet TAKE 1 TO 2 TABLETS BY MOUTH EVERY NIGHT AT BEDTIME 180 tablet 3     STATIN NOT PRESCRIBED, INTENTIONAL, by Other route continuous prn. Patient declined 5/4/12  0     tacrolimus (PROTOPIC) 0.1 % external ointment Twice daily to rash areas in the underwear area and under the breast. 60 g 11     triamcinolone (ARISTOCORT HP) 0.5 % external cream Apply topically 2 times daily To the plaques on the body 60 g 5     zinc oxide (DESITIN) 40 % external ointment Apply topically as needed for dry skin or irritation 60 g 0     Allergies   Allergen Reactions     Ace Inhibitors Cough     Advicor      Celexa [Citalopram] GI Disturbance     diarrhea     Doxycycline Nausea     Poor appetite     Fosamax      Severe substernal burning and dysphagia within 3 days     Valsartan Cramps     severe     Past Medical History:   Diagnosis Date     Carpal tunnel syndrome      CKD (chronic kidney disease)     kidney stone with infection     Deep vein thrombosis (DVT) (H)     1972     DVT [id number]     Glucose intolerance      H/O partial nephrectomy      Heel spur      Hypothyroidism      Kidney stones      Lichen sclerosus      Osteoarthritis      Osteoporosis      PID      Pulmonary embolism (H)     1970      Unspecified hypothyroidism      Vitiligo      Past Surgical History:   Procedure Laterality Date     C NEPHRECTOMY      left partial 07     C VENOUS THROMBOSIS IMAGING      with pe     HC REMOVAL GALLBLADDER       HC REVISE MEDIAN N/CARPAL TUNNEL SURG       TUBAL LIGATION        Family History   Problem Relation Age of Onset     Cancer Brother      Glaucoma Mother      Alzheimer Disease Brother      Macular Degeneration No family hx of      Social History     Socioeconomic History     Marital status: Single     Spouse name: None     Number of children: None     Years of education: None     Highest education level: None   Occupational History     None   Social Needs     Financial resource strain: None     Food insecurity     Worry: None     Inability: None     Transportation needs     Medical: None     Non-medical: None   Tobacco Use     Smoking status: Former Smoker     Types: Cigarettes     Last attempt to quit: 1969     Years since quittin.6     Smokeless tobacco: Never Used   Substance and Sexual Activity     Alcohol use: Not Currently     Alcohol/week: 0.0 standard drinks     Comment: very little      Drug use: No     Sexual activity: Not Currently     Partners: Male   Lifestyle     Physical activity     Days per week: None     Minutes per session: None     Stress: None   Relationships     Social connections     Talks on phone: None     Gets together: None     Attends Christianity service: None     Active member of club or organization: None     Attends meetings of clubs or organizations: None     Relationship status: None     Intimate partner violence     Fear of current or ex partner: None     Emotionally abused: None     Physically abused: None     Forced sexual activity: None   Other Topics Concern     Parent/sibling w/ CABG, MI or angioplasty before 65F 55M? No   Social History Narrative     None       REVIEW OF SYSTEMS  =================  C: NEGATIVE for fever, chills, change in weight  I:  NEGATIVE for worrisome rashes, moles or lesions  E/M: NEGATIVE for ear, mouth and throat problems  R: NEGATIVE for significant cough or SHORTNESS OF BREATH  CV:  NEGATIVE for chest pain, palpitations or peripheral edema  GI: NEGATIVE for nausea, abdominal pain, heartburn, or change in bowel habits  NEURO: NEGATIVE numbness/weakness  : see HPI  PSYCH: NEGATIVE depression/anxiety  LYmph: no new enlarged lymph nodes  Ortho: no new trauma/movements      Physical Exam:  There were no vitals taken for this visit.   Patient is pleasant, in no acute distress, good general condition.  Heart:  negative, PMI normal  Lung: no evidence of respiratory distress    Abdomen: Soft, nondistended, non tender. No masses. No rebound or guarding.   Exam: No CVA tenderness  Skin: Warm and dry.  No redness.  Neuro: grossly normal  Musculaskeletal: moving all extremities  Psych normal mood and affect  Musculoskeletal  moving all extremities  Hematologic/Lymphatic/Immunologic: normal ant/post cervical, axillary, supraclavicular and inguinal nodes    Assessment/Plan:   Pleasant 84-year-old  female with recent right hip pain and was found to have a 6 mm stone in the right upper ureter with mild hydronephrosis.  She has mild renal insufficiency due to the obstructive stone in the good kidney.  Treatment option discussed.  Giving her renal insufficiency and size of the stone, I recommend ESWL and stent placement.  Success rate about 80 to 90%.  We will schedule this elective procedure near future.

## 2020-07-06 NOTE — TELEPHONE ENCOUNTER
RN called patient and relayed provider's message. Patient verbalized understanding.     Keyana Flanagan RN, BSN, PHN  [facility]: [place]

## 2020-07-06 NOTE — TELEPHONE ENCOUNTER
"Reason for Call:  Other call back    Detailed comments: patient is requesting a call back from PCP regarding her recent high blood pressures (TC didn't realize until after the call ended that pcp is off this week).    Patient states that she was at the hospital and her bp was so high that they didn't want to let her go. She just took it and it was \"162 over one hundred something.\" She has 2 meds at home that are for high bp that she would like to discuss    Phone Number Patient can be reached at: Home number on file [phone] (home)    Best Time: asap    Can we leave a detailed message on this number? YES    Call taken on 7/6/2020 at 12:30 PM by Edward Zaragoza      "

## 2020-07-06 NOTE — PATIENT INSTRUCTIONS
Surgery Preparation    You will receive a phone call from the [facility] within 1-2 days. If you do not receive a call within 2 days, contact [phone] to schedule the procedure. You can write the location/date/time of surgery in the space provided below for your records.    Location of Surgery:                                          Date of Surgery:                                                 Time of Arrival:                                                   Time of Surgery:                                                   Please arrange an appointment with a primary care provider for a pre-op physical. This is a mandatory appointment that needs to be completed within the two weeks prior to your surgery date. This gives clearance for you to receive anesthesia during your procedure. If you have had a pre-op physical within 30 days of your surgery date, you may not need another one. Check with your provider.    If you are having a Same Day Surgery, you must have a  to and from the procedure. Someone needs to stay with you post-operatively for 12 hours.     Follow bowel prep provided by clinic  Do not eat or drink any solids, milk products, or pulpy juices after midnight the night before your surgery. You may have clear liquids up to 8 hours prior to the surgery. This would include water, soda, coffee (without cream), tea, broth, and jello.    Please stop all over the counter blood thinners such as Aspirin, Advil, Aleve, Ibuprofen, Motrin, and Excedrin one week prior to surgery. Please discontinue prescription blood thinners 5-7 days prior to surgery, per your primary physician's orders.     Please check with your insurance company to confirm that your procedure is covered, and that the facility is in-network.     Call the Urology Department @ [phone] if you have questions about your surgery, or if you need to reschedule your procedure.     Patient Education     Shock Wave  Lithotripsy     Energy waves strike the stone, which begins to crack. The stone crumbles into tiny pieces.     Passing a kidney stone can be very painful. Shock wave lithotripsy is a treatment that helps by breaking the kidney stone into smaller pieces that are easier to pass. This treatment is also called extracorporeal shock wave lithotripsy (ESWL). Lithotripsy takes about an hour. It s done in a hospital, lithotripsy center, or mobile lithotripsy van. You will likely go home the same day. This treatment is not used for all types of kidney stones. Your healthcare provider will discuss whether this is the right treatment for the type of stone you have.   During the procedure    You get medicine to prevent pain and help you relax or sleep during lithotripsy. Once this takes effect, the procedure will start.    A flexible tube (stent) with holes in it may be placed into your ureter, the tube that connects the kidney and the bladder. This helps keep urine flowing from the kidney.    Your healthcare provider then uses X-ray or ultrasound to find the exact location of the kidney stone.    Sound waves are aimed at the stone and sent at high speed. If you re awake, you may feel a tapping as they pass through your body.  After the procedure    You ll be closely watched in a recovery room for about 1 to 3 hours. Antibiotics and pain medicine may be prescribed before you leave.    You ll have a follow-up visit in a few weeks. If you received a stent, it will be removed. Your healthcare provider will also check for pieces of stone. If large pieces remain, you may need a second lithotripsy or another procedure.  Possible risks and complications    Infection    Bleeding in the kidney    Bruising of the kidney or skin    Blockage (obstruction) of the ureter    Failure to break up the stone (other procedures may be needed)  Passing the stone  It can take a day to several weeks for the pieces of stone to leave your body. Drink  plenty of liquids to help flush your system. During this time:    Your urine may be cloudy or slightly bloody. You may even see small pieces of stone.    You may have a slight fever and some pain. Take prescribed or over-the-counter pain medicine as instructed by your healthcare provider.    You may be asked to strain your urine to collect some stone particles. These will be studied in the lab.  When to call your healthcare provider   Call your healthcare provider right away if you have any of the following:    Fever of 100.4 F (38 C) or higher, or as directed by your healthcare provider    Heavy bleeding    Pain that doesn t go away with medicine    Upset stomach and vomiting    Problems urinating  Date Last Reviewed: 1/1/2017 2000-2019 [employer]. [address] [place] [address], [place] [address]. All rights reserved. This information is not intended as a substitute for professional medical care. Always follow your healthcare professional's instructions.           Patient Education     Ureteral Stents    A ureteral stent is a soft plastic tube with holes in it. It s temporarily inserted into a ureter to help drain urine into the bladder. One end goes in the kidney. The other end goes in the bladder. A coil on each end holds the stent in place. The stent can t be seen from outside the body. It shouldn t interfere with your normal routine. Your stent will be put in by a doctor trained in treating the urinary tract (a urologist) or another specialist. The procedure is done in a hospital or surgery center. You ll likely go home the same day.  When is a ureteral stent used?  A ureteral stent may be used:    To bypass a blockage in a kidney or ureter.    During kidney stone removal.    To let a ureter heal after surgery.  Before the Procedure  Your healthcare provider will give you instructions to prepare for the procedure. X-rays or other imaging tests of your kidneys and ureters may be done  beforehand.  During the procedure    You receive medicine to prevent pain and help you relax or sleep during the procedure. Once this takes effect, the procedure starts.    The doctor inserts a cystoscope (lighted instrument) through the urethra and into the bladder. This shows the opening to the ureter.    A thin wire is carefully threaded through the cystoscope, up the ureter, and into the kidney. The stent is inserted over the wire.    A fluoroscope (special X-ray machine) is used to help position the stent. When the stent is in place, the wire and cystoscope are removed.  While you have a stent    Some discomfort is normal. Certain movements may trigger pain or a feeling that you need to urinate. You may also feel mild soreness or pressure before or during urination. These symptoms will go away a few days after the stent is removed.    Medicine to control pain or bladder spasms or to prevent infection may be prescribed. Take this as directed.    Drink plenty of fluids to help flush out your urinary tract.    Your urine may be slightly pink or red. This is due to bleeding caused by minor irritation from the stent. This may happen on and off while you have the stent.    As with any synthetic device placed in the body, there is a risk of infection. The stent may have to be removed if this happens.   How long will you need a stent?  The stent is often taken out after the blockage in the ureter is treated or the ureter has healed. This may take 1 week to 2 weeks, or longer. If a stent is needed for a long time, it may need to be changed every few months.  When to call your healthcare provider  Contact your healthcare provider right away if:    Your urine contains blood clots or you see a large amount of blood-tinged urine    You have symptoms similar to those you had before the stent was placed    You constantly leak urine    You have a fever over 100.4 F (38 C), chills, nausea, or vomiting    Your pain is not  relieved with medicine    The end of the stent comes out of the urethra  Date Last Reviewed: 1/1/2017 2000-2019 [employer]. [address] [place] [address], [place] [address]. All rights reserved. This information is not intended as a substitute for professional medical care. Always follow your healthcare professional's instructions.

## 2020-07-06 NOTE — TELEPHONE ENCOUNTER
The blood pressure elevation may very well be related to increased pain.  If she has a supply of amlodipine at home she could restart that and monitor her blood pressures.  If they remain significantly elevated then follow-up in the clinic later this week or early next week would be appropriate.    Please call patient to let her know.  That particular blood pressure elevations she has reported do warrant monitoring but are not emergently dangerous to her.    Buddy Payne MD

## 2020-07-06 NOTE — TELEPHONE ENCOUNTER
"RN spoke with patient. Patient seen in ER yesterday for leg pain and kidney stone. BP was elevated in ER. This morning, BP was 148/94. At noon today, BP was 165/108 with home cuff.     BP Readings from Last 3 Encounters:   03/16/20 139/69   02/28/20 136/85   01/21/20 128/74     Patient used to take amlodipine medication, has not been taking for \"a couple months\"    Per protocol, should be seen in clinic within next three days. Patient wants to know if she can just resume amlodipine medication, or should continue to take BP at home and monitor, as may be related to pain?     Routed to PCP to review and advise.     Keyana Flanagan, RN, BSN, PHN  [facility]: [place]      Additional Information    Negative: Pregnant > 20 weeks or postpartum (< 6 weeks after delivery) and new hand or face swelling    Negative: Pregnant > 20 weeks and BP > 140/90    Negative: Sounds like a life-threatening emergency to the triager    Negative: Systolic BP >= 160 OR Diastolic >= 100, and any cardiac or neurologic symptoms (e.g., chest pain, difficulty breathing, unsteady gait, blurred vision)    Negative: Patient sounds very sick or weak to the triager    Negative: BP Systolic BP >= 140 OR Diastolic >= 90 and postpartum (from 0 to 6 weeks after delivery)    Negative: Systolic BP >= 180 OR Diastolic >= 110, and missed most recent dose of blood pressure medication    Negative: Systolic BP >= 180 OR Diastolic >= 110    Negative: Patient wants to be seen    Negative: Ran out of BP medications    Negative: Taking BP medications and feels is having side effects (e.g., impotence, cough, dizziness)    Systolic BP >= 160 OR Diastolic >= 100    Answer Assessment - Initial Assessment Questions  1. BLOOD PRESSURE: \"What is the blood pressure?\" \"Did you take at least two measurements 5 minutes apart?\"      148/94 165/108 noon  2. ONSET: \"When did you take your blood pressure?\"      sunday  3. HOW: \"How did you obtain the blood pressure?\" " "(e.g., visiting nurse, automatic home BP monitor)      Home bp  4. HISTORY: \"Do you have a history of high blood pressure?\"      Yes  5. MEDICATIONS: \"Are you taking any medications for blood pressure?\" \"Have you missed any doses recently?\"      stoppe  6. OTHER SYMPTOMS: \"Do you have any symptoms?\" (e.g., headache, chest pain, blurred vision, difficulty breathing, weakness)          7. PREGNANCY: \"Is there any chance you are pregnant?\" \"When was your last menstrual period?\"      no    Protocols used: HIGH BLOOD PRESSURE-A-[place]      "

## 2020-07-07 ENCOUNTER — TELEPHONE (OUTPATIENT)
Dept: UROLOGY | Facility: CLINIC | Age: 85
End: 2020-07-07

## 2020-07-07 DIAGNOSIS — Z11.59 ENCOUNTER FOR SCREENING FOR OTHER VIRAL DISEASES: Primary | ICD-10-CM

## 2020-07-07 NOTE — TELEPHONE ENCOUNTER
Type of surgery: LITHOTRIPSY, EXTRACORPOREAL SHOCK WAVE (ESWL), WITH CYSTOSCOPY AND URETERAL STENT INSERTION (Right)    CPT 80848, 79819   Ureteral stone N20.1     Renal colic N23     Renal insufficiency N28.9         Location of surgery: [facility]  Date and time of surgery: 7-13-20  Surgeon: Dr. Ramirez  Pre-Op Appt Date: TBD  Post-Op Appt Date: 7-27-20   Packet sent out: Yes  Pre-cert/Authorization completed: No prior auth needed, per [facility] online list.   Date:   07/07/2020    Thank you,   Luz Watt   Referral Department  [phone]

## 2020-07-08 ENCOUNTER — OFFICE VISIT (OUTPATIENT)
Dept: AUDIOLOGY | Facility: CLINIC | Age: 85
End: 2020-07-08
Payer: COMMERCIAL

## 2020-07-08 DIAGNOSIS — H90.3 SENSORINEURAL HEARING LOSS, BILATERAL: Primary | ICD-10-CM

## 2020-07-08 PROCEDURE — 99207 ZZC NO CHARGE LOS: CPT | Performed by: AUDIOLOGIST

## 2020-07-08 PROCEDURE — V5299 HEARING SERVICE: HCPCS | Performed by: AUDIOLOGIST

## 2020-07-08 NOTE — PROGRESS NOTES
AUDIOLOGY REPORT: HEARING AID RECHECK    SUBJECTIVE: Helena Eldridge is a 84 year old female, :  1935, was seen in the Audiology Clinic at [facility] on 20 for a return check of their hearing aids. Patient was unaccompanied to today's visit.       Background:   Patient is here today with the complaint of the left ear hearing aid has not worked since the tubing was changed in March.     Procedures:       SIDE: Left     : Widex      TYPE: Clear 330-9 BTE    S/N: [id number]    WARRANTY:  2016    Once a new battery was placed the hearing aid was returned to function. Biologic listening check finds the hearing aids sounding crisp and clear (patient wanted the right ear hearing aid checked as well). I explained that the issue could have been a moisture bubble or some wax that has since dried and is no longer in the tubing or earmold. Patient reports it's nice to have both hearing aids working again.     Plan:   Patient will return as needed for hearing aid concerns.     NO CHARGE VISIT    James Lau CCC-A  Licensed Audiologist #[id number]  2020

## 2020-07-09 ENCOUNTER — OFFICE VISIT (OUTPATIENT)
Dept: FAMILY MEDICINE | Facility: CLINIC | Age: 85
End: 2020-07-09
Payer: COMMERCIAL

## 2020-07-09 VITALS
OXYGEN SATURATION: 95 % | SYSTOLIC BLOOD PRESSURE: 135 MMHG | BODY MASS INDEX: 25.93 KG/M2 | WEIGHT: 135 LBS | HEART RATE: 100 BPM | TEMPERATURE: 99 F | DIASTOLIC BLOOD PRESSURE: 81 MMHG

## 2020-07-09 DIAGNOSIS — F43.22 ADJUSTMENT DISORDER WITH ANXIETY: ICD-10-CM

## 2020-07-09 DIAGNOSIS — N20.0 NEPHROLITHIASIS: ICD-10-CM

## 2020-07-09 DIAGNOSIS — M81.0 SENILE OSTEOPOROSIS: ICD-10-CM

## 2020-07-09 DIAGNOSIS — Z01.818 PREOP GENERAL PHYSICAL EXAM: Primary | ICD-10-CM

## 2020-07-09 DIAGNOSIS — C34.91 NON-SMALL CELL CANCER OF RIGHT LUNG (H): ICD-10-CM

## 2020-07-09 DIAGNOSIS — M17.11 PRIMARY OSTEOARTHRITIS OF RIGHT KNEE: ICD-10-CM

## 2020-07-09 DIAGNOSIS — M1A.00X0 IDIOPATHIC CHRONIC GOUT WITHOUT TOPHUS, UNSPECIFIED SITE: ICD-10-CM

## 2020-07-09 PROCEDURE — 99215 OFFICE O/P EST HI 40 MIN: CPT | Performed by: FAMILY MEDICINE

## 2020-07-09 ASSESSMENT — PAIN SCALES - GENERAL: PAINLEVEL: NO PAIN (0)

## 2020-07-09 NOTE — PROGRESS NOTES
[address] [address]  [phone]  Dept: [phone]    PRE-OP EVALUATION:  Today's date: 2020    Helena Eldridge (: 1935) presents for pre-operative evaluation assessment as requested by Dr. Ramirez.  She requires evaluation and anesthesia risk assessment prior to undergoing surgery/procedure for treatment of lithotripsy with cystoscopy and ureteral stent insertion .    Proposed Surgery/ Procedure: Lithotripsy  Date of Surgery/ Procedure: 20  Time of Surgery/ Procedure: 1:00 PM  Hospital/Surgical Facility: [facility]    Primary Physician: Sushma Nelson  Type of Anesthesia Anticipated: to be determined    Patient has a Health Care Directive or Living Will:  YES- scanned    1. NO - Do you have a history of heart attack, stroke, stent, bypass or surgery on an artery in the head, neck, heart or legs?  2. NO - Do you ever have any pain or discomfort in your chest?  3. NO - Do you have a history of  Heart Failure?  4. NO - Are you troubled by shortness of breath when: walking on the level, up a slight hill or at night?  5. NO - Do you currently have a cold, bronchitis or other respiratory infection?  6. NO - Do you have a cough, shortness of breath or wheezing?  7. NO - Do you sometimes get pains in the calves of your legs when you walk?  8. YES - Do you or anyone in your family have previous history of blood clots?  9. NO - Do you or does anyone in your family have a serious bleeding problem such as prolonged bleeding following surgeries or cuts?  10. NO - Have you ever had problems with anemia or been told to take iron pills?  11. NO - Have you had any abnormal blood loss such as black, tarry or bloody stools, or abnormal vaginal bleeding?  12. YES - Have you ever had a blood transfusion?  13. NO - Have you or any of your relatives ever had problems with anesthesia?  14. NO - Do you have sleep apnea, excessive snoring  or daytime drowsiness?  15. NO - Do you have any prosthetic heart valves?  16. NO - Do you have prosthetic joints?  17. NO - Is there any chance that you may be pregnant?      HPI:     HPI related to upcoming procedure: Patient with history of nephrolithiasis.  She is scheduled to have lithotripsy.  She denies fever, denies blood in her urine, she has no nausea no vomiting.    Patient is very active, she walks every day for 45 to 50 minutes.  She denies chest pain or shortness of breath, denies being lightheaded or dizzy.    Denies lower extremity edema.  Denies headache, denies recent sickness      See problem list for active medical problems.  Problems all longstanding and stable, except as noted/documented.  See ROS for pertinent symptoms related to these conditions.      MEDICAL HISTORY:     Patient Active Problem List    Diagnosis Date Noted     Ureteral stone 07/07/2020     Priority: Medium     Added automatically from request for surgery [id number]       Port-A-Cath in place 03/08/2018     Priority: Medium     Placed in early 2018       Non-small cell cancer of right lung (H) 03/07/2018     Priority: Medium     Immunosuppression (H) 03/07/2018     Priority: Medium     Adjustment disorder with anxiety 12/04/2017     Priority: Medium     Other specified hypothyroidism 05/14/2015     Priority: Medium     Idiopathic chronic gout 05/14/2015     Priority: Medium     IPF (idiopathic pulmonary fibrosis) (H) 02/25/2015     Priority: Medium     Mild, at bases per CXR 2/2015 (2/15/15, [facility])       COPD, severe (H) 02/16/2015     Priority: Medium     Seen by pulmonary 2013.         Pelvic fracture (H) 08/19/2014     Priority: Medium     Cataracts, both eyes 04/16/2014     Priority: Medium     Macular degeneration of both eyes, right eye greater than left eye 04/16/2014     Priority: Medium     Senile osteoporosis 03/11/2014     Priority: Medium     Osteoarthritis of right knee 03/11/2014     Priority: Medium     Medial  meniscus tear 03/11/2014     Priority: Medium     Lichen sclerosus et atrophicus of the vulva 05/08/2013     Priority: Medium     Clobetasol bid is the recommended remedy       Solitary kidney, acquired 05/08/2013     Priority: Medium     Left nephrectomy due to stones (5/8/13, [facility])       Hypertension goal BP (blood pressure) < 140/90 11/28/2011     Priority: Medium     Advance care planning 10/10/2011     Priority: Medium     Advance Care Planning 4/19/2016: Receipt of ACP document:  Received: Health Care Directive which was witnessed or notarized on 2/23/16.  Document previously scanned on 3/2/16.  Validation form completed and sent to be scanned.  Code Status reflects choices in most recent ACP document.  Confirmed/documented designated decision maker(s).  Added by Monique Reyna , Advance Care Planning Liaison  Advance Care Planning 10/10/2011:  Discussed advance care planning with patient; however, patient declined at this time.  Patrica Epps       Glucose intolerance (impaired glucose tolerance) 02/02/2011     Priority: Medium     Kidney stones      Priority: Medium     Partial left nephrectomy around 2008 due to stone  Should get yearly CT (stone protocol ) -- 5/20/15, [facility]       CKD (chronic kidney disease) stage 4, GFR 15-29 ml/min (H) 12/02/2010     Priority: Medium     History of kidney stone with infection.  Creatinine 1.3 - 1.6 (12/2/10, [facility])   -- she can't take ACE / ARB due to side effects.  Will continue with good BP control (12/3/15, [facility])       CARDIOVASCULAR SCREENING; LDL GOAL LESS THAN 100 10/31/2010     Priority: Medium      Past Medical History:   Diagnosis Date     Carpal tunnel syndrome      CKD (chronic kidney disease)     kidney stone with infection     Deep vein thrombosis (DVT) (H)     1972     DVT [id number]     Glucose intolerance      H/O partial nephrectomy      Heel spur      Hypothyroidism      Kidney stones      Lichen sclerosus      Osteoarthritis      Osteoporosis      PID       Pulmonary embolism (H)     1970     Unspecified hypothyroidism      Vitiligo      Past Surgical History:   Procedure Laterality Date     C NEPHRECTOMY      left partial 07-01-07     C VENOUS THROMBOSIS IMAGING      with pe     HC REMOVAL GALLBLADDER       HC REVISE MEDIAN N/CARPAL TUNNEL SURG       TUBAL LIGATION       Current Outpatient Medications   Medication Sig Dispense Refill     amLODIPine (NORVASC) 5 MG tablet Take 5 mg by mouth daily       ammonium lactate (AMLACTIN) 12 % external cream APPLY TOPICALLY TO THE AFFECTED AREA TWICE DAILY AS NEEDED 280 g 1     calcipotriene (DOVONOX) 0.005 % external ointment Twice daily to areas of psoriasis on the stomach 90 g 3     Calcium Carbonate-Vitamin D (CALCIUM + D) 600-200 MG-UNIT per tablet Take 2 tablets by mouth 2 times daily.       clindamycin (CLINDAMAX) 1 % external gel Apply topically 2 times daily To left septum 30 g 1     clobetasol (TEMOVATE) 0.05 % external ointment APPLY SPARINGLY TO AFFECTED AREA TOPICALLY TWICE DAILY AS NEEDED. DO NOT APPLY TO FACE. 60 g 1     clobetasol (TEMOVATE) 0.05 % external solution Apply topically 2 times daily To the scalp 50 mL 11     diphenhydrAMINE (BENADRYL) 25 MG tablet Take 25 mg by mouth At Bedtime        DOCUSATE SODIUM PO Take 100 mg by mouth At Bedtime       estradiol (ESTRACE) 0.1 MG/GM vaginal cream Place 2 g vaginally twice a week 42.5 g 3     famotidine (PEPCID) 20 MG tablet Take 1 tablet (20 mg) by mouth 2 times daily 90 tablet 3     famotidine (PEPCID) 20 MG tablet Take 1 tablet (20 mg) by mouth 2 times daily as needed 60 tablet 11     Fluocinolone Acetonide Scalp 0.01 % OIL oil Apply topically At Bedtime To the scalp 1 Bottle 11     hydrOXYzine (ATARAX) 25 MG tablet TAKE 1 TABLET BY MOUTH TWICE DAILY AS NEEDED FOR ITCHING 90 tablet 1     ipratropium (ATROVENT) 0.03 % nasal spray Spray 2 sprays into both nostrils every 12 hours 30 mL 1     ketoconazole (NIZORAL) 2 % external shampoo Use to wash scalp daily. 120  mL 11     levothyroxine (SYNTHROID/LEVOTHROID) 100 MCG tablet TAKE ONE TABLET BY MOUTH ONCE DAILY EXCEPT SUNDAY 90 tablet 3     Multiple Vitamins-Minerals (OCUVITE ADULT 50+) CAPS Take 1 capsule by mouth daily 30 capsule 12     nystatin (MYCOSTATIN) 065907 UNIT/GM external cream Apply topically 2 times daily 60 g 1     omeprazole (PRILOSEC) 40 MG DR capsule TAKE 1 CAPSULE BY MOUTH EVERY DAY 90 capsule 3     order for DME Equipment being ordered:   [facility] at [facility]. 1 each 0     rOPINIRole (REQUIP) 0.25 MG tablet TAKE 1 TO 2 TABLETS BY MOUTH EVERY NIGHT AT BEDTIME 180 tablet 3     STATIN NOT PRESCRIBED, INTENTIONAL, by Other route continuous prn. Patient declined 5/4/12  0     tacrolimus (PROTOPIC) 0.1 % external ointment Twice daily to rash areas in the underwear area and under the breast. 60 g 11     triamcinolone (ARISTOCORT HP) 0.5 % external cream Apply topically 2 times daily To the plaques on the body 60 g 5     zinc oxide (DESITIN) 40 % external ointment Apply topically as needed for dry skin or irritation 60 g 0     OTC products: None, except as noted above    Allergies   Allergen Reactions     Ace Inhibitors Cough     Advicor      Alendronic Acid Other (See Comments)     Other reaction(s): GI Upset  heartburn  Severe substernal burning and dysphagia within 3 days       Blood-Group Specific Substance Other (See Comments)     Patient has Anti-Shania and warm auto antibody. Blood products may be delayed. Draw patient 24 hours prior to transfusion. Draw one red top and two purple top tubes for all type and screen orders.     Citalopram GI Disturbance and Nausea     diarrhea  diarrhea     Doxycycline Nausea     Poor appetite     Fosamax      Severe substernal burning and dysphagia within 3 days     Valsartan Cramps and Other (See Comments)     severe  severe      Latex Allergy: NO    Social History     Tobacco Use     Smoking status: Former Smoker     Types: Cigarettes     Last attempt to quit:  1969     Years since quittin.6     Smokeless tobacco: Never Used   Substance Use Topics     Alcohol use: Not Currently     Alcohol/week: 0.0 standard drinks     Comment: very little      History   Drug Use No       REVIEW OF SYSTEMS:   Constitutional, neuro, ENT, endocrine, pulmonary, cardiac, gastrointestinal, genitourinary, musculoskeletal, integument and psychiatric systems are negative, except as otherwise noted.    EXAM:   /81 (BP Location: Left arm, Patient Position: Chair, Cuff Size: Adult Regular)   Pulse 100   Temp 99  F (37.2  C) (Oral)   Wt 61.2 kg (135 lb)   SpO2 95%   Breastfeeding No   BMI 25.93 kg/m      GENERAL APPEARANCE: healthy, alert and no distress     NECK: no adenopathy, no asymmetry, masses, or scars and thyroid normal to palpation     RESP: lungs clear to auscultation - no rales, rhonchi or wheezes     CV: regular rates and rhythm, normal S1 S2, no S3 or S4 and no murmur, click or rub     ABDOMEN:  soft, nontender, no HSM or masses and bowel sounds normal     MS: extremities normal- no gross deformities noted, no evidence of inflammation in joints, FROM in all extremities.     SKIN: no suspicious lesions or rashes     NEURO: Normal strength and tone, sensory exam grossly normal, mentation intact and speech normal     PSYCH: mentation appears normal. and affect normal/bright     LYMPHATICS: No cervical adenopathy    DIAGNOSTICS:   No labs or EKG required for low risk surgery (cataract, skin procedure, breast biopsy, etc)    Recent Labs   Lab Test 20  0854 10/21/19  1221  19  1050  19  1157  18  1448   HGB 11.0*  --   --  11.7  --   --   --  11.6*     --   --  276  --   --   --  229    137  --   --    < > 137   < > 141   POTASSIUM 4.4 4.6  --   --    < > 4.3   < > 3.8   CR 1.72* 1.87*   < >  --    < > 2.07*   < > 1.36*   A1C  --   --   --   --   --  5.5  --  4.9    < > = values in this interval not displayed.        IMPRESSION:    Reason for surgery/procedure: Lithotripsy,  Diagnosis/reason for consult: Preop, physical and history    The proposed surgical procedure is considered LOW risk.    REVISED CARDIAC RISK INDEX  The patient has the following serious cardiovascular risks for perioperative complications such as (MI, PE, VFib and 3  AV Block):  No serious cardiac risks  INTERPRETATION: 0 risks: Class I (very low risk - 0.4% complication rate)    The patient has the following additional risks for perioperative complications:  No identified additional risks     Diagnosis Comments   1. Preop general physical exam     2. Nephrolithiasis     3. Non-small cell cancer of right lung (H)     4. Adjustment disorder with anxiety     5. Idiopathic chronic gout without tophus, unspecified site     6. Primary osteoarthritis of right knee     7. Senile osteoporosis           RECOMMENDATIONS:     --Patient is to take all scheduled medications on the day of surgery EXCEPT for modifications listed below.    APPROVAL GIVEN to proceed with proposed procedure, without further diagnostic evaluation     Patient Instructions     Before Your Surgery      Call your surgeon if there is any change in your health. This includes signs of a cold or flu (such as a sore throat, runny nose, cough, rash or fever).    Do not smoke, drink alcohol or take over the counter medicine (unless your surgeon or primary care doctor tells you to) for the 24 hours before and after surgery.    If you take prescribed drugs: Follow your doctor s orders about which medicines to take and which to stop until after surgery.    Eating and drinking prior to surgery: follow the instructions from your surgeon    Take a shower or bath the night before surgery. Use the soap your surgeon gave you to gently clean your skin. If you do not have soap from your surgeon, use your regular soap. Do not shave or scrub the surgery site.  Wear clean pajamas and have clean sheets on your bed.     Nothing to  eat or drink after Midnight.  Hold all NSAID and blood thinner 7 days, before surgery ( Aspirin, Ibuprofen, Naproxen, etc, ), and Coumadin.  May take blood pressure medication, the morning of your surgery with sip of water, as directed.          Signed Electronically by: Celena Freire MD    Copy of this evaluation report is provided to requesting physician.    [facility] Preop Guidelines    Revised Cardiac Risk Index

## 2020-07-10 ENCOUNTER — ANESTHESIA EVENT (OUTPATIENT)
Dept: SURGERY | Facility: AMBULATORY SURGERY CENTER | Age: 85
End: 2020-07-10

## 2020-07-10 DIAGNOSIS — Z11.59 ENCOUNTER FOR SCREENING FOR OTHER VIRAL DISEASES: ICD-10-CM

## 2020-07-10 PROCEDURE — U0003 INFECTIOUS AGENT DETECTION BY NUCLEIC ACID (DNA OR RNA); SEVERE ACUTE RESPIRATORY SYNDROME CORONAVIRUS 2 (SARS-COV-2) (CORONAVIRUS DISEASE [COVID-19]), AMPLIFIED PROBE TECHNIQUE, MAKING USE OF HIGH THROUGHPUT TECHNOLOGIES AS DESCRIBED BY CMS-2020-01-R: HCPCS | Performed by: UROLOGY

## 2020-07-11 LAB
SARS-COV-2 RNA SPEC QL NAA+PROBE: NOT DETECTED
SPECIMEN SOURCE: NORMAL

## 2020-07-13 ENCOUNTER — ANCILLARY PROCEDURE (OUTPATIENT)
Dept: GENERAL RADIOLOGY | Facility: CLINIC | Age: 85
End: 2020-07-13
Attending: UROLOGY
Payer: COMMERCIAL

## 2020-07-13 ENCOUNTER — HOSPITAL ENCOUNTER (OUTPATIENT)
Facility: AMBULATORY SURGERY CENTER | Age: 85
Discharge: HOME | End: 2020-07-13
Attending: UROLOGY | Admitting: UROLOGY
Payer: COMMERCIAL

## 2020-07-13 ENCOUNTER — ANESTHESIA (OUTPATIENT)
Dept: SURGERY | Facility: AMBULATORY SURGERY CENTER | Age: 85
End: 2020-07-13
Payer: COMMERCIAL

## 2020-07-13 VITALS
HEART RATE: 69 BPM | SYSTOLIC BLOOD PRESSURE: 150 MMHG | OXYGEN SATURATION: 92 % | DIASTOLIC BLOOD PRESSURE: 67 MMHG | RESPIRATION RATE: 16 BRPM | TEMPERATURE: 96.9 F

## 2020-07-13 DIAGNOSIS — N20.1 URETERAL STONE: ICD-10-CM

## 2020-07-13 DIAGNOSIS — N20.0 KIDNEY STONE: ICD-10-CM

## 2020-07-13 PROCEDURE — G8916 PT W IV AB GIVEN ON TIME: HCPCS

## 2020-07-13 PROCEDURE — 74019 RADEX ABDOMEN 2 VIEWS: CPT | Performed by: RADIOLOGY

## 2020-07-13 PROCEDURE — G8918 PT W/O PREOP ORDER IV AB PRO: HCPCS

## 2020-07-13 PROCEDURE — G8907 PT DOC NO EVENTS ON DISCHARG: HCPCS

## 2020-07-13 PROCEDURE — 52332 CYSTOSCOPY AND TREATMENT: CPT | Mod: RT

## 2020-07-13 PROCEDURE — 52332 CYSTOSCOPY AND TREATMENT: CPT | Mod: RT | Performed by: UROLOGY

## 2020-07-13 PROCEDURE — 50590 FRAGMENTING OF KIDNEY STONE: CPT | Mod: RT | Performed by: UROLOGY

## 2020-07-13 PROCEDURE — 50590 FRAGMENTING OF KIDNEY STONE: CPT | Mod: RT

## 2020-07-13 DEVICE — STENT URETERAL PERCUFLEX PLUS 6FRX22CM M0061752610
Type: IMPLANTABLE DEVICE | Site: URETER | Status: NON-FUNCTIONAL
Removed: 2020-07-28

## 2020-07-13 RX ORDER — CEFAZOLIN SODIUM 2 G/100ML
2 INJECTION, SOLUTION INTRAVENOUS
Status: COMPLETED | OUTPATIENT
Start: 2020-07-13 | End: 2020-07-13

## 2020-07-13 RX ORDER — DEXAMETHASONE SODIUM PHOSPHATE 4 MG/ML
4 INJECTION, SOLUTION INTRA-ARTICULAR; INTRALESIONAL; INTRAMUSCULAR; INTRAVENOUS; SOFT TISSUE EVERY 10 MIN PRN
Status: DISCONTINUED | OUTPATIENT
Start: 2020-07-13 | End: 2020-07-14 | Stop reason: HOSPADM

## 2020-07-13 RX ORDER — HYDROMORPHONE HYDROCHLORIDE 1 MG/ML
.3-.5 INJECTION, SOLUTION INTRAMUSCULAR; INTRAVENOUS; SUBCUTANEOUS EVERY 10 MIN PRN
Status: DISCONTINUED | OUTPATIENT
Start: 2020-07-13 | End: 2020-07-14 | Stop reason: HOSPADM

## 2020-07-13 RX ORDER — CIPROFLOXACIN 250 MG/1
250 TABLET, FILM COATED ORAL 2 TIMES DAILY
Qty: 6 TABLET | Refills: 0 | Status: SHIPPED | OUTPATIENT
Start: 2020-07-13 | End: 2020-07-20

## 2020-07-13 RX ORDER — LIDOCAINE 40 MG/G
CREAM TOPICAL
Status: DISCONTINUED | OUTPATIENT
Start: 2020-07-13 | End: 2020-07-14 | Stop reason: HOSPADM

## 2020-07-13 RX ORDER — ONDANSETRON 2 MG/ML
4 INJECTION INTRAMUSCULAR; INTRAVENOUS EVERY 30 MIN PRN
Status: DISCONTINUED | OUTPATIENT
Start: 2020-07-13 | End: 2020-07-14 | Stop reason: HOSPADM

## 2020-07-13 RX ORDER — HYDROCODONE BITARTRATE AND ACETAMINOPHEN 5; 325 MG/1; MG/1
1-2 TABLET ORAL EVERY 4 HOURS PRN
Qty: 15 TABLET | Refills: 0 | Status: SHIPPED | OUTPATIENT
Start: 2020-07-13 | End: 2020-07-20

## 2020-07-13 RX ORDER — NALOXONE HYDROCHLORIDE 0.4 MG/ML
.1-.4 INJECTION, SOLUTION INTRAMUSCULAR; INTRAVENOUS; SUBCUTANEOUS
Status: DISCONTINUED | OUTPATIENT
Start: 2020-07-13 | End: 2020-07-14 | Stop reason: HOSPADM

## 2020-07-13 RX ORDER — ONDANSETRON 4 MG/1
4 TABLET, ORALLY DISINTEGRATING ORAL EVERY 30 MIN PRN
Status: DISCONTINUED | OUTPATIENT
Start: 2020-07-13 | End: 2020-07-14 | Stop reason: HOSPADM

## 2020-07-13 RX ORDER — SODIUM CHLORIDE, SODIUM LACTATE, POTASSIUM CHLORIDE, CALCIUM CHLORIDE 600; 310; 30; 20 MG/100ML; MG/100ML; MG/100ML; MG/100ML
INJECTION, SOLUTION INTRAVENOUS CONTINUOUS
Status: DISCONTINUED | OUTPATIENT
Start: 2020-07-13 | End: 2020-07-14 | Stop reason: HOSPADM

## 2020-07-13 RX ORDER — FENTANYL CITRATE 50 UG/ML
INJECTION, SOLUTION INTRAMUSCULAR; INTRAVENOUS PRN
Status: DISCONTINUED | OUTPATIENT
Start: 2020-07-13 | End: 2020-07-13

## 2020-07-13 RX ORDER — PROPOFOL 10 MG/ML
INJECTION, EMULSION INTRAVENOUS CONTINUOUS PRN
Status: DISCONTINUED | OUTPATIENT
Start: 2020-07-13 | End: 2020-07-13

## 2020-07-13 RX ORDER — ACETAMINOPHEN 325 MG/1
975 TABLET ORAL ONCE
Status: COMPLETED | OUTPATIENT
Start: 2020-07-13 | End: 2020-07-13

## 2020-07-13 RX ORDER — FENTANYL CITRATE 50 UG/ML
25-50 INJECTION, SOLUTION INTRAMUSCULAR; INTRAVENOUS
Status: DISCONTINUED | OUTPATIENT
Start: 2020-07-13 | End: 2020-07-14 | Stop reason: HOSPADM

## 2020-07-13 RX ORDER — ONDANSETRON 2 MG/ML
INJECTION INTRAMUSCULAR; INTRAVENOUS PRN
Status: DISCONTINUED | OUTPATIENT
Start: 2020-07-13 | End: 2020-07-13

## 2020-07-13 RX ORDER — MEPERIDINE HYDROCHLORIDE 25 MG/ML
12.5 INJECTION INTRAMUSCULAR; INTRAVENOUS; SUBCUTANEOUS
Status: DISCONTINUED | OUTPATIENT
Start: 2020-07-13 | End: 2020-07-14 | Stop reason: HOSPADM

## 2020-07-13 RX ORDER — PROPOFOL 10 MG/ML
INJECTION, EMULSION INTRAVENOUS PRN
Status: DISCONTINUED | OUTPATIENT
Start: 2020-07-13 | End: 2020-07-13

## 2020-07-13 RX ORDER — ALBUTEROL SULFATE 0.83 MG/ML
2.5 SOLUTION RESPIRATORY (INHALATION) EVERY 4 HOURS PRN
Status: DISCONTINUED | OUTPATIENT
Start: 2020-07-13 | End: 2020-07-14 | Stop reason: HOSPADM

## 2020-07-13 RX ORDER — LIDOCAINE HYDROCHLORIDE 20 MG/ML
INJECTION, SOLUTION INFILTRATION; PERINEURAL PRN
Status: DISCONTINUED | OUTPATIENT
Start: 2020-07-13 | End: 2020-07-13

## 2020-07-13 RX ORDER — OXYCODONE HYDROCHLORIDE 5 MG/1
5-10 TABLET ORAL EVERY 4 HOURS PRN
Status: DISCONTINUED | OUTPATIENT
Start: 2020-07-13 | End: 2020-07-14 | Stop reason: HOSPADM

## 2020-07-13 RX ORDER — DEXAMETHASONE SODIUM PHOSPHATE 4 MG/ML
INJECTION, SOLUTION INTRA-ARTICULAR; INTRALESIONAL; INTRAMUSCULAR; INTRAVENOUS; SOFT TISSUE PRN
Status: DISCONTINUED | OUTPATIENT
Start: 2020-07-13 | End: 2020-07-13

## 2020-07-13 RX ORDER — CEFAZOLIN SODIUM 1 G/3ML
1 INJECTION, POWDER, FOR SOLUTION INTRAMUSCULAR; INTRAVENOUS SEE ADMIN INSTRUCTIONS
Status: DISCONTINUED | OUTPATIENT
Start: 2020-07-13 | End: 2020-07-14 | Stop reason: HOSPADM

## 2020-07-13 RX ADMIN — Medication 100 MCG: at 14:25

## 2020-07-13 RX ADMIN — ONDANSETRON 4 MG: 2 INJECTION INTRAMUSCULAR; INTRAVENOUS at 14:41

## 2020-07-13 RX ADMIN — CEFAZOLIN SODIUM 2 G: 2 INJECTION, SOLUTION INTRAVENOUS at 13:34

## 2020-07-13 RX ADMIN — Medication 100 MCG: at 14:13

## 2020-07-13 RX ADMIN — FENTANYL CITRATE 25 MCG: 50 INJECTION, SOLUTION INTRAMUSCULAR; INTRAVENOUS at 13:43

## 2020-07-13 RX ADMIN — PROPOFOL 50 MCG/KG/MIN: 10 INJECTION, EMULSION INTRAVENOUS at 13:49

## 2020-07-13 RX ADMIN — Medication 100 MCG: at 14:04

## 2020-07-13 RX ADMIN — ACETAMINOPHEN 975 MG: 325 TABLET ORAL at 11:26

## 2020-07-13 RX ADMIN — LIDOCAINE HYDROCHLORIDE 40 MG: 20 INJECTION, SOLUTION INFILTRATION; PERINEURAL at 13:43

## 2020-07-13 RX ADMIN — SODIUM CHLORIDE, SODIUM LACTATE, POTASSIUM CHLORIDE, CALCIUM CHLORIDE: 600; 310; 30; 20 INJECTION, SOLUTION INTRAVENOUS at 11:33

## 2020-07-13 RX ADMIN — DEXAMETHASONE SODIUM PHOSPHATE 4 MG: 4 INJECTION, SOLUTION INTRA-ARTICULAR; INTRALESIONAL; INTRAMUSCULAR; INTRAVENOUS; SOFT TISSUE at 13:54

## 2020-07-13 RX ADMIN — PROPOFOL 120 MG: 10 INJECTION, EMULSION INTRAVENOUS at 13:43

## 2020-07-13 ASSESSMENT — COPD QUESTIONNAIRES
COPD: 1
CAT_SEVERITY: SEVERE

## 2020-07-13 ASSESSMENT — LIFESTYLE VARIABLES: TOBACCO_USE: 1

## 2020-07-13 NOTE — OP NOTE
PREOPERATIVE DIAGNOSIS:  right ureteral stone      POSTOPERATIVE DIAGNOSIS:  same      PROCEDURE:  right extracorporeal shockwave lithotripsy.                              Cystoscopy and right stent placement      DESCRIPTION OF PROCEDURE:  Patient was brought to the operating room and after general anesthesia was induced, she was placed supine.  Preop antibiotic was given.  Under fluoroscopy, the right ureteral stone was identified.  A total of 3600 shocks was given to the stone.  There are some changes to the stone, but it is still visualized at the end of the procedure.  I decided to place a stent.  She was draped and prepped.  A rigid cystoscope was then placed into the bladder.  The right ureteral orifice identified.  A 0.035 Jiménez wire placed passed the stone into the renal pelvis.  A 6 F by 22 cm stent then placed.  The patient tolerated the procedure well.  No complication identified during the procedure.           SOBIA KOVACS MD

## 2020-07-13 NOTE — DISCHARGE INSTRUCTIONS
[facility]  Same-Day Surgery   Adult Discharge Orders & Instructions   For 24 hours after surgery  1. Get plenty of rest.  A responsible adult must stay with you for at least 24 hours after you leave the hospital.   2. Do not drive or use heavy equipment.  If you have weakness or tingling, don't drive or use heavy equipment until this feeling goes away.  3. Do not drink alcohol.  4. Avoid strenuous or risky activities.  Ask for help when climbing stairs.   5. You may feel lightheaded.  IF so, sit for a few minutes before standing.  Have someone help you get up.   6. If you have nausea (feel sick to your stomach): Drink only clear liquids such as apple juice, ginger ale, broth or 7-Up.  Rest may also help.  Be sure to drink enough fluids.  Move to a regular diet as you feel able.  7. You may have a slight fever. Call the doctor if your fever is over 100 F (37.7 C) (taken under the tongue) or lasts longer than 24 hours.  8. You may have a dry mouth, a sore throat, muscle aches or trouble sleeping.  These should go away after 24 hours.  9. Do not make important or legal decisions.   Call your doctor for any of the followin.  Signs of infection (fever, growing tenderness at the surgery site, a large amount of drainage or bleeding, severe pain, foul-smelling drainage, redness, swelling).    2. It has been over 8 to 10 hours since surgery and you are still not able to urinate (pass water).    3.  Headache for over 24 hours.    To contact Dr Ramirez call:  [phone]    [facility]   Adult Discharge Orders & Instructions     For 24 hours after surgery    1. Get plenty of rest.  A responsible adult must stay with you for at least 24 hours after you leave the hospital.   2. Do not drive or use heavy equipment.  If you have weakness or tingling, don't drive or use heavy equipment until this feeling goes away.  3. Do not drink alcohol.  4. Avoid strenuous or risky activities.  Ask for  help when climbing stairs.   5. You may feel lightheaded.  IF so, sit for a few minutes before standing.  Have someone help you get up.   6. If you have nausea (feel sick to your stomach): Drink only clear liquids such as apple juice, ginger ale, broth or 7-Up.  Rest may also help.  Be sure to drink enough fluids.  Move to a regular diet as you feel able.  7. You may have a slight fever. Call the doctor if your fever is over 100 F (37.7 C) (taken under the tongue) or lasts longer than 24 hours.  8. You may have a dry mouth, a sore throat, muscle aches or trouble sleeping.  These should go away after 24 hours.  9. Do not make important or legal decisions.     Call your doctor for any of the followin.  Signs of infection (fever, growing tenderness at the surgery site, a large amount of drainage or bleeding, severe pain, foul-smelling drainage, redness, swelling).    2. It has been over 8 to 10 hours since surgery and you are still not able to urinate (pass water).    3.  Headache for over 24 hours.    4.  Numbness, tingling or weakness the day after surgery (if you had spinal anesthesia).                  5. Signs of Covid-19 infection (temperature over 100 degrees, shortness of breath, cough, loss of taste/smell, generalized body aches, persistent headache,                  chills, sore throat, nausea/vomiting/diarrhea).    To contact a doctor, call ___[phone]_____________________________________    Tylenol was given at 11:30 AM. No tylenol/acetaminophen before 5:30 PM    Managing Your Pain   Pain management is an important part of your care. When you are in pain or uncomfortable, it can affect the way you feel both physically and emotionally.   The longer pain goes untreated, the harder it is to relieve. Effective pain management can break the pain cycle.   When you take care of your pain before it becomes a problem you will:     Heal faster     Be more comfortable when walking and doing breathing  exercises     Regain your strength faster     Other Ways to Manage Pain   There are many ways besides medication to treat your pain. Ask your nurse or doctor for more information about:     Relaxation techniques     Guided imagery     Breathing exercises     Hot or cold packs     Massage     Changing position (elevation or support)     Using pillows or splints to protect incisions when coughing, laughing, etc.     Music     The goal is for you to be able to complete activities such as turning in bed, walking and doing deep breathing exercises with only mild to moderate pain.   Possible Side Effects of Pain Medications     Constipation     Sleepiness     Dry mouth     Nausea and/or vomiting   It is important for you to let your nurse or doctor know if you have any of these side effects.     What You Can Do to Help with the Side Effects     Drink as much fluid as possible     Eat foods high in fiber (beans, lentils, fruits)     Ask for medication if you continue to have problems with constipation     Suck on sugarless hard candy, or ice     Take pain medications with food     Peppermint can be helpful to decrease nausea     Managing Your Pain at Home   Your doctor may give you a prescription for pain medicine to take at home. Most pain medications to be taken at home are in pill form.   Your nurse will review the instructions for taking your pain medications. When taken by mouth, medication can take up to 30 minutes to be effective. Remember to take pain medication when your pain first begins      Remember, same day surgery does not mean same day recovery.  Healing is a gradual process.  It is normal to be impatient and feel discouraged while waiting for swelling, bruising, discomfort and numbness to diminish.  Allow yourself to be a patient!  Extra rest, a nutritious diet, and avoidance of stress are important aids to recovery.    ankle pump exercises: This particular exercise is important because it helps decrease  the swelling in the knee and lower leg.  It s also very important in helping you avoid developing blood clots in your lower leg(s) after surgery.   To do an ankle pump you point and flex your foot back and forth.  You should do 10 repetitions several times during the day. You really can t over do these.    Deep breathing and coughing:  It's important to learn deep breathing and coughing exercises as these will help to lower your risk of lung complications after your surgery.  Breathing deeply:  Moves air down to the bottom areas of the lungs   Opens air passages and moves mucous out (coughing is also easier)   Helps the blood and oxygen supply to your lungs, boosting circulation   Lowers the risk of lung complications such as pneumonia and infections  Breathe in deeply and slowly through your nose, expanding your lower rib cage, and letting your abdomen move forward. Hold for a count of 3 to 5. Breathe out slowly and completely.  Don't force your breath out. On the third breath, cough deeply from the lungs, not the throat.  Rest and repeat every hour while you are awake.

## 2020-07-13 NOTE — BRIEF OP NOTE
[facility]  Brief Operative Note    Pre-operative diagnosis: Right ureteral stone   Post-operative diagnosis same   Procedure: Procedure(s):  LITHOTRIPSY, EXTRACORPOREAL SHOCK WAVE (ESWL), WITH CYSTOSCOPY AND URETERAL STENT INSERTION   Surgeon: Buddy Ramirez MD   Assistants(s): None   Anesthesia: General    Estimated blood loss: minimal                   Specimens: None   Implants: stent       Complications: None   Condition on discharge: Stable   Findings: Stone not completely broken  See dictated operative report for full details

## 2020-07-13 NOTE — ANESTHESIA PREPROCEDURE EVALUATION
"Anesthesia Pre-Procedure Evaluation    Patient: Helena Eldridge   MRN:     [id number] Gender:   female   Age:    84 year old :      1935        Preoperative Diagnosis: Ureteral stone [N20.1]   Procedure(s):  LITHOTRIPSY, EXTRACORPOREAL SHOCK WAVE (ESWL), WITH CYSTOSCOPY AND URETERAL STENT INSERTION     LABS:  CBC:   Lab Results   Component Value Date    WBC 9.6 2020    WBC 9.0 2019    HGB 11.0 (L) 2020    HGB 11.7 2019    HCT 34.5 (L) 2020    HCT 34.8 (L) 2019     2020     2019     BMP:   Lab Results   Component Value Date     2020     10/21/2019    POTASSIUM 4.4 2020    POTASSIUM 4.6 10/21/2019    CHLORIDE 102 2020    CHLORIDE 103 10/21/2019    CO2 27 2020    CO2 27 10/21/2019    BUN 32 (H) 2020    BUN 33 (H) 10/21/2019    CR 1.72 (H) 2020    CR 1.87 (H) 10/21/2019     (H) 2020    GLC 99 10/21/2019     COAGS: No results found for: PTT, INR, FIBR  POC: No results found for: BGM, HCG, HCGS  OTHER:   Lab Results   Component Value Date    A1C 5.5 2019    SERAFIN 9.3 2020    PHOS 3.9 2012    MAG 2.2 2016    ALBUMIN 4.3 10/10/2011    PROTTOTAL 7.9 10/10/2011    ALT 27 2014    AST 30 2014    ALKPHOS 73 10/10/2011    BILITOTAL 0.2 10/10/2011    TSH 2.50 2019    T4 0.94 2018        Preop Vitals    BP Readings from Last 3 Encounters:   20 (!) 146/53   20 135/81   20 139/69    Pulse Readings from Last 3 Encounters:   20 100   20 94   20 87      Resp Readings from Last 3 Encounters:   20 16   06/11/15 18   01/31/15 16    SpO2 Readings from Last 3 Encounters:   20 96%   20 95%   20 96%      Temp Readings from Last 1 Encounters:   20 97.3  F (36.3  C) (Temporal)    Ht Readings from Last 1 Encounters:   20 1.537 m (5' 0.5\")      Wt Readings from Last 1 Encounters:   20 61.2 kg (135 " "lb)    Estimated body mass index is 25.93 kg/m  as calculated from the following:    Height as of 1/21/20: 1.537 m (5' 0.5\").    Weight as of 7/9/20: 61.2 kg (135 lb).     LDA:  Peripheral IV 06/14/18 Right (Active)   Number of days: 760       Peripheral IV 07/13/20 (Active)   Site Assessment WDL 07/13/20 1133   Line Status Infusing 07/13/20 1133   Phlebitis Scale 0-->no symptoms 07/13/20 1133   Infiltration Scale 0 07/13/20 1133   Infiltration Site Treatment Method  None 07/13/20 1133   Extravasation? No 07/13/20 1133   Dressing Intervention New dressing  07/13/20 1133   Number of days: 0        Past Medical History:   Diagnosis Date     Carpal tunnel syndrome      CKD (chronic kidney disease)     kidney stone with infection     Deep vein thrombosis (DVT) (H)     1972     DVT [id number]     Glucose intolerance      H/O partial nephrectomy      Heel spur      Hypothyroidism      Kidney stones      Lichen sclerosus      Osteoarthritis      Osteoporosis      PID      Pulmonary embolism (H)     1970     Unspecified hypothyroidism      Vitiligo       Past Surgical History:   Procedure Laterality Date     C NEPHRECTOMY      left partial 07-01-07     C VENOUS THROMBOSIS IMAGING      with pe     HC REMOVAL GALLBLADDER       HC REVISE MEDIAN N/CARPAL TUNNEL SURG       TUBAL LIGATION        Allergies   Allergen Reactions     Ace Inhibitors Cough     Advicor      Alendronic Acid Other (See Comments)     Other reaction(s): GI Upset  heartburn  Severe substernal burning and dysphagia within 3 days       Blood-Group Specific Substance Other (See Comments)     Patient has Anti-Dixie and warm auto antibody. Blood products may be delayed. Draw patient 24 hours prior to transfusion. Draw one red top and two purple top tubes for all type and screen orders.     Citalopram GI Disturbance and Nausea     diarrhea  diarrhea     Doxycycline Nausea     Poor appetite     Fosamax      Severe substernal burning and dysphagia within 3 days     " Valsartan Cramps and Other (See Comments)     severe  severe        Anesthesia Evaluation     . Pt has had prior anesthetic. Type: General    No history of anesthetic complications          ROS/MED HX    ENT/Pulmonary: Comment: Idiopathic pulmonary fibrosis    (+)tobacco use, Past use severe COPD, , . 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    Neurologic:  - neg neurologic ROS     Cardiovascular:     (+) hypertension----. : . . . :. .       METS/Exercise Tolerance:     Hematologic:  - neg hematologic  ROS       Musculoskeletal:  - neg musculoskeletal ROS       GI/Hepatic:         Renal/Genitourinary:     (+) chronic renal disease, type: CRI, Nephrolithiasis ,       Endo:     (+) thyroid problem hypothyroidism, .      Psychiatric:     (+) psychiatric history anxiety      Infectious Disease:  - neg infectious disease ROS       Malignancy:   (+) Malignancy History of Lung  Lung CA Remission status post Chemo.         Other:    - neg other ROS                     PHYSICAL EXAM:   Mental Status/Neuro: A/A/O   Airway: Facies: Feasible  Mallampati: I  Mouth/Opening: Full  TM distance: > 6 cm  Neck ROM: Full   Respiratory: Auscultation: Decreased BS (Restrictive breathing pattern)     Resp. Rate: Normal     Resp. Effort: Normal      CV: Rhythm: Regular  Rate: Age appropriate  Heart: Normal Sounds  Edema: None   Comments:      Dental: Endentulous Habitus: Normal               Assessment:   ASA SCORE: 3    H&P: History and physical reviewed and following examination; no interval change.   Smoking Status:  Non-Smoker/Unknown   NPO Status: NPO Appropriate     Plan:   Anes. Type:  General   Pre-Medication: None   Induction:  IV (Standard)   Airway: LMA   Access/Monitoring: PIV   Maintenance: Balanced     Postop Plan:   Postop Pain: Opioids  Postop Sedation/Airway: Not planned  Disposition: Outpatient     PONV Management:   Adult Risk Factors: Female, Non-Smoker, Postop Opioids     CONSENT: Direct  conversation   Plan and risks discussed with: Patient   Blood Products: Consent Deferred (Minimal Blood Loss)                   Norberto Haro MD

## 2020-07-13 NOTE — ANESTHESIA CARE TRANSFER NOTE
Patient: Helena Eldridge    Procedure(s):  LITHOTRIPSY, Right EXTRACORPOREAL SHOCK WAVE (ESWL), WITH CYSTOSCOPY AND Right URETERAL STENT INSERTION    Diagnosis: Ureteral stone [N20.1]  Diagnosis Additional Information: No value filed.    Anesthesia Type:   General     Note:  Airway :Nasal Cannula  Patient transferred to:PACU  Comments: Awake, comfortable, sats 100%, Report to RN.Handoff Report: Identifed the Patient, Identified the Reponsible Provider, Reviewed the pertinent medical history, Discussed the surgical course, Reviewed Intra-OP anesthesia mangement and issues during anesthesia, Set expectations for post-procedure period and Allowed opportunity for questions and acknowledgement of understanding      Vitals: (Last set prior to Anesthesia Care Transfer)    CRNA VITALS  7/13/2020 1422 - 7/13/2020 1457      7/13/2020             Pulse:  71    SpO2:  100 %                Electronically Signed By: MERARI Izaguirre CRNA  July 13, 2020  2:57 PM

## 2020-07-14 NOTE — ANESTHESIA POSTPROCEDURE EVALUATION
Anesthesia POST Procedure Evaluation    Patient: Helena Eldridge   MRN:     [id number] Gender:   female   Age:    84 year old :      1935        Preoperative Diagnosis: Ureteral stone [N20.1]   Procedure(s):  LITHOTRIPSY, Right EXTRACORPOREAL SHOCK WAVE (ESWL), WITH CYSTOSCOPY AND Right URETERAL STENT INSERTION   Postop Comments: No value filed.     Anesthesia Type: General       Disposition: Outpatient   Postop Pain Control: Uneventful            Sign Out: Well controlled pain   PONV: No   Neuro/Psych: Uneventful            Sign Out: Acceptable/Baseline neuro status   Airway/Respiratory: Uneventful            Sign Out: Acceptable/Baseline resp. status   CV/Hemodynamics: Uneventful            Sign Out: Acceptable CV status   Other NRE: NONE   DID A NON-ROUTINE EVENT OCCUR? No         Last Anesthesia Record Vitals:  CRNA VITALS  2020 1422 - 2020 1522      2020             Pulse:  71    SpO2:  100 %          Last PACU Vitals:  Vitals Value Taken Time   /79 2020  3:15 PM   Temp 96.9  F (36.1  C) 2020  2:55 PM   Pulse 74 2020  3:10 PM   Resp 16 2020  3:15 PM   SpO2 93 % 2020  3:15 PM   Temp src     NIBP     Pulse     SpO2     Resp     Temp     Ht Rate     Temp 2     Vitals shown include unvalidated device data.      Electronically Signed By: Norberto Haro MD, 2020, 3:13 PM

## 2020-07-15 ENCOUNTER — ANCILLARY PROCEDURE (OUTPATIENT)
Dept: GENERAL RADIOLOGY | Facility: CLINIC | Age: 85
End: 2020-07-15
Attending: UROLOGY
Payer: COMMERCIAL

## 2020-07-15 PROCEDURE — 74019 RADEX ABDOMEN 2 VIEWS: CPT

## 2020-07-16 ENCOUNTER — PREP FOR PROCEDURE (OUTPATIENT)
Dept: UROLOGY | Facility: CLINIC | Age: 85
End: 2020-07-16

## 2020-07-16 ENCOUNTER — TELEPHONE (OUTPATIENT)
Dept: FAMILY MEDICINE | Facility: CLINIC | Age: 85
End: 2020-07-16

## 2020-07-16 ENCOUNTER — TELEPHONE (OUTPATIENT)
Dept: UROLOGY | Facility: CLINIC | Age: 85
End: 2020-07-16

## 2020-07-16 DIAGNOSIS — Z11.59 ENCOUNTER FOR SCREENING FOR OTHER VIRAL DISEASES: Primary | ICD-10-CM

## 2020-07-16 DIAGNOSIS — N20.1 URETERAL STONE: Primary | ICD-10-CM

## 2020-07-16 RX ORDER — CEFAZOLIN SODIUM 1 G
1 VIAL (EA) INJECTION SEE ADMIN INSTRUCTIONS
Status: CANCELLED | OUTPATIENT
Start: 2020-07-16

## 2020-07-16 NOTE — TELEPHONE ENCOUNTER
Routed to [facility], can pre-op be done at same visit as the physical?    Yanique Fritz RN  [facility]

## 2020-07-16 NOTE — TELEPHONE ENCOUNTER
I called and spoke to patient, advised her the Monday physical is now a pre-op visit.    Yanique Fritz RN  [facility]

## 2020-07-16 NOTE — TELEPHONE ENCOUNTER
Spoke with patient she would like to speak with Dr. Ramirez or one of his nurses before scheduling this surgery. Please call and advise Thank you.

## 2020-07-16 NOTE — TELEPHONE ENCOUNTER
Reason for Call:  Other appointment    Detailed comments: Patient is having surgery for kidney stone on 08/11/20. Patient already has physical scheduled for 07/20/20. Patient wondering if pre-op and physical can be done together.    Phone Number Patient can be reached at: Cell number on file:    Telephone Information:   Mobile [phone]       Best Time: anytime    Can we leave a detailed message on this number? YES    Call taken on 7/16/2020 at 2:49 PM by Rogerio Graham

## 2020-07-16 NOTE — TELEPHONE ENCOUNTER
----- Message from Buddy Ramirez MD sent at 7/16/2020 10:26 AM CDT -----  Please CC patient of results.  Will schedule patient for ureteroscopy next.  ESWL did not break stone small enough to pass.

## 2020-07-16 NOTE — TELEPHONE ENCOUNTER
Type of surgery: Cystoureteroscopy with lithotripsy using laser and ureteral stent insertion right CPT code 74893, 06823  Ureteral stone [N20.1]  - Primary   Location of surgery: [facility]  Date and time of surgery: 08/11/2020  Surgeon: Ashley  Pre-Op Appt Date: patient will schedule with Leslie  Post-Op Appt Date: urology will schedule.    Packet sent out: Yes  Pre-cert/Authorization completed:  No prior auth required per [employer].   Date: 07/17/2020

## 2020-07-17 ENCOUNTER — TELEPHONE (OUTPATIENT)
Dept: UROLOGY | Facility: CLINIC | Age: 85
End: 2020-07-17

## 2020-07-17 NOTE — TELEPHONE ENCOUNTER
"Patient scheduled 8/17/2020 at 9\"45am for for stent removal. Attempted to contact patient to advise. Unable to leave message on number provided. Will try again later.  Rabia Low CMA    "

## 2020-07-20 ENCOUNTER — OFFICE VISIT (OUTPATIENT)
Dept: FAMILY MEDICINE | Facility: CLINIC | Age: 85
End: 2020-07-20
Payer: COMMERCIAL

## 2020-07-20 ENCOUNTER — ANCILLARY PROCEDURE (OUTPATIENT)
Dept: GENERAL RADIOLOGY | Facility: CLINIC | Age: 85
End: 2020-07-20
Attending: INTERNAL MEDICINE
Payer: COMMERCIAL

## 2020-07-20 VITALS
OXYGEN SATURATION: 95 % | SYSTOLIC BLOOD PRESSURE: 120 MMHG | DIASTOLIC BLOOD PRESSURE: 75 MMHG | BODY MASS INDEX: 25.36 KG/M2 | TEMPERATURE: 98.9 F | WEIGHT: 132 LBS | HEART RATE: 102 BPM

## 2020-07-20 DIAGNOSIS — Z90.5 SOLITARY KIDNEY, ACQUIRED: ICD-10-CM

## 2020-07-20 DIAGNOSIS — C34.91 NON-SMALL CELL CANCER OF RIGHT LUNG (H): ICD-10-CM

## 2020-07-20 DIAGNOSIS — N18.4 CKD (CHRONIC KIDNEY DISEASE) STAGE 4, GFR 15-29 ML/MIN (H): ICD-10-CM

## 2020-07-20 DIAGNOSIS — Z13.6 CARDIOVASCULAR SCREENING; LDL GOAL LESS THAN 100: ICD-10-CM

## 2020-07-20 DIAGNOSIS — Z23 NEED FOR VACCINE FOR DT (DIPHTHERIA-TETANUS): ICD-10-CM

## 2020-07-20 DIAGNOSIS — Z86.711 HISTORY OF PULMONARY EMBOLISM: ICD-10-CM

## 2020-07-20 DIAGNOSIS — E03.8 OTHER SPECIFIED HYPOTHYROIDISM: ICD-10-CM

## 2020-07-20 DIAGNOSIS — M25.551 HIP PAIN, RIGHT: ICD-10-CM

## 2020-07-20 DIAGNOSIS — R73.02 GLUCOSE INTOLERANCE (IMPAIRED GLUCOSE TOLERANCE): ICD-10-CM

## 2020-07-20 DIAGNOSIS — Z01.818 PREOP GENERAL PHYSICAL EXAM: Primary | ICD-10-CM

## 2020-07-20 DIAGNOSIS — J44.9 COPD, SEVERE (H): ICD-10-CM

## 2020-07-20 LAB
ALT SERPL W P-5'-P-CCNC: 16 U/L (ref 0–50)
ANION GAP SERPL CALCULATED.3IONS-SCNC: 4 MMOL/L (ref 3–14)
BUN SERPL-MCNC: 31 MG/DL (ref 7–30)
CALCIUM SERPL-MCNC: 8.7 MG/DL (ref 8.5–10.1)
CHLORIDE SERPL-SCNC: 106 MMOL/L (ref 94–109)
CHOLEST SERPL-MCNC: 191 MG/DL
CO2 SERPL-SCNC: 29 MMOL/L (ref 20–32)
CREAT SERPL-MCNC: 1.91 MG/DL (ref 0.52–1.04)
ERYTHROCYTE [DISTWIDTH] IN BLOOD BY AUTOMATED COUNT: 12.8 % (ref 10–15)
GFR SERPL CREATININE-BSD FRML MDRD: 24 ML/MIN/{1.73_M2}
GLUCOSE SERPL-MCNC: 144 MG/DL (ref 70–99)
HCT VFR BLD AUTO: 32.1 % (ref 35–47)
HDLC SERPL-MCNC: 34 MG/DL
HGB BLD-MCNC: 10.7 G/DL (ref 11.7–15.7)
LDLC SERPL CALC-MCNC: 110 MG/DL
MCH RBC QN AUTO: 33.3 PG (ref 26.5–33)
MCHC RBC AUTO-ENTMCNC: 33.3 G/DL (ref 31.5–36.5)
MCV RBC AUTO: 100 FL (ref 78–100)
NONHDLC SERPL-MCNC: 157 MG/DL
PLATELET # BLD AUTO: 292 10E9/L (ref 150–450)
POTASSIUM SERPL-SCNC: 4.1 MMOL/L (ref 3.4–5.3)
RBC # BLD AUTO: 3.21 10E12/L (ref 3.8–5.2)
SODIUM SERPL-SCNC: 139 MMOL/L (ref 133–144)
T4 FREE SERPL-MCNC: 1.58 NG/DL (ref 0.76–1.46)
TRIGL SERPL-MCNC: 236 MG/DL
TSH SERPL DL<=0.005 MIU/L-ACNC: 0.1 MU/L (ref 0.4–4)
WBC # BLD AUTO: 8.5 10E9/L (ref 4–11)

## 2020-07-20 PROCEDURE — 93000 ELECTROCARDIOGRAM COMPLETE: CPT | Performed by: INTERNAL MEDICINE

## 2020-07-20 PROCEDURE — 80061 LIPID PANEL: CPT | Performed by: INTERNAL MEDICINE

## 2020-07-20 PROCEDURE — 84439 ASSAY OF FREE THYROXINE: CPT | Performed by: INTERNAL MEDICINE

## 2020-07-20 PROCEDURE — 36415 COLL VENOUS BLD VENIPUNCTURE: CPT | Performed by: INTERNAL MEDICINE

## 2020-07-20 PROCEDURE — 73502 X-RAY EXAM HIP UNI 2-3 VIEWS: CPT

## 2020-07-20 PROCEDURE — 80048 BASIC METABOLIC PNL TOTAL CA: CPT | Performed by: INTERNAL MEDICINE

## 2020-07-20 PROCEDURE — 84460 ALANINE AMINO (ALT) (SGPT): CPT | Performed by: INTERNAL MEDICINE

## 2020-07-20 PROCEDURE — 85027 COMPLETE CBC AUTOMATED: CPT | Performed by: INTERNAL MEDICINE

## 2020-07-20 PROCEDURE — 90471 IMMUNIZATION ADMIN: CPT | Performed by: INTERNAL MEDICINE

## 2020-07-20 PROCEDURE — 90714 TD VACC NO PRESV 7 YRS+ IM: CPT | Performed by: INTERNAL MEDICINE

## 2020-07-20 PROCEDURE — 82043 UR ALBUMIN QUANTITATIVE: CPT | Performed by: INTERNAL MEDICINE

## 2020-07-20 PROCEDURE — 84443 ASSAY THYROID STIM HORMONE: CPT | Performed by: INTERNAL MEDICINE

## 2020-07-20 PROCEDURE — 99215 OFFICE O/P EST HI 40 MIN: CPT | Mod: 25 | Performed by: INTERNAL MEDICINE

## 2020-07-20 RX ORDER — LORAZEPAM 0.5 MG/1
0.5 TABLET ORAL EVERY 6 HOURS PRN
COMMUNITY
End: 2020-10-19

## 2020-07-20 NOTE — TELEPHONE ENCOUNTER
2nd attempt to let pt know about her appt for stent removal.    Erica Otero RN Specialty Triage 7/20/2020 1:03 PM

## 2020-07-20 NOTE — LETTER
[facility]   [address] [place]  [address]  [phone]                                   July 27, 2020    Helena Eldridge  [address] [facility] [address]        Dear Helena,    Your thyroid test is abnormal:  We need to reduce your dose     Start by holding the Levothyroxine for about 5 days.   Then change the dose to 75 mcg daily.     Make a lab appointment to recheck the thyroid function tests in about 4 - 6 weeks.     At some point, will have to recheck the urine protein, once Dr Ramirez has completed his procedures.       Results for orders placed or performed in visit on 07/20/20   XR Hip Right 2-3 Views     Status: None    Narrative    XR RIGHT HIP TWO-THREE VIEWS   7/20/2020 1:55 PM     HISTORY: Hip pain, right.    COMPARISON: None.      Impression    IMPRESSION: Hip joint spaces appear well preserved. Mild irregularity  of the right inferior pubic ramus likely the result of an old  fracture. No definite acute fracture or subluxation. Right ureteral  stent in place.    FRANNY CHAMPAGNE MD   Basic metabolic panel     Status: Abnormal   Result Value Ref Range    Sodium 139 133 - 144 mmol/L    Potassium 4.1 3.4 - 5.3 mmol/L    Chloride 106 94 - 109 mmol/L    Carbon Dioxide 29 20 - 32 mmol/L    Anion Gap 4 3 - 14 mmol/L    Glucose 144 (H) 70 - 99 mg/dL    Urea Nitrogen 31 (H) 7 - 30 mg/dL    Creatinine 1.91 (H) 0.52 - 1.04 mg/dL    GFR Estimate 24 (L) >60 mL/min/[1.73_m2]    GFR Estimate If Black 27 (L) >60 mL/min/[1.73_m2]    Calcium 8.7 8.5 - 10.1 mg/dL   TSH with free T4 reflex     Status: Abnormal   Result Value Ref Range    TSH 0.10 (L) 0.40 - 4.00 mU/L   Lipid panel reflex to direct LDL Non-fasting     Status: Abnormal   Result Value Ref Range    Cholesterol 191 <200 mg/dL    Triglycerides 236 (H) <150 mg/dL    HDL Cholesterol 34 (L) >49 mg/dL    LDL Cholesterol Calculated 110 (H) <100 mg/dL    Non HDL Cholesterol 157 (H) <130 mg/dL   ALT     Status: None    Result Value Ref Range    ALT 16 0 - 50 U/L   CBC with platelets     Status: Abnormal   Result Value Ref Range    WBC 8.5 4.0 - 11.0 10e9/L    RBC Count 3.21 (L) 3.8 - 5.2 10e12/L    Hemoglobin 10.7 (L) 11.7 - 15.7 g/dL    Hematocrit 32.1 (L) 35.0 - 47.0 %     78 - 100 fl    MCH 33.3 (H) 26.5 - 33.0 pg    MCHC 33.3 31.5 - 36.5 g/dL    RDW 12.8 10.0 - 15.0 %    Platelet Count 292 150 - 450 10e9/L   Albumin Random Urine Quantitative with Creat Ratio     Status: Abnormal   Result Value Ref Range    Creatinine Urine 139 mg/dL    Albumin Urine mg/L 1,630 mg/L    Albumin Urine mg/g Cr 1,172.66 (H) 0 - 25 mg/g Cr   T4 free     Status: Abnormal   Result Value Ref Range    T4 Free 1.58 (H) 0.76 - 1.46 ng/dL       If you have any questions please call the clinic at [phone]    Sincerely,    Sushma Nelson MD  bmd

## 2020-07-20 NOTE — PATIENT INSTRUCTIONS
Before Your Surgery      Call your surgeon if there is any change in your health. This includes signs of a cold or flu (such as a sore throat, runny nose, cough, rash or fever).    Do not smoke, drink alcohol or take over the counter medicine (unless your surgeon or primary care doctor tells you to) for the 24 hours before and after surgery.    If you take prescribed drugs: Follow your doctor s orders about which medicines to take and which to stop until after surgery.    Eating and drinking prior to surgery: follow the instructions from your surgeon    Take a shower or bath the night before surgery. Use the soap your surgeon gave you to gently clean your skin. If you do not have soap from your surgeon, use your regular soap. Do not shave or scrub the surgery site.  Wear clean pajamas and have clean sheets on your bed.         For day of surgery, take all usual medications with small sips    After surgery, schedule with me for consideration of Right hip injection (Bursa)  -- 3 weeks    Meantime, Ice, rest, good arch supports.

## 2020-07-20 NOTE — LETTER
[facility]   [address] [place]  [address]  [phone]                                   July 21, 2020    Helena Eldridge  [address] [facility] [address]        Dear Helena,    There may be some mild dejenerative joint arthritis evident at R hip.  This xray does not explain your symptoms well.  Let's see if treating the stone has impact on your symptoms.     Results for orders placed or performed in visit on 07/20/20   XR Hip Right 2-3 Views     Status: None    Narrative    XR RIGHT HIP TWO-THREE VIEWS   7/20/2020 1:55 PM     HISTORY: Hip pain, right.    COMPARISON: None.      Impression    IMPRESSION: Hip joint spaces appear well preserved. Mild irregularity  of the right inferior pubic ramus likely the result of an old  fracture. No definite acute fracture or subluxation. Right ureteral  stent in place.    FRANNY CHAMPAGNE MD   Basic metabolic panel     Status: Abnormal   Result Value Ref Range    Sodium 139 133 - 144 mmol/L    Potassium 4.1 3.4 - 5.3 mmol/L    Chloride 106 94 - 109 mmol/L    Carbon Dioxide 29 20 - 32 mmol/L    Anion Gap 4 3 - 14 mmol/L    Glucose 144 (H) 70 - 99 mg/dL    Urea Nitrogen 31 (H) 7 - 30 mg/dL    Creatinine 1.91 (H) 0.52 - 1.04 mg/dL    GFR Estimate 24 (L) >60 mL/min/[1.73_m2]    GFR Estimate If Black 27 (L) >60 mL/min/[1.73_m2]    Calcium 8.7 8.5 - 10.1 mg/dL   TSH with free T4 reflex     Status: Abnormal   Result Value Ref Range    TSH 0.10 (L) 0.40 - 4.00 mU/L   Lipid panel reflex to direct LDL Non-fasting     Status: Abnormal   Result Value Ref Range    Cholesterol 191 <200 mg/dL    Triglycerides 236 (H) <150 mg/dL    HDL Cholesterol 34 (L) >49 mg/dL    LDL Cholesterol Calculated 110 (H) <100 mg/dL    Non HDL Cholesterol 157 (H) <130 mg/dL   ALT     Status: None   Result Value Ref Range    ALT 16 0 - 50 U/L   CBC with platelets     Status: Abnormal   Result Value Ref Range    WBC 8.5 4.0 - 11.0 10e9/L    RBC Count 3.21 (L) 3.8 -  5.2 10e12/L    Hemoglobin 10.7 (L) 11.7 - 15.7 g/dL    Hematocrit 32.1 (L) 35.0 - 47.0 %     78 - 100 fl    MCH 33.3 (H) 26.5 - 33.0 pg    MCHC 33.3 31.5 - 36.5 g/dL    RDW 12.8 10.0 - 15.0 %    Platelet Count 292 150 - 450 10e9/L   T4 free     Status: Abnormal   Result Value Ref Range    T4 Free 1.58 (H) 0.76 - 1.46 ng/dL       If you have any questions please call the clinic at [phone]    Sincerely,    Sushma Nelson MD  bmd

## 2020-07-20 NOTE — LETTER
[facility]   [address] [place]  [address]  [phone]                                   July 27, 2020    Helena Eldridge  [address] [facility] [address] [address]        Dear Helena,    Your thyroid test is abnormal:  We need to reduce your dose       Start by holding the Levothyroxine for about 5 days.   Then change the dose to 75 mcg daily.       Make a lab appointment to recheck the thyroid function tests in about 4 - 6 weeks.       At some point, will have to recheck the urine protein, once Dr Ramirez has completed his procedures.       Results for orders placed or performed in visit on 07/20/20   XR Hip Right 2-3 Views     Status: None    Narrative    XR RIGHT HIP TWO-THREE VIEWS   7/20/2020 1:55 PM     HISTORY: Hip pain, right.    COMPARISON: None.      Impression    IMPRESSION: Hip joint spaces appear well preserved. Mild irregularity  of the right inferior pubic ramus likely the result of an old  fracture. No definite acute fracture or subluxation. Right ureteral  stent in place.    FRANNY CHAMPAGNE MD   Basic metabolic panel     Status: Abnormal   Result Value Ref Range    Sodium 139 133 - 144 mmol/L    Potassium 4.1 3.4 - 5.3 mmol/L    Chloride 106 94 - 109 mmol/L    Carbon Dioxide 29 20 - 32 mmol/L    Anion Gap 4 3 - 14 mmol/L    Glucose 144 (H) 70 - 99 mg/dL    Urea Nitrogen 31 (H) 7 - 30 mg/dL    Creatinine 1.91 (H) 0.52 - 1.04 mg/dL    GFR Estimate 24 (L) >60 mL/min/[1.73_m2]    GFR Estimate If Black 27 (L) >60 mL/min/[1.73_m2]    Calcium 8.7 8.5 - 10.1 mg/dL   TSH with free T4 reflex     Status: Abnormal   Result Value Ref Range    TSH 0.10 (L) 0.40 - 4.00 mU/L   Lipid panel reflex to direct LDL Non-fasting     Status: Abnormal   Result Value Ref Range    Cholesterol 191 <200 mg/dL    Triglycerides 236 (H) <150 mg/dL    HDL Cholesterol 34 (L) >49 mg/dL    LDL Cholesterol Calculated 110 (H) <100 mg/dL    Non HDL Cholesterol 157 (H) <130 mg/dL   ALT     Status:  None   Result Value Ref Range    ALT 16 0 - 50 U/L   CBC with platelets     Status: Abnormal   Result Value Ref Range    WBC 8.5 4.0 - 11.0 10e9/L    RBC Count 3.21 (L) 3.8 - 5.2 10e12/L    Hemoglobin 10.7 (L) 11.7 - 15.7 g/dL    Hematocrit 32.1 (L) 35.0 - 47.0 %     78 - 100 fl    MCH 33.3 (H) 26.5 - 33.0 pg    MCHC 33.3 31.5 - 36.5 g/dL    RDW 12.8 10.0 - 15.0 %    Platelet Count 292 150 - 450 10e9/L   Albumin Random Urine Quantitative with Creat Ratio     Status: Abnormal   Result Value Ref Range    Creatinine Urine 139 mg/dL    Albumin Urine mg/L 1,630 mg/L    Albumin Urine mg/g Cr 1,172.66 (H) 0 - 25 mg/g Cr   T4 free     Status: Abnormal   Result Value Ref Range    T4 Free 1.58 (H) 0.76 - 1.46 ng/dL       If you have any questions please call the clinic at [phone]    Sincerely,    Sushma Nelson MD  bmd

## 2020-07-20 NOTE — PROGRESS NOTES
[address] [address]  [phone]  Dept: [phone]    PRE-OP EVALUATION:  Today's date: 2020    Helena Eldridge (: 1935) presents for pre-operative evaluation assessment as requested by Dr. Ramirez.  She requires evaluation and anesthesia risk assessment prior to undergoing surgery/procedure for treatment of Kidney stone .    Proposed Surgery/ Procedure: Kidney stone  Date of Surgery/ Procedure: 2020  Time of Surgery/ Procedure: unknown  Hospital/Surgical Facility: [facility] Fax Number: [phone]  Primary Physician: Sushma Nelson  Type of Anesthesia Anticipated: to be determined    Preoperative Questionnaire:   No - Have you ever had a heart attack or stroke?  No - Have you ever had surgery on your heart or blood vessels, such as a stent, coronary (heart) bypass, or surgery on an artery in the head, neck, heart, or legs?  No - Do you have chest pain when you are physically active?  No - Do you have a history of heart failure?  No - Do you currently have a cold, bronchitis, or symptoms of other respiratory (head and chest) infections?  No - Do you have a cough, shortness of breath, or wheezing?  YES - DO YOU OR ANYONE IN YOUR FAMILY HAVE A HISTORY OF BLOOD CLOTS? 1970s : DVT and PE due to Birth Control Pill/smokint  No - Do you or anyone in your family have a serious bleeding problem, such as long-lasting bleeding after surgeries or cuts?  YES - HAVE YOU EVERY HAD ANEMIA OR BEEN TOLD TO TAKE IRON PILLS? childhood  No - Have you had any abnormal blood loss such as black, tarry or bloody stools, or abnormal vaginal bleeding?  Yes - Are you willing to have a blood transfusion if it is medically needed before, during, or after your surgery?  yes - ARE YOU WILLING TO HAVE A BLOOD TRANSFUSION IF IT IS MEDICALLY NEEDED BEFORE, DURING, OR AFTER YOUR SURGERY? *  No - Have you or anyone in your family ever had  problems with anesthesia (sedation for surgery)?  No - Do you have sleep apnea, excessive snoring, or daytime drowsiness?   No - Do you have any artifical heart valves or other implanted medical devices, such as a pacemaker, defibrillator, or continuous glucose monitor?  No - Do you have any artifical joints?  No - Are you allergic to latex?  No - Is there any chance that you may be pregnant?    Patient has a Health Care Directive or Living Will:  YES      HPI:     HPI related to upcoming procedure:    83 y/o F here for preop:  She  has 6 mm stone in Right upper ureter w/hydronephrosis... plan is ESWL and stent placement.    ... h/o  RUL/Hilar NSCLungCa (s/p low dose carboplatin and pemerexed.....had to d/c Novolumib immunotherapy due to  pruritis and yeast infections.... recent CT imaging= no evidence of cancer), L. nephrectomy (stones), osteoporosis, pelvic fx, lichen sclerosis, Gout, CKD 3, hypothryoid   , severe (asymptomatic) COPD    COPD - Patient has a longstanding history of moderate-severe COPD . Patient has been doing well overall noting  No symptoms... and continues on NO medication regimen       HYPERTENSION - Patient has longstanding history of HTN , currently denies any symptoms referable to elevated blood pressure. Specifically denies chest pain, palpitations, dyspnea, orthopnea, PND or peripheral edema. Blood pressure readings have been in normal range. Current medication regimen is as listed below. Patient denies any side effects of medication.     RENAL INSUFFICIENCY - Patient has a longstanding history of moderate-severe chronic renal insufficiency. Last Cr  2.0.       MEDICAL HISTORY:     Patient Active Problem List    Diagnosis Date Noted     Ureteral stone 07/07/2020     Priority: Medium     Added automatically from request for surgery [id number]       Port-A-Cath in place 03/08/2018     Priority: Medium     Placed in early 2018       Non-small cell cancer of right lung (H) 03/07/2018      Priority: Medium     Immunosuppression (H) 03/07/2018     Priority: Medium     Adjustment disorder with anxiety 12/04/2017     Priority: Medium     Other specified hypothyroidism 05/14/2015     Priority: Medium     Idiopathic chronic gout 05/14/2015     Priority: Medium     IPF (idiopathic pulmonary fibrosis) (H) 02/25/2015     Priority: Medium     Mild, at bases per CXR 2/2015 (2/15/15, [facility])       COPD, severe (H) 02/16/2015     Priority: Medium     Seen by pulmonary 2013.         Pelvic fracture (H) 08/19/2014     Priority: Medium     Cataracts, both eyes 04/16/2014     Priority: Medium     Macular degeneration of both eyes, right eye greater than left eye 04/16/2014     Priority: Medium     Senile osteoporosis 03/11/2014     Priority: Medium     Osteoarthritis of right knee 03/11/2014     Priority: Medium     Medial meniscus tear 03/11/2014     Priority: Medium     Lichen sclerosus et atrophicus of the vulva 05/08/2013     Priority: Medium     Clobetasol bid is the recommended remedy       Solitary kidney, acquired 05/08/2013     Priority: Medium     Left nephrectomy due to stones (5/8/13, [facility])       Hypertension goal BP (blood pressure) < 140/90 11/28/2011     Priority: Medium     Advance care planning 10/10/2011     Priority: Medium     Advance Care Planning 4/19/2016: Receipt of ACP document:  Received: Health Care Directive which was witnessed or notarized on 2/23/16.  Document previously scanned on 3/2/16.  Validation form completed and sent to be scanned.  Code Status reflects choices in most recent ACP document.  Confirmed/documented designated decision maker(s).  Added by Monique Reyna , Advance Care Planning Liaison  Advance Care Planning 10/10/2011:  Discussed advance care planning with patient; however, patient declined at this time.  Patrica Epps       Glucose intolerance (impaired glucose tolerance) 02/02/2011     Priority: Medium     Kidney stones      Priority: Medium     Partial left  nephrectomy around 2008 due to stone  Should get yearly CT (stone protocol ) -- 5/20/15, [facility]       CKD (chronic kidney disease) stage 4, GFR 15-29 ml/min (H) 12/02/2010     Priority: Medium     History of kidney stone with infection.  Creatinine 1.3 - 1.6 (12/2/10, [facility])   -- she can't take ACE / ARB due to side effects.  Will continue with good BP control (12/3/15, [facility])       CARDIOVASCULAR SCREENING; LDL GOAL LESS THAN 100 10/31/2010     Priority: Medium      Past Medical History:   Diagnosis Date     Carpal tunnel syndrome      CKD (chronic kidney disease)     kidney stone with infection     Deep vein thrombosis (DVT) (H)     1972     DVT [id number]     Glucose intolerance      H/O partial nephrectomy      Heel spur      Hypothyroidism      Kidney stones      Lichen sclerosus      Osteoarthritis      Osteoporosis      PID      Pulmonary embolism (H)     1970     Unspecified hypothyroidism      Vitiligo      Past Surgical History:   Procedure Laterality Date     C NEPHRECTOMY      left partial 07-01-07     C VENOUS THROMBOSIS IMAGING      with pe     HC REMOVAL GALLBLADDER       HC REVISE MEDIAN N/CARPAL TUNNEL SURG       TUBAL LIGATION       Current Outpatient Medications   Medication Sig Dispense Refill     amLODIPine (NORVASC) 5 MG tablet Take 5 mg by mouth daily       Calcium Carbonate-Vitamin D (CALCIUM + D) 600-200 MG-UNIT per tablet Take 2 tablets by mouth 2 times daily.       clobetasol (TEMOVATE) 0.05 % external ointment APPLY SPARINGLY TO AFFECTED AREA TOPICALLY TWICE DAILY AS NEEDED. DO NOT APPLY TO FACE. 60 g 1     clobetasol (TEMOVATE) 0.05 % external solution Apply topically 2 times daily To the scalp 50 mL 11     diphenhydrAMINE (BENADRYL) 25 MG tablet Take 25 mg by mouth At Bedtime        DOCUSATE SODIUM PO Take 100 mg by mouth At Bedtime       estradiol (ESTRACE) 0.1 MG/GM vaginal cream Place 2 g vaginally twice a week 42.5 g 3     famotidine (PEPCID) 20 MG tablet Take 1 tablet (20 mg) by mouth 2  times daily 90 tablet 3     Fluocinolone Acetonide Scalp 0.01 % OIL oil Apply topically At Bedtime To the scalp 1 Bottle 11     HYDROcodone-acetaminophen (NORCO) 5-325 MG tablet Take 1-2 tablets by mouth every 4 hours as needed for moderate to severe pain 15 tablet 0     hydrOXYzine (ATARAX) 25 MG tablet TAKE 1 TABLET BY MOUTH TWICE DAILY AS NEEDED FOR ITCHING 180 tablet 0     ipratropium (ATROVENT) 0.03 % nasal spray Spray 2 sprays into both nostrils every 12 hours 30 mL 1     ketoconazole (NIZORAL) 2 % external shampoo Use to wash scalp daily. 120 mL 11     levothyroxine (SYNTHROID/LEVOTHROID) 100 MCG tablet TAKE ONE TABLET BY MOUTH ONCE DAILY EXCEPT SUNDAY 90 tablet 3     Multiple Vitamins-Minerals (OCUVITE ADULT 50+) CAPS Take 1 capsule by mouth daily 30 capsule 12     omeprazole (PRILOSEC) 40 MG DR capsule TAKE 1 CAPSULE BY MOUTH EVERY DAY 90 capsule 3     order for DME Equipment being ordered:   [employer] [facility] at [facility]. 1 each 0     rOPINIRole (REQUIP) 0.25 MG tablet TAKE 1 TO 2 TABLETS BY MOUTH EVERY NIGHT AT BEDTIME 180 tablet 3     STATIN NOT PRESCRIBED, INTENTIONAL, by Other route continuous prn. Patient declined 5/4/12  0     OTC products: None, except as noted above    Allergies   Allergen Reactions     Ace Inhibitors Cough     Advicor      Alendronic Acid Other (See Comments)     Other reaction(s): GI Upset  heartburn  Severe substernal burning and dysphagia within 3 days       Blood-Group Specific Substance Other (See Comments)     Patient has Anti-[place] and warm auto antibody. Blood products may be delayed. Draw patient 24 hours prior to transfusion. Draw one red top and two purple top tubes for all type and screen orders.     Citalopram GI Disturbance and Nausea     diarrhea  diarrhea     Doxycycline Nausea     Poor appetite     Fosamax      Severe substernal burning and dysphagia within 3 days     Valsartan Cramps and Other (See Comments)     severe  severe      Latex Allergy: NO    Social  History     Tobacco Use     Smoking status: Former Smoker     Types: Cigarettes     Last attempt to quit: 1969     Years since quittin.6     Smokeless tobacco: Never Used   Substance Use Topics     Alcohol use: Not Currently     Alcohol/week: 0.0 standard drinks     Comment: very little      History   Drug Use No       REVIEW OF SYSTEMS:   CONSTITUTIONAL: NEGATIVE for fever, chills, change in weight  ENT/MOUTH: NEGATIVE for ear, mouth and throat problems  RESP: NEGATIVE for significant cough or SOB  CV: NEGATIVE for chest pain, palpitations or peripheral edema  MUSCULOSKELETAL: Right hip very painful when walking.     EXAM:   There were no vitals taken for this visit.  GENERAL APPEARANCE: healthy, alert and no distress  HENT: ear canals and TM's normal and nose and mouth without ulcers or lesions  RESP: lungs clear to auscultation - no rales, rhonchi or wheezes  CV: regular rate and rhythm, normal S1 S2, no S3 or S4 and no murmur, click or rub   ABDOMEN: soft, nontender, no HSM or masses and bowel sounds normal  MS: extremities normal- no gross deformities noted   Pain over Right hip bursa and ischium.   Range of motion slighty limited .  Antalgic gait due to pain at right hip, using cane.   NEURO: Normal strength and tone, sensory exam grossly normal, mentation intact and speech normal    DIAGNOSTICS:   EKG: appears normal, NSR, normal axis, normal intervals, no acute ST/T changes c/w ischemia, no LVH by voltage criteria, unchanged from previous tracings    Recent Labs   Lab Test 20  0854 10/21/19  1221  19  1050  19  1157  18  1448   HGB  --  11.0*  --   --  11.7  --   --   --  11.6*   PLT  --  291  --   --  276  --   --   --  229   NA  --  138 137  --   --    < > 137   < > 141   POTASSIUM 4.8 4.4 4.6  --   --    < > 4.3   < > 3.8   CR 2.05* 1.72* 1.87*   < >  --    < > 2.07*   < > 1.36*   A1C  --   --   --   --   --   --  5.5  --  4.9    < > = values in this interval not  "displayed.      BMP   CBC   TSH are pending.   IMPRESSION:   Reason for surgery/procedure: R renal stone  Diagnosis/reason for consult: med clearance    The proposed surgical procedure is considered INTERMEDIATE risk.    REVISED CARDIAC RISK INDEX  The patient has the following serious cardiovascular risks for perioperative complications such as (MI, PE, VFib and 3  AV Block):  No serious cardiac risks  INTERPRETATION: 0 risks: Class I (very low risk - 0.4% complication rate)    The patient has the following additional risks for perioperative complications:  No identified additional risks      ICD-10-CM    1. Preop general physical exam  Z01.818    2. CKD (chronic kidney disease) stage 4, GFR 15-29 ml/min (H)  N18.4 Basic metabolic panel     CBC with platelets     Albumin Random Urine Quantitative with Creat Ratio   3. COPD, severe (H)  J44.9    4. Non-small cell cancer of right lung (H)  C34.91    5. Other specified hypothyroidism  E03.8 TSH with free T4 reflex   6. Solitary kidney, acquired  Z90.5 Basic metabolic panel     CBC with platelets   7. History of pulmonary embolism  Z86.711    8. Glucose intolerance (impaired glucose tolerance)  R73.02 Albumin Random Urine Quantitative with Creat Ratio   9. CARDIOVASCULAR SCREENING; LDL GOAL LESS THAN 100  Z13.6 Lipid panel reflex to direct LDL Non-fasting     ALT   10. Need for vaccine for DT (diphtheria-tetanus)  Z23 TD (ADULT, 7+) PRESERVE FREE     VACCINE ADMINISTRATION, INITIAL   11. Hip pain, right  M25.551 XR Hip Right 2-3 Views       RECOMMENDATIONS:       Pulmonary Risk  Per spirometry she has \"severe COPD\" but not in need of inhalers, really is asymptomatic.      --Patient is to take all scheduled medications on the day of surgery      APPROVAL GIVEN to proceed with proposed procedure, without further diagnostic evaluation       OTHER:  Hip xray done today as she has R hip pain, with significant difficulty with gait.  This xray looks like \"bone on bone\", no " metastasis.  I think we could consider trial hip bursa injection AFTER SURGERY if appropriate.  Consider further iimaging depending on how she does over this time....  *    Signed Electronically by: Sushma Nelson MD    Copy of this evaluation report is provided to requesting physician.    [facility] Preop Guidelines    Revised Cardiac Risk Index

## 2020-07-21 LAB
CREAT UR-MCNC: 139 MG/DL
MICROALBUMIN UR-MCNC: 1630 MG/L
MICROALBUMIN/CREAT UR: 1172.66 MG/G CR (ref 0–25)

## 2020-07-21 NOTE — TELEPHONE ENCOUNTER
called pt she does want to be scheduled asap. Gave her the surgery scheduling number. Made her aware of the 8/17 stent removal (if needed).    Erica Otreo RN Specialty Triage 7/21/2020 3:33 PM

## 2020-07-21 NOTE — TELEPHONE ENCOUNTER
Called pt and contacted her in regards to below. She has her appt set up.      Erica Otero RN Specialty Triage 7/21/2020 3:10 PM

## 2020-07-21 NOTE — TELEPHONE ENCOUNTER
Attempted to call patient. No answer and VM not set up so unable to leave a message.  Surgery is scheduled 8/11. Does patient want to try to have surgery scheduled sooner? Stent removal on 8/17 1 week post surgery. Please clarify.  Rabia Low, CMA

## 2020-07-21 NOTE — TELEPHONE ENCOUNTER
Patient is calling back and would like the stent removal scheduled before 8/11/20. Please call back

## 2020-07-21 NOTE — TELEPHONE ENCOUNTER
Rescheduled to 7-28-20 @ [facility].    Tried to reach patient to confirm date changer per her request but no answer and voicemail was not set up.

## 2020-07-21 NOTE — RESULT ENCOUNTER NOTE
Helena Eldridge    There may be some mild dejenerative joint arthritis evident at R hip.  This xray does not explain your symptoms well.  Let's see if treating the stone has impact on your symptoms.     Sincerely,     MANUELA MONREAL M.D.

## 2020-07-24 ENCOUNTER — TELEPHONE (OUTPATIENT)
Dept: FAMILY MEDICINE | Facility: CLINIC | Age: 85
End: 2020-07-24

## 2020-07-24 DIAGNOSIS — Z11.59 ENCOUNTER FOR SCREENING FOR OTHER VIRAL DISEASES: ICD-10-CM

## 2020-07-24 DIAGNOSIS — E03.8 OTHER SPECIFIED HYPOTHYROIDISM: ICD-10-CM

## 2020-07-24 DIAGNOSIS — R80.9 MICROALBUMINURIA: Primary | ICD-10-CM

## 2020-07-24 DIAGNOSIS — I10 HYPERTENSION GOAL BP (BLOOD PRESSURE) < 140/90: ICD-10-CM

## 2020-07-24 PROCEDURE — U0003 INFECTIOUS AGENT DETECTION BY NUCLEIC ACID (DNA OR RNA); SEVERE ACUTE RESPIRATORY SYNDROME CORONAVIRUS 2 (SARS-COV-2) (CORONAVIRUS DISEASE [COVID-19]), AMPLIFIED PROBE TECHNIQUE, MAKING USE OF HIGH THROUGHPUT TECHNOLOGIES AS DESCRIBED BY CMS-2020-01-R: HCPCS | Performed by: UROLOGY

## 2020-07-24 RX ORDER — AMLODIPINE BESYLATE 5 MG/1
5 TABLET ORAL DAILY
Qty: 90 TABLET | Refills: 3 | Status: SHIPPED | OUTPATIENT
Start: 2020-07-24 | End: 2020-12-21

## 2020-07-24 RX ORDER — LEVOTHYROXINE SODIUM 75 UG/1
TABLET ORAL
Qty: 90 TABLET | Refills: 1 | Status: SHIPPED | OUTPATIENT
Start: 2020-07-24 | End: 2020-10-19

## 2020-07-24 NOTE — TELEPHONE ENCOUNTER
"MD called patient re: Thyroid    We will do the following:    \"  Your thyroid test is abnormal:  We need to reduce your dose    Start by holding the Levothyroxine for about 5 days.   Then change the dose to 75 mcg daily.     Make a lab appointment to recheck the thyroid function tests in about 4 - 6 weeks.     At some point, will have to recheck the urine protein, once Dr Ramirez has completed his procedures.  \"    She also informs me she had resumed amlodipine due to noting higher BP lately.        "

## 2020-07-24 NOTE — RESULT ENCOUNTER NOTE
Helena Eldridge       Your thyroid test is abnormal:  We need to reduce your dose       Start by holding the Levothyroxine for about 5 days.   Then change the dose to 75 mcg daily.       Make a lab appointment to recheck the thyroid function tests in about 4 - 6 weeks.       At some point, will have to recheck the urine protein, once Dr Ramirez has completed his procedures.         Sincerely,         MANUELA MONREAL M.D.

## 2020-07-24 NOTE — RESULT ENCOUNTER NOTE
Helena Elrdidge    Your thyroid test is abnormal:  We need to reduce your dose    Start by holding the Levothyroxine for about 5 days.   Then change the dose to 75 mcg daily.     Make a lab appointment to recheck the thyroid function tests in about 4 - 6 weeks.     At some point, will have to recheck the urine protein, once Dr Ramirez has completed his procedures.      Sincerely,

## 2020-07-25 LAB
SARS-COV-2 RNA SPEC QL NAA+PROBE: NOT DETECTED
SPECIMEN SOURCE: NORMAL

## 2020-07-27 ENCOUNTER — ANESTHESIA EVENT (OUTPATIENT)
Dept: SURGERY | Facility: AMBULATORY SURGERY CENTER | Age: 85
End: 2020-07-27

## 2020-07-27 ASSESSMENT — COPD QUESTIONNAIRES
CAT_SEVERITY: SEVERE
COPD: 1

## 2020-07-27 ASSESSMENT — LIFESTYLE VARIABLES: TOBACCO_USE: 1

## 2020-07-27 NOTE — ANESTHESIA PREPROCEDURE EVALUATION
"Anesthesia Pre-Procedure Evaluation    Patient: Helena Eldridge   MRN:     [id number] Gender:   female   Age:    84 year old :      1935        Preoperative Diagnosis: Ureteral stone [N20.1]   Procedure(s):  LITHOTRIPSY, EXTRACORPOREAL SHOCK WAVE (ESWL), WITH CYSTOSCOPY AND URETERAL STENT INSERTION     LABS:  CBC:   Lab Results   Component Value Date    WBC 8.5 2020    WBC 9.6 2020    HGB 10.7 (L) 2020    HGB 11.0 (L) 2020    HCT 32.1 (L) 2020    HCT 34.5 (L) 2020     2020     2020     BMP:   Lab Results   Component Value Date     2020     2020    POTASSIUM 4.1 2020    POTASSIUM 4.8 2020    CHLORIDE 106 2020    CHLORIDE 102 2020    CO2 29 2020    CO2 27 2020    BUN 31 (H) 2020    BUN 32 (H) 2020    CR 1.91 (H) 2020    CR 2.05 (A) 2020     (H) 2020    GLC 96 2020     COAGS: No results found for: PTT, INR, FIBR  POC: No results found for: BGM, HCG, HCGS  OTHER:   Lab Results   Component Value Date    A1C 5.5 2019    SERAFIN 8.7 2020    PHOS 3.9 2012    MAG 2.2 2016    ALBUMIN 4.3 10/10/2011    PROTTOTAL 7.9 10/10/2011    ALT 16 2020    AST 17 2020    ALKPHOS 73 10/10/2011    BILITOTAL 0.2 10/10/2011    TSH 0.10 (L) 2020    T4 1.58 (H) 2020        Preop Vitals    BP Readings from Last 3 Encounters:   20 120/75   20 (!) 150/67   20 135/81    Pulse Readings from Last 3 Encounters:   20 102   20 69   07/09/20 100      Resp Readings from Last 3 Encounters:   20 16   06/11/15 18   01/31/15 16    SpO2 Readings from Last 3 Encounters:   20 95%   20 92%   20 95%      Temp Readings from Last 1 Encounters:   20 98.9  F (37.2  C) (Oral)    Ht Readings from Last 1 Encounters:   20 1.537 m (5' 0.5\")      Wt Readings from Last 1 Encounters:   20 " "59.9 kg (132 lb)    Estimated body mass index is 25.36 kg/m  as calculated from the following:    Height as of 1/21/20: 1.537 m (5' 0.5\").    Weight as of 7/20/20: 59.9 kg (132 lb).     LDA:  Peripheral IV 06/14/18 Right (Active)   Number of days: 774        Past Medical History:   Diagnosis Date     Carpal tunnel syndrome      CKD (chronic kidney disease)     kidney stone with infection     Deep vein thrombosis (DVT) (H)     1972     DVT [id number]     Glucose intolerance      H/O partial nephrectomy      Heel spur      Hypothyroidism      Kidney stones      Lichen sclerosus      Osteoarthritis      Osteoporosis      PID      Pulmonary embolism (H)     1970     Unspecified hypothyroidism      Vitiligo       Past Surgical History:   Procedure Laterality Date     C NEPHRECTOMY      left partial 07-01-07     C VENOUS THROMBOSIS IMAGING      with pe     EXTRACORPOREAL SHOCK WAVE LITHOTRIPSY, CYSTOSCOPY, INSERT STENT URETER(S), COMBINED Right 7/13/2020    Procedure: LITHOTRIPSY, Right EXTRACORPOREAL SHOCK WAVE (ESWL), WITH CYSTOSCOPY AND Right URETERAL STENT INSERTION;  Surgeon: Buddy Ramirez MD;  Location: [facility] OR     HC REMOVAL GALLBLADDER       HC REVISE MEDIAN N/CARPAL TUNNEL SURG       TUBAL LIGATION        Allergies   Allergen Reactions     Ace Inhibitors Cough     Advicor      Alendronic Acid Other (See Comments)     Other reaction(s): GI Upset  heartburn  Severe substernal burning and dysphagia within 3 days       Blood-Group Specific Substance Other (See Comments)     Patient has Anti-[place] and warm auto antibody. Blood products may be delayed. Draw patient 24 hours prior to transfusion. Draw one red top and two purple top tubes for all type and screen orders.     Citalopram GI Disturbance and Nausea     diarrhea  diarrhea     Doxycycline Nausea     Poor appetite     Fosamax      Severe substernal burning and dysphagia within 3 days     Valsartan Cramps and Other (See Comments)     severe  severe    "     Anesthesia Evaluation     . Pt has had prior anesthetic. Type: General    No history of anesthetic complications          ROS/MED HX    ENT/Pulmonary: Comment: Idiopathic pulmonary fibrosis    (+)tobacco use, Past use severe COPD, , . 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    Neurologic:  - neg neurologic ROS     Cardiovascular:     (+) hypertension----. : . . . :. .       METS/Exercise Tolerance:     Hematologic:  - neg hematologic  ROS       Musculoskeletal:  - neg musculoskeletal ROS       GI/Hepatic:         Renal/Genitourinary:     (+) chronic renal disease, type: CRI, Nephrolithiasis ,       Endo:     (+) thyroid problem hypothyroidism, .      Psychiatric:     (+) psychiatric history anxiety      Infectious Disease:  - neg infectious disease ROS       Malignancy:   (+) Malignancy History of Lung  Lung CA Remission status post Chemo.         Other:    - neg other ROS                     PHYSICAL EXAM:   Mental Status/Neuro: A/A/O   Airway: Facies: Feasible  Mallampati: I  Mouth/Opening: Full  TM distance: > 6 cm  Neck ROM: Full   Respiratory: Respiratory Auscultation: Restrictive breathing pattern.     Resp. Rate: Normal     Resp. Effort: Normal      CV:   Rate: Age appropriate  Edema: None   Comments:      Dental: Endentulous Habitus: Normal               Assessment:   ASA SCORE: 3    H&P: History and physical reviewed and following examination; no interval change.   Smoking Status:  Non-Smoker/Unknown   NPO Status: NPO Appropriate     Plan:   Anes. Type:  General   Pre-Medication: None   Induction:  IV (Standard)   Airway: LMA   Access/Monitoring: PIV   Maintenance: Balanced     Postop Plan:   Postop Pain: Opioids  Postop Sedation/Airway: Not planned  Disposition: Outpatient     PONV Management:   Adult Risk Factors: Female, Non-Smoker, Postop Opioids   Prevention: Ondansetron, Dexamethasone     CONSENT: Direct conversation   Plan and risks discussed with: Patient   Blood  Products: Consent Deferred (Minimal Blood Loss)                       Lj Lai MD

## 2020-07-28 ENCOUNTER — ANESTHESIA (OUTPATIENT)
Dept: SURGERY | Facility: AMBULATORY SURGERY CENTER | Age: 85
End: 2020-07-28

## 2020-07-28 ENCOUNTER — HOSPITAL ENCOUNTER (OUTPATIENT)
Facility: AMBULATORY SURGERY CENTER | Age: 85
Discharge: HOME | End: 2020-07-28
Attending: UROLOGY | Admitting: UROLOGY
Payer: COMMERCIAL

## 2020-07-28 ENCOUNTER — ANCILLARY PROCEDURE (OUTPATIENT)
Dept: GENERAL RADIOLOGY | Facility: CLINIC | Age: 85
End: 2020-07-28
Attending: UROLOGY
Payer: COMMERCIAL

## 2020-07-28 VITALS
OXYGEN SATURATION: 99 % | HEART RATE: 62 BPM | DIASTOLIC BLOOD PRESSURE: 86 MMHG | TEMPERATURE: 97.5 F | SYSTOLIC BLOOD PRESSURE: 126 MMHG | RESPIRATION RATE: 16 BRPM

## 2020-07-28 DIAGNOSIS — N20.0 RENAL CALCULI: ICD-10-CM

## 2020-07-28 DIAGNOSIS — N20.1 URETERAL STONE: ICD-10-CM

## 2020-07-28 PROCEDURE — G8907 PT DOC NO EVENTS ON DISCHARG: HCPCS

## 2020-07-28 PROCEDURE — 99000 SPECIMEN HANDLING OFFICE-LAB: CPT | Performed by: UROLOGY

## 2020-07-28 PROCEDURE — G8916 PT W IV AB GIVEN ON TIME: HCPCS

## 2020-07-28 PROCEDURE — 52356 CYSTO/URETERO W/LITHOTRIPSY: CPT | Mod: 58 | Performed by: UROLOGY

## 2020-07-28 PROCEDURE — 82365 CALCULUS SPECTROSCOPY: CPT | Mod: 90 | Performed by: UROLOGY

## 2020-07-28 PROCEDURE — 52356 CYSTO/URETERO W/LITHOTRIPSY: CPT | Mod: RT

## 2020-07-28 DEVICE — STENT URETERAL PERCUFLEX PLUS 6FRX24CM M0061752620: Type: IMPLANTABLE DEVICE | Site: URETER | Status: FUNCTIONAL

## 2020-07-28 RX ORDER — FENTANYL CITRATE 50 UG/ML
25-50 INJECTION, SOLUTION INTRAMUSCULAR; INTRAVENOUS
Status: DISCONTINUED | OUTPATIENT
Start: 2020-07-28 | End: 2020-07-29 | Stop reason: HOSPADM

## 2020-07-28 RX ORDER — FENTANYL CITRATE 50 UG/ML
INJECTION, SOLUTION INTRAMUSCULAR; INTRAVENOUS PRN
Status: DISCONTINUED | OUTPATIENT
Start: 2020-07-28 | End: 2020-07-28

## 2020-07-28 RX ORDER — LIDOCAINE HYDROCHLORIDE 20 MG/ML
INJECTION, SOLUTION INFILTRATION; PERINEURAL PRN
Status: DISCONTINUED | OUTPATIENT
Start: 2020-07-28 | End: 2020-07-28

## 2020-07-28 RX ORDER — CEFAZOLIN SODIUM 2 G/100ML
2 INJECTION, SOLUTION INTRAVENOUS
Status: COMPLETED | OUTPATIENT
Start: 2020-07-28 | End: 2020-07-28

## 2020-07-28 RX ORDER — OXYCODONE HYDROCHLORIDE 5 MG/1
5-10 TABLET ORAL EVERY 4 HOURS PRN
Status: DISCONTINUED | OUTPATIENT
Start: 2020-07-28 | End: 2020-07-29 | Stop reason: HOSPADM

## 2020-07-28 RX ORDER — SODIUM CHLORIDE, SODIUM LACTATE, POTASSIUM CHLORIDE, CALCIUM CHLORIDE 600; 310; 30; 20 MG/100ML; MG/100ML; MG/100ML; MG/100ML
INJECTION, SOLUTION INTRAVENOUS CONTINUOUS
Status: DISCONTINUED | OUTPATIENT
Start: 2020-07-28 | End: 2020-07-29 | Stop reason: HOSPADM

## 2020-07-28 RX ORDER — MEPERIDINE HYDROCHLORIDE 25 MG/ML
12.5 INJECTION INTRAMUSCULAR; INTRAVENOUS; SUBCUTANEOUS
Status: DISCONTINUED | OUTPATIENT
Start: 2020-07-28 | End: 2020-07-29 | Stop reason: HOSPADM

## 2020-07-28 RX ORDER — CEFAZOLIN SODIUM 1 G/3ML
1 INJECTION, POWDER, FOR SOLUTION INTRAMUSCULAR; INTRAVENOUS SEE ADMIN INSTRUCTIONS
Status: DISCONTINUED | OUTPATIENT
Start: 2020-07-28 | End: 2020-07-29 | Stop reason: HOSPADM

## 2020-07-28 RX ORDER — LIDOCAINE 40 MG/G
CREAM TOPICAL
Status: DISCONTINUED | OUTPATIENT
Start: 2020-07-28 | End: 2020-07-29 | Stop reason: HOSPADM

## 2020-07-28 RX ORDER — ALBUTEROL SULFATE 0.83 MG/ML
2.5 SOLUTION RESPIRATORY (INHALATION) EVERY 4 HOURS PRN
Status: DISCONTINUED | OUTPATIENT
Start: 2020-07-28 | End: 2020-07-29 | Stop reason: HOSPADM

## 2020-07-28 RX ORDER — PROPOFOL 10 MG/ML
INJECTION, EMULSION INTRAVENOUS PRN
Status: DISCONTINUED | OUTPATIENT
Start: 2020-07-28 | End: 2020-07-28

## 2020-07-28 RX ORDER — CIPROFLOXACIN 250 MG/1
250 TABLET, FILM COATED ORAL DAILY
Qty: 3 TABLET | Refills: 0 | Status: SHIPPED | OUTPATIENT
Start: 2020-07-28 | End: 2020-07-31

## 2020-07-28 RX ORDER — NALOXONE HYDROCHLORIDE 0.4 MG/ML
.1-.4 INJECTION, SOLUTION INTRAMUSCULAR; INTRAVENOUS; SUBCUTANEOUS
Status: DISCONTINUED | OUTPATIENT
Start: 2020-07-28 | End: 2020-07-29 | Stop reason: HOSPADM

## 2020-07-28 RX ORDER — HYDROMORPHONE HYDROCHLORIDE 1 MG/ML
.3-.5 INJECTION, SOLUTION INTRAMUSCULAR; INTRAVENOUS; SUBCUTANEOUS EVERY 10 MIN PRN
Status: DISCONTINUED | OUTPATIENT
Start: 2020-07-28 | End: 2020-07-29 | Stop reason: HOSPADM

## 2020-07-28 RX ORDER — ONDANSETRON 4 MG/1
4 TABLET, ORALLY DISINTEGRATING ORAL EVERY 30 MIN PRN
Status: DISCONTINUED | OUTPATIENT
Start: 2020-07-28 | End: 2020-07-29 | Stop reason: HOSPADM

## 2020-07-28 RX ORDER — ONDANSETRON 2 MG/ML
INJECTION INTRAMUSCULAR; INTRAVENOUS PRN
Status: DISCONTINUED | OUTPATIENT
Start: 2020-07-28 | End: 2020-07-28

## 2020-07-28 RX ORDER — DEXAMETHASONE SODIUM PHOSPHATE 4 MG/ML
4 INJECTION, SOLUTION INTRA-ARTICULAR; INTRALESIONAL; INTRAMUSCULAR; INTRAVENOUS; SOFT TISSUE EVERY 10 MIN PRN
Status: DISCONTINUED | OUTPATIENT
Start: 2020-07-28 | End: 2020-07-29 | Stop reason: HOSPADM

## 2020-07-28 RX ORDER — ONDANSETRON 2 MG/ML
4 INJECTION INTRAMUSCULAR; INTRAVENOUS EVERY 30 MIN PRN
Status: DISCONTINUED | OUTPATIENT
Start: 2020-07-28 | End: 2020-07-29 | Stop reason: HOSPADM

## 2020-07-28 RX ORDER — ACETAMINOPHEN 325 MG/1
975 TABLET ORAL ONCE
Status: COMPLETED | OUTPATIENT
Start: 2020-07-28 | End: 2020-07-28

## 2020-07-28 RX ADMIN — FENTANYL CITRATE 25 MCG: 50 INJECTION, SOLUTION INTRAMUSCULAR; INTRAVENOUS at 12:35

## 2020-07-28 RX ADMIN — LIDOCAINE HYDROCHLORIDE 60 MG: 20 INJECTION, SOLUTION INFILTRATION; PERINEURAL at 12:35

## 2020-07-28 RX ADMIN — CEFAZOLIN SODIUM 2 G: 2 INJECTION, SOLUTION INTRAVENOUS at 12:39

## 2020-07-28 RX ADMIN — ONDANSETRON 4 MG: 2 INJECTION INTRAMUSCULAR; INTRAVENOUS at 12:54

## 2020-07-28 RX ADMIN — SODIUM CHLORIDE, SODIUM LACTATE, POTASSIUM CHLORIDE, CALCIUM CHLORIDE: 600; 310; 30; 20 INJECTION, SOLUTION INTRAVENOUS at 12:30

## 2020-07-28 RX ADMIN — Medication 0.3 MCG/KG/MIN: at 12:45

## 2020-07-28 RX ADMIN — SODIUM CHLORIDE, SODIUM LACTATE, POTASSIUM CHLORIDE, CALCIUM CHLORIDE: 600; 310; 30; 20 INJECTION, SOLUTION INTRAVENOUS at 11:11

## 2020-07-28 RX ADMIN — PROPOFOL 200 MG: 10 INJECTION, EMULSION INTRAVENOUS at 12:35

## 2020-07-28 RX ADMIN — ACETAMINOPHEN 975 MG: 325 TABLET ORAL at 10:55

## 2020-07-28 NOTE — ANESTHESIA CARE TRANSFER NOTE
Patient: Helena Eldridge    Procedure(s):  RIGHT CYSTOURETEROSCOPY, WITH LITHOTRIPSY USING LASER AND URETERAL STENT EXCHANGE    Diagnosis: Ureteral stone [N20.1]  Diagnosis Additional Information: No value filed.    Anesthesia Type:   General     Note:  Airway :Nasal Cannula  Patient transferred to:PACU  Comments: Awake, comfortable, sats 99%, Report to RN.Handoff Report: Identifed the Patient, Identified the Reponsible Provider, Reviewed the pertinent medical history, Discussed the surgical course, Reviewed Intra-OP anesthesia mangement and issues during anesthesia, Set expectations for post-procedure period and Allowed opportunity for questions and acknowledgement of understanding      Vitals: (Last set prior to Anesthesia Care Transfer)    CRNA VITALS  7/28/2020 1241 - 7/28/2020 1315      7/28/2020             Pulse:  72    SpO2:  (!) 85 %                Electronically Signed By: MERARI Izaguirre CRNA  July 28, 2020  1:15 PM

## 2020-07-28 NOTE — DISCHARGE INSTRUCTIONS
[facility]   Adult Discharge Orders & Instructions     For 24 hours after surgery    1. Get plenty of rest.  A responsible adult must stay with you for at least 24 hours after you leave the hospital.   2. Do not drive or use heavy equipment.  If you have weakness or tingling, don't drive or use heavy equipment until this feeling goes away.  3. Do not drink alcohol.  4. Avoid strenuous or risky activities.  Ask for help when climbing stairs.   5. You may feel lightheaded.  IF so, sit for a few minutes before standing.  Have someone help you get up.   6. If you have nausea (feel sick to your stomach): Drink only clear liquids such as apple juice, ginger ale, broth or 7-Up.  Rest may also help.  Be sure to drink enough fluids.  Move to a regular diet as you feel able.  7. You may have a slight fever. Call the doctor if your fever is over 100 F (37.7 C) (taken under the tongue) or lasts longer than 24 hours.  8. You may have a dry mouth, a sore throat, muscle aches or trouble sleeping.  These should go away after 24 hours.  9. Do not make important or legal decisions.     Call your doctor for any of the followin.  Signs of infection (fever, growing tenderness at the surgery site, a large amount of drainage or bleeding, severe pain, foul-smelling drainage, redness, swelling).    2. It has been over 8 to 10 hours since surgery and you are still not able to urinate (pass water).    3.  Headache for over 24 hours.    4.  Numbness, tingling or weakness the day after surgery (if you had spinal anesthesia).                  5. Signs of Covid-19 infection (temperature over 100 degrees, shortness of breath, cough, loss of taste/smell, generalized body aches, persistent headache,                  chills, sore throat, nausea/vomiting/diarrhea).          To contact Dr Ramirez call:  [phone]_______________________________________        Tylenol 975 mg given at 12:55 pm. Do not repeat Tylenol until after 7  pm.

## 2020-07-28 NOTE — ANESTHESIA POSTPROCEDURE EVALUATION
Anesthesia POST Procedure Evaluation    Patient: Helena Eldridge   MRN:     [id number] Gender:   female   Age:    84 year old :      1935        Preoperative Diagnosis: Ureteral stone [N20.1]   Procedure(s):  RIGHT CYSTOURETEROSCOPY, WITH LITHOTRIPSY USING LASER AND URETERAL STENT EXCHANGE   Postop Comments: No value filed.     Anesthesia Type: General       Disposition: Outpatient   Postop Pain Control: Uneventful            Sign Out: Well controlled pain   PONV: No   Neuro/Psych: Uneventful            Sign Out: Acceptable/Baseline neuro status   Airway/Respiratory: Uneventful            Sign Out: Acceptable/Baseline resp. status   CV/Hemodynamics: Uneventful            Sign Out: Acceptable CV status   Other NRE: NONE   DID A NON-ROUTINE EVENT OCCUR? No         Last Anesthesia Record Vitals:  CRNA VITALS  2020 1241 - 2020 1341      2020             Pulse:  72    SpO2:  94 %          Last PACU Vitals:  Vitals Value Taken Time   /63 2020  1:25 PM   Temp 96.7  F (35.9  C) 2020  1:15 PM   Pulse 69 2020  1:25 PM   Resp 11 2020  1:25 PM   SpO2 100 % 2020  1:25 PM   Temp src     NIBP     Pulse     SpO2     Resp     Temp     Ht Rate     Temp 2     Vitals shown include unvalidated device data.      Electronically Signed By: Lj Lai MD, 2020, 1:49 PM

## 2020-07-28 NOTE — OP NOTE
Preop diagnosis: Right ureteral stone    Prognosis: Same    PROCEDURE NOTE:      #1 right ureteroscopy with holmium laser lithotripsy  #2 right ureteroscopy with stone basketing  #3 cystoscopy and right stent exchange    Procedure    Patient brought to the open room and placed in a dorsolithotomy position after general anesthesia has been induced.  She was draped and prepped in the usual surgical fashion.  Patient received preop antibiotics.  A rigid cystoscope was then placed into the bladder direct vision.  The right ureteral stent was identified.  It was removed with a grasper.  A sensor wire was then placed in the renal pelvis under fluoroscopy.  This is followed by placement of a Super Stiff wire through a double-lumen catheter.  I then placed a 10-12 Kazakh ureteral access sheath.  A flexible ureteroscope was placed into the ureter where the stone was identified.  Using holmium laser, the stone was broken down into multiple small pieces.  A larger piece was removed with a tipless basket.  A new 6 Kazakh by 24 cm stent placed in usual fashion.  Patient tolerated procedure well.  No complications identified during the procedure.  There was minimal bleeding during the operation.

## 2020-07-28 NOTE — BRIEF OP NOTE
[facility]  Brief Operative Note    Pre-operative diagnosis: Right ureteral stone   Post-operative diagnosis same   Procedure: Procedure(s):  RIGHT CYSTOURETEROSCOPY, WITH LITHOTRIPSY USING LASER AND URETERAL STENT EXCHANGE   Surgeon: Buddy Ramirez MD   Assistants(s): None   Anesthesia: General    Estimated blood loss: minimal                   Specimens: stone   Implants: stent       Complications: None   Condition on discharge: Stable   Findings: Stone removed  See dictated operative report for full details

## 2020-07-30 LAB
APPEARANCE STONE: NORMAL
COMPN STONE: NORMAL
NUMBER STONE: 1
SIZE STONE: NORMAL MM
WT STONE: 13 MG

## 2020-08-03 ENCOUNTER — OFFICE VISIT (OUTPATIENT)
Dept: UROLOGY | Facility: CLINIC | Age: 85
End: 2020-08-03
Payer: COMMERCIAL

## 2020-08-03 DIAGNOSIS — N20.1 URETERAL STONE: Primary | ICD-10-CM

## 2020-08-03 PROCEDURE — 52310 CYSTOSCOPY AND TREATMENT: CPT | Mod: 58 | Performed by: UROLOGY

## 2020-08-03 RX ORDER — CIPROFLOXACIN 500 MG/1
500 TABLET, FILM COATED ORAL ONCE
Status: COMPLETED | OUTPATIENT
Start: 2020-08-03 | End: 2020-08-03

## 2020-08-03 RX ADMIN — CIPROFLOXACIN 500 MG: 500 TABLET, FILM COATED ORAL at 11:10

## 2020-08-03 NOTE — PROGRESS NOTES
MEDICATION:  Ciprofloxin  ROUTE: PO  SITE: mouth  DOSE: 500  LOT #: [id number]  : Ceasar  EXPIRATION DATE: 6/2021  NDC#: [id number]  Was there drug waste? No  Oral Medication Documentation     Patient was given Ciprofloxacin (Cipro). Prior to medication administration, verified patients identity using patient s name and date of birth. Please see MAR and medication order for additional information.      Was entire amount of medication used? Yes  Expiration Date: 06/2021

## 2020-08-03 NOTE — PROGRESS NOTES
Patient returns to clinic for cysto and stent removal.  she is s/p eswl followed by ureteroscopy recently.   .    Procedure: patient was brought to the cysto suite.  she was draped and prepped in the usual surgical fashion.  Cystoscopy placed. Stent removed without problems.    Recommendation:  Return to Clinic: prn

## 2020-08-13 ENCOUNTER — ANCILLARY PROCEDURE (OUTPATIENT)
Dept: MAMMOGRAPHY | Facility: CLINIC | Age: 85
End: 2020-08-13
Attending: INTERNAL MEDICINE
Payer: COMMERCIAL

## 2020-08-13 DIAGNOSIS — Z12.31 VISIT FOR SCREENING MAMMOGRAM: ICD-10-CM

## 2020-08-13 PROCEDURE — 77067 SCR MAMMO BI INCL CAD: CPT | Mod: TC

## 2020-08-14 ENCOUNTER — OFFICE VISIT (OUTPATIENT)
Dept: FAMILY MEDICINE | Facility: CLINIC | Age: 85
End: 2020-08-14
Payer: COMMERCIAL

## 2020-08-14 VITALS
OXYGEN SATURATION: 97 % | WEIGHT: 132 LBS | HEART RATE: 83 BPM | BODY MASS INDEX: 24.92 KG/M2 | DIASTOLIC BLOOD PRESSURE: 73 MMHG | SYSTOLIC BLOOD PRESSURE: 138 MMHG | HEIGHT: 61 IN | TEMPERATURE: 98.4 F

## 2020-08-14 DIAGNOSIS — N20.0 KIDNEY STONES: ICD-10-CM

## 2020-08-14 DIAGNOSIS — J44.9 COPD, SEVERE (H): ICD-10-CM

## 2020-08-14 DIAGNOSIS — Z90.5 SOLITARY KIDNEY, ACQUIRED: ICD-10-CM

## 2020-08-14 DIAGNOSIS — C34.91 NON-SMALL CELL CANCER OF RIGHT LUNG (H): ICD-10-CM

## 2020-08-14 DIAGNOSIS — I10 HYPERTENSION GOAL BP (BLOOD PRESSURE) < 140/90: ICD-10-CM

## 2020-08-14 DIAGNOSIS — E03.9 ACQUIRED HYPOTHYROIDISM: ICD-10-CM

## 2020-08-14 DIAGNOSIS — Z00.00 ENCOUNTER FOR MEDICARE ANNUAL WELLNESS EXAM: Primary | ICD-10-CM

## 2020-08-14 DIAGNOSIS — N18.4 CKD (CHRONIC KIDNEY DISEASE) STAGE 4, GFR 15-29 ML/MIN (H): ICD-10-CM

## 2020-08-14 PROCEDURE — 99213 OFFICE O/P EST LOW 20 MIN: CPT | Mod: 25 | Performed by: INTERNAL MEDICINE

## 2020-08-14 PROCEDURE — 99397 PER PM REEVAL EST PAT 65+ YR: CPT | Performed by: INTERNAL MEDICINE

## 2020-08-14 ASSESSMENT — MIFFLIN-ST. JEOR: SCORE: 978.19

## 2020-08-14 NOTE — PATIENT INSTRUCTIONS
Patient Education   Personalized Prevention Plan  You are due for the preventive services outlined below.  Your care team is available to assist you in scheduling these services.  If you have already completed any of these items, please share that information with your care team to update in your medical record.  Health Maintenance Due   Topic Date Due     Eye Exam  03/01/2020     Osteoporosis Screening  06/01/2020     Annual Wellness Visit  06/25/2020           Recheck thyroid function tests in 1-2 months    Return to clinic 4 - 6 months (12/20 - 2/21) for routine follow up, thyroid follow up

## 2020-08-14 NOTE — PROGRESS NOTES
"  SUBJECTIVE:   Helena Eldridge is a 84 year old female who presents for Preventive Visit.    ... h/o  RUL/Hilar NSCLungCa (s/p low dose carboplatin and pemerexed.....had to d/c Novolumib immunotherapy due to  pruritis and yeast infections.... recent CT imaging= no evidence of cancer), L. nephrectomy (stones), osteoporosis, pelvic fx, lichen sclerosis, Gout, CKD 3, hypothryoid   , severe (asymptomatic) COPD    Recently had ESWL and stent placement for 6 mm stone in Right upper ureter  Since, her Right hip and leg have NO MORE PAIN.   Back to daily walking.     Are you in the first 12 months of your Medicare Part B coverage?  No    Physical Health:    In general, how would you rate your overall physical health? good    Outside of work, how many days during the week do you exercise? Walking, depends on weather    Outside of work, approximately how many minutes a day do you exercise? depends    If you drink alcohol do you typically have >3 drinks per day or >7 drinks per week? Not Applicable    Do you usually eat at least 4 servings of fruit and vegetables a day, include whole grains & fiber and avoid regularly eating high fat or \"junk\" foods? NO    Do you have any problems taking medications regularly?  No    Do you have any side effects from medications? none    Needs assistance for the following daily activities: no assistance needed    Which of the following safety concerns are present in your home?  none identified     Hearing impairment: Yes, has hearing aids    In the past 6 months, have you been bothered by leaking of urine? no    Mental Health:    In general, how would you rate your overall mental or emotional health? good  PHQ-2 Score:  0    Do you feel safe in your environment? Yes    Have you ever done Advance Care Planning? (For example, a Health Directive, POLST, or a discussion with a medical provider or your loved ones about your wishes): Yes, advance care planning is on file.    Additional concerns to " address? None    Fall risk:  Fallen 2 or more times in the past year?: No  Any fall with injury in the past year?: No    Cognitive Screenin) Repeat 3 items (Leader, Season, Table)    2) Clock draw: NORMAL  3) 3 item recall: Recalls 3 objects  Results: 3 items recalled: COGNITIVE IMPAIRMENT LESS LIKELY    Mini-CogTM Copyright TIARA Cam. Licensed by the author for use in [facility]; reprinted with permission (delilah@[facility]). All rights reserved.      Do you have sleep apnea, excessive snoring or daytime drowsiness?: no             Reviewed and updated as needed this visit by clinical staff  Tobacco  Allergies  Meds  Med Hx  Surg Hx  Fam Hx  Soc Hx        Reviewed and updated as needed this visit by Provider        Social History     Tobacco Use     Smoking status: Former Smoker     Types: Cigarettes     Last attempt to quit: 1969     Years since quittin.7     Smokeless tobacco: Never Used   Substance Use Topics     Alcohol use: Not Currently     Alcohol/week: 0.0 standard drinks     Comment: very little                            Current providers sharing in care for this patient include:     Patient Care Team:  Sushma Nelson MD as PCP - General  Sushma Nelson MD as Assigned PCP  Elidia Bower PA-C as Physician Assistant (Physician Assistant)    The following health maintenance items are reviewed in Epic and correct as of today:  Health Maintenance   Topic Date Due     EYE EXAM  2020     DEXA  2020     MEDICARE ANNUAL WELLNESS VISIT  2020     INFLUENZA VACCINE (1) 2020     FALL RISK ASSESSMENT  2021     ADVANCE CARE PLANNING  2021     BMP  2021     LIPID  2021     MICROALBUMIN  2021     TSH W/FREE T4 REFLEX  2021     DTAP/TDAP/TD IMMUNIZATION (4 - Td) 2030     SPIROMETRY  Completed     COPD ACTION PLAN  Completed     PHQ-2  Completed     PNEUMOCOCCAL IMMUNIZATION 65+ LOW/MEDIUM RISK  Completed      ZOSTER IMMUNIZATION  Completed     IPV IMMUNIZATION  Aged Out     MENINGITIS IMMUNIZATION  Aged Out     HEPATITIS B IMMUNIZATION  Aged Out     Labs reviewed in EPIC  BP Readings from Last 3 Encounters:   08/14/20 138/73   07/28/20 126/86   07/20/20 120/75    Wt Readings from Last 3 Encounters:   08/14/20 59.9 kg (132 lb)   07/20/20 59.9 kg (132 lb)   07/09/20 61.2 kg (135 lb)                  Patient Active Problem List   Diagnosis     CARDIOVASCULAR SCREENING; LDL GOAL LESS THAN 100     CKD (chronic kidney disease) stage 4, GFR 15-29 ml/min (H)     Glucose intolerance (impaired glucose tolerance)     Kidney stones     Advance care planning     Hypertension goal BP (blood pressure) < 140/90     Lichen sclerosus et atrophicus of the vulva     Solitary kidney, acquired     Senile osteoporosis     Osteoarthritis of right knee     Medial meniscus tear     Cataracts, both eyes     Macular degeneration of both eyes, right eye greater than left eye     Pelvic fracture (H)     COPD, severe (H)     IPF (idiopathic pulmonary fibrosis) (H)     Other specified hypothyroidism     Idiopathic chronic gout     Adjustment disorder with anxiety     Non-small cell cancer of right lung (H)     Immunosuppression (H)     Port-A-Cath in place     Ureteral stone     Past Surgical History:   Procedure Laterality Date     C NEPHRECTOMY      left partial 07-01-07     C VENOUS THROMBOSIS IMAGING      with pe     EXTRACORPOREAL SHOCK WAVE LITHOTRIPSY, CYSTOSCOPY, INSERT STENT URETER(S), COMBINED Right 7/13/2020    Procedure: LITHOTRIPSY, Right EXTRACORPOREAL SHOCK WAVE (ESWL), WITH CYSTOSCOPY AND Right URETERAL STENT INSERTION;  Surgeon: Buddy Ramirez MD;  Location: [facility] OR     HC REMOVAL GALLBLADDER       HC REVISE MEDIAN N/CARPAL TUNNEL SURG       LASER HOLMIUM LITHOTRIPSY URETER(S), INSERT STENT, COMBINED Right 7/28/2020    Procedure: RIGHT CYSTOURETEROSCOPY, WITH LITHOTRIPSY USING LASER AND URETERAL STENT EXCHANGE;  Surgeon: Ashley  Buddy Alba MD;  Location: [facility] OR     TUBAL LIGATION         Social History     Tobacco Use     Smoking status: Former Smoker     Types: Cigarettes     Last attempt to quit: 1969     Years since quittin.7     Smokeless tobacco: Never Used   Substance Use Topics     Alcohol use: Not Currently     Alcohol/week: 0.0 standard drinks     Comment: very little      Family History   Problem Relation Age of Onset     Cancer Brother      Glaucoma Mother      Alzheimer Disease Brother      Macular Degeneration No family hx of          Current Outpatient Medications   Medication Sig Dispense Refill     amLODIPine (NORVASC) 5 MG tablet Take 1 tablet (5 mg) by mouth daily 90 tablet 3     Calcium Carbonate-Vitamin D (CALCIUM + D) 600-200 MG-UNIT per tablet Take 2 tablets by mouth 2 times daily.       clobetasol (TEMOVATE) 0.05 % external ointment APPLY SPARINGLY TO AFFECTED AREA TOPICALLY TWICE DAILY AS NEEDED. DO NOT APPLY TO FACE. 60 g 1     clobetasol (TEMOVATE) 0.05 % external solution Apply topically 2 times daily To the scalp 50 mL 11     diphenhydrAMINE (BENADRYL) 25 MG tablet Take 25 mg by mouth At Bedtime        DOCUSATE SODIUM PO Take 100 mg by mouth At Bedtime       estradiol (ESTRACE) 0.1 MG/GM vaginal cream Place 2 g vaginally twice a week 42.5 g 3     famotidine (PEPCID) 20 MG tablet Take 1 tablet (20 mg) by mouth 2 times daily 90 tablet 3     ketoconazole (NIZORAL) 2 % external shampoo Use to wash scalp daily. 120 mL 11     levothyroxine (SYNTHROID/LEVOTHROID) 75 MCG tablet TAKE ONE TABLET BY MOUTH ONCE DAILY EXCEPT  90 tablet 1     LORazepam (ATIVAN) 0.5 MG tablet Take 0.5 mg by mouth every 6 hours as needed for anxiety       Multiple Vitamins-Minerals (OCUVITE ADULT 50+) CAPS Take 1 capsule by mouth daily 30 capsule 12     omeprazole (PRILOSEC) 40 MG DR capsule TAKE 1 CAPSULE BY MOUTH EVERY DAY 90 capsule 3     order for DME Equipment being ordered:   [facility] at [facility]. 1 each 0      rOPINIRole (REQUIP) 0.25 MG tablet TAKE 1 TO 2 TABLETS BY MOUTH EVERY NIGHT AT BEDTIME 180 tablet 3     STATIN NOT PRESCRIBED, INTENTIONAL, by Other route continuous prn. Patient declined 5/4/12  0     Allergies   Allergen Reactions     Ace Inhibitors Cough     Advicor      Alendronic Acid Other (See Comments)     Other reaction(s): GI Upset  heartburn  Severe substernal burning and dysphagia within 3 days       Blood-Group Specific Substance Other (See Comments)     Patient has Anti-Shania and warm auto antibody. Blood products may be delayed. Draw patient 24 hours prior to transfusion. Draw one red top and two purple top tubes for all type and screen orders.     Citalopram GI Disturbance and Nausea     diarrhea  diarrhea     Doxycycline Nausea     Poor appetite     Fosamax      Severe substernal burning and dysphagia within 3 days     Valsartan Cramps and Other (See Comments)     severe  severe     Recent Labs   Lab Test 07/20/20  1354 07/05/20 06/25/19  1157  08/20/18  1448 06/21/18  0946  05/08/18  0835  05/02/14  0823   A1C  --   --   --  5.5  --  4.9 5.3  --   --    < > 5.3   *  --   --  97  --   --   --   --  124*   < > 125   HDL 34*  --   --  35*  --   --   --   --  50   < > 30*   TRIG 236*  --   --  376*  --   --   --   --  160*   < > 216*   ALT 16 13  --   --   --   --   --   --   --   --  27   CR 1.91* 2.05*   < > 2.07*   < > 1.36*  --   --   --    < > 1.17*   GFRESTIMATED 24* 23*   < > 22*   < > 37*  --    < >  --    < > 45*   GFRESTBLACK 27* 28*   < > 25*   < > 45*  --    < >  --    < > 54*   POTASSIUM 4.1 4.8   < > 4.3   < > 3.8  --   --   --    < > 4.2   TSH 0.10*  --   --  2.50   < > 1.58  --   --  4.85*   < > 3.31    < > = values in this interval not displayed.           ROS:  Constitutional, HEENT, cardiovascular, pulmonary, GI, , musculoskeletal, neuro, skin, endocrine and psych systems are negative, except as otherwise noted.  Gets teary-eyed at night time.  She misses her partner,  "Tobi ( 3 years ago)    OBJECTIVE:   /73 (BP Location: Right arm, Patient Position: Chair, Cuff Size: Adult Regular)   Pulse 83   Temp 98.4  F (36.9  C) (Oral)   Ht 1.537 m (5' 0.5\")   Wt 59.9 kg (132 lb)   SpO2 97%   BMI 25.36 kg/m   Estimated body mass index is 25.36 kg/m  as calculated from the following:    Height as of this encounter: 1.537 m (5' 0.5\").    Weight as of this encounter: 59.9 kg (132 lb).     EXAM:   GENERAL APPEARANCE: healthy, alert and no distress  EYES: Eyes grossly normal to inspection, PERRL and conjunctivae and sclerae normal  HENT: ear canals and TM's normal, nose and mouth without ulcers or lesions, oropharynx clear and oral mucous membranes moist  NECK: no adenopathy, no asymmetry, masses, or scars and thyroid normal to palpation  RESP: lungs clear to auscultation - no rales, rhonchi or wheezes  BREAST: normal without masses, tenderness or nipple discharge and no palpable axillary masses or adenopathy  CV: regular rate and rhythm, normal S1 S2, no S3 or S4, no murmur, click or rub, no peripheral edema and peripheral pulses strong  ABDOMEN: soft, nontender, no hepatosplenomegaly, no masses and bowel sounds normal  ABDOMEN   R nephrectomy scar.   Rosalia scar   (female): normal female external genitalia, normal urethral meatus, vaginal mucosal atrophy noted, normal cervix, adnexae, and uterus without masses or abnormal discharge  MS: no musculoskeletal defects are noted and gait is age appropriate without ataxia  SKIN: no suspicious lesions or rashes  NEURO: Normal strength and tone, sensory exam grossly normal, mentation intact and speech normal  PSYCH: mentation appears normal and affect normal/bright    Diagnostic Test Results:  Labs reviewed in Epic  No results found for this or any previous visit (from the past 24 hour(s)).    ASSESSMENT / PLAN:     1. Encounter for Medicare annual wellness exam       2. Hypertension goal BP (blood pressure) < 140/90  Controlled     3. " "Solitary kidney, acquired  Recurrent stones.     4. Kidney stones  Recent ESWL.    Hip pain better.     5. COPD, severe (H)  Per testing.  Clinically no COPD symptoms.     6. Non-small cell cancer of right lung (H)   s/p chemo.  No signs of recurrence. F/u Dr Hsieh    7. CKD (chronic kidney disease) stage 4, GFR 15-29 ml/min (H)       8. Acquired hypothyroidism  Recent dose change due to OVER medication  Recheck 1-2 months.       COUNSELING:  Reviewed preventive health counseling, as reflected in patient instructions       Regular exercise    Estimated body mass index is 25.36 kg/m  as calculated from the following:    Height as of this encounter: 1.537 m (5' 0.5\").    Weight as of this encounter: 59.9 kg (132 lb).         reports that she quit smoking about 50 years ago. Her smoking use included cigarettes. She has never used smokeless tobacco.      Appropriate preventive services were discussed with this patient, including applicable screening as appropriate for cardiovascular disease, diabetes, osteopenia/osteoporosis, and glaucoma.  As appropriate for age/gender, discussed screening for colorectal cancer, prostate cancer, breast cancer, and cervical cancer. Checklist reviewing preventive services available has been given to the patient.    Reviewed patients plan of care and provided an AVS. The Basic Care Plan (routine screening as documented in Health Maintenance) for Helena meets the Care Plan requirement. This Care Plan has been established and reviewed with the Patient.    Counseling Resources:  ATP IV Guidelines  Pooled Cohorts Equation Calculator  Breast Cancer Risk Calculator  FRAX Risk Assessment  ICSI Preventive Guidelines  Dietary Guidelines for Americans, 2010  USDA's MyPlate  ASA Prophylaxis  Lung CA Screening    Sushma Nelson MD  [facility]  "

## 2020-09-14 ENCOUNTER — TRANSFERRED RECORDS (OUTPATIENT)
Dept: HEALTH INFORMATION MANAGEMENT | Facility: CLINIC | Age: 85
End: 2020-09-14

## 2020-10-16 DIAGNOSIS — R80.9 MICROALBUMINURIA: ICD-10-CM

## 2020-10-16 DIAGNOSIS — E03.8 OTHER SPECIFIED HYPOTHYROIDISM: ICD-10-CM

## 2020-10-16 LAB
T4 FREE SERPL-MCNC: 0.83 NG/DL (ref 0.76–1.46)
TSH SERPL DL<=0.005 MIU/L-ACNC: 25.46 MU/L (ref 0.4–4)

## 2020-10-16 PROCEDURE — 84439 ASSAY OF FREE THYROXINE: CPT | Performed by: INTERNAL MEDICINE

## 2020-10-16 PROCEDURE — 36415 COLL VENOUS BLD VENIPUNCTURE: CPT | Performed by: INTERNAL MEDICINE

## 2020-10-16 PROCEDURE — 99000 SPECIMEN HANDLING OFFICE-LAB: CPT | Performed by: INTERNAL MEDICINE

## 2020-10-16 PROCEDURE — 99N1036 PR STATISTIC TOTAL PROTEIN: Performed by: INTERNAL MEDICINE

## 2020-10-16 PROCEDURE — 83520 IMMUNOASSAY QUANT NOS NONAB: CPT | Mod: 90 | Performed by: INTERNAL MEDICINE

## 2020-10-16 PROCEDURE — 86376 MICROSOMAL ANTIBODY EACH: CPT | Performed by: INTERNAL MEDICINE

## 2020-10-16 PROCEDURE — 84165 PROTEIN E-PHORESIS SERUM: CPT | Performed by: FAMILY MEDICINE

## 2020-10-16 PROCEDURE — 84443 ASSAY THYROID STIM HORMONE: CPT | Performed by: INTERNAL MEDICINE

## 2020-10-16 NOTE — LETTER
[facility]   [address] [place]  [address]  [phone]                                   October 20, 2020    Helena Eldridge  [address] [address]        Dear Helena,    As discussed at our visit, the thyroid studies swung in the other direction, so I will have you resume the levothyroxine DAILY (7 days per week).  Recheck the thyroid labs in a couple months.     Results for orders placed or performed in visit on 10/16/20   Thyroid peroxidase antibody     Status: None   Result Value Ref Range    Thyroid Peroxidase Antibody 16 <35 IU/mL   T4, free     Status: None   Result Value Ref Range    T4 Free 0.83 0.76 - 1.46 ng/dL   TSH     Status: Abnormal   Result Value Ref Range    TSH 25.46 (H) 0.40 - 4.00 mU/L   Protein electrophoresis     Status: Abnormal   Result Value Ref Range    Albumin Fraction 4.1 3.7 - 5.1 g/dL    Alpha 1 Fraction 0.3 0.2 - 0.4 g/dL    Alpha 2 Fraction 1.0 (H) 0.5 - 0.9 g/dL    Beta Fraction 0.7 0.6 - 1.0 g/dL    Gamma Fraction 1.1 0.7 - 1.6 g/dL    Monoclonal Peak 0.0 0.0 g/dL    ELP Interpretation:       Essentially normal electrophoretic pattern.  No obvious monoclonal proteins seen.   Pathologic significance requires clinical correlation. Luz Whitley M.D., Ph.D.         If you have any questions please call the clinic at [phone]    Sincerely,      MANUELA MONREAL M.D.   bmd

## 2020-10-19 ENCOUNTER — OFFICE VISIT (OUTPATIENT)
Dept: FAMILY MEDICINE | Facility: CLINIC | Age: 85
End: 2020-10-19
Payer: COMMERCIAL

## 2020-10-19 VITALS
BODY MASS INDEX: 25.16 KG/M2 | TEMPERATURE: 98.6 F | SYSTOLIC BLOOD PRESSURE: 146 MMHG | OXYGEN SATURATION: 96 % | HEART RATE: 83 BPM | WEIGHT: 131 LBS | DIASTOLIC BLOOD PRESSURE: 76 MMHG

## 2020-10-19 DIAGNOSIS — N90.4 LICHEN SCLEROSUS OF FEMALE GENITALIA: ICD-10-CM

## 2020-10-19 DIAGNOSIS — E03.8 OTHER SPECIFIED HYPOTHYROIDISM: ICD-10-CM

## 2020-10-19 DIAGNOSIS — R30.0 DYSURIA: Primary | ICD-10-CM

## 2020-10-19 DIAGNOSIS — R82.81 PYURIA: ICD-10-CM

## 2020-10-19 DIAGNOSIS — N18.4 CKD (CHRONIC KIDNEY DISEASE) STAGE 4, GFR 15-29 ML/MIN (H): ICD-10-CM

## 2020-10-19 DIAGNOSIS — I10 HYPERTENSION GOAL BP (BLOOD PRESSURE) < 140/90: ICD-10-CM

## 2020-10-19 LAB
ALBUMIN SERPL ELPH-MCNC: 4.1 G/DL (ref 3.7–5.1)
ALBUMIN UR-MCNC: NEGATIVE MG/DL
ALPHA1 GLOB SERPL ELPH-MCNC: 0.3 G/DL (ref 0.2–0.4)
ALPHA2 GLOB SERPL ELPH-MCNC: 1 G/DL (ref 0.5–0.9)
APPEARANCE UR: CLEAR
B-GLOBULIN SERPL ELPH-MCNC: 0.7 G/DL (ref 0.6–1)
BACTERIA #/AREA URNS HPF: ABNORMAL /HPF
BILIRUB UR QL STRIP: NEGATIVE
COLOR UR AUTO: YELLOW
GAMMA GLOB SERPL ELPH-MCNC: 1.1 G/DL (ref 0.7–1.6)
GLUCOSE UR STRIP-MCNC: NEGATIVE MG/DL
HGB UR QL STRIP: NEGATIVE
KETONES UR STRIP-MCNC: NEGATIVE MG/DL
LEUKOCYTE ESTERASE UR QL STRIP: ABNORMAL
M PROTEIN SERPL ELPH-MCNC: 0 G/DL
MUCOUS THREADS #/AREA URNS LPF: PRESENT /LPF
NITRATE UR QL: NEGATIVE
NON-SQ EPI CELLS #/AREA URNS LPF: ABNORMAL /LPF
PH UR STRIP: 6 PH (ref 5–7)
PROT PATTERN SERPL ELPH-IMP: ABNORMAL
RBC #/AREA URNS AUTO: ABNORMAL /HPF
RENAL EPI CELLS #/AREA URNS HPF: ABNORMAL /HPF
SOURCE: ABNORMAL
SP GR UR STRIP: 1.02 (ref 1–1.03)
THYROPEROXIDASE AB SERPL-ACNC: 16 IU/ML
UROBILINOGEN UR STRIP-ACNC: 0.2 EU/DL (ref 0.2–1)
WBC #/AREA URNS AUTO: ABNORMAL /HPF
WBC CLUMPS #/AREA URNS HPF: PRESENT /HPF

## 2020-10-19 PROCEDURE — 99214 OFFICE O/P EST MOD 30 MIN: CPT | Performed by: INTERNAL MEDICINE

## 2020-10-19 PROCEDURE — 87086 URINE CULTURE/COLONY COUNT: CPT | Performed by: INTERNAL MEDICINE

## 2020-10-19 PROCEDURE — 81001 URINALYSIS AUTO W/SCOPE: CPT | Performed by: INTERNAL MEDICINE

## 2020-10-19 RX ORDER — FUROSEMIDE 20 MG
20 TABLET ORAL DAILY
Qty: 90 TABLET | Refills: 1 | Status: SHIPPED | OUTPATIENT
Start: 2020-10-19 | End: 2020-12-21

## 2020-10-19 RX ORDER — ESTRADIOL 0.1 MG/G
2 CREAM VAGINAL
Qty: 42.5 G | Refills: 4 | Status: SHIPPED | OUTPATIENT
Start: 2020-10-19 | End: 2020-11-12

## 2020-10-19 RX ORDER — LEVOTHYROXINE SODIUM 75 UG/1
75 TABLET ORAL DAILY
Qty: 90 TABLET | Refills: 3 | Status: SHIPPED | OUTPATIENT
Start: 2020-10-19 | End: 2020-11-03

## 2020-10-19 RX ORDER — DOXYCYCLINE 100 MG/1
100 CAPSULE ORAL 2 TIMES DAILY
Qty: 10 CAPSULE | Refills: 1 | Status: SHIPPED | OUTPATIENT
Start: 2020-10-19 | End: 2020-10-24

## 2020-10-19 NOTE — LETTER
[facility]   [address] [place]  [address]  [phone]                                   October 21, 2020    Helena Eldridge  [address] [address]        Dear Helena,    Urine sample did not grow any significant bacteria.     Results for orders placed or performed in visit on 10/19/20   UA with Microscopic reflex to Culture     Status: Abnormal    Specimen: Midstream Urine   Result Value Ref Range    Color Urine Yellow     Appearance Urine Clear     Glucose Urine Negative NEG^Negative mg/dL    Bilirubin Urine Negative NEG^Negative    Ketones Urine Negative NEG^Negative mg/dL    Specific Gravity Urine 1.020 1.003 - 1.035    pH Urine 6.0 5.0 - 7.0 pH    Protein Albumin Urine Negative NEG^Negative mg/dL    Urobilinogen Urine 0.2 0.2 - 1.0 EU/dL    Nitrite Urine Negative NEG^Negative    Blood Urine Negative NEG^Negative    Leukocyte Esterase Urine Trace (A) NEG^Negative    Source Midstream Urine     WBC Urine 10-25 (A) OTO5^0 - 5 /HPF    RBC Urine O - 2 OTO2^O - 2 /HPF    WBC Clumps Present (A) NEG^Negative /HPF    Squamous Epithelial /LPF Urine Few FEW^Few /LPF    Renal Tub Epi Few (A) NEG^Negative /HPF    Bacteria Urine Few (A) NEG^Negative /HPF    Mucous Urine Present (A) NEG^Negative /LPF   Urine Culture Aerobic Bacterial     Status: None    Specimen: Midstream Urine   Result Value Ref Range    Specimen Description Midstream Urine     Special Requests Specimen received in preservative     Culture Micro       10,000 to 50,000 colonies/mL  mixed urogenital karlee         If you have any questions please call the clinic at [phone]    Sincerely,    Sushma Nelson MD  bmd

## 2020-10-19 NOTE — PATIENT INSTRUCTIONS
For UTI:   Doxycycline    For Blood PRessure:  Add furosemide 20 mg daily     Return to clinic 2 weeks for BP recheck and labs.     Take the thyroid medication EVERY DAY and recheck thyroid function labs in 2 MONTHS    Trial vaginal estrogen for lichen sclerosis

## 2020-10-19 NOTE — RESULT ENCOUNTER NOTE
Helena Eldridge    As discussed at our visit, the thyroid studies swung in the other direction, so I will have you resume the levothyroxine DAILY (7 days per week).  Recheck the thyroid labs in a couple months.     Sincerely,     MANUELA MONREAL M.D.

## 2020-10-19 NOTE — PROGRESS NOTES
Subjective     Helena Eldridge is a 84 year old female who presents to clinic today for the following health issues:    HPI         Hypertension Follow-up      Do you check your blood pressure regularly outside of the clinic? Yes     Are you following a low salt diet? Yes    Are your blood pressures ever more than 140 on the top number (systolic) OR more   than 90 on the bottom number (diastolic), for example 140/90? Yes      How many servings of fruits and vegetables do you eat daily?  2-3    On average, how many sweetened beverages do you drink each day (Examples: soda, juice, sweet tea, etc.  Do NOT count diet or artificially sweetened beverages)?   0    How many days per week do you exercise enough to make your heart beat faster? 3 or less    How many minutes a day do you exercise enough to make your heart beat faster? 9 or less    How many days per week do you miss taking your medication? 0    Right hand numbness that started yesterday,    She has UTI symptoms and wants UA checked.      She will stop lorazapam, takes 0.5 mg each night.  Discussed.  She feels she can do without it.      Review of Systems   Constitutional, HEENT, cardiovascular, pulmonary, gi and gu systems are negative, except as otherwise noted.      Objective    BP (!) 146/76 (BP Location: Right arm, Patient Position: Sitting, Cuff Size: Adult Regular)   Pulse 83   Temp 98.6  F (37  C) (Oral)   Wt 59.4 kg (131 lb)   SpO2 96%   BMI 25.16 kg/m    Body mass index is 25.16 kg/m .  Physical Exam   GENERAL: healthy, alert and no distress  EYES: Eyes grossly normal to inspection, PERRL and conjunctivae and sclerae normal   (female): significant Lichen sclerosis.   MS: no gross musculoskeletal defects noted, no edema    Results for orders placed or performed in visit on 10/19/20 (from the past 24 hour(s))   UA with Microscopic reflex to Culture    Specimen: Midstream Urine   Result Value Ref Range    Color Urine Yellow     Appearance Urine Clear      Glucose Urine Negative NEG^Negative mg/dL    Bilirubin Urine Negative NEG^Negative    Ketones Urine Negative NEG^Negative mg/dL    Specific Gravity Urine 1.020 1.003 - 1.035    pH Urine 6.0 5.0 - 7.0 pH    Protein Albumin Urine Negative NEG^Negative mg/dL    Urobilinogen Urine 0.2 0.2 - 1.0 EU/dL    Nitrite Urine Negative NEG^Negative    Blood Urine Negative NEG^Negative    Leukocyte Esterase Urine Trace (A) NEG^Negative    Source Midstream Urine     WBC Urine 10-25 (A) OTO5^0 - 5 /HPF    RBC Urine O - 2 OTO2^O - 2 /HPF    WBC Clumps Present (A) NEG^Negative /HPF    Squamous Epithelial /LPF Urine Few FEW^Few /LPF    Renal Tub Epi Few (A) NEG^Negative /HPF    Bacteria Urine Few (A) NEG^Negative /HPF    Mucous Urine Present (A) NEG^Negative /LPF           Assessment & Plan     Helena was seen today for hypertension and numbness.    Diagnoses and all orders for this visit:    Dysuria  -     UA with Microscopic reflex to Culture  -     doxycycline hyclate (VIBRAMYCIN) 100 MG capsule; Take 1 capsule (100 mg) by mouth 2 times daily for 5 days  -     Urine Culture Aerobic Bacterial    Other specified hypothyroidism  -     levothyroxine (SYNTHROID/LEVOTHROID) 75 MCG tablet; Take 1 tablet (75 mcg) by mouth daily  -     TSH; Future  -     T4, free; Future    Pyuria  -     doxycycline hyclate (VIBRAMYCIN) 100 MG capsule; Take 1 capsule (100 mg) by mouth 2 times daily for 5 days  -     Urine Culture Aerobic Bacterial    CKD (chronic kidney disease) stage 4, GFR 15-29 ml/min (H)    Hypertension goal BP (blood pressure) < 140/90  -     furosemide (LASIX) 20 MG tablet; Take 1 tablet (20 mg) by mouth daily  -     Basic metabolic panel; Future    Lichen sclerosus of female genitalia  -     estradiol (ESTRACE) 0.1 MG/GM vaginal cream; Place 2 g vaginally twice a week           Due to TFT high, we had her hold thyroid pill once a week  Now, TSH is 25 with Free T4 0.83.   Will have her take thyroid pill DAILY      She just finished  Rx 3 days ago for UTI>     Start Vag estrogen for  Lichen sclerosis    Patient Instructions   For UTI:   Doxycycline    For Blood PRessure:  Add furosemide 20 mg daily     Return to clinic 2 weeks for BP recheck and labs.     Take the thyroid medication EVERY DAY and recheck thyroid function labs in 2 MONTHS    Trial vaginal estrogen for lichen sclerosis       Return in about 2 weeks (around 11/2/2020).  Will rechek UA, BMP... recheck BP     Sushma Nelson MD  [facility]

## 2020-10-20 LAB — TSH RECEP AB SER-ACNC: <1 IU/L (ref 0–1.75)

## 2020-10-21 LAB
BACTERIA SPEC CULT: NORMAL
Lab: NORMAL
SPECIMEN SOURCE: NORMAL

## 2020-10-21 NOTE — RESULT ENCOUNTER NOTE
Helena Eldridge    Urine sample did not grow any significant bacteria.     Sincerely,     MANUELA MONREAL M.D.

## 2020-10-22 ENCOUNTER — NURSE TRIAGE (OUTPATIENT)
Dept: FAMILY MEDICINE | Facility: CLINIC | Age: 85
End: 2020-10-22

## 2020-10-22 DIAGNOSIS — F51.01 PRIMARY INSOMNIA: Primary | ICD-10-CM

## 2020-10-22 RX ORDER — LORAZEPAM 0.5 MG/1
0.5 TABLET ORAL
Qty: 30 TABLET | Refills: 1 | Status: SHIPPED | OUTPATIENT
Start: 2020-10-22 | End: 2023-05-22

## 2020-10-22 NOTE — TELEPHONE ENCOUNTER
Called patient to find out dosing.  She stated that she takes 0.5 mg of Lorazepam at HS.      She then stated that last night she was having left sided chest pain for about 3 hours, she is still very nauseated and can not drink anything.    See triage notes.      Saida Bolton, RIMMAN-RN  [facility]

## 2020-10-22 NOTE — TELEPHONE ENCOUNTER
"  Reason for Disposition    Chest pain lasting longer than 5 minutes and ANY of the following:* Over 50 years old* Over 30 years old and at least one cardiac risk factor (i.e., high blood pressure, diabetes, high cholesterol, obesity, smoker or strong family history of heart disease)* Pain is crushing, pressure-like, or heavy * Took nitroglycerin and chest pain was not relieved* History of heart disease (i.e., angina, heart attack, bypass surgery, angioplasty, CHF)    SEVERE chest pain    Additional Information    Negative: Passed out (i.e., fainted, collapsed and was not responding)    Negative: Severe difficulty breathing (e.g., struggling for each breath, speaks in single words)    Negative: Visible sweat on face or sweat dripping down face    Negative: Sounds like a life-threatening emergency to the triager    Negative: Followed an injury to chest    Negative: Pain also present in shoulder(s) or arm(s) or jaw    Negative: Difficulty breathing    Negative: Cocaine use within last 3 days    Negative: History of prior 'blood clot' in leg or lungs (i.e., deep vein thrombosis, pulmonary embolism)    Negative: Recent illness requiring prolonged bed rest (i.e., immobilization)    Negative: Hip or leg fracture in past 2 months (e.g, or had cast on leg or ankle)    Negative: Major surgery in the past month    Negative: Recent long-distance travel with prolonged time in car, bus, plane, or train (i.e., within past 2 weeks; 6 or more hours duration)    Negative: Heart beating irregularly or very rapidly    Chest pain lasting longer than 5 minutes    Negative: Intermittent chest pain and pain has been increasing in severity or frequency    Dizziness or lightheadedness    Negative: Coughing up blood    Answer Assessment - Initial Assessment Questions  1. LOCATION: \"Where does it hurt?\"       Last night I had left sided chest pain by my left breast area  2. RADIATION: \"Does the pain go anywhere else?\" (e.g., into neck, jaw, " "arms, back)      denies  3. ONSET: \"When did the chest pain begin?\" (Minutes, hours or days)       Last night for about 3 hours  4. PATTERN \"Does the pain come and go, or has it been constant since it started?\"  \"Does it get worse with exertion?\"       See above  5. DURATION: \"How long does it last\" (e.g., seconds, minutes, hours)      It lasted for 3 constant hours  6. SEVERITY: \"How bad is the pain?\"  (e.g., Scale 1-10; mild, moderate, or severe)     - MILD (1-3): doesn't interfere with normal activities      - MODERATE (4-7): interferes with normal activities or awakens from sleep     - SEVERE (8-10): excruciating pain, unable to do any normal activities        It was an 8/10   7. CARDIAC RISK FACTORS: \"Do you have any history of heart problems or risk factors for heart disease?\" (e.g., prior heart attack, angina; high blood pressure, diabetes, being overweight, high cholesterol, smoking, or strong family history of heart disease)      yes  8. PULMONARY RISK FACTORS: \"Do you have any history of lung disease?\"  (e.g., blood clots in lung, asthma, emphysema, birth control pills)      Yes   9. CAUSE: \"What do you think is causing the chest pain?\"      I am not sure  10. OTHER SYMPTOMS: \"Do you have any other symptoms?\" (e.g., dizziness, nausea, vomiting, sweating, fever, difficulty breathing, cough)        I have nausea and can not drink anything.  I feels slightly dizzy and lightheaded.    Protocols used: CHEST PAIN-A-OH    "

## 2020-10-22 NOTE — TELEPHONE ENCOUNTER
Routed to PCP to please advise of the below message.  Saida Bolton, BSN-RN  [facility]

## 2020-10-22 NOTE — TELEPHONE ENCOUNTER
That is right.  So sorry: I needed to do that at my desk with phone, and must have forgotten    Ask patient to remind me the dose on bottle and the quantity.  I am aware she had been getting this from Dr Hsieh in past but can't find his previous Rx.     Cue up and I will send right away

## 2020-10-22 NOTE — TELEPHONE ENCOUNTER
Called patient to ask her what dosage she had on her Ativan. She stated that it is 0.5 mg at HS.  Then stated that last night she was having pain in the left side of her chest.  Please see triage notes.      Advised patient even though her chest pain has been gone since 10 pm, she is still very nauseated, dizzy and lightheaded.  Her pain had lasted for about 3 hours and felt like someone was punching her.    Patient stated understanding and agreeable with the plan of care. Saida BRAVO-RAUL.      Routed to PCP as an FYI.

## 2020-10-22 NOTE — TELEPHONE ENCOUNTER
Reason for Call:  Medication or medication refill:    Do you use a [facility]?  Name of the pharmacy and phone number for the current request:  [facility]     Name of the medication requested: lorazapam    Other request: Patient states Dr. Nelson was supposed to send this medication to the  pharmacy since they werent able to fill it at [facility], but the pharmacy hasnt gotten it yet. Please call patient when script is sent.     Can we leave a detailed message on this number? NO    Phone number patient can be reached at: Home number on file [phone] (home)    Best Time: any     Call taken on 10/22/2020 at 8:07 AM by Sarah Kat

## 2020-11-03 ENCOUNTER — OFFICE VISIT (OUTPATIENT)
Dept: FAMILY MEDICINE | Facility: CLINIC | Age: 85
End: 2020-11-03
Payer: COMMERCIAL

## 2020-11-03 VITALS
TEMPERATURE: 98.3 F | BODY MASS INDEX: 24.39 KG/M2 | DIASTOLIC BLOOD PRESSURE: 68 MMHG | WEIGHT: 127 LBS | HEART RATE: 91 BPM | OXYGEN SATURATION: 95 % | SYSTOLIC BLOOD PRESSURE: 134 MMHG

## 2020-11-03 DIAGNOSIS — A49.8 ENTEROCOCCUS FAECALIS INFECTION: ICD-10-CM

## 2020-11-03 DIAGNOSIS — E03.8 OTHER SPECIFIED HYPOTHYROIDISM: ICD-10-CM

## 2020-11-03 DIAGNOSIS — R30.0 DYSURIA: Primary | ICD-10-CM

## 2020-11-03 DIAGNOSIS — N18.4 CKD (CHRONIC KIDNEY DISEASE) STAGE 4, GFR 15-29 ML/MIN (H): ICD-10-CM

## 2020-11-03 LAB
ALBUMIN UR-MCNC: NEGATIVE MG/DL
APPEARANCE UR: ABNORMAL
BACTERIA #/AREA URNS HPF: ABNORMAL /HPF
BILIRUB UR QL STRIP: NEGATIVE
COLOR UR AUTO: YELLOW
GLUCOSE UR STRIP-MCNC: NEGATIVE MG/DL
HGB UR QL STRIP: NEGATIVE
KETONES UR STRIP-MCNC: NEGATIVE MG/DL
LEUKOCYTE ESTERASE UR QL STRIP: ABNORMAL
MUCOUS THREADS #/AREA URNS LPF: PRESENT /LPF
NITRATE UR QL: NEGATIVE
NON-SQ EPI CELLS #/AREA URNS LPF: ABNORMAL /LPF
PH UR STRIP: 5.5 PH (ref 5–7)
RBC #/AREA URNS AUTO: ABNORMAL /HPF
SOURCE: ABNORMAL
SP GR UR STRIP: 1.02 (ref 1–1.03)
UROBILINOGEN UR STRIP-ACNC: 0.2 EU/DL (ref 0.2–1)
WBC #/AREA URNS AUTO: ABNORMAL /HPF

## 2020-11-03 PROCEDURE — 81001 URINALYSIS AUTO W/SCOPE: CPT | Performed by: INTERNAL MEDICINE

## 2020-11-03 PROCEDURE — 99214 OFFICE O/P EST MOD 30 MIN: CPT | Performed by: INTERNAL MEDICINE

## 2020-11-03 RX ORDER — FAMOTIDINE 20 MG/1
20 TABLET, FILM COATED ORAL 2 TIMES DAILY
COMMUNITY
End: 2020-12-21

## 2020-11-03 RX ORDER — LEVOTHYROXINE SODIUM 100 UG/1
100 TABLET ORAL DAILY
Qty: 90 TABLET | Refills: 3 | Status: SHIPPED | OUTPATIENT
Start: 2020-11-03 | End: 2022-01-06

## 2020-11-03 RX ORDER — AMOXICILLIN AND CLAVULANATE POTASSIUM 500; 125 MG/1; MG/1
1 TABLET, FILM COATED ORAL 2 TIMES DAILY
Qty: 10 TABLET | Refills: 0 | Status: SHIPPED | OUTPATIENT
Start: 2020-11-03 | End: 2020-11-08

## 2020-11-03 NOTE — PATIENT INSTRUCTIONS
[facility] - [place] [phone]   ([place])  -- Creatine is elevated    Increase levothyroxine to 100 mcg daily (for next refill)  You can use up the  75 mcg tabs by taking it 1-1-2      Augmentin twice daily for 5 days (antibiotic)    Return to clinic 3 months for routine follow up (February)

## 2020-11-03 NOTE — PROGRESS NOTES
Subjective     Helena Eldridge is a 85 year old female who presents to clinic today for the following health issues:    HPI     84 y/o F here for f/u.  we started lasix 2 weeks ago for BP.  Seen in ED 10/22 for abdominal pain.  UA, BMP all negative. -resolved wtih odansetron  7/24: Reduce thyroid to 75 mcg... also, should recheck UrAlb in future after Dr Ramirez done w/procedures.       Follow up on UTI- finished her antibiotic but does not think it is completely gone  ED/UC Followup:    Facility:  [facility]  Date of visit: 10/22/2020  Reason for visit: Upper abdominal pain (Primary Dx);   Chronic kidney disease, unspecified CKD stage;   Nausea  Current Status:      Last summer presented with R hip pain and was found to have 6 mm stone in R upper ureter with hydronephorosis .   S/p Stent/ESWL.    Around 10/19 I treated her for presumed UTI  THen she went to ER 10/22/20 for upper abdominal pain as stated above,   She had CT scan, it showed howed Mild nonspecific right hydronephrosis and hydroureter, without a calcified obstructing stone or mass.     UCx grew (from 10/22/20 specimen)  10,000-50,000 CFU/mL Morganella morganii         CULTURE 10,000-50,000 CFU/mL Enterococcus faecalis       She still feels she has some UTI symptoms.           Review of Systems   Constitutional, HEENT, cardiovascular, pulmonary, gi and gu systems are negative, except as otherwise noted.      Objective    /68 (BP Location: Right arm, Patient Position: Sitting, Cuff Size: Adult Regular)   Pulse 91   Temp 98.3  F (36.8  C) (Oral)   Wt 57.6 kg (127 lb)   SpO2 95%   BMI 24.39 kg/m    There is no height or weight on file to calculate BMI.  Physical Exam   GENERAL: healthy, alert and no distress  EYES: Eyes grossly normal to inspection, PERRL and conjunctivae and sclerae normal  MS: no gross musculoskeletal defects noted, no edema  NEURO: Normal strength and tone, mentation intact and speech normal  PSYCH: mentation appears normal, affect  normal/bright    Reviewed recent ER labs and imaging via Care Everywhere   See HPI.         Assessment & Plan     Dysuria  Reviewed Care everywhere .. she grew Enterofeacalis, low CFU but on Abx at that time.   Due to ongoing symptoms will treat with augmentin for 5 d.   Side effects discussed and questions answered.   - UA with Microscopic reflex to Culture  - amoxicillin-clavulanate (AUGMENTIN) 500-125 MG tablet; Take 1 tablet by mouth 2 times daily for 5 days    CKD (chronic kidney disease) stage 4, GFR 15-29 ml/min (H)  Due to start seeing nephrologist.   - NEPHROLOGY ADULT REFERRAL    Other specified hypothyroidism   increased from 70 to 100 mcg daily   She is compliant daily  - levothyroxine (SYNTHROID/LEVOTHROID) 100 MCG tablet; Take 1 tablet (100 mcg) by mouth daily    Enterococcus faecalis infection UTI   see above...   - amoxicillin-clavulanate (AUGMENTIN) 500-125 MG tablet; Take 1 tablet by mouth 2 times daily for 5 days       Patient Instructions   [facility] ([facility] - [place] [phone]   ([place])  -- Creatine is elevated    Increase levothyroxine to 100 mcg daily (for next refill)  You can use up the  75 mcg tabs by taking it 1-1-2      Augmentin twice daily for 5 days (antibiotic)    Return to clinic 3 months for routine follow up (February)              Return in about 3 months (around 2/3/2021) for BP Recheck, Lab Work, Routine Visit.    Sushma Nelson MD  [facility]

## 2020-11-10 ENCOUNTER — OFFICE VISIT (OUTPATIENT)
Dept: FAMILY MEDICINE | Facility: CLINIC | Age: 85
End: 2020-11-10
Payer: COMMERCIAL

## 2020-11-10 VITALS
TEMPERATURE: 98.5 F | OXYGEN SATURATION: 97 % | DIASTOLIC BLOOD PRESSURE: 73 MMHG | WEIGHT: 128 LBS | SYSTOLIC BLOOD PRESSURE: 128 MMHG | HEART RATE: 89 BPM | BODY MASS INDEX: 24.59 KG/M2

## 2020-11-10 DIAGNOSIS — N90.4 LICHEN SCLEROSUS ET ATROPHICUS OF THE VULVA: ICD-10-CM

## 2020-11-10 DIAGNOSIS — L85.3 DRY SKIN DERMATITIS: ICD-10-CM

## 2020-11-10 DIAGNOSIS — I10 HYPERTENSION GOAL BP (BLOOD PRESSURE) < 140/90: Primary | ICD-10-CM

## 2020-11-10 DIAGNOSIS — Z87.440 HISTORY OF RECURRENT UTIS: ICD-10-CM

## 2020-11-10 LAB
ALBUMIN UR-MCNC: NEGATIVE MG/DL
ANION GAP SERPL CALCULATED.3IONS-SCNC: 5 MMOL/L (ref 3–14)
APPEARANCE UR: CLEAR
BILIRUB UR QL STRIP: NEGATIVE
BUN SERPL-MCNC: 42 MG/DL (ref 7–30)
CALCIUM SERPL-MCNC: 8.9 MG/DL (ref 8.5–10.1)
CHLORIDE SERPL-SCNC: 104 MMOL/L (ref 94–109)
CO2 SERPL-SCNC: 30 MMOL/L (ref 20–32)
COLOR UR AUTO: YELLOW
CREAT SERPL-MCNC: 2.48 MG/DL (ref 0.52–1.04)
GFR SERPL CREATININE-BSD FRML MDRD: 17 ML/MIN/{1.73_M2}
GLUCOSE SERPL-MCNC: 125 MG/DL (ref 70–99)
GLUCOSE UR STRIP-MCNC: NEGATIVE MG/DL
HGB UR QL STRIP: NEGATIVE
HYALINE CASTS #/AREA URNS LPF: ABNORMAL /LPF
KETONES UR STRIP-MCNC: NEGATIVE MG/DL
LEUKOCYTE ESTERASE UR QL STRIP: NEGATIVE
MUCOUS THREADS #/AREA URNS LPF: PRESENT /LPF
NITRATE UR QL: NEGATIVE
NON-SQ EPI CELLS #/AREA URNS LPF: ABNORMAL /LPF
PH UR STRIP: 5.5 PH (ref 5–7)
POTASSIUM SERPL-SCNC: 3.6 MMOL/L (ref 3.4–5.3)
RBC #/AREA URNS AUTO: ABNORMAL /HPF
SODIUM SERPL-SCNC: 139 MMOL/L (ref 133–144)
SOURCE: ABNORMAL
SP GR UR STRIP: 1.02 (ref 1–1.03)
UROBILINOGEN UR STRIP-ACNC: 0.2 EU/DL (ref 0.2–1)
WBC #/AREA URNS AUTO: ABNORMAL /HPF

## 2020-11-10 PROCEDURE — 81001 URINALYSIS AUTO W/SCOPE: CPT | Performed by: INTERNAL MEDICINE

## 2020-11-10 PROCEDURE — 99214 OFFICE O/P EST MOD 30 MIN: CPT | Performed by: INTERNAL MEDICINE

## 2020-11-10 PROCEDURE — 80048 BASIC METABOLIC PNL TOTAL CA: CPT | Performed by: INTERNAL MEDICINE

## 2020-11-10 PROCEDURE — 36415 COLL VENOUS BLD VENIPUNCTURE: CPT | Performed by: INTERNAL MEDICINE

## 2020-11-10 RX ORDER — AMMONIUM LACTATE 12 G/100G
CREAM TOPICAL
Qty: 280 G | Refills: 3 | Status: SHIPPED | OUTPATIENT
Start: 2020-11-10 | End: 2022-01-18

## 2020-11-10 NOTE — PATIENT INSTRUCTIONS
Return to clinic Spring 2021 (or sooner if needed)     Petroleum Jelly for Lichen Sclerosis of Vulva

## 2020-11-10 NOTE — PROGRESS NOTES
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 recent CT imaging= no evidence of cancer), L. nephrectomy (stones), osteoporosis, pelvic fx, lichen sclerosis, Gout, CKD 3, hypothryoid   , severe (asymptomatic) COPD   Over Summer '20, had ESWL and stent placement for 6 mm stone in Right upper ureter.      Two weeks ago, treated  UTI (Efaecalis), with Augmentin ... we also added furosemide for her BP Here to recheck UA, BMP and BP ... She is using Vaginal Estrogen for lichen sclerosis, not working.   Vaseline is the only effective agent.      Tolerating LASIX well.      Needs RF ammonium lactate for her feet.     Hypertension Follow-up      Do you check your blood pressure regularly outside of the clinic? Yes     Are you following a low salt diet? Yes    Are your blood pressures ever more than 140 on the top number (systolic) OR more   than 90 on the bottom number (diastolic), for example 140/90? No      How many servings of fruits and vegetables do you eat daily?  2-3    On average, how many sweetened beverages do you drink each day (Examples: soda, juice, sweet tea, etc.  Do NOT count diet or artificially sweetened beverages)?   0    How many days per week do you exercise enough to make your heart beat faster? 3 or less    How many minutes a day do you exercise enough to make your heart beat faster? 20 - 29    How many days per week do you miss taking your medication? 0    Genitourinary - Female  Onset/Duration: 2 days  Description:   Painful urination (Dysuria): YES           Frequency: YES  Blood in urine (Hematuria): yes  Delay in urine (Hesitency): no  Intensity: mild  Progression of Symptoms:  same  Accompanying Signs & Symptoms:  Fever/chills: no  Flank pain: no  Nausea and  vomiting: no  Vaginal symptoms:itching  Abdominal/Pelvic Pain: no  History:   History of frequent UTI s: no  History of kidney stones: YES  Sexually Active: no  Possibility of pregnancy: No  Precipitating or alleviating factors: None  Therapies tried and outcome:  was on augmentin and finishesd that and her symptoms came back. and finished          Review of Systems   Constitutional, HEENT, cardiovascular, pulmonary, gi and gu systems are negative, except as otherwise noted.      Objective    /73 (BP Location: Right arm, Patient Position: Sitting, Cuff Size: Adult Regular)   Pulse 89   Temp 98.5  F (36.9  C)   Wt 58.1 kg (128 lb)   SpO2 97%   BMI 24.59 kg/m    Body mass index is 24.59 kg/m .  Physical Exam   GENERAL: healthy, alert and no distress  EYES: Eyes grossly normal to inspection, PERRL and conjunctivae and sclerae normal  MS: no gross musculoskeletal defects noted, no edema  NEURO: Normal strength and tone, mentation intact and speech normal  PSYCH: mentation appears normal, affect normal/bright    Results for orders placed or performed in visit on 11/10/20 (from the past 24 hour(s))   UA with Microscopic reflex to Culture    Specimen: Midstream Urine   Result Value Ref Range    Color Urine Yellow     Appearance Urine Clear     Glucose Urine Negative NEG^Negative mg/dL    Bilirubin Urine Negative NEG^Negative    Ketones Urine Negative NEG^Negative mg/dL    Specific Gravity Urine 1.025 1.003 - 1.035    pH Urine 5.5 5.0 - 7.0 pH    Protein Albumin Urine Negative NEG^Negative mg/dL    Urobilinogen Urine 0.2 0.2 - 1.0 EU/dL    Nitrite Urine Negative NEG^Negative    Blood Urine Negative NEG^Negative    Leukocyte Esterase Urine Negative NEG^Negative    Source Midstream Urine     WBC Urine 0 - 5 OTO5^0 - 5 /HPF    RBC Urine O - 2 OTO2^O - 2 /HPF    Hyaline Casts O - 2 OTO2^O - 2 /LPF    Squamous Epithelial /LPF Urine Moderate (A) FEW^Few /LPF    Mucous Urine Present (A) NEG^Negative /LPF     BMP is  pending (started Lasix 2 weeks ago)        Assessment & Plan     Hypertension goal BP (blood pressure) < 140/90  Improved, tolerating lasix  - Basic metabolic panel  - JUST IN CASE    History of recurrent UTIs  No  UTI  Her symptoms are due to Lichen scleosis.  Petroleum jelly is helping the most.    - UA with Microscopic reflex to Culture    Dry skin dermatitis     - ammonium lactate (AMLACTIN) 12 % external cream; Apply  topically 2 times daily as needed. apply to affected area/feet             No follow-ups on file.    Sushma Nelson MD  [facility]

## 2020-11-12 NOTE — RESULT ENCOUNTER NOTE
Helena Eldridge    The creatinine is a little higher than recent baseline levels.  Work on increasing daily fluid intake.      For Kidney safety, in the future, we should avoid any imaging using contrast dye.  For example, if you get a CAT scan, it should be done without IV contrast dye.   Other things to avoid are over the counter anti inflammatories (such as ibuprofen, advil, aleve..... tylenol is safe and okay to use as needed).     I am glad you will be seeing the Kidney specialist next month     Sincerely,     MANUELA MONREAL M.D.

## 2020-12-03 DIAGNOSIS — I10 HYPERTENSION GOAL BP (BLOOD PRESSURE) < 140/90: ICD-10-CM

## 2020-12-03 DIAGNOSIS — E03.8 OTHER SPECIFIED HYPOTHYROIDISM: ICD-10-CM

## 2020-12-03 LAB
ANION GAP SERPL CALCULATED.3IONS-SCNC: 3 MMOL/L (ref 3–14)
BUN SERPL-MCNC: 22 MG/DL (ref 7–30)
CALCIUM SERPL-MCNC: 9.5 MG/DL (ref 8.5–10.1)
CHLORIDE SERPL-SCNC: 107 MMOL/L (ref 94–109)
CO2 SERPL-SCNC: 31 MMOL/L (ref 20–32)
CREAT SERPL-MCNC: 1.92 MG/DL (ref 0.52–1.04)
GFR SERPL CREATININE-BSD FRML MDRD: 23 ML/MIN/{1.73_M2}
GLUCOSE SERPL-MCNC: 106 MG/DL (ref 70–99)
POTASSIUM SERPL-SCNC: 4.4 MMOL/L (ref 3.4–5.3)
SODIUM SERPL-SCNC: 141 MMOL/L (ref 133–144)
T4 FREE SERPL-MCNC: 1.13 NG/DL (ref 0.76–1.46)
TSH SERPL DL<=0.005 MIU/L-ACNC: 2.63 MU/L (ref 0.4–4)

## 2020-12-03 PROCEDURE — 84439 ASSAY OF FREE THYROXINE: CPT | Performed by: INTERNAL MEDICINE

## 2020-12-03 PROCEDURE — 84443 ASSAY THYROID STIM HORMONE: CPT | Performed by: INTERNAL MEDICINE

## 2020-12-03 PROCEDURE — 80048 BASIC METABOLIC PNL TOTAL CA: CPT | Performed by: INTERNAL MEDICINE

## 2020-12-03 PROCEDURE — 36415 COLL VENOUS BLD VENIPUNCTURE: CPT | Performed by: INTERNAL MEDICINE

## 2020-12-04 NOTE — RESULT ENCOUNTER NOTE
Helena Eldridge ,      Thyroid tests look great.   Electrolytes and kidney function are stable.     Sincerely,     MANUELA MONREAL M.D.

## 2020-12-07 DIAGNOSIS — R25.2 CRAMP OF LIMB: ICD-10-CM

## 2020-12-08 RX ORDER — ROPINIROLE 0.25 MG/1
TABLET, FILM COATED ORAL
Qty: 180 TABLET | Refills: 3 | Status: SHIPPED | OUTPATIENT
Start: 2020-12-08 | End: 2021-08-17

## 2020-12-10 ENCOUNTER — TRANSFERRED RECORDS (OUTPATIENT)
Dept: HEALTH INFORMATION MANAGEMENT | Facility: CLINIC | Age: 85
End: 2020-12-10

## 2020-12-20 DIAGNOSIS — K21.00 GASTROESOPHAGEAL REFLUX DISEASE WITH ESOPHAGITIS WITHOUT HEMORRHAGE: Primary | ICD-10-CM

## 2020-12-21 ENCOUNTER — VIRTUAL VISIT (OUTPATIENT)
Dept: NEPHROLOGY | Facility: CLINIC | Age: 85
End: 2020-12-21
Payer: COMMERCIAL

## 2020-12-21 DIAGNOSIS — C34.91 NON-SMALL CELL CANCER OF RIGHT LUNG (H): ICD-10-CM

## 2020-12-21 DIAGNOSIS — D84.9 IMMUNOSUPPRESSION (H): ICD-10-CM

## 2020-12-21 DIAGNOSIS — N18.4 CKD (CHRONIC KIDNEY DISEASE) STAGE 4, GFR 15-29 ML/MIN (H): ICD-10-CM

## 2020-12-21 DIAGNOSIS — J44.9 COPD, SEVERE (H): ICD-10-CM

## 2020-12-21 DIAGNOSIS — N28.89 LEFT RENAL MASS: ICD-10-CM

## 2020-12-21 DIAGNOSIS — I10 HYPERTENSION GOAL BP (BLOOD PRESSURE) < 140/90: Primary | ICD-10-CM

## 2020-12-21 PROCEDURE — 99203 OFFICE O/P NEW LOW 30 MIN: CPT | Mod: 95 | Performed by: INTERNAL MEDICINE

## 2020-12-21 RX ORDER — ESTRADIOL 0.1 MG/G
CREAM VAGINAL
COMMUNITY
Start: 2020-10-19 | End: 2022-01-18

## 2020-12-21 RX ORDER — FUROSEMIDE 20 MG
20 TABLET ORAL DAILY PRN
Qty: 90 TABLET | Refills: 1 | Status: SHIPPED | OUTPATIENT
Start: 2020-12-21 | End: 2021-08-17

## 2020-12-21 RX ORDER — LEVOTHYROXINE SODIUM 75 UG/1
TABLET ORAL
COMMUNITY
Start: 2020-10-19 | End: 2021-06-21 | Stop reason: DRUGHIGH

## 2020-12-21 RX ORDER — TACROLIMUS 1 MG/G
OINTMENT TOPICAL
COMMUNITY
Start: 2020-08-04 | End: 2021-08-17

## 2020-12-21 RX ORDER — ONDANSETRON 4 MG/1
TABLET, ORALLY DISINTEGRATING ORAL
COMMUNITY
Start: 2020-10-22 | End: 2022-01-18

## 2020-12-21 RX ORDER — FAMOTIDINE 20 MG/1
TABLET, FILM COATED ORAL
Qty: 90 TABLET | Refills: 3 | Status: SHIPPED | OUTPATIENT
Start: 2020-12-21 | End: 2021-08-22

## 2020-12-21 RX ORDER — HYDROXYZINE HYDROCHLORIDE 25 MG/1
TABLET, FILM COATED ORAL
COMMUNITY
Start: 2020-12-18 | End: 2021-03-18

## 2020-12-21 RX ORDER — CLOBETASOL PROPIONATE 0.5 MG/G
OINTMENT TOPICAL
COMMUNITY
Start: 2020-06-15 | End: 2021-12-13

## 2020-12-21 RX ORDER — FLUOCINOLONE ACETONIDE 0.11 MG/ML
OIL TOPICAL
COMMUNITY
Start: 2020-03-24 | End: 2021-03-10

## 2020-12-21 NOTE — PROGRESS NOTES
"Helena Eldridge is a 85 year old female who is being evaluated via a billable telephone visit.      The patient has been notified of following:     \"This telephone visit will be conducted via a call between you and your physician/provider. We have found that certain health care needs can be provided without the need for a physical exam.  This service lets us provide the care you need with a short phone conversation.  If a prescription is necessary we can send it directly to your pharmacy.  If lab work is needed we can place an order for that and you can then stop by our lab to have the test done at a later time.    Telephone visits are billed at different rates depending on your insurance coverage. During this emergency period, for some insurers they may be billed the same as an in-person visit.  Please reach out to your insurance provider with any questions.    If during the course of the call the physician/provider feels a telephone visit is not appropriate, you will not be charged for this service.\"    Patient has given verbal consent for Telephone visit?  Yes    What phone number would you like to be contacted at? [phone]      How would you like to obtain your AVS? Mail a copy    Phone call duration: 37 minutes    Nguyen Bryant Simms MD    Assessment and Plan:  85 year old female with history of CKD, with eGFR near 25ml/min in recent years, history of lung cancer s/p chemotherapy, kidney cancer s/p left partial resection, kidney stones and infections, ESWL and stent in 7/2020 who presents for evaluation of CKD and proteinuria  # CKD stage 4- herScr has been near 1.8 with eGFR near 23-27 ml/min over the last two years, previous to that her eGFR was near 35ml/min an dback in early 2000s she had eGFR near 40-45ml/min  She has been through many things that have caused CKD including cancer, chemotherapy with carboplatin.    She has 1 gram of proteinuria which puts her at a higher risk for progression  - spent " time educating on how to avoid kidney injury, prevent kidney stones, diet for CKD and stones, hydration     # Hypertension: BP high normal. Aim toward 130/80 if tolerated. Use furosemide once to twice a day as needed for BP and volume control  # Kidney stones: has calcium oxalate stone on stone analysis; discussed low sodium, fluid intake of 2.5 -3 liters if able.    # Anemia in chronic renal disease:   - Hgb: Stable, low normal, 11-11.5  - Iron studies: Not checked recently    # Electrolytes:   - Potassium; level: Normal  - bicarbonate: Normal  # Mineral Bone Disorder:   - Calcium; level is:  Normal   - phos and PTH not checked- ordered   Assessment and plan was discussed with patient and she voiced her understanding and agreement.    Consult:  Helena Eldridge was seen in consultation at the request of Dr. Nelson for CKD management.    Reason for Visit:  Helena Eldridge is a 85 year old female with CKD from renal and lung cancer, who presents for evaluation.    HPI:  She is a pleasant female with multiple comorbidities including hisotry of renal cell carcinoma s/p partial left kidney resection, lung cancer , COPD, who presents for evaluation.  She has been aware of kidney disease and her creatinine has been stable near 1.9 mg/dL over the last few years. Recently her Scr was up to 2.3, eGFR decreased to 17ml/min, but recently improved back and eGFR 23ml/min  Over the years, her eGFR was 35-45 ml/min from 2011 to 2017, but in the last couple of years, her eGFR was 25-28ml/min.  She has undergone treatment for her cancers and doing fairly well at this time.  Her lung cancer (nonsmall cell lung cancer T1N1) was treated with carboplatin/ permetrexeb 6795-9301 (6 cycles) and then nivolumab for a year until it was stopped due to itching.  She denies shortness of breath or chest pain. She has some fatigue with significant exertion.  She denies any skin rash, GI or  issues at this time.  She denies family history of  renal failure or dialysis.    Renal History:   Kidney Disease and Medical Hx:  h/o HTN: Yes  , h/o Protein in Urine: Yes  and h/o Kidney Stones:Yes     ROS:   A comprehensive review of systems was obtained and negative, except as noted in the HPI or PMH.    Active Medical Problems:  Patient Active Problem List   Diagnosis     CARDIOVASCULAR SCREENING; LDL GOAL LESS THAN 100     CKD (chronic kidney disease) stage 4, GFR 15-29 ml/min (H)     Glucose intolerance (impaired glucose tolerance)     Kidney stones     Advance care planning     Hypertension goal BP (blood pressure) < 140/90     Lichen sclerosus et atrophicus of the vulva     Solitary kidney, acquired     Senile osteoporosis     Osteoarthritis of right knee     Medial meniscus tear     Cataracts, both eyes     Macular degeneration of both eyes, right eye greater than left eye     Pelvic fracture (H)     COPD, severe (H)     IPF (idiopathic pulmonary fibrosis) (H)     Other specified hypothyroidism     Idiopathic chronic gout     Adjustment disorder with anxiety     Non-small cell cancer of right lung (H)     Immunosuppression (H)     Port-A-Cath in place     Ureteral stone     PMH:   Medical record was reviewed and PMH was discussed with patient and noted below.  Past Medical History:   Diagnosis Date     Carpal tunnel syndrome      CKD (chronic kidney disease)     kidney stone with infection     Deep vein thrombosis (DVT) (H)     1972     DVT [id number]     Glucose intolerance      H/O partial nephrectomy      Heel spur      Hypothyroidism      Kidney stones      Lichen sclerosus      Osteoarthritis      Osteoporosis      PID      Pulmonary embolism (H)     1970     Unspecified hypothyroidism      Vitiligo      PSH:   Past Surgical History:   Procedure Laterality Date     C NEPHRECTOMY      left partial 07-01-07     C VENOUS THROMBOSIS IMAGING      with pe     EXTRACORPOREAL SHOCK WAVE LITHOTRIPSY, CYSTOSCOPY, INSERT STENT URETER(S), COMBINED Right 7/13/2020     Procedure: LITHOTRIPSY, Right EXTRACORPOREAL SHOCK WAVE (ESWL), WITH CYSTOSCOPY AND Right URETERAL STENT INSERTION;  Surgeon: Buddy Ramirez MD;  Location: [facility] OR     HC REMOVAL GALLBLADDER       HC REVISE MEDIAN N/CARPAL TUNNEL SURG       LASER HOLMIUM LITHOTRIPSY URETER(S), INSERT STENT, COMBINED Right 2020    Procedure: RIGHT CYSTOURETEROSCOPY, WITH LITHOTRIPSY USING LASER AND URETERAL STENT EXCHANGE;  Surgeon: Buddy Ramirez MD;  Location: [facility] OR     TUBAL LIGATION         Family Hx:   Family History   Problem Relation Age of Onset     Cancer Brother      Glaucoma Mother      Alzheimer Disease Brother      Macular Degeneration No family hx of      Personal Hx:   Social History     Tobacco Use     Smoking status: Former Smoker     Types: Cigarettes     Quit date: 1969     Years since quittin.0     Smokeless tobacco: Never Used   Substance Use Topics     Alcohol use: Yes     Alcohol/week: 0.0 standard drinks     Comment: very little        Allergies:  Allergies   Allergen Reactions     Ace Inhibitors Cough     Advicor      Alendronic Acid Other (See Comments)     Other reaction(s): GI Upset  heartburn  Severe substernal burning and dysphagia within 3 days       Blood-Group Specific Substance Other (See Comments)     Patient has Anti-Ray and warm auto antibody. Blood products may be delayed. Draw patient 24 hours prior to transfusion. Draw one red top and two purple top tubes for all type and screen orders.     Citalopram GI Disturbance and Nausea     diarrhea  diarrhea     Doxycycline Nausea     Poor appetite     Fosamax      Severe substernal burning and dysphagia within 3 days     Valsartan Cramps and Other (See Comments)     severe  severe       Medications:  Current Outpatient Medications   Medication Sig     ammonium lactate (AMLACTIN) 12 % external cream Apply  topically 2 times daily as needed. apply to affected area/feet     Calcium Carbonate-Vitamin D (CALCIUM + D) 600-200 MG-UNIT  per tablet Take 2 tablets by mouth 2 times daily.     diphenhydrAMINE (BENADRYL) 25 MG tablet Take 25 mg by mouth At Bedtime      DOCUSATE SODIUM PO Take 100 mg by mouth At Bedtime     famotidine (PEPCID) 20 MG tablet TAKE 1 TABLET(20 MG) BY MOUTH TWICE DAILY     furosemide (LASIX) 20 MG tablet Take 1 tablet (20 mg) by mouth daily as needed (blood pressure higher than 140/90)     levothyroxine (SYNTHROID/LEVOTHROID) 100 MCG tablet Take 1 tablet (100 mcg) by mouth daily     LORazepam (ATIVAN) 0.5 MG tablet Take 1 tablet (0.5 mg) by mouth nightly as needed for anxiety or sleep     Multiple Vitamins-Minerals (OCUVITE ADULT 50+) CAPS Take 1 capsule by mouth daily     omeprazole (PRILOSEC) 40 MG DR capsule TAKE 1 CAPSULE BY MOUTH EVERY DAY     order for DME Equipment being ordered:   [facility] at [facility].     rOPINIRole (REQUIP) 0.25 MG tablet TAKE 1 TO 2 TABLETS BY MOUTH EVERY NIGHT AT BEDTIME     STATIN NOT PRESCRIBED, INTENTIONAL, by Other route continuous prn. Patient declined 5/4/12     clobetasol (TEMOVATE) 0.05 % external ointment TIMOTHY SPARINGLY EXT AA BID AS NEEDED. DO NOT TIMOTHY TO FACE     estradiol (ESTRACE) 0.1 MG/GM vaginal cream PLACE 2 GRAMS VAGINALLY 2 TIMES A WK     Fluocinolone Acetonide Scalp 0.01 % OIL oil TIMOTHY EXT TO THE SCALP HS     hydrOXYzine (ATARAX) 25 MG tablet      levothyroxine (SYNTHROID/LEVOTHROID) 75 MCG tablet      ondansetron (ZOFRAN-ODT) 4 MG ODT tab DIS 1 T ON THE TONGUE Q 8 H PRF NAUSEA OR VOM     tacrolimus (PROTOPIC) 0.1 % external ointment TIMOTHY AA BID IN THE UNDERWEAR AREA AND UNDER THE BREAST.     No current facility-administered medications for this visit.      Facility-Administered Medications Ordered in Other Visits   Medication     perflutren diluted in saline (DEFINITY) injection 5 mL      Vitals:  There were no vitals taken for this visit.    Exam:  General: alert, oriented, conversant  Speaks in full sentences without audible dyspnea or wheeze  Neuro: normal  speech  Psych: conversant, normal affect and thought process      LABS:   CMP  Recent Labs   Lab Test 12/03/20  0912 11/10/20  0930 07/20/20  1354 07/05/20 02/28/20  0854    139 139  --  138   POTASSIUM 4.4 3.6 4.1 4.8 4.4   CHLORIDE 107 104 106  --  102   CO2 31 30 29  --  27   ANIONGAP 3 5 4  --  9   * 125* 144* 96 101*   BUN 22 42* 31*  --  32*   CR 1.92* 2.48* 1.91* 2.05* 1.72*   GFRESTIMATED 23* 17* 24* 23* 27*   GFRESTBLACK 27* 20* 27* 28* 31*   SERAFIN 9.5 8.9 8.7  --  9.3     Recent Labs   Lab Test 07/20/20  1354 07/05/20 05/02/14  0823 04/24/13  0828   ALT 16 13 27 24   AST  --  17 30 25     CBC  Recent Labs   Lab Test 07/20/20  1354 02/28/20  0854 08/09/19  1050 08/20/18  1448   HGB 10.7* 11.0* 11.7 11.6*   WBC 8.5 9.6 9.0 7.2   RBC 3.21* 3.30* 3.31* 3.09*   HCT 32.1* 34.5* 34.8* 33.8*    105* 105* 109*   MCH 33.3* 33.3* 35.3* 37.5*   MCHC 33.3 31.9 33.6 34.3   RDW 12.8 13.0 14.4 14.3    291 276 229     URINE STUDIES  Recent Labs   Lab Test 11/10/20  0944 11/03/20  0932 10/19/20  1050 01/21/20  0850   COLOR Yellow Yellow Yellow Yellow   APPEARANCE Clear Cloudy Clear Clear   URINEGLC Negative Negative Negative Negative   URINEBILI Negative Negative Negative Negative   URINEKETONE Negative Negative Negative Negative   SG 1.025 1.020 1.020 1.025   UBLD Negative Negative Negative Negative   URINEPH 5.5 5.5 6.0 5.5   PROTEIN Negative Negative Negative Negative   UROBILINOGEN 0.2 0.2 0.2 0.2   NITRITE Negative Negative Negative Negative   LEUKEST Negative Small* Trace* Negative   RBCU O - 2 O - 2 O - 2 O - 2   WBCU 0 - 5 5-10* 10-25* 0 - 5     No lab results found.  PTH  No lab results found.  IRON STUDIES  No lab results found.      Nguyen Simms MD

## 2020-12-21 NOTE — LETTER
"12/21/2020       RE: Helena Eldridge  [address] [id number]     Dear Colleague,    Thank you for referring your patient, Helena Eldridge, to the [facility] at [facility]. Please see a copy of my visit note below.    Helena Eldridge is a 85 year old female who is being evaluated via a billable telephone visit.      The patient has been notified of following:     \"This telephone visit will be conducted via a call between you and your physician/provider. We have found that certain health care needs can be provided without the need for a physical exam.  This service lets us provide the care you need with a short phone conversation.  If a prescription is necessary we can send it directly to your pharmacy.  If lab work is needed we can place an order for that and you can then stop by our lab to have the test done at a later time.    Telephone visits are billed at different rates depending on your insurance coverage. During this emergency period, for some insurers they may be billed the same as an in-person visit.  Please reach out to your insurance provider with any questions.    If during the course of the call the physician/provider feels a telephone visit is not appropriate, you will not be charged for this service.\"    Patient has given verbal consent for Telephone visit?  Yes    What phone number would you like to be contacted at? [phone]      How would you like to obtain your AVS? Mail a copy    Phone call duration: 37 minutes    Nguyen Bryant Simms MD    Assessment and Plan:  85 year old female with history of CKD, with eGFR near 25ml/min in recent years, history of lung cancer s/p chemotherapy, kidney cancer s/p left partial resection, kidney stones and infections, ESWL and stent in 7/2020 who presents for evaluation of CKD and proteinuria  # CKD stage 4- herScr has been near 1.8 with eGFR near 23-27 ml/min over the last two " years, previous to that her eGFR was near 35ml/min an dback in early 2000s she had eGFR near 40-45ml/min  She has been through many things that have caused CKD including cancer, chemotherapy with carboplatin.    She has 1 gram of proteinuria which puts her at a higher risk for progression  - spent time educating on how to avoid kidney injury, prevent kidney stones, diet for CKD and stones, hydration     # Hypertension: BP high normal. Aim toward 130/80 if tolerated. Use furosemide once to twice a day as needed for BP and volume control  # Kidney stones: has calcium oxalate stone on stone analysis; discussed low sodium, fluid intake of 2.5 -3 liters if able.    # Anemia in chronic renal disease:   - Hgb: Stable, low normal, 11-11.5  - Iron studies: Not checked recently    # Electrolytes:   - Potassium; level: Normal  - bicarbonate: Normal  # Mineral Bone Disorder:   - Calcium; level is:  Normal   - phos and PTH not checked- ordered   Assessment and plan was discussed with patient and she voiced her understanding and agreement.    Consult:  Helena Eldridge was seen in consultation at the request of Dr. Nelson for CKD management.    Reason for Visit:  Helena Eldridge is a 85 year old female with CKD from renal and lung cancer, who presents for evaluation.    HPI:  She is a pleasant female with multiple comorbidities including hisotry of renal cell carcinoma s/p partial left kidney resection, lung cancer , COPD, who presents for evaluation.  She has been aware of kidney disease and her creatinine has been stable near 1.9 mg/dL over the last few years. Recently her Scr was up to 2.3, eGFR decreased to 17ml/min, but recently improved back and eGFR 23ml/min  Over the years, her eGFR was 35-45 ml/min from 2011 to 2017, but in the last couple of years, her eGFR was 25-28ml/min.  She has undergone treatment for her cancers and doing fairly well at this time.  Her lung cancer (nonsmall cell lung cancer T1N1) was treated with  carboplatin/ permetrexeb 4089-6543 (6 cycles) and then nivolumab for a year until it was stopped due to itching.  She denies shortness of breath or chest pain. She has some fatigue with significant exertion.  She denies any skin rash, GI or  issues at this time.  She denies family history of renal failure or dialysis.    Renal History:   Kidney Disease and Medical Hx:  h/o HTN: Yes  , h/o Protein in Urine: Yes  and h/o Kidney Stones:Yes     ROS:   A comprehensive review of systems was obtained and negative, except as noted in the HPI or PMH.    Active Medical Problems:  Patient Active Problem List   Diagnosis     CARDIOVASCULAR SCREENING; LDL GOAL LESS THAN 100     CKD (chronic kidney disease) stage 4, GFR 15-29 ml/min (H)     Glucose intolerance (impaired glucose tolerance)     Kidney stones     Advance care planning     Hypertension goal BP (blood pressure) < 140/90     Lichen sclerosus et atrophicus of the vulva     Solitary kidney, acquired     Senile osteoporosis     Osteoarthritis of right knee     Medial meniscus tear     Cataracts, both eyes     Macular degeneration of both eyes, right eye greater than left eye     Pelvic fracture (H)     COPD, severe (H)     IPF (idiopathic pulmonary fibrosis) (H)     Other specified hypothyroidism     Idiopathic chronic gout     Adjustment disorder with anxiety     Non-small cell cancer of right lung (H)     Immunosuppression (H)     Port-A-Cath in place     Ureteral stone     PMH:   Medical record was reviewed and PMH was discussed with patient and noted below.  Past Medical History:   Diagnosis Date     Carpal tunnel syndrome      CKD (chronic kidney disease)     kidney stone with infection     Deep vein thrombosis (DVT) (H)     1972     DVT [id number]     Glucose intolerance      H/O partial nephrectomy      Heel spur      Hypothyroidism      Kidney stones      Lichen sclerosus      Osteoarthritis      Osteoporosis      PID      Pulmonary embolism (H)     1970      Unspecified hypothyroidism      Vitiligo      PSH:   Past Surgical History:   Procedure Laterality Date     C NEPHRECTOMY      left partial 07     C VENOUS THROMBOSIS IMAGING      with pe     EXTRACORPOREAL SHOCK WAVE LITHOTRIPSY, CYSTOSCOPY, INSERT STENT URETER(S), COMBINED Right 2020    Procedure: LITHOTRIPSY, Right EXTRACORPOREAL SHOCK WAVE (ESWL), WITH CYSTOSCOPY AND Right URETERAL STENT INSERTION;  Surgeon: Buddy Ramirez MD;  Location: [facility] OR     HC REMOVAL GALLBLADDER       HC REVISE MEDIAN N/CARPAL TUNNEL SURG       LASER HOLMIUM LITHOTRIPSY URETER(S), INSERT STENT, COMBINED Right 2020    Procedure: RIGHT CYSTOURETEROSCOPY, WITH LITHOTRIPSY USING LASER AND URETERAL STENT EXCHANGE;  Surgeon: Buddy Ramirez MD;  Location: [facility] OR     TUBAL LIGATION         Family Hx:   Family History   Problem Relation Age of Onset     Cancer Brother      Glaucoma Mother      Alzheimer Disease Brother      Macular Degeneration No family hx of      Personal Hx:   Social History     Tobacco Use     Smoking status: Former Smoker     Types: Cigarettes     Quit date: 1969     Years since quittin.0     Smokeless tobacco: Never Used   Substance Use Topics     Alcohol use: Yes     Alcohol/week: 0.0 standard drinks     Comment: very little        Allergies:  Allergies   Allergen Reactions     Ace Inhibitors Cough     Advicor      Alendronic Acid Other (See Comments)     Other reaction(s): GI Upset  heartburn  Severe substernal burning and dysphagia within 3 days       Blood-Group Specific Substance Other (See Comments)     Patient has Anti-[place] and warm auto antibody. Blood products may be delayed. Draw patient 24 hours prior to transfusion. Draw one red top and two purple top tubes for all type and screen orders.     Citalopram GI Disturbance and Nausea     diarrhea  diarrhea     Doxycycline Nausea     Poor appetite     Fosamax      Severe substernal burning and dysphagia within 3 days     Valsartan  Cramps and Other (See Comments)     severe  severe       Medications:  Current Outpatient Medications   Medication Sig     ammonium lactate (AMLACTIN) 12 % external cream Apply  topically 2 times daily as needed. apply to affected area/feet     Calcium Carbonate-Vitamin D (CALCIUM + D) 600-200 MG-UNIT per tablet Take 2 tablets by mouth 2 times daily.     diphenhydrAMINE (BENADRYL) 25 MG tablet Take 25 mg by mouth At Bedtime      DOCUSATE SODIUM PO Take 100 mg by mouth At Bedtime     famotidine (PEPCID) 20 MG tablet TAKE 1 TABLET(20 MG) BY MOUTH TWICE DAILY     furosemide (LASIX) 20 MG tablet Take 1 tablet (20 mg) by mouth daily as needed (blood pressure higher than 140/90)     levothyroxine (SYNTHROID/LEVOTHROID) 100 MCG tablet Take 1 tablet (100 mcg) by mouth daily     LORazepam (ATIVAN) 0.5 MG tablet Take 1 tablet (0.5 mg) by mouth nightly as needed for anxiety or sleep     Multiple Vitamins-Minerals (OCUVITE ADULT 50+) CAPS Take 1 capsule by mouth daily     omeprazole (PRILOSEC) 40 MG DR capsule TAKE 1 CAPSULE BY MOUTH EVERY DAY     order for DME Equipment being ordered:   [facility] at [facility].     rOPINIRole (REQUIP) 0.25 MG tablet TAKE 1 TO 2 TABLETS BY MOUTH EVERY NIGHT AT BEDTIME     STATIN NOT PRESCRIBED, INTENTIONAL, by Other route continuous prn. Patient declined 5/4/12     clobetasol (TEMOVATE) 0.05 % external ointment TIMOTHY SPARINGLY EXT AA BID AS NEEDED. DO NOT TIMOTHY TO FACE     estradiol (ESTRACE) 0.1 MG/GM vaginal cream PLACE 2 GRAMS VAGINALLY 2 TIMES A WK     Fluocinolone Acetonide Scalp 0.01 % OIL oil TIMOTHY EXT TO THE SCALP HS     hydrOXYzine (ATARAX) 25 MG tablet      levothyroxine (SYNTHROID/LEVOTHROID) 75 MCG tablet      ondansetron (ZOFRAN-ODT) 4 MG ODT tab DIS 1 T ON THE TONGUE Q 8 H PRF NAUSEA OR VOM     tacrolimus (PROTOPIC) 0.1 % external ointment TIMOTHY AA BID IN THE UNDERWEAR AREA AND UNDER THE BREAST.     No current facility-administered medications for this visit.       Facility-Administered Medications Ordered in Other Visits   Medication     perflutren diluted in saline (DEFINITY) injection 5 mL      Vitals:  There were no vitals taken for this visit.    Exam:  General: alert, oriented, conversant  Speaks in full sentences without audible dyspnea or wheeze  Neuro: normal speech  Psych: conversant, normal affect and thought process      LABS:   CMP  Recent Labs   Lab Test 12/03/20  0912 11/10/20  0930 07/20/20  1354 07/05/20 02/28/20  0854    139 139  --  138   POTASSIUM 4.4 3.6 4.1 4.8 4.4   CHLORIDE 107 104 106  --  102   CO2 31 30 29  --  27   ANIONGAP 3 5 4  --  9   * 125* 144* 96 101*   BUN 22 42* 31*  --  32*   CR 1.92* 2.48* 1.91* 2.05* 1.72*   GFRESTIMATED 23* 17* 24* 23* 27*   GFRESTBLACK 27* 20* 27* 28* 31*   SERAFIN 9.5 8.9 8.7  --  9.3     Recent Labs   Lab Test 07/20/20  1354 07/05/20 05/02/14  0823 04/24/13  0828   ALT 16 13 27 24   AST  --  17 30 25     CBC  Recent Labs   Lab Test 07/20/20  1354 02/28/20  0854 08/09/19  1050 08/20/18  1448   HGB 10.7* 11.0* 11.7 11.6*   WBC 8.5 9.6 9.0 7.2   RBC 3.21* 3.30* 3.31* 3.09*   HCT 32.1* 34.5* 34.8* 33.8*    105* 105* 109*   MCH 33.3* 33.3* 35.3* 37.5*   MCHC 33.3 31.9 33.6 34.3   RDW 12.8 13.0 14.4 14.3    291 276 229     URINE STUDIES  Recent Labs   Lab Test 11/10/20  0944 11/03/20  0932 10/19/20  1050 01/21/20  0850   COLOR Yellow Yellow Yellow Yellow   APPEARANCE Clear Cloudy Clear Clear   URINEGLC Negative Negative Negative Negative   URINEBILI Negative Negative Negative Negative   URINEKETONE Negative Negative Negative Negative   SG 1.025 1.020 1.020 1.025   UBLD Negative Negative Negative Negative   URINEPH 5.5 5.5 6.0 5.5   PROTEIN Negative Negative Negative Negative   UROBILINOGEN 0.2 0.2 0.2 0.2   NITRITE Negative Negative Negative Negative   LEUKEST Negative Small* Trace* Negative   RBCU O - 2 O - 2 O - 2 O - 2   WBCU 0 - 5 5-10* 10-25* 0 - 5     No lab results found.  PTH  No lab  results found.  IRON STUDIES  No lab results found.      Nguyen Simms MD

## 2021-01-09 PROBLEM — N28.89 LEFT RENAL MASS: Status: ACTIVE | Noted: 2021-01-09

## 2021-01-11 ENCOUNTER — PATIENT OUTREACH (OUTPATIENT)
Dept: NEPHROLOGY | Facility: CLINIC | Age: 86
End: 2021-01-11

## 2021-01-11 ENCOUNTER — TELEPHONE (OUTPATIENT)
Dept: NEPHROLOGY | Facility: CLINIC | Age: 86
End: 2021-01-11

## 2021-01-11 NOTE — PROGRESS NOTES
"Dr. Simms wanting to place patient on CKD journey. Episode created. Will refer patient to [facility] for education. Patient agreed to referral and had no other questions or concerns.     CKD to ESRD Checklist    CKD Education:   Status: referred Date: 1/11/21   Definition/Purpose: During the encounter the patient is instructed about one or more of the following: the etiology and prognosis of their CKD; the stability (or lack thereof) of their eGFR or CrCl; medication dosing, IV contrast avoidance, or specific medications to avoid; sodium, potassium, or phosphorus restriction (or other dietary counseling).  Referral to an educator or program that offers any CKD-specific counseling is applicable. Having completed education in the previous 12 months to the encounter date is also applicable.     Modality Education:   Status: referred Date: 1/11/21   Definition/Purpose: During the encounter the patient is directly counseled and/or referred to education regarding the options for dialysis including in-center, home therapies, transplant, and/or hospice.  Patients who are at low risk of progression (by various risk calculators) may be deemed \"not a candidate.\" Completion of education in the past is also applicable.     Preferred Modality:   Definition/Purpose: During the encounter the patient indicates that they have chosen a modality of dialysis, irrespective of the estimated time to ESRD.  The goal of this measure is to reflect education and understanding.  This may change visit to visit as the patient s health status and other conditions dictate.  \"Not a Candidate\" should be selected for patients choosing hospice, are at low risk for progression (based on various scoring and/or clinician judgment), or for other medically documented exclusion.     Access Surgeon Referral:   Definition/Purpose: This measure indicates that the patient has been referred for discussion and/or evaluation by a dialysis access surgeon.  \"Not " "a Candidate\" should be selected for patients choosing hospice, are at low risk for progression (based on various scoring and/or clinician judgment), or for other medically documented exclusion.     Access Placed:   Definition/Purpose: This measure indicates that the patient has undergone surgery for dialysis access placement.   \"Not a Candidate\" should be selected for patients choosing hospice, are at low risk for progression (based on various scoring and/or clinician judgment), or for other medically documented exclusion.     Transplant Listing:   Definition/Purpose: This measure indicates that the patient has actively engaged in the transplant listing process.   \"Not Eligible\" should be selected for patients choosing hospice, are at low risk for progression (based on various scoring and/or clinician judgment), or for other medically documented exclusion.         "

## 2021-01-11 NOTE — TELEPHONE ENCOUNTER
----- Message from Monique Zarco RN sent at 1/11/2021  1:20 PM CST -----  Regarding: [place] Referral  Maciel Chapman,  Could you please fax the paperwork to [facility] for this patient? Dr. Simms referred.  I would appreciate it!  Thank you,  Claire

## 2021-01-28 DIAGNOSIS — K21.00 GASTROESOPHAGEAL REFLUX DISEASE WITH ESOPHAGITIS, UNSPECIFIED WHETHER HEMORRHAGE: ICD-10-CM

## 2021-01-28 RX ORDER — OMEPRAZOLE 40 MG/1
CAPSULE, DELAYED RELEASE ORAL
Qty: 90 CAPSULE | Refills: 3 | Status: SHIPPED | OUTPATIENT
Start: 2021-01-28 | End: 2021-12-30

## 2021-02-11 ENCOUNTER — PATIENT OUTREACH (OUTPATIENT)
Dept: NEPHROLOGY | Facility: CLINIC | Age: 86
End: 2021-02-11

## 2021-02-11 NOTE — PROGRESS NOTES
Attempted to reach patient to discuss kidney smart and how she has been feeling.     Unable to leave voicemail.

## 2021-02-12 NOTE — PROGRESS NOTES
Nephrology Note: Nursing Outreach Encounter    REASON FOR CALL:                                                      REASON FOR CALL: Care Coordination                                          SITUATION/BACKROUND:                                                    Patient is being treated for CKD Stage 4.    Following up to see if patient attended [place] class.      ASSESSMENT:                                                    Patient stated she did attend one part of the kidney smart class. Her son was also present and was able to get his questions answered during that call as well. Patient had no questions or concerns from class. Patient stated she is 85 and has lived a good life and her children are taking good care of her.     Patient was not having any uremic symptoms at this time. Patient was only having problems or concerns with her psoriasis. Told patient to call or report any questions or concerns she has.   Uremic Symptoms: No       PLAN:                                                      Follow Up:   Patient to follow up as scheduled at next apt  Patient to call/[employer] message with updates     Patient verbalized understanding and will contact the clinic with any further questions or concerns.     Claire PERES RN, BSN  Nephrology Care Coordinator  [facility]

## 2021-03-08 ENCOUNTER — OFFICE VISIT (OUTPATIENT)
Dept: NEPHROLOGY | Facility: CLINIC | Age: 86
End: 2021-03-08
Payer: COMMERCIAL

## 2021-03-08 VITALS
WEIGHT: 125 LBS | HEART RATE: 84 BPM | HEIGHT: 61 IN | SYSTOLIC BLOOD PRESSURE: 170 MMHG | DIASTOLIC BLOOD PRESSURE: 70 MMHG | BODY MASS INDEX: 23.6 KG/M2

## 2021-03-08 DIAGNOSIS — N18.32 CHRONIC KIDNEY DISEASE, STAGE 3B (H): ICD-10-CM

## 2021-03-08 DIAGNOSIS — N18.32 ANEMIA OF CHRONIC RENAL FAILURE, STAGE 3B (H): ICD-10-CM

## 2021-03-08 DIAGNOSIS — I10 HYPERTENSION GOAL BP (BLOOD PRESSURE) < 140/90: Primary | ICD-10-CM

## 2021-03-08 DIAGNOSIS — I10 HYPERTENSION GOAL BP (BLOOD PRESSURE) < 140/90: ICD-10-CM

## 2021-03-08 DIAGNOSIS — N18.4 CKD (CHRONIC KIDNEY DISEASE) STAGE 4, GFR 15-29 ML/MIN (H): ICD-10-CM

## 2021-03-08 DIAGNOSIS — D63.1 ANEMIA OF CHRONIC RENAL FAILURE, STAGE 3B (H): ICD-10-CM

## 2021-03-08 DIAGNOSIS — N18.30 CKD (CHRONIC KIDNEY DISEASE) STAGE 3, GFR 30-59 ML/MIN (H): ICD-10-CM

## 2021-03-08 DIAGNOSIS — R80.9 MICROALBUMINURIA: Primary | ICD-10-CM

## 2021-03-08 LAB
ALBUMIN SERPL-MCNC: 4 G/DL (ref 3.4–5)
ANION GAP SERPL CALCULATED.3IONS-SCNC: 5 MMOL/L (ref 3–14)
BUN SERPL-MCNC: 27 MG/DL (ref 7–30)
CALCIUM SERPL-MCNC: 9.3 MG/DL (ref 8.5–10.1)
CHLORIDE SERPL-SCNC: 105 MMOL/L (ref 94–109)
CO2 SERPL-SCNC: 28 MMOL/L (ref 20–32)
CREAT SERPL-MCNC: 1.85 MG/DL (ref 0.52–1.04)
CREAT UR-MCNC: 35 MG/DL
GFR SERPL CREATININE-BSD FRML MDRD: 24 ML/MIN/{1.73_M2}
GLUCOSE SERPL-MCNC: 98 MG/DL (ref 70–99)
MICROALBUMIN UR-MCNC: 55 MG/L
MICROALBUMIN/CREAT UR: 157.84 MG/G CR (ref 0–25)
PHOSPHATE SERPL-MCNC: 3.9 MG/DL (ref 2.5–4.5)
POTASSIUM SERPL-SCNC: 4.1 MMOL/L (ref 3.4–5.3)
PTH-INTACT SERPL-MCNC: 65 PG/ML (ref 18–80)
SODIUM SERPL-SCNC: 138 MMOL/L (ref 133–144)

## 2021-03-08 PROCEDURE — 99214 OFFICE O/P EST MOD 30 MIN: CPT | Performed by: INTERNAL MEDICINE

## 2021-03-08 PROCEDURE — 36415 COLL VENOUS BLD VENIPUNCTURE: CPT | Performed by: INTERNAL MEDICINE

## 2021-03-08 PROCEDURE — 82043 UR ALBUMIN QUANTITATIVE: CPT | Performed by: INTERNAL MEDICINE

## 2021-03-08 PROCEDURE — 83970 ASSAY OF PARATHORMONE: CPT | Performed by: INTERNAL MEDICINE

## 2021-03-08 PROCEDURE — 80069 RENAL FUNCTION PANEL: CPT | Performed by: INTERNAL MEDICINE

## 2021-03-08 RX ORDER — KETOCONAZOLE 20 MG/ML
SHAMPOO TOPICAL
COMMUNITY
Start: 2021-03-02 | End: 2021-08-17

## 2021-03-08 RX ORDER — CLOBETASOL PROPIONATE 0.5 MG/ML
SOLUTION TOPICAL
COMMUNITY
Start: 2021-03-02 | End: 2022-01-18

## 2021-03-08 ASSESSMENT — MIFFLIN-ST. JEOR: SCORE: 949.38

## 2021-03-08 NOTE — PROGRESS NOTES
Assessment and Plan:  85 year old female with history of CKD, with eGFR near 25ml/min in recent years, history of lung cancer s/p chemotherapy, kidney cancer s/p left partial resection, kidney stones and infections, ESWL and stent in 7/2020 who presents for evaluation of CKD and proteinuria  # CKD stage 4- herScr has been near 1.8 with eGFR near 23-27 ml/min over the last two years, previous to that her eGFR was near 35ml/min and back in early 2000s she had eGFR near 40-45ml/min  She has been through many things that have caused CKD including cancer, chemotherapy with carboplatin.    She has 1 gram of proteinuria which puts her at a higher risk for progression  - spent time educating on how to avoid kidney injury, prevent kidney stones, diet for CKD and stones, hydration     # Hypertension: BP high normal. Aim toward 130/80 if tolerated. Use furosemide once to twice a day as needed for BP and volume control  # Kidney stones: has calcium oxalate stone on stone analysis; discussed low sodium, fluid intake of 2.5 -3 liters if able.    # Anemia in chronic renal disease:   - Hgb: Stable, low normal, 11-11.5  - Iron studies: Not checked recently    # Electrolytes:   - Potassium; level: Normal  - bicarbonate: Normal  # Mineral Bone Disorder:   - Calcium; level is:  Normal   - phos and PTH not checked- ordered   Assessment and plan was discussed with patient and she voiced her understanding and agreement.      Reason for Visit:  Helena Eldridge is a 85 year old female with CKD from renal and lung cancer, who presents for evaluation.    HPI:  She is a pleasant female with multiple comorbidities including hisotry of renal cell carcinoma s/p partial left kidney resection, lung cancer , COPD, who presents for evaluation.  She has been aware of kidney disease and her creatinine has been stable near 1.9 mg/dL over the last few years. Recently her Scr was up to 2.3, eGFR decreased to 17ml/min, but recently improved back and  eGFR 23ml/min  Over the years, her eGFR was 35-45 ml/min from 2011 to 2017, but in the last couple of years, her eGFR was 25-28ml/min.  She has undergone treatment for her cancers and doing fairly well at this time.  Her lung cancer (nonsmall cell lung cancer T1N1) was treated with carboplatin/ permetrexeb [id number] (6 cycles) and then nivolumab for a year until it was stopped due to itching.  She denies shortness of breath or chest pain. She has some fatigue with significant exertion.  She denies any skin rash, GI or  issues at this time.  She denies family history of renal failure or dialysis.    Renal History:   Kidney Disease and Medical Hx:  h/o HTN: Yes  , h/o Protein in Urine: Yes  and h/o Kidney Stones:Yes     ROS:   A comprehensive review of systems was obtained and negative, except as noted in the HPI or PMH.    Active Medical Problems:  Patient Active Problem List   Diagnosis     CARDIOVASCULAR SCREENING; LDL GOAL LESS THAN 100     CKD (chronic kidney disease) stage 4, GFR 15-29 ml/min (H)     Glucose intolerance (impaired glucose tolerance)     Kidney stones     Advance care planning     Hypertension goal BP (blood pressure) < 140/90     Lichen sclerosus et atrophicus of the vulva     Solitary kidney, acquired     Senile osteoporosis     Osteoarthritis of right knee     Medial meniscus tear     Cataracts, both eyes     Macular degeneration of both eyes, right eye greater than left eye     Pelvic fracture (H)     COPD, severe (H)     IPF (idiopathic pulmonary fibrosis) (H)     Other specified hypothyroidism     Idiopathic chronic gout     Adjustment disorder with anxiety     Non-small cell cancer of right lung (H)     Immunosuppression (H)     Port-A-Cath in place     Ureteral stone     Left renal mass     PMH:   Medical record was reviewed and PMH was discussed with patient and noted below.  Past Medical History:   Diagnosis Date     Carpal tunnel syndrome      CKD (chronic kidney disease)     kidney  stone with infection     Deep vein thrombosis (DVT) (H)          DVT      Glucose intolerance      H/O partial nephrectomy      Heel spur      Hypothyroidism      Kidney stones      Lichen sclerosus      Osteoarthritis      Osteoporosis      PID      Pulmonary embolism (H)          Unspecified hypothyroidism      Vitiligo      PSH:   Past Surgical History:   Procedure Laterality Date     C NEPHRECTOMY      left partial 07     C VENOUS THROMBOSIS IMAGING      with pe     EXTRACORPOREAL SHOCK WAVE LITHOTRIPSY, CYSTOSCOPY, INSERT STENT URETER(S), COMBINED Right 2020    Procedure: LITHOTRIPSY, Right EXTRACORPOREAL SHOCK WAVE (ESWL), WITH CYSTOSCOPY AND Right URETERAL STENT INSERTION;  Surgeon: Bdudy Ramirez MD;  Location: [facility] OR     HC REMOVAL GALLBLADDER       HC REVISE MEDIAN N/CARPAL TUNNEL SURG       LASER HOLMIUM LITHOTRIPSY URETER(S), INSERT STENT, COMBINED Right 2020    Procedure: RIGHT CYSTOURETEROSCOPY, WITH LITHOTRIPSY USING LASER AND URETERAL STENT EXCHANGE;  Surgeon: Buddy Ramirez MD;  Location: [facility] OR     TUBAL LIGATION         Family Hx:   Family History   Problem Relation Age of Onset     Cancer Brother      Glaucoma Mother      Alzheimer Disease Brother      Macular Degeneration No family hx of      Personal Hx:   Social History     Tobacco Use     Smoking status: Former Smoker     Types: Cigarettes     Quit date: 1969     Years since quittin.2     Smokeless tobacco: Never Used   Substance Use Topics     Alcohol use: Yes     Alcohol/week: 0.0 standard drinks     Comment: very little        Allergies:  Allergies   Allergen Reactions     Ace Inhibitors Cough     Advicor      Alendronic Acid Other (See Comments)     Other reaction(s): GI Upset  heartburn  Severe substernal burning and dysphagia within 3 days       Blood-Group Specific Substance Other (See Comments)     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 Blood products may be delayed. Draw  patient 24 hours prior to transfusion. Draw one red top and two purple top tubes for all type and screen orders.     Citalopram GI Disturbance and Nausea     diarrhea  diarrhea     Doxycycline Nausea     Poor appetite     Fosamax      Severe substernal burning and dysphagia within 3 days     Valsartan Cramps and Other (See Comments)     severe  severe       Medications:  Current Outpatient Medications   Medication Sig     ammonium lactate (AMLACTIN) 12 % external cream Apply  topically 2 times daily as needed. apply to affected area/feet     Calcium Carbonate-Vitamin D (CALCIUM + D) 600-200 MG-UNIT per tablet Take 2 tablets by mouth 2 times daily.     clobetasol (TEMOVATE) 0.05 % external ointment TIMOTHY SPARINGLY EXT AA BID AS NEEDED. DO NOT TIOMTHY TO FACE     diphenhydrAMINE (BENADRYL) 25 MG tablet Take 25 mg by mouth At Bedtime      DOCUSATE SODIUM PO Take 100 mg by mouth At Bedtime     estradiol (ESTRACE) 0.1 MG/GM vaginal cream PLACE 2 GRAMS VAGINALLY 2 TIMES A WK     Fluocinolone Acetonide Scalp 0.01 % OIL oil TIMOTHY EXT TO THE SCALP HS     furosemide (LASIX) 20 MG tablet Take 1 tablet (20 mg) by mouth daily as needed (blood pressure higher than 140/90)     hydrOXYzine (ATARAX) 25 MG tablet      levothyroxine (SYNTHROID/LEVOTHROID) 100 MCG tablet Take 1 tablet (100 mcg) by mouth daily     LORazepam (ATIVAN) 0.5 MG tablet Take 1 tablet (0.5 mg) by mouth nightly as needed for anxiety or sleep     Multiple Vitamins-Minerals (OCUVITE ADULT 50+) CAPS Take 1 capsule by mouth daily     omeprazole (PRILOSEC) 40 MG DR capsule TAKE 1 CAPSULE BY MOUTH EVERY DAY     ondansetron (ZOFRAN-ODT) 4 MG ODT tab DIS 1 T ON THE TONGUE Q 8 H PRF NAUSEA OR VOM     order for DME Equipment being ordered:   [facility].     rOPINIRole (REQUIP) 0.25 MG tablet TAKE 1 TO 2 TABLETS BY MOUTH EVERY NIGHT AT BEDTIME     STATIN NOT PRESCRIBED, INTENTIONAL, by Other route continuous prn. Patient declined 5/4/12     clobetasol  "(TEMOVATE) 0.05 % external solution      famotidine (PEPCID) 20 MG tablet TAKE 1 TABLET(20 MG) BY MOUTH TWICE DAILY     ketoconazole (NIZORAL) 2 % external shampoo      levothyroxine (SYNTHROID/LEVOTHROID) 75 MCG tablet      tacrolimus (PROTOPIC) 0.1 % external ointment TIMOTHY AA BID IN THE UNDERWEAR AREA AND UNDER THE BREAST.     No current facility-administered medications for this visit.      Facility-Administered Medications Ordered in Other Visits   Medication     perflutren diluted in saline (DEFINITY) injection 5 mL      Vitals:  BP (!) 170/70   Pulse 84   Ht 1.549 m (5' 1\")   Wt 56.7 kg (125 lb)   BMI 23.62 kg/m      Exam:  General: alert, oriented, conversant  Heart: RRR  Lungs: clear bilaterally  Ext: no edema  Speaks in full sentences without audible dyspnea or wheeze  Neuro: normal speech  Psych: conversant, normal affect and thought process      LABS:   CMP  Recent Labs   Lab Test 12/03/20  0912 11/10/20  0930 07/20/20  1354 07/05/20 02/28/20  0854    139 139  --  138   POTASSIUM 4.4 3.6 4.1 4.8 4.4   CHLORIDE 107 104 106  --  102   CO2 31 30 29  --  27   ANIONGAP 3 5 4  --  9   * 125* 144* 96 101*   BUN 22 42* 31*  --  32*   CR 1.92* 2.48* 1.91* 2.05* 1.72*   GFRESTIMATED 23* 17* 24* 23* 27*   GFRESTBLACK 27* 20* 27* 28* 31*   SERAFIN 9.5 8.9 8.7  --  9.3     Recent Labs   Lab Test 07/20/20  1354 07/05/20 05/02/14  0823 04/24/13  0828   ALT 16 13 27 24   AST  --  17 30 25     CBC  Recent Labs   Lab Test 07/20/20  1354 02/28/20  0854 08/09/19  1050 08/20/18  1448   HGB 10.7* 11.0* 11.7 11.6*   WBC 8.5 9.6 9.0 7.2   RBC 3.21* 3.30* 3.31* 3.09*   HCT 32.1* 34.5* 34.8* 33.8*    105* 105* 109*   MCH 33.3* 33.3* 35.3* 37.5*   MCHC 33.3 31.9 33.6 34.3   RDW 12.8 13.0 14.4 14.3    291 276 229     URINE STUDIES  Recent Labs   Lab Test 11/10/20  0944 11/03/20  0932 10/19/20  1050 01/21/20  0850   COLOR Yellow Yellow Yellow Yellow   APPEARANCE Clear Cloudy Clear Clear   URINEGLC Negative " Negative Negative Negative   URINEBILI Negative Negative Negative Negative   URINEKETONE Negative Negative Negative Negative   SG 1.025 1.020 1.020 1.025   UBLD Negative Negative Negative Negative   URINEPH 5.5 5.5 6.0 5.5   PROTEIN Negative Negative Negative Negative   UROBILINOGEN 0.2 0.2 0.2 0.2   NITRITE Negative Negative Negative Negative   LEUKEST Negative Small* Trace* Negative   RBCU O - 2 O - 2 O - 2 O - 2   WBCU 0 - 5 5-10* 10-25* 0 - 5     No lab results found.  PTH  No lab results found.  IRON STUDIES  No lab results found.      Nguyen Bryant Simms MD

## 2021-03-08 NOTE — LETTER
3/8/2021       RE: Helena Eldridge  [address] [facility] [id number]     Dear Colleague,    Thank you for referring your patient, Helena Eldridge, to the [facility] [place] at [facility]. Please see a copy of my visit note below.        Assessment and Plan:  85 year old female with history of CKD, with eGFR near 25ml/min in recent years, history of lung cancer s/p chemotherapy, kidney cancer s/p left partial resection, kidney stones and infections, ESWL and stent in 7/2020 who presents for evaluation of CKD and proteinuria  # CKD stage 4- herScr has been near 1.8 with eGFR near 23-27 ml/min over the last two years, previous to that her eGFR was near 35ml/min and back in early 2000s she had eGFR near 40-45ml/min  She has been through many things that have caused CKD including cancer, chemotherapy with carboplatin.    She has 1 gram of proteinuria which puts her at a higher risk for progression  - spent time educating on how to avoid kidney injury, prevent kidney stones, diet for CKD and stones, hydration     # Hypertension: BP high normal. Aim toward 130/80 if tolerated. Use furosemide once to twice a day as needed for BP and volume control  # Kidney stones: has calcium oxalate stone on stone analysis; discussed low sodium, fluid intake of 2.5 -3 liters if able.    # Anemia in chronic renal disease:   - Hgb: Stable, low normal, 11-11.5  - Iron studies: Not checked recently    # Electrolytes:   - Potassium; level: Normal  - bicarbonate: Normal  # Mineral Bone Disorder:   - Calcium; level is:  Normal   - phos and PTH not checked- ordered   Assessment and plan was discussed with patient and she voiced her understanding and agreement.      Reason for Visit:  Helena Eldridge is a 85 year old female with CKD from renal and lung cancer, who presents for evaluation.    HPI:  She is a pleasant female with multiple comorbidities including hisotry of renal  cell carcinoma s/p partial left kidney resection, lung cancer , COPD, who presents for evaluation.  She has been aware of kidney disease and her creatinine has been stable near 1.9 mg/dL over the last few years. Recently her Scr was up to 2.3, eGFR decreased to 17ml/min, but recently improved back and eGFR 23ml/min  Over the years, her eGFR was 35-45 ml/min from 2011 to 2017, but in the last couple of years, her eGFR was 25-28ml/min.  She has undergone treatment for her cancers and doing fairly well at this time.  Her lung cancer (nonsmall cell lung cancer T1N1) was treated with carboplatin/ permetrexeb 1011-3681 (6 cycles) and then nivolumab for a year until it was stopped due to itching.  She denies shortness of breath or chest pain. She has some fatigue with significant exertion.  She denies any skin rash, GI or  issues at this time.  She denies family history of renal failure or dialysis.    Renal History:   Kidney Disease and Medical Hx:  h/o HTN: Yes  , h/o Protein in Urine: Yes  and h/o Kidney Stones:Yes     ROS:   A comprehensive review of systems was obtained and negative, except as noted in the HPI or PMH.    Active Medical Problems:  Patient Active Problem List   Diagnosis     CARDIOVASCULAR SCREENING; LDL GOAL LESS THAN 100     CKD (chronic kidney disease) stage 4, GFR 15-29 ml/min (H)     Glucose intolerance (impaired glucose tolerance)     Kidney stones     Advance care planning     Hypertension goal BP (blood pressure) < 140/90     Lichen sclerosus et atrophicus of the vulva     Solitary kidney, acquired     Senile osteoporosis     Osteoarthritis of right knee     Medial meniscus tear     Cataracts, both eyes     Macular degeneration of both eyes, right eye greater than left eye     Pelvic fracture (H)     COPD, severe (H)     IPF (idiopathic pulmonary fibrosis) (H)     Other specified hypothyroidism     Idiopathic chronic gout     Adjustment disorder with anxiety     Non-small cell cancer of right  lung (H)     Immunosuppression (H)     Port-A-Cath in place     Ureteral stone     Left renal mass     PMH:   Medical record was reviewed and PMH was discussed with patient and noted below.  Past Medical History:   Diagnosis Date     Carpal tunnel syndrome      CKD (chronic kidney disease)     kidney stone with infection     Deep vein thrombosis (DVT) (H)          DVT      Glucose intolerance      H/O partial nephrectomy      Heel spur      Hypothyroidism      Kidney stones      Lichen sclerosus      Osteoarthritis      Osteoporosis      PID      Pulmonary embolism (H)          Unspecified hypothyroidism      Vitiligo      PSH:   Past Surgical History:   Procedure Laterality Date     C NEPHRECTOMY      left partial 07     C VENOUS THROMBOSIS IMAGING      with pe     EXTRACORPOREAL SHOCK WAVE LITHOTRIPSY, CYSTOSCOPY, INSERT STENT URETER(S), COMBINED Right 2020    Procedure: LITHOTRIPSY, Right EXTRACORPOREAL SHOCK WAVE (ESWL), WITH CYSTOSCOPY AND Right URETERAL STENT INSERTION;  Surgeon: Buddy Ramirez MD;  Location: [facility] OR     HC REMOVAL GALLBLADDER       HC REVISE MEDIAN N/CARPAL TUNNEL SURG       LASER HOLMIUM LITHOTRIPSY URETER(S), INSERT STENT, COMBINED Right 2020    Procedure: RIGHT CYSTOURETEROSCOPY, WITH LITHOTRIPSY USING LASER AND URETERAL STENT EXCHANGE;  Surgeon: Buddy Ramirez MD;  Location: [facility] OR     TUBAL LIGATION         Family Hx:   Family History   Problem Relation Age of Onset     Cancer Brother      Glaucoma Mother      Alzheimer Disease Brother      Macular Degeneration No family hx of      Personal Hx:   Social History     Tobacco Use     Smoking status: Former Smoker     Types: Cigarettes     Quit date: 1969     Years since quittin.2     Smokeless tobacco: Never Used   Substance Use Topics     Alcohol use: Yes     Alcohol/week: 0.0 standard drinks     Comment: very little        Allergies:  Allergies   Allergen Reactions     Ace Inhibitors Cough      Advicor      Alendronic Acid Other (See Comments)     Other reaction(s): GI Upset  heartburn  Severe substernal burning and dysphagia within 3 days       Blood-Group Specific Substance Other (See Comments)     Patient has Anti-Shania and warm auto antibody. Blood products may be delayed. Draw patient 24 hours prior to transfusion. Draw one red top and two purple top tubes for all type and screen orders.     Citalopram GI Disturbance and Nausea     diarrhea  diarrhea     Doxycycline Nausea     Poor appetite     Fosamax      Severe substernal burning and dysphagia within 3 days     Valsartan Cramps and Other (See Comments)     severe  severe       Medications:  Current Outpatient Medications   Medication Sig     ammonium lactate (AMLACTIN) 12 % external cream Apply  topically 2 times daily as needed. apply to affected area/feet     Calcium Carbonate-Vitamin D (CALCIUM + D) 600-200 MG-UNIT per tablet Take 2 tablets by mouth 2 times daily.     clobetasol (TEMOVATE) 0.05 % external ointment TIMOTHY SPARINGLY EXT AA BID AS NEEDED. DO NOT TIMOTHY TO FACE     diphenhydrAMINE (BENADRYL) 25 MG tablet Take 25 mg by mouth At Bedtime      DOCUSATE SODIUM PO Take 100 mg by mouth At Bedtime     estradiol (ESTRACE) 0.1 MG/GM vaginal cream PLACE 2 GRAMS VAGINALLY 2 TIMES A WK     Fluocinolone Acetonide Scalp 0.01 % OIL oil TIMOTHY EXT TO THE SCALP HS     furosemide (LASIX) 20 MG tablet Take 1 tablet (20 mg) by mouth daily as needed (blood pressure higher than 140/90)     hydrOXYzine (ATARAX) 25 MG tablet      levothyroxine (SYNTHROID/LEVOTHROID) 100 MCG tablet Take 1 tablet (100 mcg) by mouth daily     LORazepam (ATIVAN) 0.5 MG tablet Take 1 tablet (0.5 mg) by mouth nightly as needed for anxiety or sleep     Multiple Vitamins-Minerals (OCUVITE ADULT 50+) CAPS Take 1 capsule by mouth daily     omeprazole (PRILOSEC) 40 MG DR capsule TAKE 1 CAPSULE BY MOUTH EVERY DAY     ondansetron (ZOFRAN-ODT) 4 MG ODT tab DIS 1 T ON THE TONGUE Q 8 H PRF  "NAUSEA OR VOM     order for DME Equipment being ordered:   [facility] at [facility].     rOPINIRole (REQUIP) 0.25 MG tablet TAKE 1 TO 2 TABLETS BY MOUTH EVERY NIGHT AT BEDTIME     STATIN NOT PRESCRIBED, INTENTIONAL, by Other route continuous prn. Patient declined 5/4/12     clobetasol (TEMOVATE) 0.05 % external solution      famotidine (PEPCID) 20 MG tablet TAKE 1 TABLET(20 MG) BY MOUTH TWICE DAILY     ketoconazole (NIZORAL) 2 % external shampoo      levothyroxine (SYNTHROID/LEVOTHROID) 75 MCG tablet      tacrolimus (PROTOPIC) 0.1 % external ointment TIMOTHY AA BID IN THE UNDERWEAR AREA AND UNDER THE BREAST.     No current facility-administered medications for this visit.      Facility-Administered Medications Ordered in Other Visits   Medication     perflutren diluted in saline (DEFINITY) injection 5 mL      Vitals:  BP (!) 170/70   Pulse 84   Ht 1.549 m (5' 1\")   Wt 56.7 kg (125 lb)   BMI 23.62 kg/m      Exam:  General: alert, oriented, conversant  Heart: RRR  Lungs: clear bilaterally  Ext: no edema  Speaks in full sentences without audible dyspnea or wheeze  Neuro: normal speech  Psych: conversant, normal affect and thought process      LABS:   CMP  Recent Labs   Lab Test 12/03/20  0912 11/10/20  0930 07/20/20  1354 07/05/20 02/28/20  0854    139 139  --  138   POTASSIUM 4.4 3.6 4.1 4.8 4.4   CHLORIDE 107 104 106  --  102   CO2 31 30 29  --  27   ANIONGAP 3 5 4  --  9   * 125* 144* 96 101*   BUN 22 42* 31*  --  32*   CR 1.92* 2.48* 1.91* 2.05* 1.72*   GFRESTIMATED 23* 17* 24* 23* 27*   GFRESTBLACK 27* 20* 27* 28* 31*   SERAFIN 9.5 8.9 8.7  --  9.3     Recent Labs   Lab Test 07/20/20  1354 07/05/20 05/02/14  0823 04/24/13  0828   ALT 16 13 27 24   AST  --  17 30 25     CBC  Recent Labs   Lab Test 07/20/20  1354 02/28/20  0854 08/09/19  1050 08/20/18  1448   HGB 10.7* 11.0* 11.7 11.6*   WBC 8.5 9.6 9.0 7.2   RBC 3.21* 3.30* 3.31* 3.09*   HCT 32.1* 34.5* 34.8* 33.8*    105* 105* 109*   MCH " 33.3* 33.3* 35.3* 37.5*   MCHC 33.3 31.9 33.6 34.3   RDW 12.8 13.0 14.4 14.3    291 276 229     URINE STUDIES  Recent Labs   Lab Test 11/10/20  0944 11/03/20  0932 10/19/20  1050 01/21/20  0850   COLOR Yellow Yellow Yellow Yellow   APPEARANCE Clear Cloudy Clear Clear   URINEGLC Negative Negative Negative Negative   URINEBILI Negative Negative Negative Negative   URINEKETONE Negative Negative Negative Negative   SG 1.025 1.020 1.020 1.025   UBLD Negative Negative Negative Negative   URINEPH 5.5 5.5 6.0 5.5   PROTEIN Negative Negative Negative Negative   UROBILINOGEN 0.2 0.2 0.2 0.2   NITRITE Negative Negative Negative Negative   LEUKEST Negative Small* Trace* Negative   RBCU O - 2 O - 2 O - 2 O - 2   WBCU 0 - 5 5-10* 10-25* 0 - 5     No lab results found.  PTH  No lab results found.  IRON STUDIES  No lab results found.      Nguyen Simms MD

## 2021-03-10 ENCOUNTER — TELEPHONE (OUTPATIENT)
Dept: FAMILY MEDICINE | Facility: CLINIC | Age: 86
End: 2021-03-10

## 2021-03-10 DIAGNOSIS — L40.0 PSORIASIS VULGARIS: ICD-10-CM

## 2021-03-10 RX ORDER — FLUOCINOLONE ACETONIDE 0.11 MG/ML
OIL TOPICAL
Qty: 118.28 ML | Refills: 4 | Status: SHIPPED | OUTPATIENT
Start: 2021-03-10 | End: 2021-08-17

## 2021-03-10 NOTE — TELEPHONE ENCOUNTER
Routing refill request to provider for review/approval because:  Medication is reported/historical    Romaine Mendez RN  [facility]

## 2021-03-10 NOTE — TELEPHONE ENCOUNTER
Reason for Call:  Other call back    Detailed comments: Has psoriasis and would like to get an oil for it.Fluocinolone Acetonide Scalp 0.01 % OIL oil. Would like it sent to [facility]. Went to pick it up but was given the wrong medication    Phone Number Patient can be reached at: Home number on file [phone] (home)    Best Time: Any    Can we leave a detailed message on this number? YES    Call taken on 3/10/2021 at 9:11 AM by Leonela Grewal

## 2021-04-06 ENCOUNTER — PATIENT OUTREACH (OUTPATIENT)
Dept: NEPHROLOGY | Facility: CLINIC | Age: 86
End: 2021-04-06

## 2021-04-06 NOTE — PROGRESS NOTES
"Nephrology Note: Nursing Outreach Encounter    REASON FOR CALL:                                                      REASON FOR CALL: Care Coordination                                          SITUATION/BACKROUND:                                                    Patient is being treated for CKD Stage 4.    Called patient to follow up and see how she has been doing and to answer any questions/concerns.       ASSESSMENT:                                                    Patient stated she is doing fine. She stated I just had a visit with the doctor and have one again in June. Asked patient if she was having any nausea/vomiting patient then stated \"no I told you I'm doing fine.\" Asked patient if she had any questions or concerns about her kidney disease or about dialysis that I could help answer. Patient stated no I don't have any. Told patient to call or send in message to this writer if she had any questions or concerns before her appointment in June. Patient stated yes I will.     Uremic Symptoms: No       PLAN:                                                      Follow Up:   Patient to follow up as scheduled at next apt  Patient to call/MyChart message with updates     Patient verbalized understanding and will contact the clinic with any further questions or concerns.     Monique Zarco RN              "

## 2021-05-06 ENCOUNTER — OFFICE VISIT (OUTPATIENT)
Dept: AUDIOLOGY | Facility: CLINIC | Age: 86
End: 2021-05-06

## 2021-05-06 DIAGNOSIS — H90.3 SENSORINEURAL HEARING LOSS, BILATERAL: Primary | ICD-10-CM

## 2021-05-06 PROCEDURE — 99207 PR NO CHARGE LOS: CPT | Performed by: AUDIOLOGIST

## 2021-05-06 PROCEDURE — V5299 HEARING SERVICE: HCPCS | Performed by: AUDIOLOGIST

## 2021-05-06 NOTE — PROGRESS NOTES
AUDIOLOGY REPORT: HEARING AID RECHECK    SUBJECTIVE: Helena Eldridge is a 85 year old female, :  1935, was seen in the Audiology Clinic at [facility] on 21 for a return check of their hearing aids. Patient was unaccompanied to today's visit.       Background:   Patient is here today with the complaint of not working.     Procedures:       SIDE: Right    : Widex    TYPE: Clear 39 BTE    S/N: [id number]     L: [id number]    WARRANTY:      It was noted that the earmold tubing was clogged with cerumen and required replacement. Once the earmold tubing was replaced the hearing aid was returned to function. Patient requested we replaced the tubing on the left ear hearing aid as well, this was done. Biologic listening check found the hearing aids sounding crisp and clear.     Plan:   Patient will return as needed for hearing aid concerns.     CHARGES:   [id number] [facility]    James Lau CCC-A  Licensed Audiologist #[id number]  2021

## 2021-06-21 ENCOUNTER — OFFICE VISIT (OUTPATIENT)
Dept: NEPHROLOGY | Facility: CLINIC | Age: 86
End: 2021-06-21
Payer: COMMERCIAL

## 2021-06-21 VITALS
WEIGHT: 130 LBS | SYSTOLIC BLOOD PRESSURE: 144 MMHG | BODY MASS INDEX: 24.56 KG/M2 | DIASTOLIC BLOOD PRESSURE: 78 MMHG | HEART RATE: 84 BPM

## 2021-06-21 DIAGNOSIS — N18.32 ANEMIA OF CHRONIC RENAL FAILURE, STAGE 3B (H): ICD-10-CM

## 2021-06-21 DIAGNOSIS — R80.1 PERSISTENT PROTEINURIA: Primary | ICD-10-CM

## 2021-06-21 DIAGNOSIS — N18.32 CHRONIC KIDNEY DISEASE, STAGE 3B (H): ICD-10-CM

## 2021-06-21 DIAGNOSIS — I10 HYPERTENSION GOAL BP (BLOOD PRESSURE) < 140/90: ICD-10-CM

## 2021-06-21 DIAGNOSIS — N18.4 CKD (CHRONIC KIDNEY DISEASE) STAGE 4, GFR 15-29 ML/MIN (H): ICD-10-CM

## 2021-06-21 DIAGNOSIS — D63.1 ANEMIA OF CHRONIC RENAL FAILURE, STAGE 3B (H): ICD-10-CM

## 2021-06-21 LAB
ALBUMIN SERPL-MCNC: 4 G/DL (ref 3.4–5)
ANION GAP SERPL CALCULATED.3IONS-SCNC: 5 MMOL/L (ref 3–14)
BUN SERPL-MCNC: 29 MG/DL (ref 7–30)
CALCIUM SERPL-MCNC: 8.9 MG/DL (ref 8.5–10.1)
CHLORIDE SERPL-SCNC: 100 MMOL/L (ref 94–109)
CO2 SERPL-SCNC: 30 MMOL/L (ref 20–32)
CREAT SERPL-MCNC: 2.11 MG/DL (ref 0.52–1.04)
CREAT UR-MCNC: 20 MG/DL
ERYTHROCYTE [DISTWIDTH] IN BLOOD BY AUTOMATED COUNT: 13.6 % (ref 10–15)
FERRITIN SERPL-MCNC: 375 NG/ML (ref 8–252)
GFR SERPL CREATININE-BSD FRML MDRD: 21 ML/MIN/{1.73_M2}
GLUCOSE SERPL-MCNC: 96 MG/DL (ref 70–99)
HCT VFR BLD AUTO: 35.4 % (ref 35–47)
HGB BLD-MCNC: 11.8 G/DL (ref 11.7–15.7)
IRON SATN MFR SERPL: 39 % (ref 15–46)
IRON SERPL-MCNC: 94 UG/DL (ref 35–180)
MCH RBC QN AUTO: 33.5 PG (ref 26.5–33)
MCHC RBC AUTO-ENTMCNC: 33.3 G/DL (ref 31.5–36.5)
MCV RBC AUTO: 101 FL (ref 78–100)
MICROALBUMIN UR-MCNC: 10 MG/L
MICROALBUMIN/CREAT UR: 49.9 MG/G CR (ref 0–25)
PHOSPHATE SERPL-MCNC: 3.9 MG/DL (ref 2.5–4.5)
PLATELET # BLD AUTO: 300 10E9/L (ref 150–450)
POTASSIUM SERPL-SCNC: 4.1 MMOL/L (ref 3.4–5.3)
PTH-INTACT SERPL-MCNC: 106 PG/ML (ref 18–80)
RBC # BLD AUTO: 3.52 10E12/L (ref 3.8–5.2)
SODIUM SERPL-SCNC: 135 MMOL/L (ref 133–144)
TIBC SERPL-MCNC: 242 UG/DL (ref 240–430)
WBC # BLD AUTO: 7.8 10E9/L (ref 4–11)

## 2021-06-21 PROCEDURE — 83550 IRON BINDING TEST: CPT | Performed by: INTERNAL MEDICINE

## 2021-06-21 PROCEDURE — 82728 ASSAY OF FERRITIN: CPT | Performed by: INTERNAL MEDICINE

## 2021-06-21 PROCEDURE — 85027 COMPLETE CBC AUTOMATED: CPT | Performed by: INTERNAL MEDICINE

## 2021-06-21 PROCEDURE — 99215 OFFICE O/P EST HI 40 MIN: CPT | Performed by: INTERNAL MEDICINE

## 2021-06-21 PROCEDURE — 83540 ASSAY OF IRON: CPT | Performed by: INTERNAL MEDICINE

## 2021-06-21 PROCEDURE — 83970 ASSAY OF PARATHORMONE: CPT | Performed by: INTERNAL MEDICINE

## 2021-06-21 PROCEDURE — 36415 COLL VENOUS BLD VENIPUNCTURE: CPT | Performed by: INTERNAL MEDICINE

## 2021-06-21 PROCEDURE — 82043 UR ALBUMIN QUANTITATIVE: CPT | Performed by: INTERNAL MEDICINE

## 2021-06-21 PROCEDURE — 80069 RENAL FUNCTION PANEL: CPT | Performed by: INTERNAL MEDICINE

## 2021-06-21 NOTE — PROGRESS NOTES
Assessment and Plan:  85 year old female with history of CKD, with eGFR near 25ml/min in recent years, history of lung cancer s/p chemotherapy, kidney cancer s/p left partial resection, kidney stones and infections, ESWL and stent in 7/2020 who presents for followup of CKD and proteinuria  # CKD stage 4- herScr has been near 1.8 with eGFR near 23-27 ml/min over the last two years, previous to that her eGFR was near 35ml/min and back in early 2000s she had eGFR near 40-45ml/min  She has been through many things that have caused CKD including cancer, chemotherapy with carboplatin. Last Scr 1.84, eGFR 24ml/min    She has 1 gram of proteinuria which puts her at a higher risk for progression  - check labs today    # Hypertension: BP high normal. Aim toward 130/80 if tolerated. Furosemide 20mg    # Kidney stones: has calcium oxalate stone on stone analysis; discussed low sodium, fluid intake of 2.5 -3 liters     # Anemia in chronic renal disease:   - Hgb: Stable, low normal, 11-11.5  - Iron studies: Not checked recently    # Electrolytes:   - Potassium; level: Normal  - bicarbonate: Normal  # Mineral Bone Disorder:   - Calcium; level is:  Normal   - phos and PTH normal  Assessment and plan was discussed with patient and she voiced her understanding and agreement.    RTC 4 months    Reason for Visit:  Helena Eldridge is a 85 year old female with CKD from renal and lung cancer, who presents for followup    HPI:  She is a pleasant female with multiple comorbidities including hisotry of renal cell carcinoma s/p partial left kidney resection, lung cancer , COPD, who presents for followup of CKD She has been aware of kidney disease and her creatinine has been stable near 1.8-2 mg/dL over the last few years. Recently her Scr was up to 2.3, eGFR decreased to 17ml/min, but recently back and eGFR 23ml/min  Over the years, her eGFR was 35-45 ml/min from 2011 to 2017, but in the last couple of years, her eGFR was 25-28ml/min.  She has  undergone treatment for her cancers and doing fairly well at this time.  Her lung cancer (nonsmall cell lung cancer T1N1) was treated with carboplatin/ permetrexeb [id number] (6 cycles) and then nivolumab for a year until it was stopped due to itching.  She denies shortness of breath or chest pain. She has some fatigue with significant exertion.  She denies any skin rash, GI or  issues at this time.  She denies family history of renal failure or dialysis.    She has been doing well since last visit. She denies shortness of breath or swelling  She has had many family deaths recently and this has been difficult, but she is doing well health wise.  Her daughter in law  recently (age 53)    Renal History:   Kidney Disease and Medical Hx:  h/o HTN: Yes  , h/o Protein in Urine: Yes  and h/o Kidney Stones:Yes     ROS:   A comprehensive review of systems was obtained and negative, except as noted in the HPI or PMH.    Active Medical Problems:  Patient Active Problem List   Diagnosis     CARDIOVASCULAR SCREENING; LDL GOAL LESS THAN 100     CKD (chronic kidney disease) stage 4, GFR 15-29 ml/min (H)     Glucose intolerance (impaired glucose tolerance)     Kidney stones     Advance care planning     Hypertension goal BP (blood pressure) < 140/90     Lichen sclerosus et atrophicus of the vulva     Solitary kidney, acquired     Senile osteoporosis     Osteoarthritis of right knee     Medial meniscus tear     Cataracts, both eyes     Macular degeneration of both eyes, right eye greater than left eye     Pelvic fracture (H)     COPD, severe (H)     IPF (idiopathic pulmonary fibrosis) (H)     Other specified hypothyroidism     Idiopathic chronic gout     Adjustment disorder with anxiety     Non-small cell cancer of right lung (H)     Immunosuppression (H)     Port-A-Cath in place     Ureteral stone     Left renal mass     PMH:   Medical record was reviewed and PMH was discussed with patient and noted below.  Past Medical  History:   Diagnosis Date     Carpal tunnel syndrome      CKD (chronic kidney disease)     kidney stone with infection     Deep vein thrombosis (DVT) (H)          DVT      Glucose intolerance      H/O partial nephrectomy      Heel spur      Hypothyroidism      Kidney stones      Lichen sclerosus      Osteoarthritis      Osteoporosis      PID      Pulmonary embolism (H)          Unspecified hypothyroidism      Vitiligo      PSH:   Past Surgical History:   Procedure Laterality Date     C NEPHRECTOMY      left partial 07     C VENOUS THROMBOSIS IMAGING      with pe     EXTRACORPOREAL SHOCK WAVE LITHOTRIPSY, CYSTOSCOPY, INSERT STENT URETER(S), COMBINED Right 2020    Procedure: LITHOTRIPSY, Right EXTRACORPOREAL SHOCK WAVE (ESWL), WITH CYSTOSCOPY AND Right URETERAL STENT INSERTION;  Surgeon: Buddy Ramirez MD;  Location: [facility]     HC REMOVAL GALLBLADDER       HC REVISE MEDIAN N/CARPAL TUNNEL SURG       LASER HOLMIUM LITHOTRIPSY URETER(S), INSERT STENT, COMBINED Right 2020    Procedure: RIGHT CYSTOURETEROSCOPY, WITH LITHOTRIPSY USING LASER AND URETERAL STENT EXCHANGE;  Surgeon: Buddy Ramirez MD;  Location: [facility]     TUBAL LIGATION         Family Hx:   Family History   Problem Relation Age of Onset     Cancer Brother      Glaucoma Mother      Alzheimer Disease Brother      Macular Degeneration No family hx of      Personal Hx:   Social History     Tobacco Use     Smoking status: Former Smoker     Types: Cigarettes     Quit date: 1969     Years since quittin.5     Smokeless tobacco: Never Used   Substance Use Topics     Alcohol use: Yes     Alcohol/week: 0.0 standard drinks     Comment: very little        Allergies:  Allergies   Allergen Reactions     Ace Inhibitors Cough     Advicor      Alendronic Acid Other (See Comments)     Other reaction(s): GI Upset  heartburn  Severe substernal burning and dysphagia within 3 days       Blood-Group Specific Substance Other (See  Comments)     Patient has Anti-Shania and warm auto antibody. Blood products may be delayed. Draw patient 24 hours prior to transfusion. Draw one red top and two purple top tubes for all type and screen orders.     Citalopram GI Disturbance and Nausea     diarrhea  diarrhea     Doxycycline Nausea     Poor appetite     Fosamax      Severe substernal burning and dysphagia within 3 days     Valsartan Cramps and Other (See Comments)     severe  severe       Medications:  Current Outpatient Medications   Medication Sig     ammonium lactate (AMLACTIN) 12 % external cream Apply  topically 2 times daily as needed. apply to affected area/feet     Calcium Carbonate-Vitamin D (CALCIUM + D) 600-200 MG-UNIT per tablet Take 2 tablets by mouth 2 times daily.     clobetasol (TEMOVATE) 0.05 % external ointment TIMOTHY SPARINGLY EXT AA BID AS NEEDED. DO NOT TIMOTHY TO FACE     clobetasol (TEMOVATE) 0.05 % external solution      furosemide (LASIX) 20 MG tablet Take 1 tablet (20 mg) by mouth daily as needed (blood pressure higher than 140/90)     levothyroxine (SYNTHROID/LEVOTHROID) 100 MCG tablet Take 1 tablet (100 mcg) by mouth daily     Multiple Vitamins-Minerals (OCUVITE ADULT 50+) CAPS Take 1 capsule by mouth daily     order for DME Equipment being ordered:   [facility] [facility] [employer] at [facility].     rOPINIRole (REQUIP) 0.25 MG tablet TAKE 1 TO 2 TABLETS BY MOUTH EVERY NIGHT AT BEDTIME     diphenhydrAMINE (BENADRYL) 25 MG tablet Take 25 mg by mouth At Bedtime      DOCUSATE SODIUM PO Take 100 mg by mouth At Bedtime     estradiol (ESTRACE) 0.1 MG/GM vaginal cream PLACE 2 GRAMS VAGINALLY 2 TIMES A WK     famotidine (PEPCID) 20 MG tablet TAKE 1 TABLET(20 MG) BY MOUTH TWICE DAILY     Fluocinolone Acetonide Scalp 0.01 % OIL oil Apply topically to scalp at bedtime     hydrOXYzine (ATARAX) 25 MG tablet TAKE 1 TABLET BY MOUTH TWICE DAILY AS NEEDED FOR ITCHING     ketoconazole (NIZORAL) 2 % external shampoo      LORazepam (ATIVAN) 0.5 MG tablet Take 1  tablet (0.5 mg) by mouth nightly as needed for anxiety or sleep     omeprazole (PRILOSEC) 40 MG DR capsule TAKE 1 CAPSULE BY MOUTH EVERY DAY     ondansetron (ZOFRAN-ODT) 4 MG ODT tab DIS 1 T ON THE TONGUE Q 8 H PRF NAUSEA OR VOM     STATIN NOT PRESCRIBED, INTENTIONAL, by Other route continuous prn. Patient declined 5/4/12     tacrolimus (PROTOPIC) 0.1 % external ointment TIMOTHY AA BID IN THE UNDERWEAR AREA AND UNDER THE BREAST.     No current facility-administered medications for this visit.      Facility-Administered Medications Ordered in Other Visits   Medication     perflutren diluted in saline (DEFINITY) injection 5 mL      Vitals:  BP (!) 144/78   Pulse 84   Wt 59 kg (130 lb)   BMI 24.56 kg/m      Exam:  General: alert, oriented, conversant  Heart: RRR  Lungs: clear bilaterally  Ext: no edema  Speaks in full sentences without audible dyspnea or wheeze  Neuro: normal speech  Psych: conversant, normal affect and thought process      LABS:   CMP  Recent Labs   Lab Test 03/08/21  1325 12/03/20  0912 11/10/20  0930 07/20/20  1354    141 139 139   POTASSIUM 4.1 4.4 3.6 4.1   CHLORIDE 105 107 104 106   CO2 28 31 30 29   ANIONGAP 5 3 5 4   GLC 98 106* 125* 144*   BUN 27 22 42* 31*   CR 1.85* 1.92* 2.48* 1.91*   GFRESTIMATED 24* 23* 17* 24*   GFRESTBLACK 28* 27* 20* 27*   SEARFIN 9.3 9.5 8.9 8.7     Recent Labs   Lab Test 07/20/20  1354 07/05/20 05/02/14  0823 04/24/13  0828   ALT 16 13 27 24   AST  --  17 30 25     CBC  Recent Labs   Lab Test 07/20/20  1354 02/28/20  0854 08/09/19  1050 08/20/18  1448   HGB 10.7* 11.0* 11.7 11.6*   WBC 8.5 9.6 9.0 7.2   RBC 3.21* 3.30* 3.31* 3.09*   HCT 32.1* 34.5* 34.8* 33.8*    105* 105* 109*   MCH 33.3* 33.3* 35.3* 37.5*   MCHC 33.3 31.9 33.6 34.3   RDW 12.8 13.0 14.4 14.3    291 276 229     URINE STUDIES  Recent Labs   Lab Test 11/10/20  0944 11/03/20  0932 10/19/20  1050 01/21/20  0850   COLOR Yellow Yellow Yellow Yellow   APPEARANCE Clear Cloudy Clear Clear    URINEGLC Negative Negative Negative Negative   URINEBILI Negative Negative Negative Negative   URINEKETONE Negative Negative Negative Negative   SG 1.025 1.020 1.020 1.025   UBLD Negative Negative Negative Negative   URINEPH 5.5 5.5 6.0 5.5   PROTEIN Negative Negative Negative Negative   UROBILINOGEN 0.2 0.2 0.2 0.2   NITRITE Negative Negative Negative Negative   LEUKEST Negative Small* Trace* Negative   RBCU O - 2 O - 2 O - 2 O - 2   WBCU 0 - 5 5-10* 10-25* 0 - 5     No lab results found.  PTH  Recent Labs   Lab Test 03/08/21  1325   PTHI 65     IRON STUDIES  No lab results found.    TECHNIQUE: Scans were obtained from the diaphragm through the pelvis  without IV contrast. Radiation dose for this scan was reduced using  automated exposure control, adjustment of the mA and/or kV according  to patient size, or iterative reconstruction technique.     COMPARISON: 5/19/2015.     FINDINGS: Coronary calcifications. Linear atelectasis or fibrosis left  lung base. Liver, spleen, and pancreas are normal. Previous  cholecystectomy with prominence to the common bile duct again noted  and unchanged. Atrophic changes again noted in the left kidney which  are unchanged from 5/19/2015. 2 small calculi again noted in the left  kidney. Left kidney is otherwise unchanged. Right kidney is normal in  size with small calculi again noted in the lower portion of the right  kidney which are unchanged. Right kidney and ureter are otherwise  normal. Diverticula in the colon without evidence of diverticulitis.  Colon and small bowel are otherwise normal. Calcification of the  abdominal aorta and iliac arteries. No abdominal or pelvic adenopathy.  Remainder of the scan is negative and unchanged from 5/19/2015.                                                                      IMPRESSION:   1. Bilateral nonobstructing nephrolithiasis which is unchanged from  5/19/2015. Atrophic left kidney again noted.  2. Remainder of the scan is  unchanged.  Nguyen Bryant Simms MD

## 2021-06-21 NOTE — LETTER
6/21/2021       RE: Helena Eldridge  [address] [id number]     Dear Colleague,    Thank you for referring your patient, Helena Eldridge, to the [facility] at [facility]. Please see a copy of my visit note below.    Assessment and Plan:  85 year old female with history of CKD, with eGFR near 25ml/min in recent years, history of lung cancer s/p chemotherapy, kidney cancer s/p left partial resection, kidney stones and infections, ESWL and stent in 7/2020 who presents for followup of CKD and proteinuria  # CKD stage 4- herScr has been near 1.8 with eGFR near 23-27 ml/min over the last two years, previous to that her eGFR was near 35ml/min and back in early 2000s she had eGFR near 40-45ml/min  She has been through many things that have caused CKD including cancer, chemotherapy with carboplatin. Last Scr 1.84, eGFR 24ml/min    She has 1 gram of proteinuria which puts her at a higher risk for progression  - check labs today    # Hypertension: BP high normal. Aim toward 130/80 if tolerated. Furosemide 20mg    # Kidney stones: has calcium oxalate stone on stone analysis; discussed low sodium, fluid intake of 2.5 -3 liters     # Anemia in chronic renal disease:   - Hgb: Stable, low normal, 11-11.5  - Iron studies: Not checked recently    # Electrolytes:   - Potassium; level: Normal  - bicarbonate: Normal  # Mineral Bone Disorder:   - Calcium; level is:  Normal   - phos and PTH normal  Assessment and plan was discussed with patient and she voiced her understanding and agreement.    RTC 4 months    Reason for Visit:  Helena Eldridge is a 85 year old female with CKD from renal and lung cancer, who presents for followup    HPI:  She is a pleasant female with multiple comorbidities including hisotry of renal cell carcinoma s/p partial left kidney resection, lung cancer , COPD, who presents for followup of CKD She has been aware of kidney  disease and her creatinine has been stable near 1.8-2 mg/dL over the last few years. Recently her Scr was up to 2.3, eGFR decreased to 17ml/min, but recently back and eGFR 23ml/min  Over the years, her eGFR was 35-45 ml/min from  to 2017, but in the last couple of years, her eGFR was 25-28ml/min.  She has undergone treatment for her cancers and doing fairly well at this time.  Her lung cancer (nonsmall cell lung cancer T1N1) was treated with carboplatin/ permetrexeb 5236-0587 (6 cycles) and then nivolumab for a year until it was stopped due to itching.  She denies shortness of breath or chest pain. She has some fatigue with significant exertion.  She denies any skin rash, GI or  issues at this time.  She denies family history of renal failure or dialysis.    She has been doing well since last visit. She denies shortness of breath or swelling  She has had many family deaths recently and this has been difficult, but she is doing well health wise.  Her daughter in law  recently (age 53)    Renal History:   Kidney Disease and Medical Hx:  h/o HTN: Yes  , h/o Protein in Urine: Yes  and h/o Kidney Stones:Yes     ROS:   A comprehensive review of systems was obtained and negative, except as noted in the HPI or PMH.    Active Medical Problems:  Patient Active Problem List   Diagnosis     CARDIOVASCULAR SCREENING; LDL GOAL LESS THAN 100     CKD (chronic kidney disease) stage 4, GFR 15-29 ml/min (H)     Glucose intolerance (impaired glucose tolerance)     Kidney stones     Advance care planning     Hypertension goal BP (blood pressure) < 140/90     Lichen sclerosus et atrophicus of the vulva     Solitary kidney, acquired     Senile osteoporosis     Osteoarthritis of right knee     Medial meniscus tear     Cataracts, both eyes     Macular degeneration of both eyes, right eye greater than left eye     Pelvic fracture (H)     COPD, severe (H)     IPF (idiopathic pulmonary fibrosis) (H)     Other specified hypothyroidism      Idiopathic chronic gout     Adjustment disorder with anxiety     Non-small cell cancer of right lung (H)     Immunosuppression (H)     Port-A-Cath in place     Ureteral stone     Left renal mass     PMH:   Medical record was reviewed and PMH was discussed with patient and noted below.  Past Medical History:   Diagnosis Date     Carpal tunnel syndrome      CKD (chronic kidney disease)     kidney stone with infection     Deep vein thrombosis (DVT) (H)          DVT      Glucose intolerance      H/O partial nephrectomy      Heel spur      Hypothyroidism      Kidney stones      Lichen sclerosus      Osteoarthritis      Osteoporosis      PID      Pulmonary embolism (H)          Unspecified hypothyroidism      Vitiligo      PSH:   Past Surgical History:   Procedure Laterality Date     C NEPHRECTOMY      left partial 07     C VENOUS THROMBOSIS IMAGING      with pe     EXTRACORPOREAL SHOCK WAVE LITHOTRIPSY, CYSTOSCOPY, INSERT STENT URETER(S), COMBINED Right 2020    Procedure: LITHOTRIPSY, Right EXTRACORPOREAL SHOCK WAVE (ESWL), WITH CYSTOSCOPY AND Right URETERAL STENT INSERTION;  Surgeon: Buddy Ramirez MD;  Location: [facility] OR     HC REMOVAL GALLBLADDER       HC REVISE MEDIAN N/CARPAL TUNNEL SURG       LASER HOLMIUM LITHOTRIPSY URETER(S), INSERT STENT, COMBINED Right 2020    Procedure: RIGHT CYSTOURETEROSCOPY, WITH LITHOTRIPSY USING LASER AND URETERAL STENT EXCHANGE;  Surgeon: uBddy Ramirez MD;  Location: [facility] OR     TUBAL LIGATION         Family Hx:   Family History   Problem Relation Age of Onset     Cancer Brother      Glaucoma Mother      Alzheimer Disease Brother      Macular Degeneration No family hx of      Personal Hx:   Social History     Tobacco Use     Smoking status: Former Smoker     Types: Cigarettes     Quit date: 1969     Years since quittin.5     Smokeless tobacco: Never Used   Substance Use Topics     Alcohol use: Yes     Alcohol/week: 0.0 standard drinks      Comment: very little        Allergies:  Allergies   Allergen Reactions     Ace Inhibitors Cough     Advicor      Alendronic Acid Other (See Comments)     Other reaction(s): GI Upset  heartburn  Severe substernal burning and dysphagia within 3 days       Blood-Group Specific Substance Other (See Comments)     Patient has Anti-Shania and warm auto antibody. Blood products may be delayed. Draw patient 24 hours prior to transfusion. Draw one red top and two purple top tubes for all type and screen orders.     Citalopram GI Disturbance and Nausea     diarrhea  diarrhea     Doxycycline Nausea     Poor appetite     Fosamax      Severe substernal burning and dysphagia within 3 days     Valsartan Cramps and Other (See Comments)     severe  severe       Medications:  Current Outpatient Medications   Medication Sig     ammonium lactate (AMLACTIN) 12 % external cream Apply  topically 2 times daily as needed. apply to affected area/feet     Calcium Carbonate-Vitamin D (CALCIUM + D) 600-200 MG-UNIT per tablet Take 2 tablets by mouth 2 times daily.     clobetasol (TEMOVATE) 0.05 % external ointment TIMOTHY SPARINGLY EXT AA BID AS NEEDED. DO NOT TIMOTHY TO FACE     clobetasol (TEMOVATE) 0.05 % external solution      furosemide (LASIX) 20 MG tablet Take 1 tablet (20 mg) by mouth daily as needed (blood pressure higher than 140/90)     levothyroxine (SYNTHROID/LEVOTHROID) 100 MCG tablet Take 1 tablet (100 mcg) by mouth daily     Multiple Vitamins-Minerals (OCUVITE ADULT 50+) CAPS Take 1 capsule by mouth daily     order for DME Equipment being ordered:   [facility] at [facility].     rOPINIRole (REQUIP) 0.25 MG tablet TAKE 1 TO 2 TABLETS BY MOUTH EVERY NIGHT AT BEDTIME     diphenhydrAMINE (BENADRYL) 25 MG tablet Take 25 mg by mouth At Bedtime      DOCUSATE SODIUM PO Take 100 mg by mouth At Bedtime     estradiol (ESTRACE) 0.1 MG/GM vaginal cream PLACE 2 GRAMS VAGINALLY 2 TIMES A WK     famotidine (PEPCID) 20 MG tablet TAKE 1  TABLET(20 MG) BY MOUTH TWICE DAILY     Fluocinolone Acetonide Scalp 0.01 % OIL oil Apply topically to scalp at bedtime     hydrOXYzine (ATARAX) 25 MG tablet TAKE 1 TABLET BY MOUTH TWICE DAILY AS NEEDED FOR ITCHING     ketoconazole (NIZORAL) 2 % external shampoo      LORazepam (ATIVAN) 0.5 MG tablet Take 1 tablet (0.5 mg) by mouth nightly as needed for anxiety or sleep     omeprazole (PRILOSEC) 40 MG DR capsule TAKE 1 CAPSULE BY MOUTH EVERY DAY     ondansetron (ZOFRAN-ODT) 4 MG ODT tab DIS 1 T ON THE TONGUE Q 8 H PRF NAUSEA OR VOM     STATIN NOT PRESCRIBED, INTENTIONAL, by Other route continuous prn. Patient declined 5/4/12     tacrolimus (PROTOPIC) 0.1 % external ointment TIMOTHY AA BID IN THE UNDERWEAR AREA AND UNDER THE BREAST.     No current facility-administered medications for this visit.      Facility-Administered Medications Ordered in Other Visits   Medication     perflutren diluted in saline (DEFINITY) injection 5 mL      Vitals:  BP (!) 144/78   Pulse 84   Wt 59 kg (130 lb)   BMI 24.56 kg/m      Exam:  General: alert, oriented, conversant  Heart: RRR  Lungs: clear bilaterally  Ext: no edema  Speaks in full sentences without audible dyspnea or wheeze  Neuro: normal speech  Psych: conversant, normal affect and thought process      LABS:   CMP  Recent Labs   Lab Test 03/08/21  1325 12/03/20  0912 11/10/20  0930 07/20/20  1354    141 139 139   POTASSIUM 4.1 4.4 3.6 4.1   CHLORIDE 105 107 104 106   CO2 28 31 30 29   ANIONGAP 5 3 5 4   GLC 98 106* 125* 144*   BUN 27 22 42* 31*   CR 1.85* 1.92* 2.48* 1.91*   GFRESTIMATED 24* 23* 17* 24*   GFRESTBLACK 28* 27* 20* 27*   SERAFIN 9.3 9.5 8.9 8.7     Recent Labs   Lab Test 07/20/20  1354 07/05/20 05/02/14  0823 04/24/13  0828   ALT 16 13 27 24   AST  --  17 30 25     CBC  Recent Labs   Lab Test 07/20/20  1354 02/28/20  0854 08/09/19  1050 08/20/18  1448   HGB 10.7* 11.0* 11.7 11.6*   WBC 8.5 9.6 9.0 7.2   RBC 3.21* 3.30* 3.31* 3.09*   HCT 32.1* 34.5* 34.8* 33.8*     105* 105* 109*   MCH 33.3* 33.3* 35.3* 37.5*   MCHC 33.3 31.9 33.6 34.3   RDW 12.8 13.0 14.4 14.3    291 276 229     URINE STUDIES  Recent Labs   Lab Test 11/10/20  0944 11/03/20  0932 10/19/20  1050 01/21/20  0850   COLOR Yellow Yellow Yellow Yellow   APPEARANCE Clear Cloudy Clear Clear   URINEGLC Negative Negative Negative Negative   URINEBILI Negative Negative Negative Negative   URINEKETONE Negative Negative Negative Negative   SG 1.025 1.020 1.020 1.025   UBLD Negative Negative Negative Negative   URINEPH 5.5 5.5 6.0 5.5   PROTEIN Negative Negative Negative Negative   UROBILINOGEN 0.2 0.2 0.2 0.2   NITRITE Negative Negative Negative Negative   LEUKEST Negative Small* Trace* Negative   RBCU O - 2 O - 2 O - 2 O - 2   WBCU 0 - 5 5-10* 10-25* 0 - 5     No lab results found.  PTH  Recent Labs   Lab Test 03/08/21  1325   PTHI 65     IRON STUDIES  No lab results found.    TECHNIQUE: Scans were obtained from the diaphragm through the pelvis  without IV contrast. Radiation dose for this scan was reduced using  automated exposure control, adjustment of the mA and/or kV according  to patient size, or iterative reconstruction technique.     COMPARISON: 5/19/2015.     FINDINGS: Coronary calcifications. Linear atelectasis or fibrosis left  lung base. Liver, spleen, and pancreas are normal. Previous  cholecystectomy with prominence to the common bile duct again noted  and unchanged. Atrophic changes again noted in the left kidney which  are unchanged from 5/19/2015. 2 small calculi again noted in the left  kidney. Left kidney is otherwise unchanged. Right kidney is normal in  size with small calculi again noted in the lower portion of the right  kidney which are unchanged. Right kidney and ureter are otherwise  normal. Diverticula in the colon without evidence of diverticulitis.  Colon and small bowel are otherwise normal. Calcification of the  abdominal aorta and iliac arteries. No abdominal or pelvic  adenopathy.  Remainder of the scan is negative and unchanged from 5/19/2015.                                                                      IMPRESSION:   1. Bilateral nonobstructing nephrolithiasis which is unchanged from  5/19/2015. Atrophic left kidney again noted.  2. Remainder of the scan is unchanged.  Nguyen Simms MD

## 2021-07-16 ENCOUNTER — TELEPHONE (OUTPATIENT)
Dept: NEPHROLOGY | Facility: CLINIC | Age: 86
End: 2021-07-16

## 2021-07-16 NOTE — TELEPHONE ENCOUNTER
Tried to reach to pt to set up Follow up appt with Dr. Simms at the [facility]. In Oct 2021    NA at current phone #, no VM is set up to leave message. Will try to call again within the week    Gi Al CMA

## 2021-08-11 ENCOUNTER — PATIENT OUTREACH (OUTPATIENT)
Dept: NEPHROLOGY | Facility: CLINIC | Age: 86
End: 2021-08-11

## 2021-08-11 NOTE — PROGRESS NOTES
Voicemail box not set up. Unable to leave voicemail. Will attempt to call again later. [employer] message sent.

## 2021-08-16 ENCOUNTER — ANCILLARY PROCEDURE (OUTPATIENT)
Dept: MAMMOGRAPHY | Facility: CLINIC | Age: 86
End: 2021-08-16
Attending: INTERNAL MEDICINE
Payer: COMMERCIAL

## 2021-08-16 DIAGNOSIS — Z12.31 VISIT FOR SCREENING MAMMOGRAM: ICD-10-CM

## 2021-08-16 PROCEDURE — 77067 SCR MAMMO BI INCL CAD: CPT | Mod: TC | Performed by: RADIOLOGY

## 2021-08-17 ENCOUNTER — OFFICE VISIT (OUTPATIENT)
Dept: INTERNAL MEDICINE | Facility: CLINIC | Age: 86
End: 2021-08-17
Payer: COMMERCIAL

## 2021-08-17 VITALS
TEMPERATURE: 98.2 F | DIASTOLIC BLOOD PRESSURE: 76 MMHG | OXYGEN SATURATION: 94 % | SYSTOLIC BLOOD PRESSURE: 134 MMHG | BODY MASS INDEX: 24.73 KG/M2 | HEART RATE: 85 BPM | WEIGHT: 131 LBS | HEIGHT: 61 IN

## 2021-08-17 DIAGNOSIS — J44.9 COPD, SEVERE (H): ICD-10-CM

## 2021-08-17 DIAGNOSIS — B02.9 HERPES ZOSTER WITHOUT COMPLICATION: ICD-10-CM

## 2021-08-17 DIAGNOSIS — Z13.220 SCREENING FOR HYPERLIPIDEMIA: Primary | ICD-10-CM

## 2021-08-17 DIAGNOSIS — H61.23 BILATERAL IMPACTED CERUMEN: ICD-10-CM

## 2021-08-17 DIAGNOSIS — D84.9 IMMUNOSUPPRESSION (H): ICD-10-CM

## 2021-08-17 DIAGNOSIS — Z00.00 ENCOUNTER FOR MEDICARE ANNUAL WELLNESS EXAM: ICD-10-CM

## 2021-08-17 DIAGNOSIS — C34.91 NON-SMALL CELL CANCER OF RIGHT LUNG (H): ICD-10-CM

## 2021-08-17 PROCEDURE — 99397 PER PM REEVAL EST PAT 65+ YR: CPT | Performed by: INTERNAL MEDICINE

## 2021-08-17 PROCEDURE — 99213 OFFICE O/P EST LOW 20 MIN: CPT | Mod: 25 | Performed by: INTERNAL MEDICINE

## 2021-08-17 RX ORDER — VALACYCLOVIR HYDROCHLORIDE 1 G/1
1000 TABLET, FILM COATED ORAL 3 TIMES DAILY
Qty: 21 TABLET | Refills: 0 | Status: SHIPPED | OUTPATIENT
Start: 2021-08-17 | End: 2022-01-18

## 2021-08-17 ASSESSMENT — ACTIVITIES OF DAILY LIVING (ADL): CURRENT_FUNCTION: NO ASSISTANCE NEEDED

## 2021-08-17 ASSESSMENT — MIFFLIN-ST. JEOR: SCORE: 976.59

## 2021-08-17 NOTE — PATIENT INSTRUCTIONS
"  Patient Education   Personalized Prevention Plan  You are due for the preventive services outlined below.  Your care team is available to assist you in scheduling these services.  If you have already completed any of these items, please share that information with your care team to update in your medical record.  Health Maintenance Due   Topic Date Due     ANNUAL REVIEW OF HM ORDERS  Never done     Osteoporosis Screening  06/01/2020     PHQ-2  01/01/2021     Cholesterol Lab  07/20/2021     Eye Exam  08/10/2021     FALL RISK ASSESSMENT  08/14/2021     Annual Wellness Visit  08/14/2021               I think the \"itch\" might be shingles: Antiviral called in for you to take for 7days    Due for visit with Nephrologist, Dr Galvin           "

## 2021-08-17 NOTE — PROGRESS NOTES
"SUBJECTIVE:   Helena Eldridge is a 85 year old female who presents for Preventive Visit.    84 y/o F here for AFE.      H/o  h/o RUL/Hilar NSCLungCa (s/p low dose carboplatin and pemerexed.....had to d/c Novolumib immunotherapy due to  pruritis and yeast infections.... recent CT imaging= no evidence of cancer), L. nephrectomy (stones&RCCa), osteoporosis, pelvic fx, lichen sclerosis, Gout, CKD 4, hypothryoid   , severe (asymptomatic) COPD   Over Summer '20, had ESWL and stent placement for 6 mm stone in Right upper ureter.   She has follow up with Dr Galvin in the near future. She has not heard yet from their office.     Her psoriasis is bothering her    Especially R flank   Using Cerave OTC and it works most of time.     Her daughter in law recently  of blood clot (Blair's wife)  Her son, Tobi had an ocular emergency, was unable to drive home until recently.     She is spending time at her community center. . . One son is joining her there weekly and playing \"500\".         Patient has been advised of split billing requirements and indicates understanding: Yes   Are you in the first 12 months of your Medicare coverage?  No    Healthy Habits:    In general, how would you rate your overall health?  Very good    Frequency of exercise:  6-7 days/week    Duration of exercise:  15-30 minutes    Do you usually eat at least 4 servings of fruit and vegetables a day, include whole grains    & fiber and avoid regularly eating high fat or \"junk\" foods?  No    Taking medications regularly:  Yes    Barriers to taking medications:  None    Medication side effects:  None    Ability to successfully perform activities of daily living:  No assistance needed    Home Safety:  No safety concerns identified    Hearing Impairment:  Difficult to understand a speaker at a public meeting or Zoroastrianism service    In the past 6 months, have you been bothered by leaking of urine?  No    In general, how would you rate your overall mental or " emotional health?  Good      PHQ-2 Total Score:    Additional concerns today:  Yes (psorasis)    Do you feel safe in your environment? Yes    Have you ever done Advance Care Planning? (For example, a Health Directive, POLST, or a discussion with a medical provider or your loved ones about your wishes): Yes, advance care planning is on file.       Fall risk       Cognitive Screening   1) Repeat 3 items (Leader, Season, Table)    2) Clock draw: NORMAL  3) 3 item recall: Recalls 3 objects  Results: 3 items recalled: COGNITIVE IMPAIRMENT LESS LIKELY    Mini-CogTM Copyright TIARA Cam. Licensed by the author for use in [facility]; reprinted with permission (soob@[facility]). All rights reserved.      Do you have sleep apnea, excessive snoring or daytime drowsiness?: no    Reviewed and updated as needed this visit by clinical staff                 Reviewed and updated as needed this visit by Provider                Social History     Tobacco Use     Smoking status: Former Smoker     Types: Cigarettes     Quit date: 1969     Years since quittin.7     Smokeless tobacco: Never Used   Substance Use Topics     Alcohol use: Yes     Alcohol/week: 0.0 standard drinks     Comment: very little      If you drink alcohol do you typically have >3 drinks per day or >7 drinks per week? No    Alcohol Use 2019   Prescreen: >3 drinks/day or >7 drinks/week? -   Prescreen: >3 drinks/day or >7 drinks/week? No           Discuss psorasis    Current providers sharing in care for this patient include:   Patient Care Team:  Sushma Nelson MD as PCP - General  Sushma Nelson MD as Assigned PCP  Elidia Bower PA-C as Physician Assistant (Physician Assistant)  Buddy Ramirez MD as Assigned Surgical Provider  Nguyen Simms MD as MD (Nephrology)  Nguyen Simms MD as Assigned Nephrology Provider  Monique Zarco RN as Specialty Care Coordinator (Nephrology)  Maine Barahona RN as  Specialty Care Coordinator (Nephrology)  Mildred Waterman, RN as Specialty Care Coordinator (Nephrology)    The following health maintenance items are reviewed in Epic and correct as of today:  Health Maintenance Due   Topic Date Due     ANNUAL REVIEW OF HM ORDERS  Never done     DEXA  06/01/2020     PHQ-2  01/01/2021     LIPID  07/20/2021     EYE EXAM  08/10/2021     FALL RISK ASSESSMENT  08/14/2021     MEDICARE ANNUAL WELLNESS VISIT  08/14/2021     Lab work is in process  Labs reviewed in EPIC  BP Readings from Last 3 Encounters:   08/17/21 134/76   06/21/21 (!) 144/78   03/08/21 (!) 170/70    Wt Readings from Last 3 Encounters:   08/17/21 59.4 kg (131 lb)   06/21/21 59 kg (130 lb)   03/08/21 56.7 kg (125 lb)                  Patient Active Problem List   Diagnosis     CARDIOVASCULAR SCREENING; LDL GOAL LESS THAN 100     CKD (chronic kidney disease) stage 4, GFR 15-29 ml/min (H)     Glucose intolerance (impaired glucose tolerance)     Kidney stones     Advance care planning     Hypertension goal BP (blood pressure) < 140/90     Lichen sclerosus et atrophicus of the vulva     Solitary kidney, acquired     Senile osteoporosis     Osteoarthritis of right knee     Medial meniscus tear     Cataracts, both eyes     Macular degeneration of both eyes, right eye greater than left eye     Pelvic fracture (H)     COPD, severe (H)     IPF (idiopathic pulmonary fibrosis) (H)     Other specified hypothyroidism     Idiopathic chronic gout     Adjustment disorder with anxiety     Non-small cell cancer of right lung (H)     Immunosuppression (H)     Port-A-Cath in place     Ureteral stone     Left renal mass     Past Surgical History:   Procedure Laterality Date     C NEPHRECTOMY      left partial 07-01-07     C VENOUS THROMBOSIS IMAGING      with pe     EXTRACORPOREAL SHOCK WAVE LITHOTRIPSY, CYSTOSCOPY, INSERT STENT URETER(S), COMBINED Right 7/13/2020    Procedure: LITHOTRIPSY, Right EXTRACORPOREAL SHOCK WAVE (ESWL), WITH CYSTOSCOPY  AND Right URETERAL STENT INSERTION;  Surgeon: Buddy Ramirez MD;  Location: [facility] OR     HC REMOVAL GALLBLADDER       HC REVISE MEDIAN N/CARPAL TUNNEL SURG       LASER HOLMIUM LITHOTRIPSY URETER(S), INSERT STENT, COMBINED Right 2020    Procedure: RIGHT CYSTOURETEROSCOPY, WITH LITHOTRIPSY USING LASER AND URETERAL STENT EXCHANGE;  Surgeon: Buddy Ramirez MD;  Location: [facility] OR     TUBAL LIGATION         Social History     Tobacco Use     Smoking status: Former Smoker     Types: Cigarettes     Quit date: 1969     Years since quittin.7     Smokeless tobacco: Never Used   Substance Use Topics     Alcohol use: Yes     Alcohol/week: 0.0 standard drinks     Comment: very little      Family History   Problem Relation Age of Onset     Cancer Brother      Glaucoma Mother      Alzheimer Disease Brother      Macular Degeneration No family hx of          Current Outpatient Medications   Medication Sig Dispense Refill     ammonium lactate (AMLACTIN) 12 % external cream Apply  topically 2 times daily as needed. apply to affected area/feet 280 g 3     Calcium Carbonate-Vitamin D (CALCIUM + D) 600-200 MG-UNIT per tablet Take 2 tablets by mouth 2 times daily.       clobetasol (TEMOVATE) 0.05 % external ointment TIMOTHY SPARINGLY EXT AA BID AS NEEDED. DO NOT TIMOTHY TO FACE       clobetasol (TEMOVATE) 0.05 % external solution        diphenhydrAMINE (BENADRYL) 25 MG tablet Take 25 mg by mouth At Bedtime        DOCUSATE SODIUM PO Take 100 mg by mouth At Bedtime       Emollient (CERAVE DAILY MOISTURIZING EX)        estradiol (ESTRACE) 0.1 MG/GM vaginal cream PLACE 2 GRAMS VAGINALLY 2 TIMES A WK       famotidine (PEPCID) 20 MG tablet TAKE 1 TABLET(20 MG) BY MOUTH TWICE DAILY 90 tablet 3     hydrOXYzine (ATARAX) 25 MG tablet TAKE 1 TABLET BY MOUTH TWICE DAILY AS NEEDED FOR ITCHING 180 tablet 1     levothyroxine (SYNTHROID/LEVOTHROID) 100 MCG tablet Take 1 tablet (100 mcg) by mouth daily 90 tablet 3     LORazepam (ATIVAN) 0.5 MG  tablet Take 1 tablet (0.5 mg) by mouth nightly as needed for anxiety or sleep 30 tablet 1     Multiple Vitamins-Minerals (OCUVITE ADULT 50+) CAPS Take 1 capsule by mouth daily 30 capsule 12     omeprazole (PRILOSEC) 40 MG DR capsule TAKE 1 CAPSULE BY MOUTH EVERY DAY 90 capsule 3     ondansetron (ZOFRAN-ODT) 4 MG ODT tab DIS 1 T ON THE TONGUE Q 8 H PRF NAUSEA OR VOM       STATIN NOT PRESCRIBED, INTENTIONAL, by Other route continuous prn. Patient declined 5/4/12  0     order for DME Equipment being ordered:   Bassam [employer] at [employer]. 1 each 0     Allergies   Allergen Reactions     Ace Inhibitors Cough     Advicor      Alendronic Acid Other (See Comments)     Other reaction(s): GI Upset  heartburn  Severe substernal burning and dysphagia within 3 days       Blood-Group Specific Substance Other (See Comments)     Patient has Anti-Shania and warm auto antibody. Blood products may be delayed. Draw patient 24 hours prior to transfusion. Draw one red top and two purple top tubes for all type and screen orders.     Citalopram GI Disturbance and Nausea     diarrhea  diarrhea     Doxycycline Nausea     Poor appetite     Fosamax      Severe substernal burning and dysphagia within 3 days     Valsartan Cramps and Other (See Comments)     severe  severe     Recent Labs   Lab Test 06/21/21  1251 03/08/21  1325 12/03/20  0912 10/16/20  1054 07/20/20  1354 07/20/20  1354 07/05/20  0000 06/25/19  1157 08/20/18  1448 06/21/18  0946 05/08/18  0835 07/27/17  1736 08/19/14  1705 05/02/14  0823   A1C  --   --   --   --   --   --   --  5.5 4.9 5.3  --   --    < > 5.3   LDL  --   --   --   --   --  110*  --  97  --   --  124*  --    < > 125   HDL  --   --   --   --   --  34*  --  35*  --   --  50  --    < > 30*   TRIG  --   --   --   --   --  236*  --  376*  --   --  160*  --    < > 216*   ALT  --   --   --   --   --  16 13  --   --   --   --   --   --  27   CR 2.11* 1.85* 1.92*  --    < > 1.91* 2.05* 2.07* 1.36*  --   --   --   --   "1.17*   GFRESTIMATED 21* 24* 23*  --    < > 24* 23* 22* 37*  --   --    < >  --  45*   GFRESTBLACK 24* 28* 27*  --    < > 27* 28* 25* 45*  --   --    < >  --  54*   POTASSIUM 4.1 4.1 4.4  --    < > 4.1 4.8 4.3 3.8  --   --   --   --  4.2   TSH  --   --  2.63 25.46*  --  0.10*  --  2.50 1.58  --  4.85*   < >   < > 3.31    < > = values in this interval not displayed.           Mammogram Screening - Patient over age 75, has elected to continue with screening.  Pertinent mammograms are reviewed under the imaging tab.    Review of Systems  Constitutional, HEENT, cardiovascular, pulmonary, gi and gu systems are negative, except as otherwise noted.    OBJECTIVE:   /76 (BP Location: Right arm, Patient Position: Sitting, Cuff Size: Adult Regular)   Pulse 85   Temp 98.2  F (36.8  C) (Oral)   Ht 1.549 m (5' 1\")   Wt 59.4 kg (131 lb)   SpO2 94%   BMI 24.75 kg/m   Estimated body mass index is 24.56 kg/m  as calculated from the following:    Height as of 3/8/21: 1.549 m (5' 1\").    Weight as of 6/21/21: 59 kg (130 lb).     Physical Exam  GENERAL APPEARANCE: healthy, alert and no distress  EYES: Eyes grossly normal to inspection, PERRL and conjunctivae and sclerae normal  HENT: ear canals and TM's normal, nose and mouth without ulcers or lesions, oropharynx clear and oral mucous membranes moist   June hearing aids and cerumen impaction L>>R  NECK: no adenopathy, no asymmetry, masses, or scars and thyroid normal to palpation  RESP: lungs clear to auscultation - no rales, rhonchi or wheezes  RESP: moderate kyphosis  BREAST: normal without masses, tenderness or nipple discharge and no palpable axillary masses or adenopathy  CV: regular rate and rhythm, normal S1 S2, no S3 or S4, no murmur, click or rub, no peripheral edema and peripheral pulses strong  ABDOMEN: soft, nontender, no hepatosplenomegaly, no masses and bowel sounds normal   (female): dry mucousa.  Some intertrigo.  Normal external genitalia and urethra.  " "Inspection only  MS: no musculoskeletal defects are noted and gait is age appropriate without ataxia  SKIN: no suspicious lesions or rashes  SKIN: psoriatic plaques on elbows.  She has no rash at right flank where she has a lot of itching and distress.   NEURO: Normal strength and tone, sensory exam grossly normal, mentation intact and speech normal  PSYCH: mentation appears normal and affect normal/bright    Diagnostic Test Results:  Labs reviewed in Epic  Epic reviewed labs from 6/2021     ASSESSMENT / PLAN:       ICD-10-CM    1. Screening for hyperlipidemia  Z13.220    2. Encounter for Medicare annual wellness exam  Z00.00    3. Non-small cell cancer of right lung (H)  C34.91    4. Immunosuppression (H)  D84.9 OFFICE/OUTPT VISIT,EST,LEVL III   5. COPD, severe (H)  J44.9    6. Herpes zoster without complication  B02.9 valACYclovir (VALTREX) 1000 mg tablet     OFFICE/OUTPT VISIT,EST,LEVL III   7. Bilateral impacted cerumen  H61.23 AL REMOVAL IMPACTED CERUMEN IRRIGATION/LVG UNILAT     I really think the severe itch at R flank could be an atypical shingles outbreak, w/o rash. She has had shingles before, and she had the shingle shot.  Will treat as shinbles with valacyclovir  Lung cancer treated, LIKELY cured but survellaince per Onc  Due to have nephrology visit.  Outreach failed her.  I sent message to try again.     Patient has been advised of split billing requirements and indicates understanding: Yes  COUNSELING:  Reviewed preventive health counseling, as reflected in patient instructions       Regular exercise    Estimated body mass index is 24.56 kg/m  as calculated from the following:    Height as of 3/8/21: 1.549 m (5' 1\").    Weight as of 6/21/21: 59 kg (130 lb).        She reports that she quit smoking about 51 years ago. Her smoking use included cigarettes. She has never used smokeless tobacco.      Appropriate preventive services were discussed with this patient, including applicable screening as " appropriate for cardiovascular disease, diabetes, osteopenia/osteoporosis, and glaucoma.  As appropriate for age/gender, discussed screening for colorectal cancer, prostate cancer, breast cancer, and cervical cancer. Checklist reviewing preventive services available has been given to the patient.    Reviewed patients plan of care and provided an AVS. The Basic Care Plan (routine screening as documented in Health Maintenance) for Helena meets the Care Plan requirement. This Care Plan has been established and reviewed with the Patient.    Counseling Resources:  ATP IV Guidelines  Pooled Cohorts Equation Calculator  Breast Cancer Risk Calculator  Breast Cancer: Medication to Reduce Risk  FRAX Risk Assessment  ICSI Preventive Guidelines  Dietary Guidelines for Americans, 2010  USDA's MyPlate  ASA Prophylaxis  Lung CA Screening    Sushma Nelson MD  [facility]    Identified Health Risks:

## 2021-08-20 ENCOUNTER — TELEPHONE (OUTPATIENT)
Dept: NEPHROLOGY | Facility: CLINIC | Age: 86
End: 2021-08-20

## 2021-08-20 NOTE — TELEPHONE ENCOUNTER
Tried to reach Helena in regards to a Follow-up appt with Dr. Simms in [place]     appt set up for Dec 13th at 130pm in person     LMTC(left mess to call) to confirm     Gi Al CMA

## 2021-08-22 DIAGNOSIS — K21.00 GASTROESOPHAGEAL REFLUX DISEASE WITH ESOPHAGITIS WITHOUT HEMORRHAGE: ICD-10-CM

## 2021-08-22 RX ORDER — FAMOTIDINE 20 MG/1
TABLET, FILM COATED ORAL
Qty: 180 TABLET | Refills: 0 | Status: SHIPPED | OUTPATIENT
Start: 2021-08-22 | End: 2022-02-27

## 2021-08-22 NOTE — TELEPHONE ENCOUNTER
Prescription approved per [facility] Refill Protocol.  Jeanine Thomosn RN

## 2021-08-24 ENCOUNTER — TRANSFERRED RECORDS (OUTPATIENT)
Dept: HEALTH INFORMATION MANAGEMENT | Facility: CLINIC | Age: 86
End: 2021-08-24

## 2021-08-30 ENCOUNTER — NURSE TRIAGE (OUTPATIENT)
Dept: FAMILY MEDICINE | Facility: CLINIC | Age: 86
End: 2021-08-30

## 2021-08-30 NOTE — TELEPHONE ENCOUNTER
Routing to Dr. Nelson to advise on appointment time and location.  Fang Magaña RN on 8/30/2021 at 9:14 AM        Reason for Disposition    MODERATE pain (e.g., interferes with normal activities, limping) and present > 3 days    Additional Information    Negative: Followed an ankle or foot injury    Negative: Ankle pain is the main symptom    Negative: Entire foot is cool or blue in comparison to other foot    Negative: Purple or black skin on foot or toe    Negative: Red area or streak and fever    Negative: Swollen foot and fever    Negative: Patient sounds very sick or weak to the triager    Negative: SEVERE pain (e.g., excruciating, unable to do any normal activities)    Negative: Looks like a boil, infected sore, deep ulcer, or other infected rash (spreading redness, pus)    Negative: Swollen foot (Exceptions: localized bump from bunions, calluses, insect bite, sting)    Negative: Numbness in one foot (i.e., loss of sensation)    Answer Assessment - Initial Assessment Questions  1. ONSET: Not sure, probably about a week ago  2. LOCATION:  Left foot - arch  3. PAIN: Moderate - rates 5-6/10 with walking. Pain increases with distance  0/10 if no weight on foot  4. WORK OR EXERCISE: Patient is normally very active. Walks 4000 - 5000 steps a day. Uses stairs  To do laundry.  5. CAUSE: Not sure, waiting on new custom orthotic shoes from podiatrist  6. OTHER SYMPTOMS: No other symptoms, just the usual arthritis in hands.    Protocols used: FOOT PAIN-A-OH

## 2021-08-30 NOTE — TELEPHONE ENCOUNTER
Patient has appointment on 9/7/21.  If she needs to be seen sooner, please have her schedule with another provider.    Juli Ferrera, CNP

## 2021-08-30 NOTE — TELEPHONE ENCOUNTER
Writer spoke with pt and advised on making a sooner appointment, pt declined. She said she will call if symptoms worsen. Writer also offered pt could try using an ice filled water bottle to roll under the arch of her foot, she said she might try that. Pt felt a lot of her symptoms could be from not wearing shoes and walking almost 5000 steps in stocking feet.    Pt encouraged to call if symptoms worsen or do not improve, pt agreeable.    Mary URIBE RN, BSN  [facility]

## 2021-09-07 ENCOUNTER — OFFICE VISIT (OUTPATIENT)
Dept: INTERNAL MEDICINE | Facility: CLINIC | Age: 86
End: 2021-09-07
Payer: COMMERCIAL

## 2021-09-07 VITALS
BODY MASS INDEX: 24.75 KG/M2 | OXYGEN SATURATION: 95 % | TEMPERATURE: 98.1 F | SYSTOLIC BLOOD PRESSURE: 130 MMHG | WEIGHT: 131 LBS | DIASTOLIC BLOOD PRESSURE: 60 MMHG | HEART RATE: 99 BPM

## 2021-09-07 DIAGNOSIS — Z90.5 SOLITARY KIDNEY, ACQUIRED: ICD-10-CM

## 2021-09-07 DIAGNOSIS — N18.4 CKD (CHRONIC KIDNEY DISEASE) STAGE 4, GFR 15-29 ML/MIN (H): ICD-10-CM

## 2021-09-07 DIAGNOSIS — I10 HYPERTENSION GOAL BP (BLOOD PRESSURE) < 140/90: ICD-10-CM

## 2021-09-07 DIAGNOSIS — M15.0 PRIMARY OSTEOARTHRITIS INVOLVING MULTIPLE JOINTS: Primary | ICD-10-CM

## 2021-09-07 DIAGNOSIS — Z23 NEED FOR PROPHYLACTIC VACCINATION AND INOCULATION AGAINST INFLUENZA: ICD-10-CM

## 2021-09-07 DIAGNOSIS — Z13.220 SCREENING FOR HYPERLIPIDEMIA: ICD-10-CM

## 2021-09-07 PROCEDURE — 90662 IIV NO PRSV INCREASED AG IM: CPT | Performed by: INTERNAL MEDICINE

## 2021-09-07 PROCEDURE — G0008 ADMIN INFLUENZA VIRUS VAC: HCPCS | Performed by: INTERNAL MEDICINE

## 2021-09-07 PROCEDURE — 99213 OFFICE O/P EST LOW 20 MIN: CPT | Mod: 25 | Performed by: INTERNAL MEDICINE

## 2021-09-07 NOTE — PATIENT INSTRUCTIONS
Hands:  Tylenol is safe.    Heated paraffin kit for hand warming/dunking.      Feet:  Tylenol is safe .  Good arch supports.        Consider Hand Therapy if you want in the future.       Keep a BP diary for nephrologist visit in December

## 2021-09-07 NOTE — PROGRESS NOTES
Assessment & Plan   Primary osteoarthritis involving multiple joints  Dejenerative joint arthritis affecting hands all over and feet/gait.   No further recommendation other than tylenol/heat/rest  Offered hand therapy, she declined.     Solitary kidney, acquired  CKD (chronic kidney disease) stage 4, GFR 15-29 ml/min (H)  Hypertension goal BP (blood pressure) < 140/90  RE: the three above items:  Arm BP was 170/70 - she insisted her BP goes up with this measurement b/c it is very painful her her to get her BP taken like this.  I rechecked manually using wrist on R arm, BP was 130/60     Need for prophylactic vaccination and inoculation against influenza     - FLUZONE HIGH DOSE 65+  [33885]  - ADMIN INFLUENZA (For MEDICARE Patients ONLY) []          I spent a total of 20 minutes on the day of the visit.   Time spent doing chart review, history and exam, documentation and further activities per the note             Return in about 11 months (around 8/7/2022) for Physical Exam.    Sushma Nelson MD  [facility] LADI Malik is a 85 year old who presents for the following health issues     HPI     h/o RUL/Hilar NSCLungCa (s/p low dose carboplatin and pemerexed.....had to d/c Novolumib immunotherapy due to  pruritis and yeast infections.... recent CT imaging= no evidence of cancer), L. nephrectomy (stones&RCCa), osteoporosis, pelvic fx, lichen sclerosis, Gout, CKD 4, hypothryoid   , severe (asymptomatic) COPD   Over Summer '20, had ESWL and stent placement for 6 mm stone in Right upper ureter    Left foot pain x 3 weeks   Was walking a lot.   Hands hurt.       Review of Systems   Constitutional, HEENT, cardiovascular, pulmonary, gi and gu systems are negative, except as otherwise noted.      Objective    /60 (BP Location: Right arm, Patient Position: Sitting, Cuff Size: Adult Regular)   Pulse 99   Temp 98.1  F (36.7  C) (Oral)   Wt 59.4 kg (131 lb)   SpO2 95%   BMI  24.75 kg/m    There is no height or weight on file to calculate BMI.     Arm BP was 170/70 - she insisted her BP goes up with this measurement b/c it is very painful her her to get her BP taken like this.  I rechecked manually using wrist on R arm, BP was 130/60     Physical Exam   GENERAL: healthy, alert and no distress  EYES: Eyes grossly normal to inspection, PERRL and conjunctivae and sclerae normal  MS: no gross musculoskeletal defects noted, no edema  MS:djd change in hands, and diminished  strength as a result  Pain at Metatarsal heads of feet.  O/w normal.   SKIN: no suspicious lesions or rashes  NEURO: Normal strength and tone, mentation intact and speech normal    No results found for this or any previous visit (from the past 24 hour(s)).

## 2021-09-15 ENCOUNTER — TELEPHONE (OUTPATIENT)
Dept: FAMILY MEDICINE | Facility: CLINIC | Age: 86
End: 2021-09-15

## 2021-09-15 DIAGNOSIS — R25.2 CRAMP OF LIMB: Primary | ICD-10-CM

## 2021-09-15 NOTE — TELEPHONE ENCOUNTER
Reason for Call:  Other prescription    Detailed comments: Pt states that there was a medication that she was taking for restless legs up until a couple of weeks ago when Dr Nelson took it away. Pt states that since she has run out of the medication she has been having a hard time sleeping due to her body not being able to relax. Pt states that she has been getting to sleep at quarter to 3 am due to not being able to relax her body    Phone Number Patient can be reached at: Home number on file [phone] (home)    Best Time: anytime    Can we leave a detailed message on this number? YES    Call taken on 9/15/2021 at 12:44 PM by Michelle Ceballos

## 2021-09-16 NOTE — TELEPHONE ENCOUNTER
Called patient and phone was off, left message to call clinic back.       Phyllis GASPAR CMA ([facility])

## 2021-09-16 NOTE — TELEPHONE ENCOUNTER
"Routing to provider to advise. Ropinirole was dc'd 8/17/21 \"not taking\"    Rabia Millan RN  [facility]    "

## 2021-09-16 NOTE — TELEPHONE ENCOUNTER
I reviewed notes.  I believe it was in error.  I've krystyna'd up med, please verify pharmacy and send.    Juli Ferrera, CNP

## 2021-09-18 DIAGNOSIS — R25.2 CRAMP OF LIMB: ICD-10-CM

## 2021-09-20 RX ORDER — ROPINIROLE 0.25 MG/1
TABLET, FILM COATED ORAL
Qty: 180 TABLET | Refills: 3 | OUTPATIENT
Start: 2021-09-20

## 2021-09-21 RX ORDER — ROPINIROLE 0.25 MG/1
.25-.5 TABLET, FILM COATED ORAL
Qty: 180 TABLET | Refills: 1 | Status: SHIPPED | OUTPATIENT
Start: 2021-09-21 | End: 2022-03-06

## 2021-10-22 ENCOUNTER — PATIENT OUTREACH (OUTPATIENT)
Dept: NEPHROLOGY | Facility: CLINIC | Age: 86
End: 2021-10-22

## 2021-10-22 NOTE — PROGRESS NOTES
Nephrology Note: Nursing Outreach Encounter    REASON FOR CALL:                                                      REASON FOR CALL: Care Coordination                                          SITUATION/BACKROUND:                                                    Patient is being treated for CKD Stage 4.    Call for update on how patient has been since last visit. (Last appt was June)      ASSESSMENT:                                                      Patient reports she is doing well. She is staying active by going to the senior center. Patient was not having any uremic symptoms at this time. Patient had no questions or concerns for this writer. Was appreciative of call.  Uremic Symptoms: No       PLAN:                                                      Follow Up:   Patient to follow up as scheduled at next apt  Patient to call/MyChart message with updates     Patient verbalized understanding and will contact the clinic with any further questions or concerns.     Claire PERES RN, BSN  Nephrology Care Coordinator  [facility]

## 2021-10-26 ENCOUNTER — OFFICE VISIT (OUTPATIENT)
Dept: AUDIOLOGY | Facility: CLINIC | Age: 86
End: 2021-10-26

## 2021-10-26 DIAGNOSIS — H90.3 SENSORINEURAL HEARING LOSS, BILATERAL: Primary | ICD-10-CM

## 2021-10-26 PROCEDURE — 99207 PR NO CHARGE LOS: CPT | Performed by: AUDIOLOGIST

## 2021-10-26 PROCEDURE — V5299 HEARING SERVICE: HCPCS | Performed by: AUDIOLOGIST

## 2021-10-26 NOTE — PROGRESS NOTES
AUDIOLOGY REPORT: HEARING AID RECHECK    SUBJECTIVE: Helena Eldridge is a 85 year old female, :  1935, was seen in the Audiology Clinic at [facility] on 10/26/21 for a return check of their hearing aids. Patient was unaccompanied to today's visit.       Background:                 Patient is here to have the tubing on her Widex BTE hearing aids changed as they are hard and brittle.      Procedures:                                        SIDE: Both                          : Widex                           TYPE: C3-9 BTE                          S/N: [id number] & [id number]                          WARRANTY:                              Replaced the tubing on the hearing aids and cut to patient's ear height. General cleaning of the hearing aids and earmolds was also performed. Biologic listening check finds the hearings aids sounding crisp and clear. Patient reports the hearing aids are feeling better and sounding better than when she arrived.       Plan:              Patient will return as needed for hearing aid concerns.     CHARGES:    [id number] [facility]. Hearing services    James Lau CCC-A  Licensed Audiologist #[id number]  10/26/2021

## 2021-11-22 ENCOUNTER — TELEPHONE (OUTPATIENT)
Dept: FAMILY MEDICINE | Facility: CLINIC | Age: 86
End: 2021-11-22
Payer: COMMERCIAL

## 2021-11-22 NOTE — TELEPHONE ENCOUNTER
Reason for call:  Patient reporting a symptom    Symptom or request: Vaginal irritation    Duration (how long have symptoms been present): 2 weeks    Have you been treated for this before? No    Additional comments: Patient would like to see you sooner than next available. Does not wish to see other providers, please call.     Phone Number patient can be reached at:  Home number on file [phone] (home)    Best Time:  any    Can we leave a detailed message on this number:  YES    Call taken on 11/22/2021 at 8:33 AM by Ebonie Goldberg

## 2021-11-23 NOTE — TELEPHONE ENCOUNTER
Patient would like to be seen this week or early next week by Sushma Mcgrath. Does not want to see anyone else.  Has vaginal irritation x 2 weeks and denies any other symptoms.    Advised that an Evisit may be another option if we are not able to add on.  Patient got upset stating that she does not have mychart and does not have a computer.  Explained that she does have a mychart acct active and that results have consistently opened/viewed on patient's end.   Asked if she has a family member that may have helped set this up and been accessing her mychart on her behalf.  She stated that she has a son but does not think that he would do that.     Romaine Mendez RN  [facility]

## 2021-11-24 NOTE — TELEPHONE ENCOUNTER
Left message for patient to return call. Unable to work in please schedule with an provider. Ofelia Dias MA

## 2021-11-29 ENCOUNTER — OFFICE VISIT (OUTPATIENT)
Dept: FAMILY MEDICINE | Facility: CLINIC | Age: 86
End: 2021-11-29
Payer: COMMERCIAL

## 2021-11-29 VITALS
DIASTOLIC BLOOD PRESSURE: 80 MMHG | SYSTOLIC BLOOD PRESSURE: 145 MMHG | TEMPERATURE: 98.3 F | WEIGHT: 131 LBS | BODY MASS INDEX: 24.73 KG/M2 | RESPIRATION RATE: 16 BRPM | HEIGHT: 61 IN | HEART RATE: 100 BPM

## 2021-11-29 DIAGNOSIS — B37.31 CANDIDIASIS OF VULVA AND VAGINA: ICD-10-CM

## 2021-11-29 DIAGNOSIS — B37.2 CANDIDAL INTERTRIGO: ICD-10-CM

## 2021-11-29 DIAGNOSIS — N89.8 VAGINAL ITCHING: Primary | ICD-10-CM

## 2021-11-29 LAB
ALBUMIN UR-MCNC: NEGATIVE MG/DL
APPEARANCE UR: CLEAR
BILIRUB UR QL STRIP: NEGATIVE
CLUE CELLS: ABNORMAL
COLOR UR AUTO: YELLOW
GLUCOSE UR STRIP-MCNC: NEGATIVE MG/DL
HGB UR QL STRIP: NEGATIVE
KETONES UR STRIP-MCNC: NEGATIVE MG/DL
LEUKOCYTE ESTERASE UR QL STRIP: NEGATIVE
NITRATE UR QL: NEGATIVE
PH UR STRIP: 5.5 [PH] (ref 5–7)
SP GR UR STRIP: 1.01 (ref 1–1.03)
TRICHOMONAS, WET PREP: ABNORMAL
UROBILINOGEN UR STRIP-ACNC: 0.2 E.U./DL
WBC'S/HIGH POWER FIELD, WET PREP: ABNORMAL
YEAST, WET PREP: PRESENT

## 2021-11-29 PROCEDURE — 99214 OFFICE O/P EST MOD 30 MIN: CPT | Performed by: NURSE PRACTITIONER

## 2021-11-29 PROCEDURE — 87210 SMEAR WET MOUNT SALINE/INK: CPT | Performed by: NURSE PRACTITIONER

## 2021-11-29 PROCEDURE — 81003 URINALYSIS AUTO W/O SCOPE: CPT | Performed by: NURSE PRACTITIONER

## 2021-11-29 RX ORDER — CLOBETASOL PROPIONATE 0.5 MG/G
OINTMENT TOPICAL 2 TIMES DAILY
Qty: 60 G | Refills: 1 | Status: CANCELLED | OUTPATIENT
Start: 2021-11-29

## 2021-11-29 RX ORDER — NYSTATIN 100000 [USP'U]/G
POWDER TOPICAL
Qty: 120 G | Refills: 11 | Status: SHIPPED | OUTPATIENT
Start: 2021-11-29 | End: 2022-01-18 | Stop reason: ALTCHOICE

## 2021-11-29 RX ORDER — FLUCONAZOLE 150 MG/1
TABLET ORAL
Qty: 2 TABLET | Refills: 1 | Status: SHIPPED | OUTPATIENT
Start: 2021-11-29 | End: 2022-01-18

## 2021-11-29 ASSESSMENT — MIFFLIN-ST. JEOR: SCORE: 971.59

## 2021-11-29 ASSESSMENT — PAIN SCALES - GENERAL: PAINLEVEL: NO PAIN (0)

## 2021-11-29 NOTE — PATIENT INSTRUCTIONS
Patient Education     Vaginal Infection: Yeast (Candidiasis)  Yeast infection occurs when yeast in the vagina increase and attacks the vaginal tissues. Yeast is a type of fungus. These infections are often caused by a type of yeast called Candida albicans. Other species of yeast can also cause infections. Factors that may make infection more likely include recent antibiotic use, douching, or increased sex. Yeast infections are more common in women who have diabetes, or are obese or pregnant, or have a weak immune system.   Symptoms of yeast infection    Clumpy or thin, white discharge, which may look like cottage cheese    No odor or minimal odor    Severe vaginal itching or burning    Burning with urination    Swelling, redness of vulva    Pain during sex    Treating yeast infection  Yeast infection is treated with a vaginal antifungal cream. In some cases, antifungal pills are prescribed instead. During treatment:     Finish all of your medicine, even if your symptoms go away.    Apply the cream before going to bed. Lie flat after applying so that it doesn't drip out.    Don't douche or use tampons.    Don't rely on a diaphragm or condoms, since the cream may weaken them.    Avoid intercourse if advised by your healthcare provider.  Should I treat a yeast infection myself?  Discuss with your healthcare provider whether you should use over-the-counter medicines to treat a yeast infection. Self-treatment may depend on whether:     You've had a yeast infection in the past.    You're at risk for sexually transmitted infections (STIs).  Call your healthcare provider if symptoms don't go away or come back after treatment.   [employer] last reviewed this educational content on 4/1/2020 2000-2021 [employer]. All rights reserved. This information is not intended as a substitute for professional medical care. Always follow your healthcare professional's instructions.           Patient Education     Candida  Skin Infection (Adult)   Candida is a type of yeast. It grows naturally on the skin and in the mouth. If it grows out of control, it can cause an infection. Candida can cause infections in the genital area, skin folds, in the mouth, and under the breasts. Anyone can get this infection. It is more common in a person with a weak immune system, such as from diabetes, HIV, or cancer. It s also more common in someone who has been on antibiotic therapy. And it s more common in people who are overweight or who have incontinence. Wearing tight-fitting clothing and taking part in activities with lots of skin-to-skin contact can also put you at risk.  Candida causes the skin to become bright red and inflamed. The border of the infected part of the skin is often raised. The infection causes pain and itching. Sometimes the skin peels and bleeds. In the mouth, candida is called thrush, and may cause white thickened areas.  A Candida rash is most often treated with an antifungal cream, gel, or powder. . The rash will clear a few days after starting the medicine. Infections that don t go away may need a prescription medicine. In rare cases, a bacterial infection can also occur.  Home care  Your healthcare provider will advise using an antifungal cream, powder, or gel for the rash. He or she may also prescribe a medicine for the itch. Follow all instructions for using these medicines.  General care    Keep your skin clean by washing the area twice a day.    Use the medicine as directed until your rash is gone. Once the skin has healed, keep it dry to prevent another infection.     If you are overweight, talk with your healthcare provider about a plan to lose extra weight.    Don't wear tight-fitting clothes.    Follow-up care  Follow up with your healthcare provider, or as advised. Your rash will clear in 7 to 14 days. Call your provider if the rash is not gone after 14 days.  When to get medical advice  Call your healthcare  provider right away if any of these occur:    Pain or redness that gets worse or spreads    Fluid coming from the skin    Yellow crusts on the skin    Fever of 100.4 F (38 C) or higher, or as directed by your provider  Ra[employer] last reviewed this educational content on 10/1/2019    5778-2911 [employer]. All rights reserved. This information is not intended as a substitute for professional medical care. Always follow your healthcare professional's instructions.

## 2021-11-29 NOTE — PROGRESS NOTES
"  Assessment & Plan     Vaginal itching  - UA reflex to Microscopic and Culture  - Wet prep - Clinic Collect    Candidiasis of vulva and vagina  Counseled on self-care measures including: avoid scratching; and warning signs of when to seek urgent medical care including: worsening symptoms or symptoms that do not resolve.  - fluconazole (DIFLUCAN) 150 MG tablet; Take one tablet (150 mg) by mouth now. Repeat in 3 days if symptoms do not resolve.    Candidal intertrigo  Counseled on self-care measures including: keeping area clean and dry; and warning signs of when to seek urgent medical care including: fever, worsening symptoms.  - nystatin (MYCOSTATIN) 274279 UNIT/GM external powder; Apply to affected area twice daily as needed    Review of the result(s) of each unique test - UA, wet prep  Ordering of each unique test  Prescription drug management     See Patient Instructions    Return in about 2 weeks (around 12/13/2021), or if symptoms worsen or fail to improve.    MERARI Aceves CNP  [facility] is a 86 year old who presents for the following health issues     HPI     Vaginal Symptoms  Onset/Duration: 2 weeks   Description:  Vaginal Discharge: none   Itching (Pruritis): YES  Burning sensation:  YES  Odor: YES  Accompanying Signs & Symptoms:  Urinary symptoms: YES  Abdominal pain: no  Fever: no  History:   Sexually active: not applicable  New Partner: not applicable  Possibility of Pregnancy:  no  Recent antibiotic use: no  Previous vaginitis issues: YES  Therapies tried and outcome: Cerave cream and vaseline applying PRN; gives some relief.     She also has a painful red rash under bilateral breasts.     Review of Systems   Constitutional, HEENT, cardiovascular, pulmonary, gi and gu systems are negative, except as otherwise noted.      Objective    BP (!) 145/80   Pulse 100   Temp 98.3  F (36.8  C) (Oral)   Resp 16   Ht 1.549 m (5' 1\")   Wt 59.4 kg (131 lb) "   BMI 24.75 kg/m    Body mass index is 24.75 kg/m .  Physical Exam   GENERAL: healthy, alert and no distress  EYES: Eyes grossly normal to inspection, PERRL and conjunctivae and sclerae normal  SKIN: erythema - under bilateral breasts  PSYCH: mentation appears normal, affect normal/bright    Results for orders placed or performed in visit on 11/29/21 (from the past 24 hour(s))   UA reflex to Microscopic and Culture    Specimen: Urine, Midstream   Result Value Ref Range    Color Urine Yellow Colorless, Straw, Light Yellow, Yellow    Appearance Urine Clear Clear    Glucose Urine Negative Negative mg/dL    Bilirubin Urine Negative Negative    Ketones Urine Negative Negative mg/dL    Specific Gravity Urine 1.010 1.003 - 1.035    Blood Urine Negative Negative    pH Urine 5.5 5.0 - 7.0    Protein Albumin Urine Negative Negative mg/dL    Urobilinogen Urine 0.2 0.2, 1.0 E.U./dL    Nitrite Urine Negative Negative    Leukocyte Esterase Urine Negative Negative    Narrative    Microscopic not indicated   Wet prep - Clinic Collect    Specimen: Vagina; Swab   Result Value Ref Range    Trichomonas Absent Absent    Yeast Present (A) Absent    Clue Cells Absent Absent    WBCs/high power field 1+ (A) None

## 2021-12-09 DIAGNOSIS — I10 HYPERTENSION GOAL BP (BLOOD PRESSURE) < 140/90: ICD-10-CM

## 2021-12-09 RX ORDER — FUROSEMIDE 20 MG
TABLET ORAL
Qty: 90 TABLET | Refills: 3 | Status: SHIPPED | OUTPATIENT
Start: 2021-12-09 | End: 2022-11-14

## 2021-12-13 ENCOUNTER — OFFICE VISIT (OUTPATIENT)
Dept: NEPHROLOGY | Facility: CLINIC | Age: 86
End: 2021-12-13
Payer: COMMERCIAL

## 2021-12-13 ENCOUNTER — TELEPHONE (OUTPATIENT)
Dept: FAMILY MEDICINE | Facility: CLINIC | Age: 86
End: 2021-12-13

## 2021-12-13 ENCOUNTER — LAB (OUTPATIENT)
Dept: LAB | Facility: CLINIC | Age: 86
End: 2021-12-13
Payer: COMMERCIAL

## 2021-12-13 VITALS
BODY MASS INDEX: 24.56 KG/M2 | WEIGHT: 130 LBS | DIASTOLIC BLOOD PRESSURE: 72 MMHG | HEART RATE: 90 BPM | SYSTOLIC BLOOD PRESSURE: 148 MMHG

## 2021-12-13 DIAGNOSIS — R80.1 PERSISTENT PROTEINURIA: ICD-10-CM

## 2021-12-13 DIAGNOSIS — B37.31 CANDIDIASIS OF VULVA AND VAGINA: Primary | ICD-10-CM

## 2021-12-13 DIAGNOSIS — I10 HYPERTENSION GOAL BP (BLOOD PRESSURE) < 140/90: ICD-10-CM

## 2021-12-13 DIAGNOSIS — N18.4 CKD (CHRONIC KIDNEY DISEASE) STAGE 4, GFR 15-29 ML/MIN (H): Primary | ICD-10-CM

## 2021-12-13 DIAGNOSIS — N20.0 KIDNEY STONES: ICD-10-CM

## 2021-12-13 DIAGNOSIS — N18.32 CHRONIC KIDNEY DISEASE, STAGE 3B (H): ICD-10-CM

## 2021-12-13 LAB
ALBUMIN SERPL-MCNC: 3.6 G/DL (ref 3.4–5)
ANION GAP SERPL CALCULATED.3IONS-SCNC: 9 MMOL/L (ref 3–14)
BUN SERPL-MCNC: 30 MG/DL (ref 7–30)
CALCIUM SERPL-MCNC: 8.7 MG/DL (ref 8.5–10.1)
CHLORIDE BLD-SCNC: 102 MMOL/L (ref 94–109)
CO2 SERPL-SCNC: 24 MMOL/L (ref 20–32)
CREAT SERPL-MCNC: 2.47 MG/DL (ref 0.52–1.04)
GFR SERPL CREATININE-BSD FRML MDRD: 17 ML/MIN/1.73M2
GLUCOSE BLD-MCNC: 122 MG/DL (ref 70–99)
HGB BLD-MCNC: 11.1 G/DL (ref 11.7–15.7)
PHOSPHATE SERPL-MCNC: 3.4 MG/DL (ref 2.5–4.5)
POTASSIUM BLD-SCNC: 4.2 MMOL/L (ref 3.4–5.3)
PTH-INTACT SERPL-MCNC: 119 PG/ML (ref 18–80)
SODIUM SERPL-SCNC: 135 MMOL/L (ref 133–144)

## 2021-12-13 PROCEDURE — 85018 HEMOGLOBIN: CPT

## 2021-12-13 PROCEDURE — 80069 RENAL FUNCTION PANEL: CPT

## 2021-12-13 PROCEDURE — 83970 ASSAY OF PARATHORMONE: CPT

## 2021-12-13 PROCEDURE — 82306 VITAMIN D 25 HYDROXY: CPT

## 2021-12-13 PROCEDURE — 36415 COLL VENOUS BLD VENIPUNCTURE: CPT

## 2021-12-13 PROCEDURE — 99214 OFFICE O/P EST MOD 30 MIN: CPT | Performed by: INTERNAL MEDICINE

## 2021-12-13 NOTE — PROGRESS NOTES
Assessment and Plan:  86 year old female with history of CKD, Scr 1.8 with eGFR near 25ml/min in recent years, history of lung cancer s/p chemotherapy, kidney cancer s/p left partial resection, kidney stones and infections s/p ESWL and stent in 7/2020, small left kidney, who presents for followup of CKD and proteinuria.  # CKD stage 4- herScr has been near 1.8 with eGFR near 23-27 ml/min over the last two years, previous to that her eGFR was near 35ml/min and back in early 2000s she had eGFR near 40-45ml/min  She has been through many things that have caused CKD including cancer, chemotherapy with carboplatin. Last Scr 1.84, eGFR 24ml/min  She has 1 gram of proteinuria which puts her at a higher risk for progression  - check labs today  - BP fairly controlled based on home readings  # Hypertension: BP high normal. Aim toward 130/80 if tolerated.   Continue furosemide 20mg    # Kidney stones: has calcium oxalate stone on stone analysis; discussed low sodium, fluid intake of 2.5 -3 liters     # Anemia in chronic renal disease:   - Hgb: Stable, low normal, 11-11.5  - Iron studies: Not checked recently    # Electrolytes:   - Potassium; level: Normal  - bicarbonate: Normal  # Mineral Bone Disorder:   - Calcium; level is:  Normal   - phos and PTH normal  Assessment and plan was discussed with patient and she voiced her understanding and agreement.    RTC 4 months    Reason for Visit:  Helena Eldridge is a 86 year old female with CKD from renal and lung cancer, who presents for followup    HPI:  She is a pleasant female with multiple comorbidities including hisotry of renal cell carcinoma s/p partial left kidney resection, lung cancer , COPD, who presents for followup of CKD She has been aware of kidney disease and her creatinine has been stable near 1.8-2 mg/dL over the last few years. Recently her Scr was up to 2.3, eGFR decreased to 17ml/min, but recently back and eGFR 23ml/min  Her left kidney was noted to be very small  on CT imaging going back to 2015.  Over the years, her eGFR was 35-45 ml/min from 2011 to 2017, but in the last couple of years, her eGFR was 25-28ml/min.  She has undergone treatment for her cancers and doing fairly well at this time.  Her lung cancer (nonsmall cell lung cancer T1N1) was treated with carboplatin/ permetrexeb 4710-3884 (6 cycles) and then nivolumab for a year until it was stopped due to itching.  She denies shortness of breath or chest pain. She has some fatigue with significant exertion.  She denies any skin rash, GI or  issues at this time.  She denies family history of renal failure or dialysis.    She has been doing well since last visit. She denies shortness of breath or swelling    Her BP at home is usually 135/70s , though it is higher.   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  She has a lot of great nephews and nieces and stays busy with them.  She is getting her steps in, at least 2000 a day, she counts   She has some psoriasis and has it under her breast and at times it keeps her awake, and she takes hydroxyzine for this.  She was in the office recently for vaginal itching and was treated for yeast infection.    Renal History:   Kidney Disease and Medical Hx:  h/o HTN: Yes  , h/o Protein in Urine: Yes  and h/o Kidney Stones:Yes     ROS:   A comprehensive review of systems was obtained and negative, except as noted in the HPI or PMH.    Active Medical Problems:  Patient Active Problem List   Diagnosis     CARDIOVASCULAR SCREENING; LDL GOAL LESS THAN 100     CKD (chronic kidney disease) stage 4, GFR 15-29 ml/min (H)     Glucose intolerance (impaired glucose tolerance)     Kidney stones     Advance care planning     Hypertension goal BP (blood pressure) < 140/90     Lichen sclerosus et atrophicus of the vulva     Solitary kidney, acquired     Senile osteoporosis     Osteoarthritis of right knee     Medial meniscus tear     Cataracts, both eyes      Macular degeneration of both eyes, right eye greater than left eye     Pelvic fracture (H)     COPD, severe (H)     IPF (idiopathic pulmonary fibrosis) (H)     Other specified hypothyroidism     Idiopathic chronic gout     Adjustment disorder with anxiety     Non-small cell cancer of right lung (H)     Immunosuppression (H)     Port-A-Cath in place     Ureteral stone     Left renal mass     PMH:   Medical record was reviewed and PMH was discussed with patient and noted below.  Past Medical History:   Diagnosis Date     Carpal tunnel syndrome      CKD (chronic kidney disease)     kidney stone with infection     Deep vein thrombosis (DVT) (H)     1972     DVT [id number]     Glucose intolerance      H/O partial nephrectomy      Heel spur      Hypothyroidism      Kidney stones      Lichen sclerosus      Osteoarthritis      Osteoporosis      PID      Pulmonary embolism (H)     1970     Unspecified hypothyroidism      Vitiligo      PSH:   Past Surgical History:   Procedure Laterality Date     C NEPHRECTOMY      left partial 07-01-07     C VENOUS THROMBOSIS IMAGING      with pe     EXTRACORPOREAL SHOCK WAVE LITHOTRIPSY, CYSTOSCOPY, INSERT STENT URETER(S), COMBINED Right 7/13/2020    Procedure: LITHOTRIPSY, Right EXTRACORPOREAL SHOCK WAVE (ESWL), WITH CYSTOSCOPY AND Right URETERAL STENT INSERTION;  Surgeon: Buddy Ramirez MD;  Location: [facility] OR     HC REMOVAL GALLBLADDER       HC REVISE MEDIAN N/CARPAL TUNNEL SURG       LASER HOLMIUM LITHOTRIPSY URETER(S), INSERT STENT, COMBINED Right 7/28/2020    Procedure: RIGHT CYSTOURETEROSCOPY, WITH LITHOTRIPSY USING LASER AND URETERAL STENT EXCHANGE;  Surgeon: Buddy Ramirez MD;  Location: [facility] OR     TUBAL LIGATION         Family Hx:   Family History   Problem Relation Age of Onset     Cancer Brother      Glaucoma Mother      Alzheimer Disease Brother      Macular Degeneration No family hx of      Personal Hx:   Social History     Tobacco Use     Smoking status: Former Smoker      Types: Cigarettes     Quit date: 1969     Years since quittin.0     Smokeless tobacco: Never Used   Substance Use Topics     Alcohol use: Yes     Alcohol/week: 0.0 standard drinks     Comment: very little        Allergies:  Allergies   Allergen Reactions     Ace Inhibitors Cough     Advicor      Alendronic Acid Other (See Comments)     Other reaction(s): GI Upset  heartburn  Severe substernal burning and dysphagia within 3 days       Blood-Group Specific Substance Other (See Comments)     Patient has Anti-Shania and warm auto antibody. Blood products may be delayed. Draw patient 24 hours prior to transfusion. Draw one red top and two purple top tubes for all type and screen orders.     Citalopram GI Disturbance and Nausea     diarrhea  diarrhea     Doxycycline Nausea     Poor appetite     Fosamax      Severe substernal burning and dysphagia within 3 days     Valsartan Cramps and Other (See Comments)     severe  severe       Medications:  Current Outpatient Medications   Medication Sig     ammonium lactate (AMLACTIN) 12 % external cream Apply  topically 2 times daily as needed. apply to affected area/feet     Calcium Carbonate-Vitamin D (CALCIUM + D) 600-200 MG-UNIT per tablet Take 2 tablets by mouth 2 times daily.     clobetasol (TEMOVATE) 0.05 % external solution      diphenhydrAMINE (BENADRYL) 25 MG tablet Take 25 mg by mouth At Bedtime      DOCUSATE SODIUM PO Take 100 mg by mouth At Bedtime     Emollient (CERAVE DAILY MOISTURIZING EX)      estradiol (ESTRACE) 0.1 MG/GM vaginal cream PLACE 2 GRAMS VAGINALLY 2 TIMES A WK     famotidine (PEPCID) 20 MG tablet TAKE 1 TABLET(20 MG) BY MOUTH TWICE DAILY     fluconazole (DIFLUCAN) 150 MG tablet Take one tablet (150 mg) by mouth now. Repeat in 3 days if symptoms do not resolve.     furosemide (LASIX) 20 MG tablet TAKE 1 TABLET(20 MG) BY MOUTH DAILY     hydrOXYzine (ATARAX) 25 MG tablet TAKE 1 TABLET BY MOUTH TWICE DAILY AS NEEDED FOR ITCHING     levothyroxine  (SYNTHROID/LEVOTHROID) 100 MCG tablet Take 1 tablet (100 mcg) by mouth daily     LORazepam (ATIVAN) 0.5 MG tablet Take 1 tablet (0.5 mg) by mouth nightly as needed for anxiety or sleep     Multiple Vitamins-Minerals (OCUVITE ADULT 50+) CAPS Take 1 capsule by mouth daily     nystatin (MYCOSTATIN) 398438 UNIT/GM external powder Apply to affected area twice daily as needed     omeprazole (PRILOSEC) 40 MG DR capsule TAKE 1 CAPSULE BY MOUTH EVERY DAY     ondansetron (ZOFRAN-ODT) 4 MG ODT tab DIS 1 T ON THE TONGUE Q 8 H PRF NAUSEA OR VOM     order for DME Equipment being ordered:   [facility] at [facility].     rOPINIRole (REQUIP) 0.25 MG tablet Take 1-2 tablets (0.25-0.5 mg) by mouth nightly as needed (restless legs)     STATIN NOT PRESCRIBED, INTENTIONAL, by Other route continuous prn. Patient declined 5/4/12     valACYclovir (VALTREX) 1000 mg tablet Take 1 tablet (1,000 mg) by mouth 3 times daily     No current facility-administered medications for this visit.     Facility-Administered Medications Ordered in Other Visits   Medication     perflutren diluted in saline (DEFINITY) injection 5 mL      Vitals:  BP (!) 148/72   Pulse 90   Wt 59 kg (130 lb)   BMI 24.56 kg/m      Exam:  General: alert, oriented, conversant  Heart: RRR  Lungs: clear bilaterally  Ext: no edema  Speaks in full sentences without audible dyspnea or wheeze  Neuro: normal speech  Psych: conversant, normal affect and thought process      LABS:   CMP  Recent Labs   Lab Test 06/21/21  1251 03/08/21  1325 12/03/20  0912 11/10/20  0930    138 141 139   POTASSIUM 4.1 4.1 4.4 3.6   CHLORIDE 100 105 107 104   CO2 30 28 31 30   ANIONGAP 5 5 3 5   GLC 96 98 106* 125*   BUN 29 27 22 42*   CR 2.11* 1.85* 1.92* 2.48*   GFRESTIMATED 21* 24* 23* 17*   GFRESTBLACK 24* 28* 27* 20*   SERAFIN 8.9 9.3 9.5 8.9     Recent Labs   Lab Test 07/20/20  1354 07/05/20  0000 05/02/14  0823   ALT 16 13 27   AST  --  17 30     CBC  Recent Labs   Lab Test 06/21/21  9726  07/20/20  1354 02/28/20  0854 08/09/19  1050   HGB 11.8 10.7* 11.0* 11.7   WBC 7.8 8.5 9.6 9.0   RBC 3.52* 3.21* 3.30* 3.31*   HCT 35.4 32.1* 34.5* 34.8*   * 100 105* 105*   MCH 33.5* 33.3* 33.3* 35.3*   MCHC 33.3 33.3 31.9 33.6   RDW 13.6 12.8 13.0 14.4    292 291 276     URINE STUDIES  Recent Labs   Lab Test 11/29/21  1040 11/10/20  0944 11/03/20  0932 10/19/20  1050 01/21/20  0850   COLOR Yellow Yellow Yellow Yellow Yellow   APPEARANCE Clear Clear Cloudy Clear Clear   URINEGLC Negative Negative Negative Negative Negative   URINEBILI Negative Negative Negative Negative Negative   URINEKETONE Negative Negative Negative Negative Negative   SG 1.010 1.025 1.020 1.020 1.025   UBLD Negative Negative Negative Negative Negative   URINEPH 5.5 5.5 5.5 6.0 5.5   PROTEIN Negative Negative Negative Negative Negative   UROBILINOGEN 0.2 0.2 0.2 0.2 0.2   NITRITE Negative Negative Negative Negative Negative   LEUKEST Negative Negative Small* Trace* Negative   RBCU  --  O - 2 O - 2 O - 2 O - 2   WBCU  --  0 - 5 5-10* 10-25* 0 - 5     No lab results found.  PTH  Recent Labs   Lab Test 06/21/21  1251 03/08/21  1325   PTHI 106* 65     IRON STUDIES  Recent Labs   Lab Test 06/21/21  1251   IRON 94      IRONSAT 39   JOSE RAMON 375*       TECHNIQUE: Scans were obtained from the diaphragm through the pelvis  without IV contrast. Radiation dose for this scan was reduced using  automated exposure control, adjustment of the mA and/or kV according  to patient size, or iterative reconstruction technique.     COMPARISON: 5/19/2015.     FINDINGS: Coronary calcifications. Linear atelectasis or fibrosis left  lung base. Liver, spleen, and pancreas are normal. Previous  cholecystectomy with prominence to the common bile duct again noted  and unchanged. Atrophic changes again noted in the left kidney which  are unchanged from 5/19/2015. 2 small calculi again noted in the left  kidney. Left kidney is otherwise unchanged. Right kidney  is normal in  size with small calculi again noted in the lower portion of the right  kidney which are unchanged. Right kidney and ureter are otherwise  normal. Diverticula in the colon without evidence of diverticulitis.  Colon and small bowel are otherwise normal. Calcification of the  abdominal aorta and iliac arteries. No abdominal or pelvic adenopathy.  Remainder of the scan is negative and unchanged from 5/19/2015.                                                                      IMPRESSION:   1. Bilateral nonobstructing nephrolithiasis which is unchanged from  5/19/2015. Atrophic left kidney again noted.  2. Remainder of the scan is unchanged.  Nguyen Simms MD

## 2021-12-13 NOTE — LETTER
12/13/2021     RE: Helena Eldridge  [address] [facility] [address]     Dear Colleague,    Thank you for referring your patient, Helena Eldridge, to the [facility] at [facility]. Please see a copy of my visit note below.    Assessment and Plan:  86 year old female with history of CKD, Scr 1.8 with eGFR near 25ml/min in recent years, history of lung cancer s/p chemotherapy, kidney cancer s/p left partial resection, kidney stones and infections s/p ESWL and stent in 7/2020, small left kidney, who presents for followup of CKD and proteinuria.  # CKD stage 4- herScr has been near 1.8 with eGFR near 23-27 ml/min over the last two years, previous to that her eGFR was near 35ml/min and back in early 2000s she had eGFR near 40-45ml/min  She has been through many things that have caused CKD including cancer, chemotherapy with carboplatin. Last Scr 1.84, eGFR 24ml/min  She has 1 gram of proteinuria which puts her at a higher risk for progression  - check labs today  - BP fairly controlled based on home readings  # Hypertension: BP high normal. Aim toward 130/80 if tolerated.   Continue furosemide 20mg    # Kidney stones: has calcium oxalate stone on stone analysis; discussed low sodium, fluid intake of 2.5 -3 liters     # Anemia in chronic renal disease:   - Hgb: Stable, low normal, 11-11.5  - Iron studies: Not checked recently    # Electrolytes:   - Potassium; level: Normal  - bicarbonate: Normal  # Mineral Bone Disorder:   - Calcium; level is:  Normal   - phos and PTH normal  Assessment and plan was discussed with patient and she voiced her understanding and agreement.    RTC 4 months    Reason for Visit:  Helena Eldridge is a 86 year old female with CKD from renal and lung cancer, who presents for followup    HPI:  She is a pleasant female with multiple comorbidities including hisotry of renal cell carcinoma s/p partial left kidney resection,  lung cancer , COPD, who presents for followup of CKD She has been aware of kidney disease and her creatinine has been stable near 1.8-2 mg/dL over the last few years. Recently her Scr was up to 2.3, eGFR decreased to 17ml/min, but recently back and eGFR 23ml/min  Her left kidney was noted to be very small on CT imaging going back to 2015.  Over the years, her eGFR was 35-45 ml/min from 2011 to 2017, but in the last couple of years, her eGFR was 25-28ml/min.  She has undergone treatment for her cancers and doing fairly well at this time.  Her lung cancer (nonsmall cell lung cancer T1N1) was treated with carboplatin/ permetrexeb 3838-2420 (6 cycles) and then nivolumab for a year until it was stopped due to itching.  She denies shortness of breath or chest pain. She has some fatigue with significant exertion.  She denies any skin rash, GI or  issues at this time.  She denies family history of renal failure or dialysis.    She has been doing well since last visit. She denies shortness of breath or swelling    Her BP at home is usually 135/70s , though it is higher.   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  She has a lot of great nephews and nieces and stays busy with them.  She is getting her steps in, at least 2000 a day, she counts   She has some psoriasis and has it under her breast and at times it keeps her awake, and she takes hydroxyzine for this.  She was in the office recently for vaginal itching and was treated for yeast infection.    Renal History:   Kidney Disease and Medical Hx:  h/o HTN: Yes  , h/o Protein in Urine: Yes  and h/o Kidney Stones:Yes     ROS:   A comprehensive review of systems was obtained and negative, except as noted in the HPI or PMH.    Active Medical Problems:  Patient Active Problem List   Diagnosis     CARDIOVASCULAR SCREENING; LDL GOAL LESS THAN 100     CKD (chronic kidney disease) stage 4, GFR 15-29 ml/min (H)     Glucose intolerance  (impaired glucose tolerance)     Kidney stones     Advance care planning     Hypertension goal BP (blood pressure) < 140/90     Lichen sclerosus et atrophicus of the vulva     Solitary kidney, acquired     Senile osteoporosis     Osteoarthritis of right knee     Medial meniscus tear     Cataracts, both eyes     Macular degeneration of both eyes, right eye greater than left eye     Pelvic fracture (H)     COPD, severe (H)     IPF (idiopathic pulmonary fibrosis) (H)     Other specified hypothyroidism     Idiopathic chronic gout     Adjustment disorder with anxiety     Non-small cell cancer of right lung (H)     Immunosuppression (H)     Port-A-Cath in place     Ureteral stone     Left renal mass     PMH:   Medical record was reviewed and PMH was discussed with patient and noted below.  Past Medical History:   Diagnosis Date     Carpal tunnel syndrome      CKD (chronic kidney disease)     kidney stone with infection     Deep vein thrombosis (DVT) (H)     1972     DVT [id number]     Glucose intolerance      H/O partial nephrectomy      Heel spur      Hypothyroidism      Kidney stones      Lichen sclerosus      Osteoarthritis      Osteoporosis      PID      Pulmonary embolism (H)     1970     Unspecified hypothyroidism      Vitiligo      PSH:   Past Surgical History:   Procedure Laterality Date     C NEPHRECTOMY      left partial 07-01-07     C VENOUS THROMBOSIS IMAGING      with pe     EXTRACORPOREAL SHOCK WAVE LITHOTRIPSY, CYSTOSCOPY, INSERT STENT URETER(S), COMBINED Right 7/13/2020    Procedure: LITHOTRIPSY, Right EXTRACORPOREAL SHOCK WAVE (ESWL), WITH CYSTOSCOPY AND Right URETERAL STENT INSERTION;  Surgeon: Buddy Ramirez MD;  Location: [facility] OR     HC REMOVAL GALLBLADDER       HC REVISE MEDIAN N/CARPAL TUNNEL SURG       LASER HOLMIUM LITHOTRIPSY URETER(S), INSERT STENT, COMBINED Right 7/28/2020    Procedure: RIGHT CYSTOURETEROSCOPY, WITH LITHOTRIPSY USING LASER AND URETERAL STENT EXCHANGE;  Surgeon: Buddy Ramirez  MD Parth;  Location: [facility] OR     TUBAL LIGATION         Family Hx:   Family History   Problem Relation Age of Onset     Cancer Brother      Glaucoma Mother      Alzheimer Disease Brother      Macular Degeneration No family hx of      Personal Hx:   Social History     Tobacco Use     Smoking status: Former Smoker     Types: Cigarettes     Quit date: 1969     Years since quittin.0     Smokeless tobacco: Never Used   Substance Use Topics     Alcohol use: Yes     Alcohol/week: 0.0 standard drinks     Comment: very little        Allergies:  Allergies   Allergen Reactions     Ace Inhibitors Cough     Advicor      Alendronic Acid Other (See Comments)     Other reaction(s): GI Upset  heartburn  Severe substernal burning and dysphagia within 3 days       Blood-Group Specific Substance Other (See Comments)     Patient has Anti-Racine and warm auto antibody. Blood products may be delayed. Draw patient 24 hours prior to transfusion. Draw one red top and two purple top tubes for all type and screen orders.     Citalopram GI Disturbance and Nausea     diarrhea  diarrhea     Doxycycline Nausea     Poor appetite     Fosamax      Severe substernal burning and dysphagia within 3 days     Valsartan Cramps and Other (See Comments)     severe  severe       Medications:  Current Outpatient Medications   Medication Sig     ammonium lactate (AMLACTIN) 12 % external cream Apply  topically 2 times daily as needed. apply to affected area/feet     Calcium Carbonate-Vitamin D (CALCIUM + D) 600-200 MG-UNIT per tablet Take 2 tablets by mouth 2 times daily.     clobetasol (TEMOVATE) 0.05 % external solution      diphenhydrAMINE (BENADRYL) 25 MG tablet Take 25 mg by mouth At Bedtime      DOCUSATE SODIUM PO Take 100 mg by mouth At Bedtime     Emollient (CERAVE DAILY MOISTURIZING EX)      estradiol (ESTRACE) 0.1 MG/GM vaginal cream PLACE 2 GRAMS VAGINALLY 2 TIMES A WK     famotidine (PEPCID) 20 MG tablet TAKE 1 TABLET(20 MG) BY MOUTH TWICE  DAILY     fluconazole (DIFLUCAN) 150 MG tablet Take one tablet (150 mg) by mouth now. Repeat in 3 days if symptoms do not resolve.     furosemide (LASIX) 20 MG tablet TAKE 1 TABLET(20 MG) BY MOUTH DAILY     hydrOXYzine (ATARAX) 25 MG tablet TAKE 1 TABLET BY MOUTH TWICE DAILY AS NEEDED FOR ITCHING     levothyroxine (SYNTHROID/LEVOTHROID) 100 MCG tablet Take 1 tablet (100 mcg) by mouth daily     LORazepam (ATIVAN) 0.5 MG tablet Take 1 tablet (0.5 mg) by mouth nightly as needed for anxiety or sleep     Multiple Vitamins-Minerals (OCUVITE ADULT 50+) CAPS Take 1 capsule by mouth daily     nystatin (MYCOSTATIN) 930975 UNIT/GM external powder Apply to affected area twice daily as needed     omeprazole (PRILOSEC) 40 MG DR capsule TAKE 1 CAPSULE BY MOUTH EVERY DAY     ondansetron (ZOFRAN-ODT) 4 MG ODT tab DIS 1 T ON THE TONGUE Q 8 H PRF NAUSEA OR VOM     order for DME Equipment being ordered:   [facility] at [facility].     rOPINIRole (REQUIP) 0.25 MG tablet Take 1-2 tablets (0.25-0.5 mg) by mouth nightly as needed (restless legs)     STATIN NOT PRESCRIBED, INTENTIONAL, by Other route continuous prn. Patient declined 5/4/12     valACYclovir (VALTREX) 1000 mg tablet Take 1 tablet (1,000 mg) by mouth 3 times daily     No current facility-administered medications for this visit.     Facility-Administered Medications Ordered in Other Visits   Medication     perflutren diluted in saline (DEFINITY) injection 5 mL      Vitals:  BP (!) 148/72   Pulse 90   Wt 59 kg (130 lb)   BMI 24.56 kg/m      Exam:  General: alert, oriented, conversant  Heart: RRR  Lungs: clear bilaterally  Ext: no edema  Speaks in full sentences without audible dyspnea or wheeze  Neuro: normal speech  Psych: conversant, normal affect and thought process      LABS:   CMP  Recent Labs   Lab Test 06/21/21  1251 03/08/21  1325 12/03/20  0912 11/10/20  0930    138 141 139   POTASSIUM 4.1 4.1 4.4 3.6   CHLORIDE 100 105 107 104   CO2 30 28 31 30    ANIONGAP 5 5 3 5   GLC 96 98 106* 125*   BUN 29 27 22 42*   CR 2.11* 1.85* 1.92* 2.48*   GFRESTIMATED 21* 24* 23* 17*   GFRESTBLACK 24* 28* 27* 20*   SERAFIN 8.9 9.3 9.5 8.9     Recent Labs   Lab Test 07/20/20  1354 07/05/20  0000 05/02/14  0823   ALT 16 13 27   AST  --  17 30     CBC  Recent Labs   Lab Test 06/21/21  1251 07/20/20  1354 02/28/20  0854 08/09/19  1050   HGB 11.8 10.7* 11.0* 11.7   WBC 7.8 8.5 9.6 9.0   RBC 3.52* 3.21* 3.30* 3.31*   HCT 35.4 32.1* 34.5* 34.8*   * 100 105* 105*   MCH 33.5* 33.3* 33.3* 35.3*   MCHC 33.3 33.3 31.9 33.6   RDW 13.6 12.8 13.0 14.4    292 291 276     URINE STUDIES  Recent Labs   Lab Test 11/29/21  1040 11/10/20  0944 11/03/20  0932 10/19/20  1050 01/21/20  0850   COLOR Yellow Yellow Yellow Yellow Yellow   APPEARANCE Clear Clear Cloudy Clear Clear   URINEGLC Negative Negative Negative Negative Negative   URINEBILI Negative Negative Negative Negative Negative   URINEKETONE Negative Negative Negative Negative Negative   SG 1.010 1.025 1.020 1.020 1.025   UBLD Negative Negative Negative Negative Negative   URINEPH 5.5 5.5 5.5 6.0 5.5   PROTEIN Negative Negative Negative Negative Negative   UROBILINOGEN 0.2 0.2 0.2 0.2 0.2   NITRITE Negative Negative Negative Negative Negative   LEUKEST Negative Negative Small* Trace* Negative   RBCU  --  O - 2 O - 2 O - 2 O - 2   WBCU  --  0 - 5 5-10* 10-25* 0 - 5     No lab results found.  PTH  Recent Labs   Lab Test 06/21/21  1251 03/08/21  1325   PTHI 106* 65     IRON STUDIES  Recent Labs   Lab Test 06/21/21  1251   IRON 94      IRONSAT 39   JOSE RAMON 375*       TECHNIQUE: Scans were obtained from the diaphragm through the pelvis  without IV contrast. Radiation dose for this scan was reduced using  automated exposure control, adjustment of the mA and/or kV according  to patient size, or iterative reconstruction technique.     COMPARISON: 5/19/2015.     FINDINGS: Coronary calcifications. Linear atelectasis or fibrosis left  lung  base. Liver, spleen, and pancreas are normal. Previous  cholecystectomy with prominence to the common bile duct again noted  and unchanged. Atrophic changes again noted in the left kidney which  are unchanged from 5/19/2015. 2 small calculi again noted in the left  kidney. Left kidney is otherwise unchanged. Right kidney is normal in  size with small calculi again noted in the lower portion of the right  kidney which are unchanged. Right kidney and ureter are otherwise  normal. Diverticula in the colon without evidence of diverticulitis.  Colon and small bowel are otherwise normal. Calcification of the  abdominal aorta and iliac arteries. No abdominal or pelvic adenopathy.  Remainder of the scan is negative and unchanged from 5/19/2015.                                                                      IMPRESSION:   1. Bilateral nonobstructing nephrolithiasis which is unchanged from  5/19/2015. Atrophic left kidney again noted.  2. Remainder of the scan is unchanged.  Nguyen Simms MD

## 2021-12-13 NOTE — TELEPHONE ENCOUNTER
Patient had an OV with Aby Dejesus CNP on 11/29/2021 for vaginal itching and was treated with Diflucan for a yeast infection.   She has taken both doses.  Vaginal itching is a little better but has not resolved completely.     Please advise.  Please try calling after 3 PM today or if unable to reach her, try again in the morning as she has an appointment elsewhere right now.  She is [place] and ask that we do not leave a VM on her phone with info    Romaine Mendez RN  [facility]

## 2021-12-13 NOTE — TELEPHONE ENCOUNTER
Reason for call:  Patient reporting a symptom    Symptom or request: f/u- continuing yeast infection    Duration (how long have symptoms been present): x2wk    Have you been treated for this before? Yes    Additional comments: f/u yeast infection 2 wks- getting a little better but not totally cleared up. Unable to schedule an appt- would like call back/ .    Phone Number patient can be reached at:  Home number on file [phone] (home)    Best Time:  morning    Can we leave a detailed message on this number:  NO    Call taken on 12/13/2021 at 9:56 AM by Radha Crenshaw

## 2021-12-14 LAB — DEPRECATED CALCIDIOL+CALCIFEROL SERPL-MC: 43 UG/L (ref 20–75)

## 2021-12-17 RX ORDER — FLUCONAZOLE 150 MG/1
150 TABLET ORAL ONCE
Qty: 2 TABLET | Refills: 0 | Status: SHIPPED | OUTPATIENT
Start: 2021-12-17 | End: 2021-12-17

## 2021-12-18 NOTE — TELEPHONE ENCOUNTER
I have sent a prescription for repeat treatment to her pharmacy on file.     Aby Dejesus, APRN, CNP

## 2021-12-20 NOTE — TELEPHONE ENCOUNTER
Patient notified of Provider's message as written.  Patient verbalized understanding.    Patient mentioned that she fell and hurt her ribs (on 12/13/2021?)  Denies any trouble breathing  Offered an appointment for further evaluation but patient declined stating that she saw nephrology the same day and her nephrologist suggested using heat for the pain which she has been doing    Romaine Mendez RN  [facility]

## 2021-12-23 ENCOUNTER — PATIENT OUTREACH (OUTPATIENT)
Dept: NEPHROLOGY | Facility: CLINIC | Age: 86
End: 2021-12-23
Payer: COMMERCIAL

## 2021-12-23 DIAGNOSIS — N18.4 CKD (CHRONIC KIDNEY DISEASE) STAGE 4, GFR 15-29 ML/MIN (H): Primary | ICD-10-CM

## 2021-12-23 NOTE — PROGRESS NOTES
Nguyen Simms MD Stechmann, Kathleen, RN  Let's have her repeat renal panel in 3 months, has appt w me in 6 months     Notified patient of above and notified her of message Dr. Simms sent about labs as well. Patient had no further questions or concerns.   Order placed for renal panel.

## 2021-12-28 ENCOUNTER — TELEPHONE (OUTPATIENT)
Dept: FAMILY MEDICINE | Facility: CLINIC | Age: 86
End: 2021-12-28
Payer: COMMERCIAL

## 2021-12-28 DIAGNOSIS — B37.31 CANDIDIASIS OF VULVA AND VAGINA: ICD-10-CM

## 2021-12-28 DIAGNOSIS — B37.2 CANDIDAL INTERTRIGO: Primary | ICD-10-CM

## 2021-12-28 RX ORDER — NYSTATIN 100000 U/G
OINTMENT TOPICAL 2 TIMES DAILY
Qty: 30 G | Refills: 0 | Status: SHIPPED | OUTPATIENT
Start: 2021-12-28 | End: 2022-01-18

## 2021-12-28 RX ORDER — MICONAZOLE NITRATE 2 %
1 CREAM WITH APPLICATOR VAGINAL AT BEDTIME
Qty: 0.7 G | Refills: 0 | Status: SHIPPED | OUTPATIENT
Start: 2021-12-28 | End: 2022-01-04

## 2021-12-28 NOTE — TELEPHONE ENCOUNTER
Will send nystatin ointment for extrenal use only. And a miconazole cream for vaginal use. If no improvement in the next 10 days, then she should schedule in person follow up.    MERARI Friedman, CNP

## 2021-12-28 NOTE — TELEPHONE ENCOUNTER
Patient called.  She was seen by Aby Dejesus CNP on 11/29/2021 for vaginal itchiness and prescribed nystatin powder and Diflucan.  Refill of Diflucan was sent on 12/17/2021.  Per patient, nystatin powder is too difficult to apply  Diflucan did not seem to help  The vaginal dryness and itchiness is getting worse.   Starts getting worse in the evening times.  She has to wake up during the night to apply vaseline which seems to help some.  Has Estradial vaginal cream listed but patient does not have this nor recall ever using this.    Please advise on further treatment    Ok to leave detailed message on patient's VM    Romaine Mendez RN  [facility]

## 2021-12-28 NOTE — TELEPHONE ENCOUNTER
Patient notified of Provider's message as written.  Patient verbalized understanding.    Romaine Mendez RN  [facility]

## 2022-01-04 DIAGNOSIS — E03.8 OTHER SPECIFIED HYPOTHYROIDISM: ICD-10-CM

## 2022-01-06 NOTE — TELEPHONE ENCOUNTER
Please call patient and clarify the Levothyroxine dose she is currently taking, just want to be sure I am signing the correct dose    She is due for labs, could she make lab appt in next couple months?  (Not urgent, does not need to come in the cold).  Orders will be in.

## 2022-01-07 RX ORDER — LEVOTHYROXINE SODIUM 100 UG/1
TABLET ORAL
Qty: 90 TABLET | Refills: 3 | Status: SHIPPED | OUTPATIENT
Start: 2022-01-07 | End: 2022-11-14

## 2022-01-07 NOTE — TELEPHONE ENCOUNTER
Patient is taking 100 MCG for Levothyroxine.   Scheduled lab only appt.     LXIONG3, MEDICAL ASSISTANT

## 2022-01-18 ENCOUNTER — OFFICE VISIT (OUTPATIENT)
Dept: FAMILY MEDICINE | Facility: CLINIC | Age: 87
End: 2022-01-18
Payer: COMMERCIAL

## 2022-01-18 VITALS
HEART RATE: 96 BPM | SYSTOLIC BLOOD PRESSURE: 172 MMHG | DIASTOLIC BLOOD PRESSURE: 79 MMHG | OXYGEN SATURATION: 97 % | BODY MASS INDEX: 25.52 KG/M2 | WEIGHT: 130 LBS | HEIGHT: 60 IN | TEMPERATURE: 99.1 F

## 2022-01-18 DIAGNOSIS — R11.0 NAUSEA: ICD-10-CM

## 2022-01-18 DIAGNOSIS — N18.4 CKD (CHRONIC KIDNEY DISEASE) STAGE 4, GFR 15-29 ML/MIN (H): ICD-10-CM

## 2022-01-18 DIAGNOSIS — J44.9 COPD, SEVERE (H): ICD-10-CM

## 2022-01-18 DIAGNOSIS — C34.91 NON-SMALL CELL CANCER OF RIGHT LUNG (H): ICD-10-CM

## 2022-01-18 DIAGNOSIS — L40.9 PSORIASIS: ICD-10-CM

## 2022-01-18 DIAGNOSIS — B37.2 CANDIDAL INTERTRIGO: ICD-10-CM

## 2022-01-18 DIAGNOSIS — N90.4 LICHEN SCLEROSUS ET ATROPHICUS OF THE VULVA: Primary | ICD-10-CM

## 2022-01-18 PROBLEM — N20.1 URETERAL STONE: Status: ACTIVE | Noted: 2020-07-07

## 2022-01-18 PROBLEM — N20.1 URETERAL STONE: Status: RESOLVED | Noted: 2020-07-07 | Resolved: 2022-01-18

## 2022-01-18 PROBLEM — D84.9 IMMUNOSUPPRESSION (H): Status: RESOLVED | Noted: 2018-03-07 | Resolved: 2022-01-18

## 2022-01-18 PROBLEM — N28.89 LEFT RENAL MASS: Status: RESOLVED | Noted: 2021-01-09 | Resolved: 2022-01-18

## 2022-01-18 PROBLEM — D84.9 IMMUNOSUPPRESSION (H): Status: ACTIVE | Noted: 2018-03-07

## 2022-01-18 PROBLEM — Z95.828 PORT-A-CATH IN PLACE: Status: RESOLVED | Noted: 2018-03-08 | Resolved: 2022-01-18

## 2022-01-18 PROBLEM — F43.22 ADJUSTMENT DISORDER WITH ANXIETY: Status: ACTIVE | Noted: 2017-12-04

## 2022-01-18 PROCEDURE — 99214 OFFICE O/P EST MOD 30 MIN: CPT | Performed by: FAMILY MEDICINE

## 2022-01-18 RX ORDER — CLOBETASOL PROPIONATE 0.5 MG/G
OINTMENT TOPICAL 2 TIMES DAILY
Qty: 30 G | Refills: 0 | Status: SHIPPED | OUTPATIENT
Start: 2022-01-18 | End: 2022-07-08

## 2022-01-18 RX ORDER — NYSTATIN 100000 U/G
OINTMENT TOPICAL 2 TIMES DAILY
Qty: 30 G | Refills: 0 | Status: SHIPPED | OUTPATIENT
Start: 2022-01-18 | End: 2022-07-07

## 2022-01-18 RX ORDER — ONDANSETRON 4 MG/1
4 TABLET, ORALLY DISINTEGRATING ORAL EVERY 8 HOURS PRN
Qty: 20 TABLET | Refills: 0 | Status: SHIPPED | OUTPATIENT
Start: 2022-01-18 | End: 2023-05-22

## 2022-01-18 ASSESSMENT — MIFFLIN-ST. JEOR: SCORE: 951.18

## 2022-01-18 NOTE — PROGRESS NOTES
Assessment & Plan     (N90.4) Lichen sclerosus et atrophicus of the vulva  (primary encounter diagnosis)  Plan: clobetasol (TEMOVATE) 0.05 % external ointment,        Adult Dermatology Referral          (B37.2) Candidal intertrigo  Plan: nystatin (MYCOSTATIN) 202483 UNIT/GM external         ointment, Adult Dermatology Referral          (L40.9) Psoriasis  Plan: clobetasol (TEMOVATE) 0.05 % external ointment,        Adult Dermatology Referral          (C34.91) Non-small cell cancer of right lung (H)  Comment: in remission after chemotherapy   Plan: follow-up with [facility] Dr. Hsieh as planned     (J44.9) COPD, severe (H)  Comment: stable   Plan: follow-up with PCP as planned     (N18.4) CKD (chronic kidney disease) stage 4, GFR 15-29 ml/min (H)  Comment: discussed blood pressure management/goal; Outside BP readings are in the range of 130 systolic.   Plan: follow-up recommended with PCP and nephrology to consider addition of blood pressure medication     (R11.0) Nausea  Plan: ondansetron (ZOFRAN-ODT) 4 MG ODT tab        Per patient request                See Patient Instructions    Return in about 2 weeks (around 2/1/2022) for thyroid , free nurse only blood pressure check.    Taisha Lagunas MD  [facility]    Radha Malik is a 86 year old who presents for the following health issues     HPI     Vaginal Symptoms  Onset/Duration: Itching  Description:  Vaginal Discharge: none   Itching (Pruritis): YES  Burning sensation:  no  Odor: no  Accompanying Signs & Symptoms:  Urinary symptoms: no  Abdominal pain: no  Fever: no  History:   Sexually active: no  New Partner: no  Possibility of Pregnancy:  no  Recent antibiotic use: no  Previous vaginitis issues: YES- Was seen 12/28/21  Precipitating or alleviating factors: None  Therapies tried and outcome: Vaseline            Review of Systems   CONSTITUTIONAL: NEGATIVE for fever, chills, change in weight  INTEGUMENTARY/SKIN: psoriasis; genital  itching; new rash under right breast   ENT/MOUTH: NEGATIVE for ear, mouth and throat problems  RESP: NEGATIVE for significant cough or SOB  GI: hx GERD; chronic nausea   : NEGATIVE for frequency, dysuria, or hematuria      Objective    BP (!) 172/79 (BP Location: Right arm, Patient Position: Sitting, Cuff Size: Adult Regular)   Pulse 96   Temp 99.1  F (37.3  C) (Oral)   Ht 1.524 m (5')   Wt 59 kg (130 lb)   SpO2 97%   BMI 25.39 kg/m    Body mass index is 25.39 kg/m .  Physical Exam   GENERAL: alert and no distress  EYES: Eyes grossly normal to inspection, PERRL and conjunctivae and sclerae normal  MS: extremities normal- no gross deformities noted  SKIN: red macular rash without scale and with discrete borders under right breast and in bilateral inguinal creases extending to groin and vulva; generalized atrophy of vulva and clitoris; white pearly rash surrounding clitoris and extending along bilateral vulva in lacy pattern   PSYCH: mentation appears normal, affect normal/bright    Lab on 12/13/2021   Component Date Value Ref Range Status     Sodium 12/13/2021 135  133 - 144 mmol/L Final     Potassium 12/13/2021 4.2  3.4 - 5.3 mmol/L Final     Chloride 12/13/2021 102  94 - 109 mmol/L Final     Carbon Dioxide (CO2) 12/13/2021 24  20 - 32 mmol/L Final     Anion Gap 12/13/2021 9  3 - 14 mmol/L Final     Urea Nitrogen 12/13/2021 30  7 - 30 mg/dL Final     Creatinine 12/13/2021 2.47* 0.52 - 1.04 mg/dL Final     Calcium 12/13/2021 8.7  8.5 - 10.1 mg/dL Final     Glucose 12/13/2021 122* 70 - 99 mg/dL Final     Albumin 12/13/2021 3.6  3.4 - 5.0 g/dL Final     Phosphorus 12/13/2021 3.4  2.5 - 4.5 mg/dL Final     GFR Estimate 12/13/2021 17* >60 mL/min/1.73m2 Final    As of July 11, 2021, eGFR is calculated by the CKD-EPI creatinine equation, without race adjustment. eGFR can be influenced by muscle mass, exercise, and diet. The reported eGFR is an estimation only and is only applicable if the renal function is stable.      Hemoglobin 12/13/2021 11.1* 11.7 - 15.7 g/dL Final     Parathyroid Hormone Intact 12/13/2021 119* 18 - 80 pg/mL Final     Vitamin D, Total (25-Hydroxy) 12/13/2021 43  20 - 75 ug/L Final

## 2022-01-18 NOTE — TELEPHONE ENCOUNTER
Patient called the clinic.  She reports on-going intermittent itching and dryness to her genital area especially during the night time.  These symptoms have been ongoing and was evaluated and treated 11/29/21.  Patient reports that

## 2022-01-21 ENCOUNTER — OFFICE VISIT (OUTPATIENT)
Dept: OPHTHALMOLOGY | Facility: CLINIC | Age: 87
End: 2022-01-21
Payer: COMMERCIAL

## 2022-01-21 DIAGNOSIS — H35.3221 EXUDATIVE AGE-RELATED MACULAR DEGENERATION OF LEFT EYE WITH ACTIVE CHOROIDAL NEOVASCULARIZATION (H): Primary | ICD-10-CM

## 2022-01-21 DIAGNOSIS — H52.4 MYOPIA OF BOTH EYES WITH REGULAR ASTIGMATISM AND PRESBYOPIA: ICD-10-CM

## 2022-01-21 DIAGNOSIS — H25.813 COMBINED FORMS OF AGE-RELATED CATARACT OF BOTH EYES: ICD-10-CM

## 2022-01-21 DIAGNOSIS — H52.13 MYOPIA OF BOTH EYES WITH REGULAR ASTIGMATISM AND PRESBYOPIA: ICD-10-CM

## 2022-01-21 DIAGNOSIS — H35.3112 INTERMEDIATE STAGE NONEXUDATIVE AGE-RELATED MACULAR DEGENERATION OF RIGHT EYE: ICD-10-CM

## 2022-01-21 DIAGNOSIS — H52.223 MYOPIA OF BOTH EYES WITH REGULAR ASTIGMATISM AND PRESBYOPIA: ICD-10-CM

## 2022-01-21 PROCEDURE — 92002 INTRM OPH EXAM NEW PATIENT: CPT | Performed by: STUDENT IN AN ORGANIZED HEALTH CARE EDUCATION/TRAINING PROGRAM

## 2022-01-21 PROCEDURE — 92015 DETERMINE REFRACTIVE STATE: CPT | Performed by: STUDENT IN AN ORGANIZED HEALTH CARE EDUCATION/TRAINING PROGRAM

## 2022-01-21 ASSESSMENT — CONF VISUAL FIELD
OS_NORMAL: 1
OD_NORMAL: 1

## 2022-01-21 ASSESSMENT — VISUAL ACUITY
METHOD: SNELLEN - LINEAR
OS_CC: 20/100
OD_CC: 20/400
CORRECTION_TYPE: GLASSES

## 2022-01-21 ASSESSMENT — EXTERNAL EXAM - LEFT EYE: OS_EXAM: NORMAL

## 2022-01-21 ASSESSMENT — REFRACTION_WEARINGRX
SPECS_TYPE: BIFOCAL
OS_SPHERE: -0.50
OS_AXIS: 163
OD_AXIS: 013
OD_ADD: +3.50
OS_CYLINDER: +0.50
OD_CYLINDER: +1.00
OS_ADD: +3.25
OD_SPHERE: -0.75

## 2022-01-21 ASSESSMENT — REFRACTION_MANIFEST
OD_CYLINDER: +0.50
OS_CYLINDER: +1.50
OS_AXIS: 168
OS_SPHERE: -2.00
OD_AXIS: 028
OD_SPHERE: -1.25

## 2022-01-21 ASSESSMENT — CUP TO DISC RATIO
OD_RATIO: 0.5
OS_RATIO: 0.5

## 2022-01-21 ASSESSMENT — SLIT LAMP EXAM - LIDS
COMMENTS: NORMAL
COMMENTS: NORMAL

## 2022-01-21 ASSESSMENT — EXTERNAL EXAM - RIGHT EYE: OD_EXAM: NORMAL

## 2022-01-21 NOTE — PATIENT INSTRUCTIONS
Continue care with retina specialist     DMV form completed. You need to update your glasses in order to drive. Glasses prescription given.    Karlene Dickerson MD  [phone]

## 2022-01-21 NOTE — LETTER
1/21/2022         RE: Helena Eldridge  [address] [facility] [address]        Dear Colleague,    Thank you for referring your patient, Helena Eldridge, to the [facility]. Please see a copy of my visit note below.     Current Eye Medications:  Artificial tears both eyes as needed.  No eye vitamin specifically, but she takes a multi vitamin.     Subjective:  Patient needs to have a DMV form completed.   Her last eye exam was with an Optometrist in [place] in October of 2021 - the Optometrist told her that they were unable to complete the form as it had to be completed by an Ophthalmologist (he son accidentally printed a form from the DMV that was for a  Waiver).     She also sees a Retina Doctor for injections in each eye every 2 months.  Next appointment is next week. Last visit with us 2017.     Objective:  See Ophthalmology Exam.      Assessment:  Helena Eldridge is a 86 year old female who presents with:   Encounter Diagnoses   Name Primary?     Exudative age-related macular degeneration of left eye with active choroidal neovascularization (H) DMV form completed with restrictions (best corrected visual acuity 20/70 left eye).      Intermediate stage nonexudative age-related macular degeneration of right eye      Combined forms of age-related cataract of both eyes      Myopia of both eyes with regular astigmatism and presbyopia        Plan:  Continue care with retina specialist     DMV form completed. You need to update your glasses in order to drive. Glasses prescription given.    Karlene Dickerson MD  [phone]          Again, thank you for allowing me to participate in the care of your patient.        Sincerely,        Karlene Dickerson MD

## 2022-01-21 NOTE — PROGRESS NOTES
Current Eye Medications:  Artificial tears both eyes as needed.  No eye vitamin specifically, but she takes a multi vitamin.     Subjective:  Patient needs to have a DMV form completed.   Her last eye exam was with an Optometrist in [place] in October of 2021 - the Optometrist told her that they were unable to complete the form as it had to be completed by an Ophthalmologist (he son accidentally printed a form from the DMV that was for a  Waiver).     She also sees a Retina Doctor for injections in each eye every 2 months.  Next appointment is next week. Last visit with us 2017.     Objective:  See Ophthalmology Exam.      Assessment:  Helena Eldridge is a 86 year old female who presents with:   Encounter Diagnoses   Name Primary?     Exudative age-related macular degeneration of left eye with active choroidal neovascularization (H) DMV form completed with restrictions (best corrected visual acuity 20/70 left eye).      Intermediate stage nonexudative age-related macular degeneration of right eye      Combined forms of age-related cataract of both eyes      Myopia of both eyes with regular astigmatism and presbyopia        Plan:  Continue care with retina specialist     DMV form completed. You need to update your glasses in order to drive. Glasses prescription given.    Karlene Dickerson MD  [phone]

## 2022-01-26 ENCOUNTER — DOCUMENTATION ONLY (OUTPATIENT)
Dept: OPHTHALMOLOGY | Facility: CLINIC | Age: 87
End: 2022-01-26
Payer: COMMERCIAL

## 2022-02-08 ENCOUNTER — LAB (OUTPATIENT)
Dept: LAB | Facility: CLINIC | Age: 87
End: 2022-02-08
Payer: COMMERCIAL

## 2022-02-08 DIAGNOSIS — E03.8 OTHER SPECIFIED HYPOTHYROIDISM: ICD-10-CM

## 2022-02-08 DIAGNOSIS — Z13.220 SCREENING FOR HYPERLIPIDEMIA: ICD-10-CM

## 2022-02-08 DIAGNOSIS — M1A.00X0 IDIOPATHIC CHRONIC GOUT: ICD-10-CM

## 2022-02-08 LAB
CHOLEST SERPL-MCNC: 237 MG/DL
FASTING STATUS PATIENT QL REPORTED: NO
HDLC SERPL-MCNC: 36 MG/DL
LDLC SERPL CALC-MCNC: 126 MG/DL
NONHDLC SERPL-MCNC: 201 MG/DL
TRIGL SERPL-MCNC: 374 MG/DL
TSH SERPL DL<=0.005 MIU/L-ACNC: 1.6 MU/L (ref 0.4–4)
URATE SERPL-MCNC: 9.2 MG/DL (ref 2.6–6)

## 2022-02-08 PROCEDURE — 36415 COLL VENOUS BLD VENIPUNCTURE: CPT

## 2022-02-08 PROCEDURE — 84443 ASSAY THYROID STIM HORMONE: CPT

## 2022-02-08 PROCEDURE — 80061 LIPID PANEL: CPT

## 2022-02-08 PROCEDURE — 84550 ASSAY OF BLOOD/URIC ACID: CPT

## 2022-02-08 NOTE — LETTER
February 8, 2022      Helena Eldridge  [address] [address]        Dear Helena:    We are writing to inform you of your test results.    Thyroid remains normal.  Cholesterol a little elevated, but stable.     The uric acid is elevated.  I recommend we restart your allopurinol if you are able.  The chances of a gout flare will be high, otherwise.     Let me know if you would be agreeable to resume the allopurinol?    Resulted Orders   Lipid panel reflex to direct LDL Fasting   Result Value Ref Range    Cholesterol 237 (H) <200 mg/dL    Triglycerides 374 (H) <150 mg/dL    Direct Measure HDL 36 (L) >=50 mg/dL    LDL Cholesterol Calculated 126 (H) <=100 mg/dL    Non HDL Cholesterol 201 (H) <130 mg/dL    Patient Fasting > 8hrs? No     Narrative    Cholesterol  Desirable:  <200 mg/dL    Triglycerides  Normal:  Less than 150 mg/dL  Borderline High:  150-199 mg/dL  High:  200-499 mg/dL  Very High:  Greater than or equal to 500 mg/dL    Direct Measure HDL  Female:  Greater than or equal to 50 mg/dL   Male:  Greater than or equal to 40 mg/dL    LDL Cholesterol  Desirable:  <100mg/dL  Above Desirable:  100-129 mg/dL   Borderline High:  130-159 mg/dL   High:  160-189 mg/dL   Very High:  >= 190 mg/dL    Non HDL Cholesterol  Desirable:  130 mg/dL  Above Desirable:  130-159 mg/dL  Borderline High:  160-189 mg/dL  High:  190-219 mg/dL  Very High:  Greater than or equal to 220 mg/dL   Uric acid   Result Value Ref Range    Uric Acid 9.2 (H) 2.6 - 6.0 mg/dL   TSH with free T4 reflex   Result Value Ref Range    TSH 1.60 0.40 - 4.00 mU/L     If you have any questions or concerns, please call the clinic at the number listed above.     Sincerely,      Sushma Nelson MD/wendy

## 2022-02-08 NOTE — RESULT ENCOUNTER NOTE
Helena Eldridge    Thyroid remains normal.  Cholesterol a little elevated, but stable.    The uric acid is elevated:  I recommend we restart your allopurinol if you are able.  The chances of a gout flare will be high otherwise.   Let me know if you would be agreeable to resume the allopurinol    Sincerely,       MANUELA MONREAL M.D.

## 2022-02-09 ENCOUNTER — MYC MEDICAL ADVICE (OUTPATIENT)
Dept: INTERNAL MEDICINE | Facility: CLINIC | Age: 87
End: 2022-02-09
Payer: COMMERCIAL

## 2022-02-09 DIAGNOSIS — M1A.0790 IDIOPATHIC CHRONIC GOUT OF FOOT WITHOUT TOPHUS, UNSPECIFIED LATERALITY: ICD-10-CM

## 2022-02-09 RX ORDER — ALLOPURINOL 100 MG/1
TABLET ORAL
Qty: 180 TABLET | Refills: 2 | Status: SHIPPED | OUTPATIENT
Start: 2022-02-09 | End: 2022-11-14

## 2022-02-15 ENCOUNTER — TRANSFERRED RECORDS (OUTPATIENT)
Dept: HEALTH INFORMATION MANAGEMENT | Facility: CLINIC | Age: 87
End: 2022-02-15
Payer: COMMERCIAL

## 2022-02-26 DIAGNOSIS — K21.00 GASTROESOPHAGEAL REFLUX DISEASE WITH ESOPHAGITIS WITHOUT HEMORRHAGE: ICD-10-CM

## 2022-02-27 RX ORDER — FAMOTIDINE 20 MG/1
TABLET, FILM COATED ORAL
Qty: 180 TABLET | Refills: 0 | Status: SHIPPED | OUTPATIENT
Start: 2022-02-27 | End: 2022-05-31

## 2022-02-27 NOTE — TELEPHONE ENCOUNTER
Prescription approved per [facility] Refill Protocol.  Pt needs free nurse only blood pressure check.  Please schedule.  Jeanine Thomson RN

## 2022-02-28 ENCOUNTER — PATIENT OUTREACH (OUTPATIENT)
Dept: NEPHROLOGY | Facility: CLINIC | Age: 87
End: 2022-02-28

## 2022-02-28 ENCOUNTER — ALLIED HEALTH/NURSE VISIT (OUTPATIENT)
Dept: FAMILY MEDICINE | Facility: CLINIC | Age: 87
End: 2022-02-28
Payer: COMMERCIAL

## 2022-02-28 VITALS — HEART RATE: 96 BPM | SYSTOLIC BLOOD PRESSURE: 130 MMHG | DIASTOLIC BLOOD PRESSURE: 82 MMHG | OXYGEN SATURATION: 100 %

## 2022-02-28 DIAGNOSIS — I10 HYPERTENSION GOAL BP (BLOOD PRESSURE) < 140/90: Primary | ICD-10-CM

## 2022-02-28 PROCEDURE — 99207 PR NO CHARGE NURSE ONLY: CPT

## 2022-02-28 NOTE — PROGRESS NOTES
Spoke to Helena to check in. She had just gotten home from a nurse visit for BP check (130/82).  She is feeling great, has energy. We talked about need for labs at end of March per last appointment visit with Dr. Simms. Helena goes to the [facility] and can do Tuesday or Thursday mornings. Writer sent a message to have  call her to set up.

## 2022-02-28 NOTE — PROGRESS NOTES
Helena Eldridge is a 86 year old patient who comes in today for a Blood Pressure check.  Initial BP:  /82 (BP Location: Other (Comment), Patient Position: Sitting, Cuff Size: Adult Regular)   Pulse 96   SpO2 100%      96  Disposition: results routed to provider

## 2022-02-28 NOTE — TELEPHONE ENCOUNTER
Called patient and appointment for today at 11:30am.       Phyllis GASPAR CMA ([facility])

## 2022-03-29 ENCOUNTER — LAB (OUTPATIENT)
Dept: LAB | Facility: CLINIC | Age: 87
End: 2022-03-29
Payer: COMMERCIAL

## 2022-03-29 DIAGNOSIS — N18.4 CKD (CHRONIC KIDNEY DISEASE) STAGE 4, GFR 15-29 ML/MIN (H): ICD-10-CM

## 2022-03-29 LAB
ALBUMIN SERPL-MCNC: 3.6 G/DL (ref 3.4–5)
ANION GAP SERPL CALCULATED.3IONS-SCNC: 4 MMOL/L (ref 3–14)
BUN SERPL-MCNC: 19 MG/DL (ref 7–30)
CALCIUM SERPL-MCNC: 9.3 MG/DL (ref 8.5–10.1)
CHLORIDE BLD-SCNC: 101 MMOL/L (ref 94–109)
CO2 SERPL-SCNC: 31 MMOL/L (ref 20–32)
CREAT SERPL-MCNC: 1.92 MG/DL (ref 0.52–1.04)
GFR SERPL CREATININE-BSD FRML MDRD: 25 ML/MIN/1.73M2
GLUCOSE BLD-MCNC: 113 MG/DL (ref 70–99)
PHOSPHATE SERPL-MCNC: 3.4 MG/DL (ref 2.5–4.5)
POTASSIUM BLD-SCNC: 4.4 MMOL/L (ref 3.4–5.3)
SODIUM SERPL-SCNC: 136 MMOL/L (ref 133–144)

## 2022-03-29 PROCEDURE — 80069 RENAL FUNCTION PANEL: CPT

## 2022-03-29 PROCEDURE — 36415 COLL VENOUS BLD VENIPUNCTURE: CPT

## 2022-04-25 ENCOUNTER — PATIENT OUTREACH (OUTPATIENT)
Dept: NEPHROLOGY | Facility: CLINIC | Age: 87
End: 2022-04-25
Payer: COMMERCIAL

## 2022-04-25 NOTE — CONFIDENTIAL NOTE
4/25- Attempted to call Helena james but phone was hung up before there was contact. Wanting to call to check in and set her up for labs prior to her 6/20 telephone appointment.    4/26- left VM

## 2022-05-05 ENCOUNTER — OFFICE VISIT (OUTPATIENT)
Dept: FAMILY MEDICINE | Facility: CLINIC | Age: 87
End: 2022-05-05
Payer: COMMERCIAL

## 2022-05-05 VITALS
TEMPERATURE: 97.9 F | WEIGHT: 133 LBS | HEART RATE: 110 BPM | DIASTOLIC BLOOD PRESSURE: 74 MMHG | BODY MASS INDEX: 25.97 KG/M2 | OXYGEN SATURATION: 92 % | SYSTOLIC BLOOD PRESSURE: 136 MMHG

## 2022-05-05 DIAGNOSIS — J44.9 COPD, SEVERE (H): ICD-10-CM

## 2022-05-05 DIAGNOSIS — H35.30 MACULAR DEGENERATION OF BOTH EYES, UNSPECIFIED TYPE: Primary | ICD-10-CM

## 2022-05-05 DIAGNOSIS — Z23 HIGH PRIORITY FOR 2019-NCOV VACCINE: ICD-10-CM

## 2022-05-05 DIAGNOSIS — I10 HYPERTENSION GOAL BP (BLOOD PRESSURE) < 140/90: ICD-10-CM

## 2022-05-05 PROCEDURE — 0064A COVID-19,PF,MODERNA (18+ YRS BOOSTER .25ML): CPT | Performed by: FAMILY MEDICINE

## 2022-05-05 PROCEDURE — 99213 OFFICE O/P EST LOW 20 MIN: CPT | Mod: 25 | Performed by: FAMILY MEDICINE

## 2022-05-05 PROCEDURE — 91306 COVID-19,PF,MODERNA (18+ YRS BOOSTER .25ML): CPT | Performed by: FAMILY MEDICINE

## 2022-05-05 ASSESSMENT — PAIN SCALES - GENERAL: PAINLEVEL: NO PAIN (0)
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
## 2022-05-05 NOTE — PROGRESS NOTES
Cognitive Screening   1) Repeat 3 items (Leader, Season, Table)    2) Clock draw: NORMAL  3) 3 item recall: Recalls 3 objects  Results: 3 items recalled: COGNITIVE IMPAIRMENT LESS LIKELY    Mini-CogTM Copyright S Dorina. Licensed by the author for use in [facility]; reprinted with permission ([email or web address]@.[email or web address]). All rights reserved.

## 2022-05-06 ENCOUNTER — MYC MEDICAL ADVICE (OUTPATIENT)
Dept: INTERNAL MEDICINE | Facility: CLINIC | Age: 87
End: 2022-05-06
Payer: COMMERCIAL

## 2022-05-06 ENCOUNTER — TELEPHONE (OUTPATIENT)
Dept: FAMILY MEDICINE | Facility: CLINIC | Age: 87
End: 2022-05-06
Payer: COMMERCIAL

## 2022-05-06 NOTE — TELEPHONE ENCOUNTER
Patient needs letter stating she is in good health to continue driving  She saw Dr Sharpe yesterday but was told she didn't know the patient well enough to submit a letter    It was discussed she get letter from her ophthalmologist but patient stated it isnt her eyes the DMV is concerned over, mor so her general health    Mail letter to patient when done    Letter atten of DMV    Call if questions [phone]

## 2022-06-20 ENCOUNTER — VIRTUAL VISIT (OUTPATIENT)
Dept: NEPHROLOGY | Facility: CLINIC | Age: 87
End: 2022-06-20
Payer: COMMERCIAL

## 2022-06-20 DIAGNOSIS — N18.4 CKD (CHRONIC KIDNEY DISEASE) STAGE 4, GFR 15-29 ML/MIN (H): Primary | ICD-10-CM

## 2022-06-20 PROCEDURE — 99214 OFFICE O/P EST MOD 30 MIN: CPT | Mod: 95 | Performed by: INTERNAL MEDICINE

## 2022-06-20 RX ORDER — NYSTATIN AND TRIAMCINOLONE ACETONIDE 100000; 1 [USP'U]/G; MG/G
OINTMENT TOPICAL
COMMUNITY
Start: 2022-02-16 | End: 2023-03-16

## 2022-06-20 NOTE — LETTER
6/20/2022       RE: Helena Eldridge  [address] [address]     Dear Colleague,    Thank you for referring your patient, Helena Eldridge, to the [facility] at [facility]. Please see a copy of my visit note below.    Helena is a 86 year old who is being evaluated via a billable telephone visit.      What phone number would you like to be contacted at? [phone]    How would you like to obtain your AVS? [employer]  Phone call duration: 15 minutes    Assessment and Plan:  86 year old female with history of CKD, Scr 1.8 with eGFR near 25ml/min in recent years, history of lung cancer s/p chemotherapy, kidney cancer s/p left partial resection, kidney stones and infections s/p ESWL and stent in 7/2020, small left kidney, who presents for followup of CKD and proteinuria.  # CKD stage 4- herScr has been near 1.8 with eGFR near 23-27 ml/min over the last few years, got down to 18 a couple of times, most recent is 25ml/min.  previous to that her eGFR was near 35ml/min and back in early 2000s she had eGFR near 40-45ml/min  She has been through many things that have caused CKD including cancer, chemotherapy with carboplatin. Last Scr 1.8, eGFR 25ml/min  She had 1 gram of proteinuria which puts her at a higher risk for progression- it has improved, albuminuria was under 100mg on last check- check both albumin and protein random urine on next check   - check labs in August and see her in December 2022  - BP fairly controlled based on home readings- she has a wrist cuff.  # Hypertension: BP in the 130s on recent check. Aim toward 130/80 if tolerated.   Continue furosemide 20mg    # Kidney stones: has calcium oxalate stone on stone analysis; discussed low sodium, fluid intake of 2.5 -3 liters   - she is hydrating better overall, and her zenon horses are better with tonic water and water    # Anemia in chronic renal disease:   - Hgb: Stable, low  normal, 11-11.5  - Iron studies: Not checked recently    # Electrolytes:   - Potassium; level: Normal  - bicarbonate: Normal  # Mineral Bone Disorder:   - Calcium; level is:  Normal   - phos normal, PTH mildly elevated, vit D adequate    Assessment and plan was discussed with patient and she voiced her understanding and agreement.    RTC 4-6 months    Reason for Visit:  Helena Eldridge is a 86 year old female with CKD from renal and lung cancer, who presents for followup    HPI:  She is a pleasant female with multiple comorbidities including hisotry of renal cell carcinoma s/p partial left kidney resection, lung cancer , COPD, who presents for followup of CKD She has been aware of kidney disease and her creatinine has been stable near 1.8-2 mg/dL over the last few years. Recently her Scr was up to 2.3, eGFR decreased to 17ml/min, but recently back and eGFR 23ml/min  Her left kidney was noted to be very small on CT imaging going back to 2015.  Over the years, her eGFR was 35-45 ml/min from 2011 to 2017, but in the last couple of years, her eGFR was 25-28ml/min.  She has undergone treatment for her cancers and doing fairly well at this time.  Her lung cancer (nonsmall cell lung cancer T1N1) was treated with carboplatin/ permetrexeb [id number] (6 cycles) and then nivolumab for a year until it was stopped due to itching.  She denies shortness of breath or chest pain. She has some fatigue with significant exertion.  She denies any skin rash, GI or  issues at this time.  She denies family history of renal failure or dialysis.    She has been doing well since last visit. She denies shortness of breath or swelling    Her BP at home is usually 135/70s , though it is higher.   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  She has a lot of great nephews and nieces and stays busy with them. She has 3 sons  She is getting her steps in, at least 2000 a day, she counts   She has some  psoriasis and has it under her breast and at times it keeps her awake, and she takes hydroxyzine for this.      Renal History:   Kidney Disease and Medical Hx:  h/o HTN: Yes  , h/o Protein in Urine: Yes  and h/o Kidney Stones:Yes     ROS:   A comprehensive review of systems was obtained and negative, except as noted in the HPI or PMH.    Active Medical Problems:  Patient Active Problem List   Diagnosis     CKD (chronic kidney disease) stage 4, GFR 15-29 ml/min (H)     Glucose intolerance (impaired glucose tolerance)     Kidney stones     Advance care planning     Hypertension goal BP (blood pressure) < 140/90     Lichen sclerosus et atrophicus of the vulva     Solitary kidney, acquired     Senile osteoporosis     Osteoarthritis of right knee     Medial meniscus tear     Cataracts, both eyes     Macular degeneration of both eyes, right eye greater than left eye     COPD, severe (H)     IPF (idiopathic pulmonary fibrosis) (H)     Other specified hypothyroidism     Idiopathic chronic gout     Adjustment disorder with anxiety     Non-small cell cancer of right lung (H)     Psoriasis     PMH:   Medical record was reviewed and PMH was discussed with patient and noted below.  Past Medical History:   Diagnosis Date     Carpal tunnel syndrome      CKD (chronic kidney disease)     kidney stone with infection     CKD (chronic kidney disease) stage 4, GFR 15-29 ml/min (H) 12/2/2010    History of kidney stone with infection.  Creatinine 1.3 - 1.6 (12/2/10, [facility])   -- she can't take ACE / ARB due to side effects.  Will continue with good BP control (12/3/15, [facility])      COPD, severe (H) 2/16/2015    Seen by pulmonary 2013.        Deep vein thrombosis (DVT) (H)     1972     DVT [id number]     Glucose intolerance      H/O partial nephrectomy      Heel spur      Hypertension goal BP (blood pressure) < 140/90 11/28/2011     Hypothyroidism      Idiopathic chronic gout 5/14/2015     Immunosuppression (H) 3/7/2018     IPF (idiopathic  pulmonary fibrosis) (H) 2015    Mild, at bases per CXR 2015 (2/15/15, [facility])      Kidney stones      Lichen sclerosus      Lichen sclerosus et atrophicus of the vulva 2013    Clobetasol bid is the recommended remedy     Macular degeneration      Non-small cell cancer of right lung (H) 3/7/2018     Nonsenile cataract      Osteoarthritis      Osteoarthritis of right knee 3/11/2014     Osteoporosis      Other specified hypothyroidism 2015     PID      Pulmonary embolism (H)     1970     Senile osteoporosis 3/11/2014     Unspecified hypothyroidism      Vitiligo      PSH:   Past Surgical History:   Procedure Laterality Date     EXTRACORPOREAL SHOCK WAVE LITHOTRIPSY, CYSTOSCOPY, INSERT STENT URETER(S), COMBINED Right 2020    Procedure: LITHOTRIPSY, Right EXTRACORPOREAL SHOCK WAVE (ESWL), WITH CYSTOSCOPY AND Right URETERAL STENT INSERTION;  Surgeon: Buddy Ramirez MD;  Location: [facility] OR     HC REMOVAL GALLBLADDER       HC REVISE MEDIAN N/CARPAL TUNNEL SURG       LASER HOLMIUM LITHOTRIPSY URETER(S), INSERT STENT, COMBINED Right 2020    Procedure: RIGHT CYSTOURETEROSCOPY, WITH LITHOTRIPSY USING LASER AND URETERAL STENT EXCHANGE;  Surgeon: Buddy Ramirez MD;  Location: [facility] OR     TUBAL LIGATION       ZZC NEPHRECTOMY      left partial 07     [facility] VENOUS THROMBOSIS IMAGING      with pe       Family Hx:   Family History   Problem Relation Age of Onset     Cancer Brother      Glaucoma Mother      Alzheimer Disease Brother      Macular Degeneration No family hx of      Personal Hx:   Social History     Tobacco Use     Smoking status: Former Smoker     Types: Cigarettes     Quit date: 1969     Years since quittin.5     Smokeless tobacco: Never Used   Substance Use Topics     Alcohol use: Yes     Alcohol/week: 0.0 standard drinks     Comment: very little        Allergies:  Allergies   Allergen Reactions     Ace Inhibitors Cough     Advicor      Alendronic Acid Other (See Comments)      Other reaction(s): GI Upset  heartburn  Severe substernal burning and dysphagia within 3 days       Blood-Group Specific Substance Other (See Comments)     Patient has Anti-Shania and warm auto antibody. Blood products may be delayed. Draw patient 24 hours prior to transfusion. Draw one red top and two purple top tubes for all type and screen orders.     Citalopram GI Disturbance and Nausea     diarrhea  diarrhea     Doxycycline Nausea     Poor appetite     Fosamax      Severe substernal burning and dysphagia within 3 days     Valsartan Cramps and Other (See Comments)     severe  severe       Medications:  Current Outpatient Medications   Medication Sig     allopurinol (ZYLOPRIM) 100 MG tablet TAKE 2 TABLETS BY MOUTH EVERY DAY     Calcium Carbonate-Vitamin D (CALCIUM + D) 600-200 MG-UNIT per tablet Take 2 tablets by mouth 2 times daily.     clobetasol (TEMOVATE) 0.05 % external ointment Apply topically 2 times daily to genitals     diphenhydrAMINE (BENADRYL) 25 MG tablet Take 25 mg by mouth At Bedtime      DOCUSATE SODIUM PO Take 100 mg by mouth At Bedtime     Emollient (CERAVE DAILY MOISTURIZING EX)      famotidine (PEPCID) 20 MG tablet TAKE 1 TABLET(20 MG) BY MOUTH TWICE DAILY     furosemide (LASIX) 20 MG tablet TAKE 1 TABLET(20 MG) BY MOUTH DAILY     hydrOXYzine (ATARAX) 25 MG tablet TAKE 1 TABLET BY MOUTH TWICE DAILY AS NEEDED FOR ITCHING     levothyroxine (SYNTHROID/LEVOTHROID) 100 MCG tablet TAKE 1 TABLET BY MOUTH EVERY DAY. START AFTER 75MCG TABLETS.     LORazepam (ATIVAN) 0.5 MG tablet Take 1 tablet (0.5 mg) by mouth nightly as needed for anxiety or sleep     Multiple Vitamins-Minerals (OCUVITE ADULT 50+) CAPS Take 1 capsule by mouth daily     nystatin (MYCOSTATIN) 936514 UNIT/GM external ointment Apply topically 2 times daily to rash under breasts and creases of groin     nystatin-triamcinolone (MYCOLOG) 770708-8.1 UNIT/GM-% external ointment APPLY TOPICALLY TO THE AFFECTED AREA TWICE DAILY     omeprazole  (PRILOSEC) 40 MG DR capsule TAKE 1 CAPSULE BY MOUTH EVERY DAY     ondansetron (ZOFRAN-ODT) 4 MG ODT tab Take 1 tablet (4 mg) by mouth every 8 hours as needed for nausea     rOPINIRole (REQUIP) 0.25 MG tablet TAKE 1 TO 2 TABLETS(0.25 TO 0.5 MG) BY MOUTH EVERY NIGHT AS NEEDED FOR RESTLESS LEGS     STATIN NOT PRESCRIBED, INTENTIONAL, by Other route continuous prn. Patient declined 5/4/12     No current facility-administered medications for this visit.     Facility-Administered Medications Ordered in Other Visits   Medication     perflutren diluted in saline (DEFINITY) injection 5 mL      Vitals:  There were no vitals taken for this visit.    Exam:  General: alert, oriented, conversant  Speaks in full sentences without audible dyspnea or wheeze  Neuro: normal speech  Psych: conversant, normal affect and thought process      LABS:   CMP  Recent Labs   Lab Test 03/29/22  1045 12/13/21  1431 06/21/21  1251 03/08/21  1325 12/03/20  0912 11/10/20  0930    135 135 138 141 139   POTASSIUM 4.4 4.2 4.1 4.1 4.4 3.6   CHLORIDE 101 102 100 105 107 104   CO2 31 24 30 28 31 30   ANIONGAP 4 9 5 5 3 5   * 122* 96 98 106* 125*   BUN 19 30 29 27 22 42*   CR 1.92* 2.47* 2.11* 1.85* 1.92* 2.48*   GFRESTIMATED 25* 17* 21* 24* 23* 17*   GFRESTBLACK  --   --  24* 28* 27* 20*   SERAFIN 9.3 8.7 8.9 9.3 9.5 8.9     Recent Labs   Lab Test 07/20/20  1354 07/05/20  0000 05/02/14  0823   ALT 16 13 27   AST  --  17 30     CBC  Recent Labs   Lab Test 12/13/21  1431 06/21/21  1251 07/20/20  1354 02/28/20  0854 08/09/19  1050   HGB 11.1* 11.8 10.7* 11.0* 11.7   WBC  --  7.8 8.5 9.6 9.0   RBC  --  3.52* 3.21* 3.30* 3.31*   HCT  --  35.4 32.1* 34.5* 34.8*   MCV  --  101* 100 105* 105*   MCH  --  33.5* 33.3* 33.3* 35.3*   MCHC  --  33.3 33.3 31.9 33.6   RDW  --  13.6 12.8 13.0 14.4   PLT  --  300 292 291 276     URINE STUDIES  Recent Labs   Lab Test 11/29/21  1040 11/10/20  0944 11/03/20  0932 10/19/20  1050 01/21/20  0850   COLOR Yellow Yellow  Yellow Yellow Yellow   APPEARANCE Clear Clear Cloudy Clear Clear   URINEGLC Negative Negative Negative Negative Negative   URINEBILI Negative Negative Negative Negative Negative   URINEKETONE Negative Negative Negative Negative Negative   SG 1.010 1.025 1.020 1.020 1.025   UBLD Negative Negative Negative Negative Negative   URINEPH 5.5 5.5 5.5 6.0 5.5   PROTEIN Negative Negative Negative Negative Negative   UROBILINOGEN 0.2 0.2 0.2 0.2 0.2   NITRITE Negative Negative Negative Negative Negative   LEUKEST Negative Negative Small* Trace* Negative   RBCU  --  O - 2 O - 2 O - 2 O - 2   WBCU  --  0 - 5 5-10* 10-25* 0 - 5     No lab results found.  PTH  Recent Labs   Lab Test 12/13/21  1431 06/21/21  1251 03/08/21  1325   PTHI 119* 106* 65     IRON STUDIES  Recent Labs   Lab Test 06/21/21  1251   IRON 94      IRONSAT 39   JOSE RAMON 375*       TECHNIQUE: Scans were obtained from the diaphragm through the pelvis  without IV contrast. Radiation dose for this scan was reduced using  automated exposure control, adjustment of the mA and/or kV according  to patient size, or iterative reconstruction technique.     COMPARISON: 5/19/2015.     FINDINGS: Coronary calcifications. Linear atelectasis or fibrosis left  lung base. Liver, spleen, and pancreas are normal. Previous  cholecystectomy with prominence to the common bile duct again noted  and unchanged. Atrophic changes again noted in the left kidney which  are unchanged from 5/19/2015. 2 small calculi again noted in the left  kidney. Left kidney is otherwise unchanged. Right kidney is normal in  size with small calculi again noted in the lower portion of the right  kidney which are unchanged. Right kidney and ureter are otherwise  normal. Diverticula in the colon without evidence of diverticulitis.  Colon and small bowel are otherwise normal. Calcification of the  abdominal aorta and iliac arteries. No abdominal or pelvic adenopathy.  Remainder of the scan is negative and  unchanged from 5/19/2015.                                                                      IMPRESSION:   1. Bilateral nonobstructing nephrolithiasis which is unchanged from  5/19/2015. Atrophic left kidney again noted.  2. Remainder of the scan is unchanged.  Nguyen Simms MD        Again, thank you for allowing me to participate in the care of your patient.      Sincerely,    Nguyen Simms MD

## 2022-06-20 NOTE — PROGRESS NOTES
Helena is a 86 year old who is being evaluated via a billable telephone visit.      What phone number would you like to be contacted at? [phone]    How would you like to obtain your AVS? [place]  Phone call duration: 15 minutes    Assessment and Plan:  86 year old female with history of CKD, Scr 1.8 with eGFR near 25ml/min in recent years, history of lung cancer s/p chemotherapy, kidney cancer s/p left partial resection, kidney stones and infections s/p ESWL and stent in 7/2020, small left kidney, who presents for followup of CKD and proteinuria.  # CKD stage 4- herScr has been near 1.8 with eGFR near 23-27 ml/min over the last few years, got down to 18 a couple of times, most recent is 25ml/min.  previous to that her eGFR was near 35ml/min and back in early 2000s she had eGFR near 40-45ml/min  She has been through many things that have caused CKD including cancer, chemotherapy with carboplatin. Last Scr 1.8, eGFR 25ml/min  She had 1 gram of proteinuria which puts her at a higher risk for progression- it has improved, albuminuria was under 100mg on last check- check both albumin and protein random urine on next check   - check labs in August and see her in December 2022  - BP fairly controlled based on home readings- she has a wrist cuff.  # Hypertension: BP in the 130s on recent check. Aim toward 130/80 if tolerated.   Continue furosemide 20mg    # Kidney stones: has calcium oxalate stone on stone analysis; discussed low sodium, fluid intake of 2.5 -3 liters   - she is hydrating better overall, and her zenon horses are better with tonic water and water    # Anemia in chronic renal disease:   - Hgb: Stable, low normal, 11-11.5  - Iron studies: Not checked recently    # Electrolytes:   - Potassium; level: Normal  - bicarbonate: Normal  # Mineral Bone Disorder:   - Calcium; level is:  Normal   - phos normal, PTH mildly elevated, vit D adequate    Assessment and plan was discussed with patient and she voiced her  understanding and agreement.    RTC 4-6 months    Reason for Visit:  Helena Eldridge is a 86 year old female with CKD from renal and lung cancer, who presents for followup    HPI:  She is a pleasant female with multiple comorbidities including hisotry of renal cell carcinoma s/p partial left kidney resection, lung cancer , COPD, who presents for followup of CKD She has been aware of kidney disease and her creatinine has been stable near 1.8-2 mg/dL over the last few years. Recently her Scr was up to 2.3, eGFR decreased to 17ml/min, but recently back and eGFR 23ml/min  Her left kidney was noted to be very small on CT imaging going back to 2015.  Over the years, her eGFR was 35-45 ml/min from 2011 to 2017, but in the last couple of years, her eGFR was 25-28ml/min.  She has undergone treatment for her cancers and doing fairly well at this time.  Her lung cancer (nonsmall cell lung cancer T1N1) was treated with carboplatin/ permetrexeb 9833-2265 (6 cycles) and then nivolumab for a year until it was stopped due to itching.  She denies shortness of breath or chest pain. She has some fatigue with significant exertion.  She denies any skin rash, GI or  issues at this time.  She denies family history of renal failure or dialysis.    She has been doing well since last visit. She denies shortness of breath or swelling    Her BP at home is usually 135/70s , though it is higher.   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  She has a lot of great nephews and nieces and stays busy with them. She has 3 sons  She is getting her steps in, at least 2000 a day, she counts   She has some psoriasis and has it under her breast and at times it keeps her awake, and she takes hydroxyzine for this.      Renal History:   Kidney Disease and Medical Hx:  h/o HTN: Yes  , h/o Protein in Urine: Yes  and h/o Kidney Stones:Yes     ROS:   A comprehensive review of systems was obtained and negative, except  as noted in the HPI or PMH.    Active Medical Problems:  Patient Active Problem List   Diagnosis     CKD (chronic kidney disease) stage 4, GFR 15-29 ml/min (H)     Glucose intolerance (impaired glucose tolerance)     Kidney stones     Advance care planning     Hypertension goal BP (blood pressure) < 140/90     Lichen sclerosus et atrophicus of the vulva     Solitary kidney, acquired     Senile osteoporosis     Osteoarthritis of right knee     Medial meniscus tear     Cataracts, both eyes     Macular degeneration of both eyes, right eye greater than left eye     COPD, severe (H)     IPF (idiopathic pulmonary fibrosis) (H)     Other specified hypothyroidism     Idiopathic chronic gout     Adjustment disorder with anxiety     Non-small cell cancer of right lung (H)     Psoriasis     PMH:   Medical record was reviewed and PMH was discussed with patient and noted below.  Past Medical History:   Diagnosis Date     Carpal tunnel syndrome      CKD (chronic kidney disease)     kidney stone with infection     CKD (chronic kidney disease) stage 4, GFR 15-29 ml/min (H) 12/2/2010    History of kidney stone with infection.  Creatinine 1.3 - 1.6 (12/2/10, [facility])   -- she can't take ACE / ARB due to side effects.  Will continue with good BP control (12/3/15, [facility])      COPD, severe (H) 2/16/2015    Seen by pulmonary 2013.        Deep vein thrombosis (DVT) (H)     1972     DVT [id number]     Glucose intolerance      H/O partial nephrectomy      Heel spur      Hypertension goal BP (blood pressure) < 140/90 11/28/2011     Hypothyroidism      Idiopathic chronic gout 5/14/2015     Immunosuppression (H) 3/7/2018     IPF (idiopathic pulmonary fibrosis) (H) 2/25/2015    Mild, at bases per CXR 2/2015 (2/15/15, [facility])      Kidney stones      Lichen sclerosus      Lichen sclerosus et atrophicus of the vulva 5/8/2013    Clobetasol bid is the recommended remedy     Macular degeneration      Non-small cell cancer of right lung (H) 3/7/2018      Nonsenile cataract      Osteoarthritis      Osteoarthritis of right knee 3/11/2014     Osteoporosis      Other specified hypothyroidism 2015     PID      Pulmonary embolism (H)     1970     Senile osteoporosis 3/11/2014     Unspecified hypothyroidism      Vitiligo      PSH:   Past Surgical History:   Procedure Laterality Date     EXTRACORPOREAL SHOCK WAVE LITHOTRIPSY, CYSTOSCOPY, INSERT STENT URETER(S), COMBINED Right 2020    Procedure: LITHOTRIPSY, Right EXTRACORPOREAL SHOCK WAVE (ESWL), WITH CYSTOSCOPY AND Right URETERAL STENT INSERTION;  Surgeon: Buddy Ramirez MD;  Location: [facility] OR     HC REMOVAL GALLBLADDER       HC REVISE MEDIAN N/CARPAL TUNNEL SURG       LASER HOLMIUM LITHOTRIPSY URETER(S), INSERT STENT, COMBINED Right 2020    Procedure: RIGHT CYSTOURETEROSCOPY, WITH LITHOTRIPSY USING LASER AND URETERAL STENT EXCHANGE;  Surgeon: Buddy Ramirez MD;  Location: [facility] OR     TUBAL LIGATION       ZZC NEPHRECTOMY      left partial 07     [facility] VENOUS THROMBOSIS IMAGING      with pe       Family Hx:   Family History   Problem Relation Age of Onset     Cancer Brother      Glaucoma Mother      Alzheimer Disease Brother      Macular Degeneration No family hx of      Personal Hx:   Social History     Tobacco Use     Smoking status: Former Smoker     Types: Cigarettes     Quit date: 1969     Years since quittin.5     Smokeless tobacco: Never Used   Substance Use Topics     Alcohol use: Yes     Alcohol/week: 0.0 standard drinks     Comment: very little        Allergies:  Allergies   Allergen Reactions     Ace Inhibitors Cough     Advicor      Alendronic Acid Other (See Comments)     Other reaction(s): GI Upset  heartburn  Severe substernal burning and dysphagia within 3 days       Blood-Group Specific Substance Other (See Comments)     Patient has Anti-Shania and warm auto antibody. Blood products may be delayed. Draw patient 24 hours prior to transfusion. Draw one red top and two purple  top tubes for all type and screen orders.     Citalopram GI Disturbance and Nausea     diarrhea  diarrhea     Doxycycline Nausea     Poor appetite     Fosamax      Severe substernal burning and dysphagia within 3 days     Valsartan Cramps and Other (See Comments)     severe  severe       Medications:  Current Outpatient Medications   Medication Sig     allopurinol (ZYLOPRIM) 100 MG tablet TAKE 2 TABLETS BY MOUTH EVERY DAY     Calcium Carbonate-Vitamin D (CALCIUM + D) 600-200 MG-UNIT per tablet Take 2 tablets by mouth 2 times daily.     clobetasol (TEMOVATE) 0.05 % external ointment Apply topically 2 times daily to genitals     diphenhydrAMINE (BENADRYL) 25 MG tablet Take 25 mg by mouth At Bedtime      DOCUSATE SODIUM PO Take 100 mg by mouth At Bedtime     Emollient (CERAVE DAILY MOISTURIZING EX)      famotidine (PEPCID) 20 MG tablet TAKE 1 TABLET(20 MG) BY MOUTH TWICE DAILY     furosemide (LASIX) 20 MG tablet TAKE 1 TABLET(20 MG) BY MOUTH DAILY     hydrOXYzine (ATARAX) 25 MG tablet TAKE 1 TABLET BY MOUTH TWICE DAILY AS NEEDED FOR ITCHING     levothyroxine (SYNTHROID/LEVOTHROID) 100 MCG tablet TAKE 1 TABLET BY MOUTH EVERY DAY. START AFTER 75MCG TABLETS.     LORazepam (ATIVAN) 0.5 MG tablet Take 1 tablet (0.5 mg) by mouth nightly as needed for anxiety or sleep     Multiple Vitamins-Minerals (OCUVITE ADULT 50+) CAPS Take 1 capsule by mouth daily     nystatin (MYCOSTATIN) 924462 UNIT/GM external ointment Apply topically 2 times daily to rash under breasts and creases of groin     nystatin-triamcinolone (MYCOLOG) 339161-1.1 UNIT/GM-% external ointment APPLY TOPICALLY TO THE AFFECTED AREA TWICE DAILY     omeprazole (PRILOSEC) 40 MG DR capsule TAKE 1 CAPSULE BY MOUTH EVERY DAY     ondansetron (ZOFRAN-ODT) 4 MG ODT tab Take 1 tablet (4 mg) by mouth every 8 hours as needed for nausea     rOPINIRole (REQUIP) 0.25 MG tablet TAKE 1 TO 2 TABLETS(0.25 TO 0.5 MG) BY MOUTH EVERY NIGHT AS NEEDED FOR RESTLESS LEGS     STATIN NOT  PRESCRIBED, INTENTIONAL, by Other route continuous prn. Patient declined 5/4/12     No current facility-administered medications for this visit.     Facility-Administered Medications Ordered in Other Visits   Medication     perflutren diluted in saline (DEFINITY) injection 5 mL      Vitals:  There were no vitals taken for this visit.    Exam:  General: alert, oriented, conversant  Speaks in full sentences without audible dyspnea or wheeze  Neuro: normal speech  Psych: conversant, normal affect and thought process      LABS:   CMP  Recent Labs   Lab Test 03/29/22  1045 12/13/21  1431 06/21/21  1251 03/08/21  1325 12/03/20  0912 11/10/20  0930    135 135 138 141 139   POTASSIUM 4.4 4.2 4.1 4.1 4.4 3.6   CHLORIDE 101 102 100 105 107 104   CO2 31 24 30 28 31 30   ANIONGAP 4 9 5 5 3 5   * 122* 96 98 106* 125*   BUN 19 30 29 27 22 42*   CR 1.92* 2.47* 2.11* 1.85* 1.92* 2.48*   GFRESTIMATED 25* 17* 21* 24* 23* 17*   GFRESTBLACK  --   --  24* 28* 27* 20*   SERAFIN 9.3 8.7 8.9 9.3 9.5 8.9     Recent Labs   Lab Test 07/20/20  1354 07/05/20  0000 05/02/14  0823   ALT 16 13 27   AST  --  17 30     CBC  Recent Labs   Lab Test 12/13/21  1431 06/21/21  1251 07/20/20  1354 02/28/20  0854 08/09/19  1050   HGB 11.1* 11.8 10.7* 11.0* 11.7   WBC  --  7.8 8.5 9.6 9.0   RBC  --  3.52* 3.21* 3.30* 3.31*   HCT  --  35.4 32.1* 34.5* 34.8*   MCV  --  101* 100 105* 105*   MCH  --  33.5* 33.3* 33.3* 35.3*   MCHC  --  33.3 33.3 31.9 33.6   RDW  --  13.6 12.8 13.0 14.4   PLT  --  300 292 291 276     URINE STUDIES  Recent Labs   Lab Test 11/29/21  1040 11/10/20  0944 11/03/20  0932 10/19/20  1050 01/21/20  0850   COLOR Yellow Yellow Yellow Yellow Yellow   APPEARANCE Clear Clear Cloudy Clear Clear   URINEGLC Negative Negative Negative Negative Negative   URINEBILI Negative Negative Negative Negative Negative   URINEKETONE Negative Negative Negative Negative Negative   SG 1.010 1.025 1.020 1.020 1.025   UBLD Negative Negative Negative  Negative Negative   URINEPH 5.5 5.5 5.5 6.0 5.5   PROTEIN Negative Negative Negative Negative Negative   UROBILINOGEN 0.2 0.2 0.2 0.2 0.2   NITRITE Negative Negative Negative Negative Negative   LEUKEST Negative Negative Small* Trace* Negative   RBCU  --  O - 2 O - 2 O - 2 O - 2   WBCU  --  0 - 5 5-10* 10-25* 0 - 5     No lab results found.  PTH  Recent Labs   Lab Test 12/13/21  1431 06/21/21  1251 03/08/21  1325   PTHI 119* 106* 65     IRON STUDIES  Recent Labs   Lab Test 06/21/21  1251   IRON 94      IRONSAT 39   JOSE RAMON 375*       TECHNIQUE: Scans were obtained from the diaphragm through the pelvis  without IV contrast. Radiation dose for this scan was reduced using  automated exposure control, adjustment of the mA and/or kV according  to patient size, or iterative reconstruction technique.     COMPARISON: 5/19/2015.     FINDINGS: Coronary calcifications. Linear atelectasis or fibrosis left  lung base. Liver, spleen, and pancreas are normal. Previous  cholecystectomy with prominence to the common bile duct again noted  and unchanged. Atrophic changes again noted in the left kidney which  are unchanged from 5/19/2015. 2 small calculi again noted in the left  kidney. Left kidney is otherwise unchanged. Right kidney is normal in  size with small calculi again noted in the lower portion of the right  kidney which are unchanged. Right kidney and ureter are otherwise  normal. Diverticula in the colon without evidence of diverticulitis.  Colon and small bowel are otherwise normal. Calcification of the  abdominal aorta and iliac arteries. No abdominal or pelvic adenopathy.  Remainder of the scan is negative and unchanged from 5/19/2015.                                                                      IMPRESSION:   1. Bilateral nonobstructing nephrolithiasis which is unchanged from  5/19/2015. Atrophic left kidney again noted.  2. Remainder of the scan is unchanged.  Nguyen Simms MD

## 2022-07-21 ENCOUNTER — PATIENT OUTREACH (OUTPATIENT)
Dept: NEPHROLOGY | Facility: CLINIC | Age: 87
End: 2022-07-21

## 2022-07-21 NOTE — PROGRESS NOTES
Nephrology Note: Nursing Outreach Encounter    REASON FOR CALL:                                                      REASON FOR CALL: Symptom Follow Up                                          SITUATION/BACKROUND:                                                    Patient is being treated for CKD Stage 4.    Called to check in with Helena. From June appointment notes: needs labs in August and follow up again in Dec 2022.      ASSESSMENT:                                                      Spoke with Helena. She is having labs done at her primary care appointment on 8/17 (writer noted that BMP already ordered). We made appointment for December 5th at 1:45- phone. Helena reports her BP's recently were 124/95, 119/75, and 133/90. She is feeling well, walking [id number] steps a day and hydrating a lot.      [place] Symptoms: No       PLAN:                                                      Follow Up:   Patient to follow up as scheduled at next apt     Patient verbalized understanding and will contact the clinic with any further questions or concerns.     JAI SIMMS RN

## 2022-08-16 ENCOUNTER — TRANSFERRED RECORDS (OUTPATIENT)
Dept: HEALTH INFORMATION MANAGEMENT | Facility: CLINIC | Age: 87
End: 2022-08-16

## 2022-08-17 ENCOUNTER — ANCILLARY PROCEDURE (OUTPATIENT)
Dept: MAMMOGRAPHY | Facility: CLINIC | Age: 87
End: 2022-08-17
Payer: COMMERCIAL

## 2022-08-17 DIAGNOSIS — Z12.31 VISIT FOR SCREENING MAMMOGRAM: ICD-10-CM

## 2022-08-17 PROCEDURE — 77067 SCR MAMMO BI INCL CAD: CPT | Mod: TC | Performed by: RADIOLOGY

## 2022-08-18 ENCOUNTER — TELEPHONE (OUTPATIENT)
Dept: DERMATOLOGY | Facility: CLINIC | Age: 87
End: 2022-08-18

## 2022-08-18 ENCOUNTER — OFFICE VISIT (OUTPATIENT)
Dept: INTERNAL MEDICINE | Facility: CLINIC | Age: 87
End: 2022-08-18
Payer: COMMERCIAL

## 2022-08-18 VITALS
BODY MASS INDEX: 26.11 KG/M2 | WEIGHT: 133 LBS | HEART RATE: 100 BPM | OXYGEN SATURATION: 94 % | SYSTOLIC BLOOD PRESSURE: 150 MMHG | TEMPERATURE: 97.9 F | HEIGHT: 60 IN | DIASTOLIC BLOOD PRESSURE: 75 MMHG

## 2022-08-18 DIAGNOSIS — I10 HYPERTENSION GOAL BP (BLOOD PRESSURE) < 140/90: ICD-10-CM

## 2022-08-18 DIAGNOSIS — Z90.5 SOLITARY KIDNEY, ACQUIRED: ICD-10-CM

## 2022-08-18 DIAGNOSIS — J84.112 IPF (IDIOPATHIC PULMONARY FIBROSIS) (H): ICD-10-CM

## 2022-08-18 DIAGNOSIS — N18.4 ANEMIA IN STAGE 4 CHRONIC KIDNEY DISEASE (H): ICD-10-CM

## 2022-08-18 DIAGNOSIS — Z00.01 ENCOUNTER FOR GENERAL ADULT MEDICAL EXAMINATION WITH ABNORMAL FINDINGS: Primary | ICD-10-CM

## 2022-08-18 DIAGNOSIS — M1A.0790 IDIOPATHIC CHRONIC GOUT OF FOOT WITHOUT TOPHUS, UNSPECIFIED LATERALITY: ICD-10-CM

## 2022-08-18 DIAGNOSIS — H61.22 IMPACTED CERUMEN OF LEFT EAR: ICD-10-CM

## 2022-08-18 DIAGNOSIS — R82.90 ABNORMAL URINALYSIS: ICD-10-CM

## 2022-08-18 DIAGNOSIS — L30.4 ECZEMA INTERTRIGO: ICD-10-CM

## 2022-08-18 DIAGNOSIS — N18.4 CKD (CHRONIC KIDNEY DISEASE) STAGE 4, GFR 15-29 ML/MIN (H): ICD-10-CM

## 2022-08-18 DIAGNOSIS — J44.9 COPD, SEVERE (H): ICD-10-CM

## 2022-08-18 DIAGNOSIS — D63.1 ANEMIA IN STAGE 4 CHRONIC KIDNEY DISEASE (H): ICD-10-CM

## 2022-08-18 DIAGNOSIS — R73.02 GLUCOSE INTOLERANCE (IMPAIRED GLUCOSE TOLERANCE): ICD-10-CM

## 2022-08-18 DIAGNOSIS — M81.0 SENILE OSTEOPOROSIS: ICD-10-CM

## 2022-08-18 DIAGNOSIS — C34.91 NON-SMALL CELL CANCER OF RIGHT LUNG (H): ICD-10-CM

## 2022-08-18 DIAGNOSIS — L40.9 PSORIASIS: ICD-10-CM

## 2022-08-18 DIAGNOSIS — N90.4 LICHEN SCLEROSUS ET ATROPHICUS OF THE VULVA: ICD-10-CM

## 2022-08-18 DIAGNOSIS — M15.0 PRIMARY OSTEOARTHRITIS INVOLVING MULTIPLE JOINTS: ICD-10-CM

## 2022-08-18 LAB
ALBUMIN MFR UR ELPH: 7.1 MG/DL
ALBUMIN SERPL-MCNC: 3.7 G/DL (ref 3.4–5)
ALBUMIN UR-MCNC: NEGATIVE MG/DL
ANION GAP SERPL CALCULATED.3IONS-SCNC: 5 MMOL/L (ref 3–14)
APPEARANCE UR: ABNORMAL
BILIRUB UR QL STRIP: NEGATIVE
BUN SERPL-MCNC: 22 MG/DL (ref 7–30)
CALCIUM SERPL-MCNC: 9 MG/DL (ref 8.5–10.1)
CHLORIDE BLD-SCNC: 104 MMOL/L (ref 94–109)
CO2 SERPL-SCNC: 30 MMOL/L (ref 20–32)
COLOR UR AUTO: YELLOW
CREAT SERPL-MCNC: 2.19 MG/DL (ref 0.52–1.04)
CREAT UR-MCNC: 35.3 MG/DL
CREAT UR-MCNC: 38 MG/DL
ERYTHROCYTE [DISTWIDTH] IN BLOOD BY AUTOMATED COUNT: 14.1 % (ref 10–15)
GFR SERPL CREATININE-BSD FRML MDRD: 21 ML/MIN/1.73M2
GLUCOSE BLD-MCNC: 108 MG/DL (ref 70–99)
GLUCOSE UR STRIP-MCNC: NEGATIVE MG/DL
HBA1C MFR BLD: 5.8 % (ref 0–5.6)
HCT VFR BLD AUTO: 33.1 % (ref 35–47)
HGB BLD-MCNC: 10.9 G/DL (ref 11.7–15.7)
HGB UR QL STRIP: NEGATIVE
KETONES UR STRIP-MCNC: NEGATIVE MG/DL
LEUKOCYTE ESTERASE UR QL STRIP: ABNORMAL
MCH RBC QN AUTO: 35.5 PG (ref 26.5–33)
MCHC RBC AUTO-ENTMCNC: 32.9 G/DL (ref 31.5–36.5)
MCV RBC AUTO: 108 FL (ref 78–100)
MICROALBUMIN UR-MCNC: 24 MG/L
MICROALBUMIN/CREAT UR: 63.16 MG/G CR (ref 0–25)
NITRATE UR QL: NEGATIVE
PH UR STRIP: 6.5 [PH] (ref 5–7)
PHOSPHATE SERPL-MCNC: 3.4 MG/DL (ref 2.5–4.5)
PLATELET # BLD AUTO: 290 10E3/UL (ref 150–450)
POTASSIUM BLD-SCNC: 4.4 MMOL/L (ref 3.4–5.3)
PROT/CREAT 24H UR: 0.2 MG/MG CR (ref 0–0.2)
RBC # BLD AUTO: 3.07 10E6/UL (ref 3.8–5.2)
RBC #/AREA URNS AUTO: ABNORMAL /HPF
SODIUM SERPL-SCNC: 139 MMOL/L (ref 133–144)
SP GR UR STRIP: <=1.005 (ref 1–1.03)
SQUAMOUS #/AREA URNS AUTO: ABNORMAL /LPF
URATE SERPL-MCNC: 5 MG/DL (ref 2.6–6)
UROBILINOGEN UR STRIP-ACNC: 0.2 E.U./DL
WBC # BLD AUTO: 7.3 10E3/UL (ref 4–11)
WBC #/AREA URNS AUTO: ABNORMAL /HPF

## 2022-08-18 PROCEDURE — 80069 RENAL FUNCTION PANEL: CPT | Performed by: INTERNAL MEDICINE

## 2022-08-18 PROCEDURE — 87086 URINE CULTURE/COLONY COUNT: CPT | Performed by: INTERNAL MEDICINE

## 2022-08-18 PROCEDURE — 85027 COMPLETE CBC AUTOMATED: CPT | Performed by: INTERNAL MEDICINE

## 2022-08-18 PROCEDURE — G0439 PPPS, SUBSEQ VISIT: HCPCS | Performed by: INTERNAL MEDICINE

## 2022-08-18 PROCEDURE — 82043 UR ALBUMIN QUANTITATIVE: CPT | Performed by: INTERNAL MEDICINE

## 2022-08-18 PROCEDURE — 83036 HEMOGLOBIN GLYCOSYLATED A1C: CPT | Performed by: INTERNAL MEDICINE

## 2022-08-18 PROCEDURE — 84156 ASSAY OF PROTEIN URINE: CPT | Performed by: INTERNAL MEDICINE

## 2022-08-18 PROCEDURE — 36415 COLL VENOUS BLD VENIPUNCTURE: CPT | Performed by: INTERNAL MEDICINE

## 2022-08-18 PROCEDURE — 81001 URINALYSIS AUTO W/SCOPE: CPT | Performed by: INTERNAL MEDICINE

## 2022-08-18 PROCEDURE — 84550 ASSAY OF BLOOD/URIC ACID: CPT | Performed by: INTERNAL MEDICINE

## 2022-08-18 RX ORDER — AMMONIUM LACTATE 12 G/100G
CREAM TOPICAL 2 TIMES DAILY
COMMUNITY
End: 2023-05-22

## 2022-08-18 RX ORDER — HYDROXYZINE HYDROCHLORIDE 25 MG/1
TABLET, FILM COATED ORAL
Qty: 180 TABLET | Refills: 1 | Status: CANCELLED | OUTPATIENT
Start: 2022-08-18

## 2022-08-18 RX ORDER — CLOTRIMAZOLE AND BETAMETHASONE DIPROPIONATE 10; .64 MG/G; MG/G
CREAM TOPICAL 2 TIMES DAILY
Qty: 45 G | Refills: 1 | Status: SHIPPED | OUTPATIENT
Start: 2022-08-18 | End: 2022-09-16

## 2022-08-18 ASSESSMENT — ACTIVITIES OF DAILY LIVING (ADL): CURRENT_FUNCTION: NO ASSISTANCE NEEDED

## 2022-08-18 NOTE — TELEPHONE ENCOUNTER
S/w pt and advised there are no openings at the [facility] in the next 1-2 weeks per referral.  Pt states she will use what her pcp gave her and keep appt for 9/23/22 with Gardenia Sharma.      Tabitha PALACIOS RN  [facility]  [phone]

## 2022-08-18 NOTE — PATIENT INSTRUCTIONS
Lotrisone (clotrimazole/betamethasone) for the areas in skin folds (all of them) twice daily until better (instead of the nystatin for now)    Referral to Dermatology:  Put on your calendar  A  will call you to coordinate .... If you don't hear from them within 2 business days, please call [phone].    I think the hydroxyzine (Atarax) might not be doing much for you other than dry mouth/sedation.   Let's try stopping this one. We can always resume if you think it is needed after all.        For the allergic rhinitis, you can get Claritin or Allegra over the counter:  These are like the benadryl but they don't affect the brain/eyes.      Magnesium is okay

## 2022-08-18 NOTE — PROGRESS NOTES
"SUBJECTIVE:   Helena Eldridge is a 86 year old female who presents for Preventive Visit.    H/o RUL/Hilar NSCLungCa (s/p low dose carboplatin and pemerexed.....had to d/c Novolumib immunotherapy due to  pruritis and yeast infections.... recent CT imaging= no evidence of cancer), L. nephrectomy (stones&RCCa), osteoporosis, pelvic fx, lichen sclerosis, Gout, CKD 4, hypothryoid   , severe (asymptomatic) COPD   Over Summer '20, had ESWL and stent placement for 6 mm stone in Right upper ureter    See 5/6/22 MyChart:  Son contacted re: drivers license issue.       She wants to discuss psoriasis (extreme symptoms triggered by immunomodulator for LungCa therapy years ago)         Patient has been advised of split billing requirements and indicates understanding: Yes  Are you in the first 12 months of your Medicare coverage?  No    Healthy Habits:    In general, how would you rate your overall health?  Fair    Frequency of exercise:  6-7 days/week    Duration of exercise:  30-45 minutes    Do you usually eat at least 4 servings of fruit and vegetables a day, include whole grains    & fiber and avoid regularly eating high fat or \"junk\" foods?  No    Taking medications regularly:  Yes    Barriers to taking medications:  None    Medication side effects:  None    Ability to successfully perform activities of daily living:  No assistance needed    Home Safety:  No safety concerns identified    Hearing impairment symptoms: has hearing aids     In the past 6 months, have you been bothered by leaking of urine?  No    In general, how would you rate your overall mental or emotional health?  Good      PHQ-2 Total Score:    Additional concerns today:  Yes (psoriasis )    Do you feel safe in your environment? Yes    Have you ever done Advance Care Planning? (For example, a Health Directive, POLST, or a discussion with a medical provider or your loved ones about your wishes): Yes, advance care planning is on file.       Fall risk   "     Cognitive Screening   1) Repeat 3 items (Leader, Season, Table)    2) Clock draw: NORMAL  3) 3 item recall: Recalls 2 objects   Results: NORMAL clock, 1-2 items recalled: COGNITIVE IMPAIRMENT LESS LIKELY    Mini-CogTM Copyright S Dorina. Licensed by the author for use in [facility]; reprinted with permission (leyladayne@[facility]). All rights reserved.      Do you have sleep apnea, excessive snoring or daytime drowsiness?: no    Reviewed and updated as needed this visit by clinical staff                    Reviewed and updated as needed this visit by Provider                   Social History     Tobacco Use     Smoking status: Former Smoker     Types: Cigarettes     Quit date: 1969     Years since quittin.7     Smokeless tobacco: Never Used   Substance Use Topics     Alcohol use: Yes     Alcohol/week: 0.0 standard drinks     Comment: very little      If you drink alcohol do you typically have >3 drinks per day or >7 drinks per week? No    Alcohol Use 2021   Prescreen: >3 drinks/day or >7 drinks/week? -   Prescreen: >3 drinks/day or >7 drinks/week? No           Discuss psoriasis     Current providers sharing in care for this patient include:   Patient Care Team:  Sushma Nelson MD as PCP - Elidia Tsang PA-C as Physician Assistant (Physician Assistant)  Nguyen Simms MD as MD (Nephrology)  Nguyen Simms MD as Assigned Nephrology Provider  Karlene Dickerson MD as Assigned Surgical Provider  Karime Paulino, RN as Specialty Care Coordinator (Nephrology)  Sushma Nelson MD as Assigned PCP    The following health maintenance items are reviewed in Epic and correct as of today:  Health Maintenance Due   Topic Date Due     DEXA  2020     MICROALBUMIN  2021     BMP  2022     FALL RISK ASSESSMENT  2022     HEMOGLOBIN  2022     Lab work is in process  Labs reviewed in EPIC  BP Readings from Last 3 Encounters:    22 (!) 150/75   22 136/74   22 130/82    Wt Readings from Last 3 Encounters:   22 60.3 kg (133 lb)   22 60.3 kg (133 lb)   22 59 kg (130 lb)                  Patient Active Problem List   Diagnosis     CKD (chronic kidney disease) stage 4, GFR 15-29 ml/min (H)     Glucose intolerance (impaired glucose tolerance)     Kidney stones     Advance care planning     Hypertension goal BP (blood pressure) < 140/90     Lichen sclerosus et atrophicus of the vulva     Solitary kidney, acquired     Senile osteoporosis     Osteoarthritis of right knee     Medial meniscus tear     Cataracts, both eyes     Macular degeneration of both eyes, right eye greater than left eye     COPD, severe (H)     IPF (idiopathic pulmonary fibrosis) (H)     Other specified hypothyroidism     Idiopathic chronic gout     Adjustment disorder with anxiety     Non-small cell cancer of right lung (H)     Psoriasis     Past Surgical History:   Procedure Laterality Date     EXTRACORPOREAL SHOCK WAVE LITHOTRIPSY, CYSTOSCOPY, INSERT STENT URETER(S), COMBINED Right 2020    Procedure: LITHOTRIPSY, Right EXTRACORPOREAL SHOCK WAVE (ESWL), WITH CYSTOSCOPY AND Right URETERAL STENT INSERTION;  Surgeon: Buddy Ramirez MD;  Location: [facility] OR     HC REMOVAL GALLBLADDER       HC REVISE MEDIAN N/CARPAL TUNNEL SURG       LASER HOLMIUM LITHOTRIPSY URETER(S), INSERT STENT, COMBINED Right 2020    Procedure: RIGHT CYSTOURETEROSCOPY, WITH LITHOTRIPSY USING LASER AND URETERAL STENT EXCHANGE;  Surgeon: Buddy Ramirez MD;  Location: [facility] OR     TUBAL LIGATION       ZZC NEPHRECTOMY      left partial 07     ZZC VENOUS THROMBOSIS IMAGING      with pe       Social History     Tobacco Use     Smoking status: Former Smoker     Types: Cigarettes     Quit date: 1969     Years since quittin.7     Smokeless tobacco: Never Used   Substance Use Topics     Alcohol use: Yes     Alcohol/week: 0.0 standard drinks     Comment:  very little      Family History   Problem Relation Age of Onset     Cancer Brother      Glaucoma Mother      Alzheimer Disease Brother      Macular Degeneration No family hx of          Current Outpatient Medications   Medication Sig Dispense Refill     allopurinol (ZYLOPRIM) 100 MG tablet TAKE 2 TABLETS BY MOUTH EVERY  tablet 2     ammonium lactate (AMLACTIN) 12 % external cream Apply topically 2 times daily       Calcium Carbonate-Vitamin D (CALCIUM + D) 600-200 MG-UNIT per tablet Take 2 tablets by mouth 2 times daily.       clobetasol (TEMOVATE) 0.05 % external ointment APPLY TOPICALLY TO GENITALS TWICE DAILY AS DIRECTED 60 g 4     clotrimazole-betamethasone (LOTRISONE) 1-0.05 % external cream Apply topically 2 times daily 45 g 1     diphenhydrAMINE (BENADRYL) 25 MG tablet Take 25 mg by mouth At Bedtime        DOCUSATE SODIUM PO Take 100 mg by mouth At Bedtime       Emollient (CERAVE DAILY MOISTURIZING EX)        famotidine (PEPCID) 20 MG tablet TAKE 1 TABLET(20 MG) BY MOUTH TWICE DAILY 180 tablet 3     furosemide (LASIX) 20 MG tablet TAKE 1 TABLET(20 MG) BY MOUTH DAILY 90 tablet 3     levothyroxine (SYNTHROID/LEVOTHROID) 100 MCG tablet TAKE 1 TABLET BY MOUTH EVERY DAY. START AFTER 75MCG TABLETS. 90 tablet 3     LORazepam (ATIVAN) 0.5 MG tablet Take 1 tablet (0.5 mg) by mouth nightly as needed for anxiety or sleep 30 tablet 1     nystatin (MYCOSTATIN) 360274 UNIT/GM external ointment APPLY TOPICALLY TO THE RASH UNDER THE BREAST AND CREASES OF GROIN TWICE DAILY AS DIRECTED 30 g 0     nystatin-triamcinolone (MYCOLOG) 545771-4.1 UNIT/GM-% external ointment APPLY TOPICALLY TO THE AFFECTED AREA TWICE DAILY       omeprazole (PRILOSEC) 40 MG DR capsule TAKE 1 CAPSULE BY MOUTH EVERY DAY 90 capsule 3     ondansetron (ZOFRAN-ODT) 4 MG ODT tab Take 1 tablet (4 mg) by mouth every 8 hours as needed for nausea 20 tablet 0     rOPINIRole (REQUIP) 0.25 MG tablet TAKE 1 TO 2 TABLETS(0.25 TO 0.5 MG) BY MOUTH EVERY NIGHT AS  NEEDED FOR RESTLESS LEGS 180 tablet 1     STATIN NOT PRESCRIBED, INTENTIONAL, by Other route continuous prn. Patient declined 5/4/12  0     Multiple Vitamins-Minerals (OCUVITE ADULT 50+) CAPS Take 1 capsule by mouth daily (Patient not taking: Reported on 8/18/2022) 30 capsule 12     Allergies   Allergen Reactions     Ace Inhibitors Cough     Advicor      Alendronic Acid Other (See Comments)     Other reaction(s): GI Upset  heartburn  Severe substernal burning and dysphagia within 3 days       Blood-Group Specific Substance Other (See Comments)     Patient has Anti-[place] and warm auto antibody. Blood products may be delayed. Draw patient 24 hours prior to transfusion. Draw one red top and two purple top tubes for all type and screen orders.     Citalopram GI Disturbance and Nausea     diarrhea  diarrhea     Doxycycline Nausea     Poor appetite     Fosamax      Severe substernal burning and dysphagia within 3 days     Valsartan Cramps and Other (See Comments)     severe  severe          Mammogram Screening - Patient over age 75, has elected to continue with screening.  Pertinent mammograms are reviewed under the imaging tab.    Review of Systems  Constitutional, HEENT, cardiovascular, pulmonary, gi and gu systems are negative, except as otherwise noted.    SEE HPI...     OBJECTIVE:   BP (!) 150/75 (BP Location: Other (Comment), Patient Position: Sitting, Cuff Size: Adult Regular)   Pulse 100   Temp 97.9  F (36.6  C) (Oral)   Ht 1.524 m (5')   Wt 60.3 kg (133 lb)   SpO2 94%   BMI 25.97 kg/m   Estimated body mass index is 25.97 kg/m  as calculated from the following:    Height as of 1/18/22: 1.524 m (5').    Weight as of 5/5/22: 60.3 kg (133 lb).  Physical Exam  GENERAL APPEARANCE: alert, no distress, elderly and active.   EYES: Eyes grossly normal to inspection, PERRL and conjunctivae and sclerae normal  HENT: ear canals and TM's normal, nose and mouth without ulcers or lesions, oropharynx clear and oral mucous  "membranes moist  victorina HAids.   NECK: no adenopathy, no asymmetry, masses, or scars and thyroid normal to palpation  RESP: lungs clear to auscultation - no rales, rhonchi or wheezes  BREAST: normal without masses, tenderness or nipple discharge and no palpable axillary masses or adenopathy  CV: regular rate and rhythm, normal S1 S2, no S3 or S4, no murmur, click or rub, no peripheral edema and peripheral pulses strong   Soft systolic ejection murmur  ABDOMEN: soft, nontender, no hepatosplenomegaly, no masses and bowel sounds normal  MS: no musculoskeletal defects are noted and gait is age appropriate without ataxia  SKIN: in folds are bright red areas which are sensitive, somewhat hypertrophied. \"shiny\".   Nystatin cream not helping  NEURO: Normal strength and tone, sensory exam grossly normal, mentation intact and speech normal  PSYCH: mentation appears normal and affect normal/bright   BRings pictures of family to show.     Diagnostic Test Results:  Labs reviewed in Epic  Results for orders placed or performed in visit on 08/18/22 (from the past 24 hour(s))   Hemoglobin A1c   Result Value Ref Range    Hemoglobin A1C 5.8 (H) 0.0 - 5.6 %   CBC with platelets   Result Value Ref Range    WBC Count 7.3 4.0 - 11.0 10e3/uL    RBC Count 3.07 (L) 3.80 - 5.20 10e6/uL    Hemoglobin 10.9 (L) 11.7 - 15.7 g/dL    Hematocrit 33.1 (L) 35.0 - 47.0 %     (H) 78 - 100 fL    MCH 35.5 (H) 26.5 - 33.0 pg    MCHC 32.9 31.5 - 36.5 g/dL    RDW 14.1 10.0 - 15.0 %    Platelet Count 290 150 - 450 10e3/uL   UA with Microscopic   Result Value Ref Range    Color Urine Yellow Colorless, Straw, Light Yellow, Yellow    Appearance Urine Slightly Cloudy (A) Clear    Glucose Urine Negative Negative mg/dL    Bilirubin Urine Negative Negative    Ketones Urine Negative Negative mg/dL    Specific Gravity Urine <=1.005 1.003 - 1.035    Blood Urine Negative Negative    pH Urine 6.5 5.0 - 7.0    Protein Albumin Urine Negative Negative mg/dL    " "Urobilinogen Urine 0.2 0.2, 1.0 E.U./dL    Nitrite Urine Negative Negative    Leukocyte Esterase Urine Small (A) Negative   Urine Microscopic Exam   Result Value Ref Range    RBC Urine 2-5 (A) 0-2 /HPF /HPF    WBC Urine 5-10 (A) 0-5 /HPF /HPF    Squamous Epithelials Urine Moderate (A) None Seen /LPF       ASSESSMENT / PLAN:   (Z00.01) Encounter for general adult medical examination with abnormal findings  (primary encounter diagnosis)  Comment:   She is doing fairly well.   Living independently with family support.   No longer driving.   Plan:      (I10) Hypertension goal BP (blood pressure) < 140/90  Comment:  Adequate control.    Plan:      (N18.4) CKD (chronic kidney disease) stage 4, GFR 15-29 ml/min (H)  Comment:  Multiple \"hits\" to her kidneys:   Solitary (stones) kidney, chemotherapy (carboplatin).   Plan: Hemoglobin, Urine Microscopic Exam            (N18.4,  D63.1) Anemia in stage 4 chronic kidney disease (H)  Comment:  See above.   Hgb is stable today:  10.x    Plan:    (Z90.5) Solitary kidney, acquired  Comment:see above comments.   Plan:  Nephrology following.     (C34.91) Non-small cell cancer of right lung (H)  Comment:  Remission.    Plan:       =seeing heme onc.     (L40.9) Psoriasis  Comment:  Adult Dermatology Referral  Has had severe psoriasis after immunomodulator for her Lung Ca   Plan: she has severe outbreaks at skin folds.  Put derm on calender in case lotrisone not helping.     (L30.4) Eczema intertrigo  Comment: * Nystatin failed.  There is an \"inflammatory\" aspect to these lesions in folds.  Itchy and painful.   Plan: clotrimazole-betamethasone (LOTRISONE) 1-0.05 %        external cream, Adult Dermatology Referral             (J44.9) COPD, severe (H)  Comment:  She is very active. Not too limited.   Plan: expectant mgmt     (J84.112) IPF (idiopathic pulmonary fibrosis) (H)  Comment:  Stable.   Expectant mgmt   Plan:      (M81.0) Senile osteoporosis  Comment:    Plan:       (R73.02) Glucose " intolerance (impaired glucose tolerance)  Comment:    Plan: Hemoglobin A1c        ... is 5.x        (M89.49) Primary osteoarthritis involving multiple joints  Comment:  Coping well, staying active.   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 will try off of atarax, unclear if it helps her ....       Estimated body mass index is 25.97 kg/m  as calculated from the following:    Height as of 1/18/22: 1.524 m (5').    Weight as of 5/5/22: 60.3 kg (133 lb).        She reports that she quit smoking about 52 years ago. Her smoking use included cigarettes. She has never used smokeless tobacco.      Appropriate preventive services were discussed with this patient, including applicable screening as appropriate for cardiovascular disease, diabetes, osteopenia/osteoporosis, and glaucoma.  As appropriate for age/gender, discussed screening for colorectal cancer, prostate cancer, breast cancer, and cervical cancer. Checklist reviewing preventive services available has been given to the patient.    Reviewed patients plan of care and provided an AVS. The Basic Care Plan (routine screening as documented in Health Maintenance) for Helena meets the Care Plan requirement. This Care Plan has been established and reviewed with the Patient.    Counseling Resources:  ATP IV Guidelines  Pooled Cohorts Equation Calculator  Breast  Cancer Risk Calculator  Breast Cancer: Medication to Reduce Risk  FRAX Risk Assessment  ICSI Preventive Guidelines  Dietary Guidelines for Americans, 2010  [employer]'s MyPlate  ASA Prophylaxis  Lung CA Screening    Sushma Nelson MD  [facility]    Identified Health Risks:

## 2022-08-18 NOTE — LETTER
August 19, 2022      Helena Eldridge  [address] [address]        Dear Helena:    We are writing to inform you of your test results.    Anemia is mild/stable.  This is due to the chronic kidney dysfunction (which is stable).      The HgbA1C is in the non diabetic range.          Resulted Orders   Uric acid   Result Value Ref Range    Uric Acid 5.0 2.6 - 6.0 mg/dL   Hemoglobin A1c   Result Value Ref Range    Hemoglobin A1C 5.8 (H) 0.0 - 5.6 %      Comment:      Normal <5.7%   Prediabetes 5.7-6.4%    Diabetes 6.5% or higher     Note: Adopted from ADA consensus guidelines.   CBC with platelets   Result Value Ref Range    WBC Count 7.3 4.0 - 11.0 10e3/uL    RBC Count 3.07 (L) 3.80 - 5.20 10e6/uL    Hemoglobin 10.9 (L) 11.7 - 15.7 g/dL    Hematocrit 33.1 (L) 35.0 - 47.0 %     (H) 78 - 100 fL    MCH 35.5 (H) 26.5 - 33.0 pg    MCHC 32.9 31.5 - 36.5 g/dL    RDW 14.1 10.0 - 15.0 %    Platelet Count 290 150 - 450 10e3/uL         If you have any questions or concerns, please call the clinic at the number listed above.     Sincerely,      Sushma Nelson MD/wendy

## 2022-08-18 NOTE — TELEPHONE ENCOUNTER
[facility] Call Center    Phone Message    May a detailed message be left on voicemail: yes     Reason for Call: Appointment Intake      Referring Provider Name: Sushma Nelson MD in  INTERNAL MEDICINE    Diagnosis and/or Symptoms:   Eczema intertrigo  Psoriasis  Rash   very hard to manage, oversight and opinion appreciated      Return Derm Pt previously seen at [facility] in \[facility]\"" - requesting [place] location only please    Referral Order says Priority: 1-2 Weeks    Scheduled first opening on 09/23/22 and wait listed    Please call Pt if you can move up her Appt in [place] only please - Thank you!      Action Taken: Message routed to:  Other: [facility]    Travel Screening: Not Applicable

## 2022-08-20 LAB — BACTERIA UR CULT: NORMAL

## 2022-08-22 ENCOUNTER — PATIENT OUTREACH (OUTPATIENT)
Dept: NEPHROLOGY | Facility: CLINIC | Age: 87
End: 2022-08-22

## 2022-08-22 DIAGNOSIS — N18.4 CKD (CHRONIC KIDNEY DISEASE) STAGE 4, GFR 15-29 ML/MIN (H): Primary | ICD-10-CM

## 2022-08-26 NOTE — MR AVS SNAPSHOT
After Visit Summary   11/20/2017    Helena Eldridge    MRN: [id number]           Patient Information     Date Of Birth          1935        Visit Information        Provider Department      11/20/2017 11:20 AM Sushma Nelson MD [facility]        Today's Diagnoses     Dysphagia, unspecified type    -  1    Gastroesophageal reflux disease with esophagitis        Atypical chest pain          Care Instructions    Mehreen Villar, Esophageal specialist  [address] [facility]    10 am tomorrow (11/21/17)... Get there 15 min early  ____________________________________________    Omeprazole 40 mg daily (first dose tonight)            Follow-ups after your visit        Additional Services     GASTROENTEROLOGY ADULT REF CONSULT ONLY       Preferred Location: [facility] [phone]  Mehreen Villar, Esophageal specialist  [address] [facility]      Please be aware that coverage of these services is subject to the terms and limitations of your health insurance plan.  Call member services at your health plan with any benefit or coverage questions.  Any procedures must be performed at a [facility] facility OR coordinated by your clinic's referral office.    Please bring the following with you to your appointment:    (1) Any X-Rays, CTs or MRIs which have been performed.  Contact the facility where they were done to arrange for  prior to your scheduled appointment.    (2) List of current medications   (3) This referral request   (4) Any documents/labs given to you for this referral                  Follow-up notes from your care team     Return in about 4 weeks (around 12/18/2017).      Your next 10 appointments already scheduled     Dec 04, 2017  1:30 PM CST   New Visit with JABARI Cuevas   [facility]    [address] [facility] [address] [phone]              Future tests that  [facility]  Dann Enriquez MD   Discharging Provider: Hira Mendez PA-C  Primary Care Provider: Anna Hand MD     HPI:   Maryanne Hines were ordered for you today     Open Future Orders        Priority Expected Expires Ordered    Exercise Stress Echocardiogram Routine  2018    XR Esophagram Routine 2017            Who to contact     If you have questions or need follow up information about today's clinic visit or your schedule please contact [facility] directly at [phone].  Normal or non-critical lab and imaging results will be communicated to you by MyChart, letter or phone within 4 business days after the clinic has received the results. If you do not hear from us within 7 days, please contact the clinic through [email or web address] or phone. If you have a critical or abnormal lab result, we will notify you by phone as soon as possible.  Submit refill requests through [employer] or call your pharmacy and they will forward the refill request to us. Please allow 3 business days for your refill to be completed.          Additional Information About Your Visit        [email or web address] Information     [employer] lets you send messages to your doctor, view your test results, renew your prescriptions, schedule appointments and more. To sign up, go to [email or web address] . Click on \"Log in\" on the left side of the screen, which will take you to the Welcome page. Then click on \"Sign up Now\" on the right side of the page.     You will be asked to enter the access code listed below, as well as some personal information. Please follow the directions to create your username and password.     Your access code is: [email or web address]  Expires: 2017 11:32 AM     Your access code will  in 90 days. If you need help or a new code, please call your [place] clinic or [phone].        Care EveryWhere ID     This is your Care EveryWhere ID. This could be used by other organizations to access your [place] medical records  [id number]        Your Vitals Were     Pulse Temperature Pulse Oximetry BMI (Body Mass " Index)          90 98.5  F (36.9  C) (Oral) 95% 25.58 kg/m2         Blood Pressure from Last 3 Encounters:   11/20/17 109/58   10/31/17 110/58   08/08/17 127/63    Weight from Last 3 Encounters:   11/20/17 131 lb (59.4 kg)   10/31/17 133 lb (60.3 kg)   08/08/17 137 lb (62.1 kg)              We Performed the Following     EKG 12-lead complete w/read - Clinics     GASTROENTEROLOGY ADULT REF CONSULT ONLY          Today's Medication Changes          These changes are accurate as of: 11/20/17 12:41 PM.  If you have any questions, ask your nurse or doctor.               Start taking these medicines.        Dose/Directions    omeprazole 40 MG capsule   Commonly known as:  priLOSEC   Used for:  Gastroesophageal reflux disease with esophagitis   Started by:  Sushma Nelson MD        Dose:  40 mg   Take 1 capsule (40 mg) by mouth daily   Quantity:  90 capsule   Refills:  3            Where to get your medicines      These medications were sent [facility] [place] [address] [facility] [address] [facility] [address] [facility] [address]     Phone:  [phone]     omeprazole 40 MG capsule                Primary Care Provider Office Phone # Fax #    Sushma Nelson [phone]       [address] [facility] [address]        Equal Access to Services     SHRUTHI DEL TORO AH: Hadii britany ku hadasho Soomaali, waaxda luqadaha, qaybta kaalmada rangelegyada, kayla johnson adebandar pelaez. So [facility] [phone].    ATENCIÓN: Si habla español, tiene a jones disposición servicios gratuitos de asistencia lingüística. Josiane al [phone].    We comply with applicable federal civil rights laws and [place] laws. We do not discriminate on the basis of race, color, national origin, age, disability, sex, sexual orientation, or gender identity.            Thank you!     Thank you for choosing [facility]  for your care. Our goal is always to provide you with excellent care. Hearing back  from our patients is one way we can continue to improve our services. Please take a few minutes to complete the written survey that you may receive in the mail after your visit with us. Thank you!             Your Updated Medication List - Protect others around you: Learn how to safely use, store and throw away your medicines at www.disposemymeds.org.          This list is accurate as of: 11/20/17 12:41 PM.  Always use your most recent med list.                   Brand Name Dispense Instructions for use Diagnosis    albuterol 108 (90 BASE) MCG/ACT Inhaler    PROAIR HFA/PROVENTIL HFA/VENTOLIN HFA    2 Inhaler    Inhale 2 puffs into the lungs every 6 hours as needed for shortness of breath / dyspnea or wheezing    COPD, severe (H)       allopurinol 100 MG tablet    ZYLOPRIM    180 tablet    Take 2 tablets (200 mg) by mouth daily    Idiopathic chronic gout of foot without tophus, unspecified laterality       ammonium lactate 12 % cream    AMLACTIN    280 g    Apply  topically 2 times daily as needed. apply to affected area    Dry skin dermatitis       ASPIRIN PO      Take 81 mg by mouth daily        BENADRYL 25 MG tablet   Generic drug:  diphenhydrAMINE      Take 25 mg by mouth At Bedtime    DIAGNOSIS NOT YET DEFINED       calcium + D 600-200 MG-UNIT Tabs   Generic drug:  calcium carbonate-vitamin D      Take 2 tablets by mouth 2 times daily.    DIAGNOSIS NOT YET DEFINED       clobetasol 0.05 % cream    TEMOVATE    45 g    Apply  topically daily as needed.    Rash       DOCUSATE SODIUM PO      Take 100 mg by mouth At Bedtime        furosemide 20 MG tablet    LASIX    180 tablet    Take 1 tablet (20 mg) by mouth 2 times daily (morning and lunch time)    Hypertension goal BP (blood pressure) < 140/90, CKD (chronic kidney disease) stage 3, GFR 30-59 ml/min       ketoconazole 2 % cream    NIZORAL    15 g    Apply  topically daily.    Sciatic pain       levothyroxine 100 MCG tablet    SYNTHROID    90 tablet    Take 1 tablet  (100 mcg) by mouth daily Except Sunday    Other specified hypothyroidism       mometasone 0.1 % cream    ELOCON    45 g    Apply  topically daily as needed.    Rash       OCUVITE ADULT 50+ Caps     30 capsule    Take 1 capsule by mouth daily    Macular degeneration of both eyes       omeprazole 40 MG capsule    priLOSEC    90 capsule    Take 1 capsule (40 mg) by mouth daily    Gastroesophageal reflux disease with esophagitis       rOPINIRole 0.25 MG tablet    REQUIP    60 tablet    TAKE 1-2 TABLETS BY MOUTH AT BEDTIME    Cramp of limb       STATIN NOT PRESCRIBED (INTENTIONAL)      by Other route continuous prn. Patient declined 5/4/12            [address] [facility] [address]  [phone]                         Patient Instructions:      Diet Adult Regular     Lifting restrictions     No driving until:   Order Comments: Discharge Instructions     WOUND CARE  -- You have skin glue over your incision(s); this will slowly flake away over the next few weeks. Do not pick at surgical glue, it will come off on its own.   -- Ok to shower;  however, no baths or submerging in water (I.e. swimming, submerging in water) for at least two weeks.  -- Please keep the incision clean with soap and water, pat your incision dry, do not scrub hard over your incisions.     MEDICATIONS AND PAIN CONTROL  -- Please resume all home medications as instructed and take any newly prescribed medications.  -- Most people find that over-the-counter pain medications (Tylenol combined with Ibuprofen) will be sufficient for most pain control.  -- You have been prescribed a narcotic pain medication. Please wean narcotic over the next few daysIf you're taking prescription narcotics, do not drive or operate heavy machinery. Do not drive if your pain is not controlled enough for you to react quickly safely.  -- No straining, avoid constipation. May take OTC stool softeners as described and laxatives as needed.     OTHER INSTRUCTIONS  -- Monitor for temp > 101 F, bleeding, redness, purulent drainage, or any sudden, new extreme pain. If any occur, please call our clinic or go to the emergency department if after normal business hours.  -- You may resume your regular diet as tolerated.   -- No heavy lifting (anything >10 lbs or = to a gallon of milk) or strenuous exercise until cleared by a physician. No pushing or pulling activities such as vacuuming or raking. Otherwise, activity as tolerated.     Notify your health care provider if you experience any of the following:  increased confusion or weakness     Notify your health care provider if you experience any of the following:  worsening rash     Notify your health care provider if you experience any of the following:  persistent dizziness, light-headedness, or visual disturbances     Notify your health care provider if you experience any of the following:  severe persistent headache     Notify your health care provider if you experience any of the following:  difficulty breathing or increased cough     Notify your health care  provider if you experience any of the following:  redness, tenderness, or signs of infection (pain, swelling, redness, odor or green/yellow discharge around incision site)     Notify your health care provider if you experience any of the following:  severe uncontrolled pain     Notify your health care provider if you experience any of the following:  persistent nausea and vomiting or diarrhea     Notify your health care provider if you experience any of the following:  temperature >100.4     No dressing needed     Activity as tolerated       Medications:  Reconciled Home Medications:      Medication List      START taking these medications    amoxicillin-clavulanate 875-125mg 875-125 mg per tablet  Commonly known as: AUGMENTIN  Take 1 tablet by mouth every 12 (twelve) hours. for 7 days        CONTINUE taking these medications    acetaminophen 500 MG tablet  Commonly known as: TYLENOL  Take 1 tablet (500 mg total) by mouth every 8 (eight) hours.     HYDROcodone-acetaminophen 5-325 mg per tablet  Commonly known as: NORCO  Take 1 tablet by mouth every 6 (six) hours as needed for Pain.     meloxicam 15 MG tablet  Commonly known as: MOBIC  Take 15 mg by mouth once daily.     polyethylene glycol 17 gram Pwpk  Commonly known as: GLYCOLAX  Take 17 g by mouth once daily. for 8 days     QSYMIA 7.5-46 mg Cm24  Generic drug: phentermine-topiramate  1 po qd     SHINGRIX (PF) 50 mcg/0.5 mL injection  Generic drug: varicella-zoster [id number] (PF)  Inject into the muscle.              Patient was seen and examined on the date of discharge and determined to be suitable for discharge.  Total time spent preparing discharge services: 25 minutes.  Time was spent speaking with consultants and case management, reviewing records, and/or discussing the plan of care with patient/family.        Hira Mendez PA-C  General Surgery   [facility]

## 2022-09-19 ENCOUNTER — OFFICE VISIT (OUTPATIENT)
Dept: AUDIOLOGY | Facility: CLINIC | Age: 87
End: 2022-09-19

## 2022-09-19 DIAGNOSIS — H90.3 SENSORINEURAL HEARING LOSS, BILATERAL: Primary | ICD-10-CM

## 2022-09-19 PROCEDURE — V5299 HEARING SERVICE: HCPCS | Performed by: AUDIOLOGIST

## 2022-09-19 NOTE — PROGRESS NOTES
AUDIOLOGY REPORT: HEARING AID RECHECK    SUBJECTIVE: Helena Eldridge is a 86 year old female, :  1935, was seen in the Audiology Clinic at [facility] on 22 for a return check of their hearing aids. Patient was unaccompanied to today's visit.       Background:                 Patient is here to have the tubing on her Widex BTE hearing aids changed as they are hard and brittle.      Procedures:                                        SIDE: Both                          : Widex                           TYPE: Clear 330-9 BTE                          S/N:      R: [id number]      L: [id number]                          WARRANTY:  2016                            Replaced the tubing on the hearing aids and cut to patient's ear height. General cleaning of the hearing aids and earmolds was also performed. Biologic listening check finds the hearings aids sounding crisp and clear. Patient reports the hearing aids are feeling better and sounding better than when she arrived.       Plan:              Patient will return as needed for hearing aid concerns.      CHARGES:               [id number] [facility]. Hearing services      James Lau CCC-A  Licensed Audiologist #[id number]  2022

## 2022-10-18 ENCOUNTER — TELEPHONE (OUTPATIENT)
Dept: DERMATOLOGY | Facility: CLINIC | Age: 87
End: 2022-10-18

## 2022-10-18 NOTE — TELEPHONE ENCOUNTER
"Pt called back and scheduled at [place] on Friday 12/2 at 12:30 pm.  Pt would like to be seen sooner stating the psoriasis on her scalp will only get worse.    Pt would like to be seen before 12/2.  Pt no showed appt in Sept.  Has cream from pcp that helps but cannot use in her scalp.  Nurse advised pt can contact pcp for a solution to use in her scalp until seen by derm and pt states \"that is nearly impossible\".    Routing to Gardenia Sharma to see if willing to see sooner than 12/2 at [place] location only.    Tabitha PALACIOS RN  [facility] [place]  [phone]    "

## 2022-10-18 NOTE — TELEPHONE ENCOUNTER
Left message for pt to call dermatology nurse at [phone].  Advised there is no vm at this number, if no answer to call back at a later time.    Tabitha PALACIOS RN  [facility] [place]  [phone]

## 2022-10-18 NOTE — TELEPHONE ENCOUNTER
[facility] Call Center    Phone Message    May a detailed message be left on voicemail: yes     Reason for Call: Symptoms or Concerns     If patient has red-flag symptoms, warm transfer to triage line    Current symptom or concern: Psoriasis of the scalp - It is really bad    Symptoms have been present for:  5-6 month(s)    Has patient previously been seen for this? No    By : NA    Date: NA    Are there any new or worsening symptoms? Yes: Pt has had issues in the past and used clotrimazole from [facility]. This Rx works, but not the issues are on her scalp and does not know how to use this on scalp and would like a call back to discuss. Thanks       Action Taken: Message routed to:  [facility] Derm    Travel Screening: Not Applicable

## 2022-10-19 NOTE — TELEPHONE ENCOUNTER
Meghan Sharma, MERARI CNP    You can use one of the open ADAN slots, I see some the 2nd and 3rd weeks in November       Left message for pt to call derm nurse at [phone] to schedule appt in [place] sooner than 12/2.    Tabitha PALACIOS RN  [facility] [place] [place]  [phone]

## 2022-10-20 ENCOUNTER — MYC MEDICAL ADVICE (OUTPATIENT)
Dept: INTERNAL MEDICINE | Facility: CLINIC | Age: 87
End: 2022-10-20

## 2022-10-20 DIAGNOSIS — K21.00 GASTROESOPHAGEAL REFLUX DISEASE WITH ESOPHAGITIS, UNSPECIFIED WHETHER HEMORRHAGE: ICD-10-CM

## 2022-10-20 RX ORDER — LANSOPRAZOLE 30 MG/1
30 CAPSULE, DELAYED RELEASE ORAL DAILY
Qty: 90 CAPSULE | Refills: 3 | Status: SHIPPED | OUTPATIENT
Start: 2022-10-20 | End: 2023-05-22

## 2022-10-21 NOTE — TELEPHONE ENCOUNTER
[facility] Call Center    Phone Message    May a detailed message be left on voicemail: yes     Reason for Call: Other: Pt is returning a missed call to schedule a sooner visit. Please call Pt back. Thank you.      Action Taken: Other: [facility]    Travel Screening: Not Applicable

## 2022-10-21 NOTE — TELEPHONE ENCOUNTER
Attempted to call pt and received message that mailbox is full and cannot accept messages at this time.    Tabitha PALACIOS RN  [facility] [place] [place]  [phone]

## 2022-11-10 ENCOUNTER — OFFICE VISIT (OUTPATIENT)
Dept: DERMATOLOGY | Facility: CLINIC | Age: 87
End: 2022-11-10
Payer: COMMERCIAL

## 2022-11-10 DIAGNOSIS — Z79.899 MEDICATION MANAGEMENT: ICD-10-CM

## 2022-11-10 DIAGNOSIS — Z11.59 ENCOUNTER FOR SCREENING FOR OTHER VIRAL DISEASES: ICD-10-CM

## 2022-11-10 DIAGNOSIS — L40.9 PSORIASIS: Primary | ICD-10-CM

## 2022-11-10 LAB
BASOPHILS # BLD AUTO: 0 10E3/UL (ref 0–0.2)
BASOPHILS NFR BLD AUTO: 1 %
EOSINOPHIL # BLD AUTO: 0.4 10E3/UL (ref 0–0.7)
EOSINOPHIL NFR BLD AUTO: 5 %
ERYTHROCYTE [DISTWIDTH] IN BLOOD BY AUTOMATED COUNT: 14.4 % (ref 10–15)
HCT VFR BLD AUTO: 35.4 % (ref 35–47)
HGB BLD-MCNC: 11.3 G/DL (ref 11.7–15.7)
LYMPHOCYTES # BLD AUTO: 2.4 10E3/UL (ref 0.8–5.3)
LYMPHOCYTES NFR BLD AUTO: 31 %
MCH RBC QN AUTO: 34.9 PG (ref 26.5–33)
MCHC RBC AUTO-ENTMCNC: 31.9 G/DL (ref 31.5–36.5)
MCV RBC AUTO: 109 FL (ref 78–100)
MONOCYTES # BLD AUTO: 0.7 10E3/UL (ref 0–1.3)
MONOCYTES NFR BLD AUTO: 9 %
NEUTROPHILS # BLD AUTO: 4.2 10E3/UL (ref 1.6–8.3)
NEUTROPHILS NFR BLD AUTO: 54 %
PLATELET # BLD AUTO: 282 10E3/UL (ref 150–450)
RBC # BLD AUTO: 3.24 10E6/UL (ref 3.8–5.2)
WBC # BLD AUTO: 7.9 10E3/UL (ref 4–11)

## 2022-11-10 PROCEDURE — 80053 COMPREHEN METABOLIC PANEL: CPT | Performed by: NURSE PRACTITIONER

## 2022-11-10 PROCEDURE — 85025 COMPLETE CBC W/AUTO DIFF WBC: CPT | Performed by: NURSE PRACTITIONER

## 2022-11-10 PROCEDURE — 99204 OFFICE O/P NEW MOD 45 MIN: CPT | Performed by: NURSE PRACTITIONER

## 2022-11-10 PROCEDURE — 86704 HEP B CORE ANTIBODY TOTAL: CPT | Performed by: NURSE PRACTITIONER

## 2022-11-10 PROCEDURE — 87340 HEPATITIS B SURFACE AG IA: CPT | Performed by: NURSE PRACTITIONER

## 2022-11-10 PROCEDURE — 36415 COLL VENOUS BLD VENIPUNCTURE: CPT | Performed by: NURSE PRACTITIONER

## 2022-11-10 PROCEDURE — 82248 BILIRUBIN DIRECT: CPT | Performed by: NURSE PRACTITIONER

## 2022-11-10 PROCEDURE — 86706 HEP B SURFACE ANTIBODY: CPT | Performed by: NURSE PRACTITIONER

## 2022-11-10 PROCEDURE — 87389 HIV-1 AG W/HIV-1&-2 AB AG IA: CPT | Performed by: NURSE PRACTITIONER

## 2022-11-10 PROCEDURE — 86803 HEPATITIS C AB TEST: CPT | Performed by: NURSE PRACTITIONER

## 2022-11-10 PROCEDURE — 86481 TB AG RESPONSE T-CELL SUSP: CPT | Performed by: NURSE PRACTITIONER

## 2022-11-10 RX ORDER — FLUOCINONIDE TOPICAL SOLUTION USP, 0.05% 0.5 MG/ML
SOLUTION TOPICAL 2 TIMES DAILY
Qty: 60 ML | Refills: 2 | Status: SHIPPED | OUTPATIENT
Start: 2022-11-10 | End: 2023-05-22

## 2022-11-10 RX ORDER — HYDROCORTISONE 2.5 %
CREAM (GRAM) TOPICAL 2 TIMES DAILY
Qty: 60 G | Refills: 2 | Status: SHIPPED | OUTPATIENT
Start: 2022-11-10 | End: 2023-03-02

## 2022-11-10 RX ORDER — KETOCONAZOLE 20 MG/ML
SHAMPOO TOPICAL
Qty: 120 ML | Refills: 11 | Status: SHIPPED | OUTPATIENT
Start: 2022-11-10 | End: 2023-05-22

## 2022-11-10 ASSESSMENT — PAIN SCALES - GENERAL: PAINLEVEL: NO PAIN (0)

## 2022-11-10 NOTE — PROGRESS NOTES
[facility] Dermatology Note  Encounter Date: Nov 10, 2022  Office Visit     Reviewed patients past medical history and pertinent chart review prior to patients visit today.     Dermatology Problem List:  1. Psoriasis vulgaris, will plan to start humira 11/10/22. Hydrocortisone 2.5% ointment and clobetasol ointement for flares.   2. Psoriasiform dermatitis, scalp. Ketoconazole and fluocinonide 0.05% solution  Given 11/10/22   3. 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   -not well controlled with topical steroid use.   -discussed methotrexate but due to frequent lab monitoring and patients lack of transportation and difficulty getting to appointments we decided this would not be a good drug for her  - Start Humira 40mg/0.8ml, initial loading dose of 80mg on day 1, then inject 40mg/0.8mL subcutaneously on day 8 and then inject 40mg/0.8mL SubQ every two weeks thereafter.  - Edu that patient may not see benefit from the medication for 3-6 mo. Edu on potential side effects of immunosuppression as well as injection site reactions, increased risk of infections, heart failure, and neutropenia.  - Safety Labs: CBC, CMP, TB Quantiferon Gold, HIV, Hepatitis B & C antibody drawn. Will have labs redrawn again in 3 months.     -scalp: Seborrheic dermatitis vs psoriasis of scalp. She will alternate ketoconazole 2% shampoo and T-gel shampoo leaving each on for 3-5 minutes before rinsing. Also given fluocinonide 0.05% solution  To use 1-2 time daily, decrease use as rash improves and stop when well controlled and itching has stopped.     -Skin fold areas and face (under breasts, buttock, groin). Use the hydrocortisone 2.5% ointment twice daily. Use petroleum jelly/vaseline as a barrier cream in the groin/buttock to moisturize the skin when you use the restroom if it is irritated.     -All  other areas (extremities and trunk) use the clobetasol ointment twice daily to these areas for up to 4 weeks    Follow up in 4-6 weeks for recheck.       Meghan Sharma, APRN CNP on 11/10/2022 at 1:50 PM   _______________________________________    CC: Psoriasis (Full body- started 1 year ago after cancer treatment )    HPI:  Ms. Helena Eldridge is a(n) 87 year old female who presents today as a new patient for rash. She has had this for 1 year and it continues to worsen and get itchier.     Patient is otherwise feeling well, without additional skin concerns.    Patient does not have a licence to drive and does not have reliable transportation which would be an issue with regular frequent lab monitoring. She very rarely drinks any alcohol,     Physical Exam:  SKIN: Total skin including the undergarment areas was performed. The exam included the head/face, neck, both arms, chest, back, abdomen, both legs, digits and/or nails.   - erythematous well defined thick scaly plaques scattered on buttock, gluteal crease, rectum, groin folds, scalp, left elbow, under breasts, dorsum hands,. Umbilicus, low abdomen    - No other lesions of concern on areas examined.     Medications:  Current Outpatient Medications   Medication     adalimumab (HUMIRA *CF*) 40 MG/0.4ML pen kit     adalimumab (HUMIRA *CF*) 80 MG/0.8ML pen kit     fluocinonide (LIDEX) 0.05 % external solution     hydrocortisone 2.5 % cream     ketoconazole (NIZORAL) 2 % external shampoo     allopurinol (ZYLOPRIM) 100 MG tablet     ammonium lactate (AMLACTIN) 12 % external cream     Calcium Carbonate-Vitamin D (CALCIUM + D) 600-200 MG-UNIT per tablet     clobetasol (TEMOVATE) 0.05 % external ointment     clotrimazole-betamethasone (LOTRISONE) 1-0.05 % external cream     diphenhydrAMINE (BENADRYL) 25 MG tablet     DOCUSATE SODIUM PO     Emollient (CERAVE DAILY MOISTURIZING EX)     famotidine (PEPCID) 20 MG tablet     furosemide (LASIX) 20 MG tablet     LANsoprazole  (PREVACID) 30 MG DR capsule     levothyroxine (SYNTHROID/LEVOTHROID) 100 MCG tablet     LORazepam (ATIVAN) 0.5 MG tablet     Multiple Vitamins-Minerals (OCUVITE ADULT 50+) CAPS     nystatin (MYCOSTATIN) 606203 UNIT/GM external ointment     nystatin-triamcinolone (MYCOLOG) 626030-2.1 UNIT/GM-% external ointment     ondansetron (ZOFRAN-ODT) 4 MG ODT tab     rOPINIRole (REQUIP) 0.25 MG tablet     STATIN NOT PRESCRIBED, INTENTIONAL,     No current facility-administered medications for this visit.     Facility-Administered Medications Ordered in Other Visits   Medication     perflutren diluted in saline (DEFINITY) injection 5 mL      Past Medical History:   Patient Active Problem List   Diagnosis     CKD (chronic kidney disease) stage 4, GFR 15-29 ml/min (H)     Glucose intolerance (impaired glucose tolerance)     Kidney stones     Advance care planning     Hypertension goal BP (blood pressure) < 140/90     Lichen sclerosus et atrophicus of the vulva     Solitary kidney, acquired     Senile osteoporosis     Osteoarthritis of right knee     Medial meniscus tear     Cataracts, both eyes     Macular degeneration of both eyes, right eye greater than left eye     COPD, severe (H)     IPF (idiopathic pulmonary fibrosis) (H)     Other specified hypothyroidism     Idiopathic chronic gout     Adjustment disorder with anxiety     Non-small cell cancer of right lung (H)     Psoriasis     Past Medical History:   Diagnosis Date     Carpal tunnel syndrome      CKD (chronic kidney disease)     kidney stone with infection     CKD (chronic kidney disease) stage 4, GFR 15-29 ml/min (H) 12/2/2010    History of kidney stone with infection.  Creatinine 1.3 - 1.6 (12/2/10, [facility])   -- she can't take ACE / ARB due to side effects.  Will continue with good BP control (12/3/15, [facility])      COPD, severe (H) 2/16/2015    Seen by pulmonary 2013.        Deep vein thrombosis (DVT) (H)     1972     DVT [id number]     Glucose intolerance      H/O partial  nephrectomy      Heel spur      Hypertension goal BP (blood pressure) < 140/90 11/28/2011     Hypothyroidism      Idiopathic chronic gout 5/14/2015     Immunosuppression (H) 3/7/2018     IPF (idiopathic pulmonary fibrosis) (H) 2/25/2015    Mild, at bases per CXR 2/2015 (2/15/15, [facility])      Kidney stones      Lichen sclerosus      Lichen sclerosus et atrophicus of the vulva 5/8/2013    Clobetasol bid is the recommended remedy     Macular degeneration      Non-small cell cancer of right lung (H) 3/7/2018     Nonsenile cataract      Osteoarthritis      Osteoarthritis of right knee 3/11/2014     Osteoporosis      Other specified hypothyroidism 5/14/2015     PID      Pulmonary embolism (H)     1970     Senile osteoporosis 3/11/2014     Unspecified hypothyroidism      Vitiligo        CC No referring provider defined for this encounter. on close of this encounter.

## 2022-11-10 NOTE — PATIENT INSTRUCTIONS
Scalp: use the ketonconazole shampoo alternating with Tsal shampoo each time you wash your hair. Leave on for 5 minutes before rinsing off.  Okay to use regular shampoo/conditioner afterwards if you would like.  When you have itching in the scalp use fluocinonide 0.05% solution  1-2 times daily to the rashy/itchy areas only. The fluocinonide 0.05% solution  is a steroid and can thin your skin so only use this when you have rash or itching.      Skin fold areas and face (under breasts, buttock, groin). Use the hydrocortisone 2.5% ointment twice daily. Use petroleum jelly/vaseline as a barrier cream in the groin/buttock to moisturize the skin when you use the restroom if it is irritated.     All other areas (extremities and trunk) use the clobetasol ointment twice daily to these areas for up to 4 weeks until I see you again.     Follow up in 1 month.

## 2022-11-10 NOTE — LETTER
11/10/2022         RE: Helena Eldridge  [address] [facility] [address]        Dear Colleague,    Thank you for referring your patient, Helena Eldridge, to the [facility]. Please see a copy of my visit note below.    [facility] Dermatology Note  Encounter Date: Nov 10, 2022  Office Visit     Reviewed patients past medical history and pertinent chart review prior to patients visit today.     Dermatology Problem List:  1. Psoriasis vulgaris, will plan to start humira 11/10/22. Hydrocortisone 2.5% ointment and clobetasol ointement for flares.   2. Psoriasiform dermatitis, scalp. Ketoconazole and fluocinonide 0.05% solution  Given 11/10/22   3. 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   -not well controlled with topical steroid use.   -discussed methotrexate but due to frequent lab monitoring and patients lack of transportation and difficulty getting to appointments we decided this would not be a good drug for her  - Start Humira 40mg/0.8ml, initial loading dose of 80mg on day 1, then inject 40mg/0.8mL subcutaneously on day 8 and then inject 40mg/0.8mL SubQ every two weeks thereafter.  - Edu that patient may not see benefit from the medication for 3-6 mo. Edu on potential side effects of immunosuppression as well as injection site reactions, increased risk of infections, heart failure, and neutropenia.  - Safety Labs: CBC, CMP, TB Quantiferon Gold, HIV, Hepatitis B & C antibody drawn. Will have labs redrawn again in 3 months.     -scalp: Seborrheic dermatitis vs psoriasis of scalp. She will alternate ketoconazole 2% shampoo and T-gel shampoo leaving each on for 3-5 minutes before rinsing. Also given fluocinonide 0.05% solution  To use 1-2 time daily, decrease use as rash improves and stop when well controlled and itching has stopped.      -Skin fold areas and face (under breasts, buttock, groin). Use the hydrocortisone 2.5% ointment twice daily. Use petroleum jelly/vaseline as a barrier cream in the groin/buttock to moisturize the skin when you use the restroom if it is irritated.     -All other areas (extremities and trunk) use the clobetasol ointment twice daily to these areas for up to 4 weeks    Follow up in 4-6 weeks for recheck.       Meghan Sharma, APRN CNP on 11/10/2022 at 1:50 PM   _______________________________________    CC: Psoriasis (Full body- started 1 year ago after cancer treatment )    HPI:  Ms. Helena Eldridge is a(n) 87 year old female who presents today as a new patient for rash. She has had this for 1 year and it continues to worsen and get itchier.     Patient is otherwise feeling well, without additional skin concerns.    Patient does not have a licence to drive and does not have reliable transportation which would be an issue with regular frequent lab monitoring. She very rarely drinks any alcohol,     Physical Exam:  SKIN: Total skin including the undergarment areas was performed. The exam included the head/face, neck, both arms, chest, back, abdomen, both legs, digits and/or nails.   - erythematous well defined thick scaly plaques scattered on buttock, gluteal crease, rectum, groin folds, scalp, left elbow, under breasts, dorsum hands,. Umbilicus, low abdomen    - No other lesions of concern on areas examined.     Medications:  Current Outpatient Medications   Medication     adalimumab (HUMIRA *CF*) 40 MG/0.4ML pen kit     adalimumab (HUMIRA *CF*) 80 MG/0.8ML pen kit     fluocinonide (LIDEX) 0.05 % external solution     hydrocortisone 2.5 % cream     ketoconazole (NIZORAL) 2 % external shampoo     allopurinol (ZYLOPRIM) 100 MG tablet     ammonium lactate (AMLACTIN) 12 % external cream     Calcium Carbonate-Vitamin D (CALCIUM + D) 600-200 MG-UNIT per tablet     clobetasol (TEMOVATE) 0.05 % external ointment      clotrimazole-betamethasone (LOTRISONE) 1-0.05 % external cream     diphenhydrAMINE (BENADRYL) 25 MG tablet     DOCUSATE SODIUM PO     Emollient (CERAVE DAILY MOISTURIZING EX)     famotidine (PEPCID) 20 MG tablet     furosemide (LASIX) 20 MG tablet     LANsoprazole (PREVACID) 30 MG DR capsule     levothyroxine (SYNTHROID/LEVOTHROID) 100 MCG tablet     LORazepam (ATIVAN) 0.5 MG tablet     Multiple Vitamins-Minerals (OCUVITE ADULT 50+) CAPS     nystatin (MYCOSTATIN) 825058 UNIT/GM external ointment     nystatin-triamcinolone (MYCOLOG) 697715-3.1 UNIT/GM-% external ointment     ondansetron (ZOFRAN-ODT) 4 MG ODT tab     rOPINIRole (REQUIP) 0.25 MG tablet     STATIN NOT PRESCRIBED, INTENTIONAL,     No current facility-administered medications for this visit.     Facility-Administered Medications Ordered in Other Visits   Medication     perflutren diluted in saline (DEFINITY) injection 5 mL      Past Medical History:   Patient Active Problem List   Diagnosis     CKD (chronic kidney disease) stage 4, GFR 15-29 ml/min (H)     Glucose intolerance (impaired glucose tolerance)     Kidney stones     Advance care planning     Hypertension goal BP (blood pressure) < 140/90     Lichen sclerosus et atrophicus of the vulva     Solitary kidney, acquired     Senile osteoporosis     Osteoarthritis of right knee     Medial meniscus tear     Cataracts, both eyes     Macular degeneration of both eyes, right eye greater than left eye     COPD, severe (H)     IPF (idiopathic pulmonary fibrosis) (H)     Other specified hypothyroidism     Idiopathic chronic gout     Adjustment disorder with anxiety     Non-small cell cancer of right lung (H)     Psoriasis     Past Medical History:   Diagnosis Date     Carpal tunnel syndrome      CKD (chronic kidney disease)     kidney stone with infection     CKD (chronic kidney disease) stage 4, GFR 15-29 ml/min (H) 12/2/2010    History of kidney stone with infection.  Creatinine 1.3 - 1.6 (12/2/10, [facility])   --  she can't take ACE / ARB due to side effects.  Will continue with good BP control (12/3/15, [facility])      COPD, severe (H) 2/16/2015    Seen by pulmonary 2013.        Deep vein thrombosis (DVT) (H)     1972     DVT [id number]     Glucose intolerance      H/O partial nephrectomy      Heel spur      Hypertension goal BP (blood pressure) < 140/90 11/28/2011     Hypothyroidism      Idiopathic chronic gout 5/14/2015     Immunosuppression (H) 3/7/2018     IPF (idiopathic pulmonary fibrosis) (H) 2/25/2015    Mild, at bases per CXR 2/2015 (2/15/15, [facility])      Kidney stones      Lichen sclerosus      Lichen sclerosus et atrophicus of the vulva 5/8/2013    Clobetasol bid is the recommended remedy     Macular degeneration      Non-small cell cancer of right lung (H) 3/7/2018     Nonsenile cataract      Osteoarthritis      Osteoarthritis of right knee 3/11/2014     Osteoporosis      Other specified hypothyroidism 5/14/2015     PID      Pulmonary embolism (H)     1970     Senile osteoporosis 3/11/2014     Unspecified hypothyroidism      Vitiligo        CC No referring provider defined for this encounter. on close of this encounter.       Again, thank you for allowing me to participate in the care of your patient.        Sincerely,        Meghan Sharma, MERARI CNP

## 2022-11-10 NOTE — Clinical Note
How do you feel about me starting Humira on Helena for newly diagnosed psoriasis? Her CBC has had some long term abnormalities but seem stable. Her creatinine and GFR are low, she says she has kidney problems but doesn't know why. Humira doesn't have any adjustments for kidney imparement so I assume it is okay to put her on this. Do you have any reservations about it?

## 2022-11-11 ENCOUNTER — TELEPHONE (OUTPATIENT)
Dept: DERMATOLOGY | Facility: CLINIC | Age: 87
End: 2022-11-11

## 2022-11-11 LAB
ALBUMIN SERPL-MCNC: 3.8 G/DL (ref 3.4–5)
ALP SERPL-CCNC: 91 U/L (ref 40–150)
ALT SERPL W P-5'-P-CCNC: 20 U/L (ref 0–50)
ANION GAP SERPL CALCULATED.3IONS-SCNC: 8 MMOL/L (ref 3–14)
AST SERPL W P-5'-P-CCNC: 18 U/L (ref 0–45)
BILIRUB DIRECT SERPL-MCNC: <0.1 MG/DL (ref 0–0.2)
BILIRUB SERPL-MCNC: 0.3 MG/DL (ref 0.2–1.3)
BUN SERPL-MCNC: 25 MG/DL (ref 7–30)
CALCIUM SERPL-MCNC: 8.8 MG/DL (ref 8.5–10.1)
CHLORIDE BLD-SCNC: 101 MMOL/L (ref 94–109)
CO2 SERPL-SCNC: 30 MMOL/L (ref 20–32)
CREAT SERPL-MCNC: 2.17 MG/DL (ref 0.52–1.04)
GFR SERPL CREATININE-BSD FRML MDRD: 21 ML/MIN/1.73M2
GLUCOSE BLD-MCNC: 131 MG/DL (ref 70–99)
HBV CORE AB SERPL QL IA: NONREACTIVE
HBV SURFACE AB SERPL IA-ACNC: 0 M[IU]/ML
HBV SURFACE AB SERPL IA-ACNC: NONREACTIVE M[IU]/ML
HBV SURFACE AG SERPL QL IA: NONREACTIVE
HCV AB SERPL QL IA: NONREACTIVE
HIV 1+2 AB+HIV1 P24 AG SERPL QL IA: NONREACTIVE
POTASSIUM BLD-SCNC: 3.7 MMOL/L (ref 3.4–5.3)
PROT SERPL-MCNC: 7.5 G/DL (ref 6.8–8.8)
SODIUM SERPL-SCNC: 139 MMOL/L (ref 133–144)

## 2022-11-11 NOTE — TELEPHONE ENCOUNTER
Prior Authorization Approval    Authorization Effective Date: 10/12/2022  Authorization Expiration Date: 5/10/2023  Medication: Humira PA Approved  Approved Dose/Quantity: 3 pens per 28 days for starter pack  Reference #: [id number]   Insurance Company: [employer] - Phone [phone] Fax [phone]  Expected CoPay:       CoPay Card Available:      Foundation Assistance Needed:    Which Pharmacy is filling the prescription (Not needed for infusion/clinic administered):    Pharmacy Notified:    Patient Notified:  Yes        Marta Correa [facility] Liaison  [email or web address]  Phone: [phone]  Fax: [phone]

## 2022-11-12 LAB
GAMMA INTERFERON BACKGROUND BLD IA-ACNC: 0.05 IU/ML
M TB IFN-G BLD-IMP: NEGATIVE
M TB IFN-G CD4+ BCKGRND COR BLD-ACNC: 5.93 IU/ML
MITOGEN IGNF BCKGRD COR BLD-ACNC: -0.02 IU/ML
MITOGEN IGNF BCKGRD COR BLD-ACNC: 0 IU/ML
QUANTIFERON MITOGEN: 5.98 IU/ML
QUANTIFERON NIL TUBE: 0.05 IU/ML
QUANTIFERON TB1 TUBE: 0.05 IU/ML
QUANTIFERON TB2 TUBE: 0.03

## 2022-11-13 DIAGNOSIS — M1A.0790 IDIOPATHIC CHRONIC GOUT OF FOOT WITHOUT TOPHUS, UNSPECIFIED LATERALITY: ICD-10-CM

## 2022-11-13 DIAGNOSIS — I10 HYPERTENSION GOAL BP (BLOOD PRESSURE) < 140/90: ICD-10-CM

## 2022-11-13 DIAGNOSIS — E03.8 OTHER SPECIFIED HYPOTHYROIDISM: ICD-10-CM

## 2022-11-14 RX ORDER — ALLOPURINOL 100 MG/1
TABLET ORAL
Qty: 180 TABLET | Refills: 2 | Status: SHIPPED | OUTPATIENT
Start: 2022-11-14 | End: 2023-05-22

## 2022-11-14 RX ORDER — FUROSEMIDE 20 MG
TABLET ORAL
Qty: 90 TABLET | Refills: 1 | Status: SHIPPED | OUTPATIENT
Start: 2022-11-14 | End: 2023-05-12

## 2022-11-14 RX ORDER — LEVOTHYROXINE SODIUM 100 UG/1
100 TABLET ORAL DAILY
Qty: 90 TABLET | Refills: 1 | Status: SHIPPED | OUTPATIENT
Start: 2022-11-14 | End: 2023-03-08

## 2022-11-18 DIAGNOSIS — N18.4 CKD (CHRONIC KIDNEY DISEASE) STAGE 4, GFR 15-29 ML/MIN (H): Primary | ICD-10-CM

## 2022-11-30 ENCOUNTER — PATIENT OUTREACH (OUTPATIENT)
Dept: NEPHROLOGY | Facility: CLINIC | Age: 87
End: 2022-11-30

## 2022-11-30 NOTE — PATIENT INSTRUCTIONS
Patient called in regards to patient outreach. Left VM reminding patient of her phone visit with Dr Simms 12/5/22 and to call or MyChart message with any questions or concerns.  RAUL Knutson

## 2022-12-05 ENCOUNTER — VIRTUAL VISIT (OUTPATIENT)
Dept: NEPHROLOGY | Facility: CLINIC | Age: 87
End: 2022-12-05
Payer: COMMERCIAL

## 2022-12-05 DIAGNOSIS — I10 HYPERTENSION GOAL BP (BLOOD PRESSURE) < 140/90: ICD-10-CM

## 2022-12-05 DIAGNOSIS — N28.89 LEFT RENAL MASS: ICD-10-CM

## 2022-12-05 DIAGNOSIS — N20.0 KIDNEY STONES: ICD-10-CM

## 2022-12-05 DIAGNOSIS — N18.4 CKD (CHRONIC KIDNEY DISEASE) STAGE 4, GFR 15-29 ML/MIN (H): Primary | ICD-10-CM

## 2022-12-05 DIAGNOSIS — C34.91 NON-SMALL CELL CANCER OF RIGHT LUNG (H): ICD-10-CM

## 2022-12-05 PROCEDURE — 99214 OFFICE O/P EST MOD 30 MIN: CPT | Mod: 95 | Performed by: INTERNAL MEDICINE

## 2022-12-05 NOTE — PROGRESS NOTES
Helena is a 87 year old who is being evaluated via a billable telephone visit.      What phone number would you like to be contacted at? [phone]    How would you like to obtain your AVS? Mail a copy    Phone call duration: 15 minutes      Assessment and Plan:  87 year old female with history of CKD, Scr 1.8 with eGFR near 25ml/min in recent years, history of lung cancer s/p chemotherapy, kidney cancer s/p left partial resection, kidney stones and infections s/p ESWL and stent in 7/2020, small left kidney, who presents for followup of CKD and proteinuria.  # CKD stage 4- Scr 2.1 on recent checks, her Scr has been near 1.8 with eGFR near 23-27 ml/min over the last few years, got down to 18 a couple of times, most recent is 25ml/min.  previous to that her eGFR was near 35ml/min and back in early 2000s she had eGFR near 40-45ml/min  She has been through many things that have caused CKD including cancer, chemotherapy with carboplatin. Last Scr 1.8, eGFR 25ml/min  She had 1 gram of proteinuria which puts her at a higher risk for progression- it has improved, albuminuria was under 100mg on last check- check both albumin and protein random urine on next check   - check labs in August and see her in December 2022  - BP fairly controlled based on home readings- she has a wrist cuff.  # Hypertension: BP in the 130s on recent check. Aim toward 130/80 if tolerated.   Continue furosemide 20mg    # Kidney stones: has calcium oxalate stone on stone analysis; discussed low sodium, fluid intake of 2.5 -3 liters   - she is hydrating better overall, her sons check up on her about this.    # Anemia in chronic renal disease:   - Hgb: Stable, low normal, 11-11.5  - Iron studies: adequate, 39% tsat    # Electrolytes:   - Potassium; level: Normal  - bicarbonate: Normal  # Mineral Bone Disorder:   - Calcium; level is:  Normal   - phos normal, PTH mildly elevated, vit D adequate    Assessment and plan was discussed with patient and she  voiced her understanding and agreement.    RTC 6 months    Reason for Visit:  Helena Eldridge is a 87 year old female with CKD from renal and lung cancer, who presents for followup    HPI:  She is a pleasant female with multiple comorbidities including history of lung cancer s/p chemotherapy, renal cell carcinoma s/p partial left kidney resection, COPD, who presents for followup of CKD She has been aware of kidney disease and her creatinine has been stable near 1.8-2 mg/dL over the last few years. Recently her Scr was up to 2.3, eGFR decreased to 17ml/min, but recently back and eGFR 23ml/min  Her left kidney was noted to be very small on CT imaging going back to 2015.  Over the years, her eGFR was 35-45 ml/min from 2011 to 2017, but in the last couple of years, her eGFR was 25-28ml/min.  She has undergone treatment for her cancers and doing fairly well at this time.  Her lung cancer (nonsmall cell lung cancer T1N1) was treated with carboplatin/ permetrexeb 3343-1499 (6 cycles) and then nivolumab for a year until it was stopped due to itching.  She denies shortness of breath or chest pain. She has some fatigue with significant exertion.  She denies any skin rash, GI or  issues at this time.  She denies family history of renal failure or dialysis.    She has been doing well since last visit. 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  She has a lot of great nephews and nieces and stays busy with them. She has 3 sons  She is getting her steps in, at least 2000 a day, she counts   She has some psoriasis and this has gotten worse, and she was offered Humira for this, but she is much better using a shampoo and wants to stick with that for now  Yesterday she broke a record for number of steps >8000 steps  She made a boogie for her neighbor and took it to her, and walked to her [employer].  She generally gets 5426-2225 steps daily  Her  weight is stable at 130 and 131/83, 121/79 131/73, 113/72, 142/91  [place]   She is drinking a lot of water and ice.     Renal History:   Kidney Disease and Medical Hx:  h/o HTN: Yes  , h/o Protein in Urine: Yes  and h/o Kidney Stones:Yes     ROS:   A comprehensive review of systems was obtained and negative, except as noted in the HPI or PMH.    Active Medical Problems:  Patient Active Problem List   Diagnosis     CKD (chronic kidney disease) stage 4, GFR 15-29 ml/min (H)     Glucose intolerance (impaired glucose tolerance)     Kidney stones     Advance care planning     Hypertension goal BP (blood pressure) < 140/90     Lichen sclerosus et atrophicus of the vulva     Solitary kidney, acquired     Senile osteoporosis     Osteoarthritis of right knee     Medial meniscus tear     Cataracts, both eyes     Macular degeneration of both eyes, right eye greater than left eye     COPD, severe (H)     IPF (idiopathic pulmonary fibrosis) (H)     Other specified hypothyroidism     Idiopathic chronic gout     Adjustment disorder with anxiety     Non-small cell cancer of right lung (H)     Psoriasis     PMH:   Medical record was reviewed and PMH was discussed with patient and noted below.  Past Medical History:   Diagnosis Date     Carpal tunnel syndrome      CKD (chronic kidney disease)     kidney stone with infection     CKD (chronic kidney disease) stage 4, GFR 15-29 ml/min (H) 12/2/2010    History of kidney stone with infection.  Creatinine 1.3 - 1.6 (12/2/10, [facility])   -- she can't take ACE / ARB due to side effects.  Will continue with good BP control (12/3/15, [facility])      COPD, severe (H) 2/16/2015    Seen by pulmonary 2013.        Deep vein thrombosis (DVT) (H)     1972     DVT [id number]     Glucose intolerance      H/O partial nephrectomy      Heel spur      Hypertension goal BP (blood pressure) < 140/90 11/28/2011     Hypothyroidism      Idiopathic chronic gout 5/14/2015      Immunosuppression (H) 3/7/2018     IPF (idiopathic pulmonary fibrosis) (H) 2015    Mild, at bases per CXR 2015 (2/15/15, [facility])      Kidney stones      Lichen sclerosus      Lichen sclerosus et atrophicus of the vulva 2013    Clobetasol bid is the recommended remedy     Macular degeneration      Non-small cell cancer of right lung (H) 3/7/2018     Nonsenile cataract      Osteoarthritis      Osteoarthritis of right knee 3/11/2014     Osteoporosis      Other specified hypothyroidism 2015     PID      Pulmonary embolism (H)     1970     Senile osteoporosis 3/11/2014     Unspecified hypothyroidism      Vitiligo      PSH:   Past Surgical History:   Procedure Laterality Date     EXTRACORPOREAL SHOCK WAVE LITHOTRIPSY, CYSTOSCOPY, INSERT STENT URETER(S), COMBINED Right 2020    Procedure: LITHOTRIPSY, Right EXTRACORPOREAL SHOCK WAVE (ESWL), WITH CYSTOSCOPY AND Right URETERAL STENT INSERTION;  Surgeon: Buddy Ramirez MD;  Location: [facility] OR     HC REMOVAL GALLBLADDER       HC REVISE MEDIAN N/CARPAL TUNNEL SURG       LASER HOLMIUM LITHOTRIPSY URETER(S), INSERT STENT, COMBINED Right 2020    Procedure: RIGHT CYSTOURETEROSCOPY, WITH LITHOTRIPSY USING LASER AND URETERAL STENT EXCHANGE;  Surgeon: Buddy Ramirez MD;  Location: [facility] OR     TUBAL LIGATION       ZZC NEPHRECTOMY      left partial 07     ZZC VENOUS THROMBOSIS IMAGING      with pe       Family Hx:   Family History   Problem Relation Age of Onset     Cancer Brother      Glaucoma Mother      Alzheimer Disease Brother      Macular Degeneration No family hx of      Personal Hx:   Social History     Tobacco Use     Smoking status: Former     Types: Cigarettes     Quit date: 1969     Years since quittin.0     Smokeless tobacco: Never   Substance Use Topics     Alcohol use: Yes     Alcohol/week: 0.0 standard drinks     Comment: very little        Allergies:  Allergies   Allergen Reactions     Ace Inhibitors Cough     Advicor       Alendronic Acid Other (See Comments)     Other reaction(s): GI Upset  heartburn  Severe substernal burning and dysphagia within 3 days       Blood-Group Specific Substance Other (See Comments)     Patient has Anti-Shania and warm auto antibody. Blood products may be delayed. Draw patient 24 hours prior to transfusion. Draw one red top and two purple top tubes for all type and screen orders.     Citalopram GI Disturbance and Nausea     diarrhea  diarrhea     Doxycycline Nausea     Poor appetite     Fosamax      Severe substernal burning and dysphagia within 3 days     Valsartan Cramps and Other (See Comments)     severe  severe       Medications:  Current Outpatient Medications   Medication Sig     allopurinol (ZYLOPRIM) 100 MG tablet TAKE 2 TABLETS BY MOUTH EVERY DAY     Calcium Carbonate-Vitamin D (CALCIUM + D) 600-200 MG-UNIT per tablet Take 2 tablets by mouth 2 times daily.     clobetasol (TEMOVATE) 0.05 % external ointment APPLY TOPICALLY TO GENITALS TWICE DAILY AS DIRECTED     clotrimazole-betamethasone (LOTRISONE) 1-0.05 % external cream APPLY TOPICALLY TO THE AFFECTED AREA TWICE DAILY     diphenhydrAMINE (BENADRYL) 25 MG tablet Take 25 mg by mouth At Bedtime      DOCUSATE SODIUM PO Take 100 mg by mouth At Bedtime     Emollient (CERAVE DAILY MOISTURIZING EX)      famotidine (PEPCID) 20 MG tablet TAKE 1 TABLET(20 MG) BY MOUTH TWICE DAILY     fluocinonide (LIDEX) 0.05 % external solution Apply topically 2 times daily To scalp rash when flared only.     furosemide (LASIX) 20 MG tablet TAKE 1 TABLET(20 MG) BY MOUTH DAILY     hydrocortisone 2.5 % cream Apply topically 2 times daily To rash in groin/buttock/under breasts, When rash is flared only then stop     ketoconazole (NIZORAL) 2 % external shampoo Use every 1-2 days when flared. Leave in few minutes before rinsing. Use twice weekly to prevent flares.     levothyroxine (SYNTHROID/LEVOTHROID) 100 MCG tablet Take 1 tablet (100 mcg) by mouth daily     LORazepam  (ATIVAN) 0.5 MG tablet Take 1 tablet (0.5 mg) by mouth nightly as needed for anxiety or sleep     Multiple Vitamins-Minerals (OCUVITE ADULT 50+) CAPS Take 1 capsule by mouth daily     nystatin (MYCOSTATIN) 540179 UNIT/GM external ointment APPLY TOPICALLY TO THE RASH UNDER THE BREAST AND CREASES OF GROIN TWICE DAILY AS DIRECTED     nystatin-triamcinolone (MYCOLOG) 973039-7.1 UNIT/GM-% external ointment APPLY TOPICALLY TO THE AFFECTED AREA TWICE DAILY     ondansetron (ZOFRAN-ODT) 4 MG ODT tab Take 1 tablet (4 mg) by mouth every 8 hours as needed for nausea     rOPINIRole (REQUIP) 0.25 MG tablet TAKE 1 TO 2 TABLETS(0.25 TO 0.5 MG) BY MOUTH EVERY NIGHT AS NEEDED FOR RESTLESS LEGS     adalimumab (HUMIRA *CF*) 40 MG/0.4ML pen kit Inject 0.4 mLs (40 mg) Subcutaneous every 14 days     adalimumab (HUMIRA *CF*) 80 MG/0.8ML pen kit Inject 0.8 mL (80 mg) subcutaneous for 1 dose, followed by 40 mg every other week beginning 1 week after initial dose (see separate order)     ammonium lactate (AMLACTIN) 12 % external cream Apply topically 2 times daily     LANsoprazole (PREVACID) 30 MG DR capsule Take 1 capsule (30 mg) by mouth daily     STATIN NOT PRESCRIBED, INTENTIONAL, by Other route continuous prn. Patient declined 5/4/12     No current facility-administered medications for this visit.     Facility-Administered Medications Ordered in Other Visits   Medication     perflutren diluted in saline (DEFINITY) injection 5 mL      Vitals:  There were no vitals taken for this visit.    Exam:  General: alert, oriented, conversant  Speaks in full sentences without audible dyspnea or wheeze  Neuro: normal speech  Psych: conversant, normal affect and thought process      LABS:   CMP  Recent Labs   Lab Test 11/10/22  1416 08/18/22  1211 03/29/22  1045 12/13/21  1431 06/21/21  1251 03/08/21  1325 12/03/20  0912 11/10/20  0930    139 136 135 135 138 141 139   POTASSIUM 3.7 4.4 4.4 4.2 4.1 4.1 4.4 3.6   CHLORIDE 101 104 101 102 100 105  107 104   CO2 30 30 31 24 30 28 31 30   ANIONGAP 8 5 4 9 5 5 3 5   * 108* 113* 122* 96 98 106* 125*   BUN 25 22 19 30 29 27 22 42*   CR 2.17* 2.19* 1.92* 2.47* 2.11* 1.85* 1.92* 2.48*   GFRESTIMATED 21* 21* 25* 17* 21* 24* 23* 17*   GFRESTBLACK  --   --   --   --  24* 28* 27* 20*   SERAFIN 8.8 9.0 9.3 8.7 8.9 9.3 9.5 8.9     Recent Labs   Lab Test 11/10/22  1416 07/20/20  1354 07/05/20  0000   BILITOTAL 0.3  --   --    ALKPHOS 91  --   --    ALT 20 16 13   AST 18  --  17     CBC  Recent Labs   Lab Test 11/10/22  1416 08/18/22  1211 12/13/21  1431 06/21/21  1251 07/20/20  1354   HGB 11.3* 10.9* 11.1* 11.8 10.7*   WBC 7.9 7.3  --  7.8 8.5   RBC 3.24* 3.07*  --  3.52* 3.21*   HCT 35.4 33.1*  --  35.4 32.1*   * 108*  --  101* 100   MCH 34.9* 35.5*  --  33.5* 33.3*   MCHC 31.9 32.9  --  33.3 33.3   RDW 14.4 14.1  --  13.6 12.8    290  --  300 292     URINE STUDIES  Recent Labs   Lab Test 08/18/22  1211 11/29/21  1040 11/10/20  0944 11/03/20  0932 10/19/20  1050   COLOR Yellow Yellow Yellow Yellow Yellow   APPEARANCE Slightly Cloudy* Clear Clear Cloudy Clear   URINEGLC Negative Negative Negative Negative Negative   URINEBILI Negative Negative Negative Negative Negative   URINEKETONE Negative Negative Negative Negative Negative   SG <=1.005 1.010 1.025 1.020 1.020   UBLD Negative Negative Negative Negative Negative   URINEPH 6.5 5.5 5.5 5.5 6.0   PROTEIN Negative Negative Negative Negative Negative   UROBILINOGEN 0.2 0.2 0.2 0.2 0.2   NITRITE Negative Negative Negative Negative Negative   LEUKEST Small* Negative Negative Small* Trace*   RBCU 2-5*  --  O - 2 O - 2 O - 2   WBCU 5-10*  --  0 - 5 5-10* 10-25*     No lab results found.  PTH  Recent Labs   Lab Test 12/13/21  1431 06/21/21  1251 03/08/21  1325   PTHI 119* 106* 65     IRON STUDIES  Recent Labs   Lab Test 06/21/21  1251   IRON 94      IRONSAT 39   JOSE RAMON 375*       TECHNIQUE: Scans were obtained from the diaphragm through the pelvis  without IV  contrast. Radiation dose for this scan was reduced using  automated exposure control, adjustment of the mA and/or kV according  to patient size, or iterative reconstruction technique.     COMPARISON: 5/19/2015.     FINDINGS: Coronary calcifications. Linear atelectasis or fibrosis left  lung base. Liver, spleen, and pancreas are normal. Previous  cholecystectomy with prominence to the common bile duct again noted  and unchanged. Atrophic changes again noted in the left kidney which  are unchanged from 5/19/2015. 2 small calculi again noted in the left  kidney. Left kidney is otherwise unchanged. Right kidney is normal in  size with small calculi again noted in the lower portion of the right  kidney which are unchanged. Right kidney and ureter are otherwise  normal. Diverticula in the colon without evidence of diverticulitis.  Colon and small bowel are otherwise normal. Calcification of the  abdominal aorta and iliac arteries. No abdominal or pelvic adenopathy.  Remainder of the scan is negative and unchanged from 5/19/2015.                                                                      IMPRESSION:   1. Bilateral nonobstructing nephrolithiasis which is unchanged from  5/19/2015. Atrophic left kidney again noted.  2. Remainder of the scan is unchanged.  Nguyen Simms MD

## 2022-12-05 NOTE — LETTER
12/5/2022       RE: Helena Eldridge  [address] [facility] [address]     Dear Colleague,    Thank you for referring your patient, Helena Eldridge, to the [facility] at [facility]. Please see a copy of my visit note below.    Helena is a 87 year old who is being evaluated via a billable telephone visit.      What phone number would you like to be contacted at? [phone]    How would you like to obtain your AVS? Mail a copy    Phone call duration: 15 minutes      Assessment and Plan:  87 year old female with history of CKD, Scr 1.8 with eGFR near 25ml/min in recent years, history of lung cancer s/p chemotherapy, kidney cancer s/p left partial resection, kidney stones and infections s/p ESWL and stent in 7/2020, small left kidney, who presents for followup of CKD and proteinuria.  # CKD stage 4- Scr 2.1 on recent checks, her Scr has been near 1.8 with eGFR near 23-27 ml/min over the last few years, got down to 18 a couple of times, most recent is 25ml/min.  previous to that her eGFR was near 35ml/min and back in early 2000s she had eGFR near 40-45ml/min  She has been through many things that have caused CKD including cancer, chemotherapy with carboplatin. Last Scr 1.8, eGFR 25ml/min  She had 1 gram of proteinuria which puts her at a higher risk for progression- it has improved, albuminuria was under 100mg on last check- check both albumin and protein random urine on next check   - check labs in August and see her in December 2022  - BP fairly controlled based on home readings- she has a wrist cuff.  # Hypertension: BP in the 130s on recent check. Aim toward 130/80 if tolerated.   Continue furosemide 20mg    # Kidney stones: has calcium oxalate stone on stone analysis; discussed low sodium, fluid intake of 2.5 -3 liters   - she is hydrating better overall, her sons check up on her about this.    # Anemia in chronic renal disease:   -  Hgb: Stable, low normal, 11-11.5  - Iron studies: adequate, 39% tsat    # Electrolytes:   - Potassium; level: Normal  - bicarbonate: Normal  # Mineral Bone Disorder:   - Calcium; level is:  Normal   - phos normal, PTH mildly elevated, vit D adequate    Assessment and plan was discussed with patient and she voiced her understanding and agreement.    RTC 6 months    Reason for Visit:  Helena Eldridge is a 87 year old female with CKD from renal and lung cancer, who presents for followup    HPI:  She is a pleasant female with multiple comorbidities including history of lung cancer s/p chemotherapy, renal cell carcinoma s/p partial left kidney resection, COPD, who presents for followup of CKD She has been aware of kidney disease and her creatinine has been stable near 1.8-2 mg/dL over the last few years. Recently her Scr was up to 2.3, eGFR decreased to 17ml/min, but recently back and eGFR 23ml/min  Her left kidney was noted to be very small on CT imaging going back to 2015.  Over the years, her eGFR was 35-45 ml/min from 2011 to 2017, but in the last couple of years, her eGFR was 25-28ml/min.  She has undergone treatment for her cancers and doing fairly well at this time.  Her lung cancer (nonsmall cell lung cancer T1N1) was treated with carboplatin/ permetrexeb 2223-3141 (6 cycles) and then nivolumab for a year until it was stopped due to itching.  She denies shortness of breath or chest pain. She has some fatigue with significant exertion.  She denies any skin rash, GI or  issues at this time.  She denies family history of renal failure or dialysis.    She has been doing well since last visit. 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  She has a lot of great nephews and nieces and stays busy with them. She has 3 sons  She is getting her steps in, at least 2000 a day, she counts   She has some psoriasis and this has gotten worse,  and she was offered Humira for this, but she is much better using a shampoo and wants to stick with that for now  Yesterday she broke a record for number of steps >8000 steps  She made a boogie for her neighbor and took it to her, and walked to her [employer].  She generally gets 2713-9121 steps daily  Her weight is stable at 130 and 131/83, 121/79 131/73, 113/72, 142/91  [place]   She is drinking a lot of water and ice.     Renal History:   Kidney Disease and Medical Hx:  h/o HTN: Yes  , h/o Protein in Urine: Yes  and h/o Kidney Stones:Yes     ROS:   A comprehensive review of systems was obtained and negative, except as noted in the HPI or PMH.    Active Medical Problems:  Patient Active Problem List   Diagnosis     CKD (chronic kidney disease) stage 4, GFR 15-29 ml/min (H)     Glucose intolerance (impaired glucose tolerance)     Kidney stones     Advance care planning     Hypertension goal BP (blood pressure) < 140/90     Lichen sclerosus et atrophicus of the vulva     Solitary kidney, acquired     Senile osteoporosis     Osteoarthritis of right knee     Medial meniscus tear     Cataracts, both eyes     Macular degeneration of both eyes, right eye greater than left eye     COPD, severe (H)     IPF (idiopathic pulmonary fibrosis) (H)     Other specified hypothyroidism     Idiopathic chronic gout     Adjustment disorder with anxiety     Non-small cell cancer of right lung (H)     Psoriasis     PMH:   Medical record was reviewed and PMH was discussed with patient and noted below.  Past Medical History:   Diagnosis Date     Carpal tunnel syndrome      CKD (chronic kidney disease)     kidney stone with infection     CKD (chronic kidney disease) stage 4, GFR 15-29 ml/min (H) 12/2/2010    History of kidney stone with infection.  Creatinine 1.3 - 1.6 (12/2/10, [facility])   -- she can't take ACE / ARB due to side effects.  Will continue with good BP control (12/3/15, [facility])      COPD, severe (H)  2/16/2015    Seen by pulmonary 2013.        Deep vein thrombosis (DVT) (H)     1972     DVT [id number]     Glucose intolerance      H/O partial nephrectomy      Heel spur      Hypertension goal BP (blood pressure) < 140/90 11/28/2011     Hypothyroidism      Idiopathic chronic gout 5/14/2015     Immunosuppression (H) 3/7/2018     IPF (idiopathic pulmonary fibrosis) (H) 2/25/2015    Mild, at bases per CXR 2/2015 (2/15/15, [facility])      Kidney stones      Lichen sclerosus      Lichen sclerosus et atrophicus of the vulva 5/8/2013    Clobetasol bid is the recommended remedy     Macular degeneration      Non-small cell cancer of right lung (H) 3/7/2018     Nonsenile cataract      Osteoarthritis      Osteoarthritis of right knee 3/11/2014     Osteoporosis      Other specified hypothyroidism 5/14/2015     PID      Pulmonary embolism (H)     1970     Senile osteoporosis 3/11/2014     Unspecified hypothyroidism      Vitiligo      PSH:   Past Surgical History:   Procedure Laterality Date     EXTRACORPOREAL SHOCK WAVE LITHOTRIPSY, CYSTOSCOPY, INSERT STENT URETER(S), COMBINED Right 7/13/2020    Procedure: LITHOTRIPSY, Right EXTRACORPOREAL SHOCK WAVE (ESWL), WITH CYSTOSCOPY AND Right URETERAL STENT INSERTION;  Surgeon: Buddy Ramirez MD;  Location: [facility] OR     HC REMOVAL GALLBLADDER       HC REVISE MEDIAN N/CARPAL TUNNEL SURG       LASER HOLMIUM LITHOTRIPSY URETER(S), INSERT STENT, COMBINED Right 7/28/2020    Procedure: RIGHT CYSTOURETEROSCOPY, WITH LITHOTRIPSY USING LASER AND URETERAL STENT EXCHANGE;  Surgeon: Buddy Ramirez MD;  Location: [facility] OR     TUBAL LIGATION       ZZC NEPHRECTOMY      left partial 07-01-07     ZC VENOUS THROMBOSIS IMAGING      with pe       Family Hx:   Family History   Problem Relation Age of Onset     Cancer Brother      Glaucoma Mother      Alzheimer Disease Brother      Macular Degeneration No family hx of      Personal Hx:   Social History     Tobacco Use     Smoking status: Former     Types:  Cigarettes     Quit date: 1969     Years since quittin.0     Smokeless tobacco: Never   Substance Use Topics     Alcohol use: Yes     Alcohol/week: 0.0 standard drinks     Comment: very little        Allergies:  Allergies   Allergen Reactions     Ace Inhibitors Cough     Advicor      Alendronic Acid Other (See Comments)     Other reaction(s): GI Upset  heartburn  Severe substernal burning and dysphagia within 3 days       Blood-Group Specific Substance Other (See Comments)     Patient has Anti-Westerlo and warm auto antibody. Blood products may be delayed. Draw patient 24 hours prior to transfusion. Draw one red top and two purple top tubes for all type and screen orders.     Citalopram GI Disturbance and Nausea     diarrhea  diarrhea     Doxycycline Nausea     Poor appetite     Fosamax      Severe substernal burning and dysphagia within 3 days     Valsartan Cramps and Other (See Comments)     severe  severe       Medications:  Current Outpatient Medications   Medication Sig     allopurinol (ZYLOPRIM) 100 MG tablet TAKE 2 TABLETS BY MOUTH EVERY DAY     Calcium Carbonate-Vitamin D (CALCIUM + D) 600-200 MG-UNIT per tablet Take 2 tablets by mouth 2 times daily.     clobetasol (TEMOVATE) 0.05 % external ointment APPLY TOPICALLY TO GENITALS TWICE DAILY AS DIRECTED     clotrimazole-betamethasone (LOTRISONE) 1-0.05 % external cream APPLY TOPICALLY TO THE AFFECTED AREA TWICE DAILY     diphenhydrAMINE (BENADRYL) 25 MG tablet Take 25 mg by mouth At Bedtime      DOCUSATE SODIUM PO Take 100 mg by mouth At Bedtime     Emollient (CERAVE DAILY MOISTURIZING EX)      famotidine (PEPCID) 20 MG tablet TAKE 1 TABLET(20 MG) BY MOUTH TWICE DAILY     fluocinonide (LIDEX) 0.05 % external solution Apply topically 2 times daily To scalp rash when flared only.     furosemide (LASIX) 20 MG tablet TAKE 1 TABLET(20 MG) BY MOUTH DAILY     hydrocortisone 2.5 % cream Apply topically 2 times daily To rash in groin/buttock/under breasts, When  rash is flared only then stop     ketoconazole (NIZORAL) 2 % external shampoo Use every 1-2 days when flared. Leave in few minutes before rinsing. Use twice weekly to prevent flares.     levothyroxine (SYNTHROID/LEVOTHROID) 100 MCG tablet Take 1 tablet (100 mcg) by mouth daily     LORazepam (ATIVAN) 0.5 MG tablet Take 1 tablet (0.5 mg) by mouth nightly as needed for anxiety or sleep     Multiple Vitamins-Minerals (OCUVITE ADULT 50+) CAPS Take 1 capsule by mouth daily     nystatin (MYCOSTATIN) 870305 UNIT/GM external ointment APPLY TOPICALLY TO THE RASH UNDER THE BREAST AND CREASES OF GROIN TWICE DAILY AS DIRECTED     nystatin-triamcinolone (MYCOLOG) 971575-9.1 UNIT/GM-% external ointment APPLY TOPICALLY TO THE AFFECTED AREA TWICE DAILY     ondansetron (ZOFRAN-ODT) 4 MG ODT tab Take 1 tablet (4 mg) by mouth every 8 hours as needed for nausea     rOPINIRole (REQUIP) 0.25 MG tablet TAKE 1 TO 2 TABLETS(0.25 TO 0.5 MG) BY MOUTH EVERY NIGHT AS NEEDED FOR RESTLESS LEGS     adalimumab (HUMIRA *CF*) 40 MG/0.4ML pen kit Inject 0.4 mLs (40 mg) Subcutaneous every 14 days     adalimumab (HUMIRA *CF*) 80 MG/0.8ML pen kit Inject 0.8 mL (80 mg) subcutaneous for 1 dose, followed by 40 mg every other week beginning 1 week after initial dose (see separate order)     ammonium lactate (AMLACTIN) 12 % external cream Apply topically 2 times daily     LANsoprazole (PREVACID) 30 MG DR capsule Take 1 capsule (30 mg) by mouth daily     STATIN NOT PRESCRIBED, INTENTIONAL, by Other route continuous prn. Patient declined 5/4/12     No current facility-administered medications for this visit.     Facility-Administered Medications Ordered in Other Visits   Medication     perflutren diluted in saline (DEFINITY) injection 5 mL      Vitals:  There were no vitals taken for this visit.    Exam:  General: alert, oriented, conversant  Speaks in full sentences without audible dyspnea or wheeze  Neuro: normal speech  Psych: conversant, normal affect and  thought process      LABS:   CMP  Recent Labs   Lab Test 11/10/22  1416 08/18/22  1211 03/29/22  1045 12/13/21  1431 06/21/21  1251 03/08/21  1325 12/03/20  0912 11/10/20  0930    139 136 135 135 138 141 139   POTASSIUM 3.7 4.4 4.4 4.2 4.1 4.1 4.4 3.6   CHLORIDE 101 104 101 102 100 105 107 104   CO2 30 30 31 24 30 28 31 30   ANIONGAP 8 5 4 9 5 5 3 5   * 108* 113* 122* 96 98 106* 125*   BUN 25 22 19 30 29 27 22 42*   CR 2.17* 2.19* 1.92* 2.47* 2.11* 1.85* 1.92* 2.48*   GFRESTIMATED 21* 21* 25* 17* 21* 24* 23* 17*   GFRESTBLACK  --   --   --   --  24* 28* 27* 20*   SERAFIN 8.8 9.0 9.3 8.7 8.9 9.3 9.5 8.9     Recent Labs   Lab Test 11/10/22  1416 07/20/20  1354 07/05/20  0000   BILITOTAL 0.3  --   --    ALKPHOS 91  --   --    ALT 20 16 13   AST 18  --  17     CBC  Recent Labs   Lab Test 11/10/22  1416 08/18/22  1211 12/13/21  1431 06/21/21  1251 07/20/20  1354   HGB 11.3* 10.9* 11.1* 11.8 10.7*   WBC 7.9 7.3  --  7.8 8.5   RBC 3.24* 3.07*  --  3.52* 3.21*   HCT 35.4 33.1*  --  35.4 32.1*   * 108*  --  101* 100   MCH 34.9* 35.5*  --  33.5* 33.3*   MCHC 31.9 32.9  --  33.3 33.3   RDW 14.4 14.1  --  13.6 12.8    290  --  300 292     URINE STUDIES  Recent Labs   Lab Test 08/18/22  1211 11/29/21  1040 11/10/20  0944 11/03/20  0932 10/19/20  1050   COLOR Yellow Yellow Yellow Yellow Yellow   APPEARANCE Slightly Cloudy* Clear Clear Cloudy Clear   URINEGLC Negative Negative Negative Negative Negative   URINEBILI Negative Negative Negative Negative Negative   URINEKETONE Negative Negative Negative Negative Negative   SG <=1.005 1.010 1.025 1.020 1.020   UBLD Negative Negative Negative Negative Negative   URINEPH 6.5 5.5 5.5 5.5 6.0   PROTEIN Negative Negative Negative Negative Negative   UROBILINOGEN 0.2 0.2 0.2 0.2 0.2   NITRITE Negative Negative Negative Negative Negative   LEUKEST Small* Negative Negative Small* Trace*   RBCU 2-5*  --  O - 2 O - 2 O - 2   WBCU 5-10*  --  0 - 5 5-10* 10-25*     No lab  results found.  PTH  Recent Labs   Lab Test 12/13/21  1431 06/21/21  1251 03/08/21  1325   PTHI 119* 106* 65     IRON STUDIES  Recent Labs   Lab Test 06/21/21  1251   IRON 94      IRONSAT 39   JOSE RAMON 375*       TECHNIQUE: Scans were obtained from the diaphragm through the pelvis  without IV contrast. Radiation dose for this scan was reduced using  automated exposure control, adjustment of the mA and/or kV according  to patient size, or iterative reconstruction technique.     COMPARISON: 5/19/2015.     FINDINGS: Coronary calcifications. Linear atelectasis or fibrosis left  lung base. Liver, spleen, and pancreas are normal. Previous  cholecystectomy with prominence to the common bile duct again noted  and unchanged. Atrophic changes again noted in the left kidney which  are unchanged from 5/19/2015. 2 small calculi again noted in the left  kidney. Left kidney is otherwise unchanged. Right kidney is normal in  size with small calculi again noted in the lower portion of the right  kidney which are unchanged. Right kidney and ureter are otherwise  normal. Diverticula in the colon without evidence of diverticulitis.  Colon and small bowel are otherwise normal. Calcification of the  abdominal aorta and iliac arteries. No abdominal or pelvic adenopathy.  Remainder of the scan is negative and unchanged from 5/19/2015.                                                                      IMPRESSION:   1. Bilateral nonobstructing nephrolithiasis which is unchanged from  5/19/2015. Atrophic left kidney again noted.  2. Remainder of the scan is unchanged.  Nguyen Simms MD

## 2022-12-08 ENCOUNTER — OFFICE VISIT (OUTPATIENT)
Dept: DERMATOLOGY | Facility: CLINIC | Age: 87
End: 2022-12-08
Payer: COMMERCIAL

## 2022-12-08 DIAGNOSIS — L30.4 INTERTRIGO: Primary | ICD-10-CM

## 2022-12-08 DIAGNOSIS — L40.9 PSORIASIS: ICD-10-CM

## 2022-12-08 PROCEDURE — 11900 INJECT SKIN LESIONS </W 7: CPT | Performed by: NURSE PRACTITIONER

## 2022-12-08 PROCEDURE — 99214 OFFICE O/P EST MOD 30 MIN: CPT | Mod: 25 | Performed by: NURSE PRACTITIONER

## 2022-12-08 RX ORDER — KETOCONAZOLE 20 MG/G
CREAM TOPICAL DAILY
Qty: 60 G | Refills: 11 | Status: SHIPPED | OUTPATIENT
Start: 2022-12-08 | End: 2023-02-22

## 2022-12-08 NOTE — PROGRESS NOTES
[facility] Dermatology Note  Encounter Date: Dec 8, 2022  Office Visit     Reviewed patients past medical history and pertinent chart review prior to patients visit today.     Dermatology Problem List:  1. Psoriasis vulgaris, decided not to start humira. Hydrocortisone 2.5% ointment and clobetasol ointement for flares. Ketoconazole given for folds, some intertrigo possibly driving rash under breasts and groin folds  2. Psoriasiform dermatitis, scalp. Ketoconazole and fluocinonide 0.05% solution  Given 11/10/22, well controlled 12/08/22   3. Longstanding diagnosis of lichen sclerosus of genitals, patient states well controlled and declines exam or treatment 12/08/22   ____________________________________________    Assessment & Plan:     Plaque psoriasis.   -Patient is mostly happy with her current control.  She is only bothered by lesions on the left and right inferior flanks at the underwear line.  I discussed intralesional Kenalog injections today which patient would like to proceed with for just these 2 spots.   -Kenalog intralesional injection procedure note: After verbal consent and discussion of risks including but not limited to atrophy, pain, and bruising, time out was performed, 1 total cc of Kenalog 7.5mg/cc was injected into 2 sites on the left and right inferior flanks at waistline.  The patient tolerated the procedure well and left the Dermatology clinic in good condition.     -scalp: Seborrheic dermatitis vs psoriasis of scalp. Well controlled today. Continue current treatmnet: She will alternate ketoconazole 2% shampoo and T-gel shampoo leaving each on for 3-5 minutes before rinsing. Also given fluocinonide 0.05% solution  To use 1-2 time daily, decrease use as rash improves and stop when well controlled and itching has stopped.     -Skin fold areas may have some intertrigo driving the psoriasis (under breasts, buttock, groinfolds). Use the hydrocortisone 2.5% ointment twice daily  for flares.  Also given ketoconazole 2% cream to be used to these areas once daily ongoing as treatment and prevention.    Follow up in 2 months.     Meghan Sharma, MERARI CNP on 11/10/2022 at 1:50 PM   _______________________________________    CC: Psoriasis (Follow up- has noticed improvement but not resolved. )    HPI:  Ms. Helena Eldridge is a(n) 87 year old female who presents today as a follow-up of psoriasis.  She was last seen by me 11/10/2022 at which time we did labs to start Humira.  Patient decided later that she did not want to start Humira because the topical medications were working so well.  She has been alternating T-Gel shampoo and ketoconazole 2% shampoo and says that her scalp is very well controlled.  She does have some fluocinonide solution but she says it is very hard to squeeze out of the bottle and has not been using it very often.  She is not bothered by her scalp itching or flaking lately and is very happy with her control.  For the body she has both hydrocortisone 2.5% ointment for the folds and face, and clobetasol ointment for the rest of the body.  She uses it as needed when anything gets uncomfortable.  She says mostly the uncomfortable spots on her left and right sides of her trunk in the waistband.  They get itchy sometimes and she ends up using the clobetasol here the most.  The rest of the areas are not bothersome to her so she really has not been treating them.  She has not concerned about the cosmetic appearance of the psoriasis.    Patient is otherwise feeling well, without additional skin concerns.    Patient does not have a licence to drive and does not have reliable transportation which would be an issue with regular frequent lab monitoring. She very rarely drinks any alcohol,     Physical Exam:  SKIN: Total skin including the undergarment areas was performed. The exam included the head/face, neck, both arms, chest, back, abdomen, both legs, digits and/or nails.   -  erythematous well defined thick scaly plaques scattered on buttock, gluteal crease, rectum, groin folds,  left elbow, under breasts, Umbilicus, low abdomen.   -Scalp is significantly improved with very minimal scaling and no erythema today    - No other lesions of concern on areas examined.     Medications:  Current Outpatient Medications   Medication     allopurinol (ZYLOPRIM) 100 MG tablet     ammonium lactate (AMLACTIN) 12 % external cream     Calcium Carbonate-Vitamin D (CALCIUM + D) 600-200 MG-UNIT per tablet     clobetasol (TEMOVATE) 0.05 % external ointment     clotrimazole-betamethasone (LOTRISONE) 1-0.05 % external cream     diphenhydrAMINE (BENADRYL) 25 MG tablet     DOCUSATE SODIUM PO     Emollient (CERAVE DAILY MOISTURIZING EX)     famotidine (PEPCID) 20 MG tablet     fluocinonide (LIDEX) 0.05 % external solution     furosemide (LASIX) 20 MG tablet     hydrocortisone 2.5 % cream     ketoconazole (NIZORAL) 2 % external shampoo     LANsoprazole (PREVACID) 30 MG DR capsule     levothyroxine (SYNTHROID/LEVOTHROID) 100 MCG tablet     LORazepam (ATIVAN) 0.5 MG tablet     Multiple Vitamins-Minerals (OCUVITE ADULT 50+) CAPS     nystatin (MYCOSTATIN) 552604 UNIT/GM external ointment     nystatin-triamcinolone (MYCOLOG) 835162-4.1 UNIT/GM-% external ointment     ondansetron (ZOFRAN-ODT) 4 MG ODT tab     rOPINIRole (REQUIP) 0.25 MG tablet     STATIN NOT PRESCRIBED, INTENTIONAL,     No current facility-administered medications for this visit.     Facility-Administered Medications Ordered in Other Visits   Medication     perflutren diluted in saline (DEFINITY) injection 5 mL      Past Medical History:   Patient Active Problem List   Diagnosis     CKD (chronic kidney disease) stage 4, GFR 15-29 ml/min (H)     Glucose intolerance (impaired glucose tolerance)     Kidney stones     Advance care planning     Hypertension goal BP (blood pressure) < 140/90     Lichen sclerosus et atrophicus of the vulva     Solitary kidney,  acquired     Senile osteoporosis     Osteoarthritis of right knee     Medial meniscus tear     Cataracts, both eyes     Macular degeneration of both eyes, right eye greater than left eye     COPD, severe (H)     IPF (idiopathic pulmonary fibrosis) (H)     Other specified hypothyroidism     Idiopathic chronic gout     Adjustment disorder with anxiety     Non-small cell cancer of right lung (H)     Psoriasis     Past Medical History:   Diagnosis Date     Carpal tunnel syndrome      CKD (chronic kidney disease)     kidney stone with infection     CKD (chronic kidney disease) stage 4, GFR 15-29 ml/min (H) 12/2/2010    History of kidney stone with infection.  Creatinine 1.3 - 1.6 (12/2/10, [place])   -- she can't take ACE / ARB due to side effects.  Will continue with good BP control (12/3/15, [place])      COPD, severe (H) 2/16/2015    Seen by pulmonary 2013.        Deep vein thrombosis (DVT) (H)     1972     DVT [id number]     Glucose intolerance      H/O partial nephrectomy      Heel spur      Hypertension goal BP (blood pressure) < 140/90 11/28/2011     Hypothyroidism      Idiopathic chronic gout 5/14/2015     Immunosuppression (H) 3/7/2018     IPF (idiopathic pulmonary fibrosis) (H) 2/25/2015    Mild, at bases per CXR 2/2015 (2/15/15, [place])      Kidney stones      Lichen sclerosus      Lichen sclerosus et atrophicus of the vulva 5/8/2013    Clobetasol bid is the recommended remedy     Macular degeneration      Non-small cell cancer of right lung (H) 3/7/2018     Nonsenile cataract      Osteoarthritis      Osteoarthritis of right knee 3/11/2014     Osteoporosis      Other specified hypothyroidism 5/14/2015     PID      Pulmonary embolism (H)     1970     Senile osteoporosis 3/11/2014     Unspecified hypothyroidism      Vitiligo        CC No referring provider defined for this encounter. on close of this encounter.

## 2022-12-08 NOTE — LETTER
12/8/2022         RE: Helena Eldridge  [address] [facility] [address] [address]        Dear Colleague,    Thank you for referring your patient, Helena Eldridge, to the [facility]. Please see a copy of my visit note below.    [facility] Dermatology Note  Encounter Date: Dec 8, 2022  Office Visit     Reviewed patients past medical history and pertinent chart review prior to patients visit today.     Dermatology Problem List:  1. Psoriasis vulgaris, decided not to start humira. Hydrocortisone 2.5% ointment and clobetasol ointement for flares. Ketoconazole given for folds, some intertrigo possibly driving rash under breasts and groin folds  2. Psoriasiform dermatitis, scalp. Ketoconazole and fluocinonide 0.05% solution  Given 11/10/22, well controlled 12/08/22   3. Longstanding diagnosis of lichen sclerosus of genitals, patient states well controlled and declines exam or treatment 12/08/22   ____________________________________________    Assessment & Plan:     Plaque psoriasis.   -Patient is mostly happy with her current control.  She is only bothered by lesions on the left and right inferior flanks at the underwear line.  I discussed intralesional Kenalog injections today which patient would like to proceed with for just these 2 spots.   -Kenalog intralesional injection procedure note: After verbal consent and discussion of risks including but not limited to atrophy, pain, and bruising, time out was performed, 1 total cc of Kenalog 7.5mg/cc was injected into 2 sites on the left and right inferior flanks at waistline.  The patient tolerated the procedure well and left the Dermatology clinic in good condition.     -scalp: Seborrheic dermatitis vs psoriasis of scalp. Well controlled today. Continue current treatmnet: She will alternate ketoconazole 2% shampoo and T-gel shampoo leaving each on for 3-5 minutes before rinsing. Also given fluocinonide 0.05% solution  To  use 1-2 time daily, decrease use as rash improves and stop when well controlled and itching has stopped.     -Skin fold areas may have some intertrigo driving the psoriasis (under breasts, buttock, groinfolds). Use the hydrocortisone 2.5% ointment twice daily for flares.  Also given ketoconazole 2% cream to be used to these areas once daily ongoing as treatment and prevention.    Follow up in 2 months.     Meghan Sharma, MERARI CNP on 11/10/2022 at 1:50 PM   _______________________________________    CC: Psoriasis (Follow up- has noticed improvement but not resolved. )    HPI:  Ms. Helena Eldridge is a(n) 87 year old female who presents today as a follow-up of psoriasis.  She was last seen by me 11/10/2022 at which time we did labs to start Humira.  Patient decided later that she did not want to start Humira because the topical medications were working so well.  She has been alternating T-Gel shampoo and ketoconazole 2% shampoo and says that her scalp is very well controlled.  She does have some fluocinonide solution but she says it is very hard to squeeze out of the bottle and has not been using it very often.  She is not bothered by her scalp itching or flaking lately and is very happy with her control.  For the body she has both hydrocortisone 2.5% ointment for the folds and face, and clobetasol ointment for the rest of the body.  She uses it as needed when anything gets uncomfortable.  She says mostly the uncomfortable spots on her left and right sides of her trunk in the waistband.  They get itchy sometimes and she ends up using the clobetasol here the most.  The rest of the areas are not bothersome to her so she really has not been treating them.  She has not concerned about the cosmetic appearance of the psoriasis.    Patient is otherwise feeling well, without additional skin concerns.    Patient does not have a licence to drive and does not have reliable transportation which would be an issue with regular  frequent lab monitoring. She very rarely drinks any alcohol,     Physical Exam:  SKIN: Total skin including the undergarment areas was performed. The exam included the head/face, neck, both arms, chest, back, abdomen, both legs, digits and/or nails.   - erythematous well defined thick scaly plaques scattered on buttock, gluteal crease, rectum, groin folds,  left elbow, under breasts, Umbilicus, low abdomen.   -Scalp is significantly improved with very minimal scaling and no erythema today    - No other lesions of concern on areas examined.     Medications:  Current Outpatient Medications   Medication     allopurinol (ZYLOPRIM) 100 MG tablet     ammonium lactate (AMLACTIN) 12 % external cream     Calcium Carbonate-Vitamin D (CALCIUM + D) 600-200 MG-UNIT per tablet     clobetasol (TEMOVATE) 0.05 % external ointment     clotrimazole-betamethasone (LOTRISONE) 1-0.05 % external cream     diphenhydrAMINE (BENADRYL) 25 MG tablet     DOCUSATE SODIUM PO     Emollient (CERAVE DAILY MOISTURIZING EX)     famotidine (PEPCID) 20 MG tablet     fluocinonide (LIDEX) 0.05 % external solution     furosemide (LASIX) 20 MG tablet     hydrocortisone 2.5 % cream     ketoconazole (NIZORAL) 2 % external shampoo     LANsoprazole (PREVACID) 30 MG DR capsule     levothyroxine (SYNTHROID/LEVOTHROID) 100 MCG tablet     LORazepam (ATIVAN) 0.5 MG tablet     Multiple Vitamins-Minerals (OCUVITE ADULT 50+) CAPS     nystatin (MYCOSTATIN) 852409 UNIT/GM external ointment     nystatin-triamcinolone (MYCOLOG) 252368-0.1 UNIT/GM-% external ointment     ondansetron (ZOFRAN-ODT) 4 MG ODT tab     rOPINIRole (REQUIP) 0.25 MG tablet     STATIN NOT PRESCRIBED, INTENTIONAL,     No current facility-administered medications for this visit.     Facility-Administered Medications Ordered in Other Visits   Medication     perflutren diluted in saline (DEFINITY) injection 5 mL      Past Medical History:   Patient Active Problem List   Diagnosis     CKD (chronic kidney  disease) stage 4, GFR 15-29 ml/min (H)     Glucose intolerance (impaired glucose tolerance)     Kidney stones     Advance care planning     Hypertension goal BP (blood pressure) < 140/90     Lichen sclerosus et atrophicus of the vulva     Solitary kidney, acquired     Senile osteoporosis     Osteoarthritis of right knee     Medial meniscus tear     Cataracts, both eyes     Macular degeneration of both eyes, right eye greater than left eye     COPD, severe (H)     IPF (idiopathic pulmonary fibrosis) (H)     Other specified hypothyroidism     Idiopathic chronic gout     Adjustment disorder with anxiety     Non-small cell cancer of right lung (H)     Psoriasis     Past Medical History:   Diagnosis Date     Carpal tunnel syndrome      CKD (chronic kidney disease)     kidney stone with infection     CKD (chronic kidney disease) stage 4, GFR 15-29 ml/min (H) 12/2/2010    History of kidney stone with infection.  Creatinine 1.3 - 1.6 (12/2/10, [facility])   -- she can't take ACE / ARB due to side effects.  Will continue with good BP control (12/3/15, [facility])      COPD, severe (H) 2/16/2015    Seen by pulmonary 2013.        Deep vein thrombosis (DVT) (H)     1972     DVT [id number]     Glucose intolerance      H/O partial nephrectomy      Heel spur      Hypertension goal BP (blood pressure) < 140/90 11/28/2011     Hypothyroidism      Idiopathic chronic gout 5/14/2015     Immunosuppression (H) 3/7/2018     IPF (idiopathic pulmonary fibrosis) (H) 2/25/2015    Mild, at bases per CXR 2/2015 (2/15/15, [facility])      Kidney stones      Lichen sclerosus      Lichen sclerosus et atrophicus of the vulva 5/8/2013    Clobetasol bid is the recommended remedy     Macular degeneration      Non-small cell cancer of right lung (H) 3/7/2018     Nonsenile cataract      Osteoarthritis      Osteoarthritis of right knee 3/11/2014     Osteoporosis      Other specified hypothyroidism 5/14/2015     PID      Pulmonary embolism (H)     1970     Senile osteoporosis  3/11/2014     Unspecified hypothyroidism      Vitiligo        CC No referring provider defined for this encounter. on close of this encounter.       Again, thank you for allowing me to participate in the care of your patient.        Sincerely,        MERARI Tejada CNP

## 2022-12-08 NOTE — PATIENT INSTRUCTIONS
Apply ketoconazole once daily under the breasts and in groin folds where you get a rash. You can still use the hydrocortisone cream when you have active rash. Keep using the ketoconazole even when you dont have a rash

## 2022-12-26 ENCOUNTER — NURSE TRIAGE (OUTPATIENT)
Dept: FAMILY MEDICINE | Facility: CLINIC | Age: 87
End: 2022-12-26

## 2022-12-26 NOTE — TELEPHONE ENCOUNTER
"Spoke with patient regarding symptoms, completed triage. Per protocol, patient is to be seen in 3 days. Assisted with booking patient appointment Thursday 12/29/22.    Reason for Disposition    MODERATE pain (e.g., interferes with normal activities, limping) and present > 3 days    Additional Information    Negative: Looks like a broken bone or dislocated joint (e.g., crooked or deformed)    Negative: Sounds like a life-threatening emergency to the triager    Negative: Followed a hip injury    Negative: Leg pain is main symptom    Negative: Back pain radiating (shooting) into hip    Negative: SEVERE pain (e.g., excruciating, unable to do any normal activities) and fever    Negative: Can't stand (bear weight) or walk    Negative: Fever and red area (or area very tender to touch)    Negative: Patient sounds very sick or weak to the triager    Negative: SEVERE pain (e.g., excruciating, unable to do any normal activities)    Negative: Red area or streak > 2 inches (or 5 cm)    Negative: Painful rash with multiple small blisters grouped together (i.e., dermatomal distribution or 'band' or 'stripe')    Negative: Looks like a boil, infected sore, deep ulcer, or other infected rash (spreading redness, pus)    Negative: Localized rash is very painful (no fever)    Negative: Numbness in a leg or foot (i.e., loss of sensation)    Negative: Patient wants to be seen    Answer Assessment - Initial Assessment Questions  1. LOCATION and RADIATION: \"Where is the pain located?\"       Left hip  2. QUALITY: \"What does the pain feel like?\"  (e.g., sharp, dull, aching, burning)      Hurts- deeper, hurts when getting up- dull  3. SEVERITY: \"How bad is the pain?\" \"What does it keep you from doing?\"   (Scale 1-10; or mild, moderate, severe)    -  MILD (1-3): doesn't interfere with normal activities     -  MODERATE (4-7): interferes with normal activities (e.g., work or school) or awakens from sleep, limping     -  SEVERE (8-10): " "excruciating pain, unable to do any normal activities, unable to walk- at times      Ok when resting, painful for movement  4. ONSET: \"When did the pain start?\" \"Does it come and go, or is it there all the time?\"      -about 10 days ago  5. WORK OR EXERCISE: \"Has there been any recent work or exercise that involved this part of the body?\"       -worse with movement: all, steps  6. CAUSE: \"What do you think is causing the hip pain?\"       -no idea  7. AGGRAVATING FACTORS: \"What makes the hip pain worse?\" (e.g., walking, climbing stairs, running)      -walking stairs  8. OTHER SYMPTOMS: \"Do you have any other symptoms?\" (e.g., back pain, pain shooting down leg,  fever, rash)      -no other symptoms  -psoriasis (can't distinguish between that or a rash)    Protocols used: HIP PAIN-A-[place]    SHELLY Nur RN  [facility]    "

## 2022-12-26 NOTE — TELEPHONE ENCOUNTER
Reason for call:  Patient reporting a symptom    Symptom or request: Pain in Left Hip    Duration (how long have symptoms been present): for a week    Have you been treated for this before? No    Additional comments: Patient called been dealing with a pain in Left hip, no fall to cause it.    Phone Number patient can be reached at:  Home number on file [phone] (home)    Best Time:  anytime    Can we leave a detailed message on this number:  YES    Call taken on 12/26/2022 at 1:21 PM by Zulay Delgado

## 2022-12-29 ENCOUNTER — OFFICE VISIT (OUTPATIENT)
Dept: FAMILY MEDICINE | Facility: CLINIC | Age: 87
End: 2022-12-29
Payer: COMMERCIAL

## 2022-12-29 ENCOUNTER — ANCILLARY PROCEDURE (OUTPATIENT)
Dept: GENERAL RADIOLOGY | Facility: CLINIC | Age: 87
End: 2022-12-29
Attending: PHYSICIAN ASSISTANT
Payer: COMMERCIAL

## 2022-12-29 VITALS
DIASTOLIC BLOOD PRESSURE: 70 MMHG | BODY MASS INDEX: 25.43 KG/M2 | OXYGEN SATURATION: 94 % | WEIGHT: 130.2 LBS | TEMPERATURE: 98.8 F | HEART RATE: 107 BPM | SYSTOLIC BLOOD PRESSURE: 145 MMHG

## 2022-12-29 DIAGNOSIS — M54.50 LUMBAR BACK PAIN: ICD-10-CM

## 2022-12-29 DIAGNOSIS — M25.552 HIP PAIN, LEFT: ICD-10-CM

## 2022-12-29 DIAGNOSIS — M25.552 HIP PAIN, LEFT: Primary | ICD-10-CM

## 2022-12-29 PROCEDURE — 72100 X-RAY EXAM L-S SPINE 2/3 VWS: CPT | Mod: TC | Performed by: RADIOLOGY

## 2022-12-29 PROCEDURE — 99214 OFFICE O/P EST MOD 30 MIN: CPT | Performed by: PHYSICIAN ASSISTANT

## 2022-12-29 PROCEDURE — 73502 X-RAY EXAM HIP UNI 2-3 VIEWS: CPT | Mod: TC | Performed by: RADIOLOGY

## 2022-12-29 ASSESSMENT — ENCOUNTER SYMPTOMS: HIP PAIN: 1

## 2022-12-29 NOTE — PROGRESS NOTES
"  {PROVIDER CHARTING PREFERENCE:653919}    Subjective   Helena is a 87 year old{ACCOMPANIED BY STATEMENT (Optional):896831}, presenting for the following health issues:  Hip Pain and Health Maintenance      HPI     Pain History:  When did you first notice your pain? - Acute Pain   Have you seen anyone else for your pain? {Yes with [facility] or No :051270}  Where in your body do you have pain? Musculoskeletal problem/pain  Onset/Duration: ***  Description  Location: {.:884263} - {.:298255}  Joint Swelling: {.:097394::\"No\"}  Redness: {.:404855::\"No\"}  Pain: {.:399220::\"No\"}  Warmth: {.:736023::\"No\"}  Intensity:  {mild,moderate,severe:153526}  Progression of Symptoms:  {.:580448}  Accompanying signs and symptoms:   Fevers: {.:812987::\"No\"}  Numbness/tingling/weakness: {.:209805::\"No\"}  History  Trauma to the area: {.:044073::\"No\"}  Recent illness:  {.:195861::\"No\"}  Previous similar problem: {.:015566::\"No\"}  Previous evaluation:  {.:715320::\"No\"}  Precipitating or alleviating factors:  Aggravating factors include: {AGGRAVATING MS FACTORS CHRONIC PROB:037163::\"none\"}  Therapies tried and outcome: {MS RELIEF ITEMS:714771::\"nothing\"}    {Links to Pain Management SmartSet and MNPMP (Optional) :749010}    Does not hurt in the morning- starts in the afternoon.   Sleeps well at night.   Tries to do 3000 steps per day- not making the hip pain worse.   Denies falls.     Improved some today.   Hard to bend over to pick things up.   Rare tingling in the feet at night when she takes her shoes off.     No pain with urination or change in urinate habits.   Does have constipation- takes over the counter meds every other day.     Took tylenol last night for the first time. Unable to tell if it helped.     Review of Systems   {ROS COMP (Optional):864947}      Objective    BP (!) 145/70 (BP Location: Right arm, Patient Position: Sitting, Cuff Size: Adult Regular)   Pulse 107   Temp 98.8  F (37.1  C) (Oral)   Wt 59.1 kg (130 lb 3.2 oz)  "  SpO2 94%   BMI 25.43 kg/m    Body mass index is 25.43 kg/m .  Physical Exam   {Exam List (Optional):302712}    {Diagnostic Test Results (Optional):552194}    {AMBULATORY ATTESTATION (Optional):248266}

## 2022-12-29 NOTE — PROGRESS NOTES
Assessment & Plan     1. Hip pain, left    2. Lumbar back pain      Patient declines physical therapy as she has no transportation to bring her.   She has not tried tylenol yet consistently so will have her try that for pain.   Handouts given for home exercises.   X-rays today, if not improving will need physical therapy.                  No follow-ups on file.    TONYA Morton [facility] LADI Malik is a 87 year old, presenting for the following health issues:  Hip Pain and Health Maintenance      Hip Pain  This is a new problem. The current episode started 1 to 4 weeks ago. The problem occurs intermittently. The problem has been gradually worsening. The symptoms are aggravated by bending. She has tried nothing for the symptoms.        Pain History:  When did you first notice your pain? - Acute Pain   Have you seen anyone else for your pain? No  Where in your body do you have pain? Musculoskeletal problem/pain  Onset/Duration: 7-10 days  Description  Location: lt hip - left  Joint Swelling: No  Redness: No  Pain: YES  Warmth: No  Intensity:  severe  Progression of Symptoms:  worsening  Accompanying signs and symptoms:   Fevers: No  Numbness/tingling/weakness: No  History  Trauma to the area: No  Recent illness:  No  Previous similar problem: No  Previous evaluation:  No  Precipitating or alleviating factors:  Aggravating factors include: bending  Therapies tried and outcome: nothing          Review of Systems   Constitutional, HEENT, cardiovascular, pulmonary, gi and gu systems are negative, except as otherwise noted.      Objective    BP (!) 145/70 (BP Location: Right arm, Patient Position: Sitting, Cuff Size: Adult Regular)   Pulse 107   Temp 98.8  F (37.1  C) (Oral)   Wt 59.1 kg (130 lb 3.2 oz)   SpO2 94%   BMI 25.43 kg/m    Body mass index is 25.43 kg/m .  Physical Exam   GENERAL: healthy, alert and no distress  Abdominal: bilateral lower quadrant pain with  palpation, no bulge appreciated.   MS: no gross musculoskeletal defects noted, no edema- Patient moves easily from sitting to standing but significant pain moving from sitting to laying. Pain with palpation along the left ASIS and the left upper thigh. Pain with palpation along the left sided lumbar paraspinal muscles.   NEURO: Normal strength and tone, mentation intact and speech normal  PSYCH: mentation appears normal, affect normal/bright

## 2022-12-30 ENCOUNTER — TELEPHONE (OUTPATIENT)
Dept: FAMILY MEDICINE | Facility: CLINIC | Age: 87
End: 2022-12-30

## 2022-12-30 DIAGNOSIS — R93.7 ABNORMAL X-RAY OF LUMBAR SPINE: Primary | ICD-10-CM

## 2022-12-30 NOTE — TELEPHONE ENCOUNTER
Spoke with pt and notified of below. Per pt will wait for spine to call, will take tylenol, will do stretches given in pamphlet during visit but does not want a PT order.     Thanks,  RAUL No  [facility]

## 2022-12-30 NOTE — TELEPHONE ENCOUNTER
Please call patient.     Her hip x-ray is normal, but her lumbar spine x-ray is abnormal. There is suspicion for possible fracture. I would like her to see our spine providers to discuss options for treatment. No changes to our plan with tylenol and physical therapy if she is willing.   Luz Wilson PA-C

## 2023-01-05 ENCOUNTER — TELEPHONE (OUTPATIENT)
Dept: NEUROSURGERY | Facility: CLINIC | Age: 88
End: 2023-01-05

## 2023-01-05 ENCOUNTER — OFFICE VISIT (OUTPATIENT)
Dept: NEUROSURGERY | Facility: CLINIC | Age: 88
End: 2023-01-05
Payer: COMMERCIAL

## 2023-01-05 VITALS — SYSTOLIC BLOOD PRESSURE: 152 MMHG | DIASTOLIC BLOOD PRESSURE: 86 MMHG | HEART RATE: 101 BPM

## 2023-01-05 DIAGNOSIS — M81.0 OSTEOPOROSIS, UNSPECIFIED OSTEOPOROSIS TYPE, UNSPECIFIED PATHOLOGICAL FRACTURE PRESENCE: ICD-10-CM

## 2023-01-05 DIAGNOSIS — F40.240 CLAUSTROPHOBIA: Primary | ICD-10-CM

## 2023-01-05 DIAGNOSIS — R93.7 ABNORMAL X-RAY OF LUMBAR SPINE: Primary | ICD-10-CM

## 2023-01-05 DIAGNOSIS — M25.552 LEFT HIP PAIN: ICD-10-CM

## 2023-01-05 PROCEDURE — 99204 OFFICE O/P NEW MOD 45 MIN: CPT | Performed by: NURSE PRACTITIONER

## 2023-01-05 RX ORDER — A/SINGAPORE/GP1908/2015 IVR-180 (AN A/MICHIGAN/45/2015 (H1N1)PDM09-LIKE VIRUS, A/HONG KONG/4801/2014, NYMC X-263B (H3N2) (AN A/HONG KONG/4801/2014-LIKE VIRUS), AND B/BRISBANE/60/2008, WILD TYPE (A B/BRISBANE/60/2008-LIKE VIRUS) 15; 15; 15 UG/.5ML; UG/.5ML; UG/.5ML
INJECTION, SUSPENSION INTRAMUSCULAR
COMMUNITY
Start: 2022-08-29 | End: 2023-05-22

## 2023-01-05 ASSESSMENT — PAIN SCALES - GENERAL: PAINLEVEL: MODERATE PAIN (5)

## 2023-01-05 NOTE — PROGRESS NOTES
Dr. Dorian Yun   [facility] Neurosurgery Clinic Visit      CC: left hip pain     Primary Care Provider: Sushma Nelson    Reason For Visit:   I was asked by Luz Wilson PA-C to consult on the patient for: abnormal xray of lumbar spine      HPI: Helena Eldridge is an 87 year old female with history of osteoporosis who presents for evaluation of left hip pain. Symptoms started about 2 weeks ago. Patient states she woke up with the pain, denies any trauma or injuries at onset. She reports pain is intermittent and occurs when changing positions from sitting to standing. Patient has been taking NSAIDS for pain management. She is also taking Calcium for osteoporosis. Denies any low back pain, leg pain, numbness, weakness, falls, foot drop, saddle anesthesia, or bladder/bowel incontinence.      Past Medical History:   Diagnosis Date     Carpal tunnel syndrome      CKD (chronic kidney disease)     kidney stone with infection     CKD (chronic kidney disease) stage 4, GFR 15-29 ml/min (H) 12/2/2010    History of kidney stone with infection.  Creatinine 1.3 - 1.6 (12/2/10, [facility])   -- she can't take ACE / ARB due to side effects.  Will continue with good BP control (12/3/15, [facility])      COPD, severe (H) 2/16/2015    Seen by pulmonary 2013.        Deep vein thrombosis (DVT) (H)     1972     DVT [id number]     Glucose intolerance      H/O partial nephrectomy      Heel spur      Hypertension goal BP (blood pressure) < 140/90 11/28/2011     Hypothyroidism      Idiopathic chronic gout 5/14/2015     Immunosuppression (H) 3/7/2018     IPF (idiopathic pulmonary fibrosis) (H) 2/25/2015    Mild, at bases per CXR 2/2015 (2/15/15, [facility])      Kidney stones      Lichen sclerosus      Lichen sclerosus et atrophicus of the vulva 5/8/2013    Clobetasol bid is the recommended remedy     Macular degeneration      Non-small cell cancer of right lung (H) 3/7/2018     Nonsenile cataract      Osteoarthritis      Osteoarthritis of right knee  3/11/2014     Osteoporosis      Other specified hypothyroidism 5/14/2015     PID      Pulmonary embolism (H)     1970     Senile osteoporosis 3/11/2014     Unspecified hypothyroidism      Vitiligo        Past Medical History reviewed with patient during visit.    Past Surgical History:   Procedure Laterality Date     EXTRACORPOREAL SHOCK WAVE LITHOTRIPSY, CYSTOSCOPY, INSERT STENT URETER(S), COMBINED Right 7/13/2020    Procedure: LITHOTRIPSY, Right EXTRACORPOREAL SHOCK WAVE (ESWL), WITH CYSTOSCOPY AND Right URETERAL STENT INSERTION;  Surgeon: Buddy Ramirez MD;  Location: [facility] OR     HC REMOVAL GALLBLADDER       HC REVISE MEDIAN N/CARPAL TUNNEL SURG       LASER HOLMIUM LITHOTRIPSY URETER(S), INSERT STENT, COMBINED Right 7/28/2020    Procedure: RIGHT CYSTOURETEROSCOPY, WITH LITHOTRIPSY USING LASER AND URETERAL STENT EXCHANGE;  Surgeon: Buddy Ramirez MD;  Location: [facility] OR     TUBAL LIGATION       ZZC NEPHRECTOMY      left partial 07-01-07     [facility] VENOUS THROMBOSIS IMAGING      with pe     Past Surgical History reviewed with patient during visit.    Current Outpatient Medications   Medication     allopurinol (ZYLOPRIM) 100 MG tablet     ammonium lactate (AMLACTIN) 12 % external cream     Calcium Carbonate-Vitamin D (CALCIUM + D) 600-200 MG-UNIT per tablet     clobetasol (TEMOVATE) 0.05 % external ointment     clotrimazole-betamethasone (LOTRISONE) 1-0.05 % external cream     diphenhydrAMINE (BENADRYL) 25 MG tablet     DOCUSATE SODIUM PO     Emollient (CERAVE DAILY MOISTURIZING EX)     famotidine (PEPCID) 20 MG tablet     FLUAD QUADRIVALENT 0.5 ML PRSY injection     fluocinonide (LIDEX) 0.05 % external solution     furosemide (LASIX) 20 MG tablet     hydrocortisone 2.5 % cream     ketoconazole (NIZORAL) 2 % external cream     ketoconazole (NIZORAL) 2 % external shampoo     LANsoprazole (PREVACID) 30 MG DR capsule     levothyroxine (SYNTHROID/LEVOTHROID) 100 MCG tablet     LORazepam (ATIVAN) 0.5 MG tablet      Multiple Vitamins-Minerals (OCUVITE ADULT 50+) CAPS     nystatin (MYCOSTATIN) 627351 UNIT/GM external ointment     nystatin-triamcinolone (MYCOLOG) 169755-1.1 UNIT/GM-% external ointment     ondansetron (ZOFRAN-ODT) 4 MG ODT tab     rOPINIRole (REQUIP) 0.25 MG tablet     STATIN NOT PRESCRIBED, INTENTIONAL,     No current facility-administered medications for this visit.     Facility-Administered Medications Ordered in Other Visits   Medication     perflutren diluted in saline (DEFINITY) injection 5 mL       Allergies   Allergen Reactions     Ace Inhibitors Cough     Advicor      Alendronic Acid Other (See Comments)     Other reaction(s): GI Upset  heartburn  Severe substernal burning and dysphagia within 3 days       Blood-Group Specific Substance Other (See Comments)     Patient has Anti-[place] and warm auto antibody. Blood products may be delayed. Draw patient 24 hours prior to transfusion. Draw one red top and two purple top tubes for all type and screen orders.     Citalopram GI Disturbance and Nausea     diarrhea  diarrhea     Doxycycline Nausea     Poor appetite     Fosamax      Severe substernal burning and dysphagia within 3 days     Valsartan Cramps and Other (See Comments)     severe  severe       Social History     Socioeconomic History     Marital status: Single   Tobacco Use     Smoking status: Former     Types: Cigarettes     Quit date: 1969     Years since quittin.1     Smokeless tobacco: Never   Vaping Use     Vaping Use: Never used   Substance and Sexual Activity     Alcohol use: Yes     Alcohol/week: 0.0 standard drinks     Comment: very little      Drug use: No     Sexual activity: Not Currently     Partners: Male   Other Topics Concern     Parent/sibling w/ CABG, MI or angioplasty before 65F 55M? No       Family History   Problem Relation Age of Onset     Cancer Brother      Glaucoma Mother      Alzheimer Disease Brother      Macular Degeneration No family hx of        ROS: 10 point ROS  neg other than the symptoms noted above in the HPI.    Vital Signs: BP (!) 152/86   Pulse 101     Physical Examination:  Constitutional:  Alert, well nourished, NAD.  HEENT: Normocephalic, atraumatic.   Pulmonary:  Without shortness of breath, normal effort.   Cardiovascular:  No pitting edema of BLE.      Neurological:  Awake  Alert  Oriented x 3  Speech clear  Cranial nerves II - XII grossly intact  PERRL  EOMI  Motor exam:  Iliopsoas  (hip flexion)               Right: 5/5  Left:  5/5  Quadriceps  (knee extension)       Right:  5/5  Left:  5/5  Hamstrings  (knee flexion)            Right:  5/5  Left:  5/5  Gastroc Soleus  (PF)                          Right:  5/5  Left:  5/5  Tibialis Ant  (DF)                          Right:  5/5  Left:  5/5  EHL                          Right:  5/5  Left:  5/5         Sensation normal to BLE    Reflexes are 2+ in the patellar and Achilles. There is no clonus.     Musculoskeletal:  Gait: Able to stand from a seated position. Normal non-antalgic, non-myelopathic gait.   No tenderness of the spine or paraspinous muscles.  Hip height is symmetrical.  Negative SI joint tenderness bilaterally.  Negative straight leg raise bilaterally.      Imaging:   LUMBAR SPINE TWO TO THREE VIEWS December 29, 2022 1:01 PM   IMPRESSION: Bones appear osteopenic which limits evaluation by plain  film. New deformity at the superior L1 endplate which is  age-indeterminate, acute fracture is not entirely excluded by plain  film. Marked degenerative endplate changes and loss of disc height at  L2-L3. Mild/moderate degenerative endplate changes and loss of disc at  the other levels. Moderate facet hypertrophy in the lower lumbar  spine. Degenerative findings have progressed since prior MR 2/25/2014.    XR HIP LEFT 2-3 VIEWS   12/29/2022 1:01 PM    IMPRESSION: Left hip joint space is preserved. No acute fracture or  dislocation is seen. There is osseous demineralization.    Assessment/Plan:   87 year old  female with history of osteoporosis who presents for evaluation of left hip pain. Symptoms started about 2 weeks ago, denies any trauma or injuries at onset. Lumbar spine XR shows multilevel degenerative changes and age indeterminate L1 superior endplate deformity. We discussed symptoms, imaging, and next steps.     - Recommend lumbar spine MRI for further evaluation  - Recommend an updated DEXA bone scan (most recent was completed in 2014)  - Patient is not interested in wearing a brace or starting PT  - Will call patient with MRI and DEXA results    Advised patient to call our clinic with any questions or concerns. Discussed red flag symptoms and advised to seek medical attention if these develop. Patient voiced understanding and agreement.      Naida Minor [facility] Neurosurgery  [address] [address]  Tel [phone]  Pager [phone]

## 2023-01-05 NOTE — PATIENT INSTRUCTIONS
Order for lumbar spine MRI and DEXA bone scan. You can call to schedule at [phone]. I will call with the results and next steps.       Please call our clinic if symptoms persist, change, or worsen at any time.    [facility] Neurosurgery  [address] [address]  Tel [phone]

## 2023-01-05 NOTE — LETTER
1/5/2023         RE: Helena Eldridge  [address] [address]        Dear Colleague,    Thank you for referring your patient, Helena Eldridge, to the [facility]. Please see a copy of my visit note below.    Dr. Dorian Yun   [facility] Neurosurgery Clinic Visit      CC: left hip pain     Primary Care Provider: Sushma Nelson    Reason For Visit:   I was asked by Luz Wilson PA-C to consult on the patient for: abnormal xray of lumbar spine      HPI: Helena Eldridge is an 87 year old female with history of osteoporosis who presents for evaluation of left hip pain. Symptoms started about 2 weeks ago. Patient states she woke up with the pain, denies any trauma or injuries at onset. She reports pain is intermittent and occurs when changing positions from sitting to standing. Patient has been taking NSAIDS for pain management. She is also taking Calcium for osteoporosis. Denies any low back pain, leg pain, numbness, weakness, falls, foot drop, saddle anesthesia, or bladder/bowel incontinence.      Past Medical History:   Diagnosis Date     Carpal tunnel syndrome      CKD (chronic kidney disease)     kidney stone with infection     CKD (chronic kidney disease) stage 4, GFR 15-29 ml/min (H) 12/2/2010    History of kidney stone with infection.  Creatinine 1.3 - 1.6 (12/2/10, [facility])   -- she can't take ACE / ARB due to side effects.  Will continue with good BP control (12/3/15, [facility])      COPD, severe (H) 2/16/2015    Seen by pulmonary 2013.        Deep vein thrombosis (DVT) (H)     1972     DVT [id number]     Glucose intolerance      H/O partial nephrectomy      Heel spur      Hypertension goal BP (blood pressure) < 140/90 11/28/2011     Hypothyroidism      Idiopathic chronic gout 5/14/2015     Immunosuppression (H) 3/7/2018     IPF (idiopathic pulmonary fibrosis) (H) 2/25/2015    Mild, at bases per CXR 2/2015 (2/15/15, [facility])      Kidney stones      Lichen  sclerosus      Lichen sclerosus et atrophicus of the vulva 5/8/2013    Clobetasol bid is the recommended remedy     Macular degeneration      Non-small cell cancer of right lung (H) 3/7/2018     Nonsenile cataract      Osteoarthritis      Osteoarthritis of right knee 3/11/2014     Osteoporosis      Other specified hypothyroidism 5/14/2015     PID      Pulmonary embolism (H)     1970     Senile osteoporosis 3/11/2014     Unspecified hypothyroidism      Vitiligo        Past Medical History reviewed with patient during visit.    Past Surgical History:   Procedure Laterality Date     EXTRACORPOREAL SHOCK WAVE LITHOTRIPSY, CYSTOSCOPY, INSERT STENT URETER(S), COMBINED Right 7/13/2020    Procedure: LITHOTRIPSY, Right EXTRACORPOREAL SHOCK WAVE (ESWL), WITH CYSTOSCOPY AND Right URETERAL STENT INSERTION;  Surgeon: Buddy Ramirez MD;  Location: [facility] OR     HC REMOVAL GALLBLADDER       HC REVISE MEDIAN N/CARPAL TUNNEL SURG       LASER HOLMIUM LITHOTRIPSY URETER(S), INSERT STENT, COMBINED Right 7/28/2020    Procedure: RIGHT CYSTOURETEROSCOPY, WITH LITHOTRIPSY USING LASER AND URETERAL STENT EXCHANGE;  Surgeon: Buddy Ramirez MD;  Location: [facility] OR     TUBAL LIGATION       ZZC NEPHRECTOMY      left partial 07-01-07     [facility] VENOUS THROMBOSIS IMAGING      with pe     Past Surgical History reviewed with patient during visit.    Current Outpatient Medications   Medication     allopurinol (ZYLOPRIM) 100 MG tablet     ammonium lactate (AMLACTIN) 12 % external cream     Calcium Carbonate-Vitamin D (CALCIUM + D) 600-200 MG-UNIT per tablet     clobetasol (TEMOVATE) 0.05 % external ointment     clotrimazole-betamethasone (LOTRISONE) 1-0.05 % external cream     diphenhydrAMINE (BENADRYL) 25 MG tablet     DOCUSATE SODIUM PO     Emollient (CERAVE DAILY MOISTURIZING EX)     famotidine (PEPCID) 20 MG tablet     FLUAD QUADRIVALENT 0.5 ML PRSY injection     fluocinonide (LIDEX) 0.05 % external solution     furosemide (LASIX) 20 MG tablet      hydrocortisone 2.5 % cream     ketoconazole (NIZORAL) 2 % external cream     ketoconazole (NIZORAL) 2 % external shampoo     LANsoprazole (PREVACID) 30 MG DR capsule     levothyroxine (SYNTHROID/LEVOTHROID) 100 MCG tablet     LORazepam (ATIVAN) 0.5 MG tablet     Multiple Vitamins-Minerals (OCUVITE ADULT 50+) CAPS     nystatin (MYCOSTATIN) 194935 UNIT/GM external ointment     nystatin-triamcinolone (MYCOLOG) 556654-0.1 UNIT/GM-% external ointment     ondansetron (ZOFRAN-ODT) 4 MG ODT tab     rOPINIRole (REQUIP) 0.25 MG tablet     STATIN NOT PRESCRIBED, INTENTIONAL,     No current facility-administered medications for this visit.     Facility-Administered Medications Ordered in Other Visits   Medication     perflutren diluted in saline (DEFINITY) injection 5 mL       Allergies   Allergen Reactions     Ace Inhibitors Cough     Advicor      Alendronic Acid Other (See Comments)     Other reaction(s): GI Upset  heartburn  Severe substernal burning and dysphagia within 3 days       Blood-Group Specific Substance Other (See Comments)     Patient has Anti-[place] and warm auto antibody. Blood products may be delayed. Draw patient 24 hours prior to transfusion. Draw one red top and two purple top tubes for all type and screen orders.     Citalopram GI Disturbance and Nausea     diarrhea  diarrhea     Doxycycline Nausea     Poor appetite     Fosamax      Severe substernal burning and dysphagia within 3 days     Valsartan Cramps and Other (See Comments)     severe  severe       Social History     Socioeconomic History     Marital status: Single   Tobacco Use     Smoking status: Former     Types: Cigarettes     Quit date: 1969     Years since quittin.1     Smokeless tobacco: Never   Vaping Use     Vaping Use: Never used   Substance and Sexual Activity     Alcohol use: Yes     Alcohol/week: 0.0 standard drinks     Comment: very little      Drug use: No     Sexual activity: Not Currently     Partners: Male   Other Topics  Concern     Parent/sibling w/ CABG, MI or angioplasty before 65F 55M? No       Family History   Problem Relation Age of Onset     Cancer Brother      Glaucoma Mother      Alzheimer Disease Brother      Macular Degeneration No family hx of        ROS: 10 point ROS neg other than the symptoms noted above in the HPI.    Vital Signs: BP (!) 152/86   Pulse 101     Physical Examination:  Constitutional:  Alert, well nourished, NAD.  HEENT: Normocephalic, atraumatic.   Pulmonary:  Without shortness of breath, normal effort.   Cardiovascular:  No pitting edema of BLE.      Neurological:  Awake  Alert  Oriented x 3  Speech clear  Cranial nerves II - XII grossly intact  PERRL  EOMI  Motor exam:  Iliopsoas  (hip flexion)               Right: 5/5  Left:  5/5  Quadriceps  (knee extension)       Right:  5/5  Left:  5/5  Hamstrings  (knee flexion)            Right:  5/5  Left:  5/5  Gastroc Soleus  (PF)                          Right:  5/5  Left:  5/5  Tibialis Ant  (DF)                          Right:  5/5  Left:  5/5  EHL                          Right:  5/5  Left:  5/5         Sensation normal to BLE    Reflexes are 2+ in the patellar and Achilles. There is no clonus.     Musculoskeletal:  Gait: Able to stand from a seated position. Normal non-antalgic, non-myelopathic gait.   No tenderness of the spine or paraspinous muscles.  Hip height is symmetrical.  Negative SI joint tenderness bilaterally.  Negative straight leg raise bilaterally.      Imaging:   LUMBAR SPINE TWO TO THREE VIEWS December 29, 2022 1:01 PM   IMPRESSION: Bones appear osteopenic which limits evaluation by plain  film. New deformity at the superior L1 endplate which is  age-indeterminate, acute fracture is not entirely excluded by plain  film. Marked degenerative endplate changes and loss of disc height at  L2-L3. Mild/moderate degenerative endplate changes and loss of disc at  the other levels. Moderate facet hypertrophy in the lower lumbar  spine.  Degenerative findings have progressed since prior MR 2/25/2014.    XR HIP LEFT 2-3 VIEWS   12/29/2022 1:01 PM    IMPRESSION: Left hip joint space is preserved. No acute fracture or  dislocation is seen. There is osseous demineralization.    Assessment/Plan:   87 year old female with history of osteoporosis who presents for evaluation of left hip pain. Symptoms started about 2 weeks ago, denies any trauma or injuries at onset. Lumbar spine XR shows multilevel degenerative changes and age indeterminate L1 superior endplate deformity. We discussed symptoms, imaging, and next steps.     - Recommend lumbar spine MRI for further evaluation  - Recommend an updated DEXA bone scan (most recent was completed in 2014)  - Patient is not interested in wearing a brace or starting PT  - Will call patient with MRI and DEXA results    Advised patient to call our clinic with any questions or concerns. Discussed red flag symptoms and advised to seek medical attention if these develop. Patient voiced understanding and agreement.      Naida Minor CNP  [facility] Neurosurgery  [address] [address]  Tel [phone]  Pager [phone]          Again, thank you for allowing me to participate in the care of your patient.        Sincerely,        Naida Minor, DANETTE

## 2023-01-05 NOTE — NURSING NOTE
Helena Eldridge is a 87 year old female who presents for:  Chief Complaint   Patient presents with     Consult     Abnormal x-ray of lumbar spine /Luz Wilson PA-C/ XR/ [facility]/ ortho con        Initial Vitals:  BP (!) 152/86   Pulse 101  Estimated body mass index is 25.43 kg/m  as calculated from the following:    Height as of 8/18/22: 1.524 m (5').    Weight as of 12/29/22: 59.1 kg (130 lb 3.2 oz).. There is no height or weight on file to calculate BSA. BP completed using cuff size: regular  Moderate Pain (5)    Nursing Comments:     Kristen Kauffman MA

## 2023-01-11 ENCOUNTER — ANCILLARY PROCEDURE (OUTPATIENT)
Dept: BONE DENSITY | Facility: CLINIC | Age: 88
End: 2023-01-11
Attending: NURSE PRACTITIONER
Payer: COMMERCIAL

## 2023-01-11 DIAGNOSIS — R93.7 ABNORMAL X-RAY OF LUMBAR SPINE: ICD-10-CM

## 2023-01-11 DIAGNOSIS — M81.0 OSTEOPOROSIS, UNSPECIFIED OSTEOPOROSIS TYPE, UNSPECIFIED PATHOLOGICAL FRACTURE PRESENCE: ICD-10-CM

## 2023-01-11 PROCEDURE — 77080 DXA BONE DENSITY AXIAL: CPT | Performed by: INTERNAL MEDICINE

## 2023-01-12 ENCOUNTER — ANCILLARY PROCEDURE (OUTPATIENT)
Dept: MRI IMAGING | Facility: CLINIC | Age: 88
End: 2023-01-12
Attending: NURSE PRACTITIONER
Payer: COMMERCIAL

## 2023-01-12 DIAGNOSIS — R93.7 ABNORMAL X-RAY OF LUMBAR SPINE: ICD-10-CM

## 2023-01-12 PROCEDURE — 72148 MRI LUMBAR SPINE W/O DYE: CPT | Mod: TC | Performed by: RADIOLOGY

## 2023-01-16 ENCOUNTER — TELEPHONE (OUTPATIENT)
Dept: NEUROSURGERY | Facility: CLINIC | Age: 88
End: 2023-01-16
Payer: COMMERCIAL

## 2023-01-16 NOTE — TELEPHONE ENCOUNTER
Called patient to review imaging results as stated below. No answer, awaiting call back to discuss results and next steps.      DEXA bone scan 1/11/23: Osteoporosis     MRI OF THE LUMBAR SPINE WITHOUT CONTRAST 1/12/2023 2:08 PM   IMPRESSION:  1. Diffuse degenerative change of the lumbar spine as detailed above.  2. Chronic mild compression deformity of the superior aspect of the L1  vertebral body. No recent fractures.  3. Moderate spinal canal stenoses at L3-L4 and L4-L5. No other  significant spinal canal narrowing of the lumbar spine.  4. No high-grade neural foraminal stenosis at any level of the lumbar  spine.     Naida Minor [facility]surgery  [address] [address]  Tel [phone]  Pager [phone]

## 2023-01-18 ENCOUNTER — TELEPHONE (OUTPATIENT)
Dept: NEUROSURGERY | Facility: CLINIC | Age: 88
End: 2023-01-18

## 2023-01-18 NOTE — TELEPHONE ENCOUNTER
Patient is returning call that she missed from clinic and wants to discuss imaging results. Thank you ~

## 2023-01-23 NOTE — TELEPHONE ENCOUNTER
Called patient to review imaging results as stated below. She reports prior left hip pain is mild and not as bothersome compared to last office visit. She denies any back pain, leg pain, numbness, weakness, or other symptoms/concerns at this time. She remains active and walks 3,000 steps per day at home. She continues to take vitamin D and calcium per PCP.      DEXA bone scan 1/11/23: Osteoporosis      MRI OF THE LUMBAR SPINE WITHOUT CONTRAST 1/12/2023 2:08 PM   IMPRESSION:  1. Diffuse degenerative change of the lumbar spine as detailed above.  2. Chronic mild compression deformity of the superior aspect of the L1  vertebral body. No recent fractures.  3. Moderate spinal canal stenoses at L3-L4 and L4-L5. No other  significant spinal canal narrowing of the lumbar spine.  4. No high-grade neural foraminal stenosis at any level of the lumbar  Spine.    Plan:  - We discussed starting PT, but patient declined at this time. She feels comfortable with home exercises for now.   - Continue follow-up with PCP for osteoporosis management.   - Follow up with our NSG clinic as needed  - Advised patient to call clinic if symptoms persist, change, or worsen. Patient voiced agreement with plan.      Naida Minor [facility] Neurosurgery  [address] [address]  Tel [phone]  Pager [phone]

## 2023-01-23 NOTE — TELEPHONE ENCOUNTER
Patient calling to get her Dexa and MRI results.    Says she did not get any phone call, verified number is correct as pulled up in demographics.    She says she's been sitting within 5 feet of her phone, is plugged in and charged and she has her hearing aides in.    No plan noted, neurosurgery provider will need to talk to patient about results of Dexa and MRI and plan.    Routed to provider to call patient again.    Yanique Fritz RN  [place]

## 2023-01-23 NOTE — TELEPHONE ENCOUNTER
Called patient to review imaging results as stated below. No answer, awaiting call back to discuss results and next steps.       DEXA bone scan 1/11/23: Osteoporosis      MRI OF THE LUMBAR SPINE WITHOUT CONTRAST 1/12/2023 2:08 PM   IMPRESSION:  1. Diffuse degenerative change of the lumbar spine as detailed above.  2. Chronic mild compression deformity of the superior aspect of the L1  vertebral body. No recent fractures.  3. Moderate spinal canal stenoses at L3-L4 and L4-L5. No other  significant spinal canal narrowing of the lumbar spine.  4. No high-grade neural foraminal stenosis at any level of the lumbar  spine.     Naida Minor [facility] Neurosurgery  [address] [address]  Tel [phone]  Pager [phone]

## 2023-01-30 ENCOUNTER — PATIENT OUTREACH (OUTPATIENT)
Dept: NEPHROLOGY | Facility: CLINIC | Age: 88
End: 2023-01-30
Payer: COMMERCIAL

## 2023-01-30 NOTE — TELEPHONE ENCOUNTER
Spoke with patient regarding outreach. Patient states she feel good which she attributes to be active. Patient had no complaints but does report that due to her chemotherapy medications she has a diagnosis of psoriasis which patient states is all over her body. Patient is being followed by dermatology. Patient does not have anything else to report but was happy to have a conversation.   RAUL Knutson

## 2023-02-09 ENCOUNTER — OFFICE VISIT (OUTPATIENT)
Dept: DERMATOLOGY | Facility: CLINIC | Age: 88
End: 2023-02-09
Payer: COMMERCIAL

## 2023-02-09 DIAGNOSIS — L40.9 PSORIASIS: Primary | ICD-10-CM

## 2023-02-09 DIAGNOSIS — L30.4 INTERTRIGO: ICD-10-CM

## 2023-02-09 DIAGNOSIS — L90.0 LICHEN SCLEROSUS ET ATROPHICUS: ICD-10-CM

## 2023-02-09 PROCEDURE — 99214 OFFICE O/P EST MOD 30 MIN: CPT | Performed by: NURSE PRACTITIONER

## 2023-02-09 NOTE — LETTER
2/9/2023         RE: Helena Eldridge  [address] [id number]        Dear Colleague,    Thank you for referring your patient, Helena Eldridge, to the [facility]. Please see a copy of my visit note below.    [facility] Dermatology Note  Encounter Date: Feb 9, 2023  Office Visit     Reviewed patients past medical history and pertinent chart review prior to patients visit today.     Dermatology Problem List:  1. Psoriasis vulgaris, will plan to start humira February, 2023. Hydrocortisone 2.5% ointment and clobetasol ointement for flares.   2. Psoriasiform dermatitis, scalp. Ketoconazole and fluocinonide 0.05% solution  Given 11/10/22 and is well controlled  3.  Lichen sclerosis, uses clobetasol ointment twice daily for the month of February until follow-up  4.  Intertrigo, has ketoconazole 2% cream and hydrocortisone 2.5% cream for flares  ____________________________________________    Assessment & Plan:     Plaque psoriasis.   -not well controlled with topical steroid use.   -discussed methotrexate but due to frequent lab monitoring and patients lack of transportation and difficulty getting to appointments we decided this would not be a good drug for her  - Start Humira 40mg/0.8ml, initial loading dose of 80mg on day 1, then inject 40mg/0.8mL subcutaneously on day 8 and then inject 40mg/0.8mL SubQ every two weeks thereafter.  - Edu that patient may not see benefit from the medication for 3-6 mo. Edu on potential side effects of immunosuppression as well as injection site reactions, increased risk of infections, heart failure, and neutropenia.  - Safety Labs: completed 11/10/22    -scalp: Seborrheic dermatitis vs psoriasis of scalp. She is well controlled alternating ketoconazole 2% shampoo and T-gel shampoo leaving each on for 3-5 minutes before rinsing. Also has fluocinonide 0.05% solution which she uses as needed for flares of itching. Councled  about use and side effects of thinning of the skin, striae, erythema, and worsening of rash.       -Intertrigo versus psoriasis in the inframammary folds, buttock, and groin folds.  Continue using ketoconazole once daily, use the hydrocortisone 2.5% ointment twice daily and decrease as she improves. Use petroleum jelly/vaseline as a barrier cream in the groin/buttock to moisturize the skin when you use the restroom if it is irritated.     -All other areas of psoriasis (extremities and trunk) use the clobetasol ointment twice daily to these areas for up to 4 weeks    # vulvar lichen sclerosus.  Discussed diagnosis with patient.  Discussed that this is usually a chronic diagnosis with frequent recurrences and remissions of signs and symptoms.  Discussed that lichen sclerosus has the potential to distort or destroy vulvar anatomy but that the condition is manageable and topical corticosteroid treatments are highly effective and can inhibit progression of the disease.  Discussed importance of cessation of scratching to minimize exposures to factors that may exacerbate symptoms.  Lichen sclerosus may increase the risk of malignancy in adult women, stressed the importance of self examinations for more thickened areas or sores that do not heal especially after intensive corticosteroid treatments twice daily x1 month.  Patient will return to clinic prior to normally scheduled follow-up if she notices any of the signs and symptoms.  Discussed that this is not a contagious condition and would not be spread to sexual partners.  Discussed the importance of follow-ups at least yearly to monitor for signs and symptoms of malignancy and reduce risk of scarring.  Advised that it is okay to use emollients or barrier cream such as petroleum jelly, Vaseline, or zinc oxide paste's for discomfort.    -Given prescription for clobetasol 0.05% ointment to be used twice daily for 1 month and then I would like to see her back in follow-up to  recheck.    Follow up in 4-6 weeks for recheck.       Meghan Sharma, APRN CNP on 11/10/2022 at 1:50 PM   _______________________________________    CC: Derm Problem (Ketoconazole and HC ointment not effective. Very itchy and disrupting sleep/Psoriasis on abdomen and behind kneed flaring and uncomfortable /Scalp dermatitis controlled and stopped the fluocinonide 0.05% and still doing the Ketoconazole shampoo)    HPI:  Ms. Helena Eldridge is a(n) 87 year old female who presents today for follow-up of rashes. She has had psoriasis that continues to be uncontrolled with topical steroids.  We have discussed starting Humira back in November but she did not want to use this as she was improving but now she is flared and is considering it again.  She has clobetasol ointment which she uses for flares but does not seem to be helping.  She also has ketoconazole and hydrocortisone cream that she uses in the groin folds and under the breast but this is not well controlled either.  She does have ketoconazole 2% shampoo and fluocinonide solution that she uses for her scalp but this is well controlled and she is happy with that improvement.     Patient is otherwise feeling well, without additional skin concerns.    Patient does not have a licence to drive and does not have reliable transportation which would be an issue with regular frequent lab monitoring. She very rarely drinks any alcohol,     Physical Exam:  SKIN: Total skin including the undergarment areas was performed. The exam included the head/face, neck, both arms, chest, back, abdomen, both legs, digits and/or nails.   - erythematous well defined thick scaly plaques scattered on buttock, superior thighs, left elbow, under breasts, dorsum hands,. Umbilicus, low abdomen  -Erythema without scale on the rectum, groin folds, inframammary folds, gluteal crease and abdominal folds    - No other lesions of concern on areas examined.     Medications:  Current Outpatient  Medications   Medication     adalimumab (HUMIRA *CF*) 40 MG/0.4ML pen kit     adalimumab (HUMIRA *CF*) 80 MG/0.8ML pen kit     clobetasol (TEMOVATE) 0.05 % external ointment     clotrimazole-betamethasone (LOTRISONE) 1-0.05 % external cream     fluocinonide (LIDEX) 0.05 % external solution     hydrocortisone 2.5 % cream     nystatin (MYCOSTATIN) 886787 UNIT/GM external ointment     nystatin-triamcinolone (MYCOLOG) 488312-3.1 UNIT/GM-% external ointment     allopurinol (ZYLOPRIM) 100 MG tablet     ammonium lactate (AMLACTIN) 12 % external cream     Calcium Carbonate-Vitamin D (CALCIUM + D) 600-200 MG-UNIT per tablet     diazepam (VALIUM) 5 MG tablet     diphenhydrAMINE (BENADRYL) 25 MG tablet     DOCUSATE SODIUM PO     Emollient (CERAVE DAILY MOISTURIZING EX)     famotidine (PEPCID) 20 MG tablet     FLUAD QUADRIVALENT 0.5 ML PRSY injection     furosemide (LASIX) 20 MG tablet     ketoconazole (NIZORAL) 2 % external cream     ketoconazole (NIZORAL) 2 % external shampoo     LANsoprazole (PREVACID) 30 MG DR capsule     levothyroxine (SYNTHROID/LEVOTHROID) 100 MCG tablet     LORazepam (ATIVAN) 0.5 MG tablet     Multiple Vitamins-Minerals (OCUVITE ADULT 50+) CAPS     ondansetron (ZOFRAN-ODT) 4 MG ODT tab     rOPINIRole (REQUIP) 0.25 MG tablet     STATIN NOT PRESCRIBED, INTENTIONAL,     No current facility-administered medications for this visit.     Facility-Administered Medications Ordered in Other Visits   Medication     perflutren diluted in saline (DEFINITY) injection 5 mL      Past Medical History:   Patient Active Problem List   Diagnosis     CKD (chronic kidney disease) stage 4, GFR 15-29 ml/min (H)     Glucose intolerance (impaired glucose tolerance)     Kidney stones     Advance care planning     Hypertension goal BP (blood pressure) < 140/90     Lichen sclerosus et atrophicus of the vulva     Solitary kidney, acquired     Senile osteoporosis     Osteoarthritis of right knee     Medial meniscus tear     Cataracts,  both eyes     Macular degeneration of both eyes, right eye greater than left eye     COPD, severe (H)     IPF (idiopathic pulmonary fibrosis) (H)     Other specified hypothyroidism     Idiopathic chronic gout     Adjustment disorder with anxiety     Non-small cell cancer of right lung (H)     Psoriasis     Past Medical History:   Diagnosis Date     Carpal tunnel syndrome      CKD (chronic kidney disease)     kidney stone with infection     CKD (chronic kidney disease) stage 4, GFR 15-29 ml/min (H) 12/2/2010    History of kidney stone with infection.  Creatinine 1.3 - 1.6 (12/2/10, [facility])   -- she can't take ACE / ARB due to side effects.  Will continue with good BP control (12/3/15, [facility])      COPD, severe (H) 2/16/2015    Seen by pulmonary 2013.        Deep vein thrombosis (DVT) (H)     1972     DVT [id number]     Glucose intolerance      H/O partial nephrectomy      Heel spur      Hypertension goal BP (blood pressure) < 140/90 11/28/2011     Hypothyroidism      Idiopathic chronic gout 5/14/2015     Immunosuppression (H) 3/7/2018     IPF (idiopathic pulmonary fibrosis) (H) 2/25/2015    Mild, at bases per CXR 2/2015 (2/15/15, [facility])      Kidney stones      Lichen sclerosus      Lichen sclerosus et atrophicus of the vulva 5/8/2013    Clobetasol bid is the recommended remedy     Macular degeneration      Non-small cell cancer of right lung (H) 3/7/2018     Nonsenile cataract      Osteoarthritis      Osteoarthritis of right knee 3/11/2014     Osteoporosis      Other specified hypothyroidism 5/14/2015     PID      Pulmonary embolism (H)     1970     Senile osteoporosis 3/11/2014     Unspecified hypothyroidism      Vitiligo        CC No referring provider defined for this encounter. on close of this encounter.       Again, thank you for allowing me to participate in the care of your patient.        Sincerely,        Meghan Sharma, APRN CNP

## 2023-02-09 NOTE — PATIENT INSTRUCTIONS
Clobetasol ointment 2 times daily everyday until I see you back in March inside the vagina. Use it twice daily to the psoriasis on the arms, legs, or abdomen.     Hydrocortisone use twice daily when rash in groin folds, buttock fold, and under breast folds is irritated. Use the ketoconazole cream once daily to these areas whether you have rash or not.     Start humira when you can get it from the pharmacy. Take out of the fridge 1 hour before injecting it so it isn't so cold, it might hurt less. 2 injections on the first time you start then one injection every 2 weeks.

## 2023-02-09 NOTE — PROGRESS NOTES
[facility] Dermatology Note  Encounter Date: Feb 9, 2023  Office Visit     Reviewed patients past medical history and pertinent chart review prior to patients visit today.     Dermatology Problem List:  1. Psoriasis vulgaris, will plan to start humira February, 2023. Hydrocortisone 2.5% ointment and clobetasol ointement for flares.   2. Psoriasiform dermatitis, scalp. Ketoconazole and fluocinonide 0.05% solution  Given 11/10/22 and is well controlled  3.  Lichen sclerosis, uses clobetasol ointment twice daily for the month of February until follow-up  4.  Intertrigo, has ketoconazole 2% cream and hydrocortisone 2.5% cream for flares  ____________________________________________    Assessment & Plan:     Plaque psoriasis.   -not well controlled with topical steroid use.   -discussed methotrexate but due to frequent lab monitoring and patients lack of transportation and difficulty getting to appointments we decided this would not be a good drug for her  - Start Humira 40mg/0.8ml, initial loading dose of 80mg on day 1, then inject 40mg/0.8mL subcutaneously on day 8 and then inject 40mg/0.8mL SubQ every two weeks thereafter.  - Edu that patient may not see benefit from the medication for 3-6 mo. Edu on potential side effects of immunosuppression as well as injection site reactions, increased risk of infections, heart failure, and neutropenia.  - Safety Labs: completed 11/10/22    -scalp: Seborrheic dermatitis vs psoriasis of scalp. She is well controlled alternating ketoconazole 2% shampoo and T-gel shampoo leaving each on for 3-5 minutes before rinsing. Also has fluocinonide 0.05% solution which she uses as needed for flares of itching. Councled about use and side effects of thinning of the skin, striae, erythema, and worsening of rash.       -Intertrigo versus psoriasis in the inframammary folds, buttock, and groin folds.  Continue using ketoconazole once daily, use the hydrocortisone 2.5% ointment  twice daily and decrease as she improves. Use petroleum jelly/vaseline as a barrier cream in the groin/buttock to moisturize the skin when you use the restroom if it is irritated.     -All other areas of psoriasis (extremities and trunk) use the clobetasol ointment twice daily to these areas for up to 4 weeks    # vulvar lichen sclerosus.  Discussed diagnosis with patient.  Discussed that this is usually a chronic diagnosis with frequent recurrences and remissions of signs and symptoms.  Discussed that lichen sclerosus has the potential to distort or destroy vulvar anatomy but that the condition is manageable and topical corticosteroid treatments are highly effective and can inhibit progression of the disease.  Discussed importance of cessation of scratching to minimize exposures to factors that may exacerbate symptoms.  Lichen sclerosus may increase the risk of malignancy in adult women, stressed the importance of self examinations for more thickened areas or sores that do not heal especially after intensive corticosteroid treatments twice daily x1 month.  Patient will return to clinic prior to normally scheduled follow-up if she notices any of the signs and symptoms.  Discussed that this is not a contagious condition and would not be spread to sexual partners.  Discussed the importance of follow-ups at least yearly to monitor for signs and symptoms of malignancy and reduce risk of scarring.  Advised that it is okay to use emollients or barrier cream such as petroleum jelly, Vaseline, or zinc oxide paste's for discomfort.    -Given prescription for clobetasol 0.05% ointment to be used twice daily for 1 month and then I would like to see her back in follow-up to recheck.    Follow up in 4-6 weeks for recheck.       Meghan Sharma, MERARI CNP on 11/10/2022 at 1:50 PM   _______________________________________    CC: Derm Problem (Ketoconazole and HC ointment not effective. Very itchy and disrupting sleep/Psoriasis  on abdomen and behind kneed flaring and uncomfortable /Scalp dermatitis controlled and stopped the fluocinonide 0.05% and still doing the Ketoconazole shampoo)    HPI:  Ms. Helena Eldridge is a(n) 87 year old female who presents today for follow-up of rashes. She has had psoriasis that continues to be uncontrolled with topical steroids.  We have discussed starting Humira back in November but she did not want to use this as she was improving but now she is flared and is considering it again.  She has clobetasol ointment which she uses for flares but does not seem to be helping.  She also has ketoconazole and hydrocortisone cream that she uses in the groin folds and under the breast but this is not well controlled either.  She does have ketoconazole 2% shampoo and fluocinonide solution that she uses for her scalp but this is well controlled and she is happy with that improvement.     Patient is otherwise feeling well, without additional skin concerns.    Patient does not have a licence to drive and does not have reliable transportation which would be an issue with regular frequent lab monitoring. She very rarely drinks any alcohol,     Physical Exam:  SKIN: Total skin including the undergarment areas was performed. The exam included the head/face, neck, both arms, chest, back, abdomen, both legs, digits and/or nails.   - erythematous well defined thick scaly plaques scattered on buttock, superior thighs, left elbow, under breasts, dorsum hands,. Umbilicus, low abdomen  -Erythema without scale on the rectum, groin folds, inframammary folds, gluteal crease and abdominal folds    - No other lesions of concern on areas examined.     Medications:  Current Outpatient Medications   Medication     adalimumab (HUMIRA *CF*) 40 MG/0.4ML pen kit     adalimumab (HUMIRA *CF*) 80 MG/0.8ML pen kit     clobetasol (TEMOVATE) 0.05 % external ointment     clotrimazole-betamethasone (LOTRISONE) 1-0.05 % external cream     fluocinonide  (LIDEX) 0.05 % external solution     hydrocortisone 2.5 % cream     nystatin (MYCOSTATIN) 518305 UNIT/GM external ointment     nystatin-triamcinolone (MYCOLOG) 769196-5.1 UNIT/GM-% external ointment     allopurinol (ZYLOPRIM) 100 MG tablet     ammonium lactate (AMLACTIN) 12 % external cream     Calcium Carbonate-Vitamin D (CALCIUM + D) 600-200 MG-UNIT per tablet     diazepam (VALIUM) 5 MG tablet     diphenhydrAMINE (BENADRYL) 25 MG tablet     DOCUSATE SODIUM PO     Emollient (CERAVE DAILY MOISTURIZING EX)     famotidine (PEPCID) 20 MG tablet     FLUAD QUADRIVALENT 0.5 ML PRSY injection     furosemide (LASIX) 20 MG tablet     ketoconazole (NIZORAL) 2 % external cream     ketoconazole (NIZORAL) 2 % external shampoo     LANsoprazole (PREVACID) 30 MG DR capsule     levothyroxine (SYNTHROID/LEVOTHROID) 100 MCG tablet     LORazepam (ATIVAN) 0.5 MG tablet     Multiple Vitamins-Minerals (OCUVITE ADULT 50+) CAPS     ondansetron (ZOFRAN-ODT) 4 MG ODT tab     rOPINIRole (REQUIP) 0.25 MG tablet     STATIN NOT PRESCRIBED, INTENTIONAL,     No current facility-administered medications for this visit.     Facility-Administered Medications Ordered in Other Visits   Medication     perflutren diluted in saline (DEFINITY) injection 5 mL      Past Medical History:   Patient Active Problem List   Diagnosis     CKD (chronic kidney disease) stage 4, GFR 15-29 ml/min (H)     Glucose intolerance (impaired glucose tolerance)     Kidney stones     Advance care planning     Hypertension goal BP (blood pressure) < 140/90     Lichen sclerosus et atrophicus of the vulva     Solitary kidney, acquired     Senile osteoporosis     Osteoarthritis of right knee     Medial meniscus tear     Cataracts, both eyes     Macular degeneration of both eyes, right eye greater than left eye     COPD, severe (H)     IPF (idiopathic pulmonary fibrosis) (H)     Other specified hypothyroidism     Idiopathic chronic gout     Adjustment disorder with anxiety      Non-small cell cancer of right lung (H)     Psoriasis     Past Medical History:   Diagnosis Date     Carpal tunnel syndrome      CKD (chronic kidney disease)     kidney stone with infection     CKD (chronic kidney disease) stage 4, GFR 15-29 ml/min (H) 12/2/2010    History of kidney stone with infection.  Creatinine 1.3 - 1.6 (12/2/10, [facility])   -- she can't take ACE / ARB due to side effects.  Will continue with good BP control (12/3/15, [facility])      COPD, severe (H) 2/16/2015    Seen by pulmonary 2013.        Deep vein thrombosis (DVT) (H)     1972     DVT [id number]     Glucose intolerance      H/O partial nephrectomy      Heel spur      Hypertension goal BP (blood pressure) < 140/90 11/28/2011     Hypothyroidism      Idiopathic chronic gout 5/14/2015     Immunosuppression (H) 3/7/2018     IPF (idiopathic pulmonary fibrosis) (H) 2/25/2015    Mild, at bases per CXR 2/2015 (2/15/15, [facility])      Kidney stones      Lichen sclerosus      Lichen sclerosus et atrophicus of the vulva 5/8/2013    Clobetasol bid is the recommended remedy     Macular degeneration      Non-small cell cancer of right lung (H) 3/7/2018     Nonsenile cataract      Osteoarthritis      Osteoarthritis of right knee 3/11/2014     Osteoporosis      Other specified hypothyroidism 5/14/2015     PID      Pulmonary embolism (H)     1970     Senile osteoporosis 3/11/2014     Unspecified hypothyroidism      Vitiligo        CC No referring provider defined for this encounter. on close of this encounter.

## 2023-02-13 ENCOUNTER — TELEPHONE (OUTPATIENT)
Dept: DERMATOLOGY | Facility: CLINIC | Age: 88
End: 2023-02-13
Payer: COMMERCIAL

## 2023-02-13 DIAGNOSIS — L30.4 INTERTRIGO: ICD-10-CM

## 2023-02-20 DIAGNOSIS — B37.2 CANDIDAL INTERTRIGO: ICD-10-CM

## 2023-02-20 RX ORDER — NYSTATIN 100000 U/G
OINTMENT TOPICAL
Qty: 30 G | Refills: 0 | Status: SHIPPED | OUTPATIENT
Start: 2023-02-20 | End: 2023-03-16

## 2023-02-20 NOTE — TELEPHONE ENCOUNTER
Liaison called patient to discuss Humira. Described free drug program as an alternative to high cost.    Patient expressed that she's not interested in using Humira; that she doesn't want to use an injectable medication when she feels that the topicals she's been prescribed have been working well.    Patient additionally mentioned that the cream she applies under breasts (her ketoconazole cream) has run out completely. She stated that her [facility] sent over a request for more, but no prescription has been sent as of yet.    Liaison expressed that she would let the office know, and either a new prescription can be sent or they can have someone call her to discuss.    Marta Correa [facility] [email or web address]  Phone: [phone]  Fax: [phone]

## 2023-02-22 RX ORDER — KETOCONAZOLE 20 MG/G
CREAM TOPICAL DAILY
Qty: 240 G | Refills: 11 | Status: SHIPPED | OUTPATIENT
Start: 2023-02-22 | End: 2023-05-22

## 2023-02-23 ENCOUNTER — TELEPHONE (OUTPATIENT)
Dept: DERMATOLOGY | Facility: CLINIC | Age: 88
End: 2023-02-23
Payer: COMMERCIAL

## 2023-02-23 NOTE — TELEPHONE ENCOUNTER
[facility] Call Center    Phone Message    May a detailed message be left on voicemail: yes     Reason for Call: Other: Patient would like to let Meghan Sharma know that her pharmacy is out of 4 of the 5 meds she needs and please get them to her ASAP and she wants the largest  tubes possible for each. Thank you     Action Taken: Message routed to:  [facility] Derm    Travel Screening: Not Applicable

## 2023-02-24 NOTE — TELEPHONE ENCOUNTER
Please call patient.     Is pharmacy ordering them for her? Does she want them transferred, because she can call whatever pharmacy she wants to get them at and ask them to transfer those prescriptions or I can send to a new pharmacy but need to know where.

## 2023-02-24 NOTE — TELEPHONE ENCOUNTER
Called patient back, no answer and no voicemail.  Sent "[employer]" message.     Gail GASPAR RN  [facility] Dermatology  [phone]

## 2023-02-27 NOTE — TELEPHONE ENCOUNTER
Pt read [employer] message on 2/24/23. Has not called back or responded to message.    Thank you,  Elsa COBOS RN  Dermatology   [phone]

## 2023-02-28 NOTE — TELEPHONE ENCOUNTER
Pt calling back because she was not able to  one of her medications. Also, she wanted to discuss the shots that [place]ia mentioned and next steps for that or renewing the other scripts. FYI Wednesday she available after 1PM. Thank you.

## 2023-02-28 NOTE — TELEPHONE ENCOUNTER
Called [facility] pharmacy in [place], [place] and spoke to the pharmacist. She stated that her computer system automatically processed Helena's ketoconazole as a 90 day supply. Pharmacist suggested that large quantities be sent with the day supply in the sig, so that the pharmacy most certainly receives the information and insurance cannot negate it. (Large quantities of topicals are sometimes flagged by insurance if processed as shorter day supplies, which can result in the pharmacy not getting paid.)    The pharmacist took note of liaison's verbal clarification that the Ketoconazole rx they had is for a 28 day supply. She stated that if the patient wanted to pick some up, they have one tube in stock if needed asap.    Called patient and discussed topical cream and medication with her. Recapped the situation, how liatolu had discussed Humira with her early last week, and described the ongoing cost. ($2000, usually followed by about $1400 and then about $800 monthly, with the cost going down but never really getting especially affordable.) We talked about Free Drug program eligibility and how they place import on income but not assets. She feels she would qualify.    Liaison and patient agreed that we would have the HUMIRA FREE DRUG form for her to fill out at her upcoming appt 3/16 and she would bring in a copy of her SHAILA 1040 pgs 1 and 2, and we could get her application completed 3/16/23 and sent in that day.    Office staff: please print the patient application in the Media Tab on her visit 3/16, and have patient sign and complete the application.    Provider: please fill out provider application and have this faxed off with patient's application and her 1040.    Marta Correa, [facility] [email or web address]  Phone: [phone]  Fax: [phone]

## 2023-02-28 NOTE — TELEPHONE ENCOUNTER
S/w pt who states she has hydrocortisone 2.5% cream and nystatin ointment for under her breasts and groin which was prescribed by other providers.  Does not have the ketoconazole 2% cream that Gardenia Sharma prescribed on 2/22/23.  S/w Andrzej at [facility] pharmacy who states pt is not able to  the ketoconazole until 3/3.  Nurse advised will let pt know.    S/w pt and advised can  ketoconazole cream on 3/3.  Pt states [facility] is looking into the cost of the humira injections for her and she will call clinic once she hears from them.    Tabitha PALACIOS RN  [facility] Dermatology [place]  [phone]

## 2023-03-02 ENCOUNTER — TELEPHONE (OUTPATIENT)
Dept: DERMATOLOGY | Facility: CLINIC | Age: 88
End: 2023-03-02
Payer: COMMERCIAL

## 2023-03-02 DIAGNOSIS — L40.9 PSORIASIS: ICD-10-CM

## 2023-03-02 RX ORDER — HYDROCORTISONE 2.5 %
CREAM (GRAM) TOPICAL 2 TIMES DAILY
Qty: 60 G | Refills: 2 | OUTPATIENT
Start: 2023-03-02

## 2023-03-02 RX ORDER — HYDROCORTISONE 2.5 %
CREAM (GRAM) TOPICAL 2 TIMES DAILY
Qty: 60 G | Refills: 2 | Status: SHIPPED | OUTPATIENT
Start: 2023-03-02 | End: 2023-03-30

## 2023-03-02 NOTE — LETTER
[address] [address]  [facility] [phone]    ARELY RADHA  [address] [address]    Instructions for Topical Dermatology Medications    Clobetasol:     2 times a day until next appointment.    For inside the vagina and to psoriasis on arms, legs, or abdomen.    Hydrocortisone:    2 times a day as needed    For rash in groin folds, buttock folds, and under breast fold when irritated.    Ketoconazole:    Once a day under breast and groin folds whether you have a rash or not.

## 2023-03-02 NOTE — TELEPHONE ENCOUNTER
Routing refill request to provider for review/approval because:  Drug not on the [facility] refill protocol     Gail GASPAR RN  [facility] Dermatology  [phone]

## 2023-03-02 NOTE — TELEPHONE ENCOUNTER
[facility] Call Center    Phone Message    May a detailed message be left on voicemail: yes     Reason for Call: Other: Pt returning call for Nichole. Please call [phone]. Thanks!      Action Taken: Message routed to:  [facility] DERM    Travel Screening: Not Applicable

## 2023-03-02 NOTE — TELEPHONE ENCOUNTER
Spoke to patient and she states pharmacy told her she is out of refills for Clotrimazole and Hydrocortisone.     Hydrocortisone refill request has already been sent to provider in a different encounter from 03/02/23    Routing to provider, does patient need clotrimazole refills? I don't see this medication on the instructions of your last office visit note. Please review and advise    Thank you   Gail GASPAR RN  [facility] Dermatology  [phone]

## 2023-03-02 NOTE — TELEPHONE ENCOUNTER
Spoke with patient and reviewed patient instructions regarding clobetasol, hydrocortisone, ketoconazole from provider note on 2/9/23. Patient stated she had her after visit summary with her and I advised she refer to these instructions.     Patient states pharmacy does not have these medications, and I confirmed that the orders for these are in and she should have refills. I asked if she would like us to send them to another pharmacy- patient declined.   I spelled out the three topical medications so she could clarify with pharmacy what she needed as she was unsure what the medication names were. Patient expressed understanding.   Patient states she will reach out pharmacy to get these filled.     Gail GASPAR RN  [facility] Dermatology  [phone]

## 2023-03-02 NOTE — TELEPHONE ENCOUNTER
Last Written Prescription Date:  11/10/22  Last Fill Quantity: 60g,  # refills: 2   Last office visit: 2/9/2023 with prescribing provider:  MERARI Bedoya CNP      Future Office Visit:   Next 5 appointments (look out 90 days)    Mar 16, 2023 11:45 AM  (Arrive by 11:30 AM)  Return Visit with MERARI Vizcaino CNP  [facility] ([facility] [address] [facility] [address]  [phone]               Requested Prescriptions   Pending Prescriptions Disp Refills     hydrocortisone 2.5 % cream 60 g 2     Sig: Apply topically 2 times daily To rash in groin/buttock/under breasts, When rash is flared only then stop       There is no refill protocol information for this order

## 2023-03-03 NOTE — TELEPHONE ENCOUNTER
Huddled with provider regarding the clotrimazole. Patient does not need this.     Spoke with patient and let her know that she does not need to use the clotrimazole (it is an old prescription), I let her know Gardenia sent refill of Hydrocortisone and reviewed the patient instructions ( from 2/9/23) with her again to make sure she understood her topical medication regimen.   Patient expressed understanding.     I am mailing the patient a copy of the patient instructions in big font as she expressed having eyesight troubles and being unable to read her after visit summary.     Gail GASPAR RN  [facility] Dermatology  [phone]

## 2023-03-07 DIAGNOSIS — E03.8 OTHER SPECIFIED HYPOTHYROIDISM: ICD-10-CM

## 2023-03-08 RX ORDER — LEVOTHYROXINE SODIUM 100 UG/1
TABLET ORAL
Qty: 90 TABLET | Refills: 3 | Status: SHIPPED | OUTPATIENT
Start: 2023-03-08 | End: 2024-03-12

## 2023-03-09 ENCOUNTER — TELEPHONE (OUTPATIENT)
Dept: DERMATOLOGY | Facility: CLINIC | Age: 88
End: 2023-03-09
Payer: COMMERCIAL

## 2023-03-09 NOTE — TELEPHONE ENCOUNTER
Spoke with patient and reviewed instructions for her topical medications again as per patient instructions (from 2/9/23).    Patient expresses understanding, but has also called a few times to ask similar questions about the same topical medications.   Patient has upcoming appt with Gardenia 3/16/23, I asked if she is okay with bringing her son with her to this appointment so he can help her with the topical medication regimen as she seems to have a hard time keeping up with the different uses of them. Helena agrees and will ask her son to come with her to her next appointment.     Gail GASPAR RN  [facility] Dermatology  [phone]

## 2023-03-09 NOTE — TELEPHONE ENCOUNTER
[facility] Call Center    Phone Message    May a detailed message be left on voicemail: yes     Reason for Call: Other: Pt has questions about whether her medications are for any other area besides under her breast. Please call to advise.      Action Taken: Message routed to:  Other: [facility]    Travel Screening: Not Applicable

## 2023-03-09 NOTE — TELEPHONE ENCOUNTER
Spoke with patient and she requested I call her back later this afternoon     Gail GASPAR RN  [facility] Dermatology  [phone]

## 2023-03-16 ENCOUNTER — OFFICE VISIT (OUTPATIENT)
Dept: DERMATOLOGY | Facility: CLINIC | Age: 88
End: 2023-03-16
Payer: COMMERCIAL

## 2023-03-16 DIAGNOSIS — L90.0 LICHEN SCLEROSUS ET ATROPHICUS: ICD-10-CM

## 2023-03-16 DIAGNOSIS — L40.9 PSORIASIS: Primary | ICD-10-CM

## 2023-03-16 DIAGNOSIS — L30.4 INTERTRIGO: ICD-10-CM

## 2023-03-16 PROCEDURE — 99214 OFFICE O/P EST MOD 30 MIN: CPT | Performed by: NURSE PRACTITIONER

## 2023-03-16 NOTE — PROGRESS NOTES
[facility] Dermatology Note  Encounter Date: Mar 16, 2023  Office Visit     Reviewed patients past medical history and pertinent chart review prior to patients visit today.     Dermatology Problem List:  1. Psoriasis vulgaris, Hydrocortisone 2.5% ointment and clobetasol ointement for flares, patient is not consistent with usage and is not controlled 3/16/2023   2. Psoriasiform dermatitis, scalp. Ketoconazole and fluocinonide 0.05% solution  Given 11/10/22 and is well controlled  3.  Lichen sclerosis, has clobetasol ointment but it is unclear if she is using it consistently.  Still not well controlled 3/16/2023   4.  Intertrigo, has ketoconazole 2% cream and hydrocortisone 2.5% cream for flares, is flared, patient is confused about instructions and appointment 3/16/2023   ____________________________________________    Assessment & Plan:     Plaque psoriasis.   -not well controlled  -Patient does not want to start any oral or injectable medications today.  She feels like she can can keep this well controlled with clobetasol use.  She will use clobetasol 0.005% ointment on a twice daily as needed basis to any active plaques on the body. Councled about use and side effects of thinning of the skin, striae, erythema, and worsening of rash.       -scalp: Seborrheic dermatitis vs psoriasis of scalp. She is well controlled alternating ketoconazole 2% shampoo and T-gel shampoo leaving each on for 3-5 minutes before rinsing. Also has fluocinonide 0.05% solution which she uses as needed for flares of itching. Councled about use and side effects of thinning of the skin, striae, erythema, and worsening of rash.       -Intertrigo versus psoriasis in the inframammary folds, buttock, and groin folds.    -Use ketoconazole 2% cream and hydrocortisone 2.5% cream twice daily when there is redness and decrease as she improves. Use petroleum jelly/vaseline as a barrier cream in the groin/buttock to moisturize the skin  when you use the restroom if it is irritated.     -All other areas of psoriasis (extremities and trunk) use the clobetasol ointment twice daily to these areas for up to 4 weeks    # vulvar lichen sclerosus.  Discussed diagnosis with patient.  Discussed that this is usually a chronic diagnosis with frequent recurrences and remissions of signs and symptoms.  Discussed that lichen sclerosus has the potential to distort or destroy vulvar anatomy but that the condition is manageable and topical corticosteroid treatments are highly effective and can inhibit progression of the disease.  Discussed importance of cessation of scratching to minimize exposures to factors that may exacerbate symptoms.  Lichen sclerosus may increase the risk of malignancy in adult women, stressed the importance of self examinations for more thickened areas or sores that do not heal especially after intensive corticosteroid treatments twice daily x1 month.  Patient will return to clinic prior to normally scheduled follow-up if she notices any of the signs and symptoms.  Discussed that this is not a contagious condition and would not be spread to sexual partners.  Discussed the importance of follow-ups at least yearly to monitor for signs and symptoms of malignancy and reduce risk of scarring.  Advised that it is okay to use emollients or barrier cream such as petroleum jelly, Vaseline, or zinc oxide paste's for discomfort.    -Instructed to use clobetasol 0.05% ointment twice daily until follow up.  I do not think she has been regularly using this and I do not see signs of thinning of the skin.  The rash is still active, she still has erythema and white lacy atrophy    Follow up in 2 months  for recheck.     IF PATIENT CALLS FOR CLARIFICATION OF MEDICATION USE HERE IS SIMPLIFIED INSTRUCTIONS:        Clobetasol:     2 times a day until next appointment.    For inside the vagina and to psoriasis on arms, legs, or abdomen.     Hydrocortisone mixed  with ketoconazole 2% cream:    2 times a day as needed    For rash in groin folds, buttock folds, and under breast fold when irritated.    Does not need to use nystatin anymore     Ketoconazole:    Once a day under breast and groin folds whether you have a rash or not.       If scalp is flaring: Alternate use of ketoconazole 2% shampoo and T-gel shampoo leaving each on for 3-5 minutes before rinsing. Also has fluocinonide 0.05% solution which she uses 1-2 times daily as needed for flares of itching      Meghan Sharma, MERARI CNP on 3/16/2023 at 12:05 PM     In addition to the examination 35 minutes of the appointment were spent evaluating and developing a treatment plan for her concern(s) including pathophysiology, medication management, side effects of medication, and product review and charting.   _______________________________________    CC: Derm Problem (Psoriasis- not on Humira due to not having covered. Feels like there has been improvement and does not care to go on/Keto 2% cream not covered any longer need substitute/Clobetasol ointment-currently using on buttock and groin/Nystatin under breasts//)    HPI:  Ms. Helena Eldridge is a(n) 87 year old female who presents today for follow-up of intertrigo and psoriasis and lichen sclerosis.  Patient has several medications and has been very confused about her instructions.  Since her last appointment patient has called several times wanting clarification of instructions.  My nurse has reviewed the instructions with her several times as well as writing out basic instructions of which medication to use at what times and and what areas to use them on, and mailed this to her.  She brings this into clinic today.  She still seems confused about medication use.  We asked that she bring her son in to her appointment today but no one came into the appointment with her today.  She said that her son was outside in the car on a virtual meeting and that she did not need her  son to be with her.    She has had psoriasis that continues to be managed with topical steroids although it is difficult to gauge how often she is actually putting the steroids on versus any other creams.   But she has broken out with psoriasis on the trunk and extremities.  We have discussed starting Humira twice but patient goes back and forth whether she wants to do this or not.  Today states she says she is not needing this as she feels like she can control it okay with topical medications.      The intertrigo she has under the breasts, abdominal fold, and inguinal folds she thinks she is putting nystatin ointment on this.  She does not think that she is using the hydrocortisone but is not quite sure.    She feels like her scalp has been very well controlled.  She does have ketoconazole 2% shampoo and fluocinonide solution that she uses for her scalp but this is well controlled and she is happy with that improvement.  I do not think she has been using this recently.    Patient is otherwise feeling well, without additional skin concerns.    Patient does not have a licence to drive and does not have reliable transportation which would be an issue with regular frequent lab monitoring. She very rarely drinks any alcohol,     Physical Exam:  SKIN: Total skin including the undergarment areas was performed. The exam included the head/face, neck, both arms, chest, back, abdomen, both legs, digits and/or nails.   - erythematous well defined thick scaly plaques scattered on buttock, left elbow,  Umbilicus, low abdomen and a few on the extremities  -Erythema without scale on the right inguinal fold, inframammary folds, gluteal crease and abdominal folds  -Labia and minora and labia majora have erythema and white lacy skin atrophy.  No obvious effusion or scarring of the labia or external anatomy    - No other lesions of concern on areas examined.     Medications:  Current Outpatient Medications   Medication     clobetasol  (TEMOVATE) 0.05 % external ointment     hydrocortisone 2.5 % cream     ketoconazole (NIZORAL) 2 % external cream     ketoconazole (NIZORAL) 2 % external shampoo     nystatin-triamcinolone (MYCOLOG) 982365-5.1 UNIT/GM-% external ointment     adalimumab (HUMIRA *CF*) 40 MG/0.4ML pen kit     adalimumab (HUMIRA *CF*) 80 MG/0.8ML pen kit     allopurinol (ZYLOPRIM) 100 MG tablet     ammonium lactate (AMLACTIN) 12 % external cream     Calcium Carbonate-Vitamin D (CALCIUM + D) 600-200 MG-UNIT per tablet     clotrimazole-betamethasone (LOTRISONE) 1-0.05 % external cream     diazepam (VALIUM) 5 MG tablet     diphenhydrAMINE (BENADRYL) 25 MG tablet     DOCUSATE SODIUM PO     Emollient (CERAVE DAILY MOISTURIZING EX)     famotidine (PEPCID) 20 MG tablet     FLUAD QUADRIVALENT 0.5 ML PRSY injection     fluocinonide (LIDEX) 0.05 % external solution     furosemide (LASIX) 20 MG tablet     LANsoprazole (PREVACID) 30 MG DR capsule     levothyroxine (SYNTHROID/LEVOTHROID) 100 MCG tablet     LORazepam (ATIVAN) 0.5 MG tablet     Multiple Vitamins-Minerals (OCUVITE ADULT 50+) CAPS     nystatin (MYCOSTATIN) 968416 UNIT/GM external ointment     ondansetron (ZOFRAN-ODT) 4 MG ODT tab     rOPINIRole (REQUIP) 0.25 MG tablet     STATIN NOT PRESCRIBED, INTENTIONAL,     No current facility-administered medications for this visit.     Facility-Administered Medications Ordered in Other Visits   Medication     perflutren diluted in saline (DEFINITY) injection 5 mL      Past Medical History:   Patient Active Problem List   Diagnosis     CKD (chronic kidney disease) stage 4, GFR 15-29 ml/min (H)     Glucose intolerance (impaired glucose tolerance)     Kidney stones     Advance care planning     Hypertension goal BP (blood pressure) < 140/90     Lichen sclerosus et atrophicus of the vulva     Solitary kidney, acquired     Senile osteoporosis     Osteoarthritis of right knee     Medial meniscus tear     Cataracts, both eyes     Macular degeneration of  both eyes, right eye greater than left eye     COPD, severe (H)     IPF (idiopathic pulmonary fibrosis) (H)     Other specified hypothyroidism     Idiopathic chronic gout     Adjustment disorder with anxiety     Non-small cell cancer of right lung (H)     Psoriasis     Past Medical History:   Diagnosis Date     Carpal tunnel syndrome      CKD (chronic kidney disease)     kidney stone with infection     CKD (chronic kidney disease) stage 4, GFR 15-29 ml/min (H) 12/2/2010    History of kidney stone with infection.  Creatinine 1.3 - 1.6 (12/2/10, [facility])   -- she can't take ACE / ARB due to side effects.  Will continue with good BP control (12/3/15, [facility])      COPD, severe (H) 2/16/2015    Seen by pulmonary 2013.        Deep vein thrombosis (DVT) (H)     1972     DVT [id number]     Glucose intolerance      H/O partial nephrectomy      Heel spur      Hypertension goal BP (blood pressure) < 140/90 11/28/2011     Hypothyroidism      Idiopathic chronic gout 5/14/2015     Immunosuppression (H) 3/7/2018     IPF (idiopathic pulmonary fibrosis) (H) 2/25/2015    Mild, at bases per CXR 2/2015 (2/15/15, [facility])      Kidney stones      Lichen sclerosus      Lichen sclerosus et atrophicus of the vulva 5/8/2013    Clobetasol bid is the recommended remedy     Macular degeneration      Non-small cell cancer of right lung (H) 3/7/2018     Nonsenile cataract      Osteoarthritis      Osteoarthritis of right knee 3/11/2014     Osteoporosis      Other specified hypothyroidism 5/14/2015     PID      Pulmonary embolism (H)     1970     Senile osteoporosis 3/11/2014     Unspecified hypothyroidism      Vitiligo        CC No referring provider defined for this encounter. on close of this encounter.

## 2023-03-16 NOTE — LETTER
3/16/2023         RE: Helena Eldridge  [address] [facility] [id number]        Dear Colleague,    Thank you for referring your patient, Helena Eldridge, to the [facility]. Please see a copy of my visit note below.    [facility] Dermatology Note  Encounter Date: Mar 16, 2023  Office Visit     Reviewed patients past medical history and pertinent chart review prior to patients visit today.     Dermatology Problem List:  1. Psoriasis vulgaris, Hydrocortisone 2.5% ointment and clobetasol ointement for flares, patient is not consistent with usage and is not controlled 3/16/2023   2. Psoriasiform dermatitis, scalp. Ketoconazole and fluocinonide 0.05% solution  Given 11/10/22 and is well controlled  3.  Lichen sclerosis, has clobetasol ointment but it is unclear if she is using it consistently.  Still not well controlled 3/16/2023   4.  Intertrigo, has ketoconazole 2% cream and hydrocortisone 2.5% cream for flares, is flared, patient is confused about instructions and appointment 3/16/2023   ____________________________________________    Assessment & Plan:     Plaque psoriasis.   -not well controlled  -Patient does not want to start any oral or injectable medications today.  She feels like she can can keep this well controlled with clobetasol use.  She will use clobetasol 0.005% ointment on a twice daily as needed basis to any active plaques on the body. Councled about use and side effects of thinning of the skin, striae, erythema, and worsening of rash.       -scalp: Seborrheic dermatitis vs psoriasis of scalp. She is well controlled alternating ketoconazole 2% shampoo and T-gel shampoo leaving each on for 3-5 minutes before rinsing. Also has fluocinonide 0.05% solution which she uses as needed for flares of itching. Councled about use and side effects of thinning of the skin, striae, erythema, and worsening of rash.       -Intertrigo versus psoriasis in the  inframammary folds, buttock, and groin folds.    -Use ketoconazole 2% cream and hydrocortisone 2.5% cream twice daily when there is redness and decrease as she improves. Use petroleum jelly/vaseline as a barrier cream in the groin/buttock to moisturize the skin when you use the restroom if it is irritated.     -All other areas of psoriasis (extremities and trunk) use the clobetasol ointment twice daily to these areas for up to 4 weeks    # vulvar lichen sclerosus.  Discussed diagnosis with patient.  Discussed that this is usually a chronic diagnosis with frequent recurrences and remissions of signs and symptoms.  Discussed that lichen sclerosus has the potential to distort or destroy vulvar anatomy but that the condition is manageable and topical corticosteroid treatments are highly effective and can inhibit progression of the disease.  Discussed importance of cessation of scratching to minimize exposures to factors that may exacerbate symptoms.  Lichen sclerosus may increase the risk of malignancy in adult women, stressed the importance of self examinations for more thickened areas or sores that do not heal especially after intensive corticosteroid treatments twice daily x1 month.  Patient will return to clinic prior to normally scheduled follow-up if she notices any of the signs and symptoms.  Discussed that this is not a contagious condition and would not be spread to sexual partners.  Discussed the importance of follow-ups at least yearly to monitor for signs and symptoms of malignancy and reduce risk of scarring.  Advised that it is okay to use emollients or barrier cream such as petroleum jelly, Vaseline, or zinc oxide paste's for discomfort.    -Instructed to use clobetasol 0.05% ointment twice daily until follow up.  I do not think she has been regularly using this and I do not see signs of thinning of the skin.  The rash is still active, she still has erythema and white lacy atrophy    Follow up in 2 months   for recheck.     IF PATIENT CALLS FOR CLARIFICATION OF MEDICATION USE HERE IS SIMPLIFIED INSTRUCTIONS:        Clobetasol:     2 times a day until next appointment.    For inside the vagina and to psoriasis on arms, legs, or abdomen.     Hydrocortisone mixed with ketoconazole 2% cream:    2 times a day as needed    For rash in groin folds, buttock folds, and under breast fold when irritated.    Does not need to use nystatin anymore     Ketoconazole:    Once a day under breast and groin folds whether you have a rash or not.       If scalp is flaring: Alternate use of ketoconazole 2% shampoo and T-gel shampoo leaving each on for 3-5 minutes before rinsing. Also has fluocinonide 0.05% solution which she uses 1-2 times daily as needed for flares of itching      MERARI Tejada CNP on 3/16/2023 at 12:05 PM     In addition to the examination 35 minutes of the appointment were spent evaluating and developing a treatment plan for her concern(s) including pathophysiology, medication management, side effects of medication, and product review and charting.   _______________________________________    CC: Derm Problem (Psoriasis- not on Humira due to not having covered. Feels like there has been improvement and does not care to go on/Keto 2% cream not covered any longer need substitute/Clobetasol ointment-currently using on buttock and groin/Nystatin under breasts//)    HPI:  Ms. Helena Eldridge is a(n) 87 year old female who presents today for follow-up of intertrigo and psoriasis and lichen sclerosis.  Patient has several medications and has been very confused about her instructions.  Since her last appointment patient has called several times wanting clarification of instructions.  My nurse has reviewed the instructions with her several times as well as writing out basic instructions of which medication to use at what times and and what areas to use them on, and mailed this to her.  She brings this into clinic today.   She still seems confused about medication use.  We asked that she bring her son in to her appointment today but no one came into the appointment with her today.  She said that her son was outside in the car on a virtual meeting and that she did not need her son to be with her.    She has had psoriasis that continues to be managed with topical steroids although it is difficult to gauge how often she is actually putting the steroids on versus any other creams.   But she has broken out with psoriasis on the trunk and extremities.  We have discussed starting Humira twice but patient goes back and forth whether she wants to do this or not.  Today states she says she is not needing this as she feels like she can control it okay with topical medications.      The intertrigo she has under the breasts, abdominal fold, and inguinal folds she thinks she is putting nystatin ointment on this.  She does not think that she is using the hydrocortisone but is not quite sure.    She feels like her scalp has been very well controlled.  She does have ketoconazole 2% shampoo and fluocinonide solution that she uses for her scalp but this is well controlled and she is happy with that improvement.  I do not think she has been using this recently.    Patient is otherwise feeling well, without additional skin concerns.    Patient does not have a licence to drive and does not have reliable transportation which would be an issue with regular frequent lab monitoring. She very rarely drinks any alcohol,     Physical Exam:  SKIN: Total skin including the undergarment areas was performed. The exam included the head/face, neck, both arms, chest, back, abdomen, both legs, digits and/or nails.   - erythematous well defined thick scaly plaques scattered on buttock, left elbow,  Umbilicus, low abdomen and a few on the extremities  -Erythema without scale on the right inguinal fold, inframammary folds, gluteal crease and abdominal folds  -Labia and  minora and labia majora have erythema and white lacy skin atrophy.  No obvious effusion or scarring of the labia or external anatomy    - No other lesions of concern on areas examined.     Medications:  Current Outpatient Medications   Medication     clobetasol (TEMOVATE) 0.05 % external ointment     hydrocortisone 2.5 % cream     ketoconazole (NIZORAL) 2 % external cream     ketoconazole (NIZORAL) 2 % external shampoo     nystatin-triamcinolone (MYCOLOG) 259587-3.1 UNIT/GM-% external ointment     adalimumab (HUMIRA *CF*) 40 MG/0.4ML pen kit     adalimumab (HUMIRA *CF*) 80 MG/0.8ML pen kit     allopurinol (ZYLOPRIM) 100 MG tablet     ammonium lactate (AMLACTIN) 12 % external cream     Calcium Carbonate-Vitamin D (CALCIUM + D) 600-200 MG-UNIT per tablet     clotrimazole-betamethasone (LOTRISONE) 1-0.05 % external cream     diazepam (VALIUM) 5 MG tablet     diphenhydrAMINE (BENADRYL) 25 MG tablet     DOCUSATE SODIUM PO     Emollient (CERAVE DAILY MOISTURIZING EX)     famotidine (PEPCID) 20 MG tablet     FLUAD QUADRIVALENT 0.5 ML PRSY injection     fluocinonide (LIDEX) 0.05 % external solution     furosemide (LASIX) 20 MG tablet     LANsoprazole (PREVACID) 30 MG DR capsule     levothyroxine (SYNTHROID/LEVOTHROID) 100 MCG tablet     LORazepam (ATIVAN) 0.5 MG tablet     Multiple Vitamins-Minerals (OCUVITE ADULT 50+) CAPS     nystatin (MYCOSTATIN) 642948 UNIT/GM external ointment     ondansetron (ZOFRAN-ODT) 4 MG ODT tab     rOPINIRole (REQUIP) 0.25 MG tablet     STATIN NOT PRESCRIBED, INTENTIONAL,     No current facility-administered medications for this visit.     Facility-Administered Medications Ordered in Other Visits   Medication     perflutren diluted in saline (DEFINITY) injection 5 mL      Past Medical History:   Patient Active Problem List   Diagnosis     CKD (chronic kidney disease) stage 4, GFR 15-29 ml/min (H)     Glucose intolerance (impaired glucose tolerance)     Kidney stones     Advance care planning      Hypertension goal BP (blood pressure) < 140/90     Lichen sclerosus et atrophicus of the vulva     Solitary kidney, acquired     Senile osteoporosis     Osteoarthritis of right knee     Medial meniscus tear     Cataracts, both eyes     Macular degeneration of both eyes, right eye greater than left eye     COPD, severe (H)     IPF (idiopathic pulmonary fibrosis) (H)     Other specified hypothyroidism     Idiopathic chronic gout     Adjustment disorder with anxiety     Non-small cell cancer of right lung (H)     Psoriasis     Past Medical History:   Diagnosis Date     Carpal tunnel syndrome      CKD (chronic kidney disease)     kidney stone with infection     CKD (chronic kidney disease) stage 4, GFR 15-29 ml/min (H) 12/2/2010    History of kidney stone with infection.  Creatinine 1.3 - 1.6 (12/2/10, [facility])   -- she can't take ACE / ARB due to side effects.  Will continue with good BP control (12/3/15, [facility])      COPD, severe (H) 2/16/2015    Seen by pulmonary 2013.        Deep vein thrombosis (DVT) (H)     1972     DVT 21055201     Glucose intolerance      H/O partial nephrectomy      Heel spur      Hypertension goal BP (blood pressure) < 140/90 11/28/2011     Hypothyroidism      Idiopathic chronic gout 5/14/2015     Immunosuppression (H) 3/7/2018     IPF (idiopathic pulmonary fibrosis) (H) 2/25/2015    Mild, at bases per CXR 2/2015 (2/15/15, [facility])      Kidney stones      Lichen sclerosus      Lichen sclerosus et atrophicus of the vulva 5/8/2013    Clobetasol bid is the recommended remedy     Macular degeneration      Non-small cell cancer of right lung (H) 3/7/2018     Nonsenile cataract      Osteoarthritis      Osteoarthritis of right knee 3/11/2014     Osteoporosis      Other specified hypothyroidism 5/14/2015     PID      Pulmonary embolism (H)     1970     Senile osteoporosis 3/11/2014     Unspecified hypothyroidism      Vitiligo        CC No referring provider defined for this encounter. on close of this encounter.        Again, thank you for allowing me to participate in the care of your patient.        Sincerely,        MERARI Tejada CNP

## 2023-03-20 ENCOUNTER — TELEPHONE (OUTPATIENT)
Dept: DERMATOLOGY | Facility: CLINIC | Age: 88
End: 2023-03-20
Payer: COMMERCIAL

## 2023-03-20 NOTE — TELEPHONE ENCOUNTER
Prior Authorization Specialty Medication Request    Medication/Dose: Ketoconazole cream  ICD code (if different than what is on RX):    Previously Tried and Failed:      Important Lab Values:   Rationale:     Insurance Name:   Insurance ID:   Insurance Phone Number:     Pharmacy Information (if different than what is on RX)  Name:    Phone:

## 2023-03-20 NOTE — TELEPHONE ENCOUNTER
Called and spoke with pt, will start PA request in new TE. Pt also does not need the hydrocortisone cream now, just requesting a larger quantity when she needs the refill in the future,    Thank you,  Elsa COBOS RN  Dermatology   [phone]

## 2023-03-20 NOTE — TELEPHONE ENCOUNTER
[facility] Call Center    Phone Message    May a detailed message be left on voicemail: yes     Reason for Call: Medication Question or concern regarding medication   Prescription Clarification  Name of Medication: ketoconazole (NIZORAL) 2 % external cream & hydrocortisone 2.5 % cream     Prescribing Provider: Meghan Sharma APRN CNP   Pharmacy: [facility][id number] - [facility] [address] [place] [address] AT [facility]   What on the order needs clarification? Pt states she received a letter from [facility] stating the Ketoconazole needs a prior authorization, call [phone] for the prior authorization.     Pt requests a larger quantity in the jar for the hydrocortisone 2.5 % cream when the refill is called in.     Please call Pt back to discuss. Thank you.         Action Taken: Other: [facility]    Travel Screening: Not Applicable

## 2023-03-22 NOTE — TELEPHONE ENCOUNTER
Central Prior Authorization Team - Phone: [phone]     PA Initiation    Medication: Ketoconazole cream  Insurance Company:    Pharmacy Filling the Rx: [facility] #[id number] - [place] [address] AT [facility] [address] [facility]  Filling Pharmacy Phone: [phone]  Filling Pharmacy Fax:    Start Date: 3/22/2023

## 2023-03-23 NOTE — TELEPHONE ENCOUNTER
Central Prior Authorization Team - Phone: [phone]     Prior Authorization Approval    Authorization Effective Date: 2/20/2023  Authorization Expiration Date: 3/21/2024  Medication: Ketoconazole cream-  PA APPROVED  Approved Dose/Quantity: 240  Reference #:     Insurance Company:    Expected CoPay:       CoPay Card Available:      Foundation Assistance Needed:    Which Pharmacy is filling the prescription (Not needed for infusion/clinic administered): [id number] - [facility] [address] AT [facility] [address] [facility]  Pharmacy Notified: Yes  Patient Notified: YesComment:  pharmacy will notify when ready

## 2023-03-24 ENCOUNTER — OFFICE VISIT (OUTPATIENT)
Dept: AUDIOLOGY | Facility: CLINIC | Age: 88
End: 2023-03-24
Payer: COMMERCIAL

## 2023-03-24 DIAGNOSIS — H90.3 SENSORINEURAL HEARING LOSS, BILATERAL: Primary | ICD-10-CM

## 2023-03-24 PROCEDURE — V5299 HEARING SERVICE: HCPCS

## 2023-03-24 NOTE — PROGRESS NOTES
AUDIOLOGY REPORT: HEARING AID RECHECK    SUBJECTIVE: Helena Eldridge is a 87 year old female, :  1935, was seen in the Audiology Clinic at [facility] on 3/24/23 for a return check of their hearing aids.      Background:   Patient is here today wanting the tubing changed on her hearing aids. She also feels the hearing aids aren't working as well as they used to.     Procedures:       SIDE: Both                          : Widex                           TYPE: Clear 330-9 BTE                          S/N:                                       R: [id number]                                       L: [id number]                          WARRANTY:  2016                  Hearing aids were cleaned, tubing replaced and a listening check revealed crisp and clear sound output. Helena's hearing aids are almost 10 years old; patient may be interested in updated devices. Next steps were discussed, including an updated hearing evaluation     Plan:   Patient will return as needed for hearing aid concerns.     CHARGES: - Hearing aid cleaning.     William Hardin, CCC-A  Licensed Audiologist  [place] #[id number]  2023

## 2023-03-30 ENCOUNTER — TELEPHONE (OUTPATIENT)
Dept: DERMATOLOGY | Facility: CLINIC | Age: 88
End: 2023-03-30
Payer: COMMERCIAL

## 2023-03-30 DIAGNOSIS — L40.9 PSORIASIS: ICD-10-CM

## 2023-03-30 RX ORDER — HYDROCORTISONE 2.5 %
CREAM (GRAM) TOPICAL 2 TIMES DAILY
Qty: 454 G | Refills: 2 | Status: SHIPPED | OUTPATIENT
Start: 2023-03-30 | End: 2023-05-22

## 2023-03-30 NOTE — TELEPHONE ENCOUNTER
Spoke to patient and updated on providers note below regarding larger qty refill being sent and insurance coverage. Patient verbalized understanding and stated that her psoriasis is improving on current tx regimen.    Fatoumata STRONG

## 2023-03-30 NOTE — TELEPHONE ENCOUNTER
[facility] Call Center    Phone Message    May a detailed message be left on voicemail: no     Reason for Call: Other: Pt, Helena needs more of the hydrocortisone 2.5 % cream.  She says the little tube is not enough for the places she needs to cover.  She is requesting a Jar of it. Questions;  Call Helena @ [phone]     Action Taken: Message routed to:  Other: SHANNAN Lopez    Travel Screening: Not Applicable

## 2023-03-30 NOTE — TELEPHONE ENCOUNTER
Done. Please let patient know that I did send a larger quantity but she does not need a thick layer thin layer is fine.  Insurance may dictate the amount that is covered but I did prescribe the larger amount.

## 2023-05-07 ENCOUNTER — E-VISIT (OUTPATIENT)
Dept: FAMILY MEDICINE | Facility: CLINIC | Age: 88
End: 2023-05-07
Payer: COMMERCIAL

## 2023-05-07 DIAGNOSIS — Z53.9 ERRONEOUS ENCOUNTER--DISREGARD: Primary | ICD-10-CM

## 2023-05-08 ENCOUNTER — TELEPHONE (OUTPATIENT)
Dept: FAMILY MEDICINE | Facility: CLINIC | Age: 88
End: 2023-05-08
Payer: COMMERCIAL

## 2023-05-08 NOTE — TELEPHONE ENCOUNTER
Called patient's son (Tobi- consent to communicate on file) and relayed information from provider. Caller agrees to have patient seen in ED. Writer offered to help schedule hospital follow-up, caller states that he will call back after ED/hospital when time line is more solidified.    Per Dr. Sushma Nelson, if trouble finding hospital follow-up slot within time, please let her know.    SHELLY Nur RN  [facility]

## 2023-05-08 NOTE — TELEPHONE ENCOUNTER
I cut and pasted e visit below.    Evisit is declined.     Patient should be evaluated in ER first, to look for acute/subacute changes  Determine if safe to be home.     Call and discuss with the son if there is PHI on file...     Have team schedule Helena for next avail hosp f/u, if there arent any, let me know.      ==========================================================    Helena needs your help.  She is confused as to what she did yesterday , gets days meased up, and is not eating.  She is mixing stories together that are many years apart.  She is not heself.  We are not sure she is taking all of her medicians.  Mom needs some type of an evaluation for cognitive awareness. Possible something medical, like a minny stroke or dehydrated.  Two close friends have called us and said she is very bad off.  Slurred speach, very tired and not making sense.  We check on her and she seems to be going down hill fast.   Can you recomend a course of intervention for mom (Helena). This is oldest son Tobi.  Other two siblings Ugo and Ge are also in agreement that she neefs help.      My phone is [phone]    Vince is [phone]     Alexandria is [phone]        I shure hope you have not left yet.

## 2023-05-12 DIAGNOSIS — K21.00 GASTROESOPHAGEAL REFLUX DISEASE WITH ESOPHAGITIS WITHOUT HEMORRHAGE: ICD-10-CM

## 2023-05-12 DIAGNOSIS — I10 HYPERTENSION GOAL BP (BLOOD PRESSURE) < 140/90: ICD-10-CM

## 2023-05-12 RX ORDER — FAMOTIDINE 20 MG/1
TABLET, FILM COATED ORAL
Qty: 180 TABLET | Refills: 3 | Status: SHIPPED | OUTPATIENT
Start: 2023-05-12 | End: 2024-04-30

## 2023-05-12 RX ORDER — FUROSEMIDE 20 MG
TABLET ORAL
Qty: 90 TABLET | Refills: 1 | Status: SHIPPED | OUTPATIENT
Start: 2023-05-12 | End: 2023-05-22

## 2023-05-15 ENCOUNTER — MYC MEDICAL ADVICE (OUTPATIENT)
Dept: INTERNAL MEDICINE | Facility: CLINIC | Age: 88
End: 2023-05-15
Payer: COMMERCIAL

## 2023-05-15 NOTE — TELEPHONE ENCOUNTER
MD called.    Patient unable to get to and from toilet.   She still has very bad diarrhea.   Very weak  Can't keep up with fluids.     Family might bring her into hospital if she continues to decline

## 2023-05-15 NOTE — TELEPHONE ENCOUNTER
Consent to communicate is on file for Dawson Alexander and Ugo.    Dawson Alexander wrote this message.    Thanks!    Rabia Millan RN  [facility]

## 2023-05-15 NOTE — TELEPHONE ENCOUNTER
Routing to PCP to advise. No hospital follow up appointments available this week with PCP.    Rabia Millan RN  [facility]

## 2023-05-15 NOTE — TELEPHONE ENCOUNTER
Pt son calling back.     RN huddled with provider- provider will call back ag=fter seeing her current pt.    Shena Sewell RN

## 2023-05-16 NOTE — TELEPHONE ENCOUNTER
This is great.   I am over booked the rest of week,   Would Kane be able to bring Helena in on Monday, 5/22/23, overbook?    Hosp f/u.

## 2023-05-16 NOTE — TELEPHONE ENCOUNTER
Called and spoke with Kane, pt's son. He states that they did not end up bringing patient into the hospital last night. After speaking with Dr. Nelson. He went to his mom's house. She was sitting watching TV. She is able to walk without assistance. Able to hold some food down and drink some ensure. He made dinner for her and she ate a few bites. She still has the diarrhea. He is unable to answer if the diarrhea is watery or loose or if there is any form to the stool. He will find out more today. She lives by herself and him and other brothers check on her. Ge, his brother, stayed with her last night. She is still on medicine for c-diff. They have to give her frequent reminders to take it. When he got there last night, she had missed one of her doses. She has no vomiting, and weakness is greatly improved. They are focusing on pushing fluids. She does not follow direction very well.  Advised to continue to monitor, push fluids and food that she can tolerate. if diarrhea is not improving, please notify us.   If any worsening weakness, pt not able to hold down fluids, vomiting, then go back to ED. Advised to keep surfaces clean and give pt reminders for hand hygiene. He verbalized understanding.    Melvi Hernandez RN   [facility]

## 2023-05-22 ENCOUNTER — OFFICE VISIT (OUTPATIENT)
Dept: INTERNAL MEDICINE | Facility: CLINIC | Age: 88
End: 2023-05-22
Payer: COMMERCIAL

## 2023-05-22 VITALS
DIASTOLIC BLOOD PRESSURE: 65 MMHG | OXYGEN SATURATION: 95 % | HEART RATE: 95 BPM | BODY MASS INDEX: 23.75 KG/M2 | WEIGHT: 121 LBS | HEIGHT: 60 IN | TEMPERATURE: 99.2 F | SYSTOLIC BLOOD PRESSURE: 102 MMHG

## 2023-05-22 DIAGNOSIS — C34.91 NON-SMALL CELL CANCER OF RIGHT LUNG (H): ICD-10-CM

## 2023-05-22 DIAGNOSIS — A04.72 C. DIFFICILE COLITIS: ICD-10-CM

## 2023-05-22 DIAGNOSIS — N18.4 CKD (CHRONIC KIDNEY DISEASE) STAGE 4, GFR 15-29 ML/MIN (H): Primary | ICD-10-CM

## 2023-05-22 DIAGNOSIS — Z87.442 HISTORY OF KIDNEY STONES: ICD-10-CM

## 2023-05-22 DIAGNOSIS — N18.4 ANEMIA DUE TO STAGE 4 CHRONIC KIDNEY DISEASE (H): ICD-10-CM

## 2023-05-22 DIAGNOSIS — R25.2 CRAMP OF LIMB: ICD-10-CM

## 2023-05-22 DIAGNOSIS — K52.831 COLLAGENOUS COLITIS: ICD-10-CM

## 2023-05-22 DIAGNOSIS — E03.8 OTHER SPECIFIED HYPOTHYROIDISM: ICD-10-CM

## 2023-05-22 DIAGNOSIS — Z90.5 SOLITARY KIDNEY, ACQUIRED: ICD-10-CM

## 2023-05-22 DIAGNOSIS — D63.1 ANEMIA DUE TO STAGE 4 CHRONIC KIDNEY DISEASE (H): ICD-10-CM

## 2023-05-22 DIAGNOSIS — J44.9 COPD, SEVERE (H): ICD-10-CM

## 2023-05-22 LAB
ANION GAP SERPL CALCULATED.3IONS-SCNC: 12 MMOL/L (ref 7–15)
BUN SERPL-MCNC: 51.6 MG/DL (ref 8–23)
CALCIUM SERPL-MCNC: 8.6 MG/DL (ref 8.8–10.2)
CHLORIDE SERPL-SCNC: 106 MMOL/L (ref 98–107)
CREAT SERPL-MCNC: 1.79 MG/DL (ref 0.51–0.95)
DEPRECATED HCO3 PLAS-SCNC: 22 MMOL/L (ref 22–29)
GFR SERPL CREATININE-BSD FRML MDRD: 27 ML/MIN/1.73M2
GLUCOSE SERPL-MCNC: 115 MG/DL (ref 70–99)
POTASSIUM SERPL-SCNC: 5.1 MMOL/L (ref 3.4–5.3)
SODIUM SERPL-SCNC: 140 MMOL/L (ref 136–145)
TSH SERPL DL<=0.005 MIU/L-ACNC: 8.97 UIU/ML (ref 0.3–4.2)

## 2023-05-22 PROCEDURE — 80048 BASIC METABOLIC PNL TOTAL CA: CPT | Performed by: INTERNAL MEDICINE

## 2023-05-22 PROCEDURE — 84439 ASSAY OF FREE THYROXINE: CPT | Performed by: INTERNAL MEDICINE

## 2023-05-22 PROCEDURE — 99215 OFFICE O/P EST HI 40 MIN: CPT | Performed by: INTERNAL MEDICINE

## 2023-05-22 PROCEDURE — 84443 ASSAY THYROID STIM HORMONE: CPT | Performed by: INTERNAL MEDICINE

## 2023-05-22 PROCEDURE — 36415 COLL VENOUS BLD VENIPUNCTURE: CPT | Performed by: INTERNAL MEDICINE

## 2023-05-22 RX ORDER — ROPINIROLE 0.25 MG/1
TABLET, FILM COATED ORAL
Qty: 180 TABLET | Refills: 3 | Status: SHIPPED | OUTPATIENT
Start: 2023-05-22 | End: 2024-07-11

## 2023-05-22 RX ORDER — B-COMPLEX WITH VITAMIN C
2 TABLET ORAL 2 TIMES DAILY
Start: 2023-05-22

## 2023-05-22 RX ORDER — VANCOMYCIN HYDROCHLORIDE 50 MG/ML
KIT ORAL 4 TIMES DAILY
COMMUNITY

## 2023-05-22 NOTE — PROGRESS NOTES
Assessment & Plan     CKD (chronic kidney disease) stage 4, GFR 15-29 ml/min (H)  Presented in ARF, hyperkalemia  Resolved w/IVL  Keep her off of lasix right now   Medication list minimized  Home care to help with home assessment, identify needs.     - Basic metabolic panel; Future  - Home Care Referral  - Basic metabolic panel    Anemia due to stage 4 chronic kidney disease (H)     - Home Care Referral    Cognitive concerns  Helena is not concerned, but her sons are  Each one of the 3 sons has to come over daily to check on safely and do an important cares task.   Patient and sons agreeable to Home Care  Would like SW help to guide on Assisted living type of cares.    If patient hospitalized again, may need to focus more on placement.  For now, trying to support her independence while thinking of safety.     Solitary kidney, acquired   due to stones had a partial nephrectomy about 15Y ago, again about 3Yago  History of kidney stones       C. difficile colitis  Found during recent hospitalization for ARF  Suspect community acquired, no other known exposures (hospital , Abx).       Collagenous colitis   this is baseline, she is feeling back to baseline now.      Other specified hypothyroidism   recheck  If TFTs low, would tighten up compliance first.   ...     - TSH WITH FREE T4 REFLEX; Future  - TSH WITH FREE T4 REFLEX    Non-small cell cancer of right lung (H)       COPD, severe (H)   she is oblivious to her poor lung function.       Cramp of limb     - rOPINIRole (REQUIP) 0.25 MG tablet; TAKE 1 TO 2 TABLETS(0.25 TO 0.5 MG) BY MOUTH EVERY NIGHT AS NEEDED FOR RESTLESS LEGS      I spent a total of 51 minutes on the day of the visit.   Time spent by me doing chart review, history and exam, documentation and further activities per the note      MED REC REQUIRED   Post Medication Reconciliation Status: discharge medications reconciled and changed, per note/orders  Trying to minimize medications.     See Patient  Instructions    Sushma Nelson MD  [place] LADI Malik is a 87 year old, presenting for the following health issues:  Hospital F/U   hosp f/u    H/o RUL/Hilar NSCLungCa (s/p low dose carboplatin and pemerexed.....had to d/c Novolumib immunotherapy due to  pruritis and yeast infections.... recent CT imaging= no evidence of cancer), L. nephrectomy (stones&RCCa), Gout, CKD 4, hypothyroid, osteoporosis, pelvic fx, lichen sclerosis,   , severe (asymptomatic) COPD     Over Summer '20, had ESWL and stent placement for 6 mm stone in Right upper ureter      Presented to [facility] 5/8/23 with hyperkalemia and ARF (Creat 11) responded to IV fluid resuscitation and her creatinine essentially returned to her baseline (at discharge, it was 2.84), w/normalization of potassium levels.    Found to have Cdiff colitis, treated with oral vancomycin    Lasix stopped.   allopuinol stopped.      She declined TCU and went home at d/c    Since home, able to eat/drink and keep it down      Has lost about 10#           View : No data to display.              History of Present Illness       Reason for visit:  Hospital stay follow up and cognitive evaluation    She eats 0-1 servings of fruits and vegetables daily.She consumes 0 sweetened beverage(s) daily.She exercises with enough effort to increase her heart rate 9 or less minutes per day.  She exercises with enough effort to increase her heart rate 3 or less days per week. She is missing 1 dose(s) of medications per week.  She is not taking prescribed medications regularly due to remembering to take.         Hospital Follow-up Visit:    Hospital/Nursing Home/IP Rehab Facility: [facility]  Date of Admission: 05/08/23  Date of Discharge: 05/12/23  Reason(s) for Admission: Hyperkalemia (Primary Dx);   MARIO (acute kidney injury) (HC);   Uremia;   Colitis  Discharge Disposition: Home Self Care    Was your hospitalization related to COVID-19? No   Problems taking  "medications regularly:  None  Medication changes since discharge: None  Problems adhering to non-medication therapy:  None    Summary of hospitalization:  See HPI   Diagnostic Tests/Treatments reviewed.  Follow up needed:   Collagenous colitis per colon bx.    Other Healthcare Providers Involved in Patient s Care:         Homecare  Ordered.    Update since discharge: improved.    Plan of care communicated with patient               Review of Systems   Constitutional, HEENT, cardiovascular, pulmonary, gi and gu systems are negative, except as otherwise noted.      Objective    /65 (BP Location: Left arm, Patient Position: Sitting, Cuff Size: Adult Regular)   Pulse 95   Temp 99.2  F (37.3  C) (Oral)   Ht 1.511 m (4' 11.5\")   Wt 54.9 kg (121 lb)   SpO2 95%   BMI 24.03 kg/m    There is no height or weight on file to calculate BMI.  Physical Exam   GENERAL: alert, no distress, elderly and fatigued  EYES: Eyes grossly normal to inspection, PERRL and conjunctivae and sclerae normal  RESP: lungs clear to auscultation - no rales, rhonchi or wheezes  Diminished breath sounds throughout   CV: regular rate and rhythm, normal S1 S2, no S3 or S4, no murmur, click or rub, no peripheral edema and peripheral pulses strong  MS: no gross musculoskeletal defects noted, no edema  SKIN: no suspicious lesions or rashes  NEURO: Normal strength and tone, mentation shows some issues with insight.  She is repetative,though she is acting as \"herself\"   and speech normal  PSYCH: mentation appears normal, affect normal/appropriate.  She responds appropriately to social cues.                      "

## 2023-05-22 NOTE — LETTER
May 23, 2023    Helena Eldridge  [address] [address]          Dear ,    We are writing to inform you of your test results.  Kidney function improved:  good job on hydration at home.  Keep it up!     Thyroid function tests look like you are not getting enough thyroid.    This is likely due to missed doses.   We have to have you try to take this every single day and then recheck in a couple months to ensure dosing is correct.       I will see you in July as scheduled      Resulted Orders   TSH WITH FREE T4 REFLEX   Result Value Ref Range    TSH 8.97 (H) 0.30 - 4.20 uIU/mL   Basic metabolic panel   Result Value Ref Range    Sodium 140 136 - 145 mmol/L    Potassium 5.1 3.4 - 5.3 mmol/L    Chloride 106 98 - 107 mmol/L    Carbon Dioxide (CO2) 22 22 - 29 mmol/L    Anion Gap 12 7 - 15 mmol/L    Urea Nitrogen 51.6 (H) 8.0 - 23.0 mg/dL    Creatinine 1.79 (H) 0.51 - 0.95 mg/dL    Calcium 8.6 (L) 8.8 - 10.2 mg/dL    Glucose 115 (H) 70 - 99 mg/dL    GFR Estimate 27 (L) >60 mL/min/1.73m2      Comment:      eGFR calculated using 2021 CKD-EPI equation.   T4 free   Result Value Ref Range    Free T4 0.49 (L) 0.90 - 1.70 ng/dL       If you have any questions or concerns, please call the clinic at the number listed above.       Sincerely,      Sushma Nelson MD

## 2023-05-22 NOTE — PATIENT INSTRUCTIONS
Med list simplification    Update me when Imodium is needed on med list again    Return to clinic July    Home care will call you

## 2023-05-23 LAB — T4 FREE SERPL-MCNC: 0.49 NG/DL (ref 0.9–1.7)

## 2023-05-23 NOTE — RESULT ENCOUNTER NOTE
Helena Eldridge    Kidney function improved:  good job on hydration at home.  Keep it up!    Thyroid function tests look like you are not getting enough thyroid.    This is likely due to missed doses.   We have to have you try to take this every single day and then recheck in a couple months to ensure dosing is correct.      I will see you in July as scheduled    Best,       MANUELA MONREAL M.D.

## 2023-05-26 ENCOUNTER — TELEPHONE (OUTPATIENT)
Dept: INTERNAL MEDICINE | Facility: CLINIC | Age: 88
End: 2023-05-26
Payer: COMMERCIAL

## 2023-05-26 NOTE — TELEPHONE ENCOUNTER
Received call from Bianca with [facility]. They are calling to request verbal approval for delay in SOC. They received referral from Dr. Nelson, but will not be able to go out until when POA can be there, they have a date scheduled for 05/30/2023. Verbal approval given.     Melvi Hernandez RN   [facility]

## 2023-05-30 ENCOUNTER — TELEPHONE (OUTPATIENT)
Dept: INTERNAL MEDICINE | Facility: CLINIC | Age: 88
End: 2023-05-30
Payer: COMMERCIAL

## 2023-05-30 NOTE — TELEPHONE ENCOUNTER
The Home Care/Assisted Living/Nursing Facility is calling regarding an established patient.  Has the patient seen Home Care in the past or is currently residing in Assisted Living or Nursing Facility? Yes.     RAUL Rangel calling from [facility] requesting the following orders that are within the Home Care, Assisted Living or Nursing Home Eval and Treatment standing order and can be signed as standing order signature required by RN.    Preferred Call Back Number: [phone]    Home Care Visits Continuation and PT/OT/Speech Therapy    Any additional Orders:  Are there any orders requested, not stated above, that are outside of the standing order and must be routed to a licensed practitioner for approval?    No    Writer has verified Requestor will send fax to have orders signed.    SN - 1x/week for 3 weeks, every other week for 6 weeks, 3 PRN's and OT eval and treat    SHELLY Salazar RN  [facility], [facility]

## 2023-05-31 ENCOUNTER — PATIENT OUTREACH (OUTPATIENT)
Dept: NEPHROLOGY | Facility: CLINIC | Age: 88
End: 2023-05-31
Payer: COMMERCIAL

## 2023-06-01 ENCOUNTER — TELEPHONE (OUTPATIENT)
Dept: INTERNAL MEDICINE | Facility: CLINIC | Age: 88
End: 2023-06-01
Payer: COMMERCIAL

## 2023-06-01 NOTE — TELEPHONE ENCOUNTER
[facility] received and given to Dr. Nelson to sign when she returns to the office on Friday, June 16th.  Per [facility] she has to sign the certification.  They were informed she is out til then.  Kathy Rivera,

## 2023-06-01 NOTE — TELEPHONE ENCOUNTER
Nephrology Care Coordination: Post-Discharge Note  SITUATION     Admission: 5/8/23           Discharge:    5/12/23    BACKGROUND     Per hospital discharge summary and inpatient provider notes:  Chief Complaint:  Diarrhea, decreased appetite      History of Present Illness:  Helena Eldridge is a 87 y.o. female with a history of CKD stage IV, hypertension, and non-small cell lung cancer,  who presents to the ED for diarrhea and decreased appetite. Patient explains that she has been experiencing intermittent nonbloody diarrhea (one episode per day) over the last 2-3 weeks. She reports associated decreased appetite.  She reports that she has been unable to urinate over the last few days. She has small amounts of urine leaking with bowel movements. No associated dysuria or malodorous urine. Family was concerned due to report that her speech has been slurred and she has had transiuent episodes of confusion and subsequently brought her to the ED for evaluation. At present, patient reports that she has been feeling fatigued and feels dehydrated. She is no longer taking MiraLAX. No associated vomiting or fevers. No recent antibiotics. No medication changes. She does not believe that she has been taking her Lasix. No recent injuries. No chest pain or shortness of breath. Up until this point, she has been able to make her own medical decisions. She is full code.     ASSESSMENT      Patient's son Kane call regarding post hospital outreach and to schedule a visit with nephrology. Kane states he does not believe a visit is necessary due to her increasing dementia, age, and possible inability to do well on dialysis if it comes to that. Kane states patient may in a nursing home within a month due to the amount of care that she requires. Kane also states he was told that by nephrology at [facility] that his mother Helena was stage 4 with currently stable kidney function. This nurse stated an understanding of his decision  and informed Kane the nephrology providers will be informed.      Nephrology    Fluid Intake           PLAN     Outpatient Plan:  Follow up with PCP, schedule with nephrology as needed per patient's son Kane.    Future Appointments   Date Time Provider Department Center   7/18/2023 10:20 AM Ssuhma Nelson MD [facility], GARNICA-BASS, RN

## 2023-06-01 NOTE — TELEPHONE ENCOUNTER
Reason for Call:  Form, our goal is to have forms completed with 72 hours, however, some forms may require a visit or additional information.    Type of letter, form or note:  Home Health Certification    Who is the form from?: Home care    Where did the form come from: form was faxed in    What clinic location was the form placed at?: [facility]    Where the form was placed: Given to physician    What number is listed as a contact on the form?: [phone]       Call taken on 6/1/2023 at 2:32 PM by Kathy Rivera

## 2023-06-06 ENCOUNTER — TELEPHONE (OUTPATIENT)
Dept: INTERNAL MEDICINE | Facility: CLINIC | Age: 88
End: 2023-06-06
Payer: COMMERCIAL

## 2023-06-06 NOTE — TELEPHONE ENCOUNTER
[facility] received and given to Dr. Nelson to sign when she returns to the office on Friday, June 16th.  Per [facility] she must be the one to sign.  They were made aware that she is out of the office until then.    Kathy Rivera,

## 2023-06-06 NOTE — TELEPHONE ENCOUNTER
Reason for Call:  Form, our goal is to have forms completed with 72 hours, however, some forms may require a visit or additional information.    Type of letter, form or note:  Home Health Certification    Who is the form from?: Home care    Where did the form come from: form was faxed in    What clinic location was the form placed at?: [facility]    Where the form was placed: Given to physician    What number is listed as a contact on the form?: [phone]       Call taken on 6/6/2023 at 10:08 AM by Kathy Rivera

## 2023-06-16 DIAGNOSIS — Z53.9 DIAGNOSIS NOT YET DEFINED: Primary | ICD-10-CM

## 2023-06-16 PROCEDURE — G0180 MD CERTIFICATION HHA PATIENT: HCPCS | Performed by: INTERNAL MEDICINE

## 2023-07-10 ENCOUNTER — OFFICE VISIT (OUTPATIENT)
Dept: DERMATOLOGY | Facility: CLINIC | Age: 88
End: 2023-07-10
Payer: COMMERCIAL

## 2023-07-10 DIAGNOSIS — L40.9 PSORIASIS: Primary | ICD-10-CM

## 2023-07-10 PROCEDURE — 99213 OFFICE O/P EST LOW 20 MIN: CPT | Performed by: DERMATOLOGY

## 2023-07-10 RX ORDER — FLUOCINOLONE ACETONIDE 0.25 MG/G
CREAM TOPICAL DAILY
Qty: 120 G | Refills: 6 | Status: SHIPPED | OUTPATIENT
Start: 2023-07-10 | End: 2023-07-18

## 2023-07-10 RX ORDER — CICLOPIROX OLAMINE 7.7 MG/G
CREAM TOPICAL AT BEDTIME
Qty: 60 G | Refills: 6 | Status: SHIPPED | OUTPATIENT
Start: 2023-07-10

## 2023-07-10 ASSESSMENT — PAIN SCALES - GENERAL: PAINLEVEL: NO PAIN (0)

## 2023-07-10 NOTE — PROGRESS NOTES
Helena Eldridge is an extremely pleasant 87 year old year old female patient here today for rash under breasts.   .   Patient states this has been present for a while.  Patient reports the following symptoms:  itching.  Patient reports the following previous treatments nystatin.  .  These treatments did not work.  Patient reports the following modifying factors none.  Associated symptoms: none.  Patient has no other skin complaints today.  Remainder of the HPI, Meds, PMH, Allergies, FH, and SH was reviewed in chart.      Past Medical History:   Diagnosis Date     Carpal tunnel syndrome      CKD (chronic kidney disease)     kidney stone with infection     CKD (chronic kidney disease) stage 4, GFR 15-29 ml/min (H) 12/2/2010    History of kidney stone with infection.  Creatinine 1.3 - 1.6 (12/2/10, [facility])   -- she can't take ACE / ARB due to side effects.  Will continue with good BP control (12/3/15, [facility])      COPD, severe (H) 2/16/2015    Seen by pulmonary 2013.        Deep vein thrombosis (DVT) (H)     1972     DVT [id number]     Glucose intolerance      H/O partial nephrectomy      Heel spur      Hypertension goal BP (blood pressure) < 140/90 11/28/2011     Hypothyroidism      Idiopathic chronic gout 5/14/2015     Immunosuppression (H) 3/7/2018     IPF (idiopathic pulmonary fibrosis) (H) 2/25/2015    Mild, at bases per CXR 2/2015 (2/15/15, [facility])      Kidney stones      Lichen sclerosus      Lichen sclerosus et atrophicus of the vulva 5/8/2013    Clobetasol bid is the recommended remedy     Macular degeneration      Non-small cell cancer of right lung (H) 3/7/2018     Nonsenile cataract      Osteoarthritis      Osteoarthritis of right knee 3/11/2014     Osteoporosis      Other specified hypothyroidism 5/14/2015     PID      Pulmonary embolism (H)     1970     Senile osteoporosis 3/11/2014     Unspecified hypothyroidism      Vitiligo        Past Surgical History:   Procedure Laterality Date     EXTRACORPOREAL SHOCK WAVE  LITHOTRIPSY, CYSTOSCOPY, INSERT STENT URETER(S), COMBINED Right 2020    Procedure: LITHOTRIPSY, Right EXTRACORPOREAL SHOCK WAVE (ESWL), WITH CYSTOSCOPY AND Right URETERAL STENT INSERTION;  Surgeon: Buddy Ramirez MD;  Location: [facility] OR     HC REMOVAL GALLBLADDER       HC REVISE MEDIAN N/CARPAL TUNNEL SURG       IR LUNG BIOPSY MEDIASTINUM RIGHT  2018     LASER HOLMIUM LITHOTRIPSY URETER(S), INSERT STENT, COMBINED Right 2020    Procedure: RIGHT CYSTOURETEROSCOPY, WITH LITHOTRIPSY USING LASER AND URETERAL STENT EXCHANGE;  Surgeon: Buddy Ramirez MD;  Location: [facility] OR     TUBAL LIGATION       ZZC NEPHRECTOMY      left partial 07     ZZC VENOUS THROMBOSIS IMAGING      with pe        Family History   Problem Relation Age of Onset     Cancer Brother      Glaucoma Mother      Alzheimer Disease Brother      Macular Degeneration No family hx of        Social History     Socioeconomic History     Marital status: Single     Spouse name: Not on file     Number of children: Not on file     Years of education: Not on file     Highest education level: Not on file   Occupational History     Not on file   Tobacco Use     Smoking status: Former     Types: Cigarettes     Quit date: 1969     Years since quittin.6     Smokeless tobacco: Never   Vaping Use     Vaping Use: Never used   Substance and Sexual Activity     Alcohol use: Yes     Alcohol/week: 0.0 standard drinks of alcohol     Comment: very little      Drug use: No     Sexual activity: Not Currently     Partners: Male   Other Topics Concern     Parent/sibling w/ CABG, MI or angioplasty before 65F 55M? No   Social History Narrative     Not on file     Social Determinants of Health     Financial Resource Strain: Not on file   Food Insecurity: Not on file   Transportation Needs: Not on file   Physical Activity: Not on file   Stress: Not on file   Social Connections: Not on file   Intimate Partner Violence: Not on file   Housing Stability: Not  on file       Outpatient Encounter Medications as of 7/10/2023   Medication Sig Dispense Refill     Calcium Carbonate-Vitamin D (CALCIUM + D) 600-5 MG-MCG TABS Take 2 tablets by mouth 2 times daily       famotidine (PEPCID) 20 MG tablet TAKE 1 TABLET(20 MG) BY MOUTH TWICE DAILY 180 tablet 3     levothyroxine (SYNTHROID/LEVOTHROID) 100 MCG tablet TAKE 1 TABLET(100 MCG) BY MOUTH DAILY 90 tablet 3     Multiple Vitamins-Minerals (OCUVITE ADULT 50+) CAPS Take 1 capsule by mouth daily 30 capsule 12     rOPINIRole (REQUIP) 0.25 MG tablet TAKE 1 TO 2 TABLETS(0.25 TO 0.5 MG) BY MOUTH EVERY NIGHT AS NEEDED FOR RESTLESS LEGS 180 tablet 3     vancomycin (FIRVANQ) 50 MG/ML oral solution Take by mouth 4 times daily Take 2.5 mls       Facility-Administered Encounter Medications as of 7/10/2023   Medication Dose Route Frequency Provider Last Rate Last Admin     perflutren diluted in saline (DEFINITY) injection 5 mL  5 mL Intravenous Once Sushma Nelson MD                 O:   NAD, WDWN, Alert & Oriented, Mood & Affect wnl, Vitals stable   Here today alone   General appearance normal   Vitals stable   Alert, oriented and in no acute distress     Well demarcated plaques inferior breasts      Eyes: Conjunctivae/lids:Normal     ENT: Lips, buccal mucosa, tongue: normal    MSK:Normal    Cardiovascular: peripheral edema none    Pulm: Breathing Normal    Neuro/Psych: Orientation:Alert and Orientedx3 ; Mood/Affect:normal       A/P:  1. Psoriasis v intertrigo  deistin bedtime  fluocinlone daily  Loprox bedtime  Return to clinic 2 weeks  It was a pleasure speaking to Helena Eldridge today.  Previous clinic notes and pertinent laboratory tests were reviewed prior to Helena Eldridge's visit.

## 2023-07-10 NOTE — PATIENT INSTRUCTIONS
Patient Education        cream at pharmacy and use as directed. (Twice daily)  Purchase Desitin over the counter and apply at bedtime.  Clean skin in between uses.  Follow up with phone visit in 2 weeks.

## 2023-07-10 NOTE — LETTER
7/10/2023         RE: Helena Eldridge  [address] [facility] [address]        Dear Colleague,    Thank you for referring your patient, Helena Eldridge, to the [facility]. Please see a copy of my visit note below.    Helena Eldridge is an extremely pleasant 87 year old year old female patient here today for rash under breasts.   .   Patient states this has been present for a while.  Patient reports the following symptoms:  itching.  Patient reports the following previous treatments nystatin.  .  These treatments did not work.  Patient reports the following modifying factors none.  Associated symptoms: none.  Patient has no other skin complaints today.  Remainder of the HPI, Meds, PMH, Allergies, FH, and SH was reviewed in chart.      Past Medical History:   Diagnosis Date     Carpal tunnel syndrome      CKD (chronic kidney disease)     kidney stone with infection     CKD (chronic kidney disease) stage 4, GFR 15-29 ml/min (H) 12/2/2010    History of kidney stone with infection.  Creatinine 1.3 - 1.6 (12/2/10, [facility])   -- she can't take ACE / ARB due to side effects.  Will continue with good BP control (12/3/15, [facility])      COPD, severe (H) 2/16/2015    Seen by pulmonary 2013.        Deep vein thrombosis (DVT) (H)     1972     DVT [id number]     Glucose intolerance      H/O partial nephrectomy      Heel spur      Hypertension goal BP (blood pressure) < 140/90 11/28/2011     Hypothyroidism      Idiopathic chronic gout 5/14/2015     Immunosuppression (H) 3/7/2018     IPF (idiopathic pulmonary fibrosis) (H) 2/25/2015    Mild, at bases per CXR 2/2015 (2/15/15, [facility])      Kidney stones      Lichen sclerosus      Lichen sclerosus et atrophicus of the vulva 5/8/2013    Clobetasol bid is the recommended remedy     Macular degeneration      Non-small cell cancer of right lung (H) 3/7/2018     Nonsenile cataract      Osteoarthritis      Osteoarthritis of right knee 3/11/2014     Osteoporosis       Other specified hypothyroidism 2015     PID      Pulmonary embolism (H)     1970     Senile osteoporosis 3/11/2014     Unspecified hypothyroidism      Vitiligo        Past Surgical History:   Procedure Laterality Date     EXTRACORPOREAL SHOCK WAVE LITHOTRIPSY, CYSTOSCOPY, INSERT STENT URETER(S), COMBINED Right 2020    Procedure: LITHOTRIPSY, Right EXTRACORPOREAL SHOCK WAVE (ESWL), WITH CYSTOSCOPY AND Right URETERAL STENT INSERTION;  Surgeon: Buddy Ramirez MD;  Location: [facility] OR     HC REMOVAL GALLBLADDER       HC REVISE MEDIAN N/CARPAL TUNNEL SURG       IR LUNG BIOPSY MEDIASTINUM RIGHT  2018     LASER HOLMIUM LITHOTRIPSY URETER(S), INSERT STENT, COMBINED Right 2020    Procedure: RIGHT CYSTOURETEROSCOPY, WITH LITHOTRIPSY USING LASER AND URETERAL STENT EXCHANGE;  Surgeon: Buddy Ramirez MD;  Location: [facility] OR     TUBAL LIGATION       ZZC NEPHRECTOMY      left partial 07     ZZC VENOUS THROMBOSIS IMAGING      with pe        Family History   Problem Relation Age of Onset     Cancer Brother      Glaucoma Mother      Alzheimer Disease Brother      Macular Degeneration No family hx of        Social History     Socioeconomic History     Marital status: Single     Spouse name: Not on file     Number of children: Not on file     Years of education: Not on file     Highest education level: Not on file   Occupational History     Not on file   Tobacco Use     Smoking status: Former     Types: Cigarettes     Quit date: 1969     Years since quittin.6     Smokeless tobacco: Never   Vaping Use     Vaping Use: Never used   Substance and Sexual Activity     Alcohol use: Yes     Alcohol/week: 0.0 standard drinks of alcohol     Comment: very little      Drug use: No     Sexual activity: Not Currently     Partners: Male   Other Topics Concern     Parent/sibling w/ CABG, MI or angioplasty before 65F 55M? No   Social History Narrative     Not on file     Social Determinants of Health      Financial Resource Strain: Not on file   Food Insecurity: Not on file   Transportation Needs: Not on file   Physical Activity: Not on file   Stress: Not on file   Social Connections: Not on file   Intimate Partner Violence: Not on file   Housing Stability: Not on file       Outpatient Encounter Medications as of 7/10/2023   Medication Sig Dispense Refill     Calcium Carbonate-Vitamin D (CALCIUM + D) 600-5 MG-MCG TABS Take 2 tablets by mouth 2 times daily       famotidine (PEPCID) 20 MG tablet TAKE 1 TABLET(20 MG) BY MOUTH TWICE DAILY 180 tablet 3     levothyroxine (SYNTHROID/LEVOTHROID) 100 MCG tablet TAKE 1 TABLET(100 MCG) BY MOUTH DAILY 90 tablet 3     Multiple Vitamins-Minerals (OCUVITE ADULT 50+) CAPS Take 1 capsule by mouth daily 30 capsule 12     rOPINIRole (REQUIP) 0.25 MG tablet TAKE 1 TO 2 TABLETS(0.25 TO 0.5 MG) BY MOUTH EVERY NIGHT AS NEEDED FOR RESTLESS LEGS 180 tablet 3     vancomycin (FIRVANQ) 50 MG/ML oral solution Take by mouth 4 times daily Take 2.5 mls       Facility-Administered Encounter Medications as of 7/10/2023   Medication Dose Route Frequency Provider Last Rate Last Admin     perflutren diluted in saline (DEFINITY) injection 5 mL  5 mL Intravenous Once Sushma Nelson MD                 O:   NAD, WDWN, Alert & Oriented, Mood & Affect wnl, Vitals stable   Here today alone   General appearance normal   Vitals stable   Alert, oriented and in no acute distress     Well demarcated plaques inferior breasts      Eyes: Conjunctivae/lids:Normal     ENT: Lips, buccal mucosa, tongue: normal    MSK:Normal    Cardiovascular: peripheral edema none    Pulm: Breathing Normal    Neuro/Psych: Orientation:Alert and Orientedx3 ; Mood/Affect:normal       A/P:  1. Psoriasis v intertrigo  deistin bedtime  fluocinlone daily  Loprox bedtime  Return to clinic 2 weeks  It was a pleasure speaking to Helena Eldridge today.  Previous clinic notes and pertinent laboratory tests were reviewed prior to Helena SPENCER  Chente's visit.        Again, thank you for allowing me to participate in the care of your patient.        Sincerely,        Buddy Torres MD

## 2023-07-11 ENCOUNTER — TELEPHONE (OUTPATIENT)
Dept: DERMATOLOGY | Facility: CLINIC | Age: 88
End: 2023-07-11
Payer: COMMERCIAL

## 2023-07-11 DIAGNOSIS — L40.9 PSORIASIS: Primary | ICD-10-CM

## 2023-07-11 RX ORDER — FLUOCINONIDE 0.5 MG/G
CREAM TOPICAL DAILY
Qty: 120 G | Refills: 1 | Status: SHIPPED | OUTPATIENT
Start: 2023-07-11

## 2023-07-11 NOTE — TELEPHONE ENCOUNTER
[facility] Call Center    Phone Message    May a detailed message be left on voicemail: yes     Reason for Call: Medication Question or concern regarding medication   Prescription Clarification  Name of Medication: fluocinolone (SYNALAR) 0.025 % cream  Prescribing Provider: Dr. Torres   Pharmacy: [id number] [address] AT [facility] & [facility]   What on the order needs clarification? Pharmacy is asking if Dr. Torres is okay with changing the order from 0.025% to 0.05%? Pharmacy does not have the 0.025%. Please call pharmacy to discuss. Pt really needs the order. Thanks           Action Taken: Message routed to:  Other: [place] derm    Travel Screening: Not Applicable

## 2023-07-11 NOTE — TELEPHONE ENCOUNTER
New script sent to pharmacy    Thank you,    Abril URIBE,RN BSN  [facility]- [phone]

## 2023-07-18 ENCOUNTER — OFFICE VISIT (OUTPATIENT)
Dept: INTERNAL MEDICINE | Facility: CLINIC | Age: 88
End: 2023-07-18
Payer: COMMERCIAL

## 2023-07-18 VITALS
WEIGHT: 112 LBS | TEMPERATURE: 98.5 F | OXYGEN SATURATION: 94 % | BODY MASS INDEX: 21.99 KG/M2 | HEIGHT: 60 IN | SYSTOLIC BLOOD PRESSURE: 148 MMHG | DIASTOLIC BLOOD PRESSURE: 68 MMHG | RESPIRATION RATE: 16 BRPM | HEART RATE: 80 BPM

## 2023-07-18 DIAGNOSIS — Z90.5 SOLITARY KIDNEY, ACQUIRED: Primary | ICD-10-CM

## 2023-07-18 DIAGNOSIS — C34.91 NON-SMALL CELL CANCER OF RIGHT LUNG (H): ICD-10-CM

## 2023-07-18 DIAGNOSIS — J84.112 IPF (IDIOPATHIC PULMONARY FIBROSIS) (H): ICD-10-CM

## 2023-07-18 DIAGNOSIS — L40.9 PSORIASIS: ICD-10-CM

## 2023-07-18 DIAGNOSIS — K52.831 COLLAGENOUS COLITIS: ICD-10-CM

## 2023-07-18 DIAGNOSIS — J44.9 COPD, SEVERE (H): ICD-10-CM

## 2023-07-18 DIAGNOSIS — Z23 NEED FOR DIPHTHERIA-TETANUS-PERTUSSIS (TDAP) VACCINE: ICD-10-CM

## 2023-07-18 DIAGNOSIS — E03.8 OTHER SPECIFIED HYPOTHYROIDISM: ICD-10-CM

## 2023-07-18 LAB
T4 FREE SERPL-MCNC: 1.36 NG/DL (ref 0.9–1.7)
TSH SERPL DL<=0.005 MIU/L-ACNC: 21.3 UIU/ML (ref 0.3–4.2)

## 2023-07-18 PROCEDURE — 99214 OFFICE O/P EST MOD 30 MIN: CPT | Performed by: INTERNAL MEDICINE

## 2023-07-18 PROCEDURE — 84439 ASSAY OF FREE THYROXINE: CPT | Performed by: INTERNAL MEDICINE

## 2023-07-18 PROCEDURE — 36415 COLL VENOUS BLD VENIPUNCTURE: CPT | Performed by: INTERNAL MEDICINE

## 2023-07-18 PROCEDURE — 84443 ASSAY THYROID STIM HORMONE: CPT | Performed by: INTERNAL MEDICINE

## 2023-07-18 RX ORDER — FLUOCINOLONE ACETONIDE 0.25 MG/G
CREAM TOPICAL 2 TIMES DAILY
Qty: 240 G | Refills: 4 | Status: SHIPPED | OUTPATIENT
Start: 2023-07-18

## 2023-07-18 NOTE — PATIENT INSTRUCTIONS
Melatonin is okay for sleep.     Okay to use the fluocinolone  for the psoriatic plaques on your bottom.

## 2023-07-18 NOTE — LETTER
July 20, 2023      Helena Eldridge  [address] [facility] [address]        Dear Helena:    We are writing to inform you of your test results.    The thyroid level (free T4) is improving.  At this time, I would keep the current dose of levothyroxine the same, and take daily as you are.    When you see your new primary doctor, have them recheck these levels.     All the best to you, and your wonderful boys!       Resulted Orders   TSH with free T4 reflex   Result Value Ref Range    TSH 21.30 (H) 0.30 - 4.20 uIU/mL   T4 free   Result Value Ref Range    Free T4 1.36 0.90 - 1.70 ng/dL     If you have any questions or concerns, please call the clinic at the number listed above.     Sincerely,      Sushma Nelson MD/wendy

## 2023-07-18 NOTE — PROGRESS NOTES
Assessment & Plan     Solitary kidney, acquired  Stable kidney function     Non-small cell cancer of right lung (H)   >10 Y    No recurrence    IPF (idiopathic pulmonary fibrosis) (H)  COPD, severe (H)  She has no respiratory complaints:  Not limited.       Collagenous colitis   prn imodium     Idiopathic chronic gout   no longer on allopurinol (also not on diuretic) since ARF months ago.   No recurrence.      Need for diphtheria-tetanus-pertussis (Tdap) vaccine   coached her to get via pharmacy on way out:   - Tdap, tetanus-diptheria-acell pertussis, (BOOSTRIX) 5-2.5-18.5 LF-MCG/0.5 ROSSY injection; Inject 0.5 mLs into the muscle once for 1 dose    Other specified hypothyroidism   due to recheck, ensure euthyroid    - TSH with free T4 reflex; Future  - TSH with free T4 reflex    Psoriasis   refilled.   - fluocinolone (SYNALAR) 0.025 % cream; Apply topically 2 times daily      I spent a total of 30 minutes on the day of the visit.   Time spent by me doing chart review, history and exam, documentation and further activities per the note             Sushma Nelson MD  [facility]      =====================================================    Subjective   Helena is a 87 year old, presenting for the following health issues:  No chief complaint on file.     H/o RUL/Hilar NSCLungCa (s/p low dose carboplatin and pemerexed.....had to d/c Novolumib immunotherapy due to  pruritis and yeast infections.... recent CT imaging= no evidence of cancer), L. nephrectomy (stones&RCCa), Gout, CKD 4, hypothyroid, osteoporosis, pelvic fx, lichen sclerosis,   , severe (asymptomatic) COPD     Over Summer '20, had ESWL and stent placement for 6 mm stone in Right upper ureter    > had been very ill with c.diff colitis and ARF in May.    She is no longer on lasix, and allopurinol stopped as well.   > has a lot of support at home with her sons checking in, help with cleaning and MOWs.   > Sons express concern with  "cognition, and are anticipating Helena's needs for safety, goal is independent living as long as possible.     > Thyroid dose recently changed.       Recently working with derm to treat  Psoriasis v intertrigo  >deistin bedtime  >fluocinlone daily  >Loprox bedtime  ... she is only using the fluonisonide    And her symptoms are much improvd.        >> She is using melatonin for sleep.  It is helping.            No data to display              History of Present Illness       Reason for visit:  Flowup on diarrhea, dementia, rash, low appetite, low energy.    She eats 0-1 servings of fruits and vegetables daily.She consumes 1 sweetened beverage(s) daily.She exercises with enough effort to increase her heart rate 9 or less minutes per day.  She exercises with enough effort to increase her heart rate 3 or less days per week. She is missing 3 dose(s) of medications per week.  She is not taking prescribed medications regularly due to remembering to take.         Review of Systems   Constitutional, HEENT, cardiovascular, pulmonary, gi and gu systems are negative, except as otherwise noted.      Objective    BP (!) 148/68 (BP Location: Right arm, Patient Position: Sitting, Cuff Size: Adult Regular)   Pulse 80   Temp 98.5  F (36.9  C) (Oral)   Resp 16   Ht 1.511 m (4' 11.5\")   Wt 50.8 kg (112 lb)   SpO2 94%   BMI 22.24 kg/m    There is no height or weight on file to calculate BMI.     Physical Exam     GENERAL: healthy, alert and no distress  EYES: Eyes grossly normal to inspection, PERRL and conjunctivae and sclerae normal  RESP: lungs clear to auscultation - no rales, rhonchi or wheezes  BREAST:  \"healing\" intertrigo under breasts  CV: regular rate and rhythm, normal S1 S2, no S3 or S4, soft 2/6 systolic murmur, click or rub, no peripheral edema and peripheral pulses strong  MS: no gross musculoskeletal defects noted, no edema  SKIN: \"healing\" intertrigo under breasts    Many psoriatic plaques around buttocks.   PSYCH: " mentation appears normal, affect normal/bright   She is engaging.     Office Visit on 05/22/2023   Component Date Value Ref Range Status     TSH 05/22/2023 8.97 (H)  0.30 - 4.20 uIU/mL Final     Sodium 05/22/2023 140  136 - 145 mmol/L Final     Potassium 05/22/2023 5.1  3.4 - 5.3 mmol/L Final     Chloride 05/22/2023 106  98 - 107 mmol/L Final     Carbon Dioxide (CO2) 05/22/2023 22  22 - 29 mmol/L Final     Anion Gap 05/22/2023 12  7 - 15 mmol/L Final     Urea Nitrogen 05/22/2023 51.6 (H)  8.0 - 23.0 mg/dL Final     Creatinine 05/22/2023 1.79 (H)  0.51 - 0.95 mg/dL Final     Calcium 05/22/2023 8.6 (L)  8.8 - 10.2 mg/dL Final     Glucose 05/22/2023 115 (H)  70 - 99 mg/dL Final     GFR Estimate 05/22/2023 27 (L)  >60 mL/min/1.73m2 Final    eGFR calculated using 2021 CKD-EPI equation.     Free T4 05/22/2023 0.49 (L)  0.90 - 1.70 ng/dL Final

## 2023-07-19 ENCOUNTER — PATIENT OUTREACH (OUTPATIENT)
Dept: CARE COORDINATION | Facility: CLINIC | Age: 88
End: 2023-07-19
Payer: COMMERCIAL

## 2023-07-20 NOTE — RESULT ENCOUNTER NOTE
Helena Eldridge    The thyroid level (Free T4) is improving.  At this time, I would keep the current dose of levothyroxine the same, and take daily as you are.    When you see your new primary doctor, have them recheck these levels    All the best to you, and your wonderful boys!         MANUELA MONREAL M.D.

## 2023-07-25 ENCOUNTER — VIRTUAL VISIT (OUTPATIENT)
Dept: DERMATOLOGY | Facility: CLINIC | Age: 88
End: 2023-07-25
Payer: COMMERCIAL

## 2023-07-25 DIAGNOSIS — L40.9 PSORIASIS: Primary | ICD-10-CM

## 2023-07-25 PROCEDURE — 99442 PR PHYSICIAN TELEPHONE EVALUATION 11-20 MIN: CPT | Mod: 95 | Performed by: DERMATOLOGY

## 2023-07-25 RX ORDER — BETAMETHASONE DIPROPIONATE 0.5 MG/G
CREAM TOPICAL
Qty: 100 G | Refills: 3 | Status: SHIPPED | OUTPATIENT
Start: 2023-07-25 | End: 2023-10-19

## 2023-07-25 NOTE — PROGRESS NOTES
"      Helena Eldridge is a 87 year old female who is being evaluated via a phone  visit.      The patient has been notified of following:     \"This phone  visit will be conducted via a call between you and your physician/provider. We have found that certain health care needs can be provided without the need for an in-person physical exam.  This service lets us provide the care you need with a video conversation.  If a prescription is necessary we can send it directly to your pharmacy.  If lab work is needed we can place an order for that and you can then stop by our lab to have the test done at a later time.    Phone visits are billed at different rates depending on your insurance coverage.  Please reach out to your insurance provider with any questions.    If during the course of the call the physician/provider feels a phone visit is not appropriate, you will not be charged for this service.\"    Patient has given verbal consent for phone visit? Yes    How would you like to obtain your AVS? Bhavin    Helena Eldridge is a 87 year old year old female patient here today for follow up inverse  psoriasis.   Breasts are clear, she notes rash on sacrum today.  Patient has no other skin complaints today.  Remainder of the HPI, Meds, PMH, Allergies, FH, and SH was reviewed in chart.      Past Medical History:   Diagnosis Date    Carpal tunnel syndrome     CKD (chronic kidney disease)     kidney stone with infection    CKD (chronic kidney disease) stage 4, GFR 15-29 ml/min (H) 12/2/2010    History of kidney stone with infection.  Creatinine 1.3 - 1.6 (12/2/10, [facility])   -- she can't take ACE / ARB due to side effects.  Will continue with good BP control (12/3/15, [facility])     COPD, severe (H) 2/16/2015    Seen by pulmonary 2013.       Deep vein thrombosis (DVT) (H)     1972    DVT [id number]    Glucose intolerance     H/O partial nephrectomy     Heel spur     Hypertension goal BP (blood pressure) < 140/90 11/28/2011    Hypothyroidism  "    Idiopathic chronic gout 5/14/2015    Immunosuppression (H) 3/7/2018    IPF (idiopathic pulmonary fibrosis) (H) 2/25/2015    Mild, at bases per CXR 2/2015 (2/15/15, [facility])     Kidney stones     Lichen sclerosus     Lichen sclerosus et atrophicus of the vulva 5/8/2013    Clobetasol bid is the recommended remedy    Macular degeneration     Non-small cell cancer of right lung (H) 3/7/2018    Nonsenile cataract     Osteoarthritis     Osteoarthritis of right knee 3/11/2014    Osteoporosis     Other specified hypothyroidism 5/14/2015    PID     Pulmonary embolism (H)     1970    Senile osteoporosis 3/11/2014    Unspecified hypothyroidism     Vitiligo        Past Surgical History:   Procedure Laterality Date    EXTRACORPOREAL SHOCK WAVE LITHOTRIPSY, CYSTOSCOPY, INSERT STENT URETER(S), COMBINED Right 7/13/2020    Procedure: LITHOTRIPSY, Right EXTRACORPOREAL SHOCK WAVE (ESWL), WITH CYSTOSCOPY AND Right URETERAL STENT INSERTION;  Surgeon: Buddy Ramirez MD;  Location: [facility] OR    HC REMOVAL GALLBLADDER      HC REVISE MEDIAN N/CARPAL TUNNEL SURG      IR LUNG BIOPSY MEDIASTINUM RIGHT  1/16/2018    LASER HOLMIUM LITHOTRIPSY URETER(S), INSERT STENT, COMBINED Right 7/28/2020    Procedure: RIGHT CYSTOURETEROSCOPY, WITH LITHOTRIPSY USING LASER AND URETERAL STENT EXCHANGE;  Surgeon: Buddy Ramirez MD;  Location: [facility] OR    TUBAL LIGATION      ZZC NEPHRECTOMY      left partial 07-01-07    ZZC VENOUS THROMBOSIS IMAGING      with pe        Family History   Problem Relation Age of Onset    Cancer Brother     Glaucoma Mother     Alzheimer Disease Brother     Macular Degeneration No family hx of        Social History     Socioeconomic History    Marital status: Single     Spouse name: Not on file    Number of children: Not on file    Years of education: Not on file    Highest education level: Not on file   Occupational History    Not on file   Tobacco Use    Smoking status: Former     Types: Cigarettes     Quit date: 12/4/1969      Years since quittin.6    Smokeless tobacco: Never   Vaping Use    Vaping Use: Never used   Substance and Sexual Activity    Alcohol use: Yes     Alcohol/week: 0.0 standard drinks of alcohol     Comment: very little     Drug use: No    Sexual activity: Not Currently     Partners: Male   Other Topics Concern    Parent/sibling w/ CABG, MI or angioplasty before 65F 55M? No   Social History Narrative    Not on file     Social Determinants of Health     Financial Resource Strain: Not on file   Food Insecurity: Not on file   Transportation Needs: Not on file   Physical Activity: Not on file   Stress: Not on file   Social Connections: Not on file   Intimate Partner Violence: Not on file   Housing Stability: Not on file       Outpatient Encounter Medications as of 2023   Medication Sig Dispense Refill    Calcium Carbonate-Vitamin D (CALCIUM + D) 600-5 MG-MCG TABS Take 2 tablets by mouth 2 times daily      ciclopirox (LOPROX) 0.77 % cream Apply topically At Bedtime Apply first and then desitin over this 60 g 6    famotidine (PEPCID) 20 MG tablet TAKE 1 TABLET(20 MG) BY MOUTH TWICE DAILY 180 tablet 3    fluocinolone (SYNALAR) 0.025 % cream Apply topically 2 times daily 240 g 4    fluocinonide (LIDEX) 0.05 % external cream Apply topically daily To affected areas on chest 120 g 1    levothyroxine (SYNTHROID/LEVOTHROID) 100 MCG tablet TAKE 1 TABLET(100 MCG) BY MOUTH DAILY 90 tablet 3    Multiple Vitamins-Minerals (OCUVITE ADULT 50+) CAPS Take 1 capsule by mouth daily 30 capsule 12    rOPINIRole (REQUIP) 0.25 MG tablet TAKE 1 TO 2 TABLETS(0.25 TO 0.5 MG) BY MOUTH EVERY NIGHT AS NEEDED FOR RESTLESS LEGS 180 tablet 3    vancomycin (FIRVANQ) 50 MG/ML oral solution Take by mouth 4 times daily Take 2.5 mls       Facility-Administered Encounter Medications as of 2023   Medication Dose Route Frequency Provider Last Rate Last Admin    perflutren diluted in saline (DEFINITY) injection 5 mL  5 mL Intravenous Once Leslie  Sushma LUA MD                 Review Of Systems  Skin: As above  Eyes: negative  Ears/Nose/Throat: negative  Respiratory: No shortness of breath, dyspnea on exertion, cough, or hemoptysis  Cardiovascular: negative  Gastrointestinal: negative  Genitourinary: negative  Musculoskeletal: negative  Neurologic: negative  Psychiatric: negative  Hematologic/Lymphatic/Immunologic: negative  Endocrine: negative      O:   Alert & Orientedx3, Mood & Affect wnl,    General appearance normal   Alert, oriented and in no acute distress    BREASTS clear    Pulm: Breathing Normal, talking in normal sentences, no shortness of breath during conversation    Neuro/Psych: Orientation:Alert and Orientedx3 ; Mood/Affect:normal ; no anxiety or depression     A/P:  1.psoriasis  Topiclas prn for breasts  Sacral rash most likely psoriasis  Betamethasone twice daily  Return to clinic 2 months  It was a pleasure speaking to Helena Eldridge today.  Previous clinic  notes and pertinent laboratory tests were reviewed prior to Helena Eldridge's visit.    Teledermatology information:  - Location of patient: home  - Location of teledermatologist: [facility]   - Reason teledermatology is appropriate: of National Emergency Regarding Coronavirus disease (COVID 19) Outbreak  - The patient's condition can safely be assessed using telemedicine: yes  - Method of transmission: store and forward teledermatology  - In-person dermatology visit recommendation: no  - Service start time:900am/pm  - Service end time:917am/pm  - Date of report: 07/25/23

## 2023-07-25 NOTE — LETTER
"    7/25/2023         RE: Helena Eldridge  [address] [facility] [address]        Dear Colleague,    Thank you for referring your patient, Helena Eldridge, to the [facility]. Please see a copy of my visit note below.          Helena Eldridge is a 87 year old female who is being evaluated via a phone  visit.      The patient has been notified of following:     \"This phone  visit will be conducted via a call between you and your physician/provider. We have found that certain health care needs can be provided without the need for an in-person physical exam.  This service lets us provide the care you need with a video conversation.  If a prescription is necessary we can send it directly to your pharmacy.  If lab work is needed we can place an order for that and you can then stop by our lab to have the test done at a later time.    Phone visits are billed at different rates depending on your insurance coverage.  Please reach out to your insurance provider with any questions.    If during the course of the call the physician/provider feels a phone visit is not appropriate, you will not be charged for this service.\"    Patient has given verbal consent for phone visit? Yes    How would you like to obtain your AVS? MyChart    Helena Eldridge is a 87 year old year old female patient here today for follow up inverse  psoriasis.   Breasts are clear, she notes rash on sacrum today.  Patient has no other skin complaints today.  Remainder of the HPI, Meds, PMH, Allergies, FH, and SH was reviewed in chart.      Past Medical History:   Diagnosis Date     Carpal tunnel syndrome      CKD (chronic kidney disease)     kidney stone with infection     CKD (chronic kidney disease) stage 4, GFR 15-29 ml/min (H) 12/2/2010    History of kidney stone with infection.  Creatinine 1.3 - 1.6 (12/2/10, [facility])   -- she can't take ACE / ARB due to side effects.  Will continue with good BP control (12/3/15, [facility])      " COPD, severe (H) 2/16/2015    Seen by pulmonary 2013.        Deep vein thrombosis (DVT) (H)     1972     DVT [id number]     Glucose intolerance      H/O partial nephrectomy      Heel spur      Hypertension goal BP (blood pressure) < 140/90 11/28/2011     Hypothyroidism      Idiopathic chronic gout 5/14/2015     Immunosuppression (H) 3/7/2018     IPF (idiopathic pulmonary fibrosis) (H) 2/25/2015    Mild, at bases per CXR 2/2015 (2/15/15, [facility])      Kidney stones      Lichen sclerosus      Lichen sclerosus et atrophicus of the vulva 5/8/2013    Clobetasol bid is the recommended remedy     Macular degeneration      Non-small cell cancer of right lung (H) 3/7/2018     Nonsenile cataract      Osteoarthritis      Osteoarthritis of right knee 3/11/2014     Osteoporosis      Other specified hypothyroidism 5/14/2015     PID      Pulmonary embolism (H)     1970     Senile osteoporosis 3/11/2014     Unspecified hypothyroidism      Vitiligo        Past Surgical History:   Procedure Laterality Date     EXTRACORPOREAL SHOCK WAVE LITHOTRIPSY, CYSTOSCOPY, INSERT STENT URETER(S), COMBINED Right 7/13/2020    Procedure: LITHOTRIPSY, Right EXTRACORPOREAL SHOCK WAVE (ESWL), WITH CYSTOSCOPY AND Right URETERAL STENT INSERTION;  Surgeon: Buddy Ramirez MD;  Location: [facility] OR     HC REMOVAL GALLBLADDER       HC REVISE MEDIAN N/CARPAL TUNNEL SURG       IR LUNG BIOPSY MEDIASTINUM RIGHT  1/16/2018     LASER HOLMIUM LITHOTRIPSY URETER(S), INSERT STENT, COMBINED Right 7/28/2020    Procedure: RIGHT CYSTOURETEROSCOPY, WITH LITHOTRIPSY USING LASER AND URETERAL STENT EXCHANGE;  Surgeon: Buddy Ramirez MD;  Location: [facility] OR     TUBAL LIGATION       ZZC NEPHRECTOMY      left partial 07-01-07     ZZC VENOUS THROMBOSIS IMAGING      with pe        Family History   Problem Relation Age of Onset     Cancer Brother      Glaucoma Mother      Alzheimer Disease Brother      Macular Degeneration No family hx of        Social History     Socioeconomic  History     Marital status: Single     Spouse name: Not on file     Number of children: Not on file     Years of education: Not on file     Highest education level: Not on file   Occupational History     Not on file   Tobacco Use     Smoking status: Former     Types: Cigarettes     Quit date: 1969     Years since quittin.6     Smokeless tobacco: Never   Vaping Use     Vaping Use: Never used   Substance and Sexual Activity     Alcohol use: Yes     Alcohol/week: 0.0 standard drinks of alcohol     Comment: very little      Drug use: No     Sexual activity: Not Currently     Partners: Male   Other Topics Concern     Parent/sibling w/ CABG, MI or angioplasty before 65F 55M? No   Social History Narrative     Not on file     Social Determinants of Health     Financial Resource Strain: Not on file   Food Insecurity: Not on file   Transportation Needs: Not on file   Physical Activity: Not on file   Stress: Not on file   Social Connections: Not on file   Intimate Partner Violence: Not on file   Housing Stability: Not on file       Outpatient Encounter Medications as of 2023   Medication Sig Dispense Refill     Calcium Carbonate-Vitamin D (CALCIUM + D) 600-5 MG-MCG TABS Take 2 tablets by mouth 2 times daily       ciclopirox (LOPROX) 0.77 % cream Apply topically At Bedtime Apply first and then desitin over this 60 g 6     famotidine (PEPCID) 20 MG tablet TAKE 1 TABLET(20 MG) BY MOUTH TWICE DAILY 180 tablet 3     fluocinolone (SYNALAR) 0.025 % cream Apply topically 2 times daily 240 g 4     fluocinonide (LIDEX) 0.05 % external cream Apply topically daily To affected areas on chest 120 g 1     levothyroxine (SYNTHROID/LEVOTHROID) 100 MCG tablet TAKE 1 TABLET(100 MCG) BY MOUTH DAILY 90 tablet 3     Multiple Vitamins-Minerals (OCUVITE ADULT 50+) CAPS Take 1 capsule by mouth daily 30 capsule 12     rOPINIRole (REQUIP) 0.25 MG tablet TAKE 1 TO 2 TABLETS(0.25 TO 0.5 MG) BY MOUTH EVERY NIGHT AS NEEDED FOR RESTLESS LEGS  180 tablet 3     vancomycin (FIRVANQ) 50 MG/ML oral solution Take by mouth 4 times daily Take 2.5 mls       Facility-Administered Encounter Medications as of 7/25/2023   Medication Dose Route Frequency Provider Last Rate Last Admin     perflutren diluted in saline (DEFINITY) injection 5 mL  5 mL Intravenous Once Sushma Nelson MD                 Review Of Systems  Skin: As above  Eyes: negative  Ears/Nose/Throat: negative  Respiratory: No shortness of breath, dyspnea on exertion, cough, or hemoptysis  Cardiovascular: negative  Gastrointestinal: negative  Genitourinary: negative  Musculoskeletal: negative  Neurologic: negative  Psychiatric: negative  Hematologic/Lymphatic/Immunologic: negative  Endocrine: negative      O:   Alert & Orientedx3, Mood & Affect wnl,    General appearance normal   Alert, oriented and in no acute distress    BREASTS clear    Pulm: Breathing Normal, talking in normal sentences, no shortness of breath during conversation    Neuro/Psych: Orientation:Alert and Orientedx3 ; Mood/Affect:normal ; no anxiety or depression     A/P:  1.psoriasis  Topiclas prn for breasts  Sacral rash most likely psoriasis  Betamethasone twice daily  Return to clinic 2 months  It was a pleasure speaking to Helena Eldridge today.  Previous clinic  notes and pertinent laboratory tests were reviewed prior to Helena Eldridge's visit.    Teledermatology information:  - Location of patient: home  - Location of teledermatologist: [facility]   - Reason teledermatology is appropriate: of National Emergency Regarding Coronavirus disease (COVID 19) Outbreak  - The patient's condition can safely be assessed using telemedicine: yes  - Method of transmission: store and forward teledermatology  - In-person dermatology visit recommendation: no  - Service start time:900am/pm  - Service end time:917am/pm  - Date of report: 07/25/23      Again, thank you for allowing me to participate in the care of your patient.         Sincerely,        Buddy Torres MD

## 2023-08-02 ENCOUNTER — OFFICE VISIT (OUTPATIENT)
Dept: AUDIOLOGY | Facility: CLINIC | Age: 88
End: 2023-08-02
Payer: COMMERCIAL

## 2023-08-02 ENCOUNTER — PATIENT OUTREACH (OUTPATIENT)
Dept: CARE COORDINATION | Facility: CLINIC | Age: 88
End: 2023-08-02

## 2023-08-02 DIAGNOSIS — H90.3 SENSORINEURAL HEARING LOSS, BILATERAL: Primary | ICD-10-CM

## 2023-08-02 PROCEDURE — V5299 HEARING SERVICE: HCPCS

## 2023-08-02 NOTE — PROGRESS NOTES
AUDIOLOGY REPORT: HEARING AID RECHECK    SUBJECTIVE: Helena Eldridge is a 87 year old female, :  1935, was seen in the Audiology Clinic at [facility] on 23 for a return check of their hearing aids. The patient was accompanied by their son.      Background:   Patient is here today with the complaint of neither hearing aid working well.     Procedures:        SIDE: Both                          : Widex                           TYPE: Clear 330-9 BTE                          S/N: (R): [id number] (L): [id number]                          WARRANTY:  2016     Visual inspection found hardened/yellow tubing. Hearing aids were cleaned and tubing replaced. A listening check found good sound output. Otoscopy revealed deep occluding cerumen, bilaterally. It was recommended patient follow-up with ENT for cerumen removal.   Helena is interested in updated amplification. She does have a possible benefit through her insurance; she would like to look into that benefit before returning to [facility]. Directions on how to start the process of obtaining new devices through [place] was discussed. Both patient and her son were understanding.     Plan:   Patient will return as needed for hearing aid concerns.     CHARGES: Hearing aid cleaning ()    William Hardin, CCC-A  Licensed Audiologist  [place] #[id number]  2023

## 2023-08-10 ENCOUNTER — TELEPHONE (OUTPATIENT)
Dept: FAMILY MEDICINE | Facility: CLINIC | Age: 88
End: 2023-08-10
Payer: COMMERCIAL

## 2023-08-10 NOTE — TELEPHONE ENCOUNTER
Order/Referral Request    Who is requesting: Son    Orders being requested: Ear wash    Reason service is needed/diagnosis: Audiology noted impacted ear wax and unable to provide testing until ears are cleaned    When are orders needed by: asap    Has this been discussed with Provider: No    Does patient have a preference on a Group/Provider/Facility? [facility]    Does patient have an appointment scheduled?: No    Where to send orders: Place orders within Epic    Could we send this information to you in [facility] or would you prefer to receive a phone call?:   Patient would prefer a phone call   Okay to leave a detailed message?: Yes at Other phone number:  [phone]

## 2023-08-11 ENCOUNTER — ALLIED HEALTH/NURSE VISIT (OUTPATIENT)
Dept: FAMILY MEDICINE | Facility: CLINIC | Age: 88
End: 2023-08-11
Payer: COMMERCIAL

## 2023-08-11 DIAGNOSIS — H93.8X2 PLUGGED FEELING IN EAR, LEFT: Primary | ICD-10-CM

## 2023-08-11 PROCEDURE — 99207 PR NO CHARGE NURSE ONLY: CPT

## 2023-08-11 PROCEDURE — 69210 REMOVE IMPACTED EAR WAX UNI: CPT

## 2023-08-18 ENCOUNTER — ANCILLARY ORDERS (OUTPATIENT)
Dept: MAMMOGRAPHY | Facility: CLINIC | Age: 88
End: 2023-08-18

## 2023-08-18 DIAGNOSIS — Z12.31 VISIT FOR SCREENING MAMMOGRAM: Primary | ICD-10-CM

## 2023-09-08 ENCOUNTER — ANCILLARY PROCEDURE (OUTPATIENT)
Dept: MAMMOGRAPHY | Facility: CLINIC | Age: 88
End: 2023-09-08
Attending: INTERNAL MEDICINE
Payer: COMMERCIAL

## 2023-09-08 DIAGNOSIS — Z12.31 VISIT FOR SCREENING MAMMOGRAM: ICD-10-CM

## 2023-09-08 PROCEDURE — 77067 SCR MAMMO BI INCL CAD: CPT | Mod: TC | Performed by: STUDENT IN AN ORGANIZED HEALTH CARE EDUCATION/TRAINING PROGRAM

## 2023-09-15 ENCOUNTER — PATIENT OUTREACH (OUTPATIENT)
Dept: NEPHROLOGY | Facility: CLINIC | Age: 88
End: 2023-09-15
Payer: COMMERCIAL

## 2023-09-15 NOTE — TELEPHONE ENCOUNTER
Nephrology Note: RN CC Chart Review    REASON FOR ENCOUNTER:     Chart reviewed by nephrology RN CC                          SITUATION/BACKROUND:     Last nephrology visit:    Last Renal Panel:  Sodium   Date Value Ref Range Status   05/22/2023 140 136 - 145 mmol/L Final   06/21/2021 135 133 - 144 mmol/L Final     Potassium   Date Value Ref Range Status   05/22/2023 5.1 3.4 - 5.3 mmol/L Final   11/10/2022 3.7 3.4 - 5.3 mmol/L Final   06/21/2021 4.1 3.4 - 5.3 mmol/L Final     Chloride   Date Value Ref Range Status   05/22/2023 106 98 - 107 mmol/L Final   11/10/2022 101 94 - 109 mmol/L Final   06/21/2021 100 94 - 109 mmol/L Final     Carbon Dioxide   Date Value Ref Range Status   06/21/2021 30 20 - 32 mmol/L Final     Carbon Dioxide (CO2)   Date Value Ref Range Status   05/22/2023 22 22 - 29 mmol/L Final   11/10/2022 30 20 - 32 mmol/L Final     Anion Gap   Date Value Ref Range Status   05/22/2023 12 7 - 15 mmol/L Final   11/10/2022 8 3 - 14 mmol/L Final   06/21/2021 5 3 - 14 mmol/L Final     Glucose   Date Value Ref Range Status   05/22/2023 115 (H) 70 - 99 mg/dL Final   11/10/2022 131 (H) 70 - 99 mg/dL Final   06/21/2021 96 70 - 99 mg/dL Final     Urea Nitrogen   Date Value Ref Range Status   05/22/2023 51.6 (H) 8.0 - 23.0 mg/dL Final   11/10/2022 25 7 - 30 mg/dL Final   06/21/2021 29 7 - 30 mg/dL Final     Creatinine   Date Value Ref Range Status   05/22/2023 1.79 (H) 0.51 - 0.95 mg/dL Final   06/21/2021 2.11 (H) 0.52 - 1.04 mg/dL Final     GFR Estimate   Date Value Ref Range Status   05/22/2023 27 (L) >60 mL/min/1.73m2 Final     Comment:     eGFR calculated using 2021 CKD-EPI equation.   06/21/2021 21 (L) >60 mL/min/[1.73_m2] Final     Comment:     Non  GFR Calc  Starting 12/18/2018, serum creatinine based estimated GFR (eGFR) will be   calculated using the Chronic Kidney Disease Epidemiology Collaboration   (CKD-EPI) equation.       Calcium   Date Value Ref Range Status   05/22/2023 8.6 (L) 8.8 -  10.2 mg/dL Final   06/21/2021 8.9 8.5 - 10.1 mg/dL Final     Phosphorus   Date Value Ref Range Status   08/18/2022 3.4 2.5 - 4.5 mg/dL Final   06/21/2021 3.9 2.5 - 4.5 mg/dL Final     Albumin   Date Value Ref Range Status   11/10/2022 3.8 3.4 - 5.0 g/dL Final   06/21/2021 4.0 3.4 - 5.0 g/dL Final         Neph Tracking Status:  Neph Tracking Flowsheet Last Filled Values       CKD Education Status Complete    CKD Education Referral Date 01/01/21    CKD Education Completed Date 02/01/21    CKD Education Type Kidney Smart Modality Education; Kidney Smart CKD Basics    Patient's Referral Dates Auto Populate Patient's Referral Dates    Home Care Referral 8/22/14    Journey Referral 1/9/21    Transplant Status  Not Eligible  likely not eligible due to age              ASSESSMENT/PLAN     Patient to call/[employer] message with updates. Patient's son states Helena has increasing dementia and requires a lot of care and will not do well on dialysis if it came to that time. No future follow up appointments made.     Upcoming Appointments:  Appointments in Next Year      Sep 27, 2023  1:45 PM  (Arrive by 1:30 PM)  Return Visit with Buddy Torres MD  [facility] ([facility] ) [phone]     Oct 19, 2023 10:30 AM  (Arrive by 10:10 AM)  Annual Wellness Visit with Luz Wilson PA-C  [facility] ([facility] ) [phone]     Nov 03, 2023  1:30 PM  (Arrive by 1:15 PM)  Return Visit with MERARI Vizcaino CNP  [facility] ([facility] ) [phone]              JHONNY SALMERON RN

## 2023-09-24 ENCOUNTER — HEALTH MAINTENANCE LETTER (OUTPATIENT)
Age: 88
End: 2023-09-24

## 2023-09-27 ENCOUNTER — OFFICE VISIT (OUTPATIENT)
Dept: DERMATOLOGY | Facility: CLINIC | Age: 88
End: 2023-09-27
Payer: COMMERCIAL

## 2023-09-27 DIAGNOSIS — N20.0 KIDNEY STONES: ICD-10-CM

## 2023-09-27 DIAGNOSIS — C34.91 NON-SMALL CELL CANCER OF RIGHT LUNG (H): ICD-10-CM

## 2023-09-27 DIAGNOSIS — I10 HYPERTENSION GOAL BP (BLOOD PRESSURE) < 140/90: ICD-10-CM

## 2023-09-27 DIAGNOSIS — N18.4 CKD (CHRONIC KIDNEY DISEASE) STAGE 4, GFR 15-29 ML/MIN (H): ICD-10-CM

## 2023-09-27 DIAGNOSIS — Z79.899 ENCOUNTER FOR LONG-TERM (CURRENT) USE OF HIGH-RISK MEDICATION: ICD-10-CM

## 2023-09-27 DIAGNOSIS — N28.89 LEFT RENAL MASS: ICD-10-CM

## 2023-09-27 DIAGNOSIS — L40.9 PSORIASIS: Primary | ICD-10-CM

## 2023-09-27 LAB
ALBUMIN MFR UR ELPH: 23.1 MG/DL
ALBUMIN SERPL BCG-MCNC: 4.1 G/DL (ref 3.5–5.2)
ALP SERPL-CCNC: 69 U/L (ref 35–104)
ALT SERPL W P-5'-P-CCNC: 11 U/L (ref 0–50)
ANION GAP SERPL CALCULATED.3IONS-SCNC: 6 MMOL/L (ref 7–15)
AST SERPL W P-5'-P-CCNC: 17 U/L (ref 0–45)
BILIRUB SERPL-MCNC: 0.2 MG/DL
BUN SERPL-MCNC: 28.5 MG/DL (ref 8–23)
CALCIUM SERPL-MCNC: 9.9 MG/DL (ref 8.8–10.2)
CHLORIDE SERPL-SCNC: 103 MMOL/L (ref 98–107)
CREAT SERPL-MCNC: 2.13 MG/DL (ref 0.51–0.95)
CREAT UR-MCNC: 143.2 MG/DL
CREAT UR-MCNC: 145.8 MG/DL
DEPRECATED HCO3 PLAS-SCNC: 33 MMOL/L (ref 22–29)
EGFRCR SERPLBLD CKD-EPI 2021: 22 ML/MIN/1.73M2
ERYTHROCYTE [DISTWIDTH] IN BLOOD BY AUTOMATED COUNT: 12.3 % (ref 10–15)
GLUCOSE SERPL-MCNC: 130 MG/DL (ref 70–99)
HCT VFR BLD AUTO: 35.8 % (ref 35–47)
HGB BLD-MCNC: 11.5 G/DL (ref 11.7–15.7)
IRON BINDING CAPACITY (ROCHE): 220 UG/DL (ref 240–430)
IRON SATN MFR SERPL: 25 % (ref 15–46)
IRON SERPL-MCNC: 56 UG/DL (ref 37–145)
MCH RBC QN AUTO: 33.3 PG (ref 26.5–33)
MCHC RBC AUTO-ENTMCNC: 32.1 G/DL (ref 31.5–36.5)
MCV RBC AUTO: 104 FL (ref 78–100)
MICROALBUMIN UR-MCNC: 43.4 MG/L
MICROALBUMIN/CREAT UR: 30.31 MG/G CR (ref 0–25)
PHOSPHATE SERPL-MCNC: 3.7 MG/DL (ref 2.5–4.5)
PLATELET # BLD AUTO: 238 10E3/UL (ref 150–450)
POTASSIUM SERPL-SCNC: 4.4 MMOL/L (ref 3.4–5.3)
PROT SERPL-MCNC: 7.2 G/DL (ref 6.4–8.3)
PROT/CREAT 24H UR: 0.16 MG/MG CR (ref 0–0.2)
PTH-INTACT SERPL-MCNC: 62 PG/ML (ref 15–65)
RBC # BLD AUTO: 3.45 10E6/UL (ref 3.8–5.2)
SODIUM SERPL-SCNC: 142 MMOL/L (ref 135–145)
WBC # BLD AUTO: 9.6 10E3/UL (ref 4–11)

## 2023-09-27 PROCEDURE — 83550 IRON BINDING TEST: CPT | Performed by: DERMATOLOGY

## 2023-09-27 PROCEDURE — 84156 ASSAY OF PROTEIN URINE: CPT | Performed by: DERMATOLOGY

## 2023-09-27 PROCEDURE — 80053 COMPREHEN METABOLIC PANEL: CPT | Performed by: DERMATOLOGY

## 2023-09-27 PROCEDURE — 84100 ASSAY OF PHOSPHORUS: CPT | Performed by: DERMATOLOGY

## 2023-09-27 PROCEDURE — 83540 ASSAY OF IRON: CPT | Performed by: DERMATOLOGY

## 2023-09-27 PROCEDURE — 86704 HEP B CORE ANTIBODY TOTAL: CPT | Performed by: DERMATOLOGY

## 2023-09-27 PROCEDURE — 82570 ASSAY OF URINE CREATININE: CPT | Performed by: DERMATOLOGY

## 2023-09-27 PROCEDURE — 86803 HEPATITIS C AB TEST: CPT | Performed by: DERMATOLOGY

## 2023-09-27 PROCEDURE — 85027 COMPLETE CBC AUTOMATED: CPT | Performed by: DERMATOLOGY

## 2023-09-27 PROCEDURE — 36415 COLL VENOUS BLD VENIPUNCTURE: CPT | Performed by: DERMATOLOGY

## 2023-09-27 PROCEDURE — 99214 OFFICE O/P EST MOD 30 MIN: CPT | Performed by: DERMATOLOGY

## 2023-09-27 PROCEDURE — 82043 UR ALBUMIN QUANTITATIVE: CPT | Performed by: DERMATOLOGY

## 2023-09-27 PROCEDURE — 83970 ASSAY OF PARATHORMONE: CPT | Performed by: DERMATOLOGY

## 2023-09-27 RX ORDER — METHOTREXATE 2.5 MG/1
TABLET ORAL
Qty: 5 TABLET | Refills: 0 | Status: SHIPPED | OUTPATIENT
Start: 2023-09-27 | End: 2023-09-27

## 2023-09-27 RX ORDER — FOLIC ACID 1 MG/1
TABLET ORAL
Qty: 60 TABLET | Refills: 3 | Status: SHIPPED | OUTPATIENT
Start: 2023-09-27 | End: 2024-05-16

## 2023-09-27 RX ORDER — BETAMETHASONE DIPROPIONATE 0.5 MG/G
CREAM TOPICAL
Qty: 100 G | Refills: 3 | Status: SHIPPED | OUTPATIENT
Start: 2023-09-27 | End: 2023-11-08

## 2023-09-27 RX ORDER — METHOTREXATE 2.5 MG/1
TABLET ORAL
Qty: 5 TABLET | Refills: 0 | Status: SHIPPED | OUTPATIENT
Start: 2023-09-27 | End: 2024-05-16

## 2023-09-27 NOTE — LETTER
9/27/2023         RE: Helena Eldridge  [address] [facility] [address]        Dear Colleague,    Thank you for referring your patient, Helena Eldridge, to the [facility]. Please see a copy of my visit note below.    Helena Eldridge is an extremely pleasant 87 year old year old female patient here today for follow psoriasis. Not using lidex only using nizoral.  Still has rash.  Patient has no other skin complaints today.  Remainder of the HPI, Meds, PMH, Allergies, FH, and SH was reviewed in chart.      Past Medical History:   Diagnosis Date     Carpal tunnel syndrome      CKD (chronic kidney disease)     kidney stone with infection     CKD (chronic kidney disease) stage 4, GFR 15-29 ml/min (H) 12/2/2010    History of kidney stone with infection.  Creatinine 1.3 - 1.6 (12/2/10, [facility])   -- she can't take ACE / ARB due to side effects.  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        Deep vein thrombosis (DVT) (H)     1972     DVT [id number]     Glucose intolerance      H/O partial nephrectomy      Heel spur      Hypertension goal BP (blood pressure) < 140/90 11/28/2011     Hypothyroidism      Idiopathic chronic gout 5/14/2015     Immunosuppression (H) 3/7/2018     IPF (idiopathic pulmonary fibrosis) (H) 2/25/2015    Mild, at bases per CXR 2/2015 (2/15/15, [facility])      Kidney stones      Lichen sclerosus      Lichen sclerosus et atrophicus of the vulva 5/8/2013    Clobetasol bid is the recommended remedy     Macular degeneration      Non-small cell cancer of right lung (H) 3/7/2018     Nonsenile cataract      Osteoarthritis      Osteoarthritis of right knee 3/11/2014     Osteoporosis      Other specified hypothyroidism 5/14/2015     PID      Pulmonary embolism (H)     1970     Senile osteoporosis 3/11/2014     Unspecified hypothyroidism      Vitiligo        Past Surgical History:   Procedure Laterality Date      EXTRACORPOREAL SHOCK WAVE LITHOTRIPSY, CYSTOSCOPY, INSERT STENT URETER(S), COMBINED Right 2020    Procedure: LITHOTRIPSY, Right EXTRACORPOREAL SHOCK WAVE (ESWL), WITH CYSTOSCOPY AND Right URETERAL STENT INSERTION;  Surgeon: Buddy Ramirez MD;  Location: [facility] OR     HC REMOVAL GALLBLADDER       HC REVISE MEDIAN N/CARPAL TUNNEL SURG       IR LUNG BIOPSY MEDIASTINUM RIGHT  2018     LASER HOLMIUM LITHOTRIPSY URETER(S), INSERT STENT, COMBINED Right 2020    Procedure: RIGHT CYSTOURETEROSCOPY, WITH LITHOTRIPSY USING LASER AND URETERAL STENT EXCHANGE;  Surgeon: Buddy Ramirez MD;  Location: [facility] OR     TUBAL LIGATION       ZZC NEPHRECTOMY      left partial 07     ZZC VENOUS THROMBOSIS IMAGING      with pe        Family History   Problem Relation Age of Onset     Cancer Brother      Glaucoma Mother      Alzheimer Disease Brother      Macular Degeneration No family hx of        Social History     Socioeconomic History     Marital status: Single     Spouse name: Not on file     Number of children: Not on file     Years of education: Not on file     Highest education level: Not on file   Occupational History     Not on file   Tobacco Use     Smoking status: Former     Types: Cigarettes     Quit date: 1969     Years since quittin.8     Smokeless tobacco: Never   Vaping Use     Vaping Use: Never used   Substance and Sexual Activity     Alcohol use: Yes     Alcohol/week: 0.0 standard drinks of alcohol     Comment: very little      Drug use: No     Sexual activity: Not Currently     Partners: Male   Other Topics Concern     Parent/sibling w/ CABG, MI or angioplasty before 65F 55M? No   Social History Narrative     Not on file     Social Determinants of Health     Financial Resource Strain: Not on file   Food Insecurity: Not on file   Transportation Needs: Not on file   Physical Activity: Not on file   Stress: Not on file   Social Connections: Not on file   Interpersonal Safety: Not on file    Housing Stability: Not on file       Outpatient Encounter Medications as of 9/27/2023   Medication Sig Dispense Refill     augmented betamethasone dipropionate (DIPROLENE AF) 0.05 % external cream Apply sparingly to affected area twice daily as needed.  Do not apply to face. 100 g 3     Calcium Carbonate-Vitamin D (CALCIUM + D) 600-5 MG-MCG TABS Take 2 tablets by mouth 2 times daily       ciclopirox (LOPROX) 0.77 % cream Apply topically At Bedtime Apply first and then desitin over this 60 g 6     famotidine (PEPCID) 20 MG tablet TAKE 1 TABLET(20 MG) BY MOUTH TWICE DAILY 180 tablet 3     fluocinolone (SYNALAR) 0.025 % cream Apply topically 2 times daily 240 g 4     fluocinonide (LIDEX) 0.05 % external cream Apply topically daily To affected areas on chest 120 g 1     levothyroxine (SYNTHROID/LEVOTHROID) 100 MCG tablet TAKE 1 TABLET(100 MCG) BY MOUTH DAILY 90 tablet 3     Multiple Vitamins-Minerals (OCUVITE ADULT 50+) CAPS Take 1 capsule by mouth daily 30 capsule 12     rOPINIRole (REQUIP) 0.25 MG tablet TAKE 1 TO 2 TABLETS(0.25 TO 0.5 MG) BY MOUTH EVERY NIGHT AS NEEDED FOR RESTLESS LEGS 180 tablet 3     vancomycin (FIRVANQ) 50 MG/ML oral solution Take by mouth 4 times daily Take 2.5 mls       Facility-Administered Encounter Medications as of 9/27/2023   Medication Dose Route Frequency Provider Last Rate Last Admin     perflutren diluted in saline (DEFINITY) injection 5 mL  5 mL Intravenous Once Sushma Nelson MD                 O:   NAD, WDWN, Alert & Oriented, Mood & Affect wnl, Vitals stable   Here today alone   General appearance normal   Vitals stable   Alert, oriented and in no acute distress     Red scaly plaques on trunk and ext      Eyes: Conjunctivae/lids:Normal     ENT: Lips, buccal mucosa, tongue: normal    MSK:Normal    Cardiovascular: peripheral edema none    Pulm: Breathing Normal    Neuro/Psych: Orientation:Alert and Orientedx3 ; Mood/Affect:normal       A/P:  Psoriasis  NBUVB discussed with  patient she declines  Increase to betamethasone  Methotrexate and biolgoics discussed with patient   Methotrexate is a medication used in low doses to treat inflammatory skin conditions such as psoriasis and eczema/dermatitis. It is also prescribed for rheumatoid arthritis, psoriatic arthritis, and increasingly, other inflammatory and autoimmune disorders (off-label). In much higher doses, it is used as a chemotherapy agent for leukemia and some other forms of cancer.    For responding skin diseases, methotrexate usually shows some benefit within 6 to 8 weeks. Maximum effects are generally achieved within 5 to 6 months, depending on dose escalation.    Side effects of methotrexate  Side effects can occur at any time during treatment with methotrexate, but are most common in the first few weeks. Folic acid supplements, is thought to reduce some of the side effects of methotrexate.     If the side effects described below or other problems trouble you, or should you develop any signs of infection or unusual bleeding, notify your doctor promptly and before your next dose of methotrexate is due.      The most common side effects of methotrexate are loss of appetite, nausea and diarrhea, and affect about one in 12 patients. These side effects are usually temporary, but changes in dose and/or supplemental folic acid tablets may be helpful.    An overdose of methotrexate or deficiency of the vitamin folic acid may result in anemia , reduced white cell count, risking serious infections, and low platelet count, resulting in bruising and bleeding.    Methotrexate is stored by the liver. Transaminase liver enzyme levels may rise for a few days after treatment but they quickly return to normal.     Long term therapy may be associated with scarring (fibrosis or cirrhosis) of the liver. This is more commonly due to other reasons such as fatty liver, diabetes, hyperlipidemia, and obesity (ie metabolic syndrome), but can also  develop from viral hepatitis and alcohol.    Methotrexate can rarely cause a lung reaction similar to pneumonia called acute pneumonitis or interstitial pneumonia.      Today  -  Some baseline laboratory tests will be checked  - A prescription for folic acid will be sent to pharmacy for you to start.  (This is a vitamin that can help reduce the side effects of methotrexate)  -  A test dose of the medication to the pharmacy   -  Take all of the pills in the methotrexate prescription on the same day  -  Recheck your labs at any [facility] 7 days from the date you take the test dose  -  Once your blood work is complete, our office we will call and let you know if you can continue methotrexate     It was a pleasure speaking to Helena Eldridge today.  Previous clinic notes and pertinent laboratory tests were reviewed prior to Helena Eldridge's visit.return to clinic 8 weks      Again, thank you for allowing me to participate in the care of your patient.        Sincerely,        Buddy Torres MD

## 2023-09-27 NOTE — PATIENT INSTRUCTIONS
Methotrexate is a medication used in low doses to treat inflammatory skin conditions such as psoriasis and eczema/dermatitis. It is also prescribed for rheumatoid arthritis, psoriatic arthritis, and increasingly, other inflammatory and autoimmune disorders (off-label). In much higher doses, it is used as a chemotherapy agent for leukemia and some other forms of cancer.    For responding skin diseases, methotrexate usually shows some benefit within 6 to 8 weeks. Maximum effects are generally achieved within 5 to 6 months, depending on dose escalation.    Side effects of methotrexate  Side effects can occur at any time during treatment with methotrexate, but are most common in the first few weeks. Folic acid supplements, is thought to reduce some of the side effects of methotrexate.  Do not take Folic acid the same day you take the the methotrexate.     If the side effects described below or other problems trouble you, or should you develop any signs of infection or unusual bleeding, notify your doctor promptly and before your next dose of methotrexate is due.      The most common side effects of methotrexate are loss of appetite, nausea and diarrhea, and affect about one in 12 patients. These side effects are usually temporary, but changes in dose and/or supplemental folic acid tablets may be helpful.    An overdose of methotrexate or deficiency of the vitamin folic acid may result in anemia , reduced white cell count, risking serious infections, and low platelet count, resulting in bruising and bleeding.    Methotrexate is stored by the liver. Transaminase liver enzyme levels may rise for a few days after treatment but they quickly return to normal.     Long term therapy may be associated with scarring (fibrosis or cirrhosis) of the liver. This is more commonly due to other reasons such as fatty liver, diabetes, hyperlipidemia, and obesity (ie metabolic syndrome), but can also develop from viral hepatitis and  alcohol.    Methotrexate can rarely cause a lung reaction similar to pneumonia called acute pneumonitis or interstitial pneumonia.      Today  -  Some baseline laboratory tests will be checked  - A prescription for folic acid will be sent to pharmacy for you to start.  (This is a vitamin that can help reduce the side effects of methotrexate)  -  A test dose of the medication to the pharmacy   -  Take all of the pills in the methotrexate prescription on the same day  -  Recheck your labs at any [facility] 7 days from the date you take the test dose  -  Once your blood work is complete, our office we will call and let you know if you can continue methotrexate

## 2023-09-27 NOTE — LETTER
"October 9, 2023      Helena Eldridge  [address] [facility] [address]        Dear ,        We are writing to inform you of your test results.    \"Blood work ok.\"  Component      Latest Ref Rng 9/27/2023  2:00 PM   Sodium      135 - 145 mmol/L 142    Potassium      3.4 - 5.3 mmol/L 4.4    Carbon Dioxide (CO2)      22 - 29 mmol/L 33 (H)    Anion Gap      7 - 15 mmol/L 6 (L)    Urea Nitrogen      8.0 - 23.0 mg/dL 28.5 (H)    Creatinine      0.51 - 0.95 mg/dL 2.13 (H)    GFR Estimate      >60 mL/min/1.73m2 22 (L)    Calcium      8.8 - 10.2 mg/dL 9.9    Chloride      98 - 107 mmol/L 103    Glucose      70 - 99 mg/dL 130 (H)    Alkaline Phosphatase      35 - 104 U/L 69    AST      0 - 45 U/L 17    ALT      0 - 50 U/L 11    Protein Total      6.4 - 8.3 g/dL 7.2    Albumin      3.5 - 5.2 g/dL 4.1    Bilirubin Total      <=1.2 mg/dL 0.2    WBC      4.0 - 11.0 10e3/uL 9.6    RBC Count      3.80 - 5.20 10e6/uL 3.45 (L)    Hemoglobin      11.7 - 15.7 g/dL 11.5 (L)    Hematocrit      35.0 - 47.0 % 35.8    MCV      78 - 100 fL 104 (H)    MCH      26.5 - 33.0 pg 33.3 (H)    MCHC      31.5 - 36.5 g/dL 32.1    RDW      10.0 - 15.0 % 12.3    Platelet Count      150 - 450 10e3/uL 238    Hepatitis B Core Monika      Nonreactive  Nonreactive    Hepatitis C Antibody      Nonreactive  Nonreactive       Legend:  (H) High  (L) Low      If you have any questions or concerns, please call the clinic at the number listed above.       Sincerely,        /Misti MANZANARES,  CMA              "

## 2023-09-27 NOTE — PROGRESS NOTES
Helena Eldridge is an extremely pleasant 87 year old year old female patient here today for follow psoriasis. Not using lidex only using nizoral.  Still has rash.  Patient has no other skin complaints today.  Remainder of the HPI, Meds, PMH, Allergies, FH, and SH was reviewed in chart.      Past Medical History:   Diagnosis Date    Carpal tunnel syndrome     CKD (chronic kidney disease)     kidney stone with infection    CKD (chronic kidney disease) stage 4, GFR 15-29 ml/min (H) 12/2/2010    History of kidney stone with infection.  Creatinine 1.3 - 1.6 (12/2/10, [facility])   -- she can't take ACE / ARB due to side effects.  Will continue with good BP control (12/3/15, [facility])     COPD, severe (H) 2/16/2015    Seen by pulmonary 2013.       Deep vein thrombosis (DVT) (H)     1972    DVT [id number]    Glucose intolerance     H/O partial nephrectomy     Heel spur     Hypertension goal BP (blood pressure) < 140/90 11/28/2011    Hypothyroidism     Idiopathic chronic gout 5/14/2015    Immunosuppression (H) 3/7/2018    IPF (idiopathic pulmonary fibrosis) (H) 2/25/2015    Mild, at bases per CXR 2/2015 (2/15/15, [facility])     Kidney stones     Lichen sclerosus     Lichen sclerosus et atrophicus of the vulva 5/8/2013    Clobetasol bid is the recommended remedy    Macular degeneration     Non-small cell cancer of right lung (H) 3/7/2018    Nonsenile cataract     Osteoarthritis     Osteoarthritis of right knee 3/11/2014    Osteoporosis     Other specified hypothyroidism 5/14/2015    PID     Pulmonary embolism (H)     1970    Senile osteoporosis 3/11/2014    Unspecified hypothyroidism     Vitiligo        Past Surgical History:   Procedure Laterality Date    EXTRACORPOREAL SHOCK WAVE LITHOTRIPSY, CYSTOSCOPY, INSERT STENT URETER(S), COMBINED Right 7/13/2020    Procedure: LITHOTRIPSY, Right EXTRACORPOREAL SHOCK WAVE (ESWL), WITH CYSTOSCOPY AND Right URETERAL STENT INSERTION;  Surgeon: Buddy Ramirez MD;  Location: [facility] OR    HC REMOVAL  GALLBLADDER      HC REVISE MEDIAN N/CARPAL TUNNEL SURG      IR LUNG BIOPSY MEDIASTINUM RIGHT  2018    LASER HOLMIUM LITHOTRIPSY URETER(S), INSERT STENT, COMBINED Right 2020    Procedure: RIGHT CYSTOURETEROSCOPY, WITH LITHOTRIPSY USING LASER AND URETERAL STENT EXCHANGE;  Surgeon: Buddy Ramirez MD;  Location: [facility] OR    TUBAL LIGATION      ZZC NEPHRECTOMY      left partial 07    ZZC VENOUS THROMBOSIS IMAGING      with pe        Family History   Problem Relation Age of Onset    Cancer Brother     Glaucoma Mother     Alzheimer Disease Brother     Macular Degeneration No family hx of        Social History     Socioeconomic History    Marital status: Single     Spouse name: Not on file    Number of children: Not on file    Years of education: Not on file    Highest education level: Not on file   Occupational History    Not on file   Tobacco Use    Smoking status: Former     Types: Cigarettes     Quit date: 1969     Years since quittin.8    Smokeless tobacco: Never   Vaping Use    Vaping Use: Never used   Substance and Sexual Activity    Alcohol use: Yes     Alcohol/week: 0.0 standard drinks of alcohol     Comment: very little     Drug use: No    Sexual activity: Not Currently     Partners: Male   Other Topics Concern    Parent/sibling w/ CABG, MI or angioplasty before 65F 55M? No   Social History Narrative    Not on file     Social Determinants of Health     Financial Resource Strain: Not on file   Food Insecurity: Not on file   Transportation Needs: Not on file   Physical Activity: Not on file   Stress: Not on file   Social Connections: Not on file   Interpersonal Safety: Not on file   Housing Stability: Not on file       Outpatient Encounter Medications as of 2023   Medication Sig Dispense Refill    augmented betamethasone dipropionate (DIPROLENE AF) 0.05 % external cream Apply sparingly to affected area twice daily as needed.  Do not apply to face. 100 g 3    Calcium Carbonate-Vitamin  D (CALCIUM + D) 600-5 MG-MCG TABS Take 2 tablets by mouth 2 times daily      ciclopirox (LOPROX) 0.77 % cream Apply topically At Bedtime Apply first and then desitin over this 60 g 6    famotidine (PEPCID) 20 MG tablet TAKE 1 TABLET(20 MG) BY MOUTH TWICE DAILY 180 tablet 3    fluocinolone (SYNALAR) 0.025 % cream Apply topically 2 times daily 240 g 4    fluocinonide (LIDEX) 0.05 % external cream Apply topically daily To affected areas on chest 120 g 1    levothyroxine (SYNTHROID/LEVOTHROID) 100 MCG tablet TAKE 1 TABLET(100 MCG) BY MOUTH DAILY 90 tablet 3    Multiple Vitamins-Minerals (OCUVITE ADULT 50+) CAPS Take 1 capsule by mouth daily 30 capsule 12    rOPINIRole (REQUIP) 0.25 MG tablet TAKE 1 TO 2 TABLETS(0.25 TO 0.5 MG) BY MOUTH EVERY NIGHT AS NEEDED FOR RESTLESS LEGS 180 tablet 3    vancomycin (FIRVANQ) 50 MG/ML oral solution Take by mouth 4 times daily Take 2.5 mls       Facility-Administered Encounter Medications as of 9/27/2023   Medication Dose Route Frequency Provider Last Rate Last Admin    perflutren diluted in saline (DEFINITY) injection 5 mL  5 mL Intravenous Once Sushma Nelson MD                 O:   NAD, WDWN, Alert & Oriented, Mood & Affect wnl, Vitals stable   Here today alone   General appearance normal   Vitals stable   Alert, oriented and in no acute distress     Red scaly plaques on trunk and ext      Eyes: Conjunctivae/lids:Normal     ENT: Lips, buccal mucosa, tongue: normal    MSK:Normal    Cardiovascular: peripheral edema none    Pulm: Breathing Normal    Neuro/Psych: Orientation:Alert and Orientedx3 ; Mood/Affect:normal       A/P:  Psoriasis  NBUVB discussed with patient she declines  Increase to betamethasone  Methotrexate and biolgoics discussed with patient   Methotrexate is a medication used in low doses to treat inflammatory skin conditions such as psoriasis and eczema/dermatitis. It is also prescribed for rheumatoid arthritis, psoriatic arthritis, and increasingly, other  inflammatory and autoimmune disorders (off-label). In much higher doses, it is used as a chemotherapy agent for leukemia and some other forms of cancer.    For responding skin diseases, methotrexate usually shows some benefit within 6 to 8 weeks. Maximum effects are generally achieved within 5 to 6 months, depending on dose escalation.    Side effects of methotrexate  Side effects can occur at any time during treatment with methotrexate, but are most common in the first few weeks. Folic acid supplements, is thought to reduce some of the side effects of methotrexate.     If the side effects described below or other problems trouble you, or should you develop any signs of infection or unusual bleeding, notify your doctor promptly and before your next dose of methotrexate is due.      The most common side effects of methotrexate are loss of appetite, nausea and diarrhea, and affect about one in 12 patients. These side effects are usually temporary, but changes in dose and/or supplemental folic acid tablets may be helpful.    An overdose of methotrexate or deficiency of the vitamin folic acid may result in anemia , reduced white cell count, risking serious infections, and low platelet count, resulting in bruising and bleeding.    Methotrexate is stored by the liver. Transaminase liver enzyme levels may rise for a few days after treatment but they quickly return to normal.     Long term therapy may be associated with scarring (fibrosis or cirrhosis) of the liver. This is more commonly due to other reasons such as fatty liver, diabetes, hyperlipidemia, and obesity (ie metabolic syndrome), but can also develop from viral hepatitis and alcohol.    Methotrexate can rarely cause a lung reaction similar to pneumonia called acute pneumonitis or interstitial pneumonia.      Today  -  Some baseline laboratory tests will be checked  - A prescription for folic acid will be sent to pharmacy for you to start.  (This is a vitamin that  can help reduce the side effects of methotrexate)  -  A test dose of the medication to the pharmacy   -  Take all of the pills in the methotrexate prescription on the same day  -  Recheck your labs at any [facility] 7 days from the date you take the test dose  -  Once your blood work is complete, our office we will call and let you know if you can continue methotrexate     It was a pleasure speaking to Helena Eldridge today.  Previous clinic notes and pertinent laboratory tests were reviewed prior to Helena Eldridge's visit.return to clinic 8 fish

## 2023-09-28 LAB
HBV CORE AB SERPL QL IA: NONREACTIVE
HCV AB SERPL QL IA: NONREACTIVE

## 2023-10-11 ENCOUNTER — LAB (OUTPATIENT)
Dept: LAB | Facility: CLINIC | Age: 88
End: 2023-10-11
Payer: COMMERCIAL

## 2023-10-11 DIAGNOSIS — Z79.899 ENCOUNTER FOR LONG-TERM (CURRENT) USE OF HIGH-RISK MEDICATION: ICD-10-CM

## 2023-10-11 DIAGNOSIS — L40.9 PSORIASIS: ICD-10-CM

## 2023-10-11 LAB
ERYTHROCYTE [DISTWIDTH] IN BLOOD BY AUTOMATED COUNT: 12.8 % (ref 10–15)
HCT VFR BLD AUTO: 33.6 % (ref 35–47)
HGB BLD-MCNC: 11.1 G/DL (ref 11.7–15.7)
MCH RBC QN AUTO: 33.9 PG (ref 26.5–33)
MCHC RBC AUTO-ENTMCNC: 33 G/DL (ref 31.5–36.5)
MCV RBC AUTO: 103 FL (ref 78–100)
PLATELET # BLD AUTO: 247 10E3/UL (ref 150–450)
RBC # BLD AUTO: 3.27 10E6/UL (ref 3.8–5.2)
WBC # BLD AUTO: 7.8 10E3/UL (ref 4–11)

## 2023-10-11 PROCEDURE — 85027 COMPLETE CBC AUTOMATED: CPT

## 2023-10-11 PROCEDURE — 36415 COLL VENOUS BLD VENIPUNCTURE: CPT

## 2023-10-11 PROCEDURE — 80053 COMPREHEN METABOLIC PANEL: CPT

## 2023-10-12 LAB
ALBUMIN SERPL BCG-MCNC: 4 G/DL (ref 3.5–5.2)
ALP SERPL-CCNC: 57 U/L (ref 35–104)
ALT SERPL W P-5'-P-CCNC: 11 U/L (ref 0–50)
ANION GAP SERPL CALCULATED.3IONS-SCNC: 10 MMOL/L (ref 7–15)
AST SERPL W P-5'-P-CCNC: 21 U/L (ref 0–45)
BILIRUB SERPL-MCNC: 0.2 MG/DL
BUN SERPL-MCNC: 29.2 MG/DL (ref 8–23)
CALCIUM SERPL-MCNC: 9.6 MG/DL (ref 8.8–10.2)
CHLORIDE SERPL-SCNC: 105 MMOL/L (ref 98–107)
CREAT SERPL-MCNC: 2.09 MG/DL (ref 0.51–0.95)
DEPRECATED HCO3 PLAS-SCNC: 25 MMOL/L (ref 22–29)
EGFRCR SERPLBLD CKD-EPI 2021: 22 ML/MIN/1.73M2
GLUCOSE SERPL-MCNC: 109 MG/DL (ref 70–99)
POTASSIUM SERPL-SCNC: 4.8 MMOL/L (ref 3.4–5.3)
PROT SERPL-MCNC: 7 G/DL (ref 6.4–8.3)
SODIUM SERPL-SCNC: 140 MMOL/L (ref 135–145)

## 2023-10-19 ENCOUNTER — OFFICE VISIT (OUTPATIENT)
Dept: FAMILY MEDICINE | Facility: CLINIC | Age: 88
End: 2023-10-19
Payer: COMMERCIAL

## 2023-10-19 VITALS
DIASTOLIC BLOOD PRESSURE: 74 MMHG | WEIGHT: 115 LBS | TEMPERATURE: 98.5 F | HEIGHT: 60 IN | SYSTOLIC BLOOD PRESSURE: 124 MMHG | RESPIRATION RATE: 20 BRPM | BODY MASS INDEX: 22.58 KG/M2 | HEART RATE: 69 BPM | OXYGEN SATURATION: 97 %

## 2023-10-19 DIAGNOSIS — I10 HYPERTENSION GOAL BP (BLOOD PRESSURE) < 140/90: ICD-10-CM

## 2023-10-19 DIAGNOSIS — Z00.00 ENCOUNTER FOR MEDICARE ANNUAL WELLNESS EXAM: Primary | ICD-10-CM

## 2023-10-19 DIAGNOSIS — N18.4 CKD (CHRONIC KIDNEY DISEASE) STAGE 4, GFR 15-29 ML/MIN (H): ICD-10-CM

## 2023-10-19 DIAGNOSIS — H35.3221 EXUDATIVE AGE-RELATED MACULAR DEGENERATION OF LEFT EYE WITH ACTIVE CHOROIDAL NEOVASCULARIZATION (H): ICD-10-CM

## 2023-10-19 DIAGNOSIS — L40.9 PSORIASIS: ICD-10-CM

## 2023-10-19 PROCEDURE — 90480 ADMN SARSCOV2 VAC 1/ONLY CMP: CPT | Performed by: PHYSICIAN ASSISTANT

## 2023-10-19 PROCEDURE — G0439 PPPS, SUBSEQ VISIT: HCPCS | Performed by: PHYSICIAN ASSISTANT

## 2023-10-19 PROCEDURE — 90662 IIV NO PRSV INCREASED AG IM: CPT | Performed by: PHYSICIAN ASSISTANT

## 2023-10-19 PROCEDURE — 91320 SARSCV2 VAC 30MCG TRS-SUC IM: CPT | Performed by: PHYSICIAN ASSISTANT

## 2023-10-19 PROCEDURE — G0008 ADMIN INFLUENZA VIRUS VAC: HCPCS | Performed by: PHYSICIAN ASSISTANT

## 2023-10-19 RX ORDER — RESPIRATORY SYNCYTIAL VIRUS VACCINE 120MCG/0.5
0.5 KIT INTRAMUSCULAR ONCE
Qty: 1 EACH | Refills: 0 | Status: CANCELLED | OUTPATIENT
Start: 2023-10-19 | End: 2023-10-19

## 2023-10-19 ASSESSMENT — ENCOUNTER SYMPTOMS
ARTHRALGIAS: 0
SORE THROAT: 0
PARESTHESIAS: 0
DIZZINESS: 0
BREAST MASS: 0
JOINT SWELLING: 0
NERVOUS/ANXIOUS: 0
CONSTIPATION: 1
HEMATOCHEZIA: 0
FEVER: 0
HEARTBURN: 0
COUGH: 0
DYSURIA: 0
HEADACHES: 0
HEMATURIA: 0
SHORTNESS OF BREATH: 0
DIARRHEA: 0
NAUSEA: 0
EYE PAIN: 0
FREQUENCY: 0
WEAKNESS: 0
CHILLS: 0
ABDOMINAL PAIN: 0
PALPITATIONS: 0
MYALGIAS: 0

## 2023-10-19 ASSESSMENT — ACTIVITIES OF DAILY LIVING (ADL): CURRENT_FUNCTION: TRANSPORTATION REQUIRES ASSISTANCE

## 2023-10-19 NOTE — PROGRESS NOTES
The patient was counseled and encouraged to consider modifying their diet and eating habits. She was provided with information on recommended healthy diet options.  The patient reports that she has difficulty with activities of daily living.

## 2023-10-19 NOTE — PATIENT INSTRUCTIONS
Increase miralax to daily, to help with constipation.         Patient Education   Personalized Prevention Plan  You are due for the preventive services outlined below.  Your care team is available to assist you in scheduling these services.  If you have already completed any of these items, please share that information with your care team to update in your medical record.  Health Maintenance Due   Topic Date Due    RSV VACCINE 60+ (1 - 1-dose 60+ series) Never done    Diptheria Tetanus Pertussis (DTAP/TDAP/TD) Vaccine (1 - Tdap) 07/21/2020    ANNUAL REVIEW OF HM ORDERS  01/07/2023    Eye Exam  01/21/2023    Cholesterol Lab  02/08/2023    Annual Wellness Visit  08/18/2023    Uric Acid Levels  08/18/2023     Learning About Dietary Guidelines  What are the Dietary Guidelines for Americans?     Dietary Guidelines for Americans provide tips for eating well and staying healthy. This helps reduce the risk for long-term (chronic) diseases.  These guidelines recommend that you:  Eat and drink the right amount for you. The U.[place] government's food guide is called MyPlate. It can help you make your own well-balanced eating plan.  Try to balance your eating with your activity. This helps you stay at a healthy weight.  Drink alcohol in moderation, if at all.  Limit foods high in salt, saturated fat, trans fat, and added sugar.  These guidelines are from the U.S. Department of Agriculture and the U.S. Department of Health and Human Services. They are updated every 5 years.  What is MyPlate?  MyPlate is the U.[place] government's food guide. It can help you make your own well-balanced eating plan. A balanced eating plan means that you eat enough, but not too much, and that your food gives you the nutrients you need to stay healthy.  MyPlate focuses on eating plenty of whole grains, fruits, and vegetables, and on limiting fat and sugar. It is available online at www.ChooseMyPlate.gov.  How can you get started?  If you're trying to eat  "healthier, you can slowly change your eating habits over time. You don't have to make big changes all at once. Start by adding one or two healthy foods to your meals each day.  Grains  Choose whole-grain breads and cereals and whole-wheat pasta and whole-grain crackers.  Vegetables  Eat a variety of vegetables every day. They have lots of nutrients and are part of a heart-healthy diet.  Fruits  Eat a variety of fruits every day. Fruits contain lots of nutrients. Choose fresh fruit instead of fruit juice.  Protein foods  Choose fish and lean poultry more often. Eat red meat and fried meats less often. Dried beans, tofu, and nuts are also good sources of protein.  Dairy  Choose low-fat or fat-free products from this food group. If you have problems digesting milk, try eating cheese or yogurt instead.  Fats and oils  Limit fats and oils if you're trying to cut calories. Choose healthy fats when you cook. These include canola oil and olive oil.  Where can you learn more?  Go to https://www.[employer].net/patiented  Enter D676 in the search box to learn more about \"Learning About Dietary Guidelines.\"  Current as of: March 1, 2023               Content Version: 13.7    4662-1124 [employer].   Care instructions adapted under license by your healthcare professional. If you have questions about a medical condition or this instruction, always ask your healthcare professional. [employer] disclaims any warranty or liability for your use of this information.      Activities of Daily Living    Your Health Risk Assessment indicates you have difficulties with activities of daily living such as housework, bathing, preparing meals, taking medication, etc. Please make a follow up appointment for us to address this issue in more detail.     "

## 2023-10-19 NOTE — PROGRESS NOTES
"SUBJECTIVE:   Helena is a 87 year old who presents for Preventive Visit.      10/19/2023    10:16 AM   Additional Questions   Roomed by daniella rose     Will see Dermatology next Wednesday in [place]. Betamethasone working. Need a refill.   Seeing ophthalmology regularly.   States memory is okay except for a few birthdays she forgets and is able to get around the house well. Has house  come in to help.  Still constipated with taking Miralax every other day.     Will recheck blood pressure at end of appointment.    Are you in the first 12 months of your Medicare coverage?  No    Healthy Habits:     In general, how would you rate your overall health?  Good    Frequency of exercise:  4-5 days/week    Duration of exercise:  30-45 minutes    Do you usually eat at least 4 servings of fruit and vegetables a day, include whole grains    & fiber and avoid regularly eating high fat or \"junk\" foods?  No    Taking medications regularly:  Yes    Medication side effects:  None    Ability to successfully perform activities of daily living:  Transportation requires assistance    Home Safety:  No safety concerns identified    Hearing Impairment:  No hearing concerns    In the past 6 months, have you been bothered by leaking of urine?  No    In general, how would you rate your overall mental or emotional health?  Good    Additional concerns today:  Yes      Today's PHQ-2 Score:       10/19/2023    10:28 AM   PHQ-2 ( 1999 Pf[employer])   Q1: Little interest or pleasure in doing things 0   Q2: Feeling down, depressed or hopeless 0   PHQ-2 Score 0   Q1: Little interest or pleasure in doing things Not at all   Q2: Feeling down, depressed or hopeless Not at all   PHQ-2 Score 0     Have you ever done Advance Care Planning? (For example, a Health Directive, POLST, or a discussion with a medical provider or your loved ones about your wishes): Yes, advance care planning is on file.    Fall risk  Fallen 2 or more times in the past year?: No  Any " fall with injury in the past year?: No    Cognitive Screening Not appropriate due to known dementia    Do you have sleep apnea, excessive snoring or daytime drowsiness? : no    Reviewed and updated as needed this visit by clinical staff   Tobacco  Allergies  Meds  Problems  Med Hx  Surg Hx  Fam Hx        Reviewed and updated as needed this visit by Provider   Tobacco  Allergies  Meds  Problems  Med Hx  Surg Hx  Fam Hx         Social History     Tobacco Use    Smoking status: Former     Types: Cigarettes     Quit date: 1969     Years since quittin.9    Smokeless tobacco: Never   Substance Use Topics    Alcohol use: Yes     Alcohol/week: 0.0 standard drinks of alcohol     Comment: very little              10/19/2023    10:28 AM   Alcohol Use   Prescreen: >3 drinks/day or >7 drinks/week? No     Do you have a current opioid prescription? No  Do you use any other controlled substances or medications that are not prescribed by a provider? None      Current providers sharing in care for this patient include:   Patient Care Team:  [facility] Nelson [facility] Physician Assistant (Physician Assistant)  Nguyen Simms MD as MD (Nephrology)  Nguyen Simms MD as Assigned Nephrology Provider  Zenia Alejandre RN as Specialty Care Coordinator (Nephrology)  Meghan Sharma APRN CNP as Assigned Surgical Provider  Naida Minor NP as Assigned Neuroscience Provider  Bianca Keith AuD as Audiologist (Audiology)  Buddy Torres MD as MD (Dermatology)  Luz Wilson PA-C as Assigned PCP    The following health maintenance items are reviewed in Epic and correct as of today:  Health Maintenance   Topic Date Due    RSV VACCINE 60+ (1 - 1-dose 60+ series) Never done    DTAP/TDAP/TD IMMUNIZATION (1 - Tdap) 2020    EYE EXAM  2023    LIPID  2023    MEDICARE ANNUAL WELLNESS VISIT  2023    URIC ACID   08/18/2023    COVID-19 Vaccine (7 - 2023-24 season) 12/14/2023    MICROALBUMIN  12/27/2023    BMP  04/11/2024    HEMOGLOBIN  04/11/2024    TSH W/FREE T4 REFLEX  07/18/2024    ANNUAL REVIEW OF HM ORDERS  10/19/2024    FALL RISK ASSESSMENT  10/19/2024    DEXA  01/11/2026    ADVANCE CARE PLANNING  10/19/2028    PARATHYROID  Completed    PHOSPHORUS  Completed    SPIROMETRY  Completed    COPD ACTION PLAN  Completed    PHQ-2 (once per calendar year)  Completed    INFLUENZA VACCINE  Completed    Pneumococcal Vaccine: 65+ Years  Completed    URINALYSIS  Completed    ALK PHOS  Completed    ZOSTER IMMUNIZATION  Completed    IPV IMMUNIZATION  Aged Out    HPV IMMUNIZATION  Aged Out    MENINGITIS IMMUNIZATION  Aged Out           Mammogram Screening - Patient over age 75, has elected to continue with screening.  Pertinent mammograms are reviewed under the imaging tab.    Review of Systems   Constitutional:  Negative for chills and fever.   HENT:  Negative for congestion, ear pain, hearing loss and sore throat.    Eyes:  Positive for visual disturbance. Negative for pain.   Respiratory:  Negative for cough and shortness of breath.    Cardiovascular:  Negative for chest pain, palpitations and peripheral edema.   Gastrointestinal:  Positive for constipation. Negative for abdominal pain, diarrhea, heartburn, hematochezia and nausea.   Breasts:  Negative for tenderness, breast mass and discharge.   Genitourinary:  Negative for dysuria, frequency, genital sores, hematuria, pelvic pain, urgency, vaginal bleeding and vaginal discharge.   Musculoskeletal:  Negative for arthralgias, joint swelling and myalgias.   Skin:  Negative for rash.   Neurological:  Negative for dizziness, weakness, headaches and paresthesias.   Psychiatric/Behavioral:  Negative for mood changes. The patient is not nervous/anxious.          OBJECTIVE:   BP (!) 166/78 (BP Location: Left arm, Patient Position: Chair, Cuff Size: Adult Regular)   Pulse 69   Temp 98.5  " F (36.9  C) (Oral)   Resp 20   Ht 1.511 m (4' 11.5\")   Wt 52.2 kg (115 lb)   SpO2 97%   BMI 22.84 kg/m   Estimated body mass index is 22.84 kg/m  as calculated from the following:    Height as of this encounter: 1.511 m (4' 11.5\").    Weight as of this encounter: 52.2 kg (115 lb).  Physical Exam  GENERAL APPEARANCE: healthy, alert and no distress  EYES: Eyes grossly normal to inspection, PERRL and conjunctivae and sclerae normal  HENT: ear canals and TM's normal, nose and mouth without ulcers or lesions, oropharynx clear and oral mucous membranes moist. Mild erythema noted on left ear from hearing aids. Recently got them resized to fit more appropriately.  NECK: no adenopathy, no asymmetry, masses, or scars and thyroid normal to palpation  RESP: lungs clear to auscultation - no rales, rhonchi or wheezes  CV: regular rate and rhythm, normal S1 S2, no S3 or S4, no murmur, click or rub, no peripheral edema    ABDOMEN: soft, nontender, no hepatosplenomegaly, no masses and bowel sounds normal  MS: no musculoskeletal defects are noted and gait is age appropriate without ataxia  SKIN: Psoriasis plaques located on posterior aspect of right forearm and left arm.  NEURO: Normal strength and tone, sensory exam grossly normal, mentation intact and speech normal  PSYCH: mentation appears normal and affect normal/bright    Diagnostic Test Results:  Labs reviewed in Epic    ASSESSMENT / PLAN:   (Z00.00) Encounter for Medicare annual wellness exam  (primary encounter diagnosis)  Comment: Complete labs at next lab draw.  Plan: Lipid panel reflex to direct LDL Non-fasting,         Uric acid    (H35.3221) Exudative age-related macular degeneration of left eye with active choroidal neovascularization (H)  Comment: States she is seeing ophthalmology regularly.    (N18.4) CKD (chronic kidney disease) stage 4, GFR 15-29 ml/min (H)  Has nephrology appointment 1/2024. Blood pressure well controlled today.     (I10) Hypertension goal " BP (blood pressure) < 140/90    (L40.9) Psoriasis  Comment: Has a dermatology appointment in November.    Patient advised she can take Miralax everyday for constipation. Patient was agreeable with plan.          COUNSELING:  Reviewed preventive health counseling, as reflected in patient instructions    She reports that she quit smoking about 53 years ago. Her smoking use included cigarettes. She has never used smokeless tobacco.    Appropriate preventive services were discussed with this patient, including applicable screening as appropriate for fall prevention, nutrition, physical activity, Tobacco-use cessation, weight loss and cognition.  Checklist reviewing preventive services available has been given to the patient.    Reviewed patients plan of care and provided an AVS. The Basic Care Plan (routine screening as documented in Health Maintenance) for Helena meets the Care Plan requirement. This Care Plan has been established and reviewed with the Patient.          Luz Wilson PA-C  [facility]    Identified Health Risks:  I have reviewed Opioid Use Disorder and Substance Use Disorder risk factors and made any needed referrals.

## 2023-10-19 NOTE — COMMUNITY RESOURCES LIST (ENGLISH)
10/19/2023   [facility] - Outpatient Clinics  N/A  For additional resource needs, please contact your health insurance member services or your primary care team.  Phone: [phone]   Email: N/A   Address: [address] [place] [address]   Hours: N/A        Hotlines and Helplines       Hotline - Housing crisis  1  [facility] Distance: 6.92 miles      Phone/Virtual   [address] [place] [address]  Language: English  Hours: Mon - Sun Open 24 Hours   Phone: (649) 915-4618 Email: communications@[facility].org Website: https://[facility].org/[facility]/     2  [facility] Distance: 7.38 miles      Phone/Virtual   [address] [place] [address]  Language: English  Hours: Mon - Sun Open 24 Hours   Phone: (642) 304-7631 Email: info@[facility].org Website: http://www.[facility].org          Housing       Coordinated Entry access point  3  [facility]  Office - [facility] Distance: 5.06 miles      Phone/Virtual   [address] [place] [address]  Language: English  Hours: Mon - Fri 8:30 AM - 12:00 PM , Mon - Fri 1:00 PM - 4:00 PM  Fees: Free   Phone: (526) 408-5068 Ext.2 Email: juana@[facility].[employer].org Website: https://www.[employer].org/usn/     4  [facility] ([facility] Housing Services Distance: 7.05 miles      In-Person   [address] [place] [address]  Language: English  Hours: Mon - Fri 9:00 AM - 5:00 PM  Fees: Free   Phone: (724) 186-8925 Email: housing@[facility].org Website: http://www.[facility].org/housing     Drop-in center or day shelter  5  [facility] Distance: 5.55 miles      In-Person   [address] [place] [address]  Language: English, Hmong, Irish, Cymro  Hours: Mon - Thu 8:30 AM - 4:30 PM , Sat - Sun 9:00 AM - 12:00 PM  Fees: Free   Phone: (320) 483-1904 Email: info@[employer].org Website: https://[employer].org/      6  [facility] Distance: 6.5 miles      In-Person   [address] [place] [address]  Language: English, Uzbek, Croatian  Hours: Mon - Sat 7:00 AM - 3:00 PM  Fees: Free, Self Pay   Phone: (473) 396-7075 Email: info@[employer] Website: https://www.[employer]/locations/opportunity-center/     Housing search assistance  7  [employer] - https://[employer]/ Distance: 5.77 miles      Phone/Virtual   [address] [place] [address]  Language: English  Hours: Mon - Sun Open 24 Hours   Email: info@[employer] Website: https://[employer]     8  [employer] - Online housing search assistance Distance: 5.85 miles      Phone/Virtual   [address] [address]  Language: English, Hmong, Uzbek, Croatian  Hours: Mon - Sun Open 24 Hours   Phone: (131) 479-3876 Email: info@[employer].org Website: http://www.housinglink.org/     Shelter for families  9  [facility] Distance: 14.18 miles      In-Person   [address] [place] [address]  Language: English  Hours: Mon - Fri 3:00 PM - 9:00 AM , Sat - Sun Open 24 Hours  Fees: Free   Phone: (690) 974-8776 Ext.1 Website: https://www.[facility].org/2020/07/03/emergency-family-shelter/     Shelter for individuals  10  [facility] Distance: 5.85 miles      In-Person   [address] [place] [address]  Language: English  Hours: Mon - Fri 4:00 PM - 9:00 AM  Fees: Free   Phone: (402) 797-2070 Email: waylon@[facility].org Website: https://[facility].org/northern/[facility]/     11  [facility] Distance: 6.92 miles      In-Person   [address] [place] [address]  Language: English  Hours: Mon - Sun Open 24 Hours  Fees: Free   Phone: (227) 960-2854 Email: communications@[facility].org Website:  https://[facility].org/oursaviourshousing/          Important Numbers & Websites       [facility]   [address].org  Poison Control   (835) 101-5695 [place].org  Suicide and Crisis Lifeline   [address].org  [facility] [place] Child Abuse Hotline   247.322.1740 Childhelphotline.org  [place] Sexual Assault Hotline   (470) 835-5114 (HOPE) [facility] Runaway Safeline   (297) 763-1998 (RUNAWAY) [facility].org  Pregnancy & Postpartum Support [place]   Call/text 824-038-8177 Ppsupportmn.org  Substance Abuse National Helpline ([facility]   628-240-HELP (4613) Findtreatment.gov  Emergency Services   917

## 2023-11-08 ENCOUNTER — OFFICE VISIT (OUTPATIENT)
Dept: DERMATOLOGY | Facility: CLINIC | Age: 88
End: 2023-11-08
Payer: COMMERCIAL

## 2023-11-08 DIAGNOSIS — L40.9 PSORIASIS: ICD-10-CM

## 2023-11-08 DIAGNOSIS — Z79.899 ENCOUNTER FOR LONG-TERM (CURRENT) USE OF HIGH-RISK MEDICATION: ICD-10-CM

## 2023-11-08 PROCEDURE — 99213 OFFICE O/P EST LOW 20 MIN: CPT | Performed by: PHYSICIAN ASSISTANT

## 2023-11-08 RX ORDER — BETAMETHASONE DIPROPIONATE 0.5 MG/G
CREAM TOPICAL
Qty: 200 G | Refills: 3 | Status: SHIPPED | OUTPATIENT
Start: 2023-11-08

## 2023-11-08 NOTE — LETTER
11/8/2023         RE: Helena Eldridge  [address] [address]        Dear Colleague,    Thank you for referring your patient, Helena Eldridge, to the [facility]. Please see a copy of my visit note below.    Helena Eldridge is an extremely pleasant 88 year old year old female patient here today for psoriasis recheck. She started methotrexate LOV. She has history of CKD, stage 4. She notes methotrexate not helping. Using topical medications, betamethasone seems to be the most helpful. She notes itchy and painful.   Patient has no other skin complaints today.  Remainder of the HPI, Meds, PMH, Allergies, FH, and SH was reviewed in chart.    Pertinent Hx:   Psoriasis   Past Medical History:   Diagnosis Date     Carpal tunnel syndrome      CKD (chronic kidney disease)     kidney stone with infection     CKD (chronic kidney disease) stage 4, GFR 15-29 ml/min (H) 12/2/2010    History of kidney stone with infection.  Creatinine 1.3 - 1.6 (12/2/10, [facility])   -- she can't take ACE / ARB due to side effects.  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        Deep vein thrombosis (DVT) (H)     1972     DVT [id number]     Glucose intolerance      H/O partial nephrectomy      Heel spur      Hypertension goal BP (blood pressure) < 140/90 11/28/2011     Hypothyroidism      Idiopathic chronic gout 5/14/2015     Immunosuppression (H24) 3/7/2018     IPF (idiopathic pulmonary fibrosis) (H) 2/25/2015    Mild, at bases per CXR 2/2015 (2/15/15, [facility])      Kidney stones      Lichen sclerosus      Lichen sclerosus et atrophicus of the vulva 5/8/2013    Clobetasol bid is the recommended remedy     Macular degeneration      Non-small cell cancer of right lung (H) 3/7/2018     Nonsenile cataract      Osteoarthritis      Osteoarthritis of right knee 3/11/2014     Osteoporosis      Other specified hypothyroidism 5/14/2015     PID       Pulmonary embolism (H)     1970     Senile osteoporosis 3/11/2014     Unspecified hypothyroidism      Vitiligo        Past Surgical History:   Procedure Laterality Date     EXTRACORPOREAL SHOCK WAVE LITHOTRIPSY, CYSTOSCOPY, INSERT STENT URETER(S), COMBINED Right 2020    Procedure: LITHOTRIPSY, Right EXTRACORPOREAL SHOCK WAVE (ESWL), WITH CYSTOSCOPY AND Right URETERAL STENT INSERTION;  Surgeon: Buddy Ramirez MD;  Location: [facility] OR     HC REMOVAL GALLBLADDER       HC REVISE MEDIAN N/CARPAL TUNNEL SURG       IR LUNG BIOPSY MEDIASTINUM RIGHT  2018     LASER HOLMIUM LITHOTRIPSY URETER(S), INSERT STENT, COMBINED Right 2020    Procedure: RIGHT CYSTOURETEROSCOPY, WITH LITHOTRIPSY USING LASER AND URETERAL STENT EXCHANGE;  Surgeon: Buddy Ramirez MD;  Location: [facility] OR     TUBAL LIGATION       ZZC NEPHRECTOMY      left partial 07     ZZC VENOUS THROMBOSIS IMAGING      with pe        Family History   Problem Relation Age of Onset     Cancer Brother      Glaucoma Mother      Alzheimer Disease Brother      Macular Degeneration No family hx of        Social History     Socioeconomic History     Marital status: Single     Spouse name: Not on file     Number of children: Not on file     Years of education: Not on file     Highest education level: Not on file   Occupational History     Not on file   Tobacco Use     Smoking status: Former     Types: Cigarettes     Quit date: 1969     Years since quittin.9     Smokeless tobacco: Never   Vaping Use     Vaping Use: Never used   Substance and Sexual Activity     Alcohol use: Yes     Alcohol/week: 0.0 standard drinks of alcohol     Comment: very little      Drug use: No     Sexual activity: Not Currently     Partners: Male   Other Topics Concern     Parent/sibling w/ CABG, MI or angioplasty before 65F 55M? No   Social History Narrative     Not on file     Social Determinants of Health     Financial Resource Strain: Low Risk  (10/19/2023)    Financial  Resource Strain      Within the past 12 months, have you or your family members you live with been unable to get utilities (heat, electricity) when it was really needed?: No   Food Insecurity: Low Risk  (10/19/2023)    Food Insecurity      Within the past 12 months, did you worry that your food would run out before you got money to buy more?: No      Within the past 12 months, did the food you bought just not last and you didn t have money to get more?: No   Transportation Needs: Low Risk  (10/19/2023)    Transportation Needs      Within the past 12 months, has lack of transportation kept you from medical appointments, getting your medicines, non-medical meetings or appointments, work, or from getting things that you need?: No   Physical Activity: Not on file   Stress: Not on file   Social Connections: Not on file   Interpersonal Safety: Not on file   Housing Stability: High Risk (10/19/2023)    Housing Stability      Do you have housing? : No      Are you worried about losing your housing?: No       Outpatient Encounter Medications as of 11/8/2023   Medication Sig Dispense Refill     Apremilast (OTEZLA) 10 & 20 & 30 MG TBPK Take 10 mg daily in the morning on days 1 to 3, then 20 mg daily on days 4 and 5, then start 30 mg daily on day 6. 55 each 0     apremilast (OTEZLA) 30 MG tablet Take 1 tablet (30 mg) by mouth daily 30 tablet 11     augmented betamethasone dipropionate (DIPROLENE AF) 0.05 % external cream Apply sparingly to affected area twice daily as needed.  Do not apply to face. 200 g 3     Calcium Carbonate-Vitamin D (CALCIUM + D) 600-5 MG-MCG TABS Take 2 tablets by mouth 2 times daily       ciclopirox (LOPROX) 0.77 % cream Apply topically At Bedtime Apply first and then desitin over this 60 g 6     famotidine (PEPCID) 20 MG tablet TAKE 1 TABLET(20 MG) BY MOUTH TWICE DAILY 180 tablet 3     fluocinolone (SYNALAR) 0.025 % cream Apply topically 2 times daily 240 g 4     fluocinonide (LIDEX) 0.05 % external  cream Apply topically daily To affected areas on chest 120 g 1     folic acid (FOLVITE) 1 MG tablet One tablet daily except the day you take methotrexate 60 tablet 3     levothyroxine (SYNTHROID/LEVOTHROID) 100 MCG tablet TAKE 1 TABLET(100 MCG) BY MOUTH DAILY 90 tablet 3     methotrexate sodium 2.5 MG TABS 2 tablets once per week 5 tablet 0     Multiple Vitamins-Minerals (OCUVITE ADULT 50+) CAPS Take 1 capsule by mouth daily 30 capsule 12     rOPINIRole (REQUIP) 0.25 MG tablet TAKE 1 TO 2 TABLETS(0.25 TO 0.5 MG) BY MOUTH EVERY NIGHT AS NEEDED FOR RESTLESS LEGS 180 tablet 3     vancomycin (FIRVANQ) 50 MG/ML oral solution Take by mouth 4 times daily Take 2.5 mls       [DISCONTINUED] augmented betamethasone dipropionate (DIPROLENE AF) 0.05 % external cream Apply sparingly to affected area twice daily as needed.  Do not apply to face. 100 g 3     Facility-Administered Encounter Medications as of 11/8/2023   Medication Dose Route Frequency Provider Last Rate Last Admin     perflutren diluted in saline (DEFINITY) injection 5 mL  5 mL Intravenous Once Sushma Nelson MD                 O:   NAD, WDWN, Alert & Oriented, Mood & Affect wnl, Vitals stable   Here today alone   There were no vitals taken for this visit.   General appearance normal   Vitals stable   Alert, oriented and in no acute distress     Psoriasiform plaques and papules on buttock, elbows, intertriginous areas of groin, axilla, abdomen and breasts      Eyes: Conjunctivae/lids:Normal     ENT: Lips: normal    MSK:Normal    Pulm: Breathing Normal=    Neuro/Psych: Orientation:Alert and Orientedx3 ; Mood/Affect:normal  A/P:  1. Psoriasis  Due to history of CKD and history of lung cancer, would prefer medication that does not suppress immune system.   Has been prescribed humira and methotrexate in the past. Per chart review did not want to use injectable,   Discussed otezla, adjusted today for crcl   Discussed can take 3-4 months to see if working, discussed  most common side effect is diarrhea, she has problems with constipation. Monitor mood   Refilled betamethasone initially use bid then once improved decrease to tid   Skin care regimen reviewed with patient: Eliminate harsh soaps, i.e. Dial, zest, irsih spring; Mild soaps such as Cetaphil or Dove sensitive skin, avoid hot or cold showers, aggressive use of emollients including vanicream, cetaphil or cerave discussed with patient.    Return to clinic 4 months.       Again, thank you for allowing me to participate in the care of your patient.        Sincerely,        Janna Navarro PA-C

## 2023-11-08 NOTE — PATIENT INSTRUCTIONS
Otezla was dosed based off your kidney function.   We will work on your prior authorization for this medication.       Otezla can take 3 months to start seeing great improvements in your skin, so stick with it.     Apply betamethasone cream twice daily as needed to affected areas.

## 2023-11-09 ENCOUNTER — TELEPHONE (OUTPATIENT)
Dept: DERMATOLOGY | Facility: CLINIC | Age: 88
End: 2023-11-09
Payer: COMMERCIAL

## 2023-11-09 NOTE — PROGRESS NOTES
Helena Eldridge is an extremely pleasant 88 year old year old female patient here today for psoriasis recheck. She started methotrexate LOV. She has history of CKD, stage 4. She notes methotrexate not helping. Using topical medications, betamethasone seems to be the most helpful. She notes itchy and painful.   Patient has no other skin complaints today.  Remainder of the HPI, Meds, PMH, Allergies, FH, and SH was reviewed in chart.    Pertinent Hx:   Psoriasis   Past Medical History:   Diagnosis Date    Carpal tunnel syndrome     CKD (chronic kidney disease)     kidney stone with infection    CKD (chronic kidney disease) stage 4, GFR 15-29 ml/min (H) 12/2/2010    History of kidney stone with infection.  Creatinine 1.3 - 1.6 (12/2/10, [facility])   -- she can't take ACE / ARB due to side effects.  Will continue with good BP control (12/3/15, [facility])     COPD, severe (H) 2/16/2015    Seen by pulmonary 2013.       Deep vein thrombosis (DVT) (H)     1972    DVT [id number]    Glucose intolerance     H/O partial nephrectomy     Heel spur     Hypertension goal BP (blood pressure) < 140/90 11/28/2011    Hypothyroidism     Idiopathic chronic gout 5/14/2015    Immunosuppression (H24) 3/7/2018    IPF (idiopathic pulmonary fibrosis) (H) 2/25/2015    Mild, at bases per CXR 2/2015 (2/15/15, [facility])     Kidney stones     Lichen sclerosus     Lichen sclerosus et atrophicus of the vulva 5/8/2013    Clobetasol bid is the recommended remedy    Macular degeneration     Non-small cell cancer of right lung (H) 3/7/2018    Nonsenile cataract     Osteoarthritis     Osteoarthritis of right knee 3/11/2014    Osteoporosis     Other specified hypothyroidism 5/14/2015    PID     Pulmonary embolism (H)     1970    Senile osteoporosis 3/11/2014    Unspecified hypothyroidism     Vitiligo        Past Surgical History:   Procedure Laterality Date    EXTRACORPOREAL SHOCK WAVE LITHOTRIPSY, CYSTOSCOPY, INSERT STENT URETER(S), COMBINED Right 7/13/2020    Procedure:  LITHOTRIPSY, Right EXTRACORPOREAL SHOCK WAVE (ESWL), WITH CYSTOSCOPY AND Right URETERAL STENT INSERTION;  Surgeon: Buddy Ramirez MD;  Location: [facility] OR    HC REMOVAL GALLBLADDER      HC REVISE MEDIAN N/CARPAL TUNNEL SURG      IR LUNG BIOPSY MEDIASTINUM RIGHT  2018    LASER HOLMIUM LITHOTRIPSY URETER(S), INSERT STENT, COMBINED Right 2020    Procedure: RIGHT CYSTOURETEROSCOPY, WITH LITHOTRIPSY USING LASER AND URETERAL STENT EXCHANGE;  Surgeon: Buddy Ramirez MD;  Location: [facility] OR    TUBAL LIGATION      ZZC NEPHRECTOMY      left partial 07    ZZC VENOUS THROMBOSIS IMAGING      with pe        Family History   Problem Relation Age of Onset    Cancer Brother     Glaucoma Mother     Alzheimer Disease Brother     Macular Degeneration No family hx of        Social History     Socioeconomic History    Marital status: Single     Spouse name: Not on file    Number of children: Not on file    Years of education: Not on file    Highest education level: Not on file   Occupational History    Not on file   Tobacco Use    Smoking status: Former     Types: Cigarettes     Quit date: 1969     Years since quittin.9    Smokeless tobacco: Never   Vaping Use    Vaping Use: Never used   Substance and Sexual Activity    Alcohol use: Yes     Alcohol/week: 0.0 standard drinks of alcohol     Comment: very little     Drug use: No    Sexual activity: Not Currently     Partners: Male   Other Topics Concern    Parent/sibling w/ CABG, MI or angioplasty before 65F 55M? No   Social History Narrative    Not on file     Social Determinants of Health     Financial Resource Strain: Low Risk  (10/19/2023)    Financial Resource Strain     Within the past 12 months, have you or your family members you live with been unable to get utilities (heat, electricity) when it was really needed?: No   Food Insecurity: Low Risk  (10/19/2023)    Food Insecurity     Within the past 12 months, did you worry that your food would run out  before you got money to buy more?: No     Within the past 12 months, did the food you bought just not last and you didn t have money to get more?: No   Transportation Needs: Low Risk  (10/19/2023)    Transportation Needs     Within the past 12 months, has lack of transportation kept you from medical appointments, getting your medicines, non-medical meetings or appointments, work, or from getting things that you need?: No   Physical Activity: Not on file   Stress: Not on file   Social Connections: Not on file   Interpersonal Safety: Not on file   Housing Stability: High Risk (10/19/2023)    Housing Stability     Do you have housing? : No     Are you worried about losing your housing?: No       Outpatient Encounter Medications as of 11/8/2023   Medication Sig Dispense Refill    Apremilast (OTEZLA) 10 & 20 & 30 MG TBPK Take 10 mg daily in the morning on days 1 to 3, then 20 mg daily on days 4 and 5, then start 30 mg daily on day 6. 55 each 0    apremilast (OTEZLA) 30 MG tablet Take 1 tablet (30 mg) by mouth daily 30 tablet 11    augmented betamethasone dipropionate (DIPROLENE AF) 0.05 % external cream Apply sparingly to affected area twice daily as needed.  Do not apply to face. 200 g 3    Calcium Carbonate-Vitamin D (CALCIUM + D) 600-5 MG-MCG TABS Take 2 tablets by mouth 2 times daily      ciclopirox (LOPROX) 0.77 % cream Apply topically At Bedtime Apply first and then desitin over this 60 g 6    famotidine (PEPCID) 20 MG tablet TAKE 1 TABLET(20 MG) BY MOUTH TWICE DAILY 180 tablet 3    fluocinolone (SYNALAR) 0.025 % cream Apply topically 2 times daily 240 g 4    fluocinonide (LIDEX) 0.05 % external cream Apply topically daily To affected areas on chest 120 g 1    folic acid (FOLVITE) 1 MG tablet One tablet daily except the day you take methotrexate 60 tablet 3    levothyroxine (SYNTHROID/LEVOTHROID) 100 MCG tablet TAKE 1 TABLET(100 MCG) BY MOUTH DAILY 90 tablet 3    methotrexate sodium 2.5 MG TABS 2 tablets once per  week 5 tablet 0    Multiple Vitamins-Minerals (OCUVITE ADULT 50+) CAPS Take 1 capsule by mouth daily 30 capsule 12    rOPINIRole (REQUIP) 0.25 MG tablet TAKE 1 TO 2 TABLETS(0.25 TO 0.5 MG) BY MOUTH EVERY NIGHT AS NEEDED FOR RESTLESS LEGS 180 tablet 3    vancomycin (FIRVANQ) 50 MG/ML oral solution Take by mouth 4 times daily Take 2.5 mls      [DISCONTINUED] augmented betamethasone dipropionate (DIPROLENE AF) 0.05 % external cream Apply sparingly to affected area twice daily as needed.  Do not apply to face. 100 g 3     Facility-Administered Encounter Medications as of 11/8/2023   Medication Dose Route Frequency Provider Last Rate Last Admin    perflutren diluted in saline (DEFINITY) injection 5 mL  5 mL Intravenous Once Sushma Nelson MD                 O:   NAD, WDWN, Alert & Oriented, Mood & Affect wnl, Vitals stable   Here today alone   There were no vitals taken for this visit.   General appearance normal   Vitals stable   Alert, oriented and in no acute distress     Psoriasiform plaques and papules on buttock, elbows, intertriginous areas of groin, axilla, abdomen and breasts      Eyes: Conjunctivae/lids:Normal     ENT: Lips: normal    MSK:Normal    Pulm: Breathing Normal=    Neuro/Psych: Orientation:Alert and Orientedx3 ; Mood/Affect:normal  A/P:  1. Psoriasis  Due to history of CKD and history of lung cancer, would prefer medication that does not suppress immune system.   Has been prescribed humira and methotrexate in the past. Per chart review did not want to use injectable,   Discussed otezla, adjusted today for crcl   Discussed can take 3-4 months to see if working, discussed most common side effect is diarrhea, she has problems with constipation. Monitor mood   Refilled betamethasone initially use bid then once improved decrease to tid   Skin care regimen reviewed with patient: Eliminate harsh soaps, i.e. Dial, zest, irsih spring; Mild soaps such as Cetaphil or Dove sensitive skin, avoid hot or cold  showers, aggressive use of emollients including vanicream, cetaphil or cerave discussed with patient.    Return to clinic 4 months.

## 2023-11-09 NOTE — TELEPHONE ENCOUNTER
Prior Authorization Approval    Medication: OTEZLA 10 & 20 & 30 MG PO TBPK  Authorization Effective Date: 10/10/2023  Authorization Expiration Date: 11/8/2024  Approved Dose/Quantity: starter pack then loading dose  Reference #: [id number]   Insurance Company: [employer] Non-Specialty PA's - Phone [phone] Fax [phone]  Expected CoPay: $  1372  CoPay Card Available:      Financial Assistance Needed: yes, pursuing free drug  Which Pharmacy is filling the prescription:    Pharmacy Notified: no  Patient Notified: yes via [facility]    [id number]      Marta Guzman [facility] Liaison  [email or web address]  Phone: [phone]  Fax: [phone]

## 2023-11-30 NOTE — TELEPHONE ENCOUNTER
FREE DRUG APPLICATION INITIATED    Medication: OTEZLA 10 & 20 & 30 MG PO TBPK  Free Drug Program Name:  [employer]  Date Submitted: 11/30/2023  3:42 PM  Phone #: [phone]  Fax #: [phone]  Additional Information:     [facility]a [facility]man [facility]  [email or web address]  Phone: [phone]  Fax: [phone]

## 2023-12-05 NOTE — TELEPHONE ENCOUNTER
Called patient to discuss free drug.    She did say that she's doing well with her topical meds. Expressed a (reasonable) unwillingness to pay $1300 for the Otezla. Explained that liaison can work with her son to get her approved to get the medicine for free. She agreed to this. Mentioned that her son is out of town until Monday. Will call her son Kane on Monday.    Marta Guzman [facility] Liaison  [email or web address]  Phone: [phone]  Fax: [phone]

## 2023-12-12 NOTE — TELEPHONE ENCOUNTER
Called and spoke to patient's son Tobi.    Discussed the Otezla and patient's current health status with regards to both her psoriasis and other concerns. Tobi is concerned about the possible side effect of diarrhea making the medication more risky than it would be beneficial. His perspective is that she is doing well on topical treatments. He did mention that her dementia can sometimes result in getting differing opinions from her on just how well she is doing when it comes to her psoriasis, but that he hasn't heard her describing any significant pain. Tobi mentioned that about six months ago she was hospitalized for kidney failure and he has concerns about her overall health.    Patient's son expressed that he feels that patient is better without taking the Otezla right now, but he did mention is open to applying for the free drug program in 2024 to have the Otezla as an option if her psoriasis worsens.    Liaison will set calendar to 2024 to then pursue the free drug program with Tobi's help. Looping in MTM to assist with medication-related questions at that time.    Marta Guzman [facility] Liaison  [email or web address]  Phone: [phone]  Fax: [phone]

## 2023-12-20 ENCOUNTER — PATIENT OUTREACH (OUTPATIENT)
Dept: NEPHROLOGY | Facility: CLINIC | Age: 88
End: 2023-12-20
Payer: COMMERCIAL

## 2023-12-20 NOTE — PROGRESS NOTES
Nephrology Note: RN CC Chart Review    REASON FOR ENCOUNTER:     Chart reviewed by nephrology RN CC                          SITUATION/BACKROUND:     Last nephrology visit:    Last Renal Panel:  Sodium   Date Value Ref Range Status   10/11/2023 140 135 - 145 mmol/L Final     Comment:     Reference intervals for this test were updated on 09/26/2023 to more accurately reflect our healthy population. There may be differences in the flagging of prior results with similar values performed with this method. Interpretation of those prior results can be made in the context of the updated reference intervals.    06/21/2021 135 133 - 144 mmol/L Final     Potassium   Date Value Ref Range Status   10/11/2023 4.8 3.4 - 5.3 mmol/L Final   11/10/2022 3.7 3.4 - 5.3 mmol/L Final   06/21/2021 4.1 3.4 - 5.3 mmol/L Final     Chloride   Date Value Ref Range Status   10/11/2023 105 98 - 107 mmol/L Final   11/10/2022 101 94 - 109 mmol/L Final   06/21/2021 100 94 - 109 mmol/L Final     Carbon Dioxide   Date Value Ref Range Status   06/21/2021 30 20 - 32 mmol/L Final     Carbon Dioxide (CO2)   Date Value Ref Range Status   10/11/2023 25 22 - 29 mmol/L Final   11/10/2022 30 20 - 32 mmol/L Final     Anion Gap   Date Value Ref Range Status   10/11/2023 10 7 - 15 mmol/L Final   11/10/2022 8 3 - 14 mmol/L Final   06/21/2021 5 3 - 14 mmol/L Final     Glucose   Date Value Ref Range Status   10/11/2023 109 (H) 70 - 99 mg/dL Final   11/10/2022 131 (H) 70 - 99 mg/dL Final   06/21/2021 96 70 - 99 mg/dL Final     Urea Nitrogen   Date Value Ref Range Status   10/11/2023 29.2 (H) 8.0 - 23.0 mg/dL Final   11/10/2022 25 7 - 30 mg/dL Final   06/21/2021 29 7 - 30 mg/dL Final     Creatinine   Date Value Ref Range Status   10/11/2023 2.09 (H) 0.51 - 0.95 mg/dL Final   06/21/2021 2.11 (H) 0.52 - 1.04 mg/dL Final     GFR Estimate   Date Value Ref Range Status   10/11/2023 22 (L) >60 mL/min/1.73m2 Final   06/21/2021 21 (L) >60 mL/min/[1.73_m2] Final     Comment:      Non  GFR Calc  Starting 12/18/2018, serum creatinine based estimated GFR (eGFR) will be   calculated using the Chronic Kidney Disease Epidemiology Collaboration   (CKD-EPI) equation.       Calcium   Date Value Ref Range Status   10/11/2023 9.6 8.8 - 10.2 mg/dL Final   06/21/2021 8.9 8.5 - 10.1 mg/dL Final     Phosphorus   Date Value Ref Range Status   09/27/2023 3.7 2.5 - 4.5 mg/dL Final   06/21/2021 3.9 2.5 - 4.5 mg/dL Final     Albumin   Date Value Ref Range Status   10/11/2023 4.0 3.5 - 5.2 g/dL Final   11/10/2022 3.8 3.4 - 5.0 g/dL Final   06/21/2021 4.0 3.4 - 5.0 g/dL Final         Neph Tracking Status:  Neph Tracking Flowsheet Last Filled Values       CKD Education Status Complete    CKD Education Referral Date 01/01/21    CKD Education Completed Date 02/01/21    CKD Education Type Kidney Smart Modality Education; Kidney Smart CKD Basics    Patient's Referral Dates Auto Populate Patient's Referral Dates    Home Care Referral 8/22/14    Journey Referral 1/9/21    Transplant Status  Not Eligible  likely not eligible due to age              ASSESSMENT/PLAN     Patient to follow up as scheduled at next appointment  Patient to call/Hycrete[employer] message with updates    Upcoming Appointments:  Future Appts Next 180 days       Visit Type Date Time Department    RETURN NEPHROLOGY 1/15/2024 12:00 PM  NEPHROLOGY              JHONNY SALMERON RN

## 2023-12-21 ENCOUNTER — TELEPHONE (OUTPATIENT)
Dept: FAMILY MEDICINE | Facility: CLINIC | Age: 88
End: 2023-12-21
Payer: COMMERCIAL

## 2023-12-21 DIAGNOSIS — I35.0 NONRHEUMATIC AORTIC VALVE STENOSIS: ICD-10-CM

## 2023-12-21 DIAGNOSIS — R01.1 HEART MURMUR: Primary | ICD-10-CM

## 2023-12-21 NOTE — TELEPHONE ENCOUNTER
Provider Communication    Who is calling:  Mellissa Kline NP     Facility in which provider is associated:  [facility]     Reason for call:  Via insurance the NP was sent to complete a home visit to complete Comprehensive testing when she noticed the patient does have a heart murmur. NP calling to advise PCP. After assignments the [facility] providers do not usually follow the patients  but can call if any questions or concerns. Provider number is [phone] and it is okay to leave detailed message but provider advised to call patient and follow up. Patient was not informed of heart murmur.     Nandini Dave -    [facility]

## 2023-12-26 NOTE — TELEPHONE ENCOUNTER
Please call patient. We would advise and echocardiogram to follow up on this.   I have placed the order.   Luz Wilson PA-C

## 2023-12-26 NOTE — TELEPHONE ENCOUNTER
Patient notified of Provider's message as written.  Imaging number given  Patient verbalized understanding.    Romaine Mendez RN  [facility]

## 2024-01-11 ENCOUNTER — ANCILLARY PROCEDURE (OUTPATIENT)
Dept: CARDIOLOGY | Facility: CLINIC | Age: 89
End: 2024-01-11
Attending: PHYSICIAN ASSISTANT
Payer: COMMERCIAL

## 2024-01-11 ENCOUNTER — TELEPHONE (OUTPATIENT)
Dept: CARDIOLOGY | Facility: CLINIC | Age: 89
End: 2024-01-11

## 2024-01-11 DIAGNOSIS — R01.1 HEART MURMUR: ICD-10-CM

## 2024-01-11 LAB — LVEF ECHO: NORMAL

## 2024-01-11 PROCEDURE — 93306 TTE W/DOPPLER COMPLETE: CPT | Performed by: INTERNAL MEDICINE

## 2024-01-11 NOTE — RESULT ENCOUNTER NOTE
Helena,     Your echocardiogram shows some aortic stenosis which is a thickening of the valves of the aorta. I would like you to see cardiology to discuss this further. They should call to help schedule you.   Luz Wilson PA-C

## 2024-01-11 NOTE — TELEPHONE ENCOUNTER
[facility] Call Center    Phone Message    May a detailed message be left on voicemail: no     Reason for Call: Appointment Intake    Referring Provider Name:     Luz Wilson PA-C     Diagnosis and/or Symptoms:     Nonrheumatic aortic valve stenosis      Please call pt to schedule.     Action Taken: Other: cardio    Travel Screening: Not Applicable

## 2024-01-12 NOTE — TELEPHONE ENCOUNTER
Called and spoke to patient and scheduled next available, 2/7/24 in [place] with Dr. Sands. Patient added to waitlist.    LYDIA Rashid

## 2024-01-15 ENCOUNTER — OFFICE VISIT (OUTPATIENT)
Dept: NEPHROLOGY | Facility: CLINIC | Age: 89
End: 2024-01-15
Payer: COMMERCIAL

## 2024-01-15 VITALS
BODY MASS INDEX: 23.18 KG/M2 | DIASTOLIC BLOOD PRESSURE: 76 MMHG | SYSTOLIC BLOOD PRESSURE: 146 MMHG | HEIGHT: 59 IN | WEIGHT: 115 LBS | OXYGEN SATURATION: 97 % | HEART RATE: 108 BPM

## 2024-01-15 DIAGNOSIS — N18.4 CKD (CHRONIC KIDNEY DISEASE) STAGE 4, GFR 15-29 ML/MIN (H): Primary | ICD-10-CM

## 2024-01-15 DIAGNOSIS — N20.0 KIDNEY STONES: ICD-10-CM

## 2024-01-15 DIAGNOSIS — I10 HYPERTENSION GOAL BP (BLOOD PRESSURE) < 140/90: ICD-10-CM

## 2024-01-15 PROCEDURE — 99214 OFFICE O/P EST MOD 30 MIN: CPT | Performed by: PHYSICIAN ASSISTANT

## 2024-01-15 NOTE — PATIENT INSTRUCTIONS
No changes today.   Labs on 2/7, same day as cardiology follow up. Will call with results/recommendations.   Check blood pressure at home 1-2 times per week.   Continue good hydration, low sodium diet.   Follow up in 6 months with Mandie Mendoza.

## 2024-01-15 NOTE — PROGRESS NOTES
Nephrology Progress Note  1/15/2024      Assessment and Plan:  88 year old female with history of CKD, Scr 1.8 with eGFR near 25ml/min in recent years, history of lung cancer s/p chemotherapy, kidney cancer s/p left partial resection, kidney stones and infections s/p ESWL and stent in 7/2020, small left kidney, who presents for followup of CKD and proteinuria.    # CKD stage 4- Scr 2 on recent checks, her Scr has been near 1.8 - 2.1 with eGFR near 22-27 ml/min over the last few years, got down to 18 a couple of times, most recent is 25ml/min.  previous to that her eGFR was near 35ml/min and back in early 2000s she had eGFR near 40-45ml/min  She has been through many things that have caused CKD including cancer, chemotherapy with carboplatin. Last Scr 1.8, eGFR 25ml/min  She had 1 gram of proteinuria which puts her at a higher risk for progression- it has improved, albuminuria was under 100mg on last check- check both albumin and protein random urine on next check   - check labs in August and see her in December 2022  - BP fairly controlled based on home readings- she has a wrist cuff.    # Hypertension: BP in the 120s on recent check at home, was 146/76 in clinic today. Aim toward 130/80 if tolerated.   Furosemide held during admission in 5/2023  - check Blood pressure at home and keep log.   - may need to resume lasix    # Kidney stones: has calcium oxalate stone on stone analysis; discussed low sodium, fluid intake of 2.5 -3 liters   - she is hydrating better overall, her sons check up on her about this.    # Anemia in chronic renal disease:   - Hgb: Stable, low normal, 11-11.5  - Iron studies: adequate, 25% tsat    # Electrolytes:   - Potassium; level: Normal  - bicarbonate: Normal    # Mineral Bone Disorder:   - Calcium; level is: 9.6  Normal   - phos normal, PTH normal at 62, vit D not checked recently    Assessment and plan was discussed with patient and she voiced her understanding and agreement.    Labs on  2/7/24. RTC 6 months with me.     Reason for Visit:  Helena Eldridge is an 88 year old female with CKD from renal and lung cancer, who presents for followup    HPI:  She is a pleasant female with multiple comorbidities including history of lung cancer s/p chemotherapy, renal cell carcinoma s/p partial left kidney resection, COPD, who presents for followup of CKD She has been aware of kidney disease and her creatinine has been stable near 1.8-2 mg/dL over the last few years. Recently her Scr was up to 2.3, eGFR decreased to 17ml/min, but recently back and eGFR 23ml/min  Her left kidney was noted to be very small on CT imaging going back to 2015.  Over the years, her eGFR was 35-45 ml/min from 2011 to 2017, but in the last couple of years, her eGFR was 25-28ml/min.  She has undergone treatment for her cancers and doing fairly well at this time.  Her lung cancer (nonsmall cell lung cancer T1N1) was treated with carboplatin/ permetrexeb [id number] (6 cycles) and then nivolumab for a year until it was stopped due to itching.  She denies shortness of breath or chest pain. She has some fatigue with significant exertion.  She is being treated for psoriasis and yeast infection. Denies any GI or  issues at this time. She is taking miralax which helps with constipation.   She denies family history of renal failure or dialysis.    She has been doing well since last visit. She denies shortness of breath or swelling. She was hospitalized at outside hospital in May 2023 with C dif and noted to have an MARIO and hyperkalemia. She did not require dialysis. Scr in October appeared back to baseline.     Her good friend of > 50 years passed away in August, this has been hard for her but she is coping well.     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  She has a lot of great nephews and nieces and stays busy with them. She has 3 sons  She has some psoriasis and this has gotten worse, and she  was offered Humira for this, but she is much better using a shampoo and wants to stick with that for now  She generally gets [id number] steps daily  Her weight is down to 115 lbs from 130 approximately a year ago. She has not been checking BP's at home often.   [place]   She is drinking a lot of water and ice.     Renal History:   Kidney Disease and Medical Hx:  h/o HTN: Yes  , h/o Protein in Urine: Yes  and h/o Kidney Stones:Yes     ROS:   A comprehensive review of systems was obtained and negative, except as noted in the HPI or PMH.    Active Medical Problems:  Patient Active Problem List   Diagnosis    CKD (chronic kidney disease) stage 4, GFR 15-29 ml/min (H)    Glucose intolerance (impaired glucose tolerance)    Kidney stones    Advance care planning    Hypertension goal BP (blood pressure) < 140/90    Lichen sclerosus et atrophicus of the vulva    Solitary kidney, acquired    Senile osteoporosis    Osteoarthritis of right knee    Medial meniscus tear    Cataracts, both eyes    Macular degeneration of both eyes, right eye greater than left eye    COPD, severe (H)    IPF (idiopathic pulmonary fibrosis) (H)    Other specified hypothyroidism    Idiopathic chronic gout    Adjustment disorder with anxiety    Non-small cell cancer of right lung (H)    Psoriasis    Exudative age-related macular degeneration of left eye with active choroidal neovascularization (H)     PMH:   Medical record was reviewed and PMH was discussed with patient and noted below.  Past Medical History:   Diagnosis Date    Carpal tunnel syndrome     CKD (chronic kidney disease)     kidney stone with infection    CKD (chronic kidney disease) stage 4, GFR 15-29 ml/min (H) 12/2/2010    History of kidney stone with infection.  Creatinine 1.3 - 1.6 (12/2/10, [facility])   -- she can't take ACE / ARB due to side effects.  Will continue with good BP control (12/3/15, [facility])     COPD, severe (H) 2/16/2015    Seen by  pulmonary 2013.       Deep vein thrombosis (DVT) (H)     1972    DVT [id number]    Glucose intolerance     H/O partial nephrectomy     Heel spur     Hypertension goal BP (blood pressure) < 140/90 11/28/2011    Hypothyroidism     Idiopathic chronic gout 5/14/2015    Immunosuppression (H24) 3/7/2018    IPF (idiopathic pulmonary fibrosis) (H) 2/25/2015    Mild, at bases per CXR 2/2015 (2/15/15, [facility])     Kidney stones     Lichen sclerosus     Lichen sclerosus et atrophicus of the vulva 5/8/2013    Clobetasol bid is the recommended remedy    Macular degeneration     Non-small cell cancer of right lung (H) 3/7/2018    Nonsenile cataract     Osteoarthritis     Osteoarthritis of right knee 3/11/2014    Osteoporosis     Other specified hypothyroidism 5/14/2015    PID     Pulmonary embolism (H)     1970    Senile osteoporosis 3/11/2014    Unspecified hypothyroidism     Vitiligo      PSH:   Past Surgical History:   Procedure Laterality Date    EXTRACORPOREAL SHOCK WAVE LITHOTRIPSY, CYSTOSCOPY, INSERT STENT URETER(S), COMBINED Right 7/13/2020    Procedure: LITHOTRIPSY, Right EXTRACORPOREAL SHOCK WAVE (ESWL), WITH CYSTOSCOPY AND Right URETERAL STENT INSERTION;  Surgeon: Buddy Ramirez MD;  Location: [facility] OR    HC REMOVAL GALLBLADDER      HC REVISE MEDIAN N/CARPAL TUNNEL SURG      IR LUNG BIOPSY MEDIASTINUM RIGHT  1/16/2018    LASER HOLMIUM LITHOTRIPSY URETER(S), INSERT STENT, COMBINED Right 7/28/2020    Procedure: RIGHT CYSTOURETEROSCOPY, WITH LITHOTRIPSY USING LASER AND URETERAL STENT EXCHANGE;  Surgeon: Buddy Ramirez MD;  Location: [facility] OR    TUBAL LIGATION      ZZC NEPHRECTOMY      left partial 07-01-07    ZZC VENOUS THROMBOSIS IMAGING      with pe       Family Hx:   Family History   Problem Relation Age of Onset    Cancer Brother     Glaucoma Mother     Alzheimer Disease Brother     Macular Degeneration No family hx of      Personal Hx:   Social History     Tobacco Use    Smoking status: Former     Types: Cigarettes      Quit date: 1969     Years since quittin.1    Smokeless tobacco: Never   Substance Use Topics    Alcohol use: Yes     Alcohol/week: 0.0 standard drinks of alcohol     Comment: very little        Allergies:  Allergies   Allergen Reactions    Ace Inhibitors Cough    Advicor     Alendronate Other (See Comments)     Other reaction(s): GI Upset  heartburn  Severe substernal burning and dysphagia within 3 days      Blood-Group Specific Substance Other (See Comments)     Patient has Anti-Groveton and warm auto antibody. Blood products may be delayed. Draw patient 24 hours prior to transfusion. Draw one red top and two purple top tubes for all type and screen orders.    Citalopram GI Disturbance and Nausea     diarrhea  diarrhea    Doxycycline Nausea     Poor appetite    Fosamax      Severe substernal burning and dysphagia within 3 days    Valsartan Cramps and Other (See Comments)     severe  severe       Medications:  Current Outpatient Medications   Medication Sig    Apremilast (OTEZLA) 10 & 20 & 30 MG TBPK Take 10 mg daily in the morning on days 1 to 3, then 20 mg daily on days 4 and 5, then start 30 mg daily on day 6.    apremilast (OTEZLA) 30 MG tablet Take 1 tablet (30 mg) by mouth daily    augmented betamethasone dipropionate (DIPROLENE AF) 0.05 % external cream Apply sparingly to affected area twice daily as needed.  Do not apply to face.    Calcium Carbonate-Vitamin D (CALCIUM + D) 600-5 MG-MCG TABS Take 2 tablets by mouth 2 times daily    ciclopirox (LOPROX) 0.77 % cream Apply topically At Bedtime Apply first and then desitin over this    famotidine (PEPCID) 20 MG tablet TAKE 1 TABLET(20 MG) BY MOUTH TWICE DAILY    fluocinolone (SYNALAR) 0.025 % cream Apply topically 2 times daily    fluocinonide (LIDEX) 0.05 % external cream Apply topically daily To affected areas on chest    folic acid (FOLVITE) 1 MG tablet One tablet daily except the day you take methotrexate    levothyroxine (SYNTHROID/LEVOTHROID) 100 MCG  "tablet TAKE 1 TABLET(100 MCG) BY MOUTH DAILY    methotrexate sodium 2.5 MG TABS 2 tablets once per week    Multiple Vitamins-Minerals (OCUVITE ADULT 50+) CAPS Take 1 capsule by mouth daily    rOPINIRole (REQUIP) 0.25 MG tablet TAKE 1 TO 2 TABLETS(0.25 TO 0.5 MG) BY MOUTH EVERY NIGHT AS NEEDED FOR RESTLESS LEGS    vancomycin (FIRVANQ) 50 MG/ML oral solution Take by mouth 4 times daily Take 2.5 mls     No current facility-administered medications for this visit.     Facility-Administered Medications Ordered in Other Visits   Medication    perflutren diluted in saline (DEFINITY) injection 5 mL      Vitals:  BP (!) 146/76 (BP Location: Right arm, Patient Position: Sitting, Cuff Size: Adult Regular)   Pulse 108   Ht 1.499 m (4' 11\")   Wt 52.2 kg (115 lb)   SpO2 97%   BMI 23.23 kg/m      Exam:  General: alert, oriented, conversant  Skin: scaly plaque noted to left elbow, no surrounding erythema  Neuro: normal speech  Heart: RRR  Lungs: CTA  Extremities: No LE edema noted      LABS:   CMP  Recent Labs   Lab Test 10/11/23  1353 09/27/23  1400 05/22/23  1515 11/10/22  1416 12/13/21  1431 06/21/21  1251 03/08/21  1325 12/03/20  0912 11/10/20  0930    142 140 139   < > 135 138 141 139   POTASSIUM 4.8 4.4 5.1 3.7   < > 4.1 4.1 4.4 3.6   CHLORIDE 105 103 106 101   < > 100 105 107 104   CO2 25 33* 22 30   < > 30 28 31 30   ANIONGAP 10 6* 12 8   < > 5 5 3 5   * 130* 115* 131*   < > 96 98 106* 125*   BUN 29.2* 28.5* 51.6* 25   < > 29 27 22 42*   CR 2.09* 2.13* 1.79* 2.17*   < > 2.11* 1.85* 1.92* 2.48*   GFRESTIMATED 22* 22* 27* 21*   < > 21* 24* 23* 17*   GFRESTBLACK  --   --   --   --   --  24* 28* 27* 20*   SERAFIN 9.6 9.9 8.6* 8.8   < > 8.9 9.3 9.5 8.9    < > = values in this interval not displayed.     Recent Labs   Lab Test 10/11/23  1353 09/27/23  1400 11/10/22  1416 07/20/20  1354 07/05/20  0000   BILITOTAL 0.2 0.2 0.3  --   --    ALKPHOS 57 69 91  --   --    ALT 11 11 20 16 13   AST 21 17 18  --  17 "     CBC  Recent Labs   Lab Test 10/11/23  1353 09/27/23  1400 11/10/22  1416 08/18/22  1211   HGB 11.1* 11.5* 11.3* 10.9*   WBC 7.8 9.6 7.9 7.3   RBC 3.27* 3.45* 3.24* 3.07*   HCT 33.6* 35.8 35.4 33.1*   * 104* 109* 108*   MCH 33.9* 33.3* 34.9* 35.5*   MCHC 33.0 32.1 31.9 32.9   RDW 12.8 12.3 14.4 14.1    238 282 290     URINE STUDIES  Recent Labs   Lab Test 08/18/22  1211 11/29/21  1040 11/10/20  0944 11/03/20  0932 10/19/20  1050   COLOR Yellow Yellow Yellow Yellow Yellow   APPEARANCE Slightly Cloudy* Clear Clear Cloudy Clear   URINEGLC Negative Negative Negative Negative Negative   URINEBILI Negative Negative Negative Negative Negative   URINEKETONE Negative Negative Negative Negative Negative   SG <=1.005 1.010 1.025 1.020 1.020   UBLD Negative Negative Negative Negative Negative   URINEPH 6.5 5.5 5.5 5.5 6.0   PROTEIN Negative Negative Negative Negative Negative   UROBILINOGEN 0.2 0.2 0.2 0.2 0.2   NITRITE Negative Negative Negative Negative Negative   LEUKEST Small* Negative Negative Small* Trace*   RBCU 2-5*  --  O - 2 O - 2 O - 2   WBCU 5-10*  --  0 - 5 5-10* 10-25*     No lab results found.  PTH  Recent Labs   Lab Test 09/27/23  1400 12/13/21  1431 06/21/21  1251   PTHI 62 119* 106*     IRON STUDIES  Recent Labs   Lab Test 09/27/23  1400 06/21/21  1251   IRON 56 94   * 242   IRONSAT 25 39   JOSE RAMON  --  375*       TECHNIQUE: Scans were obtained from the diaphragm through the pelvis  without IV contrast. Radiation dose for this scan was reduced using  automated exposure control, adjustment of the mA and/or kV according  to patient size, or iterative reconstruction technique.     COMPARISON: 5/19/2015.     FINDINGS: Coronary calcifications. Linear atelectasis or fibrosis left  lung base. Liver, spleen, and pancreas are normal. Previous  cholecystectomy with prominence to the common bile duct again noted  and unchanged. Atrophic changes again noted in the left kidney which  are unchanged from  5/19/2015. 2 small calculi again noted in the left  kidney. Left kidney is otherwise unchanged. Right kidney is normal in  size with small calculi again noted in the lower portion of the right  kidney which are unchanged. Right kidney and ureter are otherwise  normal. Diverticula in the colon without evidence of diverticulitis.  Colon and small bowel are otherwise normal. Calcification of the  abdominal aorta and iliac arteries. No abdominal or pelvic adenopathy.  Remainder of the scan is negative and unchanged from 5/19/2015.                                                                      IMPRESSION:   1. Bilateral nonobstructing nephrolithiasis which is unchanged from  5/19/2015. Atrophic left kidney again noted.  2. Remainder of the scan is unchanged.    Mandie Mendoza PA-C    Visit length 15 minutes. An additional 15 minutes were spent on date of service in chart review, documentation, and other activities as noted.

## 2024-03-06 ENCOUNTER — OFFICE VISIT (OUTPATIENT)
Dept: CARDIOLOGY | Facility: CLINIC | Age: 89
End: 2024-03-06
Payer: COMMERCIAL

## 2024-03-06 VITALS
OXYGEN SATURATION: 98 % | SYSTOLIC BLOOD PRESSURE: 201 MMHG | HEIGHT: 59 IN | BODY MASS INDEX: 24.39 KG/M2 | WEIGHT: 121 LBS | DIASTOLIC BLOOD PRESSURE: 82 MMHG | HEART RATE: 75 BPM

## 2024-03-06 DIAGNOSIS — I35.0 MODERATE AORTIC VALVE STENOSIS: Primary | ICD-10-CM

## 2024-03-06 DIAGNOSIS — N18.4 CHRONIC KIDNEY DISEASE, STAGE IV (SEVERE) (H): ICD-10-CM

## 2024-03-06 PROCEDURE — 99204 OFFICE O/P NEW MOD 45 MIN: CPT | Performed by: INTERNAL MEDICINE

## 2024-03-06 NOTE — PATIENT INSTRUCTIONS
Thank you for coming to the [facility] at [place]; please note the following instructions:    1. Please monitor your blood pressure at home for a couple days and send us the readings.    2. 1 year follow-up with a limited echocardiogram prior. A member of the cardiology team will contact you when it is closer to time to schedule.        If you have any questions regarding your visit, please contact your care team:     CARDIOLOGY  TELEPHONE NUMBER   Jacqueline SANCHEZ, Registered Nurse  Natalie VILLAFANA, Registered Nurse  Mary NEUMANN, Registered Nurse  Tsering HECK, Registered Medical Assistant  Luz CELIS, Certified Medical Assistant  Claire PERES, Clinic Assistant [phone] (select option 1)    *After hours: [phone]   For Scheduling Appts:     [phone] (select option 1)    *After hours: [phone]   For the Device Clinic (Pacemakers and ICD's)  Marla ALBARADO, Registered Nurse   During business hours: [phone]    *After business hours:  [phone] (select option 4)      Normal test result notifications will be released via [employer] or mailed within 7 business days.  All other test results, will be communicated via telephone once reviewed by your cardiologist.    If you need a medication refill, please contact your pharmacy.  Please allow 3 business days for your refill to be completed.    As always, thank you for trusting us with your health care needs!

## 2024-03-06 NOTE — LETTER
3/6/2024      RE: Helena Eldridge  [address] [facility] [address]       Dear Colleague,    Thank you for the opportunity to participate in the care of your patient, Helena Eldridge, at the [facility]. Please see a copy of my visit note below.                                                                                                                                                                                       General Cardiology [facility]-[place]  Referring provider:Luz Wilson PA-C     HPI: Ms. Helena Eldridge is a 88 year old  female with PMH significant for    -Whitecoat hypertension  -Idiopathic pulmonary fibrosis  -Psoriasis  -Non-small cell cancer of right lung status postchemotherapy  -COPD  -CKD stage IV  -Kidney cancer status post left partial resection    Patient underwent transthoracic echocardiogram on 1/11/2024 which showed moderate aortic stenosis, normal biventricular function.  She is following up with me today for this new diagnosis.  Echocardiogram was performed when she was found to have cardiac murmur on exam.  She tells me that she has never had any heart problems.  She walks 3000 steps a day.  Denies cardiac symptoms.  No chest pain, shortness of breath, palpitations, dizziness, syncope or lower extremity edema.  She tells me she quit smoking 50 years ago.  She smoked maybe 10 years or so.  She recalls 50 years ago she was on oral contraceptives and had 2 DVTs/PE related to oral contraceptives.  Has not had any DVT since then.  She has been monitoring her blood pressure at home and tells me that her blood pressure is usually less than 150/70 mmHg.  Today she had high blood pressure reading in clinic because the blood pressure cuff really hurt her arm.  She uses a wrist monitor at home.    With regards to her CKD patient follows with nephrology.    Current medications: Levothyroxine,  methotrexate, calcium.    Medications, personal, family, and social history reviewed with patient and revised.    PAST MEDICAL HISTORY:  Past Medical History:   Diagnosis Date    Carpal tunnel syndrome     CKD (chronic kidney disease)     kidney stone with infection    CKD (chronic kidney disease) stage 4, GFR 15-29 ml/min (H) 12/2/2010    History of kidney stone with infection.  Creatinine 1.3 - 1.6 (12/2/10, [facility])   -- she can't take ACE / ARB due to side effects.  Will continue with good BP control (12/3/15, [facility])     COPD, severe (H) 2/16/2015    Seen by pulmonary 2013.       Deep vein thrombosis (DVT) (H)     1972    DVT [id number]    Glucose intolerance     H/O partial nephrectomy     Heel spur     Hypertension goal BP (blood pressure) < 140/90 11/28/2011    Hypothyroidism     Idiopathic chronic gout 5/14/2015    Immunosuppression (H24) 3/7/2018    IPF (idiopathic pulmonary fibrosis) (H) 2/25/2015    Mild, at bases per CXR 2/2015 (2/15/15, [facility])     Kidney stones     Lichen sclerosus     Lichen sclerosus et atrophicus of the vulva 5/8/2013    Clobetasol bid is the recommended remedy    Macular degeneration     Non-small cell cancer of right lung (H) 3/7/2018    Nonsenile cataract     Osteoarthritis     Osteoarthritis of right knee 3/11/2014    Osteoporosis     Other specified hypothyroidism 5/14/2015    PID     Pulmonary embolism (H)     1970    Senile osteoporosis 3/11/2014    Unspecified hypothyroidism     Vitiligo        CURRENT MEDICATIONS:  Current Outpatient Medications   Medication Sig Dispense Refill    Apremilast (OTEZLA) 10 & 20 & 30 MG TBPK Take 10 mg daily in the morning on days 1 to 3, then 20 mg daily on days 4 and 5, then start 30 mg daily on day 6. 55 each 0    apremilast (OTEZLA) 30 MG tablet Take 1 tablet (30 mg) by mouth daily 30 tablet 11    augmented betamethasone dipropionate (DIPROLENE AF) 0.05 % external cream Apply sparingly to affected area twice daily as needed.  Do not apply to face. 200  g 3    Calcium Carbonate-Vitamin D (CALCIUM + D) 600-5 MG-MCG TABS Take 2 tablets by mouth 2 times daily      ciclopirox (LOPROX) 0.77 % cream Apply topically At Bedtime Apply first and then desitin over this 60 g 6    famotidine (PEPCID) 20 MG tablet TAKE 1 TABLET(20 MG) BY MOUTH TWICE DAILY 180 tablet 3    fluocinolone (SYNALAR) 0.025 % cream Apply topically 2 times daily 240 g 4    fluocinonide (LIDEX) 0.05 % external cream Apply topically daily To affected areas on chest 120 g 1    folic acid (FOLVITE) 1 MG tablet One tablet daily except the day you take methotrexate 60 tablet 3    levothyroxine (SYNTHROID/LEVOTHROID) 100 MCG tablet TAKE 1 TABLET(100 MCG) BY MOUTH DAILY 90 tablet 3    methotrexate sodium 2.5 MG TABS 2 tablets once per week 5 tablet 0    Multiple Vitamins-Minerals (OCUVITE ADULT 50+) CAPS Take 1 capsule by mouth daily 30 capsule 12    rOPINIRole (REQUIP) 0.25 MG tablet TAKE 1 TO 2 TABLETS(0.25 TO 0.5 MG) BY MOUTH EVERY NIGHT AS NEEDED FOR RESTLESS LEGS 180 tablet 3    vancomycin (FIRVANQ) 50 MG/ML oral solution Take by mouth 4 times daily Take 2.5 mls         PAST SURGICAL HISTORY:  Past Surgical History:   Procedure Laterality Date    EXTRACORPOREAL SHOCK WAVE LITHOTRIPSY, CYSTOSCOPY, INSERT STENT URETER(S), COMBINED Right 7/13/2020    Procedure: LITHOTRIPSY, Right EXTRACORPOREAL SHOCK WAVE (ESWL), WITH CYSTOSCOPY AND Right URETERAL STENT INSERTION;  Surgeon: Buddy Ramirez MD;  Location: [facility] OR    HC REMOVAL GALLBLADDER      HC REVISE MEDIAN N/CARPAL TUNNEL SURG      IR LUNG BIOPSY MEDIASTINUM RIGHT  1/16/2018    LASER HOLMIUM LITHOTRIPSY URETER(S), INSERT STENT, COMBINED Right 7/28/2020    Procedure: RIGHT CYSTOURETEROSCOPY, WITH LITHOTRIPSY USING LASER AND URETERAL STENT EXCHANGE;  Surgeon: Buddy Ramirez MD;  Location: [facility] OR    TUBAL LIGATION      ZZC NEPHRECTOMY      left partial 07-01-07    [facility] VENOUS THROMBOSIS IMAGING      with pe       ALLERGIES:     Allergies   Allergen  "Reactions    Ace Inhibitors Cough    Advicor     Alendronate Other (See Comments)     Other reaction(s): GI Upset  heartburn  Severe substernal burning and dysphagia within 3 days      Blood-Group Specific Substance Other (See Comments)     Patient has Anti-[place] and warm auto antibody. Blood products may be delayed. Draw patient 24 hours prior to transfusion. Draw one red top and two purple top tubes for all type and screen orders.    Citalopram GI Disturbance and Nausea     diarrhea  diarrhea    Doxycycline Nausea     Poor appetite    Fosamax      Severe substernal burning and dysphagia within 3 days    Valsartan Cramps and Other (See Comments)     severe  severe       FAMILY HISTORY:  Family History   Problem Relation Age of Onset    Cancer Brother     Glaucoma Mother     Alzheimer Disease Brother     Macular Degeneration No family hx of          SOCIAL HISTORY:  Social History     Tobacco Use    Smoking status: Former     Types: Cigarettes     Quit date: 1969     Years since quittin.2    Smokeless tobacco: Never   Vaping Use    Vaping Use: Never used   Substance Use Topics    Alcohol use: Yes     Alcohol/week: 0.0 standard drinks of alcohol     Comment: very little     Drug use: No       ROS:   Constitutional: No fever, chills, or sweats. Weight stable.   Cardiovascular: As per HPI.     Exam:  BP (!) 201/82   Pulse 75   Ht 1.499 m (4' 11\")   Wt 54.9 kg (121 lb)   SpO2 98%   BMI 24.44 kg/m    GENERAL APPEARANCE: alert and no distress  HEENT: no icterus, no central cyanosis  LYMPH/NECK: no adenopathy, no asymmetry, JVP not elevated  RESPIRATORY: lungs clear to auscultation - no rales, rhonchi or wheezes, no use of accessory muscles, no retractions, respirations are unlabored, normal respiratory rate  CARDIOVASCULAR: regular rhythm, normal S1, S2, no S3 or S4 , left parasternal 2/6 systolic ejection murmur  EXTREMITIES: no edema  NEURO: alert, normal speech,and affect  SKIN: no ecchymoses, no " anali     I have reviewed the labs and personally reviewed the imaging below and made my comment in the assessment and plan.    Labs:  CBC RESULTS:   Lab Results   Component Value Date    WBC 7.8 10/11/2023    WBC 7.8 06/21/2021    RBC 3.27 (L) 10/11/2023    RBC 3.52 (L) 06/21/2021    HGB 11.1 (L) 10/11/2023    HGB 11.8 06/21/2021    HCT 33.6 (L) 10/11/2023    HCT 35.4 06/21/2021     (H) 10/11/2023     (H) 06/21/2021    MCH 33.9 (H) 10/11/2023    MCH 33.5 (H) 06/21/2021    MCHC 33.0 10/11/2023    MCHC 33.3 06/21/2021    RDW 12.8 10/11/2023    RDW 13.6 06/21/2021     10/11/2023     06/21/2021       BMP RESULTS:  Lab Results   Component Value Date     10/11/2023     06/21/2021    POTASSIUM 4.8 10/11/2023    POTASSIUM 3.7 11/10/2022    POTASSIUM 4.1 06/21/2021    CHLORIDE 105 10/11/2023    CHLORIDE 101 11/10/2022    CHLORIDE 100 06/21/2021    CO2 25 10/11/2023    CO2 30 11/10/2022    CO2 30 06/21/2021    ANIONGAP 10 10/11/2023    ANIONGAP 8 11/10/2022    ANIONGAP 5 06/21/2021     (H) 10/11/2023     (H) 11/10/2022    GLC 96 06/21/2021    BUN 29.2 (H) 10/11/2023    BUN 25 11/10/2022    BUN 29 06/21/2021    CR 2.09 (H) 10/11/2023    CR 2.11 (H) 06/21/2021    GFRESTIMATED 22 (L) 10/11/2023    GFRESTIMATED 21 (L) 06/21/2021    GFRESTBLACK 24 (L) 06/21/2021    SERAFIN 9.6 10/11/2023    SERAFIN 8.9 06/21/2021      Echocardiogram 1/11/2024  Global and regional left ventricular function is normal with an EF of 60-65%.  Global right ventricular function is normal.  Moderate aortic stenosis by area (1.1cm2), but PV (2.2m/s) and MG (11mmHg) are  in the mild range.  The inferior vena cava is normal.    Assessment and Plan:     Patient is establishing care with us today for new diagnosis of moderate aortic stenosis.  No prior history of cardiac disease.    # Moderate aortic valve stenosis  -LV function is normal.  She is asymptomatic and euvolemic on exam.  Has normal functional  capacity with 3000 steps a day.  -Discussed the natural progression of aortic valve stenosis with the patient and her son accompanying her today.  Briefly mentioned about TAVR which can be an option for her in the future when the valve becomes severe associated with symptoms and/or LV dysfunction.  -Recommend annual echocardiograms to monitor aortic valve disease    # High blood pressure in clinic today without diagnosis of hypertension  -Patient has no prior history of hypertension.  She monitors her blood pressure at home periodically.  Today she had severe arm pain with blood pressure cuff which likely increased blood pressure.  She does not want to do blood pressure recheck today.  She would like to monitor blood pressure at home.  -Patient's son uses MyChart.  They will send me 3 days of blood pressure readings from home.    Return to clinic 1 year with prior echocardiogram.    Total time spent today for this visit is 45 minutes including precharting, face-to-face clinic visit, review of labs/imaging and medical documentation.        Please do not hesitate to contact me if you have any questions/concerns.     Sincerely,     Dru Sands MD

## 2024-03-06 NOTE — PROGRESS NOTES
General Cardiology Clinic-[place]  Referring provider:Luz Wilson PA-C     HPI: Ms. Helena Eldridge is a 88 year old  female with PMH significant for    -Whitecoat hypertension  -Idiopathic pulmonary fibrosis  -Psoriasis  -Non-small cell cancer of right lung status postchemotherapy  -COPD  -CKD stage IV  -Kidney cancer status post left partial resection    Patient underwent transthoracic echocardiogram on 1/11/2024 which showed moderate aortic stenosis, normal biventricular function.  She is following up with me today for this new diagnosis.  Echocardiogram was performed when she was found to have cardiac murmur on exam.  She tells me that she has never had any heart problems.  She walks 3000 steps a day.  Denies cardiac symptoms.  No chest pain, shortness of breath, palpitations, dizziness, syncope or lower extremity edema.  She tells me she quit smoking 50 years ago.  She smoked maybe 10 years or so.  She recalls 50 years ago she was on oral contraceptives and had 2 DVTs/PE related to oral contraceptives.  Has not had any DVT since then.  She has been monitoring her blood pressure at home and tells me that her blood pressure is usually less than 150/70 mmHg.  Today she had high blood pressure reading in clinic because the blood pressure cuff really hurt her arm.  She uses a wrist monitor at home.    With regards to her CKD patient follows with nephrology.    Current medications: Levothyroxine, methotrexate, calcium.    Medications, personal, family, and social history reviewed with patient and revised.    PAST MEDICAL HISTORY:  Past Medical History:   Diagnosis Date    Carpal tunnel syndrome     CKD (chronic kidney disease)     kidney stone with infection    CKD (chronic kidney disease) stage 4, GFR 15-29 ml/min (H) 12/2/2010    History of  kidney stone with infection.  Creatinine 1.3 - 1.6 (12/2/10, [facility])   -- she can't take ACE / ARB due to side effects.  Will continue with good BP control (12/3/15, [facility])     COPD, severe (H) 2/16/2015    Seen by pulmonary 2013.       Deep vein thrombosis (DVT) (H)     1972    DVT [id number]    Glucose intolerance     H/O partial nephrectomy     Heel spur     Hypertension goal BP (blood pressure) < 140/90 11/28/2011    Hypothyroidism     Idiopathic chronic gout 5/14/2015    Immunosuppression (H24) 3/7/2018    IPF (idiopathic pulmonary fibrosis) (H) 2/25/2015    Mild, at bases per CXR 2/2015 (2/15/15, [facility])     Kidney stones     Lichen sclerosus     Lichen sclerosus et atrophicus of the vulva 5/8/2013    Clobetasol bid is the recommended remedy    Macular degeneration     Non-small cell cancer of right lung (H) 3/7/2018    Nonsenile cataract     Osteoarthritis     Osteoarthritis of right knee 3/11/2014    Osteoporosis     Other specified hypothyroidism 5/14/2015    PID     Pulmonary embolism (H)     1970    Senile osteoporosis 3/11/2014    Unspecified hypothyroidism     Vitiligo        CURRENT MEDICATIONS:  Current Outpatient Medications   Medication Sig Dispense Refill    Apremilast (OTEZLA) 10 & 20 & 30 MG TBPK Take 10 mg daily in the morning on days 1 to 3, then 20 mg daily on days 4 and 5, then start 30 mg daily on day 6. 55 each 0    apremilast (OTEZLA) 30 MG tablet Take 1 tablet (30 mg) by mouth daily 30 tablet 11    augmented betamethasone dipropionate (DIPROLENE AF) 0.05 % external cream Apply sparingly to affected area twice daily as needed.  Do not apply to face. 200 g 3    Calcium Carbonate-Vitamin D (CALCIUM + D) 600-5 MG-MCG TABS Take 2 tablets by mouth 2 times daily      ciclopirox (LOPROX) 0.77 % cream Apply topically At Bedtime Apply first and then desitin over this 60 g 6    famotidine (PEPCID) 20 MG tablet TAKE 1 TABLET(20 MG) BY MOUTH TWICE DAILY 180 tablet 3    fluocinolone (SYNALAR) 0.025 % cream Apply  topically 2 times daily 240 g 4    fluocinonide (LIDEX) 0.05 % external cream Apply topically daily To affected areas on chest 120 g 1    folic acid (FOLVITE) 1 MG tablet One tablet daily except the day you take methotrexate 60 tablet 3    levothyroxine (SYNTHROID/LEVOTHROID) 100 MCG tablet TAKE 1 TABLET(100 MCG) BY MOUTH DAILY 90 tablet 3    methotrexate sodium 2.5 MG TABS 2 tablets once per week 5 tablet 0    Multiple Vitamins-Minerals (OCUVITE ADULT 50+) CAPS Take 1 capsule by mouth daily 30 capsule 12    rOPINIRole (REQUIP) 0.25 MG tablet TAKE 1 TO 2 TABLETS(0.25 TO 0.5 MG) BY MOUTH EVERY NIGHT AS NEEDED FOR RESTLESS LEGS 180 tablet 3    vancomycin (FIRVANQ) 50 MG/ML oral solution Take by mouth 4 times daily Take 2.5 mls         PAST SURGICAL HISTORY:  Past Surgical History:   Procedure Laterality Date    EXTRACORPOREAL SHOCK WAVE LITHOTRIPSY, CYSTOSCOPY, INSERT STENT URETER(S), COMBINED Right 7/13/2020    Procedure: LITHOTRIPSY, Right EXTRACORPOREAL SHOCK WAVE (ESWL), WITH CYSTOSCOPY AND Right URETERAL STENT INSERTION;  Surgeon: Buddy Ramirez MD;  Location: [facility] OR    HC REMOVAL GALLBLADDER      HC REVISE MEDIAN N/CARPAL TUNNEL SURG      IR LUNG BIOPSY MEDIASTINUM RIGHT  1/16/2018    LASER HOLMIUM LITHOTRIPSY URETER(S), INSERT STENT, COMBINED Right 7/28/2020    Procedure: RIGHT CYSTOURETEROSCOPY, WITH LITHOTRIPSY USING LASER AND URETERAL STENT EXCHANGE;  Surgeon: Buddy Ramirez MD;  Location: [facility] OR    TUBAL LIGATION      ZZC NEPHRECTOMY      left partial 07-01-07    [facility] VENOUS THROMBOSIS IMAGING      with pe       ALLERGIES:     Allergies   Allergen Reactions    Ace Inhibitors Cough    Advicor     Alendronate Other (See Comments)     Other reaction(s): GI Upset  heartburn  Severe substernal burning and dysphagia within 3 days      Blood-Group Specific Substance Other (See Comments)     Patient has Anti-[place] and warm auto antibody. Blood products may be delayed. Draw patient 24 hours prior to  "transfusion. Draw one red top and two purple top tubes for all type and screen orders.    Citalopram GI Disturbance and Nausea     diarrhea  diarrhea    Doxycycline Nausea     Poor appetite    Fosamax      Severe substernal burning and dysphagia within 3 days    Valsartan Cramps and Other (See Comments)     severe  severe       FAMILY HISTORY:  Family History   Problem Relation Age of Onset    Cancer Brother     Glaucoma Mother     Alzheimer Disease Brother     Macular Degeneration No family hx of          SOCIAL HISTORY:  Social History     Tobacco Use    Smoking status: Former     Types: Cigarettes     Quit date: 1969     Years since quittin.2    Smokeless tobacco: Never   Vaping Use    Vaping Use: Never used   Substance Use Topics    Alcohol use: Yes     Alcohol/week: 0.0 standard drinks of alcohol     Comment: very little     Drug use: No       ROS:   Constitutional: No fever, chills, or sweats. Weight stable.   Cardiovascular: As per HPI.     Exam:  BP (!) 201/82   Pulse 75   Ht 1.499 m (4' 11\")   Wt 54.9 kg (121 lb)   SpO2 98%   BMI 24.44 kg/m    GENERAL APPEARANCE: alert and no distress  HEENT: no icterus, no central cyanosis  LYMPH/NECK: no adenopathy, no asymmetry, JVP not elevated  RESPIRATORY: lungs clear to auscultation - no rales, rhonchi or wheezes, no use of accessory muscles, no retractions, respirations are unlabored, normal respiratory rate  CARDIOVASCULAR: regular rhythm, normal S1, S2, no S3 or S4 , left parasternal 2/6 systolic ejection murmur  EXTREMITIES: no edema  NEURO: alert, normal speech,and affect  SKIN: no ecchymoses, no rashes     I have reviewed the labs and personally reviewed the imaging below and made my comment in the assessment and plan.    Labs:  CBC RESULTS:   Lab Results   Component Value Date    WBC 7.8 10/11/2023    WBC 7.8 2021    RBC 3.27 (L) 10/11/2023    RBC 3.52 (L) 2021    HGB 11.1 (L) 10/11/2023    HGB 11.8 2021    HCT 33.6 (L) " 10/11/2023    HCT 35.4 06/21/2021     (H) 10/11/2023     (H) 06/21/2021    MCH 33.9 (H) 10/11/2023    MCH 33.5 (H) 06/21/2021    MCHC 33.0 10/11/2023    MCHC 33.3 06/21/2021    RDW 12.8 10/11/2023    RDW 13.6 06/21/2021     10/11/2023     06/21/2021       BMP RESULTS:  Lab Results   Component Value Date     10/11/2023     06/21/2021    POTASSIUM 4.8 10/11/2023    POTASSIUM 3.7 11/10/2022    POTASSIUM 4.1 06/21/2021    CHLORIDE 105 10/11/2023    CHLORIDE 101 11/10/2022    CHLORIDE 100 06/21/2021    CO2 25 10/11/2023    CO2 30 11/10/2022    CO2 30 06/21/2021    ANIONGAP 10 10/11/2023    ANIONGAP 8 11/10/2022    ANIONGAP 5 06/21/2021     (H) 10/11/2023     (H) 11/10/2022    GLC 96 06/21/2021    BUN 29.2 (H) 10/11/2023    BUN 25 11/10/2022    BUN 29 06/21/2021    CR 2.09 (H) 10/11/2023    CR 2.11 (H) 06/21/2021    GFRESTIMATED 22 (L) 10/11/2023    GFRESTIMATED 21 (L) 06/21/2021    GFRESTBLACK 24 (L) 06/21/2021    SERAFIN 9.6 10/11/2023    SERAFIN 8.9 06/21/2021      Echocardiogram 1/11/2024  Global and regional left ventricular function is normal with an EF of 60-65%.  Global right ventricular function is normal.  Moderate aortic stenosis by area (1.1cm2), but PV (2.2m/s) and MG (11mmHg) are  in the mild range.  The inferior vena cava is normal.    Assessment and Plan:     Patient is establishing care with us today for new diagnosis of moderate aortic stenosis.  No prior history of cardiac disease.    # Moderate aortic valve stenosis  -LV function is normal.  She is asymptomatic and euvolemic on exam.  Has normal functional capacity with 3000 steps a day.  -Discussed the natural progression of aortic valve stenosis with the patient and her son accompanying her today.  Briefly mentioned about TAVR which can be an option for her in the future when the valve becomes severe associated with symptoms and/or LV dysfunction.  -Recommend annual echocardiograms to monitor aortic valve  disease    # High blood pressure in clinic today without diagnosis of hypertension  -Patient has no prior history of hypertension.  She monitors her blood pressure at home periodically.  Today she had severe arm pain with blood pressure cuff which likely increased blood pressure.  She does not want to do blood pressure recheck today.  She would like to monitor blood pressure at home.  -Patient's son uses MyChart.  They will send me 3 days of blood pressure readings from home.    Return to clinic 1 year with prior echocardiogram.    Total time spent today for this visit is 45 minutes including precharting, face-to-face clinic visit, review of labs/imaging and medical documentation.    Dru DOMINGO MD  [facility] Division of Cardiology  Pager [phone]

## 2024-03-06 NOTE — NURSING NOTE
"Chief Complaint   Patient presents with    New Patient     Nonrheumatic aortic valve stenosis       Initial BP (!) 201/82   Pulse 75   Ht 1.499 m (4' 11\")   Wt 54.9 kg (121 lb)   SpO2 98%   BMI 24.44 kg/m   Estimated body mass index is 24.44 kg/m  as calculated from the following:    Height as of this encounter: 1.499 m (4' 11\").    Weight as of this encounter: 54.9 kg (121 lb)..  BP completed using cuff size: maría Dave CMA (AAMA)    "

## 2024-03-10 ENCOUNTER — MYC MEDICAL ADVICE (OUTPATIENT)
Dept: CARDIOLOGY | Facility: CLINIC | Age: 89
End: 2024-03-10
Payer: COMMERCIAL

## 2024-03-12 DIAGNOSIS — E03.8 OTHER SPECIFIED HYPOTHYROIDISM: ICD-10-CM

## 2024-03-12 RX ORDER — LEVOTHYROXINE SODIUM 100 UG/1
TABLET ORAL
Qty: 90 TABLET | Refills: 3 | Status: SHIPPED | OUTPATIENT
Start: 2024-03-12

## 2024-03-13 ENCOUNTER — OFFICE VISIT (OUTPATIENT)
Dept: DERMATOLOGY | Facility: CLINIC | Age: 89
End: 2024-03-13
Attending: PHYSICIAN ASSISTANT
Payer: COMMERCIAL

## 2024-03-13 DIAGNOSIS — L40.9 PSORIASIS: Primary | ICD-10-CM

## 2024-03-13 PROCEDURE — 11900 INJECT SKIN LESIONS </W 7: CPT | Performed by: PHYSICIAN ASSISTANT

## 2024-03-13 PROCEDURE — 99213 OFFICE O/P EST LOW 20 MIN: CPT | Mod: 25 | Performed by: PHYSICIAN ASSISTANT

## 2024-03-13 NOTE — LETTER
3/13/2024         RE: Helena Eldridge  [address] [id number]        Dear Colleague,    Thank you for referring your patient, Helena Eldridge, to the [facility]. Please see a copy of my visit note below.    Helena Eldridge is an extremely pleasant 88 year old year old female patient here today for psoriasis recheck. She tried and failed methotrexate. Did not pursue otezla free drug program since skin is improved. She notes most itchy area is on the right buttock.  Patient has no other skin complaints today.  Remainder of the HPI, Meds, PMH, Allergies, FH, and SH was reviewed in chart.    Pertinent Hx:   Psoriasis   Past Medical History:   Diagnosis Date     Carpal tunnel syndrome      CKD (chronic kidney disease)     kidney stone with infection     CKD (chronic kidney disease) stage 4, GFR 15-29 ml/min (H) 12/2/2010    History of kidney stone with infection.  Creatinine 1.3 - 1.6 (12/2/10, [facility])   -- she can't take ACE / ARB due to side effects.  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        Deep vein thrombosis (DVT) (H)     1972     DVT [id number]     Glucose intolerance      H/O partial nephrectomy      Heel spur      Hypertension goal BP (blood pressure) < 140/90 11/28/2011     Hypothyroidism      Idiopathic chronic gout 5/14/2015     Immunosuppression (H24) 3/7/2018     IPF (idiopathic pulmonary fibrosis) (H) 2/25/2015    Mild, at bases per CXR 2/2015 (2/15/15, [facility])      Kidney stones      Lichen sclerosus      Lichen sclerosus et atrophicus of the vulva 5/8/2013    Clobetasol bid is the recommended remedy     Macular degeneration      Non-small cell cancer of right lung (H) 3/7/2018     Nonsenile cataract      Osteoarthritis      Osteoarthritis of right knee 3/11/2014     Osteoporosis      Other specified hypothyroidism 5/14/2015     PID      Pulmonary embolism (H)     1970     Senile  osteoporosis 3/11/2014     Unspecified hypothyroidism      Vitiligo        Past Surgical History:   Procedure Laterality Date     EXTRACORPOREAL SHOCK WAVE LITHOTRIPSY, CYSTOSCOPY, INSERT STENT URETER(S), COMBINED Right 2020    Procedure: LITHOTRIPSY, Right EXTRACORPOREAL SHOCK WAVE (ESWL), WITH CYSTOSCOPY AND Right URETERAL STENT INSERTION;  Surgeon: Buddy Ramirez MD;  Location: [facility] OR     HC REMOVAL GALLBLADDER       HC REVISE MEDIAN N/CARPAL TUNNEL SURG       IR LUNG BIOPSY MEDIASTINUM RIGHT  2018     LASER HOLMIUM LITHOTRIPSY URETER(S), INSERT STENT, COMBINED Right 2020    Procedure: RIGHT CYSTOURETEROSCOPY, WITH LITHOTRIPSY USING LASER AND URETERAL STENT EXCHANGE;  Surgeon: Buddy Ramirez MD;  Location: [facility] OR     TUBAL LIGATION       ZZC NEPHRECTOMY      left partial 07     ZZC VENOUS THROMBOSIS IMAGING      with pe        Family History   Problem Relation Age of Onset     Cancer Brother      Glaucoma Mother      Alzheimer Disease Brother      Macular Degeneration No family hx of        Social History     Socioeconomic History     Marital status: Single     Spouse name: Not on file     Number of children: Not on file     Years of education: Not on file     Highest education level: Not on file   Occupational History     Not on file   Tobacco Use     Smoking status: Former     Types: Cigarettes     Quit date: 1969     Years since quittin.3     Smokeless tobacco: Never   Vaping Use     Vaping Use: Never used   Substance and Sexual Activity     Alcohol use: Not Currently     Drug use: No     Sexual activity: Not Currently     Partners: Male   Other Topics Concern     Parent/sibling w/ CABG, MI or angioplasty before 65F 55M? No   Social History Narrative     Not on file     Social Determinants of Health     Financial Resource Strain: Low Risk  (10/19/2023)    Financial Resource Strain      Within the past 12 months, have you or your family members you live with been unable to  get utilities (heat, electricity) when it was really needed?: No   Food Insecurity: Low Risk  (10/19/2023)    Food Insecurity      Within the past 12 months, did you worry that your food would run out before you got money to buy more?: No      Within the past 12 months, did the food you bought just not last and you didn t have money to get more?: No   Transportation Needs: Low Risk  (10/19/2023)    Transportation Needs      Within the past 12 months, has lack of transportation kept you from medical appointments, getting your medicines, non-medical meetings or appointments, work, or from getting things that you need?: No   Physical Activity: Not on file   Stress: Not on file   Social Connections: Not on file   Interpersonal Safety: Not on file   Housing Stability: High Risk (10/19/2023)    Housing Stability      Do you have housing? : No      Are you worried about losing your housing?: No       Outpatient Encounter Medications as of 3/13/2024   Medication Sig Dispense Refill     augmented betamethasone dipropionate (DIPROLENE AF) 0.05 % external cream Apply sparingly to affected area twice daily as needed.  Do not apply to face. 200 g 3     ciclopirox (LOPROX) 0.77 % cream Apply topically At Bedtime Apply first and then desitin over this 60 g 6     fluocinolone (SYNALAR) 0.025 % cream Apply topically 2 times daily 240 g 4     fluocinonide (LIDEX) 0.05 % external cream Apply topically daily To affected areas on chest 120 g 1     Apremilast (OTEZLA) 10 & 20 & 30 MG TBPK Take 10 mg daily in the morning on days 1 to 3, then 20 mg daily on days 4 and 5, then start 30 mg daily on day 6. 55 each 0     apremilast (OTEZLA) 30 MG tablet Take 1 tablet (30 mg) by mouth daily 30 tablet 11     Calcium Carbonate-Vitamin D (CALCIUM + D) 600-5 MG-MCG TABS Take 2 tablets by mouth 2 times daily       famotidine (PEPCID) 20 MG tablet TAKE 1 TABLET(20 MG) BY MOUTH TWICE DAILY 180 tablet 3     folic acid (FOLVITE) 1 MG tablet One tablet  daily except the day you take methotrexate 60 tablet 3     levothyroxine (SYNTHROID/LEVOTHROID) 100 MCG tablet TAKE 1 TABLET(100 MCG) BY MOUTH DAILY 90 tablet 3     methotrexate sodium 2.5 MG TABS 2 tablets once per week 5 tablet 0     Multiple Vitamins-Minerals (OCUVITE ADULT 50+) CAPS Take 1 capsule by mouth daily 30 capsule 12     rOPINIRole (REQUIP) 0.25 MG tablet TAKE 1 TO 2 TABLETS(0.25 TO 0.5 MG) BY MOUTH EVERY NIGHT AS NEEDED FOR RESTLESS LEGS 180 tablet 3     vancomycin (FIRVANQ) 50 MG/ML oral solution Take by mouth 4 times daily Take 2.5 mls       Facility-Administered Encounter Medications as of 3/13/2024   Medication Dose Route Frequency Provider Last Rate Last Admin     perflutren diluted in saline (DEFINITY) injection 5 mL  5 mL Intravenous Once Sushma Nelson MD         [COMPLETED] triamcinolone acetonide (KENALOG-10) injection 10 mg  10 mg Intradermal Once Janna Montoya PA-C   10 mg at 03/13/24 1542             O:   NAD, WDWN, Alert & Oriented, Mood & Affect wnl, Vitals stable   Here today alone   There were no vitals taken for this visit.   General appearance normal   Vitals stable   Alert, oriented and in no acute distress     Psoriasiform plaques and papules on buttock, minimal on elbows, intertriginous areas of groin, axilla, abdomen and breasts      Eyes: Conjunctivae/lids:Normal     ENT: Lips: normal    MSK:Normal    Pulm: Breathing Normal=    Neuro/Psych: Orientation:Alert and Orientedx3 ; Mood/Affect:normal  A/P:  1. Psoriasis  Recommend intralesional steroid injections to psoriasis on right buttocks.   IL TAC: PGACAC discussed.  Risks including but not limited to injection site reaction, bruising, no resolution.  All questions answered and entertained to patient s satisfaction.  Informed consent obtained.  IL TAC in concentration of 10mg/ml was injected ID to psoriasis on right buttocks.  Total injected was  0.5 ml.  Patient tolerated without complications and given wound  care instructions, including not to move product around.  Return in 4 weeks for follow-up and possible additional IL TAC.    Continue betamethasone as needed.   Skin care regimen reviewed with patient: Eliminate harsh soaps, i.e. Dial, zest, irsih spring; Mild soaps such as Cetaphil or Dove sensitive skin, avoid hot or cold showers, aggressive use of emollients including vanicream, cetaphil or cerave discussed with patient.    Return to clinic 4 months.             Clinic Administered Medication Documentation        Patient was given 0.5 ml of  Kenalog 10. Prior to medication administration, verified patient's identity using patient s name and date of birth. Please see MAR and medication order for additional information. Patient instructed to remain in clinic for 15 minutes and report any adverse reaction to staff immediately.    Vial/Syringe: Multi dose vial              Again, thank you for allowing me to participate in the care of your patient.        Sincerely,        Janna Navarro PA-C

## 2024-03-13 NOTE — PROGRESS NOTES
Helena Eldridge is an extremely pleasant 88 year old year old female patient here today for psoriasis recheck. She tried and failed methotrexate. Did not pursue otezla free drug program since skin is improved. She notes most itchy area is on the right buttock.  Patient has no other skin complaints today.  Remainder of the HPI, Meds, PMH, Allergies, FH, and SH was reviewed in chart.    Pertinent Hx:   Psoriasis   Past Medical History:   Diagnosis Date    Carpal tunnel syndrome     CKD (chronic kidney disease)     kidney stone with infection    CKD (chronic kidney disease) stage 4, GFR 15-29 ml/min (H) 12/2/2010    History of kidney stone with infection.  Creatinine 1.3 - 1.6 (12/2/10, [facility])   -- she can't take ACE / ARB due to side effects.  Will continue with good BP control (12/3/15, [facility])     COPD, severe (H) 2/16/2015    Seen by pulmonary 2013.       Deep vein thrombosis (DVT) (H)     1972    DVT [id number]    Glucose intolerance     H/O partial nephrectomy     Heel spur     Hypertension goal BP (blood pressure) < 140/90 11/28/2011    Hypothyroidism     Idiopathic chronic gout 5/14/2015    Immunosuppression (H24) 3/7/2018    IPF (idiopathic pulmonary fibrosis) (H) 2/25/2015    Mild, at bases per CXR 2/2015 (2/15/15, [facility])     Kidney stones     Lichen sclerosus     Lichen sclerosus et atrophicus of the vulva 5/8/2013    Clobetasol bid is the recommended remedy    Macular degeneration     Non-small cell cancer of right lung (H) 3/7/2018    Nonsenile cataract     Osteoarthritis     Osteoarthritis of right knee 3/11/2014    Osteoporosis     Other specified hypothyroidism 5/14/2015    PID     Pulmonary embolism (H)     1970    Senile osteoporosis 3/11/2014    Unspecified hypothyroidism     Vitiligo        Past Surgical History:   Procedure Laterality Date    EXTRACORPOREAL SHOCK WAVE LITHOTRIPSY, CYSTOSCOPY, INSERT STENT URETER(S), COMBINED Right 7/13/2020    Procedure: LITHOTRIPSY, Right EXTRACORPOREAL SHOCK WAVE (ESWL),  WITH CYSTOSCOPY AND Right URETERAL STENT INSERTION;  Surgeon: Buddy Ramirez MD;  Location: [facility] OR    HC REMOVAL GALLBLADDER      HC REVISE MEDIAN N/CARPAL TUNNEL SURG      IR LUNG BIOPSY MEDIASTINUM RIGHT  2018    LASER HOLMIUM LITHOTRIPSY URETER(S), INSERT STENT, COMBINED Right 2020    Procedure: RIGHT CYSTOURETEROSCOPY, WITH LITHOTRIPSY USING LASER AND URETERAL STENT EXCHANGE;  Surgeon: Buddy Ramirez MD;  Location: [facility] OR    TUBAL LIGATION      ZZC NEPHRECTOMY      left partial 07    ZZC VENOUS THROMBOSIS IMAGING      with pe        Family History   Problem Relation Age of Onset    Cancer Brother     Glaucoma Mother     Alzheimer Disease Brother     Macular Degeneration No family hx of        Social History     Socioeconomic History    Marital status: Single     Spouse name: Not on file    Number of children: Not on file    Years of education: Not on file    Highest education level: Not on file   Occupational History    Not on file   Tobacco Use    Smoking status: Former     Types: Cigarettes     Quit date: 1969     Years since quittin.3    Smokeless tobacco: Never   Vaping Use    Vaping Use: Never used   Substance and Sexual Activity    Alcohol use: Not Currently    Drug use: No    Sexual activity: Not Currently     Partners: Male   Other Topics Concern    Parent/sibling w/ CABG, MI or angioplasty before 65F 55M? No   Social History Narrative    Not on file     Social Determinants of Health     Financial Resource Strain: Low Risk  (10/19/2023)    Financial Resource Strain     Within the past 12 months, have you or your family members you live with been unable to get utilities (heat, electricity) when it was really needed?: No   Food Insecurity: Low Risk  (10/19/2023)    Food Insecurity     Within the past 12 months, did you worry that your food would run out before you got money to buy more?: No     Within the past 12 months, did the food you bought just not last and you didn t  have money to get more?: No   Transportation Needs: Low Risk  (10/19/2023)    Transportation Needs     Within the past 12 months, has lack of transportation kept you from medical appointments, getting your medicines, non-medical meetings or appointments, work, or from getting things that you need?: No   Physical Activity: Not on file   Stress: Not on file   Social Connections: Not on file   Interpersonal Safety: Not on file   Housing Stability: High Risk (10/19/2023)    Housing Stability     Do you have housing? : No     Are you worried about losing your housing?: No       Outpatient Encounter Medications as of 3/13/2024   Medication Sig Dispense Refill    augmented betamethasone dipropionate (DIPROLENE AF) 0.05 % external cream Apply sparingly to affected area twice daily as needed.  Do not apply to face. 200 g 3    ciclopirox (LOPROX) 0.77 % cream Apply topically At Bedtime Apply first and then desitin over this 60 g 6    fluocinolone (SYNALAR) 0.025 % cream Apply topically 2 times daily 240 g 4    fluocinonide (LIDEX) 0.05 % external cream Apply topically daily To affected areas on chest 120 g 1    Apremilast (OTEZLA) 10 & 20 & 30 MG TBPK Take 10 mg daily in the morning on days 1 to 3, then 20 mg daily on days 4 and 5, then start 30 mg daily on day 6. 55 each 0    apremilast (OTEZLA) 30 MG tablet Take 1 tablet (30 mg) by mouth daily 30 tablet 11    Calcium Carbonate-Vitamin D (CALCIUM + D) 600-5 MG-MCG TABS Take 2 tablets by mouth 2 times daily      famotidine (PEPCID) 20 MG tablet TAKE 1 TABLET(20 MG) BY MOUTH TWICE DAILY 180 tablet 3    folic acid (FOLVITE) 1 MG tablet One tablet daily except the day you take methotrexate 60 tablet 3    levothyroxine (SYNTHROID/LEVOTHROID) 100 MCG tablet TAKE 1 TABLET(100 MCG) BY MOUTH DAILY 90 tablet 3    methotrexate sodium 2.5 MG TABS 2 tablets once per week 5 tablet 0    Multiple Vitamins-Minerals (OCUVITE ADULT 50+) CAPS Take 1 capsule by mouth daily 30 capsule 12     rOPINIRole (REQUIP) 0.25 MG tablet TAKE 1 TO 2 TABLETS(0.25 TO 0.5 MG) BY MOUTH EVERY NIGHT AS NEEDED FOR RESTLESS LEGS 180 tablet 3    vancomycin (FIRVANQ) 50 MG/ML oral solution Take by mouth 4 times daily Take 2.5 mls       Facility-Administered Encounter Medications as of 3/13/2024   Medication Dose Route Frequency Provider Last Rate Last Admin    perflutren diluted in saline (DEFINITY) injection 5 mL  5 mL Intravenous Once Sushma Nelson MD        [COMPLETED] triamcinolone acetonide (KENALOG-10) injection 10 mg  10 mg Intradermal Once Janna Montoya PA-C   10 mg at 03/13/24 1542             O:   NAD, WDWN, Alert & Oriented, Mood & Affect wnl, Vitals stable   Here today alone   There were no vitals taken for this visit.   General appearance normal   Vitals stable   Alert, oriented and in no acute distress     Psoriasiform plaques and papules on buttock, minimal on elbows, intertriginous areas of groin, axilla, abdomen and breasts      Eyes: Conjunctivae/lids:Normal     ENT: Lips: normal    MSK:Normal    Pulm: Breathing Normal=    Neuro/Psych: Orientation:Alert and Orientedx3 ; Mood/Affect:normal  A/P:  1. Psoriasis  Recommend intralesional steroid injections to psoriasis on right buttocks.   IL TAC: PGACAC discussed.  Risks including but not limited to injection site reaction, bruising, no resolution.  All questions answered and entertained to patient s satisfaction.  Informed consent obtained.  IL TAC in concentration of 10mg/ml was injected ID to psoriasis on right buttocks.  Total injected was  0.5 ml.  Patient tolerated without complications and given wound care instructions, including not to move product around.  Return in 4 weeks for follow-up and possible additional IL TAC.    Continue betamethasone as needed.   Skin care regimen reviewed with patient: Eliminate harsh soaps, i.e. Dial, zest, irsih spring; Mild soaps such as Cetaphil or Dove sensitive skin, avoid hot or cold showers,  aggressive use of emollients including vanicream, cetaphil or cerave discussed with patient.    Return to clinic 4 months.             Clinic Administered Medication Documentation        Patient was given 0.5 ml of  Kenalog 10. Prior to medication administration, verified patient's identity using patient s name and date of birth. Please see MAR and medication order for additional information. Patient instructed to remain in clinic for 15 minutes and report any adverse reaction to staff immediately.    Vial/Syringe: Multi dose vial

## 2024-03-13 NOTE — TELEPHONE ENCOUNTER
Patient saw [employer] message. No changes at this time. No questions at this time.    Natalie Doran, RN, BSN  Cardiology RN Care Coordinator   [place]   Phone: [phone]  Fax: [phone] ([place]) [phone] ([place])

## 2024-04-04 ENCOUNTER — PATIENT OUTREACH (OUTPATIENT)
Dept: NEPHROLOGY | Facility: CLINIC | Age: 89
End: 2024-04-04
Payer: COMMERCIAL

## 2024-04-04 NOTE — PROGRESS NOTES
Nephrology Note: RN CC Chart Review    REASON FOR ENCOUNTER:     Chart reviewed by nephrology RN CC                          SITUATION/BACKROUND:     Last nephrology visit:1/15/24    Last Renal Panel:  Sodium   Date Value Ref Range Status   10/11/2023 140 135 - 145 mmol/L Final     Comment:     Reference intervals for this test were updated on 09/26/2023 to more accurately reflect our healthy population. There may be differences in the flagging of prior results with similar values performed with this method. Interpretation of those prior results can be made in the context of the updated reference intervals.    06/21/2021 135 133 - 144 mmol/L Final     Potassium   Date Value Ref Range Status   10/11/2023 4.8 3.4 - 5.3 mmol/L Final   11/10/2022 3.7 3.4 - 5.3 mmol/L Final   06/21/2021 4.1 3.4 - 5.3 mmol/L Final     Chloride   Date Value Ref Range Status   10/11/2023 105 98 - 107 mmol/L Final   11/10/2022 101 94 - 109 mmol/L Final   06/21/2021 100 94 - 109 mmol/L Final     Carbon Dioxide   Date Value Ref Range Status   06/21/2021 30 20 - 32 mmol/L Final     Carbon Dioxide (CO2)   Date Value Ref Range Status   10/11/2023 25 22 - 29 mmol/L Final   11/10/2022 30 20 - 32 mmol/L Final     Anion Gap   Date Value Ref Range Status   10/11/2023 10 7 - 15 mmol/L Final   11/10/2022 8 3 - 14 mmol/L Final   06/21/2021 5 3 - 14 mmol/L Final     Glucose   Date Value Ref Range Status   10/11/2023 109 (H) 70 - 99 mg/dL Final   11/10/2022 131 (H) 70 - 99 mg/dL Final   06/21/2021 96 70 - 99 mg/dL Final     Urea Nitrogen   Date Value Ref Range Status   10/11/2023 29.2 (H) 8.0 - 23.0 mg/dL Final   11/10/2022 25 7 - 30 mg/dL Final   06/21/2021 29 7 - 30 mg/dL Final     Creatinine   Date Value Ref Range Status   10/11/2023 2.09 (H) 0.51 - 0.95 mg/dL Final   06/21/2021 2.11 (H) 0.52 - 1.04 mg/dL Final     GFR Estimate   Date Value Ref Range Status   10/11/2023 22 (L) >60 mL/min/1.73m2 Final   06/21/2021 21 (L) >60 mL/min/[1.73_m2] Final      Comment:     Non  GFR Calc  Starting 12/18/2018, serum creatinine based estimated GFR (eGFR) will be   calculated using the Chronic Kidney Disease Epidemiology Collaboration   (CKD-EPI) equation.       Calcium   Date Value Ref Range Status   10/11/2023 9.6 8.8 - 10.2 mg/dL Final   06/21/2021 8.9 8.5 - 10.1 mg/dL Final     Phosphorus   Date Value Ref Range Status   09/27/2023 3.7 2.5 - 4.5 mg/dL Final   06/21/2021 3.9 2.5 - 4.5 mg/dL Final     Albumin   Date Value Ref Range Status   10/11/2023 4.0 3.5 - 5.2 g/dL Final   11/10/2022 3.8 3.4 - 5.0 g/dL Final   06/21/2021 4.0 3.4 - 5.0 g/dL Final         Neph Tracking Status:  Neph Tracking Flowsheet Last Filled Values       CKD Education Status Complete    CKD Education Referral Date 01/01/21    CKD Education Completed Date 02/01/21    CKD Education Type Kidney Smart Modality Education; Kidney Smart CKD Basics    Patient's Referral Dates Auto Populate Patient's Referral Dates    Home Care Referral 8/22/14    Journey Referral 1/9/21    Transplant Status  Not Eligible  likely not eligible due to age              ASSESSMENT/PLAN     Patient to follow up as scheduled at next appointment  Patient to call/[employer] message with updates    Upcoming Appointments:  Future Appts Next 180 days       Visit Type Date Time Department    RETURN NEPHROLOGY 6/10/2024 12:00 PM  NEPHROLOGY              JHONNY SALMERON RN

## 2024-04-30 DIAGNOSIS — K21.00 GASTROESOPHAGEAL REFLUX DISEASE WITH ESOPHAGITIS WITHOUT HEMORRHAGE: ICD-10-CM

## 2024-04-30 RX ORDER — FAMOTIDINE 20 MG/1
TABLET, FILM COATED ORAL
Qty: 180 TABLET | Refills: 0 | Status: SHIPPED | OUTPATIENT
Start: 2024-04-30 | End: 2024-08-05

## 2024-05-16 DIAGNOSIS — L40.9 PSORIASIS: ICD-10-CM

## 2024-05-16 DIAGNOSIS — Z79.899 ENCOUNTER FOR LONG-TERM (CURRENT) USE OF HIGH-RISK MEDICATION: ICD-10-CM

## 2024-05-16 RX ORDER — FOLIC ACID 1 MG/1
TABLET ORAL
Qty: 60 TABLET | Refills: 3 | OUTPATIENT
Start: 2024-05-16

## 2024-05-16 NOTE — TELEPHONE ENCOUNTER
Requested Prescriptions   Pending Prescriptions Disp Refills    folic acid (FOLVITE) 1 MG tablet 60 tablet 3     Sig: One tablet daily except the day you take methotrexate       There is no refill protocol information for this order        Last Written Prescription Date:  9/27/23  Last Fill Quantity: 60,  # refills: 3   Last office visit: 9/27/2023 ; last virtual visit: 7/25/2023 with prescribing provider:     Future Office Visit:

## 2024-05-16 NOTE — TELEPHONE ENCOUNTER
Refill denied as pt is no longer taking methotrexate and therefore Folic acid is not needed.  Emy ODOM RN BSN PHN  [facility]

## 2024-06-10 ENCOUNTER — OFFICE VISIT (OUTPATIENT)
Dept: NEPHROLOGY | Facility: CLINIC | Age: 89
End: 2024-06-10
Payer: COMMERCIAL

## 2024-06-10 VITALS
OXYGEN SATURATION: 96 % | SYSTOLIC BLOOD PRESSURE: 126 MMHG | WEIGHT: 113 LBS | DIASTOLIC BLOOD PRESSURE: 74 MMHG | BODY MASS INDEX: 22.82 KG/M2 | HEART RATE: 90 BPM

## 2024-06-10 DIAGNOSIS — N18.4 CKD (CHRONIC KIDNEY DISEASE) STAGE 4, GFR 15-29 ML/MIN (H): Primary | ICD-10-CM

## 2024-06-10 DIAGNOSIS — N20.0 KIDNEY STONES: ICD-10-CM

## 2024-06-10 DIAGNOSIS — I10 HYPERTENSION GOAL BP (BLOOD PRESSURE) < 140/90: ICD-10-CM

## 2024-06-10 LAB
ERYTHROCYTE [DISTWIDTH] IN BLOOD BY AUTOMATED COUNT: 12.9 % (ref 10–15)
HCT VFR BLD AUTO: 36.8 % (ref 35–47)
HGB BLD-MCNC: 11.6 G/DL (ref 11.7–15.7)
MCH RBC QN AUTO: 33 PG (ref 26.5–33)
MCHC RBC AUTO-ENTMCNC: 31.5 G/DL (ref 31.5–36.5)
MCV RBC AUTO: 105 FL (ref 78–100)
PLATELET # BLD AUTO: 283 10E3/UL (ref 150–450)
PTH-INTACT SERPL-MCNC: 53 PG/ML (ref 15–65)
RBC # BLD AUTO: 3.51 10E6/UL (ref 3.8–5.2)
WBC # BLD AUTO: 9.8 10E3/UL (ref 4–11)

## 2024-06-10 PROCEDURE — 82306 VITAMIN D 25 HYDROXY: CPT | Performed by: PHYSICIAN ASSISTANT

## 2024-06-10 PROCEDURE — 36415 COLL VENOUS BLD VENIPUNCTURE: CPT | Performed by: PHYSICIAN ASSISTANT

## 2024-06-10 PROCEDURE — 80069 RENAL FUNCTION PANEL: CPT | Performed by: PHYSICIAN ASSISTANT

## 2024-06-10 PROCEDURE — 83970 ASSAY OF PARATHORMONE: CPT | Performed by: PHYSICIAN ASSISTANT

## 2024-06-10 PROCEDURE — 99214 OFFICE O/P EST MOD 30 MIN: CPT | Performed by: PHYSICIAN ASSISTANT

## 2024-06-10 PROCEDURE — 82043 UR ALBUMIN QUANTITATIVE: CPT | Performed by: PHYSICIAN ASSISTANT

## 2024-06-10 PROCEDURE — 82570 ASSAY OF URINE CREATININE: CPT | Performed by: PHYSICIAN ASSISTANT

## 2024-06-10 PROCEDURE — 85027 COMPLETE CBC AUTOMATED: CPT | Performed by: PHYSICIAN ASSISTANT

## 2024-06-10 NOTE — PATIENT INSTRUCTIONS
Labs today, will call with results/recommendations.   Avoid ibuprofen/aleve.   Continue good hydration, low sodium diet.   Follow up in 4 months.

## 2024-06-10 NOTE — PROGRESS NOTES
Nephrology Progress Note  6/10/2024/    Assessment and Plan:  88 year old female with history of CKD, Scr 1.8 with eGFR near 25ml/min in recent years, history of lung cancer s/p chemotherapy, kidney cancer s/p left partial resection, kidney stones and infections s/p ESWL and stent in 7/2020, small left kidney, who presents for followup of CKD and proteinuria.    # CKD stage 4- Scr 2 on recent checks, her Scr has been near 1.8 - 2.1 with eGFR near 22-27 ml/min over the last few years, got down to 18 a couple of times, most recent is 25ml/min.  previous to that her eGFR was near 35ml/min and back in early 2000s she had eGFR near 40-45ml/min  She has been through many things that have caused CKD including cancer, chemotherapy with carboplatin. Last Scr 1.8, eGFR 25ml/min  She had 1 gram of proteinuria which puts her at a higher risk for progression- it has improved, albuminuria was under 100mg on last check- check both albumin and protein random urine on next check   - check labs today  - BP fairly controlled based on home readings- she has a wrist cuff.    # Hypertension: BP in the 120s on recent check at home, was 126/74 in clinic today. Aim toward 130/80 if tolerated.   Furosemide held during admission in 5/2023- check Blood pressure at home and keep log.   - may need to resume lasix, hold for now    # Kidney stones: has calcium oxalate stone on stone analysis; discussed low sodium, fluid intake of 2.5 -3 liters   - she is hydrating better overall, her sons check up on her about this.    # Anemia in chronic renal disease:   - Hgb: Stable, low normal, 11-11.5  - Iron studies: adequate, 25% tsat  - repeat labs    # Electrolytes:   - Potassium; level: Normal  - bicarbonate: Normal  - check labs    # Mineral Bone Disorder:   - Calcium; level is: 9.6  Normal   - phos normal, PTH normal at 62, vit D not checked recently    Assessment and plan was discussed with patient and she voiced her understanding and  agreement.    Labs on 2/7/24. RTC 6 months with me.     Reason for Visit:  Helena Eldridge is an 88 year old female with CKD from renal and lung cancer, who presents for followup    HPI:  She is a pleasant female with multiple comorbidities including history of lung cancer s/p chemotherapy, renal cell carcinoma s/p partial left kidney resection, COPD, who presents for followup of CKD She has been aware of kidney disease and her creatinine has been stable near 1.8-2 mg/dL over the last few years. Recently her Scr was up to 2.3, eGFR decreased to 17ml/min, but recently back and eGFR 23ml/min  Her left kidney was noted to be very small on CT imaging going back to 2015.  Over the years, her eGFR was 35-45 ml/min from 2011 to 2017, but in the last couple of years, her eGFR was 25-28ml/min.  She has undergone treatment for her cancers and doing fairly well at this time.  Her lung cancer (nonsmall cell lung cancer T1N1) was treated with carboplatin/ permetrexeb 9914-9399 (6 cycles) and then nivolumab for a year until it was stopped due to itching.  She denies shortness of breath or chest pain. She has some fatigue with significant exertion.  She is being treated for psoriasis and yeast infection. Denies any GI or  issues at this time. She is taking miralax which helps with constipation.   She denies family history of renal failure or dialysis.    She has been doing well since last visit except for a fall in April. She was evaluated for hip pain, no acute abnormalities noted on xray. She did take aleve that day. She denies shortness of breath or swelling. She was hospitalized at outside hospital in May 2023 with C dif and noted to have an MARIO and hyperkalemia. She did not require dialysis. Scr in October appeared back to baseline.     Her good friend of > 50 years passed away in August, this has been hard for her but she is coping well.     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  She has a lot of great nephews and nieces and stays busy with them. She has 3 sons  She has some psoriasis and this has gotten worse, and she was offered Humira for this, but she is much better using a shampoo and wants to stick with that for now  She generally gets 3722-2011 steps daily  Her weight is down to 115 lbs from 130 approximately a year ago. She has not been checking BP's at home often.   [place]   She is drinking a lot of water and ice.     Renal History:   Kidney Disease and Medical Hx:  h/o HTN: Yes  , h/o Protein in Urine: Yes  and h/o Kidney Stones:Yes     ROS:   A comprehensive review of systems was obtained and negative, except as noted in the HPI or PMH.    Active Medical Problems:  Patient Active Problem List   Diagnosis    CKD (chronic kidney disease) stage 4, GFR 15-29 ml/min (H)    Glucose intolerance (impaired glucose tolerance)    Kidney stones    Advance care planning    Hypertension goal BP (blood pressure) < 140/90    Lichen sclerosus et atrophicus of the vulva    Solitary kidney, acquired    Senile osteoporosis    Osteoarthritis of right knee    Medial meniscus tear    Cataracts, both eyes    Macular degeneration of both eyes, right eye greater than left eye    COPD, severe (H)    IPF (idiopathic pulmonary fibrosis) (H)    Other specified hypothyroidism    Idiopathic chronic gout    Adjustment disorder with anxiety    Non-small cell cancer of right lung (H)    Psoriasis    Exudative age-related macular degeneration of left eye with active choroidal neovascularization (H)     PMH:   Medical record was reviewed and PMH was discussed with patient and noted below.  Past Medical History:   Diagnosis Date    Carpal tunnel syndrome     CKD (chronic kidney disease)     kidney stone with infection    CKD (chronic kidney disease) stage 4, GFR 15-29 ml/min (H) 12/2/2010    History of kidney stone with infection.  Creatinine 1.3 - 1.6 (12/2/10, [facility])    -- she can't take ACE / ARB due to side effects.  Will continue with good BP control (12/3/15, [facility])     COPD, severe (H) 2/16/2015    Seen by pulmonary 2013.       Deep vein thrombosis (DVT) (H)     1972    DVT [id number]    Glucose intolerance     H/O partial nephrectomy     Heel spur     Hypertension goal BP (blood pressure) < 140/90 11/28/2011    Hypothyroidism     Idiopathic chronic gout 5/14/2015    Immunosuppression (H24) 3/7/2018    IPF (idiopathic pulmonary fibrosis) (H) 2/25/2015    Mild, at bases per CXR 2/2015 (2/15/15, [facility])     Kidney stones     Lichen sclerosus     Lichen sclerosus et atrophicus of the vulva 5/8/2013    Clobetasol bid is the recommended remedy    Macular degeneration     Non-small cell cancer of right lung (H) 3/7/2018    Nonsenile cataract     Osteoarthritis     Osteoarthritis of right knee 3/11/2014    Osteoporosis     Other specified hypothyroidism 5/14/2015    PID     Pulmonary embolism (H)     1970    Senile osteoporosis 3/11/2014    Unspecified hypothyroidism     Vitiligo      PSH:   Past Surgical History:   Procedure Laterality Date    EXTRACORPOREAL SHOCK WAVE LITHOTRIPSY, CYSTOSCOPY, INSERT STENT URETER(S), COMBINED Right 7/13/2020    Procedure: LITHOTRIPSY, Right EXTRACORPOREAL SHOCK WAVE (ESWL), WITH CYSTOSCOPY AND Right URETERAL STENT INSERTION;  Surgeon: Buddy Ramirez MD;  Location: [facility] OR    HC REMOVAL GALLBLADDER      HC REVISE MEDIAN N/CARPAL TUNNEL SURG      IR LUNG BIOPSY MEDIASTINUM RIGHT  1/16/2018    LASER HOLMIUM LITHOTRIPSY URETER(S), INSERT STENT, COMBINED Right 7/28/2020    Procedure: RIGHT CYSTOURETEROSCOPY, WITH LITHOTRIPSY USING LASER AND URETERAL STENT EXCHANGE;  Surgeon: Buddy Ramirez MD;  Location: [facility] OR    TUBAL LIGATION      ZZC NEPHRECTOMY      left partial 07-01-07    ZZC VENOUS THROMBOSIS IMAGING      with pe       Family Hx:   Family History   Problem Relation Age of Onset    Cancer Brother     Glaucoma Mother     Alzheimer Disease Brother      Macular Degeneration No family hx of      Personal Hx:   Social History     Tobacco Use    Smoking status: Former     Current packs/day: 0.00     Types: Cigarettes     Quit date: 1969     Years since quittin.5    Smokeless tobacco: Never   Substance Use Topics    Alcohol use: Not Currently       Allergies:  Allergies   Allergen Reactions    Ace Inhibitors Cough    Advicor     Alendronate Other (See Comments)     Other reaction(s): GI Upset  heartburn  Severe substernal burning and dysphagia within 3 days      Blood-Group Specific Substance Other (See Comments)     Patient has Anti-[place] and warm auto antibody. Blood products may be delayed. Draw patient 24 hours prior to transfusion. Draw one red top and two purple top tubes for all type and screen orders.    Citalopram GI Disturbance and Nausea     diarrhea  diarrhea    Doxycycline Nausea     Poor appetite    Fosamax      Severe substernal burning and dysphagia within 3 days    Valsartan Cramps and Other (See Comments)     severe  severe       Medications:  Current Outpatient Medications   Medication Sig Dispense Refill    Apremilast (OTEZLA) 10 & 20 & 30 MG TBPK Take 10 mg daily in the morning on days 1 to 3, then 20 mg daily on days 4 and 5, then start 30 mg daily on day 6. 55 each 0    apremilast (OTEZLA) 30 MG tablet Take 1 tablet (30 mg) by mouth daily 30 tablet 11    augmented betamethasone dipropionate (DIPROLENE AF) 0.05 % external cream Apply sparingly to affected area twice daily as needed.  Do not apply to face. 200 g 3    Calcium Carbonate-Vitamin D (CALCIUM + D) 600-5 MG-MCG TABS Take 2 tablets by mouth 2 times daily      ciclopirox (LOPROX) 0.77 % cream Apply topically At Bedtime Apply first and then desitin over this 60 g 6    famotidine (PEPCID) 20 MG tablet TAKE 1 TABLET(20 MG) BY MOUTH TWICE DAILY 180 tablet 0    fluocinolone (SYNALAR) 0.025 % cream Apply topically 2 times daily 240 g 4    fluocinonide (LIDEX) 0.05 % external cream  Apply topically daily To affected areas on chest 120 g 1    levothyroxine (SYNTHROID/LEVOTHROID) 100 MCG tablet TAKE 1 TABLET(100 MCG) BY MOUTH DAILY 90 tablet 3    Multiple Vitamins-Minerals (OCUVITE ADULT 50+) CAPS Take 1 capsule by mouth daily 30 capsule 12    rOPINIRole (REQUIP) 0.25 MG tablet TAKE 1 TO 2 TABLETS(0.25 TO 0.5 MG) BY MOUTH EVERY NIGHT AS NEEDED FOR RESTLESS LEGS 180 tablet 3    vancomycin (FIRVANQ) 50 MG/ML oral solution Take by mouth 4 times daily Take 2.5 mls       No current facility-administered medications for this visit.     Facility-Administered Medications Ordered in Other Visits   Medication Dose Route Frequency Provider Last Rate Last Admin    perflutren diluted in saline (DEFINITY) injection 5 mL  5 mL Intravenous Once Sushma Nelson MD          Vitals:  /74   Pulse 90   Wt 51.3 kg (113 lb)   SpO2 96%   BMI 22.82 kg/m      Exam:  General: alert, oriented, conversant  Skin: warm and dry no visible rash  Neuro: normal speech  Heart: RRR  Lungs: CTA  Extremities: No LE edema noted      LABS:   CMP  Recent Labs   Lab Test 10/11/23  1353 09/27/23  1400 05/22/23  1515 11/10/22  1416 12/13/21  1431 06/21/21  1251 03/08/21  1325 12/03/20  0912 11/10/20  0930    142 140 139   < > 135 138 141 139   POTASSIUM 4.8 4.4 5.1 3.7   < > 4.1 4.1 4.4 3.6   CHLORIDE 105 103 106 101   < > 100 105 107 104   CO2 25 33* 22 30   < > 30 28 31 30   ANIONGAP 10 6* 12 8   < > 5 5 3 5   * 130* 115* 131*   < > 96 98 106* 125*   BUN 29.2* 28.5* 51.6* 25   < > 29 27 22 42*   CR 2.09* 2.13* 1.79* 2.17*   < > 2.11* 1.85* 1.92* 2.48*   GFRESTIMATED 22* 22* 27* 21*   < > 21* 24* 23* 17*   GFRESTBLACK  --   --   --   --   --  24* 28* 27* 20*   SERAFIN 9.6 9.9 8.6* 8.8   < > 8.9 9.3 9.5 8.9    < > = values in this interval not displayed.     Recent Labs   Lab Test 10/11/23  1353 09/27/23  1400 11/10/22  1416 07/20/20  1354 07/05/20  0000   BILITOTAL 0.2 0.2 0.3  --   --    ALKPHOS 57 69 91  --   --     ALT 11 11 20 16 13   AST 21 17 18  --  17     CBC  Recent Labs   Lab Test 10/11/23  1353 09/27/23  1400 11/10/22  1416 08/18/22  1211   HGB 11.1* 11.5* 11.3* 10.9*   WBC 7.8 9.6 7.9 7.3   RBC 3.27* 3.45* 3.24* 3.07*   HCT 33.6* 35.8 35.4 33.1*   * 104* 109* 108*   MCH 33.9* 33.3* 34.9* 35.5*   MCHC 33.0 32.1 31.9 32.9   RDW 12.8 12.3 14.4 14.1    238 282 290     URINE STUDIES  Recent Labs   Lab Test 08/18/22  1211 11/29/21  1040 11/10/20  0944 11/03/20  0932 10/19/20  1050   COLOR Yellow Yellow Yellow Yellow Yellow   APPEARANCE Slightly Cloudy* Clear Clear Cloudy Clear   URINEGLC Negative Negative Negative Negative Negative   URINEBILI Negative Negative Negative Negative Negative   URINEKETONE Negative Negative Negative Negative Negative   SG <=1.005 1.010 1.025 1.020 1.020   UBLD Negative Negative Negative Negative Negative   URINEPH 6.5 5.5 5.5 5.5 6.0   PROTEIN Negative Negative Negative Negative Negative   UROBILINOGEN 0.2 0.2 0.2 0.2 0.2   NITRITE Negative Negative Negative Negative Negative   LEUKEST Small* Negative Negative Small* Trace*   RBCU 2-5*  --  O - 2 O - 2 O - 2   WBCU 5-10*  --  0 - 5 5-10* 10-25*     No lab results found.  PTH  Recent Labs   Lab Test 09/27/23  1400 12/13/21  1431 06/21/21  1251   PTHI 62 119* 106*     IRON STUDIES  Recent Labs   Lab Test 09/27/23  1400 06/21/21  1251   IRON 56 94   * 242   IRONSAT 25 39   JOSE RAMON  --  375*       TECHNIQUE: Scans were obtained from the diaphragm through the pelvis  without IV contrast. Radiation dose for this scan was reduced using  automated exposure control, adjustment of the mA and/or kV according  to patient size, or iterative reconstruction technique.     COMPARISON: 5/19/2015.     FINDINGS: Coronary calcifications. Linear atelectasis or fibrosis left  lung base. Liver, spleen, and pancreas are normal. Previous  cholecystectomy with prominence to the common bile duct again noted  and unchanged. Atrophic changes again noted  in the left kidney which  are unchanged from 5/19/2015. 2 small calculi again noted in the left  kidney. Left kidney is otherwise unchanged. Right kidney is normal in  size with small calculi again noted in the lower portion of the right  kidney which are unchanged. Right kidney and ureter are otherwise  normal. Diverticula in the colon without evidence of diverticulitis.  Colon and small bowel are otherwise normal. Calcification of the  abdominal aorta and iliac arteries. No abdominal or pelvic adenopathy.  Remainder of the scan is negative and unchanged from 5/19/2015.                                                                      IMPRESSION:   1. Bilateral nonobstructing nephrolithiasis which is unchanged from  5/19/2015. Atrophic left kidney again noted.  2. Remainder of the scan is unchanged.    Mandie Mendoza PA-C    Visit length 10 minutes. An additional 15 minutes were spent on date of service in chart review, documentation, and other activities as noted.

## 2024-06-11 LAB
ALBUMIN SERPL BCG-MCNC: 4.4 G/DL (ref 3.5–5.2)
ANION GAP SERPL CALCULATED.3IONS-SCNC: 11 MMOL/L (ref 7–15)
BUN SERPL-MCNC: 30.7 MG/DL (ref 8–23)
CALCIUM SERPL-MCNC: 9.4 MG/DL (ref 8.8–10.2)
CHLORIDE SERPL-SCNC: 102 MMOL/L (ref 98–107)
CREAT SERPL-MCNC: 2.29 MG/DL (ref 0.51–0.95)
CREAT UR-MCNC: 104 MG/DL
DEPRECATED HCO3 PLAS-SCNC: 26 MMOL/L (ref 22–29)
EGFRCR SERPLBLD CKD-EPI 2021: 20 ML/MIN/1.73M2
GLUCOSE SERPL-MCNC: 82 MG/DL (ref 70–99)
MICROALBUMIN UR-MCNC: 93.3 MG/L
MICROALBUMIN/CREAT UR: 89.71 MG/G CR (ref 0–25)
PHOSPHATE SERPL-MCNC: 3.4 MG/DL (ref 2.5–4.5)
POTASSIUM SERPL-SCNC: 5.1 MMOL/L (ref 3.4–5.3)
SODIUM SERPL-SCNC: 139 MMOL/L (ref 135–145)
VIT D+METAB SERPL-MCNC: 65 NG/ML (ref 20–50)

## 2024-06-17 ENCOUNTER — PATIENT OUTREACH (OUTPATIENT)
Dept: NEPHROLOGY | Facility: CLINIC | Age: 89
End: 2024-06-17
Payer: COMMERCIAL

## 2024-06-17 NOTE — PROGRESS NOTES
Nephrology Note: Patient Outreach Encounter    REASON FOR CALL:     REASON FOR CALL: Chronic Disease Management                                  SITUATION/BACKROUND:   Patient is being treated for CKD Stage 4.      Result note from Reji CASTANEDA:  Please let her know vit D is elevated, have her stop vitamin D supplement.     Thanks  Mandie    Patient Journey Status:  Active    ASSESSMENT:     Patient called to verify she had seen the result note. She states she did not see it as her son reads the My Chart messages. She was informed to stop any Elicia D supplements as it can increase the chance of hypercalcemia. It was explained the effects of hypercalcemia. Patient stated that she is 88 and at this point she does not care to follow any restrictions in certain foods but she will stop her multivitamin for a few days.      Neph Assessments:  Recent Labs      Latest Ref Rng & Units 6/10/2024 10/11/2023 9/27/2023   Neph Labs   Sodium 135 - 145 mmol/L 139  140  142    GFR Estimate >60 mL/min/1.73m2 20  22  22    Potassium 3.4 - 5.3 mmol/L 5.1  4.8  4.4    Creatinine 0.51 - 0.95 mg/dL 2.29  2.09  2.13    Urea Nitrogen 8.0 - 23.0 mg/dL 30.7  29.2  28.5    Hemoglobin 11.7 - 15.7 g/dL 11.6  11.1  11.5    Iron Binding Cap 240 - 430 ug/dL   220    Parathyroid Hormone Intact 15 - 65 pg/mL 53   62    Iron 37 - 145 ug/dL   56        PLAN:     Education:  Method:  general discussion/verbal explanation  Discussed: labs  These interventions were used: Collaboration, Evocation, Support autonomy  Education material provided and patient was given an opportunity to ask questions.      Follow Up:   Patient to follow up as scheduled at next appointment  Patient to call/MyChart message with updates     Patient verbalized understanding and will follow up as recommended.    JHONNY SALMERON RN

## 2024-07-10 DIAGNOSIS — R25.2 CRAMP OF LIMB: ICD-10-CM

## 2024-07-11 RX ORDER — ROPINIROLE 0.25 MG/1
TABLET, FILM COATED ORAL
Qty: 180 TABLET | Refills: 0 | Status: SHIPPED | OUTPATIENT
Start: 2024-07-11 | End: 2024-10-04

## 2024-07-17 ENCOUNTER — OFFICE VISIT (OUTPATIENT)
Dept: DERMATOLOGY | Facility: CLINIC | Age: 89
End: 2024-07-17
Attending: PHYSICIAN ASSISTANT
Payer: COMMERCIAL

## 2024-07-17 DIAGNOSIS — L82.1 SEBORRHEIC KERATOSIS: ICD-10-CM

## 2024-07-17 DIAGNOSIS — D18.01 CHERRY ANGIOMA: ICD-10-CM

## 2024-07-17 DIAGNOSIS — L81.4 LENTIGO: ICD-10-CM

## 2024-07-17 DIAGNOSIS — L40.9 PSORIASIS: Primary | ICD-10-CM

## 2024-07-17 PROCEDURE — 99213 OFFICE O/P EST LOW 20 MIN: CPT | Mod: 25 | Performed by: PHYSICIAN ASSISTANT

## 2024-07-17 PROCEDURE — 11900 INJECT SKIN LESIONS </W 7: CPT | Performed by: PHYSICIAN ASSISTANT

## 2024-07-17 NOTE — LETTER
7/17/2024      Helena Eldridge  [address] [address]      Dear Colleague,    Thank you for referring your patient, Helena Eldridge, to the [facility]. Please see a copy of my visit note below.    Helena Eldridge is an extremely pleasant 88 year old year old female patient here today for skin check recheck psoriasis. She notes psoriasis is doing well, only on buttock and under breasts. She would like to try injections again for psoriasis on buttock. Patient has no other skin complaints today.  Remainder of the HPI, Meds, PMH, Allergies, FH, and SH was reviewed in chart.    Pertinent Hx:   Psoriasis   Past Medical History:   Diagnosis Date     Carpal tunnel syndrome      CKD (chronic kidney disease)     kidney stone with infection     CKD (chronic kidney disease) stage 4, GFR 15-29 ml/min (H) 12/2/2010    History of kidney stone with infection.  Creatinine 1.3 - 1.6 (12/2/10, [facility])   -- she can't take ACE / ARB due to side effects.  Will continue with good BP control (12/3/15, [facility])      COPD, severe (H) 2/16/2015    Seen by pulmonary 2013.        Deep vein thrombosis (DVT) (H)     1972     DVT [id number]     Glucose intolerance      H/O partial nephrectomy      Heel spur      Hypertension goal BP (blood pressure) < 140/90 11/28/2011     Hypothyroidism      Idiopathic chronic gout 5/14/2015     Immunosuppression (H24) 3/7/2018     IPF (idiopathic pulmonary fibrosis) (H) 2/25/2015    Mild, at bases per CXR 2/2015 (2/15/15, [facility])      Kidney stones      Lichen sclerosus      Lichen sclerosus et atrophicus of the vulva 5/8/2013    Clobetasol bid is the recommended remedy     Macular degeneration      Non-small cell cancer of right lung (H) 3/7/2018     Nonsenile cataract      Osteoarthritis      Osteoarthritis of right knee 3/11/2014     Osteoporosis      Other specified hypothyroidism 5/14/2015     PID      Pulmonary embolism (H)     1970     Senile osteoporosis 3/11/2014      Unspecified hypothyroidism      Vitiligo        Past Surgical History:   Procedure Laterality Date     EXTRACORPOREAL SHOCK WAVE LITHOTRIPSY, CYSTOSCOPY, INSERT STENT URETER(S), COMBINED Right 2020    Procedure: LITHOTRIPSY, Right EXTRACORPOREAL SHOCK WAVE (ESWL), WITH CYSTOSCOPY AND Right URETERAL STENT INSERTION;  Surgeon: Buddy Ramirez MD;  Location: [facility] OR     HC REMOVAL GALLBLADDER       HC REVISE MEDIAN N/CARPAL TUNNEL SURG       IR LUNG BIOPSY MEDIASTINUM RIGHT  2018     LASER HOLMIUM LITHOTRIPSY URETER(S), INSERT STENT, COMBINED Right 2020    Procedure: RIGHT CYSTOURETEROSCOPY, WITH LITHOTRIPSY USING LASER AND URETERAL STENT EXCHANGE;  Surgeon: Buddy Ramirez MD;  Location: [facility] OR     TUBAL LIGATION       ZZC NEPHRECTOMY      left partial 07     ZZC VENOUS THROMBOSIS IMAGING      with pe        Family History   Problem Relation Age of Onset     Cancer Brother      Glaucoma Mother      Alzheimer Disease Brother      Macular Degeneration No family hx of        Social History     Socioeconomic History     Marital status: Single     Spouse name: Not on file     Number of children: Not on file     Years of education: Not on file     Highest education level: Not on file   Occupational History     Not on file   Tobacco Use     Smoking status: Former     Current packs/day: 0.00     Types: Cigarettes     Quit date: 1969     Years since quittin.6     Smokeless tobacco: Never   Vaping Use     Vaping status: Never Used   Substance and Sexual Activity     Alcohol use: Not Currently     Drug use: No     Sexual activity: Not Currently     Partners: Male   Other Topics Concern     Parent/sibling w/ CABG, MI or angioplasty before 65F 55M? No   Social History Narrative     Not on file     Social Determinants of Health     Financial Resource Strain: Low Risk  (10/19/2023)    Financial Resource Strain      Within the past 12 months, have you or your family members you live with been  unable to get utilities (heat, electricity) when it was really needed?: No   Food Insecurity: Low Risk  (10/19/2023)    Food Insecurity      Within the past 12 months, did you worry that your food would run out before you got money to buy more?: No      Within the past 12 months, did the food you bought just not last and you didn t have money to get more?: No   Transportation Needs: Low Risk  (10/19/2023)    Transportation Needs      Within the past 12 months, has lack of transportation kept you from medical appointments, getting your medicines, non-medical meetings or appointments, work, or from getting things that you need?: No   Physical Activity: Not on file   Stress: Not on file   Social Connections: Unknown (5/8/2023)    Received from [employer] [facility], [employer] [facility]    Social Connections      Frequency of Communication with Friends and Family: Not on file   Interpersonal Safety: Not on file   Housing Stability: High Risk (10/19/2023)    Housing Stability      Do you have housing? : No      Are you worried about losing your housing?: No       Outpatient Encounter Medications as of 7/17/2024   Medication Sig Dispense Refill     augmented betamethasone dipropionate (DIPROLENE AF) 0.05 % external cream Apply sparingly to affected area twice daily as needed.  Do not apply to face. 200 g 3     Apremilast (OTEZLA) 10 & 20 & 30 MG TBPK Take 10 mg daily in the morning on days 1 to 3, then 20 mg daily on days 4 and 5, then start 30 mg daily on day 6. (Patient not taking: Reported on 7/17/2024) 55 each 0     apremilast (OTEZLA) 30 MG tablet Take 1 tablet (30 mg) by mouth daily (Patient not taking: Reported on 7/17/2024) 30 tablet 11     Calcium Carbonate-Vitamin D (CALCIUM + D) 600-5 MG-MCG TABS Take 2 tablets by mouth 2 times daily       ciclopirox (LOPROX) 0.77 % cream Apply topically At Bedtime Apply first and then desitin over this 60 g 6     famotidine  (PEPCID) 20 MG tablet TAKE 1 TABLET(20 MG) BY MOUTH TWICE DAILY 180 tablet 0     fluocinolone (SYNALAR) 0.025 % cream Apply topically 2 times daily (Patient not taking: Reported on 7/17/2024) 240 g 4     fluocinonide (LIDEX) 0.05 % external cream Apply topically daily To affected areas on chest (Patient not taking: Reported on 7/17/2024) 120 g 1     levothyroxine (SYNTHROID/LEVOTHROID) 100 MCG tablet TAKE 1 TABLET(100 MCG) BY MOUTH DAILY 90 tablet 3     Multiple Vitamins-Minerals (OCUVITE ADULT 50+) CAPS Take 1 capsule by mouth daily 30 capsule 12     rOPINIRole (REQUIP) 0.25 MG tablet TAKE 1 TO 2 TABLETS(0.25 TO 0.5 MG) BY MOUTH EVERY NIGHT AS NEEDED FOR RESTLESS LEGS 180 tablet 0     vancomycin (FIRVANQ) 50 MG/ML oral solution Take by mouth 4 times daily Take 2.5 mls       Facility-Administered Encounter Medications as of 7/17/2024   Medication Dose Route Frequency Provider Last Rate Last Admin     perflutren diluted in saline (DEFINITY) injection 5 mL  5 mL Intravenous Once Sushma Nelson MD         [COMPLETED] triamcinolone acetonide (KENALOG-10) injection 10 mg  10 mg Intradermal Once Janna Montoya PA-C   10 mg at 07/17/24 1332             O:   NAD, WDWN, Alert & Oriented, Mood & Affect wnl, Vitals stable   Here today alone   There were no vitals taken for this visit.   General appearance normal   Vitals stable   Alert, oriented and in no acute distress      Psoriasiform plaques on buttocks   Stuck on papules and brown macules on trunk and ext   Red papules on trunk      The remainder of skin exam is normal       Eyes: Conjunctivae/lids:Normal     ENT: Lips: normal    MSK:Normal    Cardiovascular: peripheral edema none    Pulm: Breathing Normal    Neuro/Psych: Orientation:Alert and Orientedx3 ; Mood/Affect:normal   A/P:  Psoriasis   IL TAC: PGACAC discussed.  Risks including but not limited to injection site reaction, bruising, no resolution.  All questions answered and entertained to patient s  satisfaction.  Informed consent obtained.  IL TAC in concentration of 10mg/ml was injected ID to psoriasis on left buttock.  Total injected was  0.5 ml.  Patient tolerated without complications and given wound care instructions, including not to move product around.  Return in 4 weeks for follow-up and possible additional IL TAC.    2.  Seborrheic keratosis, lentigo, angioma  It was a pleasure speaking to Helena Eldridge today.  BENIGN LESIONS DISCUSSED WITH PATIENT:  I discussed the specifics of tumor, prognosis, and genetics of benign lesions.  I explained that treatment of these lesions would be purely cosmetic and not medically neccessary.  I discussed with patient different removal options including excision, cautery and /or laser.      Nature and genetics of benign skin lesions dicussed with patient.  Signs and Symptoms of skin cancer discussed with patient.  ABCDEs of melanoma reviewed with patient.  Patient encouraged to perform monthly skin exams.  UV precautions reviewed with patient.  Risks of non-melanoma skin cancer discussed with patient   Return to clinic as needed.       Clinic Administered Medication Documentation        Patient was given 0.5 ml of Kenalog 10. Prior to medication administration, verified patient's identity using patient s name and date of birth. Please see MAR and medication order for additional information. Patient instructed to remain in clinic for 15 minutes.    Vial/Syringe: Multi dose vial      Again, thank you for allowing me to participate in the care of your patient.        Sincerely,        Janna Navarro PA-C

## 2024-07-17 NOTE — PROGRESS NOTES
Helena Eldridge is an extremely pleasant 88 year old year old female patient here today for skin check recheck psoriasis. She notes psoriasis is doing well, only on buttock and under breasts. She would like to try injections again for psoriasis on buttock. Patient has no other skin complaints today.  Remainder of the HPI, Meds, PMH, Allergies, FH, and SH was reviewed in chart.    Pertinent Hx:   Psoriasis   Past Medical History:   Diagnosis Date    Carpal tunnel syndrome     CKD (chronic kidney disease)     kidney stone with infection    CKD (chronic kidney disease) stage 4, GFR 15-29 ml/min (H) 12/2/2010    History of kidney stone with infection.  Creatinine 1.3 - 1.6 (12/2/10, [facility])   -- she can't take ACE / ARB due to side effects.  Will continue with good BP control (12/3/15, [facility])     COPD, severe (H) 2/16/2015    Seen by pulmonary 2013.       Deep vein thrombosis (DVT) (H)     1972    DVT [id number]    Glucose intolerance     H/O partial nephrectomy     Heel spur     Hypertension goal BP (blood pressure) < 140/90 11/28/2011    Hypothyroidism     Idiopathic chronic gout 5/14/2015    Immunosuppression (H24) 3/7/2018    IPF (idiopathic pulmonary fibrosis) (H) 2/25/2015    Mild, at bases per CXR 2/2015 (2/15/15, [facility])     Kidney stones     Lichen sclerosus     Lichen sclerosus et atrophicus of the vulva 5/8/2013    Clobetasol bid is the recommended remedy    Macular degeneration     Non-small cell cancer of right lung (H) 3/7/2018    Nonsenile cataract     Osteoarthritis     Osteoarthritis of right knee 3/11/2014    Osteoporosis     Other specified hypothyroidism 5/14/2015    PID     Pulmonary embolism (H)     1970    Senile osteoporosis 3/11/2014    Unspecified hypothyroidism     Vitiligo        Past Surgical History:   Procedure Laterality Date    EXTRACORPOREAL SHOCK WAVE LITHOTRIPSY, CYSTOSCOPY, INSERT STENT URETER(S), COMBINED Right 7/13/2020    Procedure: LITHOTRIPSY, Right EXTRACORPOREAL SHOCK WAVE (ESWL), WITH  CYSTOSCOPY AND Right URETERAL STENT INSERTION;  Surgeon: Buddy Ramirez MD;  Location: [facility] OR    HC REMOVAL GALLBLADDER      HC REVISE MEDIAN N/CARPAL TUNNEL SURG      IR LUNG BIOPSY MEDIASTINUM RIGHT  2018    LASER HOLMIUM LITHOTRIPSY URETER(S), INSERT STENT, COMBINED Right 2020    Procedure: RIGHT CYSTOURETEROSCOPY, WITH LITHOTRIPSY USING LASER AND URETERAL STENT EXCHANGE;  Surgeon: Buddy Ramirez MD;  Location: [facility] OR    TUBAL LIGATION      ZZC NEPHRECTOMY      left partial 07    ZZC VENOUS THROMBOSIS IMAGING      with pe        Family History   Problem Relation Age of Onset    Cancer Brother     Glaucoma Mother     Alzheimer Disease Brother     Macular Degeneration No family hx of        Social History     Socioeconomic History    Marital status: Single     Spouse name: Not on file    Number of children: Not on file    Years of education: Not on file    Highest education level: Not on file   Occupational History    Not on file   Tobacco Use    Smoking status: Former     Current packs/day: 0.00     Types: Cigarettes     Quit date: 1969     Years since quittin.6    Smokeless tobacco: Never   Vaping Use    Vaping status: Never Used   Substance and Sexual Activity    Alcohol use: Not Currently    Drug use: No    Sexual activity: Not Currently     Partners: Male   Other Topics Concern    Parent/sibling w/ CABG, MI or angioplasty before 65F 55M? No   Social History Narrative    Not on file     Social Determinants of Health     Financial Resource Strain: Low Risk  (10/19/2023)    Financial Resource Strain     Within the past 12 months, have you or your family members you live with been unable to get utilities (heat, electricity) when it was really needed?: No   Food Insecurity: Low Risk  (10/19/2023)    Food Insecurity     Within the past 12 months, did you worry that your food would run out before you got money to buy more?: No     Within the past 12 months, did the food you bought  just not last and you didn t have money to get more?: No   Transportation Needs: Low Risk  (10/19/2023)    Transportation Needs     Within the past 12 months, has lack of transportation kept you from medical appointments, getting your medicines, non-medical meetings or appointments, work, or from getting things that you need?: No   Physical Activity: Not on file   Stress: Not on file   Social Connections: Unknown (5/8/2023)    Received from [facility] [employer] [facility], [employer] [facility]    Social Connections     Frequency of Communication with Friends and Family: Not on file   Interpersonal Safety: Not on file   Housing Stability: High Risk (10/19/2023)    Housing Stability     Do you have housing? : No     Are you worried about losing your housing?: No       Outpatient Encounter Medications as of 7/17/2024   Medication Sig Dispense Refill    augmented betamethasone dipropionate (DIPROLENE AF) 0.05 % external cream Apply sparingly to affected area twice daily as needed.  Do not apply to face. 200 g 3    Apremilast (OTEZLA) 10 & 20 & 30 MG TBPK Take 10 mg daily in the morning on days 1 to 3, then 20 mg daily on days 4 and 5, then start 30 mg daily on day 6. (Patient not taking: Reported on 7/17/2024) 55 each 0    apremilast (OTEZLA) 30 MG tablet Take 1 tablet (30 mg) by mouth daily (Patient not taking: Reported on 7/17/2024) 30 tablet 11    Calcium Carbonate-Vitamin D (CALCIUM + D) 600-5 MG-MCG TABS Take 2 tablets by mouth 2 times daily      ciclopirox (LOPROX) 0.77 % cream Apply topically At Bedtime Apply first and then desitin over this 60 g 6    famotidine (PEPCID) 20 MG tablet TAKE 1 TABLET(20 MG) BY MOUTH TWICE DAILY 180 tablet 0    fluocinolone (SYNALAR) 0.025 % cream Apply topically 2 times daily (Patient not taking: Reported on 7/17/2024) 240 g 4    fluocinonide (LIDEX) 0.05 % external cream Apply topically daily To affected areas on chest (Patient not taking:  Reported on 7/17/2024) 120 g 1    levothyroxine (SYNTHROID/LEVOTHROID) 100 MCG tablet TAKE 1 TABLET(100 MCG) BY MOUTH DAILY 90 tablet 3    Multiple Vitamins-Minerals (OCUVITE ADULT 50+) CAPS Take 1 capsule by mouth daily 30 capsule 12    rOPINIRole (REQUIP) 0.25 MG tablet TAKE 1 TO 2 TABLETS(0.25 TO 0.5 MG) BY MOUTH EVERY NIGHT AS NEEDED FOR RESTLESS LEGS 180 tablet 0    vancomycin (FIRVANQ) 50 MG/ML oral solution Take by mouth 4 times daily Take 2.5 mls       Facility-Administered Encounter Medications as of 7/17/2024   Medication Dose Route Frequency Provider Last Rate Last Admin    perflutren diluted in saline (DEFINITY) injection 5 mL  5 mL Intravenous Once Sushma Nelson MD        [COMPLETED] triamcinolone acetonide (KENALOG-10) injection 10 mg  10 mg Intradermal Once Janna Montoya PA-C   10 mg at 07/17/24 1332             O:   NAD, WDWN, Alert & Oriented, Mood & Affect wnl, Vitals stable   Here today alone   There were no vitals taken for this visit.   General appearance normal   Vitals stable   Alert, oriented and in no acute distress      Psoriasiform plaques on buttocks   Stuck on papules and brown macules on trunk and ext   Red papules on trunk      The remainder of skin exam is normal       Eyes: Conjunctivae/lids:Normal     ENT: Lips: normal    MSK:Normal    Cardiovascular: peripheral edema none    Pulm: Breathing Normal    Neuro/Psych: Orientation:Alert and Orientedx3 ; Mood/Affect:normal   A/P:  Psoriasis   IL TAC: PGACAC discussed.  Risks including but not limited to injection site reaction, bruising, no resolution.  All questions answered and entertained to patient s satisfaction.  Informed consent obtained.  IL TAC in concentration of 10mg/ml was injected ID to psoriasis on left buttock.  Total injected was  0.5 ml.  Patient tolerated without complications and given wound care instructions, including not to move product around.  Return in 4 weeks for follow-up and possible additional  IL TAC.    2.  Seborrheic keratosis, lentigo, angioma  It was a pleasure speaking to Helena JOHANNA Chente today.  BENIGN LESIONS DISCUSSED WITH PATIENT:  I discussed the specifics of tumor, prognosis, and genetics of benign lesions.  I explained that treatment of these lesions would be purely cosmetic and not medically neccessary.  I discussed with patient different removal options including excision, cautery and /or laser.      Nature and genetics of benign skin lesions dicussed with patient.  Signs and Symptoms of skin cancer discussed with patient.  ABCDEs of melanoma reviewed with patient.  Patient encouraged to perform monthly skin exams.  UV precautions reviewed with patient.  Risks of non-melanoma skin cancer discussed with patient   Return to clinic as needed.       Clinic Administered Medication Documentation        Patient was given 0.5 ml of Kenalog 10. Prior to medication administration, verified patient's identity using patient s name and date of birth. Please see MAR and medication order for additional information. Patient instructed to remain in clinic for 15 minutes.    Vial/Syringe: Multi dose vial

## 2024-08-04 DIAGNOSIS — K21.00 GASTROESOPHAGEAL REFLUX DISEASE WITH ESOPHAGITIS WITHOUT HEMORRHAGE: ICD-10-CM

## 2024-08-05 RX ORDER — FAMOTIDINE 20 MG/1
TABLET, FILM COATED ORAL
Qty: 180 TABLET | Refills: 0 | Status: SHIPPED | OUTPATIENT
Start: 2024-08-05

## 2024-09-03 ENCOUNTER — TRANSFERRED RECORDS (OUTPATIENT)
Dept: HEALTH INFORMATION MANAGEMENT | Facility: CLINIC | Age: 89
End: 2024-09-03
Payer: COMMERCIAL

## 2024-09-09 ENCOUNTER — TRANSFERRED RECORDS (OUTPATIENT)
Dept: HEALTH INFORMATION MANAGEMENT | Facility: CLINIC | Age: 89
End: 2024-09-09
Payer: COMMERCIAL

## 2024-09-19 ENCOUNTER — PATIENT OUTREACH (OUTPATIENT)
Dept: CARE COORDINATION | Facility: CLINIC | Age: 89
End: 2024-09-19
Payer: COMMERCIAL

## 2024-10-03 ENCOUNTER — PATIENT OUTREACH (OUTPATIENT)
Dept: CARE COORDINATION | Facility: CLINIC | Age: 89
End: 2024-10-03
Payer: COMMERCIAL

## 2024-10-03 DIAGNOSIS — R25.2 CRAMP OF LIMB: ICD-10-CM

## 2024-10-04 RX ORDER — ROPINIROLE 0.25 MG/1
TABLET, FILM COATED ORAL
Qty: 180 TABLET | Refills: 0 | Status: SHIPPED | OUTPATIENT
Start: 2024-10-04

## 2024-10-09 ENCOUNTER — TRANSFERRED RECORDS (OUTPATIENT)
Dept: HEALTH INFORMATION MANAGEMENT | Facility: CLINIC | Age: 89
End: 2024-10-09
Payer: COMMERCIAL

## 2024-10-15 ENCOUNTER — PATIENT OUTREACH (OUTPATIENT)
Dept: CARE COORDINATION | Facility: CLINIC | Age: 89
End: 2024-10-15
Payer: COMMERCIAL

## 2024-11-01 DIAGNOSIS — K21.00 GASTROESOPHAGEAL REFLUX DISEASE WITH ESOPHAGITIS WITHOUT HEMORRHAGE: ICD-10-CM

## 2024-11-02 DIAGNOSIS — R25.2 CRAMP OF LIMB: ICD-10-CM

## 2024-11-04 RX ORDER — FAMOTIDINE 20 MG/1
20 TABLET, FILM COATED ORAL 2 TIMES DAILY
Qty: 180 TABLET | Refills: 0 | Status: SHIPPED | OUTPATIENT
Start: 2024-11-04

## 2024-11-04 RX ORDER — ROPINIROLE 0.25 MG/1
TABLET, FILM COATED ORAL
Qty: 180 TABLET | Refills: 0 | OUTPATIENT
Start: 2024-11-04

## 2024-12-09 ENCOUNTER — OFFICE VISIT (OUTPATIENT)
Dept: NEPHROLOGY | Facility: CLINIC | Age: 89
End: 2024-12-09
Payer: COMMERCIAL

## 2024-12-09 VITALS — OXYGEN SATURATION: 94 % | HEART RATE: 103 BPM | DIASTOLIC BLOOD PRESSURE: 73 MMHG | SYSTOLIC BLOOD PRESSURE: 135 MMHG

## 2024-12-09 DIAGNOSIS — N20.0 KIDNEY STONES: ICD-10-CM

## 2024-12-09 DIAGNOSIS — N18.4 CKD (CHRONIC KIDNEY DISEASE) STAGE 4, GFR 15-29 ML/MIN (H): Primary | ICD-10-CM

## 2024-12-09 DIAGNOSIS — I10 HYPERTENSION GOAL BP (BLOOD PRESSURE) < 140/90: ICD-10-CM

## 2024-12-09 PROCEDURE — 99213 OFFICE O/P EST LOW 20 MIN: CPT | Performed by: PHYSICIAN ASSISTANT

## 2024-12-09 NOTE — PATIENT INSTRUCTIONS
Will get labs from your oncologist to review.   Avoid ibuprofen/aleve.   Continue good hydration, low sodium diet.   Follow up in 3 months.

## 2024-12-09 NOTE — PROGRESS NOTES
Nephrology Progress Note  12/9/2024    Assessment and Plan:  89 year old female with history of CKD, Scr 1.8 with eGFR near 25ml/min in recent years, history of lung cancer s/p chemotherapy, kidney cancer s/p left partial resection, kidney stones and infections s/p ESWL and stent in 7/2020, small left kidney, who presents for followup of CKD and proteinuria.    # CKD stage 4- Scr 2 on recent checks, her Scr has been near 1.8 - 2.1 with eGFR near 22-27 ml/min over the last few years, got down to 18 a couple of times, most recent is 25ml/min.  previous to that her eGFR was near 35ml/min and back in early 2000s she had eGFR near 40-45ml/min  She has been through many things that have caused CKD including cancer, chemotherapy with carboplatin. Last Scr 1.8, eGFR 25ml/min  She had 1 gram of proteinuria which puts her at a higher risk for progression- it has improved, albuminuria was under 100mg on last check- check both albumin and protein random urine on next check   - check labs today  - BP fairly controlled based on home readings- she has a wrist cuff.    # Hypertension: BP in the 120s on recent check at home, was 135/73 in clinic today. Aim toward 130/80 if tolerated.   Furosemide held during admission in 5/2023- check Blood pressure at home and keep log.   - may need to resume lasix, hold for now    # Kidney stones: has calcium oxalate stone on stone analysis; discussed low sodium, fluid intake of 2.5 -3 liters   - she is hydrating better overall, her sons check up on her about this.    # Anemia in chronic renal disease:   - Hgb: Stable, low normal, 11-11.5  - Iron studies: adequate, 25% tsat  - repeat labs    # Electrolytes:   - Potassium; level: Normal  - bicarbonate: Normal  - check labs    # Mineral Bone Disorder:   - Calcium; level is: 9.6  Normal   - phos normal, PTH normal at 62, vit D not checked recently    Assessment and plan was discussed with patient and she voiced her understanding and  agreement.    Labs on 2/7/24. RTC 6 months with me.     Reason for Visit:  Helena Eldridge is an 89 year old female with CKD from renal and lung cancer, who presents for followup    HPI:  She is a pleasant female with multiple comorbidities including history of lung cancer s/p chemotherapy, renal cell carcinoma s/p partial left kidney resection, COPD, who presents for followup of CKD She has been aware of kidney disease and her creatinine has been stable near 1.8-2 mg/dL over the last few years. Recently her Scr was up to 2.3, eGFR decreased to 17ml/min, but recently back and eGFR 23ml/min  Her left kidney was noted to be very small on CT imaging going back to 2015.  Over the years, her eGFR was 35-45 ml/min from 2011 to 2017, but in the last couple of years, her eGFR was 25-28ml/min.    Her lung cancer (nonsmall cell lung cancer T1N1) was treated with carboplatin/ permetrexeb [id number] (6 cycles) and then nivolumab for a year until it was stopped due to itching.    Unfortunately her lung cancer has recurred. She started treatment again 5-6 weeks ago. She notes joint pain since starting chemo. She had difficulty sleeping last night because of this. She has reached out to her oncologist. She is not taking NSAIDS.    She denies shortness of breath or chest pain. She has some fatigue with significant exertion.  Denies any GI or  issues at this time. She is taking miralax which helps with constipation.   She denies family history of renal failure or dialysis.    She has been doing well since last visit except for a fall in April. She was evaluated for hip pain, no acute abnormalities noted on xray. She did take aleve that day. She denies shortness of breath or swelling. She was hospitalized at outside hospital in May 2023 with C dif and noted to have an MARIO and hyperkalemia. She did not require dialysis. Scr in October appeared back to baseline.     Had labs through oncology office, not available in care everywhere.      Her good friend of > 50 years passed away in August 2023, this has been hard for her but she is coping well.     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  She has a lot of great nephews and nieces and stays busy with them. She has 3 sons  She has some psoriasis and this has gotten worse, and she was offered Humira for this, but she is much better using a shampoo and wants to stick with that for now  She generally gets [id number] steps daily  Her weight is down to 115 lbs from 130 approximately a year ago. She has not been checking BP's at home often.   [place]   She is drinking a lot of water.     Renal History:   Kidney Disease and Medical Hx:  h/o HTN: Yes  , h/o Protein in Urine: Yes  and h/o Kidney Stones:Yes     ROS:   A comprehensive review of systems was obtained and negative, except as noted in the HPI or PMH.    Active Medical Problems:  Patient Active Problem List   Diagnosis    CKD (chronic kidney disease) stage 4, GFR 15-29 ml/min (H)    Glucose intolerance (impaired glucose tolerance)    Kidney stones    Advance care planning    Hypertension goal BP (blood pressure) < 140/90    Lichen sclerosus et atrophicus of the vulva    Solitary kidney, acquired    Senile osteoporosis    Osteoarthritis of right knee    Medial meniscus tear    Cataracts, both eyes    Macular degeneration of both eyes, right eye greater than left eye    COPD, severe (H)    IPF (idiopathic pulmonary fibrosis) (H)    Other specified hypothyroidism    Idiopathic chronic gout    Adjustment disorder with anxiety    Non-small cell cancer of right lung (H)    Psoriasis    Exudative age-related macular degeneration of left eye with active choroidal neovascularization (H)     PMH:   Medical record was reviewed and PMH was discussed with patient and noted below.  Past Medical History:   Diagnosis Date    Carpal tunnel syndrome     CKD (chronic kidney  disease)     kidney stone with infection    CKD (chronic kidney disease) stage 4, GFR 15-29 ml/min (H) 12/2/2010    History of kidney stone with infection.  Creatinine 1.3 - 1.6 (12/2/10, [facility])   -- she can't take ACE / ARB due to side effects.  Will continue with good BP control (12/3/15, [facility])     COPD, severe (H) 2/16/2015    Seen by pulmonary 2013.       Deep vein thrombosis (DVT) (H)     1972    DVT [id number]    Glucose intolerance     H/O partial nephrectomy     Heel spur     Hypertension goal BP (blood pressure) < 140/90 11/28/2011    Hypothyroidism     Idiopathic chronic gout 5/14/2015    Immunosuppression (H) 3/7/2018    IPF (idiopathic pulmonary fibrosis) (H) 2/25/2015    Mild, at bases per CXR 2/2015 (2/15/15, [facility])     Kidney stones     Lichen sclerosus     Lichen sclerosus et atrophicus of the vulva 5/8/2013    Clobetasol bid is the recommended remedy    Macular degeneration     Non-small cell cancer of right lung (H) 3/7/2018    Nonsenile cataract     Osteoarthritis     Osteoarthritis of right knee 3/11/2014    Osteoporosis     Other specified hypothyroidism 5/14/2015    PID     Pulmonary embolism (H)     1970    Senile osteoporosis 3/11/2014    Unspecified hypothyroidism     Vitiligo      PSH:   Past Surgical History:   Procedure Laterality Date    EXTRACORPOREAL SHOCK WAVE LITHOTRIPSY, CYSTOSCOPY, INSERT STENT URETER(S), COMBINED Right 7/13/2020    Procedure: LITHOTRIPSY, Right EXTRACORPOREAL SHOCK WAVE (ESWL), WITH CYSTOSCOPY AND Right URETERAL STENT INSERTION;  Surgeon: Buddy Ramirez MD;  Location: [facility] OR    HC REMOVAL GALLBLADDER      HC REVISE MEDIAN N/CARPAL TUNNEL SURG      IR LUNG BIOPSY MEDIASTINUM RIGHT  1/16/2018    LASER HOLMIUM LITHOTRIPSY URETER(S), INSERT STENT, COMBINED Right 7/28/2020    Procedure: RIGHT CYSTOURETEROSCOPY, WITH LITHOTRIPSY USING LASER AND URETERAL STENT EXCHANGE;  Surgeon: Buddy Ramirez MD;  Location: [facility] OR    TUBAL LIGATION      ZZC NEPHRECTOMY      left  partial 07    [facility] VENOUS THROMBOSIS IMAGING      with pe       Family Hx:   Family History   Problem Relation Age of Onset    Cancer Brother     Glaucoma Mother     Alzheimer Disease Brother     Macular Degeneration No family hx of      Personal Hx:   Social History     Tobacco Use    Smoking status: Former     Current packs/day: 0.00     Types: Cigarettes     Quit date: 1969     Years since quittin.0    Smokeless tobacco: Never   Substance Use Topics    Alcohol use: Not Currently       Allergies:  Allergies   Allergen Reactions    Ace Inhibitors Cough    Advicor     Alendronate Other (See Comments)     Other reaction(s): GI Upset  heartburn  Severe substernal burning and dysphagia within 3 days      Blood-Group Specific Substance Other (See Comments)     Patient has Anti-Shania and warm auto antibody. Blood products may be delayed. Draw patient 24 hours prior to transfusion. Draw one red top and two purple top tubes for all type and screen orders.    Citalopram GI Disturbance and Nausea     diarrhea  diarrhea    Doxycycline Nausea     Poor appetite    Fosamax      Severe substernal burning and dysphagia within 3 days    Valsartan Cramps and Other (See Comments)     severe  severe       Medications:  Current Outpatient Medications   Medication Sig Dispense Refill    Apremilast (OTEZLA) 10 & 20 & 30 MG TBPK Take 10 mg daily in the morning on days 1 to 3, then 20 mg daily on days 4 and 5, then start 30 mg daily on day 6. (Patient not taking: Reported on 2024) 55 each 0    apremilast (OTEZLA) 30 MG tablet Take 1 tablet (30 mg) by mouth daily (Patient not taking: Reported on 2024) 30 tablet 11    augmented betamethasone dipropionate (DIPROLENE AF) 0.05 % external cream Apply sparingly to affected area twice daily as needed.  Do not apply to face. 200 g 3    Calcium Carbonate-Vitamin D (CALCIUM + D) 600-5 MG-MCG TABS Take 2 tablets by mouth 2 times daily      ciclopirox (LOPROX) 0.77 % cream Apply  topically At Bedtime Apply first and then desitin over this 60 g 6    famotidine (PEPCID) 20 MG tablet Take 1 tablet (20 mg) by mouth 2 times daily. +++APPOINTMENT DUE+++ 180 tablet 0    fluocinolone (SYNALAR) 0.025 % cream Apply topically 2 times daily (Patient not taking: Reported on 7/17/2024) 240 g 4    fluocinonide (LIDEX) 0.05 % external cream Apply topically daily To affected areas on chest (Patient not taking: Reported on 7/17/2024) 120 g 1    levothyroxine (SYNTHROID/LEVOTHROID) 100 MCG tablet TAKE 1 TABLET(100 MCG) BY MOUTH DAILY 90 tablet 3    Multiple Vitamins-Minerals (OCUVITE ADULT 50+) CAPS Take 1 capsule by mouth daily 30 capsule 12    rOPINIRole (REQUIP) 0.25 MG tablet TAKE 1 TO 2 TABLETS(0.25 TO 0.5 MG) BY MOUTH EVERY NIGHT AS NEEDED FOR RESTLESS LEGS 180 tablet 0    vancomycin (FIRVANQ) 50 MG/ML oral solution Take by mouth 4 times daily Take 2.5 mls       No current facility-administered medications for this visit.     Facility-Administered Medications Ordered in Other Visits   Medication Dose Route Frequency Provider Last Rate Last Admin    perflutren diluted in saline (DEFINITY) injection 5 mL  5 mL Intravenous Once Sushma Nelson MD          Vitals:  /73 (BP Location: Right arm, Patient Position: Sitting, Cuff Size: Adult Regular)   Pulse 103   SpO2 94%     Exam:  General: alert, oriented, conversant  Skin: warm and dry no visible rash  Neuro: normal speech  Heart: RRR  Lungs: CTA  Extremities: No LE edema noted      LABS:   CMP  Recent Labs   Lab Test 06/10/24  1234 10/11/23  1353 09/27/23  1400 05/22/23  1515 12/13/21  1431 06/21/21  1251 03/08/21  1325 12/03/20  0912 11/10/20  0930    140 142 140   < > 135 138 141 139   POTASSIUM 5.1 4.8 4.4 5.1   < > 4.1 4.1 4.4 3.6   CHLORIDE 102 105 103 106   < > 100 105 107 104   CO2 26 25 33* 22   < > 30 28 31 30   ANIONGAP 11 10 6* 12   < > 5 5 3 5   GLC 82 109* 130* 115*   < > 96 98 106* 125*   BUN 30.7* 29.2* 28.5* 51.6*   < > 29  27 22 42*   CR 2.29* 2.09* 2.13* 1.79*   < > 2.11* 1.85* 1.92* 2.48*   GFRESTIMATED 20* 22* 22* 27*   < > 21* 24* 23* 17*   GFRESTBLACK  --   --   --   --   --  24* 28* 27* 20*   SERAFIN 9.4 9.6 9.9 8.6*   < > 8.9 9.3 9.5 8.9    < > = values in this interval not displayed.     Recent Labs   Lab Test 10/11/23  1353 09/27/23  1400 11/10/22  1416 07/20/20  1354 07/05/20  0000   BILITOTAL 0.2 0.2 0.3  --   --    ALKPHOS 57 69 91  --   --    ALT 11 11 20 16 13   AST 21 17 18  --  17     CBC  Recent Labs   Lab Test 06/10/24  1234 10/11/23  1353 09/27/23  1400 11/10/22  1416   HGB 11.6* 11.1* 11.5* 11.3*   WBC 9.8 7.8 9.6 7.9   RBC 3.51* 3.27* 3.45* 3.24*   HCT 36.8 33.6* 35.8 35.4   * 103* 104* 109*   MCH 33.0 33.9* 33.3* 34.9*   MCHC 31.5 33.0 32.1 31.9   RDW 12.9 12.8 12.3 14.4    247 238 282     URINE STUDIES  Recent Labs   Lab Test 08/18/22  1211 11/29/21  1040 11/10/20  0944 11/03/20  0932 10/19/20  1050   COLOR Yellow Yellow Yellow Yellow Yellow   APPEARANCE Slightly Cloudy* Clear Clear Cloudy Clear   URINEGLC Negative Negative Negative Negative Negative   URINEBILI Negative Negative Negative Negative Negative   URINEKETONE Negative Negative Negative Negative Negative   SG <=1.005 1.010 1.025 1.020 1.020   UBLD Negative Negative Negative Negative Negative   URINEPH 6.5 5.5 5.5 5.5 6.0   PROTEIN Negative Negative Negative Negative Negative   UROBILINOGEN 0.2 0.2 0.2 0.2 0.2   NITRITE Negative Negative Negative Negative Negative   LEUKEST Small* Negative Negative Small* Trace*   RBCU 2-5*  --  O - 2 O - 2 O - 2   WBCU 5-10*  --  0 - 5 5-10* 10-25*     No lab results found.  PTH  Recent Labs   Lab Test 06/10/24  1234 09/27/23  1400 12/13/21  1431   PTHI 53 62 119*     IRON STUDIES  Recent Labs   Lab Test 09/27/23  1400 06/21/21  1251   IRON 56 94   * 242   IRONSAT 25 39   JOSE RAMON  --  375*       TECHNIQUE: Scans were obtained from the diaphragm through the pelvis  without IV contrast. Radiation dose for  this scan was reduced using  automated exposure control, adjustment of the mA and/or kV according  to patient size, or iterative reconstruction technique.     COMPARISON: 5/19/2015.     FINDINGS: Coronary calcifications. Linear atelectasis or fibrosis left  lung base. Liver, spleen, and pancreas are normal. Previous  cholecystectomy with prominence to the common bile duct again noted  and unchanged. Atrophic changes again noted in the left kidney which  are unchanged from 5/19/2015. 2 small calculi again noted in the left  kidney. Left kidney is otherwise unchanged. Right kidney is normal in  size with small calculi again noted in the lower portion of the right  kidney which are unchanged. Right kidney and ureter are otherwise  normal. Diverticula in the colon without evidence of diverticulitis.  Colon and small bowel are otherwise normal. Calcification of the  abdominal aorta and iliac arteries. No abdominal or pelvic adenopathy.  Remainder of the scan is negative and unchanged from 5/19/2015.                                                                      IMPRESSION:   1. Bilateral nonobstructing nephrolithiasis which is unchanged from  5/19/2015. Atrophic left kidney again noted.  2. Remainder of the scan is unchanged.    Mandie Mendoza PA-C    Visit length 10 minutes. An additional 15 minutes were spent on date of service in chart review, documentation, and other activities as noted.

## 2024-12-30 DIAGNOSIS — R25.2 CRAMP OF LIMB: ICD-10-CM

## 2024-12-30 NOTE — TELEPHONE ENCOUNTER
Appointment needed for refills. Once scheduled route to the provider for a iveth refill.     Aby Dunne RN

## 2024-12-31 RX ORDER — ROPINIROLE 0.25 MG/1
TABLET, FILM COATED ORAL
Qty: 180 TABLET | Refills: 0 | Status: SHIPPED | OUTPATIENT
Start: 2024-12-31

## 2024-12-31 NOTE — TELEPHONE ENCOUNTER
Pt scheduled appt for 1/20/25.  Pt states she is going through cancer treatment and is suffering.  Pt also states that her Dr. Hsieh will be doing lab work on her and will request that it be sent to pcp.   Marisel Sweeney MA on 12/31/2024 at 11:32 AM

## 2025-01-05 ENCOUNTER — HEALTH MAINTENANCE LETTER (OUTPATIENT)
Age: OVER 89
End: 2025-01-05

## 2025-01-30 DIAGNOSIS — E03.8 OTHER SPECIFIED HYPOTHYROIDISM: ICD-10-CM

## 2025-01-30 DIAGNOSIS — K21.00 GASTROESOPHAGEAL REFLUX DISEASE WITH ESOPHAGITIS WITHOUT HEMORRHAGE: ICD-10-CM

## 2025-01-30 RX ORDER — LEVOTHYROXINE SODIUM 100 UG/1
TABLET ORAL
Qty: 90 TABLET | Refills: 3 | OUTPATIENT
Start: 2025-01-30

## 2025-01-30 RX ORDER — FAMOTIDINE 20 MG/1
20 TABLET, FILM COATED ORAL 2 TIMES DAILY
Qty: 180 TABLET | Refills: 0 | Status: SHIPPED | OUTPATIENT
Start: 2025-01-30

## 2025-02-03 ENCOUNTER — TELEPHONE (OUTPATIENT)
Dept: FAMILY MEDICINE | Facility: CLINIC | Age: OVER 89
End: 2025-02-03

## 2025-02-03 NOTE — TELEPHONE ENCOUNTER
"SonGe calling to question upcoming visits with Katie and \"Dr. Hsieh\".   He questioned about her Chemo provider Dr Hsieh. RN asked where she receives Chemo. He stated \"through [facility]\". RN stated this sounds like it is an [facility] network, not [facility].     RN does not see this son on file for consent. RN attempted to call patient to give verbal okay to speak to this sonGe. She did not answer. RN left message to call back to give verbal.     RN updated Ge that I was not able to reach the patient for a verbal. He became frustrated and stated that he \"should\" be on the account. RN apologized and stated that unfortunately he is not listed so I am unable to communicate about her medical information.      Balbina Alonso RN on 2/3/2025 at 12:07 PM    "

## 2025-03-10 ENCOUNTER — OFFICE VISIT (OUTPATIENT)
Dept: NEPHROLOGY | Facility: CLINIC | Age: OVER 89
End: 2025-03-10
Payer: COMMERCIAL

## 2025-03-10 VITALS — DIASTOLIC BLOOD PRESSURE: 70 MMHG | HEART RATE: 119 BPM | OXYGEN SATURATION: 96 % | SYSTOLIC BLOOD PRESSURE: 136 MMHG

## 2025-03-10 DIAGNOSIS — N20.0 KIDNEY STONES: ICD-10-CM

## 2025-03-10 DIAGNOSIS — N18.4 CKD (CHRONIC KIDNEY DISEASE) STAGE 4, GFR 15-29 ML/MIN (H): Primary | ICD-10-CM

## 2025-03-10 DIAGNOSIS — I10 HYPERTENSION GOAL BP (BLOOD PRESSURE) < 140/90: ICD-10-CM

## 2025-03-10 NOTE — PATIENT INSTRUCTIONS
No changes today.   Continue good hydration, low sodium diet.   Avoid ibuprofen/aleve.   Check blood pressure daily, keep log.   Labs and follow up in 3 months.

## 2025-03-10 NOTE — PROGRESS NOTES
Nephrology Progress Note  3/10/2025    Assessment and Plan:  89 year old female with history of CKD, Scr 1.8 with eGFR near 25ml/min in recent years, history of lung cancer s/p chemotherapy, kidney cancer s/p left partial resection, kidney stones and infections s/p ESWL and stent in 7/2020, small left kidney, who presents for followup of CKD and proteinuria.    # CKD stage 4- Scr 2 on recent checks, her Scr has been near 1.8 - 2.1 with eGFR near 22-27 ml/min over the last few years, got down to 18 a couple of times, most recent is 25ml/min.  previous to that her eGFR was near 35ml/min and back in early 2000s she had eGFR near 40-45ml/min  She has been through many things that have caused CKD including cancer, chemotherapy with carboplatin.  Last Scr 1.57, eGFR 31ml/min on labs from [facility]    She had 1 gram of proteinuria which puts her at a higher risk for progression- it has improved, albuminuria was under 100mg on last check- check both albumin and protein random urine on next check   - BP fairly controlled based on home readings- she has a wrist cuff.    # Hypertension: BP in the 120s on recent check at home, was 136/70 in clinic today. Aim toward 130/80 if tolerated.   Furosemide held during admission in 5/2023- check Blood pressure at home and keep log.   - may need to resume lasix, hold for now    # Kidney stones: has calcium oxalate stone on stone analysis; discussed low sodium, fluid intake of 2.5 -3 liters   - she is hydrating better overall, her sons check up on her about this.    # Anemia in chronic renal disease:   - Hgb: 10.6 Stable, low normal, previously 11-11.5  - Iron studies: adequate, 25% tsat  - repeat labs    # Electrolytes:   Potassium; 4.5 level: Normal  bicarbonate:29  Normal  Sodium 140 normal  - check labs    # Mineral Bone Disorder:   - Calcium; level is: 9.1  Normal   - phos normal, PTH normal at 62, vit D not checked recently    Assessment and plan was discussed with patient  and she voiced her understanding and agreement.    Labs on 2/7/24. RTC 3 months with me.     Reason for Visit:  Helena Eldridge is an 89 year old female with CKD from renal and lung cancer, who presents for followup    HPI:  She is a pleasant female with multiple comorbidities including history of lung cancer s/p chemotherapy, renal cell carcinoma s/p partial left kidney resection, COPD, who presents for followup of CKD She has been aware of kidney disease and her creatinine has been stable near 1.8-2 mg/dL over the last few years. Recently her Scr was up to 2.3, eGFR decreased to 17ml/min, but recently back and eGFR 23ml/min  Her left kidney was noted to be very small on CT imaging going back to 2015.  Over the years, her eGFR was 35-45 ml/min from 2011 to 2017, but in the last couple of years, her eGFR was 25-28ml/min.    Her lung cancer (nonsmall cell lung cancer T1N1) was treated with carboplatin/ permetrexeb 6624-6342 (6 cycles) and then nivolumab for a year until it was stopped due to itching.    Unfortunately her lung cancer has recurred. She started treatment again and is scheduled through June for this. She notes joint pain since starting chemo which is improving. She has reached out to her oncologist. She is not taking NSAIDS.    She denies shortness of breath or chest pain. She has some fatigue with significant exertion.  Denies any GI or  issues at this time. She is taking miralax which helps with constipation.   She denies family history of renal failure or dialysis.    She has been doing well since last visit except for a fall in April. She was evaluated for hip pain, no acute abnormalities noted on xray. She did take aleve that day. She denies shortness of breath or swelling. She was hospitalized at outside hospital in May 2023 with C dif and noted to have an MARIO and hyperkalemia. She did not require dialysis. Scr in October appeared back to baseline.     Had labs through oncology office reviewed.      Her good friend of > 50 years passed away in August 2023, this has been hard for her but she is coping well.     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  She has a lot of great nephews and nieces and stays busy with them. She has 3 sons  She has some psoriasis and this has gotten worse, and she was offered Humira for this, but she is much better using a shampoo and wants to stick with that for now  She generally gets [id number] steps daily  Her weight is down to 115 lbs from 130 approximately a year ago. She has not been checking BP's at home often.   [place]   She is drinking a lot of water.     Renal History:   Kidney Disease and Medical Hx:  h/o HTN: Yes  , h/o Protein in Urine: Yes  and h/o Kidney Stones:Yes     ROS:   A comprehensive review of systems was obtained and negative, except as noted in the HPI or PMH.    Active Medical Problems:  Patient Active Problem List   Diagnosis    CKD (chronic kidney disease) stage 4, GFR 15-29 ml/min (H)    Glucose intolerance (impaired glucose tolerance)    Kidney stones    Advance care planning    Hypertension goal BP (blood pressure) < 140/90    Lichen sclerosus et atrophicus of the vulva    Solitary kidney, acquired    Senile osteoporosis    Osteoarthritis of right knee    Medial meniscus tear    Cataracts, both eyes    Macular degeneration of both eyes, right eye greater than left eye    COPD, severe (H)    IPF (idiopathic pulmonary fibrosis) (H)    Other specified hypothyroidism    Idiopathic chronic gout    Adjustment disorder with anxiety    Non-small cell cancer of right lung (H)    Psoriasis    Exudative age-related macular degeneration of left eye with active choroidal neovascularization (H)     PMH:   Medical record was reviewed and PMH was discussed with patient and noted below.  Past Medical History:   Diagnosis Date    Carpal tunnel syndrome     CKD (chronic kidney  disease)     kidney stone with infection    CKD (chronic kidney disease) stage 4, GFR 15-29 ml/min (H) 12/2/2010    History of kidney stone with infection.  Creatinine 1.3 - 1.6 (12/2/10, [facility])   -- she can't take ACE / ARB due to side effects.  Will continue with good BP control (12/3/15, [facility])     COPD, severe (H) 2/16/2015    Seen by pulmonary 2013.       Deep vein thrombosis (DVT) (H)     1972    DVT [id number]    Glucose intolerance     H/O partial nephrectomy     Heel spur     Hypertension goal BP (blood pressure) < 140/90 11/28/2011    Hypothyroidism     Idiopathic chronic gout 5/14/2015    Immunosuppression 3/7/2018    IPF (idiopathic pulmonary fibrosis) (H) 2/25/2015    Mild, at bases per CXR 2/2015 (2/15/15, [facility])     Kidney stones     Lichen sclerosus     Lichen sclerosus et atrophicus of the vulva 5/8/2013    Clobetasol bid is the recommended remedy    Macular degeneration     Non-small cell cancer of right lung (H) 3/7/2018    Nonsenile cataract     Osteoarthritis     Osteoarthritis of right knee 3/11/2014    Osteoporosis     Other specified hypothyroidism 5/14/2015    PID     Pulmonary embolism (H)     1970    Senile osteoporosis 3/11/2014    Unspecified hypothyroidism     Vitiligo      PSH:   Past Surgical History:   Procedure Laterality Date    EXTRACORPOREAL SHOCK WAVE LITHOTRIPSY, CYSTOSCOPY, INSERT STENT URETER(S), COMBINED Right 7/13/2020    Procedure: LITHOTRIPSY, Right EXTRACORPOREAL SHOCK WAVE (ESWL), WITH CYSTOSCOPY AND Right URETERAL STENT INSERTION;  Surgeon: Buddy Ramirez MD;  Location: [facility] OR    HC REMOVAL GALLBLADDER      HC REVISE MEDIAN N/CARPAL TUNNEL SURG      IR LUNG BIOPSY MEDIASTINUM RIGHT  1/16/2018    LASER HOLMIUM LITHOTRIPSY URETER(S), INSERT STENT, COMBINED Right 7/28/2020    Procedure: RIGHT CYSTOURETEROSCOPY, WITH LITHOTRIPSY USING LASER AND URETERAL STENT EXCHANGE;  Surgeon: Buddy Ramirez MD;  Location: [facility] OR    TUBAL LIGATION      ZZC NEPHRECTOMY      left partial  07    [facility] VENOUS THROMBOSIS IMAGING      with pe       Family Hx:   Family History   Problem Relation Age of Onset    Cancer Brother     Glaucoma Mother     Alzheimer Disease Brother     Macular Degeneration No family hx of      Personal Hx:   Social History     Tobacco Use    Smoking status: Former     Current packs/day: 0.00     Types: Cigarettes     Quit date: 1969     Years since quittin.3    Smokeless tobacco: Never   Substance Use Topics    Alcohol use: Not Currently       Allergies:  Allergies   Allergen Reactions    Ace Inhibitors Cough    Advicor     Alendronate Other (See Comments)     Other reaction(s): GI Upset  heartburn  Severe substernal burning and dysphagia within 3 days      Blood-Group Specific Substance Other (See Comments)     Patient has Anti-Shania and warm auto antibody. Blood products may be delayed. Draw patient 24 hours prior to transfusion. Draw one red top and two purple top tubes for all type and screen orders.    Citalopram GI Disturbance and Nausea     diarrhea  diarrhea    Doxycycline Nausea     Poor appetite    Fosamax      Severe substernal burning and dysphagia within 3 days    Valsartan Cramps and Other (See Comments)     severe  severe       Medications:  Current Outpatient Medications   Medication Sig Dispense Refill    Apremilast (OTEZLA) 10 & 20 & 30 MG TBPK Take 10 mg daily in the morning on days 1 to 3, then 20 mg daily on days 4 and 5, then start 30 mg daily on day 6. (Patient not taking: Reported on 2024) 55 each 0    apremilast (OTEZLA) 30 MG tablet Take 1 tablet (30 mg) by mouth daily (Patient not taking: Reported on 2024) 30 tablet 11    augmented betamethasone dipropionate (DIPROLENE AF) 0.05 % external cream Apply sparingly to affected area twice daily as needed.  Do not apply to face. 200 g 3    Calcium Carbonate-Vitamin D (CALCIUM + D) 600-5 MG-MCG TABS Take 2 tablets by mouth 2 times daily      ciclopirox (LOPROX) 0.77 % cream Apply  topically At Bedtime Apply first and then desitin over this 60 g 6    famotidine (PEPCID) 20 MG tablet TAKE 1 TABLET BY MOUTH TWICE DAILY 180 tablet 0    fluocinolone (SYNALAR) 0.025 % cream Apply topically 2 times daily (Patient not taking: Reported on 12/9/2024) 240 g 4    fluocinonide (LIDEX) 0.05 % external cream Apply topically daily To affected areas on chest (Patient not taking: Reported on 12/9/2024) 120 g 1    levothyroxine (SYNTHROID/LEVOTHROID) 100 MCG tablet Take 1 tablet (100 mcg) by mouth daily. 90 tablet 0    Multiple Vitamins-Minerals (OCUVITE ADULT 50+) CAPS Take 1 capsule by mouth daily 30 capsule 12    rOPINIRole (REQUIP) 0.25 MG tablet TAKE 1 TO 2 TABLETS(0.25 TO 0.5 MG) BY MOUTH EVERY NIGHT AS NEEDED FOR RESTLESS LEGS 180 tablet 0    vancomycin (FIRVANQ) 50 MG/ML oral solution Take by mouth 4 times daily Take 2.5 mls       No current facility-administered medications for this visit.     Facility-Administered Medications Ordered in Other Visits   Medication Dose Route Frequency Provider Last Rate Last Admin    perflutren diluted in saline (DEFINITY) injection 5 mL  5 mL Intravenous Once Sushma Nelson MD          Vitals:  /70 (BP Location: Left arm, Patient Position: Sitting, Cuff Size: Adult Regular)   Pulse 119   SpO2 96%     Exam:  General: alert, oriented, conversant  Skin: warm and dry no visible rash  Neuro: normal speech  Heart: RRR  Lungs: CTA  Extremities: No LE edema noted      LABS:   CMP  Recent Labs   Lab Test 06/10/24  1234 10/11/23  1353 09/27/23  1400 05/22/23  1515 12/13/21  1431 06/21/21  1251 03/08/21  1325 12/03/20  0912 11/10/20  0930    140 142 140   < > 135 138 141 139   POTASSIUM 5.1 4.8 4.4 5.1   < > 4.1 4.1 4.4 3.6   CHLORIDE 102 105 103 106   < > 100 105 107 104   CO2 26 25 33* 22   < > 30 28 31 30   ANIONGAP 11 10 6* 12   < > 5 5 3 5   GLC 82 109* 130* 115*   < > 96 98 106* 125*   BUN 30.7* 29.2* 28.5* 51.6*   < > 29 27 22 42*   CR 2.29* 2.09* 2.13*  1.79*   < > 2.11* 1.85* 1.92* 2.48*   GFRESTIMATED 20* 22* 22* 27*   < > 21* 24* 23* 17*   GFRESTBLACK  --   --   --   --   --  24* 28* 27* 20*   SERAFIN 9.4 9.6 9.9 8.6*   < > 8.9 9.3 9.5 8.9    < > = values in this interval not displayed.     Recent Labs   Lab Test 10/11/23  1353 09/27/23  1400 11/10/22  1416 07/20/20  1354 07/05/20  0000   BILITOTAL 0.2 0.2 0.3  --   --    ALKPHOS 57 69 91  --   --    ALT 11 11 20 16 13   AST 21 17 18  --  17     CBC  Recent Labs   Lab Test 06/10/24  1234 10/11/23  1353 09/27/23  1400 11/10/22  1416   HGB 11.6* 11.1* 11.5* 11.3*   WBC 9.8 7.8 9.6 7.9   RBC 3.51* 3.27* 3.45* 3.24*   HCT 36.8 33.6* 35.8 35.4   * 103* 104* 109*   MCH 33.0 33.9* 33.3* 34.9*   MCHC 31.5 33.0 32.1 31.9   RDW 12.9 12.8 12.3 14.4    247 238 282     URINE STUDIES  Recent Labs   Lab Test 08/18/22  1211 11/29/21  1040 11/10/20  0944 11/03/20  0932 10/19/20  1050   COLOR Yellow Yellow Yellow Yellow Yellow   APPEARANCE Slightly Cloudy* Clear Clear Cloudy Clear   URINEGLC Negative Negative Negative Negative Negative   URINEBILI Negative Negative Negative Negative Negative   URINEKETONE Negative Negative Negative Negative Negative   SG <=1.005 1.010 1.025 1.020 1.020   UBLD Negative Negative Negative Negative Negative   URINEPH 6.5 5.5 5.5 5.5 6.0   PROTEIN Negative Negative Negative Negative Negative   UROBILINOGEN 0.2 0.2 0.2 0.2 0.2   NITRITE Negative Negative Negative Negative Negative   LEUKEST Small* Negative Negative Small* Trace*   RBCU 2-5*  --  O - 2 O - 2 O - 2   WBCU 5-10*  --  0 - 5 5-10* 10-25*     No lab results found.  PTH  Recent Labs   Lab Test 06/10/24  1234 09/27/23  1400 12/13/21  1431   PTHI 53 62 119*     IRON STUDIES  Recent Labs   Lab Test 09/27/23  1400 06/21/21  1251   IRON 56 94   * 242   IRONSAT 25 39   JOSE RAMON  --  375*       TECHNIQUE: Scans were obtained from the diaphragm through the pelvis  without IV contrast. Radiation dose for this scan was reduced  using  automated exposure control, adjustment of the mA and/or kV according  to patient size, or iterative reconstruction technique.     COMPARISON: 5/19/2015.     FINDINGS: Coronary calcifications. Linear atelectasis or fibrosis left  lung base. Liver, spleen, and pancreas are normal. Previous  cholecystectomy with prominence to the common bile duct again noted  and unchanged. Atrophic changes again noted in the left kidney which  are unchanged from 5/19/2015. 2 small calculi again noted in the left  kidney. Left kidney is otherwise unchanged. Right kidney is normal in  size with small calculi again noted in the lower portion of the right  kidney which are unchanged. Right kidney and ureter are otherwise  normal. Diverticula in the colon without evidence of diverticulitis.  Colon and small bowel are otherwise normal. Calcification of the  abdominal aorta and iliac arteries. No abdominal or pelvic adenopathy.  Remainder of the scan is negative and unchanged from 5/19/2015.                                                                      IMPRESSION:   1. Bilateral nonobstructing nephrolithiasis which is unchanged from  5/19/2015. Atrophic left kidney again noted.  2. Remainder of the scan is unchanged.    Mandie Mendoza PA-C    Visit length 10 minutes. An additional 15 minutes were spent on date of service in chart review, documentation, and other activities as noted.

## 2025-04-09 ENCOUNTER — NURSE TRIAGE (OUTPATIENT)
Dept: FAMILY MEDICINE | Facility: CLINIC | Age: OVER 89
End: 2025-04-09
Payer: COMMERCIAL

## 2025-04-10 ENCOUNTER — ANCILLARY PROCEDURE (OUTPATIENT)
Dept: GENERAL RADIOLOGY | Facility: CLINIC | Age: OVER 89
End: 2025-04-10
Attending: NURSE PRACTITIONER
Payer: COMMERCIAL

## 2025-04-10 ENCOUNTER — OFFICE VISIT (OUTPATIENT)
Dept: FAMILY MEDICINE | Facility: CLINIC | Age: OVER 89
End: 2025-04-10
Payer: COMMERCIAL

## 2025-04-10 VITALS
TEMPERATURE: 97.7 F | WEIGHT: 109 LBS | BODY MASS INDEX: 22.02 KG/M2 | DIASTOLIC BLOOD PRESSURE: 70 MMHG | HEART RATE: 103 BPM | SYSTOLIC BLOOD PRESSURE: 132 MMHG | OXYGEN SATURATION: 96 % | RESPIRATION RATE: 19 BRPM

## 2025-04-10 DIAGNOSIS — M25.551 BILATERAL HIP PAIN: ICD-10-CM

## 2025-04-10 DIAGNOSIS — M25.552 BILATERAL HIP PAIN: ICD-10-CM

## 2025-04-10 DIAGNOSIS — C34.91 NON-SMALL CELL CANCER OF RIGHT LUNG (H): ICD-10-CM

## 2025-04-10 DIAGNOSIS — E03.8 OTHER SPECIFIED HYPOTHYROIDISM: ICD-10-CM

## 2025-04-10 DIAGNOSIS — M54.50 ACUTE BILATERAL LOW BACK PAIN WITHOUT SCIATICA: ICD-10-CM

## 2025-04-10 DIAGNOSIS — N18.4 CKD (CHRONIC KIDNEY DISEASE) STAGE 4, GFR 15-29 ML/MIN (H): ICD-10-CM

## 2025-04-10 DIAGNOSIS — M54.50 ACUTE BILATERAL LOW BACK PAIN WITHOUT SCIATICA: Primary | ICD-10-CM

## 2025-04-10 RX ORDER — PREDNISONE 10 MG/1
10 TABLET ORAL DAILY
COMMUNITY
Start: 2025-02-26

## 2025-04-10 ASSESSMENT — ENCOUNTER SYMPTOMS
CONSTIPATION: 0
SHORTNESS OF BREATH: 0
FEVER: 0
CHILLS: 0
HIP PAIN: 1
COUGH: 0

## 2025-04-10 ASSESSMENT — PAIN SCALES - GENERAL: PAINLEVEL_OUTOF10: SEVERE PAIN (10)

## 2025-04-10 NOTE — PROGRESS NOTES
Assessment & Plan     1. Acute bilateral low back pain without sciatica (Primary)    89-year-old female with complex PMH including non-small cell cancer of R lung, T2DM, CKD, Gout.  Currently on keytruda and prednisone 10mg daily through oncology. Has been taking prednisone BID due to worsening pain.     Fell on R side (shoulder, elbow, hip, knee) 3 weeks ago. Has had worsened back/hip pain since fall, predominately in bilateral lumbar and bilateral hips.  No sciatica symptoms.  R elbow, shoulder, knee pain have resolved.  Has follow-up with Oncology on 4/14/25. No pain at time of exam.    Patient is afebrile and in no acute distress with clear lung sounds.  Murmur appears c/w prior.  No bony tenderness in lumbar region, SLR negative bilaterally. R hip with decreased ROM and R greater trochanter pain.    Lumbar x-ray: Difficult to visualize due to bowel gas though no obvious fracture noted. Awaiting radiologist review.  Bilateral hip x-ray: Degenerative changes noted bilaterally, no obvious fracture noted. Awaiting radiologist review.    Complex patient considering oncologic hx and corticosteroid use.  Considering age, dementia, CKD, hold off on opioids until fracture confirmed by Radiology. Advised ice prn and tylenol prn. Keep close follow-up with Oncology.      - XR Lumbar Spine 2/3 Views; Future    2. Bilateral hip pain  As noted above.  - XR Pelvis and Hip Bilateral 2 Views; Future    3. Other specified hypothyroidism  Complicating factor. Due for repeat thyroid labs.  - TSH with free T4 reflex; Future    4. CKD (chronic kidney disease) stage 4, GFR 15-29 ml/min (H)  Complicating factor,    5. Non-small cell cancer of right lung (H)  Complicating factor.      Follow-up if symptoms worsen/fail to improve. Disposition pending results.      All questions/concerns addressed. Patient stated understanding/agreement to plan of care.        Note: Chart documentation was done in part with Dragon Voice Recognition  software.  Although reviewed after completion, some word and grammatical errors may remain. Please contact author for any clarification or concerns.              Radha Malik is a 89 year old, presenting for the following health issues:  Hip Pain (Had a fall 3 weeks ago whole right side of body and lower back pain)    History of Present Illness       Reason for visit:  Fall  Symptom onset:  3-4 weeks ago  Symptoms include:  Hip pain  Symptom intensity:  Severe  Symptom progression:  Worsening  Had these symptoms before:  No  What makes it worse:  Nothing  What makes it better:  Nothing   She is taking medications regularly.          89-year-old female with complex PMH including non-small cell cancer of R lung, T2DM, CKD, Gout.  Currently on keytruda and prednisone 10mg daily through oncology. Has been taking prednisone BID due to worsening pain.     Fell on R side (shoulder, elbow, hip, knee) 3 weeks ago. Has had worsened back/hip pain since fall, predominately in bilateral lumbar and bilateral hips.  No sciatica symptoms.  R elbow, shoulder, knee pain have resolved.  Has follow-up with Oncology on 4/14/25. No pain at time of exam.  No other acute concerns/symptoms at time of exam.          Review of Systems   Constitutional:  Negative for chills and fever.   Respiratory:  Negative for cough and shortness of breath.    Gastrointestinal:  Negative for constipation.   Constitutional, HEENT, cardiovascular, pulmonary, gi and gu systems are negative, except as otherwise noted.        Current Outpatient Medications   Medication Sig Dispense Refill    augmented betamethasone dipropionate (DIPROLENE AF) 0.05 % external cream Apply sparingly to affected area twice daily as needed.  Do not apply to face. 200 g 3    Calcium Carbonate-Vitamin D (CALCIUM + D) 600-5 MG-MCG TABS Take 2 tablets by mouth 2 times daily      ciclopirox (LOPROX) 0.77 % cream Apply topically At Bedtime Apply first and then desitin over this 60 g 6     famotidine (PEPCID) 20 MG tablet TAKE 1 TABLET BY MOUTH TWICE DAILY 180 tablet 0    fluocinolone (SYNALAR) 0.025 % cream Apply topically 2 times daily 240 g 4    fluocinonide (LIDEX) 0.05 % external cream Apply topically daily To affected areas on chest 120 g 1    levothyroxine (SYNTHROID/LEVOTHROID) 100 MCG tablet Take 1 tablet (100 mcg) by mouth daily. 90 tablet 0    Multiple Vitamins-Minerals (OCUVITE ADULT 50+) CAPS Take 1 capsule by mouth daily 30 capsule 12    predniSONE (DELTASONE) 10 MG tablet Take 10 mg by mouth daily.      rOPINIRole (REQUIP) 0.25 MG tablet TAKE 1 TO 2 TABLETS(0.25 TO 0.5 MG) BY MOUTH EVERY NIGHT AS NEEDED FOR RESTLESS LEGS 180 tablet 0    vancomycin (FIRVANQ) 50 MG/ML oral solution Take by mouth 4 times daily Take 2.5 mls      Apremilast (OTEZLA) 10 & 20 & 30 MG TBPK Take 10 mg daily in the morning on days 1 to 3, then 20 mg daily on days 4 and 5, then start 30 mg daily on day 6. (Patient not taking: Reported on 4/10/2025) 55 each 0    apremilast (OTEZLA) 30 MG tablet Take 1 tablet (30 mg) by mouth daily (Patient not taking: Reported on 4/10/2025) 30 tablet 11     No current facility-administered medications for this visit.     Facility-Administered Medications Ordered in Other Visits   Medication Dose Route Frequency Provider Last Rate Last Admin    perflutren diluted in saline (DEFINITY) injection 5 mL  5 mL Intravenous Once Sushma Nelson MD                 Objective        /70 (BP Location: Right arm, Patient Position: Sitting, Cuff Size: Adult Regular)   Pulse 103   Temp 97.7  F (36.5  C) (Temporal)   Resp 19   Wt 49.4 kg (109 lb)   SpO2 96%   BMI 22.02 kg/m        Physical Exam  Constitutional:       General: She is not in acute distress.     Appearance: She is not ill-appearing.   Eyes:      Extraocular Movements: Extraocular movements intact.      Pupils: Pupils are equal, round, and reactive to light.   Cardiovascular:      Rate and Rhythm: Regular rhythm.       Heart sounds: Murmur heard.      Systolic murmur is present with a grade of 1/6.      Comments: Soft systolic murmur  Pulmonary:      Effort: No respiratory distress.      Breath sounds: Normal breath sounds. No wheezing or rales.   Abdominal:      General: Bowel sounds are normal.      Palpations: Abdomen is soft.      Tenderness: There is no abdominal tenderness.   Musculoskeletal:      Right shoulder: No tenderness or bony tenderness.      Right elbow: No swelling. No tenderness.      Cervical back: Neck supple.      Lumbar back: No tenderness or bony tenderness. Negative right straight leg raise test and negative left straight leg raise test.      Right hip: Tenderness present. Decreased range of motion.      Left hip: No tenderness. Normal range of motion.      Right lower leg: No edema.      Left lower leg: No edema.      Comments: No erythema noted.  R greater trochanter pain.   Lymphadenopathy:      Cervical: No cervical adenopathy.   Neurological:      General: No focal deficit present.      Mental Status: She is alert.   Psychiatric:         Behavior: Behavior normal.          Lumbar x-ray: Difficult to visualize due to bowel gas though no obvious fracture noted. Awaiting radiologist review.  Bilateral hip x-ray: Degenerative changes noted bilaterally, no obvious fracture noted. Awaiting radiologist review.          Signed Electronically by: MERARI Adames CNP

## 2025-04-10 NOTE — TELEPHONE ENCOUNTER
Called patient. States that she is hurting in hips. Took prednisone which was prescribed by her Oncologist, but pain has been getting worse. States that she is receiving cancer treatment. Pt wanting to know if pain is related to her cancer treatment. Advised that she contact her Oncologist regarding this question and to get further recommendations for pain management.    Pt had a fall on Saturday 2-3 weeks ago. States that she fell on her right side and hit her elbow, knee, and right side.  States that she fell when getting package from front step.  Has not seen anyone since then for assessment.  Having severe pain now in both upper legs. Rates pain as 8/10. She has pain when getting up to walk. It is an aching pain.   It is hard to get up. She has to hold onto a son's arm when leaving car. She is afraid of falling because she can't get up.  She has numbness in feet, but that has been there for awhile and is not a new symptom.    States that she she sees Dr. Hsieh at [facility]    Triage disposition: Go To Office Now   She is agreeable.   Per pt, her boys drive her to any appts. Ge is not working and could give her a ride. We do not have Ge's number on file. Pt states that it is too painful for her to get up to her purse. She asked writer to call Tobi to get Ge's number. Called Tobi who gave writer Ge's # [phone]. Spoke with Ge who is agreeable to bring pt into clinic. Pt scheduled.    Reason for Disposition   SEVERE pain (e.g., excruciating, unable to do any normal activities)    Additional Information   Negative: Fever and red area (or area very tender to touch)   Negative: Patient sounds very sick or weak to the triager   Negative: Followed a hip injury   Negative: Leg pain is main symptom   Negative: Back pain radiating (shooting) into hip   Negative: SEVERE pain (e.g., excruciating, unable to do any normal activities) and fever   Negative: Can't stand (bear weight) or walk   Negative: Looks  like a broken bone or dislocated joint (e.g., crooked or deformed)   Negative: Sounds like a life-threatening emergency to the triager    Protocols used: Hip Pain-A-[place]  Melvi Hernandez RN

## 2025-04-16 NOTE — TELEPHONE ENCOUNTER
Do you want to order labs for pt?   Notify patient we can't add on to their lab draws via Dr Hsieh.      She can make future lab appt for the cholesterol and thyroid recheck at her convenience.  Sorry about the inconvenience.

## 2025-04-17 ENCOUNTER — PATIENT OUTREACH (OUTPATIENT)
Dept: CARE COORDINATION | Facility: CLINIC | Age: OVER 89
End: 2025-04-17
Payer: COMMERCIAL

## 2025-05-13 ENCOUNTER — PATIENT OUTREACH (OUTPATIENT)
Dept: NEPHROLOGY | Facility: CLINIC | Age: OVER 89
End: 2025-05-13
Payer: COMMERCIAL

## 2025-05-13 NOTE — TELEPHONE ENCOUNTER
Nephrology Note: RN CC Chart Review    REASON FOR ENCOUNTER:     Chart reviewed by nephrology RN CC                          SITUATION/BACKROUND:     Last nephrology visit:    Last Renal Panel:  Sodium   Date Value Ref Range Status   06/10/2024 139 135 - 145 mmol/L Final     Comment:     Reference intervals for this test were updated on 09/26/2023 to more accurately reflect our healthy population. There may be differences in the flagging of prior results with similar values performed with this method. Interpretation of those prior results can be made in the context of the updated reference intervals.    06/21/2021 135 133 - 144 mmol/L Final     Potassium   Date Value Ref Range Status   06/10/2024 5.1 3.4 - 5.3 mmol/L Final   11/10/2022 3.7 3.4 - 5.3 mmol/L Final   06/21/2021 4.1 3.4 - 5.3 mmol/L Final     Chloride   Date Value Ref Range Status   06/10/2024 102 98 - 107 mmol/L Final   11/10/2022 101 94 - 109 mmol/L Final   06/21/2021 100 94 - 109 mmol/L Final     Carbon Dioxide   Date Value Ref Range Status   06/21/2021 30 20 - 32 mmol/L Final     Carbon Dioxide (CO2)   Date Value Ref Range Status   06/10/2024 26 22 - 29 mmol/L Final   11/10/2022 30 20 - 32 mmol/L Final     Anion Gap   Date Value Ref Range Status   06/10/2024 11 7 - 15 mmol/L Final   11/10/2022 8 3 - 14 mmol/L Final   06/21/2021 5 3 - 14 mmol/L Final     Glucose   Date Value Ref Range Status   06/10/2024 82 70 - 99 mg/dL Final   11/10/2022 131 (H) 70 - 99 mg/dL Final   06/21/2021 96 70 - 99 mg/dL Final     Urea Nitrogen   Date Value Ref Range Status   06/10/2024 30.7 (H) 8.0 - 23.0 mg/dL Final   11/10/2022 25 7 - 30 mg/dL Final   06/21/2021 29 7 - 30 mg/dL Final     Creatinine   Date Value Ref Range Status   06/10/2024 2.29 (H) 0.51 - 0.95 mg/dL Final   06/21/2021 2.11 (H) 0.52 - 1.04 mg/dL Final     GFR Estimate   Date Value Ref Range Status   06/10/2024 20 (L) >60 mL/min/1.73m2 Final   06/21/2021 21 (L) >60 mL/min/[1.73_m2] Final     Comment:      Non  GFR Calc  Starting 12/18/2018, serum creatinine based estimated GFR (eGFR) will be   calculated using the Chronic Kidney Disease Epidemiology Collaboration   (CKD-EPI) equation.       Calcium   Date Value Ref Range Status   06/10/2024 9.4 8.8 - 10.2 mg/dL Final   06/21/2021 8.9 8.5 - 10.1 mg/dL Final     Phosphorus   Date Value Ref Range Status   06/10/2024 3.4 2.5 - 4.5 mg/dL Final   06/21/2021 3.9 2.5 - 4.5 mg/dL Final     Albumin   Date Value Ref Range Status   06/10/2024 4.4 3.5 - 5.2 g/dL Final   11/10/2022 3.8 3.4 - 5.0 g/dL Final   06/21/2021 4.0 3.4 - 5.0 g/dL Final         Neph Tracking Status:  Neph Tracking Flowsheet Last Filled Values       Patient's Referral Dates Auto Populate Patient's Referral Dates    Home Care Referral 5/22/23    Journey Referral 1/9/21    Transplant Status  Not Eligible  likely not eligible due to age              ASSESSMENT/PLAN     Patient to follow up as scheduled at next appointment  Patient to call/[facility] message with updates    Upcoming Appointments:  Future Appts Next 180 days       Visit Type Date Time Department    RETURN NEPHROLOGY 6/30/2025  1:30 PM [facility]              JHONNY SALMERON RN

## 2025-07-23 ENCOUNTER — PATIENT OUTREACH (OUTPATIENT)
Dept: NEPHROLOGY | Facility: CLINIC | Age: OVER 89
End: 2025-07-23
Payer: COMMERCIAL

## 2025-07-23 NOTE — PROGRESS NOTES
Pt  25. CKD Journey resolved. General Nephrology RNCC removed from care team. Neph tracking updated to the fullest extent possible.    Neph Tracking Flowsheet Last Filled Values       Patient's Referral Dates Auto Populate Patient's Referral Dates    Home Care Referral 23    Journey Referral 21    Transplant Status  Not Eligible  likely not eligible due to age          Mildred Waterman, RN, BSN  Nephrology Clinic Manager  [facility]

## 2025-07-28 DIAGNOSIS — K21.00 GASTROESOPHAGEAL REFLUX DISEASE WITH ESOPHAGITIS WITHOUT HEMORRHAGE: ICD-10-CM

## 2025-07-28 RX ORDER — FAMOTIDINE 20 MG/1
20 TABLET, FILM COATED ORAL 2 TIMES DAILY
Qty: 180 TABLET | Refills: 0 | OUTPATIENT
Start: 2025-07-28

## (undated) DEVICE — SOL WATER INJ 2000ML BAG 07118-07

## (undated) DEVICE — SHEATH URETERAL ACCESS NAVIGATOR 11/13FRX36CM 250-203

## (undated) DEVICE — KIT ENDO FIRST STEP DISINFECTANT 200ML W/POUCH EP-4

## (undated) DEVICE — Device

## (undated) DEVICE — SOL WATER IRRIG 1000ML BOTTLE 07139-09

## (undated) DEVICE — BASKET NITINOL TIPLESS HALO  1.5FRX120CM 554120

## (undated) DEVICE — GOWN XLG DISP 9545

## (undated) DEVICE — SOL NACL 0.9% IRRIG 3000ML BAG 07972-08

## (undated) DEVICE — GUIDEWIRE AMPLATZ 0.035"X145CM  SUPER STIFF 640-104

## (undated) DEVICE — DECANTER TRANSFER DEVICE 2008S

## (undated) DEVICE — SPECIMEN CONTAINER 4OZ

## (undated) DEVICE — ESU GROUND PAD ADULT W/CORD E7507

## (undated) DEVICE — DRSG TELFA 3X8" 1238

## (undated) DEVICE — GLOVE PROTEXIS W/NEU-THERA 7.0  2D73TE70

## (undated) DEVICE — GUIDEWIRE SENSOR DUAL FLEX STR 0.035"X150CM M0066703080

## (undated) DEVICE — CATH URETERAL DL 6FR FLEX-TIP 10FRX50CM G17323 AQ-022610

## (undated) RX ORDER — ACETAMINOPHEN 325 MG/1
TABLET ORAL
Status: DISPENSED
Start: 2020-07-13

## (undated) RX ORDER — PHENYLEPHRINE HCL IN 0.9% NACL 1 MG/10 ML
SYRINGE (ML) INTRAVENOUS
Status: DISPENSED
Start: 2020-07-13

## (undated) RX ORDER — CEFAZOLIN SODIUM 2 G/100ML
INJECTION, SOLUTION INTRAVENOUS
Status: DISPENSED
Start: 2020-07-13

## (undated) RX ORDER — FENTANYL CITRATE 50 UG/ML
INJECTION, SOLUTION INTRAMUSCULAR; INTRAVENOUS
Status: DISPENSED
Start: 2020-07-28

## (undated) RX ORDER — FENTANYL CITRATE 50 UG/ML
INJECTION, SOLUTION INTRAMUSCULAR; INTRAVENOUS
Status: DISPENSED
Start: 2020-07-13

## (undated) RX ORDER — ONDANSETRON 2 MG/ML
INJECTION INTRAMUSCULAR; INTRAVENOUS
Status: DISPENSED
Start: 2020-07-28

## (undated) RX ORDER — ACETAMINOPHEN 325 MG/1
TABLET ORAL
Status: DISPENSED
Start: 2020-07-28

## (undated) RX ORDER — PROPOFOL 10 MG/ML
INJECTION, EMULSION INTRAVENOUS
Status: DISPENSED
Start: 2020-07-28